# Patient Record
Sex: MALE | Race: WHITE | NOT HISPANIC OR LATINO | Employment: OTHER | URBAN - METROPOLITAN AREA
[De-identification: names, ages, dates, MRNs, and addresses within clinical notes are randomized per-mention and may not be internally consistent; named-entity substitution may affect disease eponyms.]

---

## 2017-01-12 ENCOUNTER — OFFICE VISIT (OUTPATIENT)
Dept: PODIATRY | Facility: CLINIC | Age: 62
End: 2017-01-12
Payer: COMMERCIAL

## 2017-01-12 ENCOUNTER — RADIANT APPOINTMENT (OUTPATIENT)
Dept: GENERAL RADIOLOGY | Facility: CLINIC | Age: 62
End: 2017-01-12
Attending: PODIATRIST
Payer: COMMERCIAL

## 2017-01-12 VITALS — TEMPERATURE: 96.2 F | HEIGHT: 71 IN | BODY MASS INDEX: 30.1 KG/M2 | WEIGHT: 215 LBS

## 2017-01-12 DIAGNOSIS — M72.2 PLANTAR FASCIAL FIBROMATOSIS: ICD-10-CM

## 2017-01-12 DIAGNOSIS — M79.672 FOOT PAIN, BILATERAL: ICD-10-CM

## 2017-01-12 DIAGNOSIS — M79.671 FOOT PAIN, BILATERAL: ICD-10-CM

## 2017-01-12 DIAGNOSIS — M20.5X1 HALLUX LIMITUS OF RIGHT FOOT: Primary | ICD-10-CM

## 2017-01-12 PROCEDURE — 73630 X-RAY EXAM OF FOOT: CPT | Mod: TC

## 2017-01-12 PROCEDURE — 20600 DRAIN/INJ JOINT/BURSA W/O US: CPT | Mod: RT | Performed by: PODIATRIST

## 2017-01-12 PROCEDURE — 99213 OFFICE O/P EST LOW 20 MIN: CPT | Mod: 25 | Performed by: PODIATRIST

## 2017-01-12 RX ORDER — DICLOFENAC SODIUM 75 MG/1
75 TABLET, DELAYED RELEASE ORAL 2 TIMES DAILY
Qty: 60 TABLET | Refills: 1 | Status: SHIPPED | OUTPATIENT
Start: 2017-01-12 | End: 2017-04-06

## 2017-01-12 ASSESSMENT — PAIN SCALES - GENERAL: PAINLEVEL: MODERATE PAIN (4)

## 2017-01-12 NOTE — NURSING NOTE
"Chief Complaint   Patient presents with     Consult     Right top and lateral foot pain 4; new issue     Consult     B/L great toe numbness, burning > Right early Dec 2016; new issue     RECHECK     improvement with NS, supportive shoes, orthotics, tendon feels better now tenderness of heel bone, pain 2Left plantar fasciitis; LOV 10/6/2016       Initial Temp(Src) 96.2  F (35.7  C) (Temporal)  Ht 5' 10.87\" (1.8 m)  Wt 215 lb (97.523 kg)  BMI 30.10 kg/m2 Estimated body mass index is 30.1 kg/(m^2) as calculated from the following:    Height as of this encounter: 5' 10.87\" (1.8 m).    Weight as of this encounter: 215 lb (97.523 kg).  BP completed using cuff size: NA (Not Taken)    Dede Portillo CMA, January 12, 2017    "

## 2017-01-12 NOTE — PROGRESS NOTES
"HPI:  Jose Angel Cha is a 61 year old male who is seen in consultation at the request of self.    Pt presents for eval of:   (Onset, Location, L/R, Character, Treatments, Injury if yes)     XR Left and Right foot today  Onset early January 2017, Right dorsal and lateral foot pain, dull ache, feels \"structural\" while walking  Onset early Dec 2016, Left and Right great toe numbness and burning > Right, feels like it is frostbitten  Wearing supportive shoes and orthotics    Chief Complaint   Patient presents with     Consult     Right top and lateral foot pain 4; new issue     Consult     B/L great toe numbness, burning > Right early Dec 2016; new issue     RECHECK     improvement with NS, supportive shoes, orthotics, tendon feels better now tenderness of heel bone, pain 2Left plantar fasciitis; LOV 10/6/2016       Weight management plan: Patient was referred to their PCP to discuss a diet and exercise plan.     HPI:  Jose Angel Cha is a 61 year old male who is seen in consultation at the request of Liz Gilliland MD.    Pt presents for evaluation of:     Left heel pain, starting 6/20/16 after jumping out of boot into sand beach barefoot.  Since then it has gotten better and worse and remain symptomatic. Terrible pain upon arising after rest. He also notes swelling off and on.    Now a new problem pain about right toe joint and limited motions, stiffness and edema.      Onset:  mid and June  Symptoms brought on by jumping out of boat.    Location:  Left foot    Laterality:  Plantar and Posterior    Character:  sharp, stabbing and swelling    Progression of symptoms:  same    Previous similar pain: no      Pain Level:  2/10    Previous treatments:  ice and boot x one week, elevation    Previous foot or ankle injuries throughout your life that took you out of work or play for more than a few days? no      Works as a .    Shoe gear: slip on shoes and dress shoes    Weight management plan: Patient was referred " "to their PCP to discuss a diet and exercise plan.    EXAM:Vitals: Temp(Src) 96.2  F (35.7  C) (Temporal)  Ht 5' 10.87\" (1.8 m)  Wt 215 lb (97.523 kg)  BMI 30.10 kg/m2  BMI= Body mass index is 30.1 kg/(m^2).    General appearance: Patient is alert and fully cooperative with history & exam.  No sign of distress is noted during the visit.     Psychiatric: Affect is pleasant & appropriate.  Patient appears motivated to improve health.     Respiratory: Breathing is regular & unlabored while sitting.     HEENT: Hearing is intact to spoken word.  Speech is clear.  No gross evidence of visual impairment that would impact ambulation.     Vascular: DP & PT 2/4 & regular bilaterally.  No significant edema, rubor or varicosities noted.  CFT and skin temperature is normal to both lower extremities.       Neurologic: Lower extremity sensation is intact to light touch.  No evidence of weakness in the lower extremities.  No evidence of neuropathy and negative tinel sign.     Dermatologic: Skin is intact to both lower extremities without significant lesions, rash or abrasion.  Normal texture turgor and tone. No paronychia or evidence of soft tissue infection is noted.    Musculoskeletal: Patient is ambulatory without assistive device or brace.   Much reduced discomfort with compression of the LEFT calcaneus medial to lateral over the any palpation of the proximal plantar fascia and calcaneus. Possible induration about the calcaneus. Most discomfort is noted throughout the entire arch and towards the medial band of the plantar fascia at the origination upon the calcaneus. I cannot be certain a stress fracture is not present at this time.    Now pain at left first MPJ with induration and pian with palpation and ROM.  40 total ROM both first MPJ.     Radiographs:  6/16 demonstrates A very large osteophyte is noted about the plantar calcaneus consistent with plantar fasciitis.     ASSESSMENT:       ICD-10-CM    1. Hallux limitus of " right foot M20.5X1 DRAIN/INJECT SMALL JOINT/BURSA     diclofenac (VOLTAREN) 75 MG EC tablet     Kenalog 10 MG         []     triamcinolone acetonide (KENALOG) 10 MG/ML injection   2. Plantar fascial fibromatosis M72.2 XR Foot Bilateral G/E 3 Views     ORTHOTICS REFERRAL     DRAIN/INJECT SMALL JOINT/BURSA     diclofenac (VOLTAREN) 75 MG EC tablet       PLAN:  Reviewed patient's chart in [facility] and discussed etiology and treatment options.      Treatments:  9/13/2016  This is most certainly plantar fasciitis however I cannot rule out stress fracture or fracture of the plantar calcaneal osteophyte. Therefore I have recommended MRI.  Follow up immediately after the MRI  He has mild anxiety with claustrophobia and therefore was given a preprocedure Valium 10 mg Rx. Instructed to have a  to take him to and from if he is using this    Also dispensed a night splint as this will not likely be detrimental in either case.  Follow-up in the next few days after the MRI    10/6/2016  Reviewed MRI findings demonstrating plantar fasciitis without calcaneal stress fracture  Order for orthotic  continue night splint all night every night    Use the OTC naprosyn 400 bid     1/12/2017  Evaluation of new problem hallux limitus right foot   I obtained informed consent, discussed risks and alternative treatments, prepped with ETOH, and injected 10 mg Kenalog and lidocaine about the right first MPJ.  Sterile dressing applied.   Continue orthotics and stiff shoes.  RTC prn    Also order for second pair of custom molded orthotics at his request to address and follow-up with previous established plantar fasciitis      Anshul Rocha DPM

## 2017-01-12 NOTE — PATIENT INSTRUCTIONS
Reliable shoe stores: To maximize your experience and provide the best possible fit.  Be sure to show them your foot concerns and tell them Dr. Rocha sent you.        Stores listed in bold have only athletic shoes, and stores that are not bold are mostly casual or variety of shoes    [employer]  [address] [place] [address]  [phone]    [employer]  [address] [place] [address]  [phone]     [employer] [place]  [address] [place] [address]  [phone]    [employer]  [address] [facility]  [facility] [address]  [phone]    [employer]  [address] [place], [place] [address] [phone]    [employer]  [address] [place] [address]  [phone]                         [employer] Locations:     [address] [place] [address]   [phone]     [address] [place] [address] [address] [place] [address]   [phone]     [address] [place] [address]   [phone]     [address] [facility] [place] [address]   [phone]     [address] [place] [address]   [phone]     [address] [place] [address]   [phone]     [address] [facility] [address]   [place] [address]   [phone]     [address] [facility]   [place] [address]   [phone]             How to find reasonable shoes          The correct width    Correct Fitting    Correct Length      Foot Distortion    Posture Distortion                          Torsional Rigidity      Grasp behind the heel and underneath the foot and twist      Bad    Excessive torsion/twist in midfoot     Less torsion/twist in midfoot is better                   Heel Counter Rigidity      Grasp just above   midsole and squeeze      Bad    Soft heel counter      Good    Rigid Heel Counter      Flexion Rigidity      Grasp shoe and bend from forefoot to rearfoot

## 2017-01-12 NOTE — MR AVS SNAPSHOT
After Visit Summary   1/12/2017    Jose Angel Cha    MRN: [id number]           Patient Information     Date Of Birth          1955        Visit Information        Provider Department      1/12/2017 11:00 AM Anshul Rocha, [facility]        Today's Diagnoses     Hallux limitus of right foot    -  1     Plantar fascial fibromatosis         Foot pain, bilateral           Care Instructions    Reliable shoe stores: To maximize your experience and provide the best possible fit.  Be sure to show them your foot concerns and tell them Dr. Rocha sent you.        Stores listed in bold have only athletic shoes, and stores that are not bold are mostly casual or variety of shoes    [employer]  [address] [place] [address]  [phone]    [employer] [place]  [address] [place] [address]  [phone]    [employer] [place]  [address] [place], [place] [address] [phone]    [employer]  [address] [facility]  [facility] [address]  [phone]    [employer]  [address] [place] [address]  [phone]    [employer]  [address] [place] [address]  [phone]                         [employer] Locations:     [address] [place] [address]   [phone]     [address] [facility] [address] [place] [address]   [phone]     [address] [place] [address]   [phone]     [address] [place] [address]   [phone]     [address] [place] [address] [place] [address]   [phone]     [address] [place] [address]   [phone]     [address]  [facility] [address]   [place] [address]   [phone]     [address]   [place], [place] [address] [phone]             How to find reasonable shoes          The correct width    Correct Fitting    Correct Length      Foot Distortion    Posture Distortion                          Torsional  "Rigidity      Grasp behind the heel and underneath the foot and twist      Bad    Excessive torsion/twist in midfoot     Less torsion/twist in midfoot is better                   Heel Counter Rigidity      Grasp just above   midsole and squeeze      Bad    Soft heel counter      Good    Rigid Heel Counter      Flexion Rigidity      Grasp shoe and bend from forefoot to rearfoot                      Follow-ups after your visit        Additional Services     ORTHOTICS REFERRAL       [facility] scheduling staff may contact patient to arrange appointments for casting of orthotics and often do not leave messages.  The patient may call this number for scheduling at their convenience. Scheduling Phone [phone].      One pair custom functional foot orthotics.   Flexible polypropylene shell with 1/8\" Spenco cushioned top cover, crepe rearfoot post, medial density arch fill, OZ bottom cover.  Aerobic model.  Subs permitted!!  May reduce volume if patient requests.                  Your next 10 appointments already scheduled     Jan 17, 2017  2:00 PM   HST  with SLEEP STUDY  7   [facility], [facility], Sleep Study ([facility])    [address] [place] [id number] [phone]            Jan 25, 2017  3:00 PM   Telephone Visit with Renu Mello MD   [facility], Sleep Study ([facility])    [address] [place] [id number] [phone]           Note: this is not an onsite visit; there is no need to come to the facility.              Who to contact     If you have questions or need follow up information about today's clinic visit or your schedule please contact [facility] directly at [phone].  Normal or non-critical lab and imaging results will be communicated to you by MyChart, letter or phone within 4 business days after the clinic has received the results. If you do not hear " "from us within 7 days, please contact the clinic through Expan or phone. If you have a critical or abnormal lab result, we will notify you by phone as soon as possible.  Submit refill requests through Expan or call your pharmacy and they will forward the refill request to us. Please allow 3 business days for your refill to be completed.          Additional Information About Your Visit        [employer] Search Information     Expan gives you secure access to your electronic health record. If you see a primary care provider, you can also send messages to your care team and make appointments. If you have questions, please call your primary care clinic.  If you do not have a primary care provider, please call [phone] and they will assist you.        Care EveryWhere ID     This is your Care EveryWhere ID. This could be used by other organizations to access your [facility] medical records  [id number]        Your Vitals Were     Temperature Height BMI (Body Mass Index)             96.2  F (35.7  C) (Temporal) 5' 10.87\" (1.8 m) 30.10 kg/m2          Blood Pressure from Last 3 Encounters:   12/07/16 150/94   10/26/16 146/96   09/13/16 115/64    Weight from Last 3 Encounters:   01/12/17 215 lb (97.523 kg)   12/07/16 215 lb (97.523 kg)   10/26/16 215 lb 8 oz (97.75 kg)              We Performed the Following     DRAIN/INJECT SMALL JOINT/BURSA     ORTHOTICS REFERRAL          Today's Medication Changes          These changes are accurate as of: 1/12/17 12:04 PM.  If you have any questions, ask your nurse or doctor.               Start taking these medicines.        Dose/Directions    diclofenac 75 MG EC tablet   Commonly known as:  VOLTAREN   Used for:  Hallux limitus of right foot, Plantar fascial fibromatosis   Started by:  Anshul Rocha DPM        Dose:  75 mg   Take 1 tablet (75 mg) by mouth 2 times daily   Quantity:  60 tablet   Refills:  1            Where to get your medicines      These medications were sent to " [facility] [place], [place] - [address]   [address] , [facility] [address]     Phone:  [phone]    - diclofenac 75 MG EC tablet             Primary Care Provider Office Phone # Fax #    Shari Paredes [phone]       [facility]  [address]  [facility] [address]        Thank you!     Thank you for choosing [facility]  for your care. Our goal is always to provide you with excellent care. Hearing back from our patients is one way we can continue to improve our services. Please take a few minutes to complete the written survey that you may receive in the mail after your visit with us. Thank you!             Your Updated Medication List - Protect others around you: Learn how to safely use, store and throw away your medicines at www.disposemymeds.org.          This list is accurate as of: 1/12/17 12:04 PM.  Always use your most recent med list.                   Brand Name Dispense Instructions for use    * ALLERGEN IMMUNOTHERAPY PRESCRIPTION     13 mL    Name of Mix: Mix #1  Mixed Vespid Mixed Vespid Venom 300 mcg/mL HS 13 ml Diluent: HSA qs to 13ml       * ALLERGEN IMMUNOTHERAPY PRESCRIPTION     13 mL    Name of Mix: Mix #2  Wasp Wasp Venom 100 mcg/mL HS 13 ml Diluent: HSA qs to 13ml       amLODIPine 2.5 MG tablet    NORVASC    30 tablet    Take 1 tablet (2.5 mg) by mouth daily       aspirin 81 MG tablet     0    ONE DAILY       diazepam 10 MG tablet    VALIUM    1 tablet    Take 1 tablet (10 mg) by mouth every 6 hours as needed for anxiety or sleep Take 30-60 minutes before procedure.  Do not operate a vehicle after taking this medication.       diclofenac 75 MG EC tablet    VOLTAREN    60 tablet    Take 1 tablet (75 mg) by mouth 2 times daily       EPINEPHrine 0.3 MG/0.3ML injection    EPIPEN 2-MIGUELINA    2 each    Inject 0.3 mLs (0.3 mg) into the muscle once as needed for anaphylaxis       MULTIVITAL PO      Take 1 tablet by mouth daily        NAPROXEN PO          omeprazole 20 MG CR capsule    priLOSEC    90 capsule    TAKE ONE CAPSULE BY MOUTH EVERY DAY (TAKE 30 TO 60 MINUTES BEFORE A MEAL)       STATIN NOT PRESCRIBED (INTENTIONAL)      by Other route continuous prn.       temazepam 15 MG capsule    RESTORIL    30 capsule    Take 1 capsule (15 mg) by mouth nightly as needed for sleep       * Notice:  This list has 2 medication(s) that are the same as other medications prescribed for you. Read the directions carefully, and ask your doctor or other care provider to review them with you.

## 2017-01-17 ENCOUNTER — OFFICE VISIT (OUTPATIENT)
Dept: SLEEP MEDICINE | Facility: CLINIC | Age: 62
End: 2017-01-17
Attending: INTERNAL MEDICINE
Payer: COMMERCIAL

## 2017-01-17 DIAGNOSIS — G47.33 OBSTRUCTIVE SLEEP APNEA SYNDROME: ICD-10-CM

## 2017-01-17 PROCEDURE — G0399 HOME SLEEP TEST/TYPE 3 PORTA: HCPCS | Mod: ZF

## 2017-01-17 NOTE — PATIENT INSTRUCTIONS
My home sleep study:    ______I will activate the device as shown on the video    __X____My device is programmed to start automatically at     ___11 P.M___________ Time   on _01/17/2017_____________  Day/Date    My contact number if I have problems is ____[phone]_____________________      I will watch the video before I hook it up at night: [email or web address]

## 2017-01-17 NOTE — MR AVS SNAPSHOT
After Visit Summary   1/17/2017    Jose Angel Cha    MRN: [id number]           Patient Information     Date Of Birth          1955        Visit Information        Provider Department      1/17/2017 2:00 PM [facility]        Today's Diagnoses     Obstructive sleep apnea syndrome           Care Instructions    My home sleep study:    ______I will activate the device as shown on the video    __X____My device is programmed to start automatically at     ___11 P.M___________ Time   on _01/17/2017_____________  Day/Date    My contact number if I have problems is ____[phone]_____________________      I will watch the video before I hook it up at night: [email or web address]                Follow-ups after your visit        Your next 10 appointments already scheduled     Jan 17, 2017  2:00 PM   HST  with SLEEP STUDY RM 7   [facility]    [address] [place] [id number] [phone]            Jan 25, 2017  3:00 PM   Telephone Visit with Renu Mello MD   [facility]    [address] [place] [id number] [phone]           Note: this is not an onsite visit; there is no need to come to the facility.              Who to contact     If you have questions or need follow up information about today's clinic visit or your schedule please contact [facility] directly at [phone].  Normal or non-critical lab and imaging results will be communicated to you by MyChart, letter or phone within 4 business days after the clinic has received the results. If you do not hear from us within 7 days, please contact the clinic through MyChart or phone. If you have a critical or abnormal lab result, we will notify you by phone as soon as possible.  Submit refill requests  through [employer]Meter or call your pharmacy and they will forward the refill request to us. Please allow 3 business days for your refill to be completed.          Additional Information About Your Visit        [employer] Information     [employer] gives you secure access to your electronic health record. If you see a primary care provider, you can also send messages to your care team and make appointments. If you have questions, please call your primary care clinic.  If you do not have a primary care provider, please call [phone] and they will assist you.        Care EveryWhere ID     This is your Care EveryWhere ID. This could be used by other organizations to access your [facility] medical records  [id number]         Blood Pressure from Last 3 Encounters:   12/07/16 150/94   10/26/16 146/96   09/13/16 115/64    Weight from Last 3 Encounters:   01/12/17 97.523 kg (215 lb)   12/07/16 97.523 kg (215 lb)   10/26/16 97.75 kg (215 lb 8 oz)              We Performed the Following     HST-HOME SLEEP TEST/TYPE 3 [facility]        Primary Care Provider Office Phone # Fax #    Shari Tonia Paredes [phone]       [facility]  [address] [facility] [address]        Thank you!     Thank you for choosing [facility], SLEEP STUDY  for your care. Our goal is always to provide you with excellent care. Hearing back from our patients is one way we can continue to improve our services. Please take a few minutes to complete the written survey that you may receive in the mail after your visit with us. Thank you!             Your Updated Medication List - Protect others around you: Learn how to safely use, store and throw away your medicines at www.disposemymeds.org.          This list is accurate as of: 1/17/17 11:15 AM.  Always use your most recent med list.                   Brand Name Dispense Instructions for use    * ALLERGEN IMMUNOTHERAPY PRESCRIPTION     13 mL    Name of Mix: Mix #1  Mixed Vespid Mixed  Vespid Venom 300 mcg/mL HS 13 ml Diluent: HSA qs to 13ml       * ALLERGEN IMMUNOTHERAPY PRESCRIPTION     13 mL    Name of Mix: Mix #2  Wasp Wasp Venom 100 mcg/mL HS 13 ml Diluent: HSA qs to 13ml       amLODIPine 2.5 MG tablet    NORVASC    30 tablet    Take 1 tablet (2.5 mg) by mouth daily       aspirin 81 MG tablet     0    ONE DAILY       diazepam 10 MG tablet    VALIUM    1 tablet    Take 1 tablet (10 mg) by mouth every 6 hours as needed for anxiety or sleep Take 30-60 minutes before procedure.  Do not operate a vehicle after taking this medication.       diclofenac 75 MG EC tablet    VOLTAREN    60 tablet    Take 1 tablet (75 mg) by mouth 2 times daily       EPINEPHrine 0.3 MG/0.3ML injection    EPIPEN 2-MIGUELINA    2 each    Inject 0.3 mLs (0.3 mg) into the muscle once as needed for anaphylaxis       MULTIVITAL PO      Take 1 tablet by mouth daily       NAPROXEN PO          omeprazole 20 MG CR capsule    priLOSEC    90 capsule    TAKE ONE CAPSULE BY MOUTH EVERY DAY (TAKE 30 TO 60 MINUTES BEFORE A MEAL)       STATIN NOT PRESCRIBED (INTENTIONAL)      by Other route continuous prn.       temazepam 15 MG capsule    RESTORIL    30 capsule    Take 1 capsule (15 mg) by mouth nightly as needed for sleep       * Notice:  This list has 2 medication(s) that are the same as other medications prescribed for you. Read the directions carefully, and ask your doctor or other care provider to review them with you.

## 2017-01-17 NOTE — PROGRESS NOTES
Patient presented to clinic for  and demonstration of the HST. Patient was set up and instructed use. Patient verbalized understanding and will be returning device after 8am tomorrow    Patient was given sleep logs and written instructions for use    BRANDO Crespo  Clinic Coordinator   Registered Medical Assistant   [facility]

## 2017-01-20 NOTE — PROGRESS NOTES
This HST performed using a Noxturnal T3 device which recorded snore, sound, movement activity, body position, nasal pressure, oronasal thermal airflow, pulse, oximetry and both chest and abdominal respiratory effort. HST data was confined to the time patients states they were in bed.   Patient was score using 1B rules. Patient respiratory events showed an AHI 2.5 with moderate snoring. Patient SP02 below 89% was 1.8 minutes. Overall signal quality was good.    Pt will follow up with sleep provider to determine appropriate therapy.

## 2017-01-20 NOTE — PROCEDURES
"HOME SLEEP STUDY INTERPRETATION    Patient: Jose Angel Cha  MRN: [id number]  YOB: 1955  Study Date: 1/17/2017  Referring Provider: Shari Paredes  Ordering Provider: Renu Mello MD     Indications for Home Study: Jose Angel Cha is a 61 year old male with a history of mild LISBET treated with a mandibular advancement device who presents for home sleep apnea testing for evaluation of efficacy of the mandibular advancement device.    Estimated body mass index is 30.10 kg/(m^2) as calculated from the following:    Height as of 1/12/17: 1.8 m (5' 10.87\").    Weight as of 1/12/17: 97.523 kg (215 lb).  Total score - [place]: 15 (12/7/2016 12:00 PM)    Data: A full night home sleep study was performed recording the standard physiologic parameters including body position, movement, sound, nasal pressure, thermal oral airflow, chest and abdominal movements with respiratory inductance plethysmography, and oxygen saturation by pulse oximetry. Pulse rate was estimated by oximetry recording. This study was considered adequate based on > 4 hours of quality oximetry and respiratory recording.     Analysis Time:  455 minutes    Respiration:   Sleep Associated Hypoxemia: sustained hypoxemia was not present. Baseline oxygen saturation was 93%.  Time with saturation less than or equal to 88% was 1.8 minutes. The lowest oxygen saturation was 70%.   Snoring: Snoring was absent.  Respiratory events: The home study revealed a presence of 2 obstructive apneas and 0 mixed and central apneas. There were 17 hypopneas resulting in a combined apnea/hypopnea index [AHI] of 2.5 events per hour.  AHI was 0 per hour supine, 0 per hour prone, 2.5 per hour on left side, and 2.6 per hour on right side.   Pattern: Excluding events noted above, respiratory rate and pattern was Normal.    Position: Percent of time spent: supine - 1.2%, prone - 0%, on left - 58%, on right - 40%.    Heart Rate: By pulse oximetry normal rate was noted. "     Assessment:   No remnant obstructive sleep apnea with the use of mandibular advancement device.  Sleep associated hypoxemia was not present.    Recommendations:  Continue  oral appliance therapy.  Suggest optimizing sleep hygiene and avoiding sleep deprivation.  Weight management.    Diagnosis Code(s): Obstructive Sleep Apnea G47.33    Haja Chou MD, January 20, 2017   Diplomate, American Board of Internal Medicine, Sleep Medicine

## 2017-03-06 ENCOUNTER — TELEPHONE (OUTPATIENT)
Dept: FAMILY MEDICINE | Facility: OTHER | Age: 62
End: 2017-03-06

## 2017-03-06 DIAGNOSIS — F41.9 ANXIETY: ICD-10-CM

## 2017-03-06 RX ORDER — TEMAZEPAM 15 MG/1
15 CAPSULE ORAL
Qty: 30 CAPSULE | Refills: 3 | Status: CANCELLED | OUTPATIENT
Start: 2017-03-06

## 2017-03-06 NOTE — TELEPHONE ENCOUNTER
Temazepam       Last Written Prescription Date:  3-17-16  Last Fill Quantity: 30,   # refills: 3  Last Office Visit with [facility] provider: 9-6--16  Future Office visit:    Next 5 appointments (look out 90 days)     Apr 06, 2017  9:15 AM CDT   Return Visit with Ynes Chirinos MD   [facility] ([facility])    [address] [place] [id number] [phone]                   Routing refill request to provider for review/approval because:  Drug not on the [facility] or [facility] refill protocol or controlled substance

## 2017-03-07 NOTE — TELEPHONE ENCOUNTER
Routing refill request to provider for review/approval because:  Drug not on the [facility] refill protocol     Yanet Acuña RN, BSN

## 2017-03-09 NOTE — TELEPHONE ENCOUNTER
Sent message to sleep team as use of temazepam was not addressed at that visit.  Shari Paredes MD

## 2017-03-15 ENCOUNTER — TELEPHONE (OUTPATIENT)
Dept: FAMILY MEDICINE | Facility: OTHER | Age: 62
End: 2017-03-15

## 2017-03-15 NOTE — TELEPHONE ENCOUNTER
Pt calling. He would like to start getting his allergy shots in [place] again. He has not vicki seen for this for some time. How does he get this set back up? Please call.   Thank you,  Annie Amaral- Pt Rep.

## 2017-03-15 NOTE — TELEPHONE ENCOUNTER
So, perhaps we should discuss the temazepam as per sleep provider.  OK for virtual visit.  Shari Paredes MD

## 2017-03-15 NOTE — TELEPHONE ENCOUNTER
Dr. Whitlock no longer works for [employer] and I saw that Dr. Mello works in the sleep study and seen this patient as well. They had me fax this message to her at [place] and will give to her to see if she can let us know our questions. I stated on cover sheet can either call us on our  Hot line number of to staff message you back.

## 2017-03-15 NOTE — TELEPHONE ENCOUNTER
Please back him up to 0.4ml. Build back up to top dose of 1.0ml. Once at top dose he can get venom allergy shot every 6 weeks. Thanks.

## 2017-03-15 NOTE — TELEPHONE ENCOUNTER
Per Dr Whitlock's last note pt was no longer using temazepam.     Home testing of sleep apnea with oral appliance in place showed adequate treatment-presumably off the temazepam.    Is pt having pain again? Why has he restarted the medication? Would he benefit from formal Cognitive Behavioral Therapy for Insomnia (via mental health referral to Dr Sherman Iqbal at  sleep [facility] or [facility].)    I recommend pt discuss resuming temazepam with his provider and consider alternative.    Renu Mello M.D.  Pulmonary/Critical Care/Sleep Medicine

## 2017-03-15 NOTE — TELEPHONE ENCOUNTER
Dr. Whitlock saw and did not address.  Sent staff message last week and no response.  Please call sleep clinic and see if they can respond to the message or ask Dr. Whitlock if he is concerned about use of temazepam in this patient due to new diagnosis of LISBET -- usually sedation can worsen LISBET.  Can transfer to me to talk to team if they are confused, but already sent message to Dr. Whitlock and no response.  Shari Paredes MD

## 2017-03-21 ENCOUNTER — ALLIED HEALTH/NURSE VISIT (OUTPATIENT)
Dept: ALLERGY | Facility: OTHER | Age: 62
End: 2017-03-21
Payer: COMMERCIAL

## 2017-03-21 DIAGNOSIS — Z51.6 ALLERGY DESENSITIZATION THERAPY: Primary | ICD-10-CM

## 2017-03-21 DIAGNOSIS — J30.9 ALLERGIC RHINITIS, UNSPECIFIED: Primary | ICD-10-CM

## 2017-03-21 PROCEDURE — 95117 IMMUNOTHERAPY INJECTIONS: CPT

## 2017-03-21 RX ORDER — EPINEPHRINE 0.3 MG/.3ML
0.3 INJECTION SUBCUTANEOUS PRN
Qty: 0.6 ML | Refills: 1 | Status: CANCELLED | OUTPATIENT
Start: 2017-03-21

## 2017-03-21 RX ORDER — EPINEPHRINE 0.3 MG/.3ML
0.3 INJECTION SUBCUTANEOUS PRN
Qty: 0.6 ML | Refills: 1 | Status: SHIPPED | OUTPATIENT
Start: 2017-03-21 | End: 2017-04-06

## 2017-03-21 NOTE — PROGRESS NOTES
"Jose Angel Cha is a 61 year old male who is being evaluated via a telephone visit.      The patient has been notified of following:     \"This telephone visit will be conducted via a call between you and your physician/provider. We have found that certain health care needs can be provided without the need for a physical exam.  This service lets us provide the care you need with a short phone conversation.  If a prescription is necessary we can send it directly to your pharmacy.  If lab work is needed we can place an order for that and you can then stop by our lab to have the test done at a later time.    We will bill your insurance company for this service.  Please check with your medical insurance if this type of visit is covered. You may be responsible for the cost of this type of visit if insurance coverage is denied.  The typical cost is $30 (10min), $59 (11-20min) and $85 (21-30min).  Most often these visits are shorter than 10 minutes.    If during the course of the call the physician/provider feels a telephone visit is not appropriate, you will not be charged for this service.\"       Consent has been obtained for this service by 2 care team members: yes. See the scanned image in the medical record.    Jose Angel Cha complains of  Recheck Medication (Temazepam)      I have reviewed and updated the patient's Past Medical History, Social History, Family History and Medication List.    ALLERGIES  Anesthetic ether; Bee venom; Demerol; and Statin [hmg-coa-r inhibitors]    Son Hope MA  March 22, 2017   (MA signature)    Additional provider notes: discussed temazepam use -- only uses if has sleep deprivation from pain over a few days.  Has much improved his sleep and pain control with his change in diet and activity.  Reduced alcohol use.  Really continues to feel better most of the time.  Has used temazepam for yaers and is actually reducing use of this, but has been more than a years since his prescription " and needs a refill to have it at all.  His wife is RN and will help monitor for any worsening of sleep apnea.    Has been having some soreness in his jaw -- instructed to call Dr. Mello and see if there is an easy adjustment.      Assessment/Plan:  1. LISBET (obstructive sleep apnea) AHI 13.8    2. Anxiety          I have reviewed the note as documented above.  This accurately captures the substance of my conversation with the patient,  Shari Paredes MD     Total time of call between patient and provider was 12 minutes

## 2017-03-21 NOTE — TELEPHONE ENCOUNTER
Pt needs a renewal of epi-pen. Pt would like Auvi-Q , let pt know that it is mail order and not a regular  at pharmacy.    Maddie Jay CMA ([facility])

## 2017-03-21 NOTE — MR AVS SNAPSHOT
After Visit Summary   3/21/2017    Jose Angel Cha    MRN: [id number]           Patient Information     Date Of Birth          1955        Visit Information        Provider Department      3/21/2017 9:45 AM ER ALLERGY SHOTS [facility]        Today's Diagnoses     Allergic rhinitis, unspecified    -  1       Follow-ups after your visit        Your next 10 appointments already scheduled     Mar 22, 2017  4:00 PM CDT   Telephone Visit with Shari [facility]es MD   [facility]    [address]  [facility] [address]   [phone]           Note: this is not an onsite visit; there is no need to come to the facility.            Mar 28, 2017  9:15 AM CDT   Nurse Only with ER ALLERGY SHOTS   [facility]    [address]  [facility] [address]   [phone]            Apr 06, 2017  9:15 AM CDT   Return Visit with Ynes Chirinos MD   [facility] ([facility])    [address] [place], [place] [id number]  [facility] [address]   [phone]            Apr 06, 2017  2:45 PM CDT   Nurse Only with ER ALLERGY SHOTS   [facility]    [address]  [facility] [address]   [phone]            Apr 11, 2017  8:30 AM CDT   Nurse Only with ER ALLERGY SHOTS   [facility]    [address]  [facility] [address]   [phone]            Apr 18, 2017  9:30 AM CDT   Nurse Only with ER ALLERGY SHOTS   [facility]    [address]  [facility] [address]   [phone]              Who to contact     If you have questions or need follow up information about today's clinic visit or your schedule please contact [facility] directly at [phone].  Normal or  non-critical lab and imaging results will be communicated to you by [employer], letter or phone within 4 business days after the clinic has received the results. If you do not hear from us within 7 days, please contact the clinic through kompany or phone. If you have a critical or abnormal lab result, we will notify you by phone as soon as possible.  Submit refill requests through kompany or call your pharmacy and they will forward the refill request to us. Please allow 3 business days for your refill to be completed.          Additional Information About Your Visit        kompany Information     kompany gives you secure access to your electronic health record. If you see a primary care provider, you can also send messages to your care team and make appointments. If you have questions, please call your primary care clinic.  If you do not have a primary care provider, please call [phone] and they will assist you.        Care EveryWhere ID     This is your Care EveryWhere ID. This could be used by other organizations to access your [place] medical records  [id number]         Blood Pressure from Last 3 Encounters:   12/07/16 (!) 150/94   10/26/16 (!) 146/96   09/13/16 115/64    Weight from Last 3 Encounters:   01/12/17 215 lb (97.5 kg)   12/07/16 215 lb (97.5 kg)   10/26/16 215 lb 8 oz (97.8 kg)              We Performed the Following     Allergy Shot: Two or more injections          Today's Medication Changes          These changes are accurate as of: 3/21/17 10:24 AM.  If you have any questions, ask your nurse or doctor.               These medicines have changed or have updated prescriptions.        Dose/Directions    * EPINEPHrine 0.3 MG/0.3ML injection   Commonly known as:  EPIPEN 2-MIGUELINA   This may have changed:  Another medication with the same name was added. Make sure you understand how and when to take each.   Used for:  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 sting   Changed by:  Shari Paredes MD        Dose:  0.3 mg   Inject  0.3 mLs (0.3 mg) into the muscle once as needed for anaphylaxis   Quantity:  2 each   Refills:  3       * EPINEPHrine 0.3 MG/0.3ML injection   Commonly known as:  AUVI-Q   This may have changed:  You were already taking a medication with the same name, and this prescription was added. Make sure you understand how and when to take each.   Used for:  Allergy desensitization therapy   Changed by:  Juan Antonio Cardozo,         Dose:  0.3 mg   Inject 0.3 mLs (0.3 mg) into the muscle as needed for anaphylaxis   Quantity:  0.6 mL   Refills:  1       * Notice:  This list has 2 medication(s) that are the same as other medications prescribed for you. Read the directions carefully, and ask your doctor or other care provider to review them with you.         Where to get your medicines      These medications were sent to [facility] [place] [id number]     Phone:  [phone]     EPINEPHrine 0.3 MG/0.3ML injection                Primary Care Provider Office Phone # Fax #    Shari Paredes [phone]       [facility]  [address] [facility] [address]        Thank you!     Thank you for choosing [facility]  for your care. Our goal is always to provide you with excellent care. Hearing back from our patients is one way we can continue to improve our services. Please take a few minutes to complete the written survey that you may receive in the mail after your visit with us. Thank you!             Your Updated Medication List - Protect others around you: Learn how to safely use, store and throw away your medicines at www.disposemymeds.org.          This list is accurate as of: 3/21/17 10:24 AM.  Always use your most recent med list.                   Brand Name Dispense Instructions for use    * ALLERGEN IMMUNOTHERAPY PRESCRIPTION     13 mL    Name of Mix: Mix #1  Mixed Vespid Mixed Vespid Venom 300 mcg/mL  HS 13 ml Diluent: HSA qs to 13ml       * ALLERGEN IMMUNOTHERAPY PRESCRIPTION     13 mL    Name of Mix: Mix #2  Wasp Wasp Venom 100 mcg/mL HS 13 ml Diluent: HSA qs to 13ml       amLODIPine 2.5 MG tablet    NORVASC    30 tablet    Take 1 tablet (2.5 mg) by mouth daily       aspirin 81 MG tablet     0    ONE DAILY       diazepam 10 MG tablet    VALIUM    1 tablet    Take 1 tablet (10 mg) by mouth every 6 hours as needed for anxiety or sleep Take 30-60 minutes before procedure.  Do not operate a vehicle after taking this medication.       diclofenac 75 MG EC tablet    VOLTAREN    60 tablet    Take 1 tablet (75 mg) by mouth 2 times daily       * EPINEPHrine 0.3 MG/0.3ML injection    EPIPEN 2-MIGUELINA    2 each    Inject 0.3 mLs (0.3 mg) into the muscle once as needed for anaphylaxis       * EPINEPHrine 0.3 MG/0.3ML injection    AUVI-Q    0.6 mL    Inject 0.3 mLs (0.3 mg) into the muscle as needed for anaphylaxis       MULTIVITAL PO      Take 1 tablet by mouth daily       NAPROXEN PO          omeprazole 20 MG CR capsule    priLOSEC    90 capsule    TAKE ONE CAPSULE BY MOUTH EVERY DAY (TAKE 30 TO 60 MINUTES BEFORE A MEAL)       STATIN NOT PRESCRIBED (INTENTIONAL)      by Other route continuous prn.       temazepam 15 MG capsule    RESTORIL    30 capsule    Take 1 capsule (15 mg) by mouth nightly as needed for sleep       * Notice:  This list has 4 medication(s) that are the same as other medications prescribed for you. Read the directions carefully, and ask your doctor or other care provider to review them with you.

## 2017-03-21 NOTE — PROGRESS NOTES
Ordered Auvi-Q today.   Patient presented after waiting 30 minutes with no reaction to allergy injections. Discharged from clinic.  Valarie Atkins MA

## 2017-03-22 ENCOUNTER — VIRTUAL VISIT (OUTPATIENT)
Dept: FAMILY MEDICINE | Facility: OTHER | Age: 62
End: 2017-03-22
Payer: COMMERCIAL

## 2017-03-22 DIAGNOSIS — F41.9 ANXIETY: ICD-10-CM

## 2017-03-22 DIAGNOSIS — G47.33 OSA (OBSTRUCTIVE SLEEP APNEA): Primary | ICD-10-CM

## 2017-03-22 PROCEDURE — 99442 ZZC PHYSICIAN TELEPHONE EVALUATION 11-20 MIN: CPT | Performed by: FAMILY MEDICINE

## 2017-03-22 RX ORDER — TEMAZEPAM 15 MG/1
15 CAPSULE ORAL
Qty: 30 CAPSULE | Refills: 3 | Status: SHIPPED | OUTPATIENT
Start: 2017-03-22 | End: 2017-09-23

## 2017-03-22 NOTE — MR AVS SNAPSHOT
After Visit Summary   3/22/2017    Jose Angel Cha    MRN: [id number]           Patient Information     Date Of Birth          1955        Visit Information        Provider Department      3/22/2017 4:00 PM Shari Paredes MD [facility]        Today's Diagnoses     LISBET (obstructive sleep apnea) AHI 13.8    -  1    Anxiety           Follow-ups after your visit        Your next 10 appointments already scheduled     Mar 22, 2017  4:00 PM CDT   Telephone Visit with Shari Paredes MD   [facility] ([facility])    [address]  [facility] [address]   [phone]           Note: this is not an onsite visit; there is no need to come to the facility.            Mar 28, 2017  9:15 AM CDT   Nurse Only with ER ALLERGY SHOTS   [facility] ([facility])    [address]  [facility] [address]   [phone]            Apr 06, 2017  9:15 AM CDT   Return Visit with Ynes Chirinos MD   [facility] [place] [facility] ([facility])    [address] [place], [place] [address]  [facility] [address]   [phone]            Apr 06, 2017  2:45 PM CDT   Nurse Only with ER ALLERGY SHOTS   [facility] ([facility])    [address]  [facility] [address]   [phone]            Apr 11, 2017  8:30 AM CDT   Nurse Only with ER ALLERGY SHOTS   [facility] ([facility])    [address] [facility] [address]  [facility] [address]   [phone]            Apr 18, 2017  9:30 AM CDT   Nurse Only with ER ALLERGY SHOTS   [facility] ([facility])    [address] [facility] [address]  [facility] [address]   [phone]              Who to contact     If you have questions or need follow up information about today's clinic visit or your schedule please contact [facility] directly at  [phone].  Normal or non-critical lab and imaging results will be communicated to you by MyChart, letter or phone within 4 business days after the clinic has received the results. If you do not hear from us within 7 days, please contact the clinic through CalciMedic[facility]art or phone. If you have a critical or abnormal lab result, we will notify you by phone as soon as possible.  Submit refill requests through [employer] or call your pharmacy and they will forward the refill request to us. Please allow 3 business days for your refill to be completed.          Additional Information About Your Visit        CalciMedicahart Information     [employer] gives you secure access to your electronic health record. If you see a primary care provider, you can also send messages to your care team and make appointments. If you have questions, please call your primary care clinic.  If you do not have a primary care provider, please call [phone] and they will assist you.        Care EveryWhere ID     This is your Care EveryWhere ID. This could be used by other organizations to access your [place] medical records  [id number]         Blood Pressure from Last 3 Encounters:   12/07/16 (!) 150/94   10/26/16 (!) 146/96   09/13/16 115/64    Weight from Last 3 Encounters:   01/12/17 215 lb (97.5 kg)   12/07/16 215 lb (97.5 kg)   10/26/16 215 lb 8 oz (97.8 kg)              Today, you had the following     No orders found for display         Where to get your medicines      Some of these will need a paper prescription and others can be bought over the counter.  Ask your nurse if you have questions.     Bring a paper prescription for each of these medications     temazepam 15 MG capsule          Primary Care Provider Office Phone # Fax #    Shari Paredes [phone]       [facility]  [address] [facility] [address]        Thank you!     Thank you for choosing [facility]  for your care. Our goal is  always to provide you with excellent care. Hearing back from our patients is one way we can continue to improve our services. Please take a few minutes to complete the written survey that you may receive in the mail after your visit with us. Thank you!             Your Updated Medication List - Protect others around you: Learn how to safely use, store and throw away your medicines at www.disposemymeds.org.          This list is accurate as of: 3/22/17  2:05 PM.  Always use your most recent med list.                   Brand Name Dispense Instructions for use    * ALLERGEN IMMUNOTHERAPY PRESCRIPTION     13 mL    Reported on 3/22/2017       * ALLERGEN IMMUNOTHERAPY PRESCRIPTION     13 mL    Reported on 3/22/2017       amLODIPine 2.5 MG tablet    NORVASC    30 tablet    Take 1 tablet (2.5 mg) by mouth daily       aspirin 81 MG tablet     0    ONE DAILY       diazepam 10 MG tablet    VALIUM    1 tablet    Take 1 tablet (10 mg) by mouth every 6 hours as needed for anxiety or sleep Take 30-60 minutes before procedure.  Do not operate a vehicle after taking this medication.       diclofenac 75 MG EC tablet    VOLTAREN    60 tablet    Take 1 tablet (75 mg) by mouth 2 times daily       * EPINEPHrine 0.3 MG/0.3ML injection    EPIPEN 2-MIGUELINA    2 each    Inject 0.3 mLs (0.3 mg) into the muscle once as needed for anaphylaxis       * EPINEPHrine 0.3 MG/0.3ML injection    AUVI-Q    0.6 mL    Inject 0.3 mLs (0.3 mg) into the muscle as needed for anaphylaxis       MULTIVITAL PO      Take 1 tablet by mouth daily Reported on 3/22/2017       NAPROXEN PO      Reported on 3/22/2017       omeprazole 20 MG CR capsule    priLOSEC    90 capsule    TAKE ONE CAPSULE BY MOUTH EVERY DAY (TAKE 30 TO 60 MINUTES BEFORE A MEAL)       STATIN NOT PRESCRIBED (INTENTIONAL)      by Other route continuous prn Reported on 3/22/2017       temazepam 15 MG capsule    RESTORIL    30 capsule    Take 1 capsule (15 mg) by mouth nightly as needed for sleep       *  Notice:  This list has 4 medication(s) that are the same as other medications prescribed for you. Read the directions carefully, and ask your doctor or other care provider to review them with you.

## 2017-03-27 PROBLEM — I10 BENIGN ESSENTIAL HYPERTENSION: Status: ACTIVE | Noted: 2017-03-27

## 2017-03-28 ENCOUNTER — ALLIED HEALTH/NURSE VISIT (OUTPATIENT)
Dept: ALLERGY | Facility: OTHER | Age: 62
End: 2017-03-28
Payer: COMMERCIAL

## 2017-03-28 DIAGNOSIS — J30.9 ALLERGIC RHINITIS, UNSPECIFIED: Primary | ICD-10-CM

## 2017-03-28 PROCEDURE — 95117 IMMUNOTHERAPY INJECTIONS: CPT

## 2017-03-28 NOTE — PROGRESS NOTES
Patient presented after waiting 30 minutes with no reaction to allergy injections. Discharged from clinic.  Roger Powell CMA

## 2017-03-28 NOTE — MR AVS SNAPSHOT
After Visit Summary   3/28/2017    Jose Angel Cha    MRN: [id number]           Patient Information     Date Of Birth          1955        Visit Information        Provider Department      3/28/2017 9:15 AM ER ALLERGY SHOTS [facility]        Today's Diagnoses     Allergic rhinitis, unspecified    -  1       Follow-ups after your visit        Your next 10 appointments already scheduled     Apr 06, 2017  9:15 AM CDT   Return Visit with Ynes Chirinos MD   [facility] [place] ([facility])    [address] [place], [place] [address]  [facility] [address]   [phone]            Apr 06, 2017  2:45 PM CDT   Nurse Only with ER ALLERGY SHOTS   [facility]    [address]  [facility] [address] [phone]            Apr 11, 2017  8:30 AM CDT   Nurse Only with ER ALLERGY SHOTS   [facility]    [address]  [facility] [address] [phone]            Apr 18, 2017  9:30 AM CDT   Nurse Only with ER ALLERGY SHOTS   [facility]    [address]  [facility] [address] [phone]              Who to contact     If you have questions or need follow up information about today's clinic visit or your schedule please contact [facility] directly at [phone].  Normal or non-critical lab and imaging results will be communicated to you by MyChart, letter or phone within 4 business days after the clinic has received the results. If you do not hear from us within 7 days, please contact the clinic through MyChart or phone. If you have a critical or abnormal lab result, we will notify you by phone as soon as possible.  Submit refill requests through [employer] or call your pharmacy and they will forward the refill request to us. Please allow 3 business days for your  refill to be completed.          Additional Information About Your Visit        [employer] Information     [employer] gives you secure access to your electronic health record. If you see a primary care provider, you can also send messages to your care team and make appointments. If you have questions, please call your primary care clinic.  If you do not have a primary care provider, please call [phone] and they will assist you.        Care EveryWhere ID     This is your Care EveryWhere ID. This could be used by other organizations to access your [place] medical records  [id number]         Blood Pressure from Last 3 Encounters:   12/07/16 (!) 150/94   10/26/16 (!) 146/96   09/13/16 115/64    Weight from Last 3 Encounters:   01/12/17 215 lb (97.5 kg)   12/07/16 215 lb (97.5 kg)   10/26/16 215 lb 8 oz (97.8 kg)              We Performed the Following     Allergy Shot: Two or more injections        Primary Care Provider Office Phone # Fax #    Shari Tonia Paredes [phone]       [facility]  [address] [facility] [address]        Thank you!     Thank you for choosing [facility]  for your care. Our goal is always to provide you with excellent care. Hearing back from our patients is one way we can continue to improve our services. Please take a few minutes to complete the written survey that you may receive in the mail after your visit with us. Thank you!             Your Updated Medication List - Protect others around you: Learn how to safely use, store and throw away your medicines at www.disposemymeds.org.          This list is accurate as of: 3/28/17 10:43 AM.  Always use your most recent med list.                   Brand Name Dispense Instructions for use    * ALLERGEN IMMUNOTHERAPY PRESCRIPTION     13 mL    Reported on 3/22/2017       * ALLERGEN IMMUNOTHERAPY PRESCRIPTION     13 mL    Reported on 3/22/2017       amLODIPine 2.5 MG tablet    NORVASC    30 tablet     Take 1 tablet (2.5 mg) by mouth daily       aspirin 81 MG tablet     0    ONE DAILY       diazepam 10 MG tablet    VALIUM    1 tablet    Take 1 tablet (10 mg) by mouth every 6 hours as needed for anxiety or sleep Take 30-60 minutes before procedure.  Do not operate a vehicle after taking this medication.       diclofenac 75 MG EC tablet    VOLTAREN    60 tablet    Take 1 tablet (75 mg) by mouth 2 times daily       * EPINEPHrine 0.3 MG/0.3ML injection    EPIPEN 2-MIGUELINA    2 each    Inject 0.3 mLs (0.3 mg) into the muscle once as needed for anaphylaxis       * EPINEPHrine 0.3 MG/0.3ML injection    AUVI-Q    0.6 mL    Inject 0.3 mLs (0.3 mg) into the muscle as needed for anaphylaxis       MULTIVITAL PO      Take 1 tablet by mouth daily Reported on 3/22/2017       NAPROXEN PO      Reported on 3/22/2017       omeprazole 20 MG CR capsule    priLOSEC    90 capsule    TAKE ONE CAPSULE BY MOUTH EVERY DAY (TAKE 30 TO 60 MINUTES BEFORE A MEAL)       STATIN NOT PRESCRIBED (INTENTIONAL)      by Other route continuous prn Reported on 3/22/2017       temazepam 15 MG capsule    RESTORIL    30 capsule    Take 1 capsule (15 mg) by mouth nightly as needed for sleep       * Notice:  This list has 4 medication(s) that are the same as other medications prescribed for you. Read the directions carefully, and ask your doctor or other care provider to review them with you.

## 2017-04-06 ENCOUNTER — TELEPHONE (OUTPATIENT)
Dept: ALLERGY | Facility: CLINIC | Age: 62
End: 2017-04-06

## 2017-04-06 ENCOUNTER — OFFICE VISIT (OUTPATIENT)
Dept: CARDIOLOGY | Facility: CLINIC | Age: 62
End: 2017-04-06
Attending: PHYSICIAN ASSISTANT
Payer: COMMERCIAL

## 2017-04-06 ENCOUNTER — ALLIED HEALTH/NURSE VISIT (OUTPATIENT)
Dept: ALLERGY | Facility: OTHER | Age: 62
End: 2017-04-06
Payer: COMMERCIAL

## 2017-04-06 VITALS
BODY MASS INDEX: 29.22 KG/M2 | DIASTOLIC BLOOD PRESSURE: 86 MMHG | HEART RATE: 76 BPM | HEIGHT: 71 IN | WEIGHT: 208.7 LBS | SYSTOLIC BLOOD PRESSURE: 124 MMHG

## 2017-04-06 DIAGNOSIS — J30.9 ALLERGIC RHINITIS, UNSPECIFIED: Primary | ICD-10-CM

## 2017-04-06 DIAGNOSIS — Z98.890 H/O MITRAL VALVE REPAIR: ICD-10-CM

## 2017-04-06 DIAGNOSIS — E78.2 MIXED HYPERLIPIDEMIA: Primary | ICD-10-CM

## 2017-04-06 PROCEDURE — 95117 IMMUNOTHERAPY INJECTIONS: CPT

## 2017-04-06 PROCEDURE — 99213 OFFICE O/P EST LOW 20 MIN: CPT | Performed by: INTERNAL MEDICINE

## 2017-04-06 RX ORDER — METOPROLOL SUCCINATE 50 MG/1
50 TABLET, EXTENDED RELEASE ORAL DAILY
COMMUNITY
End: 2017-05-10

## 2017-04-06 NOTE — TELEPHONE ENCOUNTER
Agreed - patient will need to bring an unexpired epinephrine auto-injector with him to all future injection appointments.

## 2017-04-06 NOTE — NURSING NOTE
"Patient here for allergy shot injection. Patient with epi-pen with expiration of 2017, desires to receive shot today. Pt states, \"I have a new one (Elmer-Q) I just received in the mail at home.\" Contacted Dr. Mishra. Per Dr. Mishra, patient okay to receive \"one time dose of injection that is due today. If patient returns next time with  pen or without one, he cannot receive an injection.\"  Pt updated and agreeable to plan of care. Reviewed with patient what steps he should take in event of medical emergency after his injections. Pt states understanding.  "

## 2017-04-06 NOTE — MR AVS SNAPSHOT
After Visit Summary   4/6/2017    Jose Angel Cha    MRN: [id number]           Patient Information     Date Of Birth          1955        Visit Information        Provider Department      4/6/2017 2:45 PM ER ALLERGY SHOTS [facility]        Today's Diagnoses     Allergic rhinitis, unspecified    -  1       Follow-ups after your visit        Your next 10 appointments already scheduled     Apr 11, 2017  8:00 AM CDT   LAB with [facility]   [facility]    [address] [facility] [address]   [phone]           Patient must bring picture ID.  Patient should be prepared to give a urine specimen  Please do not eat 10-12 hours before your appointment if you are coming in fasting for labs on lipids, cholesterol, or glucose (sugar).  Pregnant women should follow their Care Team instructions. Water with medications is okay. Do not drink coffee or other fluids.   If you have concerns about taking  your medications, please ask at office or if scheduling via [employer], send a message by clicking on Secure Messaging, Message Your Care Team.            Apr 11, 2017  8:30 AM CDT   Nurse Only with ER ALLERGY SHOTS   [facility]    [address]  [facility] [address]   [phone]            Apr 18, 2017 11:00 AM CDT   Nurse Only with ER ALLERGY SHOTS   [facility]    [address]  [facility] [address]   [phone]            Apr 25, 2017  8:15 AM CDT   Nurse Only with ER ALLERGY SHOTS   [facility]    [address]  [facility] [address]   [phone]            May 02, 2017  8:15 AM CDT   Nurse Only with ER ALLERGY SHOTS   [facility]    [address]  [facility] [address]   [phone]               Future tests that were ordered for you today     Open Future Orders        Priority Expected Expires Ordered    Follow-Up with Cardiologist Routine 4/6/2018 8/19/2018 4/6/2017    Lipid Profile Routine 4/13/2017 4/6/2018 4/6/2017    ALT Routine 4/13/2017 4/6/2018 4/6/2017            Who to contact     If you have questions or need follow up information about today's clinic visit or your schedule please contact [facility] directly at [phone].  Normal or non-critical lab and imaging results will be communicated to you by [employer], letter or phone within 4 business days after the clinic has received the results. If you do not hear from us within 7 days, please contact the clinic through [employer] or phone. If you have a critical or abnormal lab result, we will notify you by phone as soon as possible.  Submit refill requests through [employer] or call your pharmacy and they will forward the refill request to us. Please allow 3 business days for your refill to be completed.          Additional Information About Your Visit        [employer] Information     [employer] gives you secure access to your electronic health record. If you see a primary care provider, you can also send messages to your care team and make appointments. If you have questions, please call your primary care clinic.  If you do not have a primary care provider, please call [phone] and they will assist you.        Care EveryWhere ID     This is your Care EveryWhere ID. This could be used by other organizations to access your [place] medical records  [id number]         Blood Pressure from Last 3 Encounters:   04/06/17 124/86   12/07/16 (!) 150/94   10/26/16 (!) 146/96    Weight from Last 3 Encounters:   04/06/17 208 lb 11.2 oz (94.7 kg)   01/12/17 215 lb (97.5 kg)   12/07/16 215 lb (97.5 kg)              We Performed the Following     Allergy Shot: Two or more injections        Primary Care Provider Office Phone # Fax #    Shari Randall  MD Lena [phone]       [facility]  [address] [facility] [address]        Thank you!     Thank you for choosing [facility]  for your care. Our goal is always to provide you with excellent care. Hearing back from our patients is one way we can continue to improve our services. Please take a few minutes to complete the written survey that you may receive in the mail after your visit with us. Thank you!             Your Updated Medication List - Protect others around you: Learn how to safely use, store and throw away your medicines at www.disposemymeds.org.          This list is accurate as of: 4/6/17  4:18 PM.  Always use your most recent med list.                   Brand Name Dispense Instructions for use    * ALLERGEN IMMUNOTHERAPY PRESCRIPTION     13 mL    Reported on 3/22/2017       * ALLERGEN IMMUNOTHERAPY PRESCRIPTION     13 mL    Reported on 3/22/2017       aspirin 81 MG tablet     0    ONE DAILY       EPINEPHrine 0.3 MG/0.3ML injection    EPIPEN 2-MIGUELINA    2 each    Inject 0.3 mLs (0.3 mg) into the muscle once as needed for anaphylaxis       metoprolol 50 MG 24 hr tablet    TOPROL-XL     Take 50 mg by mouth daily       MULTIVITAL PO      Take 1 tablet by mouth daily Reported on 3/22/2017       omeprazole 20 MG CR capsule    priLOSEC    90 capsule    TAKE ONE CAPSULE BY MOUTH EVERY DAY (TAKE 30 TO 60 MINUTES BEFORE A MEAL)       STATIN NOT PRESCRIBED (INTENTIONAL)      by Other route continuous prn Reported on 4/6/2017       temazepam 15 MG capsule    RESTORIL    30 capsule    Take 1 capsule (15 mg) by mouth nightly as needed for sleep       * Notice:  This list has 2 medication(s) that are the same as other medications prescribed for you. Read the directions carefully, and ask your doctor or other care provider to review them with you.

## 2017-04-06 NOTE — LETTER
4/6/2017    Shari Paredes MD  [facility]    [address] [facility] [address]    RE: Jose Angel GARCIA Starla       Dear Colleague,    I had the pleasure of seeing Jose Angel Cha in the [facility].    Reverdeneen Cha returned for followup at [facility] in [place].  He is seen in followup of prior mitral valve repair.      He notes that he is overall feeling well.  He denies exertional dyspnea or exertional intolerance.  He and his wife just [place] the first [place]  He has undergone echocardiography which has demonstrated an intact repair and normal left ventricular systolic performance.      His postoperative course was complicated by atrial fibrillation which eventually resolved.  He has also had difficulty with cervical disk disease and prior herniation leading to chronic intermittent pain.  Fortunately, that has come under excellent control.  He developed bronchospasm in response to bee/wasp stings and was treated and the Emergency Department in [place] prior to my last visit.  It was recommended that he discontinue any ARB/ACE inhibitors and beta blockers.  He has remained on a low dose of beta blockade without difficulty.      He has a strong family history of coronary artery disease.  A little over a year ago, he performed a stress echocardiogram with good exertional tolerance and an absence of ischemia.  We reviewed his lipids which remain very elevated with an LDL fraction of 158 mg/dL and an HDL of 50 mg/dL.  He has been intolerant of multiple statin drugs in the past.      PHYSICAL EXAMINATION:   GENERAL:  This is a man in no apparent distress.  He was alert and oriented to person, place and time.   VITAL SIGNS:  Blood pressure was 124/86 mmHg, heart rate 76 beats per minute and regular and respiratory rate 14-18 per minute.   CHEST:  Clear to auscultation.   CARDIAC:  On cardiac auscultation, there was an S1 and  S2 without extra sounds or murmur.  The rhythm was regular.     Outpatient Encounter Prescriptions as of 4/6/2017   Medication Sig Dispense Refill     [DISCONTINUED] metoprolol (TOPROL-XL) 50 MG 24 hr tablet Take 50 mg by mouth daily       temazepam (RESTORIL) 15 MG capsule Take 1 capsule (15 mg) by mouth nightly as needed for sleep 30 capsule 3     omeprazole (PRILOSEC) 20 MG capsule TAKE ONE CAPSULE BY MOUTH EVERY DAY (TAKE 30 TO 60 MINUTES BEFORE A MEAL) 90 capsule 2     ORDER FOR ALLERGEN IMMUNOTHERAPY Reported on 3/22/2017 13 mL PRN     ORDER FOR ALLERGEN IMMUNOTHERAPY Reported on 3/22/2017 13 mL PRN     Multiple Vitamins-Minerals (MULTIVITAL PO) Take 1 tablet by mouth daily Reported on 3/22/2017       EPINEPHrine (EPIPEN 2-MIGUELINA) 0.3 MG/0.3ML injection Inject 0.3 mLs (0.3 mg) into the muscle once as needed for anaphylaxis 2 each 3     ASPIRIN 81 MG OR TABS ONE DAILY 0 0     [DISCONTINUED] EPINEPHrine (AUVI-Q) 0.3 MG/0.3ML injection Inject 0.3 mLs (0.3 mg) into the muscle as needed for anaphylaxis 0.6 mL 1     [DISCONTINUED] diclofenac (VOLTAREN) 75 MG EC tablet Take 1 tablet (75 mg) by mouth 2 times daily (Patient not taking: Reported on 3/22/2017) 60 tablet 1     [DISCONTINUED] NAPROXEN PO Reported on 3/22/2017       [DISCONTINUED] amLODIPine (NORVASC) 2.5 MG tablet Take 1 tablet (2.5 mg) by mouth daily 30 tablet 6     [DISCONTINUED] diazepam (VALIUM) 10 MG tablet Take 1 tablet (10 mg) by mouth every 6 hours as needed for anxiety or sleep Take 30-60 minutes before procedure.  Do not operate a vehicle after taking this medication. 1 tablet 0     STATIN NOT PRESCRIBED, INTENTIONAL, by Other route continuous prn Reported on 4/6/2017  0     No facility-administered encounter medications on file as of 4/6/2017.       IN ASSESSMENT:  Reverend Cha has an intact mitral repair.  He has normal left ventricular systolic performance post-repair.  He is doing well.      With regard to the perioperative atrial  fibrillation, it has resolved and has not returned.      With regard to his family history of coronary disease, I recommended consideration of an alternative to statin therapy.  We discussed the potential use of Repatha and he is very interested.  I have recommended a review of his covered medications and I will review his chart for a list of his prior statin intolerances.  We will contact him to set up a visit with regard to initiating therapy.  Otherwise, I will see him back at 1 year.     Sincerely,    Ynes Chirinos MD     [facility]

## 2017-04-06 NOTE — MR AVS SNAPSHOT
After Visit Summary   4/6/2017    Jose Angel Cha    MRN: [id number]           Patient Information     Date Of Birth          1955        Visit Information        Provider Department      4/6/2017 9:15 AM Ynes Chirinos MD [facility]        Today's Diagnoses     Mixed hyperlipidemia    -  1    H/O mitral valve repair           Follow-ups after your visit        Additional Services     Follow-Up with Cardiologist                 Your next 10 appointments already scheduled     Apr 06, 2017  2:45 PM CDT   Nurse Only with ER ALLERGY SHOTS   [facility]    [address]  [facility] [address]   [phone]            Apr 11, 2017  8:30 AM CDT   Nurse Only with ER ALLERGY SHOTS   [facility] ([facility])    [address]  [facility] [address]   [phone]            Apr 18, 2017  9:30 AM CDT   Nurse Only with ER ALLERGY SHOTS   [facility] ([facility])    [address]  [facility] [address]   [phone]              Future tests that were ordered for you today     Open Future Orders        Priority Expected Expires Ordered    Follow-Up with Cardiologist Routine 4/6/2018 8/19/2018 4/6/2017    Lipid Profile Routine 4/13/2017 4/6/2018 4/6/2017    ALT Routine 4/13/2017 4/6/2018 4/6/2017            Who to contact     If you have questions or need follow up information about today's clinic visit or your schedule please contact [facility] directly at [phone].  Normal or non-critical lab and imaging results will be communicated to you by MyChart, letter or phone within 4 business days after the clinic has received the results. If you do not hear from us within 7 days, please contact the clinic through MyChart or phone. If you have a critical or abnormal lab  "result, we will notify you by phone as soon as possible.  Submit refill requests through [employer] or call your pharmacy and they will forward the refill request to us. Please allow 3 business days for your refill to be completed.          Additional Information About Your Visit        milabenthart Information     [employer] gives you secure access to your electronic health record. If you see a primary care provider, you can also send messages to your care team and make appointments. If you have questions, please call your primary care clinic.  If you do not have a primary care provider, please call [phone] and they will assist you.        Care EveryWhere ID     This is your Care EveryWhere ID. This could be used by other organizations to access your [place] medical records  [id number]        Your Vitals Were     Pulse Height BMI (Body Mass Index)             76 1.791 m (5' 10.5\") 29.52 kg/m2          Blood Pressure from Last 3 Encounters:   04/06/17 124/86   12/07/16 (!) 150/94   10/26/16 (!) 146/96    Weight from Last 3 Encounters:   04/06/17 94.7 kg (208 lb 11.2 oz)   01/12/17 97.5 kg (215 lb)   12/07/16 97.5 kg (215 lb)              We Performed the Following     Follow-Up with Cardiologist        Primary Care Provider Office Phone # Fax #    Shari Paredes [phone]       [facility]  [address] [address]        Thank you!     Thank you for choosing [facility] [place]  for your care. Our goal is always to provide you with excellent care. Hearing back from our patients is one way we can continue to improve our services. Please take a few minutes to complete the written survey that you may receive in the mail after your visit with us. Thank you!             Your Updated Medication List - Protect others around you: Learn how to safely use, store and throw away your medicines at www.disposemymeds.org.          This list is accurate as " of: 4/6/17 10:04 AM.  Always use your most recent med list.                   Brand Name Dispense Instructions for use    * ALLERGEN IMMUNOTHERAPY PRESCRIPTION     13 mL    Reported on 3/22/2017       * ALLERGEN IMMUNOTHERAPY PRESCRIPTION     13 mL    Reported on 3/22/2017       aspirin 81 MG tablet     0    ONE DAILY       EPINEPHrine 0.3 MG/0.3ML injection    EPIPEN 2-MIGUELINA    2 each    Inject 0.3 mLs (0.3 mg) into the muscle once as needed for anaphylaxis       metoprolol 50 MG 24 hr tablet    TOPROL-XL     Take 50 mg by mouth daily       MULTIVITAL PO      Take 1 tablet by mouth daily Reported on 3/22/2017       omeprazole 20 MG CR capsule    priLOSEC    90 capsule    TAKE ONE CAPSULE BY MOUTH EVERY DAY (TAKE 30 TO 60 MINUTES BEFORE A MEAL)       STATIN NOT PRESCRIBED (INTENTIONAL)      by Other route continuous prn Reported on 4/6/2017       temazepam 15 MG capsule    RESTORIL    30 capsule    Take 1 capsule (15 mg) by mouth nightly as needed for sleep       * Notice:  This list has 2 medication(s) that are the same as other medications prescribed for you. Read the directions carefully, and ask your doctor or other care provider to review them with you.

## 2017-04-06 NOTE — PROGRESS NOTES
Patient presented after waiting 30 minutes with no reaction to allergy injections. Discharged from clinic.  Madina Hernandez CMA ....... 4/6/2017 4:18 PM

## 2017-04-10 NOTE — PROGRESS NOTES
HISTORY OF PRESENT ILLNESS:  Reverend Jose Angel Cha returned for followup at [facility] in [place].  He is seen in followup of prior mitral valve repair.      He notes that he is overall feeling well.  He denies exertional dyspnea or exertional intolerance.  He and his [place]  He has undergone echocardiography which has demonstrated an intact repair and normal left ventricular systolic performance.      His postoperative course was complicated by atrial fibrillation which eventually resolved.  He has also had difficulty with cervical disk disease and prior herniation leading to chronic intermittent pain.  Fortunately, that has come under excellent control.  He developed bronchospasm in response to bee/wasp stings and was treated and the Emergency Department in [place] prior to my last visit.  It was recommended that he discontinue any ARB/ACE inhibitors and beta blockers.  He has remained on a low dose of beta blockade without difficulty.      He has a strong family history of coronary artery disease.  A little over a year ago, he performed a stress echocardiogram with good exertional tolerance and an absence of ischemia.  We reviewed his lipids which remain very elevated with an LDL fraction of 158 mg/dL and an HDL of 50 mg/dL.  He has been intolerant of multiple statin drugs in the past.      PHYSICAL EXAMINATION:   GENERAL:  This is a man in no apparent distress.  He was alert and oriented to person, place and time.   VITAL SIGNS:  Blood pressure was 124/86 mmHg, heart rate 76 beats per minute and regular and respiratory rate 14-18 per minute.   CHEST:  Clear to auscultation.   CARDIAC:  On cardiac auscultation, there was an S1 and S2 without extra sounds or murmur.  The rhythm was regular.      IN ASSESSMENT:  Reverend Cha has an intact mitral repair.  He has normal left ventricular systolic performance post-repair.  He is doing  well.      With regard to the perioperative atrial fibrillation, it has resolved and has not returned.      With regard to his family history of coronary disease, I recommended consideration of an alternative to statin therapy.  We discussed the potential use of Repatha and he is very interested.  I have recommended a review of his covered medications and I will review his chart for a list of his prior statin intolerances.  We will contact him to set up a visit with regard to initiating therapy.  Otherwise, I will see him back at 1 year.         ERIKA PIPER MD, [facility]             D: 2017 22:06   T: 04/10/2017 08:32   MT: PIETER      Name:     KARTIK MILLS   MRN:      [id number]        Account:      [id number]   :      1955           Service Date: 2017      Document: [id number]

## 2017-04-11 ENCOUNTER — TELEPHONE (OUTPATIENT)
Dept: ALLERGY | Facility: OTHER | Age: 62
End: 2017-04-11

## 2017-04-11 ENCOUNTER — ALLIED HEALTH/NURSE VISIT (OUTPATIENT)
Dept: ALLERGY | Facility: OTHER | Age: 62
End: 2017-04-11
Payer: COMMERCIAL

## 2017-04-11 DIAGNOSIS — J30.9 ALLERGIC RHINITIS, UNSPECIFIED: Primary | ICD-10-CM

## 2017-04-11 DIAGNOSIS — E78.2 MIXED HYPERLIPIDEMIA: ICD-10-CM

## 2017-04-11 LAB
ALT SERPL W P-5'-P-CCNC: 30 U/L (ref 0–70)
CHOLEST SERPL-MCNC: 265 MG/DL
HDLC SERPL-MCNC: 60 MG/DL
LDLC SERPL CALC-MCNC: 174 MG/DL
NONHDLC SERPL-MCNC: 205 MG/DL
TRIGL SERPL-MCNC: 154 MG/DL

## 2017-04-11 PROCEDURE — 95117 IMMUNOTHERAPY INJECTIONS: CPT

## 2017-04-11 PROCEDURE — 80061 LIPID PANEL: CPT | Performed by: FAMILY MEDICINE

## 2017-04-11 PROCEDURE — 84460 ALANINE AMINO (ALT) (SGPT): CPT | Performed by: FAMILY MEDICINE

## 2017-04-11 PROCEDURE — 36415 COLL VENOUS BLD VENIPUNCTURE: CPT | Performed by: FAMILY MEDICINE

## 2017-04-11 NOTE — PROGRESS NOTES
Patient presented after waiting 30 minutes with no reaction to allergy injections. Discharged from clinic.  Madina Hernandez CMA ....... 4/11/2017 9:35 AM

## 2017-04-11 NOTE — MR AVS SNAPSHOT
After Visit Summary   4/11/2017    Jose Angel Cha    MRN: [id number]           Patient Information     Date Of Birth          1955        Visit Information        Provider Department      4/11/2017 8:30 AM ER ALLERGY SHOTS [facility]        Today's Diagnoses     Allergic rhinitis, unspecified    -  1       Follow-ups after your visit        Your next 10 appointments already scheduled     Apr 25, 2017  8:15 AM CDT   Nurse Only with ER ALLERGY SHOTS   [facility] ([facility]    [address]  [place] [id number] [phone]            May 02, 2017  8:15 AM CDT   Nurse Only with ER ALLERGY SHOTS   [facility] ([facility])    [address]  [place] [address] [phone]              Who to contact     If you have questions or need follow up information about today's clinic visit or your schedule please contact [facility] directly at [phone].  Normal or non-critical lab and imaging results will be communicated to you by Arista Powerhart, letter or phone within 4 business days after the clinic has received the results. If you do not hear from us within 7 days, please contact the clinic through [employer] or phone. If you have a critical or abnormal lab result, we will notify you by phone as soon as possible.  Submit refill requests through [employer] or call your pharmacy and they will forward the refill request to us. Please allow 3 business days for your refill to be completed.          Additional Information About Your Visit        Arista Powerhart Information     [employer] gives you secure access to your electronic health record. If you see a primary care provider, you can also send messages to your care team and make appointments. If you have questions, please call your primary care clinic.  If you do not have a primary care provider, please call [phone] and they will assist  you.        Care EveryWhere ID     This is your Care EveryWhere ID. This could be used by other organizations to access your [place] medical records  [id number]         Blood Pressure from Last 3 Encounters:   04/06/17 124/86   12/07/16 (!) 150/94   10/26/16 (!) 146/96    Weight from Last 3 Encounters:   04/06/17 208 lb 11.2 oz (94.7 kg)   01/12/17 215 lb (97.5 kg)   12/07/16 215 lb (97.5 kg)              We Performed the Following     Allergy Shot: Two or more injections        Primary Care Provider Office Phone # Fax #    Shari Paredes [phone]       [address] [address]        Thank you!     Thank you for choosing [facility]  for your care. Our goal is always to provide you with excellent care. Hearing back from our patients is one way we can continue to improve our services. Please take a few minutes to complete the written survey that you may receive in the mail after your visit with us. Thank you!             Your Updated Medication List - Protect others around you: Learn how to safely use, store and throw away your medicines at www.disposemymeds.org.          This list is accurate as of: 4/11/17  9:35 AM.  Always use your most recent med list.                   Brand Name Dispense Instructions for use    * ALLERGEN IMMUNOTHERAPY PRESCRIPTION     13 mL    Reported on 3/22/2017       * ALLERGEN IMMUNOTHERAPY PRESCRIPTION     13 mL    Reported on 3/22/2017       aspirin 81 MG tablet     0    ONE DAILY       EPINEPHrine 0.3 MG/0.3ML injection    EPIPEN 2-MIGUELINA    2 each    Inject 0.3 mLs (0.3 mg) into the muscle once as needed for anaphylaxis       metoprolol 50 MG 24 hr tablet    TOPROL-XL     Take 50 mg by mouth daily       MULTIVITAL PO      Take 1 tablet by mouth daily Reported on 3/22/2017       omeprazole 20 MG CR capsule    priLOSEC    90 capsule    TAKE ONE CAPSULE BY MOUTH EVERY DAY (TAKE 30 TO 60 MINUTES BEFORE A MEAL)        STATIN NOT PRESCRIBED (INTENTIONAL)      by Other route continuous prn Reported on 4/6/2017       temazepam 15 MG capsule    RESTORIL    30 capsule    Take 1 capsule (15 mg) by mouth nightly as needed for sleep       * Notice:  This list has 2 medication(s) that are the same as other medications prescribed for you. Read the directions carefully, and ask your doctor or other care provider to review them with you.

## 2017-04-11 NOTE — TELEPHONE ENCOUNTER
Pt canceled the April 18th, 2017 due to being out of town and would like to go on the April 20th but we are fully booked. Pt would like a call if we have a cancellation.     Maddie Jay CMA ([facility])

## 2017-04-12 ENCOUNTER — OFFICE VISIT (OUTPATIENT)
Dept: DENTISTRY | Facility: CLINIC | Age: 62
End: 2017-04-12

## 2017-04-12 DIAGNOSIS — G47.33 OSA (OBSTRUCTIVE SLEEP APNEA): Primary | ICD-10-CM

## 2017-04-12 NOTE — LETTER
4/12/2017       RE: Jose Angel Cha  [address] [facility] [address]     Dear Colleague,    Thank you for referring your patient, Jose Angel Cha, to the [facility] Please see a copy of my visit note below.    S:   - pt in no acute distress  - pt has mild sleep apnea  - still good treatment effect, reduced snoring, better sleep quality  - pt mentioned bilateral jaw pain occasionally, difficult to predict when problems occur  - M. masseter region bilateral, low intensity  - no functional problems (eating, chewing etc.)  - no bite problems  - no ear problems  - no dental issues  - no change in stress or job situation  - No family of arthritis or CA relevant for LISBET    O:  - Opening and protrusion: not limited, no pain  - Teeth ok, occlusion sound, checked with articulating paper  - No M. Mass. + Temp palpation pain or discomfort  - No TMJ palpation pain  - No neck palpation pain  - joint clicking  - V and VII are grossly intact  - lips ok, salivary glands ok, oral mucosa ok    A:  - Obstructive sleep apnea  - Disc displacement with reduction    P:  - Explained self-care options for pt to address TMD  - Use moist heat  - Avoid hard food when jaw problems are present  - if problems should occur pt should call   Total time 30 min, 25 min use for counseling and coordinating care    Again, thank you for allowing me to participate in the care of your patient.      Sincerely,    Berlin Vázquez DDS

## 2017-04-12 NOTE — PROGRESS NOTES
S:   - pt in no acute distress  - pt has mild sleep apnea  - still good treatment effect, reduced snoring, better sleep quality  - pt mentioned bilateral jaw pain occasionally, difficult to predict when problems occur  - M. masseter region bilateral, low intensity  - no functional problems (eating, chewing etc.)  - no bite problems  - no ear problems  - no dental issues  - no change in stress or job situation  - No family of arthritis or CA relevant for LISBET    O:  - Opening and protrusion: not limited, no pain  - Teeth ok, occlusion sound, checked with articulating paper  - No M. Mass. + Temp palpation pain or discomfort  - No TMJ palpation pain  - No neck palpation pain  - joint clicking  - V and VII are grossly intact  - lips ok, salivary glands ok, oral mucosa ok    A:  - Obstructive sleep apnea  - Disc displacement with reduction    P:  - Explained self-care options for pt to address TMD  - Use moist heat  - Avoid hard food when jaw problems are present  - if problems should occur pt should call   - Total time 30 min, 25 min use for counseling and coordinating care

## 2017-04-13 ENCOUNTER — DOCUMENTATION ONLY (OUTPATIENT)
Dept: CARDIOLOGY | Facility: CLINIC | Age: 62
End: 2017-04-13

## 2017-04-13 DIAGNOSIS — E78.2 MIXED HYPERLIPIDEMIA: Primary | ICD-10-CM

## 2017-04-13 NOTE — PROGRESS NOTES
"Lipid panel 4/13/17 from [place] lab noted, patient has been in discussion with Dr. Chirinos about starting repatha. Per Team 1 (repatha management): they need either the Rx sent to  Specialty Pharmacy or a request to their team to get the patient started on the medication and they can order the start-up.  Will message Dr. Chirinos to review    Per Dr. Chirinos's recommendation \"I request that they start Rev. Diane on Repatha. He was very much in favor. CD\"  Message sent to repatha team to inititate repatha therapy for patient  Called patient with update that repatha team will start working with him to arrange for the medication and insurance paperwork.  "

## 2017-04-25 ENCOUNTER — ALLIED HEALTH/NURSE VISIT (OUTPATIENT)
Dept: ALLERGY | Facility: OTHER | Age: 62
End: 2017-04-25
Payer: COMMERCIAL

## 2017-04-25 ENCOUNTER — TELEPHONE (OUTPATIENT)
Dept: FAMILY MEDICINE | Facility: OTHER | Age: 62
End: 2017-04-25

## 2017-04-25 DIAGNOSIS — Z12.11 SPECIAL SCREENING FOR MALIGNANT NEOPLASMS, COLON: Primary | ICD-10-CM

## 2017-04-25 DIAGNOSIS — E78.5 HYPERLIPIDEMIA LDL GOAL <100: ICD-10-CM

## 2017-04-25 DIAGNOSIS — J30.9 ALLERGIC RHINITIS, UNSPECIFIED: Primary | ICD-10-CM

## 2017-04-25 PROCEDURE — 95117 IMMUNOTHERAPY INJECTIONS: CPT

## 2017-04-25 NOTE — TELEPHONE ENCOUNTER
Patient returning call. Informed patient of the message below. He understand. He will come tomorrow to [place] to  the FIT test .

## 2017-04-25 NOTE — TELEPHONE ENCOUNTER
Summary:    Patient is due/failing the following:   COLONOSCOPY    Action needed:   Schedule a colonoscopy or complete a FIT test     Type of outreach:    Phone, left message for patient to call back.     Questions for provider review:    None                                                                                                                                    Honey Mckeon       Chart routed to Care Team .      Panel Management Review      Patient has the following on his problem list:     Hypertension   Last three blood pressure readings:  BP Readings from Last 3 Encounters:   04/06/17 124/86   12/07/16 (!) 150/94   10/26/16 (!) 146/96     Blood pressure: Passed    HTN Guidelines:  Age 18-59 BP range:  Less than 140/90  Age 60-85 with Diabetes:  Less than 140/90  Age 60-85 without Diabetes:  less than 150/90      Composite cancer screening  Chart review shows that this patient is due/due soon for the following Colonoscopy

## 2017-04-25 NOTE — PROGRESS NOTES
Patient presented after waiting 30 minutes with no reaction to allergy injections. Discharged from clinic.    Angela Bone RN ............   4/25/2017...9:16 AM

## 2017-04-25 NOTE — MR AVS SNAPSHOT
After Visit Summary   4/25/2017    Jose Angel Cha    MRN: [id number]           Patient Information     Date Of Birth          1955        Visit Information        Provider Department      4/25/2017 8:15 AM ER ALLERGY SHOTS [facility]        Today's Diagnoses     Allergic rhinitis, unspecified    -  1       Follow-ups after your visit        Your next 10 appointments already scheduled     May 02, 2017  8:15 AM CDT   Nurse Only with ER ALLERGY SHOTS   [facility]    [address]  [place] [id number] [phone]              Who to contact     If you have questions or need follow up information about today's clinic visit or your schedule please contact [facility] directly at [phone].  Normal or non-critical lab and imaging results will be communicated to you by MyChart, letter or phone within 4 business days after the clinic has received the results. If you do not hear from us within 7 days, please contact the clinic through MyChart or phone. If you have a critical or abnormal lab result, we will notify you by phone as soon as possible.  Submit refill requests through [employer] or call your pharmacy and they will forward the refill request to us. Please allow 3 business days for your refill to be completed.          Additional Information About Your Visit        MyChart Information     [employer] gives you secure access to your electronic health record. If you see a primary care provider, you can also send messages to your care team and make appointments. If you have questions, please call your primary care clinic.  If you do not have a primary care provider, please call [phone] and they will assist you.        Care EveryWhere ID     This is your Care EveryWhere ID. This could be used by other organizations to access your [place] medical records  [id number]         Blood Pressure from Last 3  Encounters:   04/06/17 124/86   12/07/16 (!) 150/94   10/26/16 (!) 146/96    Weight from Last 3 Encounters:   04/06/17 208 lb 11.2 oz (94.7 kg)   01/12/17 215 lb (97.5 kg)   12/07/16 215 lb (97.5 kg)              We Performed the Following     Allergy Shot: Two or more injections        Primary Care Provider Office Phone # Fax #    Shari Tonia Paredes [phone]       [facility]  [address] [facility] [address]        Thank you!     Thank you for choosing [facility]  for your care. Our goal is always to provide you with excellent care. Hearing back from our patients is one way we can continue to improve our services. Please take a few minutes to complete the written survey that you may receive in the mail after your visit with us. Thank you!             Your Updated Medication List - Protect others around you: Learn how to safely use, store and throw away your medicines at www.disposemymeds.org.          This list is accurate as of: 4/25/17  9:16 AM.  Always use your most recent med list.                   Brand Name Dispense Instructions for use    * ALLERGEN IMMUNOTHERAPY PRESCRIPTION     13 mL    Reported on 3/22/2017       * ALLERGEN IMMUNOTHERAPY PRESCRIPTION     13 mL    Reported on 3/22/2017       aspirin 81 MG tablet     0    ONE DAILY       EPINEPHrine 0.3 MG/0.3ML injection    EPIPEN 2-MIGUELINA    2 each    Inject 0.3 mLs (0.3 mg) into the muscle once as needed for anaphylaxis       metoprolol 50 MG 24 hr tablet    TOPROL-XL     Take 50 mg by mouth daily       MULTIVITAL PO      Take 1 tablet by mouth daily Reported on 3/22/2017       omeprazole 20 MG CR capsule    priLOSEC    90 capsule    TAKE ONE CAPSULE BY MOUTH EVERY DAY (TAKE 30 TO 60 MINUTES BEFORE A MEAL)       STATIN NOT PRESCRIBED (INTENTIONAL)      by Other route continuous prn Reported on 4/6/2017       temazepam 15 MG capsule    RESTORIL    30 capsule    Take 1 capsule (15 mg) by mouth nightly as  needed for sleep       * Notice:  This list has 2 medication(s) that are the same as other medications prescribed for you. Read the directions carefully, and ask your doctor or other care provider to review them with you.

## 2017-04-28 DIAGNOSIS — Z12.11 SPECIAL SCREENING FOR MALIGNANT NEOPLASMS, COLON: ICD-10-CM

## 2017-04-28 PROCEDURE — 82274 ASSAY TEST FOR BLOOD FECAL: CPT | Performed by: FAMILY MEDICINE

## 2017-04-29 LAB — HEMOCCULT STL QL IA: NEGATIVE

## 2017-05-01 NOTE — PROGRESS NOTES
Jose Angel, your results were all normal.    Please let me know if you have any questions.    Shari Paredes MD

## 2017-05-02 ENCOUNTER — ALLIED HEALTH/NURSE VISIT (OUTPATIENT)
Dept: ALLERGY | Facility: OTHER | Age: 62
End: 2017-05-02
Payer: COMMERCIAL

## 2017-05-02 DIAGNOSIS — J30.9 ALLERGIC RHINITIS, UNSPECIFIED: Primary | ICD-10-CM

## 2017-05-02 PROCEDURE — 95117 IMMUNOTHERAPY INJECTIONS: CPT

## 2017-05-02 NOTE — PROGRESS NOTES
Patient presented after waiting 30 minutes with no reaction to allergy injections. Discharged from clinic.  Angela Bone RN ............   5/2/2017...8:56 AM

## 2017-05-02 NOTE — MR AVS SNAPSHOT
After Visit Summary   5/2/2017    Jose Angel Cha    MRN: [id number]           Patient Information     Date Of Birth          1955        Visit Information        Provider Department      5/2/2017 8:15 AM ER ALLERGY SHOTS [facility]        Today's Diagnoses     Allergic rhinitis, unspecified    -  1       Follow-ups after your visit        Your next 10 appointments already scheduled     May 09, 2017  9:15 AM CDT   Nurse Only with ER ALLERGY SHOTS   [facility]    [address]  [place] [address]   [phone]            May 23, 2017  9:00 AM CDT   Nurse Only with ER ALLERGY SHOTS   [facility]    [address]  [place] [address]   [phone]            Jun 06, 2017  8:30 AM CDT   Nurse Only with ER ALLERGY SHOTS   [facility]    [address]  [place] [address]   [phone]            Jun 27, 2017  8:30 AM CDT   Nurse Only with ER ALLERGY SHOTS   [facility]    [address]  [place] [address]   [phone]              Who to contact     If you have questions or need follow up information about today's clinic visit or your schedule please contact [facility] directly at [phone].  Normal or non-critical lab and imaging results will be communicated to you by MyChart, letter or phone within 4 business days after the clinic has received the results. If you do not hear from us within 7 days, please contact the clinic through MyChart or phone. If you have a critical or abnormal lab result, we will notify you by phone as soon as possible.  Submit refill requests through [employer] or call your pharmacy and they will forward the refill request to us. Please allow 3 business days for your refill to be completed.           Additional Information About Your Visit        AthleteTraxhart Information     [employer] gives you secure access to your electronic health record. If you see a primary care provider, you can also send messages to your care team and make appointments. If you have questions, please call your primary care clinic.  If you do not have a primary care provider, please call [phone] and they will assist you.        Care EveryWhere ID     This is your Care EveryWhere ID. This could be used by other organizations to access your [place] medical records  [id number]         Blood Pressure from Last 3 Encounters:   04/06/17 124/86   12/07/16 (!) 150/94   10/26/16 (!) 146/96    Weight from Last 3 Encounters:   04/06/17 208 lb 11.2 oz (94.7 kg)   01/12/17 215 lb (97.5 kg)   12/07/16 215 lb (97.5 kg)              We Performed the Following     Allergy Shot: Two or more injections        Primary Care Provider Office Phone # Fax #    Shari Tonia Paredes [phone]       [facility]  [address] [facility] [address]        Thank you!     Thank you for choosing [facility]  for your care. Our goal is always to provide you with excellent care. Hearing back from our patients is one way we can continue to improve our services. Please take a few minutes to complete the written survey that you may receive in the mail after your visit with us. Thank you!             Your Updated Medication List - Protect others around you: Learn how to safely use, store and throw away your medicines at www.disposemymeds.org.          This list is accurate as of: 5/2/17  8:56 AM.  Always use your most recent med list.                   Brand Name Dispense Instructions for use    * ALLERGEN IMMUNOTHERAPY PRESCRIPTION     13 mL    Reported on 3/22/2017       * ALLERGEN IMMUNOTHERAPY PRESCRIPTION     13 mL    Reported on 3/22/2017       aspirin 81 MG tablet     0    ONE DAILY       EPINEPHrine 0.3 MG/0.3ML  injection    EPIPEN 2-MIGUELINA    2 each    Inject 0.3 mLs (0.3 mg) into the muscle once as needed for anaphylaxis       metoprolol 50 MG 24 hr tablet    TOPROL-XL     Take 50 mg by mouth daily       MULTIVITAL PO      Take 1 tablet by mouth daily Reported on 3/22/2017       omeprazole 20 MG CR capsule    priLOSEC    90 capsule    TAKE ONE CAPSULE BY MOUTH EVERY DAY (TAKE 30 TO 60 MINUTES BEFORE A MEAL)       STATIN NOT PRESCRIBED (INTENTIONAL)      by Other route continuous prn Reported on 4/6/2017       temazepam 15 MG capsule    RESTORIL    30 capsule    Take 1 capsule (15 mg) by mouth nightly as needed for sleep       * Notice:  This list has 2 medication(s) that are the same as other medications prescribed for you. Read the directions carefully, and ask your doctor or other care provider to review them with you.

## 2017-05-04 ENCOUNTER — TELEPHONE (OUTPATIENT)
Dept: CARDIOLOGY | Facility: CLINIC | Age: 62
End: 2017-05-04

## 2017-05-04 NOTE — TELEPHONE ENCOUNTER
Norvasc 2.5 mg      Last Written Prescription Date: 10-  Last Fill Quantity: 90,  # refills: 0   Last Office Visit with [facility], [facility] or [facility] prescribing provider: 04-  Radha Meadows, Pharmacy [facility] [phone]                                           Next 5 appointments (look out 90 days)     May 09, 2017  9:15 AM CDT   Nurse Only with ER ALLERGY SHOTS   [facility]    [address]  [place] [address]   [phone]            May 23, 2017  9:00 AM CDT   Nurse Only with ER ALLERGY SHOTS   [facility]    [address]  [place] [address]   [phone]            Jun 06, 2017  8:30 AM CDT   Nurse Only with ER ALLERGY SHOTS   [facility]    [address]  [place] [address]   [phone]            Jun 27, 2017  8:30 AM CDT   Nurse Only with ER ALLERGY SHOTS   [facility]    [address]  [place] [address]   [phone]                    Refused as pt not taking this--states it was discontinued prior to his last visit on 4/7/17. Dayna Landry RN Cardiology May 4, 2017, 11:25 AM

## 2017-05-09 ENCOUNTER — ALLIED HEALTH/NURSE VISIT (OUTPATIENT)
Dept: ALLERGY | Facility: OTHER | Age: 62
End: 2017-05-09
Payer: COMMERCIAL

## 2017-05-09 DIAGNOSIS — J30.9 ALLERGIC RHINITIS, UNSPECIFIED: Primary | ICD-10-CM

## 2017-05-09 PROCEDURE — 95117 IMMUNOTHERAPY INJECTIONS: CPT

## 2017-05-09 NOTE — MR AVS SNAPSHOT
After Visit Summary   5/9/2017    Jose Angel Cha    MRN: [id number]           Patient Information     Date Of Birth          1955        Visit Information        Provider Department      5/9/2017 9:15 AM ER ALLERGY SHOTS [facility]        Today's Diagnoses     Allergic rhinitis, unspecified    -  1       Follow-ups after your visit        Your next 10 appointments already scheduled     Jun 06, 2017  8:30 AM CDT   Nurse Only with ER ALLERGY SHOTS   [facility] ([facility]    [address]  [facility] [id number] [phone]            Jun 27, 2017  8:30 AM CDT   Nurse Only with ER ALLERGY SHOTS   [facility] ([facility])    [address]  [facility] [address] [phone]              Who to contact     If you have questions or need follow up information about today's clinic visit or your schedule please contact [facility] directly at [phone].  Normal or non-critical lab and imaging results will be communicated to you by Signal Data[employer], letter or phone within 4 business days after the clinic has received the results. If you do not hear from us within 7 days, please contact the clinic through [employer] or phone. If you have a critical or abnormal lab result, we will notify you by phone as soon as possible.  Submit refill requests through [employer] or call your pharmacy and they will forward the refill request to us. Please allow 3 business days for your refill to be completed.          Additional Information About Your Visit        Signal Data[employer] Information     [employer] gives you secure access to your electronic health record. If you see a primary care provider, you can also send messages to your care team and make appointments. If you have questions, please call your primary care clinic.  If you do not have a primary care provider, please call [phone] and they will assist  you.        Care EveryWhere ID     This is your Care EveryWhere ID. This could be used by other organizations to access your [place] medical records  [id number]         Blood Pressure from Last 3 Encounters:   04/06/17 124/86   12/07/16 (!) 150/94   10/26/16 (!) 146/96    Weight from Last 3 Encounters:   04/06/17 208 lb 11.2 oz (94.7 kg)   01/12/17 215 lb (97.5 kg)   12/07/16 215 lb (97.5 kg)              We Performed the Following     Allergy Shot: Two or more injections        Primary Care Provider Office Phone # Fax #    Shari Paredes [phone]       [address] [address]        Thank you!     Thank you for choosing [facility]  for your care. Our goal is always to provide you with excellent care. Hearing back from our patients is one way we can continue to improve our services. Please take a few minutes to complete the written survey that you may receive in the mail after your visit with us. Thank you!             Your Updated Medication List - Protect others around you: Learn how to safely use, store and throw away your medicines at www.disposemymeds.org.          This list is accurate as of: 5/9/17 10:16 AM.  Always use your most recent med list.                   Brand Name Dispense Instructions for use    * ALLERGEN IMMUNOTHERAPY PRESCRIPTION     13 mL    Reported on 3/22/2017       * ALLERGEN IMMUNOTHERAPY PRESCRIPTION     13 mL    Reported on 3/22/2017       aspirin 81 MG tablet     0    ONE DAILY       EPINEPHrine 0.3 MG/0.3ML injection    EPIPEN 2-MIGUELINA    2 each    Inject 0.3 mLs (0.3 mg) into the muscle once as needed for anaphylaxis       metoprolol 50 MG 24 hr tablet    TOPROL-XL     Take 50 mg by mouth daily       MULTIVITAL PO      Take 1 tablet by mouth daily Reported on 3/22/2017       omeprazole 20 MG CR capsule    priLOSEC    90 capsule    TAKE ONE CAPSULE BY MOUTH EVERY DAY (TAKE 30 TO 60 MINUTES BEFORE A MEAL)        STATIN NOT PRESCRIBED (INTENTIONAL)      by Other route continuous prn Reported on 4/6/2017       temazepam 15 MG capsule    RESTORIL    30 capsule    Take 1 capsule (15 mg) by mouth nightly as needed for sleep       * Notice:  This list has 2 medication(s) that are the same as other medications prescribed for you. Read the directions carefully, and ask your doctor or other care provider to review them with you.

## 2017-05-09 NOTE — PROGRESS NOTES
Patient presented after waiting 30 minutes with no reaction to allergy injections. Discharged from clinic.  Angela Bone RN ............   5/9/2017...10:16 AM

## 2017-05-10 ENCOUNTER — TELEPHONE (OUTPATIENT)
Dept: CARDIOLOGY | Facility: CLINIC | Age: 62
End: 2017-05-10

## 2017-05-10 DIAGNOSIS — I10 ESSENTIAL HYPERTENSION: Primary | ICD-10-CM

## 2017-05-10 RX ORDER — AMLODIPINE BESYLATE 2.5 MG/1
2.5 TABLET ORAL DAILY
Qty: 90 TABLET | Refills: 1 | Status: SHIPPED | OUTPATIENT
Start: 2017-05-10 | End: 2017-10-12

## 2017-05-10 NOTE — TELEPHONE ENCOUNTER
Patient called and is trying to clarify his refills at the [facility] pharmacy.  He states he has been taking Amlodipine for the last 9 months and a nurse denied it to be refilled.  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  He is requesting a refill of amlodipine 2.5mg 90 day supply.    Contacted  Pharmacy in [place] and clarified he has not had Toprol XL 50mg refilled recently.

## 2017-06-01 NOTE — PROGRESS NOTES
Script for Repatha sent to  Specialty pharmacy. PA also initiated. Will call pt once we have received response from  Specialty Pharmacy. Maikel CARREON

## 2017-06-02 ENCOUNTER — TELEPHONE (OUTPATIENT)
Dept: PHARMACY | Facility: OTHER | Age: 62
End: 2017-06-02

## 2017-06-02 NOTE — TELEPHONE ENCOUNTER
PA Initiation    Medication: repatha  Insurance Company: [employer] - Phone [phone] Fax [phone]  Pharm[facility]illing the Rx[facility] - [place], [place] - [address]  Filling Pharmacy Phone: [phone]  Filling Pharmacy Fax: [phone]  Start Date: 6/2/2017

## 2017-06-06 ENCOUNTER — ALLIED HEALTH/NURSE VISIT (OUTPATIENT)
Dept: ALLERGY | Facility: OTHER | Age: 62
End: 2017-06-06
Payer: COMMERCIAL

## 2017-06-06 DIAGNOSIS — J30.9 ALLERGIC RHINITIS, UNSPECIFIED: Primary | ICD-10-CM

## 2017-06-06 PROCEDURE — 95117 IMMUNOTHERAPY INJECTIONS: CPT

## 2017-06-06 NOTE — PROGRESS NOTES
Patient presented after waiting 30 minutes with flare/no wheal at injection sites. Patient reports itching, discussed that he should take an over the counter anti-histamine and he verbalized understanding. Discharged from clinic.  Angela Bone RN ............   6/6/2017...9:10 AM

## 2017-06-06 NOTE — MR AVS SNAPSHOT
After Visit Summary   6/6/2017    Jose Angel Cha    MRN: [id number]           Patient Information     Date Of Birth          1955        Visit Information        Provider Department      6/6/2017 8:30 AM ER ALLERGY SHOTS [facility]        Today's Diagnoses     Allergic rhinitis, unspecified    -  1       Follow-ups after your visit        Your next 10 appointments already scheduled     Jun 27, 2017  8:30 AM CDT   Nurse Only with ER [facility]ERGY SHOTS   [facility]    [address]  [facility] [id number] [phone]              Who to contact     If you have questions or need follow up information about today's clinic visit or your schedule please contact [facility] directly at [phone].  Normal or non-critical lab and imaging results will be communicated to you by MyChart, letter or phone within 4 business days after the clinic has received the results. If you do not hear from us within 7 days, please contact the clinic through MyChart or phone. If you have a critical or abnormal lab result, we will notify you by phone as soon as possible.  Submit refill requests through [employer] or call your pharmacy and they will forward the refill request to us. Please allow 3 business days for your refill to be completed.          Additional Information About Your Visit        MyChart Information     Horse[employer]ot gives you secure access to your electronic health record. If you see a primary care provider, you can also send messages to your care team and make appointments. If you have questions, please call your primary care clinic.  If you do not have a primary care provider, please call [phone] and they will assist you.        Care EveryWhere ID     This is your Care EveryWhere ID. This could be used by other organizations to access your [place] medical records  [id number]         Blood Pressure from Last 3  Encounters:   04/06/17 124/86   12/07/16 (!) 150/94   10/26/16 (!) 146/96    Weight from Last 3 Encounters:   04/06/17 208 lb 11.2 oz (94.7 kg)   01/12/17 215 lb (97.5 kg)   12/07/16 215 lb (97.5 kg)              We Performed the Following     Allergy Shot: Two or more injections        Primary Care Provider Office Phone # Fax #    Shari Tonia Paredes [phone]       [facility]  [address] [facility] [address]        Thank you!     Thank you for choosing [facility]  for your care. Our goal is always to provide you with excellent care. Hearing back from our patients is one way we can continue to improve our services. Please take a few minutes to complete the written survey that you may receive in the mail after your visit with us. Thank you!             Your Updated Medication List - Protect others around you: Learn how to safely use, store and throw away your medicines at www.disposemymeds.org.          This list is accurate as of: 6/6/17  9:12 AM.  Always use your most recent med list.                   Brand Name Dispense Instructions for use    * ALLERGEN IMMUNOTHERAPY PRESCRIPTION     13 mL    Reported on 3/22/2017       * ALLERGEN IMMUNOTHERAPY PRESCRIPTION     13 mL    Reported on 3/22/2017       amLODIPine 2.5 MG tablet    NORVASC    90 tablet    Take 1 tablet (2.5 mg) by mouth daily       aspirin 81 MG tablet     0    ONE DAILY       EPINEPHrine 0.3 MG/0.3ML injection    EPIPEN 2-MIGUELINA    2 each    Inject 0.3 mLs (0.3 mg) into the muscle once as needed for anaphylaxis       evolocumab 140 MG/ML prefilled autoinjector    REPATHA    2 mL    Inject 1 mL (140 mg) Subcutaneous every 14 days       MULTIVITAL PO      Take 1 tablet by mouth daily Reported on 3/22/2017       omeprazole 20 MG CR capsule    priLOSEC    90 capsule    TAKE ONE CAPSULE BY MOUTH EVERY DAY (TAKE 30 TO 60 MINUTES BEFORE A MEAL)       STATIN NOT PRESCRIBED (INTENTIONAL)      by Other route  continuous prn Reported on 4/6/2017       temazepam 15 MG capsule    RESTORIL    30 capsule    Take 1 capsule (15 mg) by mouth nightly as needed for sleep       * Notice:  This list has 2 medication(s) that are the same as other medications prescribed for you. Read the directions carefully, and ask your doctor or other care provider to review them with you.

## 2017-06-15 NOTE — PROGRESS NOTES
PA on repatha denied on basis that pt needs to fail praluent first. Script for Praluent sent to  Specialty Pharmacy. Maikel CARREON

## 2017-06-15 NOTE — TELEPHONE ENCOUNTER
PRIOR AUTHORIZATION DENIED    Medication: repatha    Denial Date: 6/13/2017    Denial Rational: Must try/fail Praluent    Appeal Information:     Mail to:   [facility] Clinical Review  [address]  [place] [address]    Or    Fax to: [phone]

## 2017-06-16 ENCOUNTER — TELEPHONE (OUTPATIENT)
Dept: PHARMACY | Facility: OTHER | Age: 62
End: 2017-06-16

## 2017-06-16 NOTE — TELEPHONE ENCOUNTER
PA Initiation    Medication: Praluent  Insurance Company: [employer] - Phone [phone] Fax [phone]  Pharmacy Filling the Rx:    Filling Pharmacy Phone:    Filling Pharmacy Fax:    Start Date: 6/16/2017

## 2017-06-22 DIAGNOSIS — K21.9 GASTROESOPHAGEAL REFLUX DISEASE WITHOUT ESOPHAGITIS: ICD-10-CM

## 2017-06-22 NOTE — TELEPHONE ENCOUNTER
omeprazole (PRILOSEC) 20 MG capsule      Last Written Prescription Date: 9/20/16  Last Fill Quantity: 90,  # refills: 2   Last Office Visit with [facility], [facility] or [facility] prescribing provider: 9/6/16                                         Next 5 appointments (look out 90 days)     Jun 27, 2017  8:30 AM CDT   Nurse Only with ER ALLERGY SHOTS   [facility] ([facility])    [address]  [facility] [address] [phone]

## 2017-06-22 NOTE — TELEPHONE ENCOUNTER
PRIOR AUTHORIZATION DENIED    Medication: Praluent    Denial Date: 6/20/2017    Denial Rational:     Appeal Information:

## 2017-06-26 NOTE — TELEPHONE ENCOUNTER
Prescription approved per [facility] Refill Protocol.  Ara Ivy, RN, BSN

## 2017-06-27 ENCOUNTER — ALLIED HEALTH/NURSE VISIT (OUTPATIENT)
Dept: ALLERGY | Facility: OTHER | Age: 62
End: 2017-06-27
Payer: COMMERCIAL

## 2017-06-27 DIAGNOSIS — J30.9 ALLERGIC RHINITIS, UNSPECIFIED: Primary | ICD-10-CM

## 2017-06-27 PROCEDURE — 95117 IMMUNOTHERAPY INJECTIONS: CPT

## 2017-06-27 NOTE — PROGRESS NOTES
Patient presented after waiting 30 minutes with no reaction to allergy injections. Discharged from clinic.  Angela Bone RN ............   6/27/2017...9:39 AM

## 2017-06-27 NOTE — MR AVS SNAPSHOT
After Visit Summary   6/27/2017    Jose Angel Cha    MRN: [id number]           Patient Information     Date Of Birth          1955        Visit Information        Provider Department      6/27/2017 8:30 AM ER ALLERGY SHOTS [facility]        Today's Diagnoses     Allergic rhinitis, unspecified    -  1       Follow-ups after your visit        Your next 10 appointments already scheduled     Aug 01, 2017  8:15 AM CDT   Nurse Only with ER ALLERGY SHOTS   [facility]    [address]  [facility] [id number] [phone]              Who to contact     If you have questions or need follow up information about today's clinic visit or your schedule please contact [facility] directly at [phone].  Normal or non-critical lab and imaging results will be communicated to you by MyChart, letter or phone within 4 business days after the clinic has received the results. If you do not hear from us within 7 days, please contact the clinic through MyChart or phone. If you have a critical or abnormal lab result, we will notify you by phone as soon as possible.  Submit refill requests through hipix or call your pharmacy and they will forward the refill request to us. Please allow 3 business days for your refill to be completed.          Additional Information About Your Visit        MyChart Information     hipix gives you secure access to your electronic health record. If you see a primary care provider, you can also send messages to your care team and make appointments. If you have questions, please call your primary care clinic.  If you do not have a primary care provider, please call [phone] and they will assist you.        Care EveryWhere ID     This is your Care EveryWhere ID. This could be used by other organizations to access your [place] medical records  [id number]         Blood Pressure from Last 3  Encounters:   04/06/17 124/86   12/07/16 (!) 150/94   10/26/16 (!) 146/96    Weight from Last 3 Encounters:   04/06/17 208 lb 11.2 oz (94.7 kg)   01/12/17 215 lb (97.5 kg)   12/07/16 215 lb (97.5 kg)              Today, you had the following     No orders found for display       Primary Care Provider Office Phone # Fax #    Shari Tonia Paredes [phone]       [facility]  [address] [facility] [address]        Equal Access to Services     [facility] : Hadii karina dowling hadasho Soreji, waaxda luqadaha, qaybta kaalmada adeegyada, nico guillen . So [facility] [phone].    ATENCIÓN: Si habla español, tiene a armas disposición servicios gratuitos de asistencia lingüística. [facility] [phone].    We comply with applicable federal civil rights laws and [place] laws. We do not discriminate on the basis of race, color, national origin, age, disability sex, sexual orientation or gender identity.            Thank you!     Thank you for choosing [facility]  for your care. Our goal is always to provide you with excellent care. Hearing back from our patients is one way we can continue to improve our services. Please take a few minutes to complete the written survey that you may receive in the mail after your visit with us. Thank you!             Your Updated Medication List - Protect others around you: Learn how to safely use, store and throw away your medicines at www.disposemymeds.org.          This list is accurate as of: 6/27/17  9:39 AM.  Always use your most recent med list.                   Brand Name Dispense Instructions for use Diagnosis    alirocumab 75 MG/ML injectable pen    PRALUENT    2 mL    Inject 1 mL (75 mg) Subcutaneous every 14 days    Mixed hyperlipidemia       * ALLERGEN IMMUNOTHERAPY PRESCRIPTION     13 mL    Reported on 3/22/2017        * ALLERGEN IMMUNOTHERAPY PRESCRIPTION     13 mL    Reported on 3/22/2017        amLODIPine 2.5  MG tablet    NORVASC    90 tablet    Take 1 tablet (2.5 mg) by mouth daily    Essential hypertension       aspirin 81 MG tablet     0    ONE DAILY    Other and unspecified hyperlipidemia, Family history of ischemic heart disease       EPINEPHrine 0.3 MG/0.3ML injection    EPIPEN 2-MIGUELINA    2 each    Inject 0.3 mLs (0.3 mg) into the muscle once as needed for anaphylaxis    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 sting       MULTIVITAL PO      Take 1 tablet by mouth daily Reported on 3/22/2017        omeprazole 20 MG CR capsule    priLOSEC    90 capsule    TAKE ONE CAPSULE BY MOUTH EVERY DAY (TAKE 30 TO 60 MINUTES BEFORE A MEAL)    Gastroesophageal reflux disease without esophagitis       STATIN NOT PRESCRIBED (INTENTIONAL)      by Other route continuous prn Reported on 4/6/2017    Mitral valve disorder, Hyperlipidemia LDL goal <100       temazepam 15 MG capsule    RESTORIL    30 capsule    Take 1 capsule (15 mg) by mouth nightly as needed for sleep    Anxiety       * Notice:  This list has 2 medication(s) that are the same as other medications prescribed for you. Read the directions carefully, and ask your doctor or other care provider to review them with you.

## 2017-08-01 ENCOUNTER — ALLIED HEALTH/NURSE VISIT (OUTPATIENT)
Dept: ALLERGY | Facility: OTHER | Age: 62
End: 2017-08-01
Payer: COMMERCIAL

## 2017-08-01 DIAGNOSIS — J30.9 ALLERGIC RHINITIS, UNSPECIFIED: Primary | ICD-10-CM

## 2017-08-01 PROCEDURE — 95117 IMMUNOTHERAPY INJECTIONS: CPT

## 2017-08-01 PROCEDURE — 99207 ZZC NO CHARGE LOS: CPT

## 2017-08-01 NOTE — MR AVS SNAPSHOT
After Visit Summary   8/1/2017    Jose Angel Cha    MRN: [id number]           Patient Information     Date Of Birth          1955        Visit Information        Provider Department      8/1/2017 8:15 AM ER ALLERGY SHOTS [facility]        Today's Diagnoses     Allergic rhinitis, unspecified    -  1       Follow-ups after your visit        Your next 10 appointments already scheduled     Aug 29, 2017  8:30 AM CDT   Nurse Only with ER ALLERGY SHOTS   [facility]    [address]  [facility] [address] [phone]              Who to contact     If you have questions or need follow up information about today's clinic visit or your schedule please contact [facility] directly at [phone].  Normal or non-critical lab and imaging results will be communicated to you by MyChart, letter or phone within 4 business days after the clinic has received the results. If you do not hear from us within 7 days, please contact the clinic through MyChart or phone. If you have a critical or abnormal lab result, we will notify you by phone as soon as possible.  Submit refill requests through [employer] or call your pharmacy and they will forward the refill request to us. Please allow 3 business days for your refill to be completed.          Additional Information About Your Visit        MyChart Information     [employer] gives you secure access to your electronic health record. If you see a primary care provider, you can also send messages to your care team and make appointments. If you have questions, please call your primary care clinic.  If you do not have a primary care provider, please call [phone] and they will assist you.        Care EveryWhere ID     This is your Care EveryWhere ID. This could be used by other organizations to access your [place] medical records  [id number]         Blood Pressure from Last 3  Encounters:   04/06/17 124/86   12/07/16 (!) 150/94   10/26/16 (!) 146/96    Weight from Last 3 Encounters:   04/06/17 208 lb 11.2 oz (94.7 kg)   01/12/17 215 lb (97.5 kg)   12/07/16 215 lb (97.5 kg)              We Performed the Following     Allergy Shot: Two or more injections        Primary Care Provider Office Phone # Fax #    Shari Tonia Paredes [phone]       [facility]  [address] [facility] [address]        Equal Access to Services     [facility] HospitalSOPHIA : Hadii aad ku hadasho Soreji, waaxda luqadaha, qaybta kaalmada adeegyada, nico guillen . So [facility] [phone].    ATENCIÓN: Si habla español, tiene a armas disposición servicios gratuitos de asistencia lingüística. [facility] [phone].    We comply with applicable federal civil rights laws and [place] laws. We do not discriminate on the basis of race, color, national origin, age, disability sex, sexual orientation or gender identity.            Thank you!     Thank you for choosing [facility]  for your care. Our goal is always to provide you with excellent care. Hearing back from our patients is one way we can continue to improve our services. Please take a few minutes to complete the written survey that you may receive in the mail after your visit with us. Thank you!             Your Updated Medication List - Protect others around you: Learn how to safely use, store and throw away your medicines at www.disposemymeds.org.          This list is accurate as of: 8/1/17  8:53 AM.  Always use your most recent med list.                   Brand Name Dispense Instructions for use Diagnosis    alirocumab 75 MG/ML injectable pen    PRALUENT    2 mL    Inject 1 mL (75 mg) Subcutaneous every 14 days    Mixed hyperlipidemia       * ALLERGEN IMMUNOTHERAPY PRESCRIPTION     13 mL    Reported on 3/22/2017        * ALLERGEN IMMUNOTHERAPY PRESCRIPTION     13 mL    Reported on 3/22/2017         amLODIPine 2.5 MG tablet    NORVASC    90 tablet    Take 1 tablet (2.5 mg) by mouth daily    Essential hypertension       aspirin 81 MG tablet     0    ONE DAILY    Other and unspecified hyperlipidemia, Family history of ischemic heart disease       EPINEPHrine 0.3 MG/0.3ML injection 2-pack    EPIPEN 2-MIGUELINA    2 each    Inject 0.3 mLs (0.3 mg) into the muscle once as needed for anaphylaxis    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 sting       MULTIVITAL PO      Take 1 tablet by mouth daily Reported on 3/22/2017        omeprazole 20 MG CR capsule    priLOSEC    90 capsule    TAKE ONE CAPSULE BY MOUTH EVERY DAY (TAKE 30 TO 60 MINUTES BEFORE A MEAL)    Gastroesophageal reflux disease without esophagitis       STATIN NOT PRESCRIBED (INTENTIONAL)      by Other route continuous prn Reported on 4/6/2017    Mitral valve disorder, Hyperlipidemia LDL goal <100       temazepam 15 MG capsule    RESTORIL    30 capsule    Take 1 capsule (15 mg) by mouth nightly as needed for sleep    Anxiety       * Notice:  This list has 2 medication(s) that are the same as other medications prescribed for you. Read the directions carefully, and ask your doctor or other care provider to review them with you.

## 2017-08-04 ENCOUNTER — TELEPHONE (OUTPATIENT)
Dept: SURGERY | Facility: CLINIC | Age: 62
End: 2017-08-04

## 2017-08-04 DIAGNOSIS — Z12.11 SCREENING FOR COLON CANCER: Primary | ICD-10-CM

## 2017-08-04 NOTE — TELEPHONE ENCOUNTER
Jose Angel called to schedule his Colonoscopy. Please place orders and call Jose Angel when it's ok to schedule.

## 2017-08-07 ENCOUNTER — TELEPHONE (OUTPATIENT)
Dept: FAMILY MEDICINE | Facility: OTHER | Age: 62
End: 2017-08-07

## 2017-08-07 NOTE — TELEPHONE ENCOUNTER
Left message for patient to return call to schedule EGD/colonoscopy. If Marixa or Ashia are not available, please transfer to same day surgery        Ok to schedule with either Toan or Brenda

## 2017-08-09 NOTE — TELEPHONE ENCOUNTER
Jose Angel called to schedule his Colonoscopy. He has the [facility] insurance. Offered Dr Gonzalez on any of his Monday schedules. Due to Jose Angel's occupation, he is unable to start prep on Sundays. I suggested [facility] [place]. Can you delete his [facility] order and place an order for [facility] [place]. Please call the patient when its ok to schedule.

## 2017-08-09 NOTE — TELEPHONE ENCOUNTER
No need for new order. Patient is not restricted to [place] so he can still have his colonoscopy here if he would like.

## 2017-08-09 NOTE — TELEPHONE ENCOUNTER
Order already in with no location, can use either site automatically.  Please help figure out what the issue is with this.  Shari Paredes MD

## 2017-08-11 NOTE — TELEPHONE ENCOUNTER
Left message for patient to return call to schedule EGD/colonoscopy. If Marixa or Ashia are not available, please transfer to same day surgery

## 2017-08-14 ENCOUNTER — TELEPHONE (OUTPATIENT)
Dept: FAMILY MEDICINE | Facility: OTHER | Age: 62
End: 2017-08-14

## 2017-08-29 ENCOUNTER — ALLIED HEALTH/NURSE VISIT (OUTPATIENT)
Dept: ALLERGY | Facility: OTHER | Age: 62
End: 2017-08-29
Payer: COMMERCIAL

## 2017-08-29 ENCOUNTER — TELEPHONE (OUTPATIENT)
Dept: ALLERGY | Facility: OTHER | Age: 62
End: 2017-08-29

## 2017-08-29 DIAGNOSIS — J30.9 ALLERGIC RHINITIS, UNSPECIFIED: Primary | ICD-10-CM

## 2017-08-29 DIAGNOSIS — T78.2XXD ANAPHYLAXIS DUE TO HYMENOPTERA VENOM, ACCIDENTAL OR UNINTENTIONAL, SUBSEQUENT ENCOUNTER: Primary | ICD-10-CM

## 2017-08-29 DIAGNOSIS — T63.481D ANAPHYLAXIS DUE TO HYMENOPTERA VENOM, ACCIDENTAL OR UNINTENTIONAL, SUBSEQUENT ENCOUNTER: Primary | ICD-10-CM

## 2017-08-29 PROCEDURE — 99207 ZZC NO CHARGE LOS: CPT

## 2017-08-29 NOTE — MR AVS SNAPSHOT
After Visit Summary   8/29/2017    Jose Angel Cha    MRN: [id number]           Patient Information     Date Of Birth          1955        Visit Information        Provider Department      8/29/2017 8:30 AM ER ALLERGY SHOTS [facility]        Today's Diagnoses     Allergic rhinitis, unspecified    -  1       Follow-ups after your visit        Your next 10 appointments already scheduled     Sep 08, 2017   Procedure with Marty Harmon MD   [facility] ([facility])    [address] [place] [address] [phone]              Who to contact     If you have questions or need follow up information about today's clinic visit or your schedule please contact [facility] directly at [phone].  Normal or non-critical lab and imaging results will be communicated to you by MyChart, letter or phone within 4 business days after the clinic has received the results. If you do not hear from us within 7 days, please contact the clinic through MyChart or phone. If you have a critical or abnormal lab result, we will notify you by phone as soon as possible.  Submit refill requests through [employer] or call your pharmacy and they will forward the refill request to us. Please allow 3 business days for your refill to be completed.          Additional Information About Your Visit        MyChart Information     [employer] gives you secure access to your electronic health record. If you see a primary care provider, you can also send messages to your care team and make appointments. If you have questions, please call your primary care clinic.  If you do not have a primary care provider, please call [phone] and they will assist you.        Care EveryWhere ID     This is your Care EveryWhere ID. This could be used by other organizations to access your [place] medical records  [id number]         Blood Pressure from Last 3 Encounters:    04/06/17 124/86   12/07/16 (!) 150/94   10/26/16 (!) 146/96    Weight from Last 3 Encounters:   04/06/17 208 lb 11.2 oz (94.7 kg)   01/12/17 215 lb (97.5 kg)   12/07/16 215 lb (97.5 kg)              Today, you had the following     No orders found for display       Primary Care Provider Office Phone # Fax #    Shari Paredes [phone]       [address] [facility] [address]        Equal Access to Services     [facility]: Hadii aad ku hadasho Soomaali, waaxda luqadaha, qaybta kaalmada adeegyada, nico guillen . So [facility] [phone].    ATENCIÓN: Si habla español, tiene a armas disposición servicios gratuitos de asistencia lingüística. [facility] [phone].    We comply with applicable federal civil rights laws and [place] laws. We do not discriminate on the basis of race, color, national origin, age, disability sex, sexual orientation or gender identity.            Thank you!     Thank you for choosing [facility]  for your care. Our goal is always to provide you with excellent care. Hearing back from our patients is one way we can continue to improve our services. Please take a few minutes to complete the written survey that you may receive in the mail after your visit with us. Thank you!             Your Updated Medication List - Protect others around you: Learn how to safely use, store and throw away your medicines at www.disposemymeds.org.          This list is accurate as of: 8/29/17  8:48 AM.  Always use your most recent med list.                   Brand Name Dispense Instructions for use Diagnosis    alirocumab 75 MG/ML injectable pen    PRALUENT    2 mL    Inject 1 mL (75 mg) Subcutaneous every 14 days    Mixed hyperlipidemia       * ALLERGEN IMMUNOTHERAPY PRESCRIPTION     13 mL    Reported on 3/22/2017        * ALLERGEN IMMUNOTHERAPY PRESCRIPTION     13 mL    Reported on 3/22/2017        amLODIPine 2.5 MG tablet    NORVASC    90 tablet     Take 1 tablet (2.5 mg) by mouth daily    Essential hypertension       aspirin 81 MG tablet     0    ONE DAILY    Other and unspecified hyperlipidemia, Family history of ischemic heart disease       EPINEPHrine 0.3 MG/0.3ML injection 2-pack    EPIPEN 2-MIGUELINA    2 each    Inject 0.3 mLs (0.3 mg) into the muscle once as needed for anaphylaxis    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 sting       MULTIVITAL PO      Take 1 tablet by mouth daily Reported on 3/22/2017        omeprazole 20 MG CR capsule    priLOSEC    90 capsule    TAKE ONE CAPSULE BY MOUTH EVERY DAY (TAKE 30 TO 60 MINUTES BEFORE A MEAL)    Gastroesophageal reflux disease without esophagitis       STATIN NOT PRESCRIBED (INTENTIONAL)      by Other route continuous prn Reported on 4/6/2017    Mitral valve disorder, Hyperlipidemia LDL goal <100       temazepam 15 MG capsule    RESTORIL    30 capsule    Take 1 capsule (15 mg) by mouth nightly as needed for sleep    Anxiety       * Notice:  This list has 2 medication(s) that are the same as other medications prescribed for you. Read the directions carefully, and ask your doctor or other care provider to review them with you.

## 2017-08-29 NOTE — TELEPHONE ENCOUNTER
Please call pt once allergy serums have been received at the [facility]. Per pt request.    Madina Hernandez CMA ....... 8/29/2017 8:50 AM

## 2017-08-29 NOTE — PROGRESS NOTES
Patient presents for allergy injections, serums . Refill placed. Discussed with patient that mixed vespid has been on backorder, and will find out from compounding when it can be filled and then call him for an appointment. Will also review with Dr. Cardozo if patient can proceed to every 6 week injections with venoms once patient has built back to top dose. Patient is aware that with new vials he will be cut back due to potency of new vials. Angela Bone RN ............   2017...8:48 AM

## 2017-08-29 NOTE — TELEPHONE ENCOUNTER
ALLERGY SOLUTION RE-ORDER REQUEST    Jose Angel Cha 1955 MRN: [id number]    DATE NEEDED:  9/4/2017  Vial Color Content   Top Dose       Last Dose     Vial Size  Red 1:1 Wasp   Red 1:1 1       Red 1:11     12 ml  Red 1:1 Mixed Vespid   Red 1:1 1       Red 1:11     12 ml        Shot Clinic Location:  [place]  Ship to Location: [place]  Special Instructions:        Updated Prescription Needed: No      Requester Signature  Madina Hernandez

## 2017-08-31 DIAGNOSIS — T63.441D TOXIC EFFECT OF VENOM OF BEES, ACCIDENTAL (UNINTENTIONAL), SUBSEQUENT ENCOUNTER: Primary | ICD-10-CM

## 2017-08-31 PROCEDURE — 95148 ANTIGEN THERAPY SERVICES: CPT | Mod: 59 | Performed by: ALLERGY & IMMUNOLOGY

## 2017-08-31 NOTE — PROGRESS NOTES
Allergy serums billed at [place].     Vials received below:    Vial Color Content                      Vial Size Expiration Date  Red 1:1 Wasp 12mL  8/30/2017  Red 1:1 Mixed Vespid 12mL  8/30/2017      Original Refill encounter date: 8/29/2017      Signature  Shawna Morgan RN

## 2017-08-31 NOTE — TELEPHONE ENCOUNTER
Allergy serums received at [place].     Vials received below:    Vial Color Content                      Vial Size Expiration Date  Red 1:1 Wasp 12mL  8/30/2018  Red 1:1 Mixed Vespid 12mL  8/30/2018        Signature  Shawna Morgan RN

## 2017-08-31 NOTE — TELEPHONE ENCOUNTER
Called and spoke with patient notifying him that allergy serums have been received in [place]. Patient states he will return call to set up appointment as he is currently busy.    Shawna Morgan RN ....... 8/31/2017 3:36 PM

## 2017-09-08 ENCOUNTER — HOSPITAL ENCOUNTER (OUTPATIENT)
Facility: CLINIC | Age: 62
Discharge: HOME | End: 2017-09-08
Attending: INTERNAL MEDICINE | Admitting: INTERNAL MEDICINE
Payer: COMMERCIAL

## 2017-09-08 ENCOUNTER — SURGERY (OUTPATIENT)
Age: 62
End: 2017-09-08

## 2017-09-08 VITALS
SYSTOLIC BLOOD PRESSURE: 138 MMHG | OXYGEN SATURATION: 98 % | HEIGHT: 70 IN | WEIGHT: 205 LBS | TEMPERATURE: 97.7 F | BODY MASS INDEX: 29.35 KG/M2 | DIASTOLIC BLOOD PRESSURE: 103 MMHG | RESPIRATION RATE: 16 BRPM

## 2017-09-08 LAB — COLONOSCOPY: NORMAL

## 2017-09-08 PROCEDURE — 45385 COLONOSCOPY W/LESION REMOVAL: CPT | Performed by: INTERNAL MEDICINE

## 2017-09-08 PROCEDURE — 88305 TISSUE EXAM BY PATHOLOGIST: CPT | Mod: 26 | Performed by: INTERNAL MEDICINE

## 2017-09-08 PROCEDURE — 25000125 ZZHC RX 250: Performed by: INTERNAL MEDICINE

## 2017-09-08 PROCEDURE — 25000128 H RX IP 250 OP 636: Performed by: INTERNAL MEDICINE

## 2017-09-08 PROCEDURE — 40000296 ZZH STATISTIC ENDO RECOVERY CLASS 1:2 FIRST HOUR: Performed by: INTERNAL MEDICINE

## 2017-09-08 PROCEDURE — 88305 TISSUE EXAM BY PATHOLOGIST: CPT | Performed by: INTERNAL MEDICINE

## 2017-09-08 PROCEDURE — 40000297 ZZH STATISTIC ENDO RECOVERY CLASS 1:2 EACH ADDL HOUR: Performed by: INTERNAL MEDICINE

## 2017-09-08 PROCEDURE — G0500 MOD SEDAT ENDO SERVICE >5YRS: HCPCS

## 2017-09-08 RX ORDER — ONDANSETRON 2 MG/ML
4 INJECTION INTRAMUSCULAR; INTRAVENOUS
Status: DISCONTINUED | OUTPATIENT
Start: 2017-09-08 | End: 2017-09-08 | Stop reason: HOSPADM

## 2017-09-08 RX ORDER — LIDOCAINE 40 MG/G
CREAM TOPICAL
Status: DISCONTINUED | OUTPATIENT
Start: 2017-09-08 | End: 2017-09-08 | Stop reason: HOSPADM

## 2017-09-08 RX ORDER — FENTANYL CITRATE 50 UG/ML
INJECTION, SOLUTION INTRAMUSCULAR; INTRAVENOUS PRN
Status: DISCONTINUED | OUTPATIENT
Start: 2017-09-08 | End: 2017-09-08 | Stop reason: HOSPADM

## 2017-09-08 RX ADMIN — MIDAZOLAM HYDROCHLORIDE 1 MG: 1 INJECTION, SOLUTION INTRAMUSCULAR; INTRAVENOUS at 10:48

## 2017-09-08 RX ADMIN — LIDOCAINE HYDROCHLORIDE 1 ML: 10 INJECTION, SOLUTION EPIDURAL; INFILTRATION; INTRACAUDAL; PERINEURAL at 09:58

## 2017-09-08 RX ADMIN — FENTANYL CITRATE 50 MCG: 50 INJECTION, SOLUTION INTRAMUSCULAR; INTRAVENOUS at 10:47

## 2017-09-08 RX ADMIN — MIDAZOLAM HYDROCHLORIDE 1 MG: 1 INJECTION, SOLUTION INTRAMUSCULAR; INTRAVENOUS at 10:49

## 2017-09-08 RX ADMIN — MIDAZOLAM HYDROCHLORIDE 2 MG: 1 INJECTION, SOLUTION INTRAMUSCULAR; INTRAVENOUS at 10:47

## 2017-09-08 RX ADMIN — MIDAZOLAM HYDROCHLORIDE 1 MG: 1 INJECTION, SOLUTION INTRAMUSCULAR; INTRAVENOUS at 10:51

## 2017-09-08 NOTE — CONSULTS
[facility] GI Pre-Procedure Physical Assessment    Jose Angel Cha MRN# [id number]   Age: 62 year old YOB: 1955      Date of Surgery: 9/8/2017  Location [facility]      Date of Exam 9/8/2017 Facility (Same day)       Primary care provider: Shari Paredes         Active problem list:   Patient Active Problem List   Diagnosis     Hearing loss     Lumbago     Family history of ischemic heart disease     Esophageal reflux     Unspecified hyperplasia of prostate without urinary obstruction and other lower urinary tract symptoms (LUTS)     Meniere disease     Pain in joint involving shoulder region     Health Care Home     Anxiety     Advanced directives, counseling/discussion     Mixed hyperlipidemia     Nerv/musculskel sym NEC     Dizziness and giddiness     Dupuytren's contracture     House dust mite allergy     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 sting     LISBET (obstructive sleep apnea) AHI 13.8     Venom-induced anaphylaxis     Coronary artery disease involving native coronary artery of native heart without angina pectoris     Nonrheumatic mitral valve insufficiency     Need for SBE (subacute bacterial endocarditis) prophylaxis     Benign essential hypertension            Medications (include herbals and vitamins):   Any Plavix use in the last 7 days?  No     Current Facility-Administered Medications   Medication     lidocaine 1 % 1 mL     lidocaine (LMX4) kit     sodium chloride (PF) 0.9% PF flush 3 mL     sodium chloride (PF) 0.9% PF flush 3 mL     sodium chloride (PF) 0.9% PF flush 3 mL     ondansetron (ZOFRAN) injection 4 mg             Allergies:      Allergies   Allergen Reactions     Anesthetic Ether      Bee Venom      Demerol Visual Disturbance     Statin [Hmg-Coa-R Inhibitors] Other (See Comments)     Muscle pain     Allergy to Latex?  No  Allergy to tape?    No          Social History:     Social History   Substance Use Topics     Smoking status: Light Tobacco Smoker     Types: Cigars  "    Smokeless tobacco: Never Used      Comment: Occas Cigar     Alcohol use Yes      Comment: 3-4 glasses wine/night            Physical Exam:   All vitals have been reviewed  Blood pressure (!) 152/101, temperature 97.7  F (36.5  C), temperature source Oral, resp. rate 16, height 5' 10\" (1.778 m), weight 205 lb (93 kg), SpO2 98 %.  Airway assessment:   Patient is able to stick out tongue      Lungs:   No increased work of breathing, good air exchange, clear to auscultation bilaterally, no crackles or wheezing      Cardiovascular:   Normal apical impulse, regular rate and rhythm, normal S1 and S2, no S3 or S4, and no murmur noted           Lab / Radiology Results:   All laboratory data reviewed          Assessment:   Appropriately NPO  Chief complaint or anatomic assessment of involved area: Screening         Plan:   Moderate (conscious) sedation     Risks, benefits, alternatives to sedation and blood explained and consent obtained  Risks, benefits, alternatives to procedure explained and consent obtained  Orders and progress notes are in the chart  Discharge from Phase 1 and / or Phase 2 recovery when patient meets criteria    I have reviewed the history and physical, lab finding(s), diagnostic data, medicaitons, and the plan for sedation.  I have determined this patient to be an appropriate candidate for the planned sedation / procedure and have reassessed the patient immediately prior to sedation / procedure.    I have personally and medically directed the administration of medications used.    Marty Harmon MD, MD     "

## 2017-09-11 LAB — COPATH REPORT: NORMAL

## 2017-09-13 ENCOUNTER — ALLIED HEALTH/NURSE VISIT (OUTPATIENT)
Dept: ALLERGY | Facility: OTHER | Age: 62
End: 2017-09-13
Payer: COMMERCIAL

## 2017-09-13 DIAGNOSIS — T63.441D TOXIC EFFECT OF VENOM OF BEES, ACCIDENTAL (UNINTENTIONAL), SUBSEQUENT ENCOUNTER: Primary | ICD-10-CM

## 2017-09-13 PROCEDURE — 95117 IMMUNOTHERAPY INJECTIONS: CPT

## 2017-09-13 NOTE — MR AVS SNAPSHOT
After Visit Summary   9/13/2017    Jose Angel Cha    MRN: [id number]           Patient Information     Date Of Birth          1955        Visit Information        Provider Department      9/13/2017 9:00 AM ER ALLERGY SHOTS [facility]        Today's Diagnoses     Toxic effect of venom of bees, accidental (unintentional), subsequent encounter    -  1       Follow-ups after your visit        Your next 10 appointments already scheduled     Sep 27, 2017  8:30 AM CDT   Nurse Only with ER ALLERGY SHOTS   [facility]    [address]  [facility] [address] [phone]            Oct 05, 2017  1:00 PM CDT   Nurse Only with ER ALLERGY SHOTS   [facility]    [address]  [facility] [address] [phone]              Who to contact     If you have questions or need follow up information about today's clinic visit or your schedule please contact [facility] directly at [phone].  Normal or non-critical lab and imaging results will be communicated to you by [employer], letter or phone within 4 business days after the clinic has received the results. If you do not hear from us within 7 days, please contact the clinic through [employer] or phone. If you have a critical or abnormal lab result, we will notify you by phone as soon as possible.  Submit refill requests through [employer] or call your pharmacy and they will forward the refill request to us. Please allow 3 business days for your refill to be completed.          Additional Information About Your Visit        [employer] Information     [employer] gives you secure access to your electronic health record. If you see a primary care provider, you can also send messages to your care team and make appointments. If you have questions, please call your primary care clinic.  If you do not have a primary care  provider, please call [phone] and they will assist you.        Care EveryWhere ID     This is your Care EveryWhere ID. This could be used by other organizations to access your [place] medical records  [id number]         Blood Pressure from Last 3 Encounters:   09/08/17 (!) 138/103   04/06/17 124/86   12/07/16 (!) 150/94    Weight from Last 3 Encounters:   09/08/17 205 lb (93 kg)   04/06/17 208 lb 11.2 oz (94.7 kg)   01/12/17 215 lb (97.5 kg)              We Performed the Following     Allergy Shot: Two or more injections        Primary Care Provider Office Phone # Fax #    Shari Paredes -[phone]       [address] [place] [address]        Equal Access to Services     [facility] : Hadii karina dowling hadasho Soomaali, waaxda luqadaha, qaybta kaalmada adeegyada, nico guillen . So [facility] [phone].    ATENCIÓN: Si habla español, tiene a armas disposición servicios gratuitos de asistencia lingüística. [facility] [phone].    We comply with applicable federal civil rights laws and [place] laws. We do not discriminate on the basis of race, color, national origin, age, disability sex, sexual orientation or gender identity.            Thank you!     Thank you for choosing [facility]  for your care. Our goal is always to provide you with excellent care. Hearing back from our patients is one way we can continue to improve our services. Please take a few minutes to complete the written survey that you may receive in the mail after your visit with us. Thank you!             Your Updated Medication List - Protect others around you: Learn how to safely use, store and throw away your medicines at www.disposemymeds.org.          This list is accurate as of: 9/13/17  9:55 AM.  Always use your most recent med list.                   Brand Name Dispense Instructions for use Diagnosis    alirocumab 75 MG/ML injectable pen    PRALUENT    2 mL    Inject 1 mL (75  mg) Subcutaneous every 14 days    Mixed hyperlipidemia       * ALLERGEN IMMUNOTHERAPY PRESCRIPTION     13 mL    Reported on 3/22/2017        * ALLERGEN IMMUNOTHERAPY PRESCRIPTION     13 mL    Reported on 3/22/2017        * ALLERGEN IMMUNOTHERAPY PRESCRIPTION     13 mL    Name of Mix: Mix #1  Mixed Vespid Mixed Vespid Venom 300 mcg/mL HS 13 ml Diluent: HSA qs to 13ml    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, accidental or unintentional, subsequent encounter       * ALLERGEN IMMUNOTHERAPY PRESCRIPTION     13 mL    Name of Mix: Mix #2  Wasp Wasp Venom 100 mcg/mL HS 13 ml Diluent: HSA qs to 13ml    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, accidental or unintentional, subsequent encounter       amLODIPine 2.5 MG tablet    NORVASC    90 tablet    Take 1 tablet (2.5 mg) by mouth daily    Essential hypertension       aspirin 81 MG tablet     0    ONE DAILY    Other and unspecified hyperlipidemia, Family history of ischemic heart disease       EPINEPHrine 0.3 MG/0.3ML injection 2-pack    EPIPEN 2-MIGUELINA    2 each    Inject 0.3 mLs (0.3 mg) into the muscle once as needed for anaphylaxis    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 sting       MULTIVITAL PO      Take 1 tablet by mouth daily Reported on 3/22/2017        omeprazole 20 MG CR capsule    priLOSEC    90 capsule    TAKE ONE CAPSULE BY MOUTH EVERY DAY (TAKE 30 TO 60 MINUTES BEFORE A MEAL)    Gastroesophageal reflux disease without esophagitis       STATIN NOT PRESCRIBED (INTENTIONAL)      by Other route continuous prn Reported on 4/6/2017    Mitral valve disorder, Hyperlipidemia LDL goal <100       temazepam 15 MG capsule    RESTORIL    30 capsule    Take 1 capsule (15 mg) by mouth nightly as needed for sleep    Anxiety       * Notice:  This list has 4 medication(s) that are the same as other medications prescribed for you. Read the directions carefully, and ask your doctor or other care provider to review them with you.

## 2017-09-13 NOTE — PROGRESS NOTES
Patient presented after waiting 30 minutes with no reaction to allergy injections. Discharged from clinic.    Steffanie Cope RN

## 2017-09-13 NOTE — PROGRESS NOTES
It has been 43 days since patient's last injection. He is able to go 6 weeks between injections. Dose will be cut back due to new vial. Per Dr. Cardozo, cut back to 0.5 ml for this injection. Then build by 0.25 ml until he reaches top dose (1.0 ml).     Patient was given 0.5 ml today.    Ara Mcbride RN

## 2017-09-19 ENCOUNTER — TELEPHONE (OUTPATIENT)
Dept: FAMILY MEDICINE | Facility: OTHER | Age: 62
End: 2017-09-19

## 2017-09-19 DIAGNOSIS — K21.9 GASTROESOPHAGEAL REFLUX DISEASE WITHOUT ESOPHAGITIS: ICD-10-CM

## 2017-09-19 NOTE — TELEPHONE ENCOUNTER
prilosec      Last Written Prescription Date: 06/26/17  Last Fill Quantity: 90,  # refills: 0   Last Office Visit with [facility], [facility] or [facility] prescribing provider: 03/22/17                                         Next 5 appointments (look out 90 days)     Sep 27, 2017  8:30 AM CDT   Nurse Only with ER ALLERGY SHOTS   [facility]    [address] [address]   [phone]            Oct 05, 2017  1:00 PM CDT   Nurse Only with ER ALLERGY SHOTS   [facility]    [address] [address]   [phone]

## 2017-09-19 NOTE — LETTER
[address]  [facility] [address]  Phone: [phone]  September 25, 2017      Jose Angel Cha  [address] [facility] [address]      Dear Jose Angel,    We have made multiple attempts to contact you and have been unsuccessful.    We received a request to refill the Prilosec. A one-time refill was sent to your pharmacy. However, you are due for an office visit prior to your next refill.    Please call us at [phone] to schedule your appointment.      Healthy Regards,    Your Health Care Team  [facility]

## 2017-09-21 NOTE — TELEPHONE ENCOUNTER
Medication is being filled for 1 time refill only due to:  Patient needs to be seen because it has been more than one year since last visit.     Ara Ivy, RN, BSN

## 2017-09-21 NOTE — PROGRESS NOTES
SUBJECTIVE:                                                    Jose Angel Cha is a 62 year old male who presents to clinic today for the following health issues:    HPI  Medication Followup of Prilosec    Taking Medication as prescribed: yes    Side Effects:  None    Medication Helping Symptoms:  yes     GERD   Patient claims that his omeprazole is helping with his GERD symptoms.     Patient says that he has been on the omeprazole for about 10 years (he was using OTC before 2012, when he was prescribed).     Mood  Patient says that his stress level has been down lately. Pt says that he rarely takes his Restoril but that it is very effective when taken.    HENT  Patient notices that he has a raspy throat and thinks it is a combination of his reflux and allergies. He states that he is getting this primarily for his bee allergy. He has been taking Flonase for his allergic rhinitis symptoms.     Sleep apnea  Patient doesn't use his oral appliance right now because he needs to get it fitted at the dentist. He says he will get this fitted ASAP.     Right foot  States big toe is tender all the time. When he puts his foot down in the morning, feels like there is a broken bone with pain on the lateral surface.     Lower Back  Consistent weakness and soreness is noted in the morning.     Hemorrhoids   Notes chronic hemorrhoid problems with bowel movements.     Energy  Patient explains that he is exhausted by 2 pm every day. He says that this is a new issue.     Hypertension  Patient inquires if his BP medications can effect his erections.     Problem list and histories reviewed & adjusted, as indicated.  Additional history: as documented    Patient Active Problem List   Diagnosis     Hearing loss     Lumbago     Family history of ischemic heart disease     Esophageal reflux     Unspecified hyperplasia of prostate without urinary obstruction and other lower urinary tract symptoms (LUTS)     Meniere disease     Pain in joint  involving shoulder region     Health Care Home     Anxiety     Advanced directives, counseling/discussion     Mixed hyperlipidemia     Nerv/musculskel sym NEC     Dizziness and giddiness     Dupuytren's contracture     House dust mite allergy     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 sting     LISBET (obstructive sleep apnea) AHI 13.8     Venom-induced anaphylaxis     Coronary artery disease involving native coronary artery of native heart without angina pectoris     Nonrheumatic mitral valve insufficiency     Need for SBE (subacute bacterial endocarditis) prophylaxis     Benign essential hypertension     Past Surgical History:   Procedure Laterality Date     BURSECTOMY ELBOW Right 2016    Procedure: BURSECTOMY ELBOW;  Surgeon: Cruzito Diaz DO;  Location: [facility]     COLONOSCOPY  2007     ESOPHAGOSCOPY, GASTROSCOPY, DUODENOSCOPY (EGD), COMBINED N/A 2015    Procedure: COMBINED ESOPHAGOSCOPY, GASTROSCOPY, DUODENOSCOPY (EGD);  Surgeon: Duane, William Charles, MD;  Location: [facility] OR     ESOPHAGOSCOPY, GASTROSCOPY, DUODENOSCOPY (EGD), COMBINED N/A 2015    Procedure: COMBINED ESOPHAGOSCOPY, GASTROSCOPY, DUODENOSCOPY (EGD), BIOPSY SINGLE OR MULTIPLE;  Surgeon: Duane, William Charles, MD;  Location: [facility] OR     HC CREATE EARDRUM OPENING,GEN ANESTH  2009    Right     HC MASTOIDECTOMY,COMPLETE  2009    Right     HEAD & NECK SURGERY       INJECT EPIDURAL CERVICAL  2014    [facility] [place]     ORTHOPEDIC SURGERY       REPAIR VALVE MITRAL  2010     THORACIC SURGERY       TONSILLECTOMY         Social History   Substance Use Topics     Smoking status: Light Tobacco Smoker     Types: Cigars     Smokeless tobacco: Never Used      Comment: Occas Cigar     Alcohol use Yes      Comment: 3-4 glasses wine/night     Family History   Problem Relation Age of Onset     C.A.D. Sister       from [place] at 49     C.A.D. Brother      [place] age 50s     Parkinsonism Brother      C.A.D. Mother      [place]     Neurologic  Disorder Brother      hearing loss     Neurologic Disorder Son      hearing loss age 20s     CANCER Father      liver  age 51     Cancer - colorectal No family hx of      Prostate Cancer No family hx of      DIABETES No family hx of      Hypertension No family hx of      CEREBROVASCULAR DISEASE No family hx of      Breast Cancer No family hx of      Colon Cancer No family hx of      Hyperlipidemia No family hx of      Coronary Artery Disease No family hx of      Other Cancer No family hx of      Depression No family hx of      Anxiety Disorder No family hx of      MENTAL ILLNESS No family hx of      Substance Abuse No family hx of      Anesthesia Reaction No family hx of      OSTEOPOROSIS No family hx of      Genetic Disorder No family hx of      Thyroid Disease No family hx of      Asthma No family hx of      Obesity No family hx of          ROS:  Constitutional, HEENT, cardiovascular, pulmonary, GI, , musculoskeletal, neuro, skin, endocrine and psych systems are negative, except as in HPI or otherwise noted.     This document serves as a record of the services and decisions personally performed and made by Shari Paredes MD. It was created on her behalf by Dequan Woods, a trained medical scribe. The creation of this document is based the provider's statements to the medical scribe.  Dequan Woods, 2017 4:18 PM     OBJECTIVE:                                                    /90  Pulse 80  Temp 97.5  F (36.4  C) (Oral)  Resp 16  Wt 95.7 kg (211 lb)  BMI 30.28 kg/m2  Body mass index is 30.28 kg/(m^2).     GENERAL: healthy, alert, well nourished, well hydrated, no distress, obese   SKIN: no suspicious lesions, no rashes to visible skin  PSYCH: Alert and oriented times 3; speech- coherent , normal rate and volume; able to articulate logical thoughts, able to abstract reason, no tangential thoughts, no hallucinations or delusions, affect- normal  RIGHT FOOT: swelling of first metatarsal  joint    No results found for this or any previous visit (from the past 24 hour(s)).     ASSESSMENT/PLAN:                                                        ICD-10-CM    1. Gastroesophageal reflux disease without esophagitis K21.9 omeprazole (PRILOSEC) 20 MG CR capsule     GASTROENTEROLOGY ADULT REF PROCEDURE ONLY   2. Tobacco use disorder F17.200 TOBACCO CESSATION ORDER FOR    3. Need for prophylactic vaccination and inoculation against influenza Z23    4. Anxiety F41.9 BASIC METABOLIC PANEL     temazepam (RESTORIL) 15 MG capsule   5. Chronic fatigue R53.82      GERD - Pt is informed with long term risks of using omeprazole use. He is advised to try to transition to making dietary and lifestyle adjustments to help with his GERD and possibly come off of the Prilosec (eventually). Pt is informed that gastroscopy is another option to examine etiology of GERD symptoms and examine his esophagus. Pt would like to get this ordered and scheduled. He is given a gastroenterology referral.     Right Foot Pain - Lateral pain is likely from overuse rather than degenerative toe. Pt will wear Crocs less and see if it clears up. He will follow up with pediatry if pain worsens.     Energy - Discussed limiting sleep interruptions and increasing rest time.     Hemorrhoids - Pt advised to use stool softener (such as Miralax) to reduce pressure of bowel movement.     Erection - Pt is told that his hypertension is not impacting his erection, hyperlipidemia, however, could be. He will follow up with his cardiologist on this topic if a source is not apparent on labs.     Smoking - Pt is advised to stop using cigars.     Meds - Order placed for meds. Medication direction, dosage, and side effects discussed with patient.    Labs - Order placed for labs that the patient will complete tomorrow morning (fasting).     There are no Patient Instructions on file for this visit.     The information in this document, created by the medical  scribe for me, accurately reflects the services I personally performed and the decisions made by me. I have reviewed and approved this document for accuracy.   MD Shari Davila MD, MD  [facility]

## 2017-09-25 ENCOUNTER — OFFICE VISIT (OUTPATIENT)
Dept: FAMILY MEDICINE | Facility: OTHER | Age: 62
End: 2017-09-25
Payer: COMMERCIAL

## 2017-09-25 VITALS
RESPIRATION RATE: 16 BRPM | WEIGHT: 211 LBS | TEMPERATURE: 97.5 F | DIASTOLIC BLOOD PRESSURE: 90 MMHG | SYSTOLIC BLOOD PRESSURE: 138 MMHG | HEART RATE: 80 BPM | BODY MASS INDEX: 30.28 KG/M2

## 2017-09-25 DIAGNOSIS — R53.82 CHRONIC FATIGUE: ICD-10-CM

## 2017-09-25 DIAGNOSIS — K64.4 EXTERNAL HEMORRHOIDS: ICD-10-CM

## 2017-09-25 DIAGNOSIS — F41.9 ANXIETY: ICD-10-CM

## 2017-09-25 DIAGNOSIS — M72.2 PLANTAR FASCIITIS: ICD-10-CM

## 2017-09-25 DIAGNOSIS — E03.8 SUBCLINICAL HYPOTHYROIDISM: ICD-10-CM

## 2017-09-25 DIAGNOSIS — Z23 NEED FOR PROPHYLACTIC VACCINATION AND INOCULATION AGAINST INFLUENZA: ICD-10-CM

## 2017-09-25 DIAGNOSIS — K21.9 GASTROESOPHAGEAL REFLUX DISEASE WITHOUT ESOPHAGITIS: Primary | ICD-10-CM

## 2017-09-25 DIAGNOSIS — K59.09 CHRONIC CONSTIPATION: ICD-10-CM

## 2017-09-25 DIAGNOSIS — F17.200 TOBACCO USE DISORDER: ICD-10-CM

## 2017-09-25 PROCEDURE — 99214 OFFICE O/P EST MOD 30 MIN: CPT | Performed by: FAMILY MEDICINE

## 2017-09-25 RX ORDER — TEMAZEPAM 15 MG/1
15 CAPSULE ORAL
Qty: 30 CAPSULE | Refills: 3 | Status: SHIPPED | OUTPATIENT
Start: 2017-09-25 | End: 2018-06-11

## 2017-09-25 RX ORDER — POLYETHYLENE GLYCOL 3350 17 G/17G
1 POWDER, FOR SOLUTION ORAL DAILY
Qty: 510 G | Refills: 1 | Status: SHIPPED | OUTPATIENT
Start: 2017-09-25 | End: 2022-02-24

## 2017-09-25 ASSESSMENT — PAIN SCALES - GENERAL: PAINLEVEL: MILD PAIN (3)

## 2017-09-25 NOTE — NURSING NOTE
"Chief Complaint   Patient presents with     Recheck Medication     Panel Management     flu, smoking cessation, cmp/bmp       Initial /90  Pulse 80  Temp 97.5  F (36.4  C) (Oral)  Resp 16  Wt 211 lb (95.7 kg)  BMI 30.28 kg/m2 Estimated body mass index is 30.28 kg/(m^2) as calculated from the following:    Height as of 9/8/17: 5' 10\" (1.778 m).    Weight as of this encounter: 211 lb (95.7 kg).  Medication Reconciliation: complete  Juan Pablo Welch MA    "

## 2017-09-25 NOTE — MR AVS SNAPSHOT
After Visit Summary   9/25/2017    Jose Angel Cha    MRN: [id number]           Patient Information     Date Of Birth          1955        Visit Information        Provider Department      9/25/2017 4:00 PM Shari Paredes MD [facility]        Today's Diagnoses     Gastroesophageal reflux disease without esophagitis    -  1    Tobacco use disorder        Need for prophylactic vaccination and inoculation against influenza        Anxiety        Chronic fatigue        Chronic constipation           Follow-ups after your visit        Additional Services     GASTROENTEROLOGY ADULT REF PROCEDURE ONLY       Last Lab Result: Creatinine (mg/dL)       Date                     Value                 02/05/2016               1.09             ----------  Body mass index is 30.28 kg/(m^2).     Needed:  No  Language:  English    Patient will be contacted to schedule procedure.     Please be aware that coverage of these services is subject to the terms and limitations of your health insurance plan.  Call member services at your health plan with any benefit or coverage questions.  Any procedures must be performed at a [facility] facility OR coordinated by your clinic's referral office.    Please bring the following with you to your appointment:    (1) Any X-Rays, CTs or MRIs which have been performed.  Contact the facility where they were done to arrange for  prior to your scheduled appointment.    (2) List of current medications   (3) This referral request   (4) Any documents/labs given to you for this referral                  Your next 10 appointments already scheduled     Sep 27, 2017  8:30 AM CDT   N[facility] with ER [facility]ERGY SHOTS   [facility]    [address]  [place] [address]   [phone]            Oct 05, 2017  1:00 PM CDT   Nurse Only with ER ALLERGY SHOTS   [facility]  [place]    [address]  [place] [id number] [phone]              Future tests that were ordered for you today     Open Future Orders        Priority Expected Expires Ordered    CBC with platelets Routine  10/25/2017 9/25/2017    TSH with free T4 reflex Routine  10/25/2017 9/25/2017    Hemoglobin A1c Routine  10/25/2017 9/25/2017    BASIC METABOLIC PANEL Routine  10/25/2017 9/25/2017            Who to contact     If you have questions or need follow up information about today's clinic visit or your schedule please contact [facility] directly at [phone].  Normal or non-critical lab and imaging results will be communicated to you by [email or web address], letter or phone within 4 business days after the clinic has received the results. If you do not hear from us within 7 days, please contact the clinic through [employer] or phone. If you have a critical or abnormal lab result, we will notify you by phone as soon as possible.  Submit refill requests through [employer] or call your pharmacy and they will forward the refill request to us. Please allow 3 business days for your refill to be completed.          Additional Information About Your Visit        [email or web address] Information     [employer] gives you secure access to your electronic health record. If you see a primary care provider, you can also send messages to your care team and make appointments. If you have questions, please call your primary care clinic.  If you do not have a primary care provider, please call [phone] and they will assist you.        Care EveryWhere ID     This is your Care EveryWhere ID. This could be used by other organizations to access your [place] medical records  [id number]        Your Vitals Were     Pulse Temperature Respirations BMI (Body Mass Index)          80 97.5  F (36.4  C) (Oral) 16 30.28 kg/m2         Blood Pressure from Last 3 Encounters:   09/25/17 138/90   09/08/17 (!) 138/103   04/06/17 124/86     Weight from Last 3 Encounters:   09/25/17 211 lb (95.7 kg)   09/08/17 205 lb (93 kg)   04/06/17 208 lb 11.2 oz (94.7 kg)              We Performed the Following     GASTROENTEROLOGY ADULT REF PROCEDURE ONLY     TOBACCO CESSATION ORDER FOR           Today's Medication Changes          These changes are accurate as of: 9/25/17  4:57 PM.  If you have any questions, ask your nurse or doctor.               Start taking these medicines.        Dose/Directions    polyethylene glycol powder   Commonly known as:  MIRALAX   Used for:  Chronic constipation   Started by:  Shari Paredes MD        Dose:  1 capful   Take 17 g (1 capful) by mouth daily   Quantity:  510 g   Refills:  1         These medicines have changed or have updated prescriptions.        Dose/Directions    omeprazole 20 MG CR capsule   Commonly known as:  priLOSEC   This may have changed:  See the new instructions.   Used for:  Gastroesophageal reflux disease without esophagitis   Changed by:  Shari Paredes MD        TAKE ONE CAPSULE BY MOUTH EVERY DAY (TAKE 30 TO 60 MINUTES BEFORE A MEAL)   Quantity:  90 capsule   Refills:  3            Where to get your medicines      These medications were sent to [facility] [address] [address]     Phone:  [phone]     omeprazole 20 MG CR capsule    polyethylene glycol powder         Some of these will need a paper prescription and others can be bought over the counter.  Ask your nurse if you have questions.     Bring a paper prescription for each of these medications     temazepam 15 MG capsule                Primary Care Provider Office Phone # Fax #    Shari Paredes [phone]       [address] [place] [address]        Equal Access to Services     [facility] : Ciaran Castañeda, kayley cotton, qanico gusman. So [facility] [phone].    ATENCIÓN: Si  gaston figueroa, tiene a armas disposición servicios gratuitos de asistencia lingüística. Pascual pulido [phone].    We comply with applicable federal civil rights laws and [place] laws. We do not discriminate on the basis of race, color, national origin, age, disability sex, sexual orientation or gender identity.            Thank you!     Thank you for choosing [facility]  for your care. Our goal is always to provide you with excellent care. Hearing back from our patients is one way we can continue to improve our services. Please take a few minutes to complete the written survey that you may receive in the mail after your visit with us. Thank you!             Your Updated Medication List - Protect others around you: Learn how to safely use, store and throw away your medicines at www.disposemymeds.org.          This list is accurate as of: 9/25/17  4:57 PM.  Always use your most recent med list.                   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, accidental or unintentional, subsequent encounter       * ALLERGEN IMMUNOTHERAPY PRESCRIPTION     13 mL    Name of Mix: Mix #2  Wasp Wasp Venom 100 mcg/mL HS 13 ml Diluent: HSA qs to 13ml    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, accidental or unintentional, subsequent encounter       amLODIPine 2.5 MG tablet    NORVASC    90 tablet    Take 1 tablet (2.5 mg) by mouth daily    Essential hypertension       aspirin 81 MG tablet     0    ONE DAILY    Other and unspecified  hyperlipidemia, Family history of ischemic heart disease       EPINEPHrine 0.3 MG/0.3ML injection 2-pack    EPIPEN 2-MIGUELINA    2 each    Inject 0.3 mLs (0.3 mg) into the muscle once as needed for anaphylaxis    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 sting       MULTIVITAL PO      Take 1 tablet by mouth daily Reported on 3/22/2017        omeprazole 20 MG CR capsule    priLOSEC    90 capsule    TAKE ONE CAPSULE BY MOUTH EVERY DAY (TAKE 30 TO 60 MINUTES BEFORE A MEAL)    Gastroesophageal reflux disease without esophagitis       polyethylene glycol powder    MIRALAX    510 g    Take 17 g (1 capful) by mouth daily    Chronic constipation       STATIN NOT PRESCRIBED (INTENTIONAL)      by Other route continuous prn Reported on 4/6/2017    Mitral valve disorder, Hyperlipidemia LDL goal <100       temazepam 15 MG capsule    RESTORIL    30 capsule    Take 1 capsule (15 mg) by mouth nightly as needed for sleep    Anxiety       * Notice:  This list has 4 medication(s) that are the same as other medications prescribed for you. Read the directions carefully, and ask your doctor or other care provider to review them with you.

## 2017-09-26 ENCOUNTER — TELEPHONE (OUTPATIENT)
Dept: FAMILY MEDICINE | Facility: OTHER | Age: 62
End: 2017-09-26

## 2017-09-26 DIAGNOSIS — R53.82 CHRONIC FATIGUE: ICD-10-CM

## 2017-09-26 DIAGNOSIS — F41.9 ANXIETY: ICD-10-CM

## 2017-09-26 LAB
ANION GAP SERPL CALCULATED.3IONS-SCNC: 10 MMOL/L (ref 3–14)
BUN SERPL-MCNC: 10 MG/DL (ref 7–30)
CALCIUM SERPL-MCNC: 8.6 MG/DL (ref 8.5–10.1)
CHLORIDE SERPL-SCNC: 105 MMOL/L (ref 94–109)
CO2 SERPL-SCNC: 26 MMOL/L (ref 20–32)
CREAT SERPL-MCNC: 1.14 MG/DL (ref 0.66–1.25)
ERYTHROCYTE [DISTWIDTH] IN BLOOD BY AUTOMATED COUNT: 11.7 % (ref 10–15)
GFR SERPL CREATININE-BSD FRML MDRD: NORMAL ML/MIN/1.7M2
GLUCOSE SERPL-MCNC: 97 MG/DL (ref 70–99)
HBA1C MFR BLD: 5.1 % (ref 4.3–6)
HCT VFR BLD AUTO: 43.6 % (ref 40–53)
HGB BLD-MCNC: 16 G/DL (ref 13.3–17.7)
MCH RBC QN AUTO: 37.3 PG (ref 26.5–33)
MCHC RBC AUTO-ENTMCNC: 36.7 G/DL (ref 31.5–36.5)
MCV RBC AUTO: 102 FL (ref 78–100)
PLATELET # BLD AUTO: 162 10E9/L (ref 150–450)
POTASSIUM SERPL-SCNC: 4.1 MMOL/L (ref 3.4–5.3)
RBC # BLD AUTO: 4.29 10E12/L (ref 4.4–5.9)
SODIUM SERPL-SCNC: 141 MMOL/L (ref 133–144)
T4 FREE SERPL-MCNC: 0.83 NG/DL (ref 0.76–1.46)
TSH SERPL DL<=0.005 MIU/L-ACNC: 7.43 MU/L (ref 0.4–4)
WBC # BLD AUTO: 4.5 10E9/L (ref 4–11)

## 2017-09-26 PROCEDURE — 84439 ASSAY OF FREE THYROXINE: CPT | Performed by: FAMILY MEDICINE

## 2017-09-26 PROCEDURE — 83036 HEMOGLOBIN GLYCOSYLATED A1C: CPT | Performed by: FAMILY MEDICINE

## 2017-09-26 PROCEDURE — 85027 COMPLETE CBC AUTOMATED: CPT | Performed by: FAMILY MEDICINE

## 2017-09-26 PROCEDURE — 80048 BASIC METABOLIC PNL TOTAL CA: CPT | Performed by: FAMILY MEDICINE

## 2017-09-26 PROCEDURE — 36415 COLL VENOUS BLD VENIPUNCTURE: CPT | Performed by: FAMILY MEDICINE

## 2017-09-26 PROCEDURE — 84443 ASSAY THYROID STIM HORMONE: CPT | Performed by: FAMILY MEDICINE

## 2017-09-26 NOTE — TELEPHONE ENCOUNTER
Left message for patient to return call to schedule colonoscopy or EGD. If Ashia or Marixa are unavailable, please transfer to the surgery center.     Schedule with JAUN

## 2017-09-27 ENCOUNTER — CARE COORDINATION (OUTPATIENT)
Dept: CARDIOLOGY | Facility: CLINIC | Age: 62
End: 2017-09-27

## 2017-09-27 ENCOUNTER — ALLIED HEALTH/NURSE VISIT (OUTPATIENT)
Dept: ALLERGY | Facility: OTHER | Age: 62
End: 2017-09-27
Payer: COMMERCIAL

## 2017-09-27 DIAGNOSIS — J30.9 ALLERGIC RHINITIS, UNSPECIFIED: Primary | ICD-10-CM

## 2017-09-27 PROBLEM — E03.8 SUBCLINICAL HYPOTHYROIDISM: Status: ACTIVE | Noted: 2017-09-27

## 2017-09-27 PROCEDURE — 95117 IMMUNOTHERAPY INJECTIONS: CPT

## 2017-09-27 NOTE — PROGRESS NOTES
RN called patient and left Voicemail regarding his questions about Praluent. Left contact information.     Patient called to discuss pre-authorization for Praluent. Patient was denied coverage. Assured patient the repeals will be submitted.     RN called [employer] to determine next steps. A new PA was submitted and [employer] will fax forms to be completed. Informed patient of this information and that it will take approximately 5 business days to process.

## 2017-09-27 NOTE — PROGRESS NOTES
Jose Angel, thyroid stimulating hormone is working a little harder to maintain the thyroid.  There are no symptoms associated with this change, but sleep, nutrition, and emotional stress can affect this.  As it can also be a trend of worsening from early thyroid disease, we need to recheck in 3-6 months to see if it has changed/worsened.  Please let me know if you have any questions.    Shari Paredes MD

## 2017-09-27 NOTE — TELEPHONE ENCOUNTER
Patient returned call and scheduled EGD on 10/6/17 arrive at 715 procedure at 815 with Dr. Membreno.  Please mail prep instructions

## 2017-09-27 NOTE — MR AVS SNAPSHOT
After Visit Summary   9/27/2017    Jose Angel Cha    MRN: [id number]           Patient Information     Date Of Birth          1955        Visit Information        Provider Department      9/27/2017 8:30 AM ER ALLERGY SHOTS [facility]        Today's Diagnoses     Allergic rhinitis, unspecified    -  1       Follow-ups after your visit        Your next 10 appointments already scheduled     Oct 05, 2017  1:00 PM CDT   Nurse Only with ER ALLERGY SHOTS   [facility] ([facility])    [address]  [facility] [facility] [address] [phone]            Oct 17, 2017  8:15 AM CDT   Nurse Only with ER ALLERGY SHOTS   [facility] ([facility])    [address]  [place] [facility] [place] [address] [phone]            Oct 31, 2017  8:15 AM CDT   Nurse Only with ER ALLERGY SHOTS   [facility] ([facility])    [address]  [place] [facility] [place] [address] [phone]              Who to contact     If you have questions or need follow up information about today's clinic visit or your schedule please contact [facility] directly at [phone].  Normal or non-critical lab and imaging results will be communicated to you by [employer], letter or phone within 4 business days after the clinic has received the results. If you do not hear from us within 7 days, please contact the clinic through [employer] or phone. If you have a critical or abnormal lab result, we will notify you by phone as soon as possible.  Submit refill requests through [employer] or call your pharmacy and they will forward the refill request to us. Please allow 3 business days for your refill to be completed.          Additional Information About Your Visit        [employer] Information     [employer] gives you secure access to your electronic health record. If you see a primary care provider, you can also send  messages to your care team and make appointments. If you have questions, please call your primary care clinic.  If you do not have a primary care provider, please call [phone] and they will assist you.        Care EveryWhere ID     This is your Care EveryWhere ID. This could be used by other organizations to access your [place] medical records  [id number]         Blood Pressure from Last 3 Encounters:   09/25/17 138/90   09/08/17 (!) 138/103   04/06/17 124/86    Weight from Last 3 Encounters:   09/25/17 211 lb (95.7 kg)   09/08/17 205 lb (93 kg)   04/06/17 208 lb 11.2 oz (94.7 kg)              We Performed the Following     Allergy Shot: Two or more injections        Primary Care Provider Office Phone # Fax #    Shari Paredes [phone]       [address] [facility] [address]        Equal Access to Services     [facility] Hospital[facility] : Hadii karina messero Soreji, waaxda luqadaha, qaybta kaalmada adeegyada, nico guillen . So [facility] [phone].    ATENCIÓN: Si habla español, tiene a armas disposición servicios gratuitos de asistencia lingüística. Llame al [phone].    We comply with applicable federal civil rights laws and [place] laws. We do not discriminate on the basis of race, color, national origin, age, disability sex, sexual orientation or gender identity.            Thank you!     Thank you for choosing [facility]  for your care. Our goal is always to provide you with excellent care. Hearing back from our patients is one way we can continue to improve our services. Please take a few minutes to complete the written survey that you may receive in the mail after your visit with us. Thank you!             Your Updated Medication List - Protect others around you: Learn how to safely use, store and throw away your medicines at www.disposemymeds.org.          This list is accurate as of: 9/27/17  9:13 AM.  Always use your most recent med list.                    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, accidental or unintentional, subsequent encounter       * ALLERGEN IMMUNOTHERAPY PRESCRIPTION     13 mL    Name of Mix: Mix #2  Wasp Wasp Venom 100 mcg/mL HS 13 ml Diluent: HSA qs to 13ml    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, accidental or unintentional, subsequent encounter       amLODIPine 2.5 MG tablet    NORVASC    90 tablet    Take 1 tablet (2.5 mg) by mouth daily    Essential hypertension       aspirin 81 MG tablet     0    ONE DAILY    Other and unspecified hyperlipidemia, Family history of ischemic heart disease       EPINEPHrine 0.3 MG/0.3ML injection 2-pack    EPIPEN 2-MIGUELINA    2 each    Inject 0.3 mLs (0.3 mg) into the muscle once as needed for anaphylaxis    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 sting       MULTIVITAL PO      Take 1 tablet by mouth daily Reported on 3/22/2017        omeprazole 20 MG CR capsule    priLOSEC    90 capsule    TAKE ONE CAPSULE BY MOUTH EVERY DAY (TAKE 30 TO 60 MINUTES BEFORE A MEAL)    Gastroesophageal reflux disease without esophagitis       polyethylene glycol powder    MIRALAX    510 g    Take 17 g (1 capful) by mouth daily    Chronic constipation       STATIN NOT PRESCRIBED (INTENTIONAL)      by Other route continuous prn Reported on 4/6/2017    Mitral valve disorder, Hyperlipidemia LDL goal <100       temazepam 15 MG capsule    RESTORIL    30 capsule    Take 1 capsule (15 mg) by mouth nightly as needed for sleep    Anxiety       * Notice:  This list  has 4 medication(s) that are the same as other medications prescribed for you. Read the directions carefully, and ask your doctor or other care provider to review them with you.

## 2017-10-05 ENCOUNTER — CARE COORDINATION (OUTPATIENT)
Dept: CARDIOLOGY | Facility: CLINIC | Age: 62
End: 2017-10-05

## 2017-10-05 ENCOUNTER — TELEPHONE (OUTPATIENT)
Dept: ALLERGY | Facility: OTHER | Age: 62
End: 2017-10-05

## 2017-10-05 ENCOUNTER — ALLIED HEALTH/NURSE VISIT (OUTPATIENT)
Dept: ALLERGY | Facility: OTHER | Age: 62
End: 2017-10-05
Payer: COMMERCIAL

## 2017-10-05 DIAGNOSIS — T63.441D TOXIC EFFECT OF VENOM OF BEES, UNINTENTIONAL, SUBSEQUENT ENCOUNTER: Primary | ICD-10-CM

## 2017-10-05 PROCEDURE — 95117 IMMUNOTHERAPY INJECTIONS: CPT

## 2017-10-05 PROCEDURE — 99207 ZZC NO CHARGE LOS: CPT

## 2017-10-05 NOTE — TELEPHONE ENCOUNTER
Routing to Dr. Mishra for orders as Dr. Cardozo is out of office today.    Shawna Morgan RN ....... 10/5/2017 9:50 AM

## 2017-10-05 NOTE — MR AVS SNAPSHOT
After Visit Summary   10/5/2017    Jose Angel Cha    MRN: [id number]           Patient Information     Date Of Birth          1955        Visit Information        Provider Department      10/5/2017 1:00 PM ER ALLERGY SHOTS [facility]        Today's Diagnoses     Toxic effect of venom of bees, unintentional, subsequent encounter    -  1       Follow-ups after your visit        Your next 10 appointments already scheduled     Oct 06, 2017   Procedure with Pablo Membreno MD   [facility] ([facility])    [address] [place] [address]   [phone]            Oct 17, 2017  8:15 AM CDT   Nurse Only with ER ALLERGY SHOTS   [facility] ([facility])    [address]  [facility] [id number] [phone]            Oct 31, 2017  8:15 AM CDT   Nurse Only with ER ALLERGY SHOTS   [facility] ([facility])    [address]  [facility] [id number] [phone]              Who to contact     If you have questions or need follow up information about today's clinic visit or your schedule please contact [facility] directly at [phone].  Normal or non-critical lab and imaging results will be communicated to you by [employer], letter or phone within 4 business days after the clinic has received the results. If you do not hear from us within 7 days, please contact the clinic through [employer] or phone. If you have a critical or abnormal lab result, we will notify you by phone as soon as possible.  Submit refill requests through [employer] or call your pharmacy and they will forward the refill request to us. Please allow 3 business days for your refill to be completed.          Additional Information About Your Visit        [id number] Information     [employer] gives you secure access to your electronic health record. If you see a primary care provider,  you can also send messages to your care team and make appointments. If you have questions, please call your primary care clinic.  If you do not have a primary care provider, please call [phone] and they will assist you.        Care EveryWhere ID     This is your Care EveryWhere ID. This could be used by other organizations to access your [place] medical records  [id number]         Blood Pressure from Last 3 Encounters:   09/25/17 138/90   09/08/17 (!) 138/103   04/06/17 124/86    Weight from Last 3 Encounters:   09/25/17 95.7 kg (211 lb)   09/08/17 93 kg (205 lb)   04/06/17 94.7 kg (208 lb 11.2 oz)              We Performed the Following     ANTIGEN RX SERV,INSECT,2 VENOMS        Primary Care Provider Office Phone # Fax #    Shari Paredes [phone]       [address] [facility] [address]        Equal Access to Services     ANJEL TAFOYA : Hadii karina ku hadasho Soomaali, waaxda luqadaha, qaybta kaalmada adeegyada, waxay lokiin karrie guillen . So [facility] [phone].    ATENCIÓN: Si gaston figueroa, tiene a armas disposición servicios gratuitos de asistencia lingüística. Llame al [phone].    We comply with applicable federal civil rights laws and [place] laws. We do not discriminate on the basis of race, color, national origin, age, disability, sex, sexual orientation, or gender identity.            Thank you!     Thank you for choosing [facility]  for your care. Our goal is always to provide you with excellent care. Hearing back from our patients is one way we can continue to improve our services. Please take a few minutes to complete the written survey that you may receive in the mail after your visit with us. Thank you!             Your Updated Medication List - Protect others around you: Learn how to safely use, store and throw away your medicines at www.disposemymeds.org.          This list is accurate as of: 10/5/17  3:26 PM.  Always use your most recent  med list.                   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, accidental or unintentional, subsequent encounter       * ALLERGEN IMMUNOTHERAPY PRESCRIPTION     13 mL    Name of Mix: Mix #2  Wasp Wasp Venom 100 mcg/mL HS 13 ml Diluent: HSA qs to 13ml    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, accidental or unintentional, subsequent encounter       amLODIPine 2.5 MG tablet    NORVASC    90 tablet    Take 1 tablet (2.5 mg) by mouth daily    Essential hypertension       aspirin 81 MG tablet     0    ONE DAILY    Other and unspecified hyperlipidemia, Family history of ischemic heart disease       EPINEPHrine 0.3 MG/0.3ML injection 2-pack    EPIPEN 2-MIGUELINA    2 each    Inject 0.3 mLs (0.3 mg) into the muscle once as needed for anaphylaxis    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 sting       MULTIVITAL PO      Take 1 tablet by mouth daily Reported on 3/22/2017        omeprazole 20 MG CR capsule    priLOSEC    90 capsule    TAKE ONE CAPSULE BY MOUTH EVERY DAY (TAKE 30 TO 60 MINUTES BEFORE A MEAL)    Gastroesophageal reflux disease without esophagitis       polyethylene glycol powder    MIRALAX    510 g    Take 17 g (1 capful) by mouth daily    Chronic constipation       STATIN NOT PRESCRIBED (INTENTIONAL)      by Other route continuous prn Reported on 4/6/2017    Mitral valve disorders(424.0), Hyperlipidemia LDL goal <100       temazepam 15 MG capsule    RESTORIL    30 capsule    Take 1 capsule (15 mg) by mouth nightly as needed for sleep    Anxiety       * Notice:  This list has 4 medication(s) that are the same as other medications prescribed for you. Read the directions carefully, and  ask your doctor or other care provider to review them with you.

## 2017-10-05 NOTE — TELEPHONE ENCOUNTER
Patient's top doses are Wasp Red 1.0 and Mixed Vespid Red 1.0.    On 9/13/17, patient started new vials and was cut back per protocol to Wasp Red 0.5 and Mixed Vespid Red 0.5.    At patient's next appointment (9/27) he was built by 0.1 to Wasp Red 0.6 and Mixed Vespid Red 0.6, instead of per protocol building by 0.25 ml.    Patient is scheduled for injection today, 10/5.    Routing to provider for dosing orders on how to build for today's injection appointment.    Shawna Morgan RN ....... 10/5/2017 8:19 AM

## 2017-10-05 NOTE — PROGRESS NOTES
Patient presented after waiting 30 minutes with no reaction to allergy injections. Discharged from clinic.    Shawna Morgan RN ....... 10/5/2017 1:46 PM

## 2017-10-06 ENCOUNTER — ANESTHESIA (OUTPATIENT)
Dept: GASTROENTEROLOGY | Facility: CLINIC | Age: 62
End: 2017-10-06
Payer: COMMERCIAL

## 2017-10-06 ENCOUNTER — SURGERY (OUTPATIENT)
Age: 62
End: 2017-10-06

## 2017-10-06 ENCOUNTER — ANESTHESIA EVENT (OUTPATIENT)
Dept: GASTROENTEROLOGY | Facility: CLINIC | Age: 62
End: 2017-10-06
Payer: COMMERCIAL

## 2017-10-06 ENCOUNTER — HOSPITAL ENCOUNTER (OUTPATIENT)
Facility: CLINIC | Age: 62
Discharge: HOME | End: 2017-10-06
Attending: SURGERY | Admitting: SURGERY
Payer: COMMERCIAL

## 2017-10-06 VITALS
SYSTOLIC BLOOD PRESSURE: 126 MMHG | DIASTOLIC BLOOD PRESSURE: 86 MMHG | BODY MASS INDEX: 30.28 KG/M2 | WEIGHT: 211 LBS | TEMPERATURE: 98 F | RESPIRATION RATE: 16 BRPM | OXYGEN SATURATION: 97 %

## 2017-10-06 DIAGNOSIS — K21.9 GASTROESOPHAGEAL REFLUX DISEASE WITHOUT ESOPHAGITIS: Primary | ICD-10-CM

## 2017-10-06 PROCEDURE — 25000128 H RX IP 250 OP 636: Performed by: NURSE ANESTHETIST, CERTIFIED REGISTERED

## 2017-10-06 PROCEDURE — 25000125 ZZHC RX 250: Performed by: NURSE ANESTHETIST, CERTIFIED REGISTERED

## 2017-10-06 PROCEDURE — 43235 EGD DIAGNOSTIC BRUSH WASH: CPT | Performed by: SURGERY

## 2017-10-06 PROCEDURE — 40000296 ZZH STATISTIC ENDO RECOVERY CLASS 1:2 FIRST HOUR: Performed by: SURGERY

## 2017-10-06 RX ORDER — PROPOFOL 10 MG/ML
INJECTION, EMULSION INTRAVENOUS PRN
Status: DISCONTINUED | OUTPATIENT
Start: 2017-10-06 | End: 2017-10-06

## 2017-10-06 RX ORDER — HYDROMORPHONE HYDROCHLORIDE 1 MG/ML
.3-.5 INJECTION, SOLUTION INTRAMUSCULAR; INTRAVENOUS; SUBCUTANEOUS EVERY 10 MIN PRN
Status: DISCONTINUED | OUTPATIENT
Start: 2017-10-06 | End: 2017-10-06 | Stop reason: HOSPADM

## 2017-10-06 RX ORDER — ONDANSETRON 4 MG/1
4 TABLET, ORALLY DISINTEGRATING ORAL EVERY 30 MIN PRN
Status: DISCONTINUED | OUTPATIENT
Start: 2017-10-06 | End: 2017-10-06 | Stop reason: HOSPADM

## 2017-10-06 RX ORDER — NALOXONE HYDROCHLORIDE 0.4 MG/ML
.1-.4 INJECTION, SOLUTION INTRAMUSCULAR; INTRAVENOUS; SUBCUTANEOUS
Status: DISCONTINUED | OUTPATIENT
Start: 2017-10-06 | End: 2017-10-06 | Stop reason: HOSPADM

## 2017-10-06 RX ORDER — PROPOFOL 10 MG/ML
INJECTION, EMULSION INTRAVENOUS CONTINUOUS PRN
Status: DISCONTINUED | OUTPATIENT
Start: 2017-10-06 | End: 2017-10-06

## 2017-10-06 RX ORDER — SODIUM CHLORIDE, SODIUM LACTATE, POTASSIUM CHLORIDE, CALCIUM CHLORIDE 600; 310; 30; 20 MG/100ML; MG/100ML; MG/100ML; MG/100ML
INJECTION, SOLUTION INTRAVENOUS CONTINUOUS
Status: DISCONTINUED | OUTPATIENT
Start: 2017-10-06 | End: 2017-10-06 | Stop reason: HOSPADM

## 2017-10-06 RX ORDER — ONDANSETRON 2 MG/ML
4 INJECTION INTRAMUSCULAR; INTRAVENOUS EVERY 30 MIN PRN
Status: DISCONTINUED | OUTPATIENT
Start: 2017-10-06 | End: 2017-10-06 | Stop reason: HOSPADM

## 2017-10-06 RX ORDER — DIMENHYDRINATE 50 MG/ML
25 INJECTION, SOLUTION INTRAMUSCULAR; INTRAVENOUS
Status: DISCONTINUED | OUTPATIENT
Start: 2017-10-06 | End: 2017-10-06 | Stop reason: HOSPADM

## 2017-10-06 RX ORDER — LIDOCAINE HYDROCHLORIDE 20 MG/ML
INJECTION, SOLUTION INFILTRATION; PERINEURAL PRN
Status: DISCONTINUED | OUTPATIENT
Start: 2017-10-06 | End: 2017-10-06

## 2017-10-06 RX ADMIN — LIDOCAINE HYDROCHLORIDE 0.1 ML: 10 INJECTION, SOLUTION EPIDURAL; INFILTRATION; INTRACAUDAL; PERINEURAL at 07:49

## 2017-10-06 RX ADMIN — LIDOCAINE HYDROCHLORIDE 40 MG: 20 INJECTION, SOLUTION INFILTRATION; PERINEURAL at 08:22

## 2017-10-06 RX ADMIN — SODIUM CHLORIDE, POTASSIUM CHLORIDE, SODIUM LACTATE AND CALCIUM CHLORIDE: 600; 310; 30; 20 INJECTION, SOLUTION INTRAVENOUS at 07:49

## 2017-10-06 RX ADMIN — PROPOFOL 50 MG: 10 INJECTION, EMULSION INTRAVENOUS at 08:27

## 2017-10-06 RX ADMIN — PROPOFOL 50 MG: 10 INJECTION, EMULSION INTRAVENOUS at 08:25

## 2017-10-06 RX ADMIN — PROPOFOL 150 MCG/KG/MIN: 10 INJECTION, EMULSION INTRAVENOUS at 08:22

## 2017-10-06 ASSESSMENT — LIFESTYLE VARIABLES: TOBACCO_USE: 1

## 2017-10-06 NOTE — DISCHARGE INSTRUCTIONS
[facility]    Home Care Following Endoscopy    Dr. Shah Membreno      Activity:    You have just undergone an endoscopic procedure usually performed with conscious sedation.  Do not work or operate machinery (including a car) for at least 12 hours.      I encourage you to walk and attempt to pass this air as soon as possible.    Diet:    Return to the diet you were on before your procedure but eat lightly for the first 12-24 hours.    Drink plenty of water.    Resume any regular medications unless otherwise advised by your physician.  Please begin any new medication prescribed as a result of your procedure as directed by your physician.     If you had any biopsy or polyp removed please refrain from aspirin or aspirin products for 2 days.  If on Coumadin please restart as instructed by your physician.   Pain:    You may take Tylenol as needed for pain.  Expected Recovery:    You can expect some mild abdominal fullness and/or discomfort due to the air used to inflate your intestinal tract. It is also normal to have a mild sore throat after upper endoscopy.    Call Your Physician if You Have:    After Upper Endoscopy:  o Shoulder, back or chest pain.  o Difficulty breathing or swallowing.  o Vomiting blood.    After Colonoscopy:  o Worsening persisting abdominal pain which is worse with activity.  o Fevers (>101 degrees F), chills or shakes.  o Passage of continued blood with bowel movements.   Any questions or concerns about your recovery, please call [phone] or after hours [phone] Nurse Advice Line.    Follow-up Care:    You should receive a call or letter with your results within 1 week. Please call if you have not received a notification of your results.    If asked to return to clinic please make an appointment 1 week after your procedure.  Call [phone].  [facility]   Adult Discharge Orders & Instructions     For 24 hours after surgery    1. Get plenty of rest.  A  responsible adult must stay with you for at least 24 hours after you leave the hospital.   2. Do not drive or use heavy equipment.  If you have weakness or tingling, don't drive or use heavy equipment until this feeling goes away.  3. Do not drink alcohol.  4. Avoid strenuous or risky activities.  Ask for help when climbing stairs.   5. You may feel lightheaded.  IF so, sit for a few minutes before standing.  Have someone help you get up.   6. If you have nausea (feel sick to your stomach): Drink only clear liquids such as apple juice, ginger ale, broth or 7-Up.  Rest may also help.  Be sure to drink enough fluids.  Move to a regular diet as you feel able.  7. You may have a slight fever. Call the doctor if your fever is over 100 F (37.7 C) (taken under the tongue) or lasts longer than 24 hours.  8. You may have a dry mouth, a sore throat, muscle aches or trouble sleeping.  These should go away after 24 hours.  9. Do not make important or legal decisions.   Call your doctor for any of the followin.  Signs of infection (fever, growing tenderness at the surgery site, a large amount of drainage or bleeding, severe pain, foul-smelling drainage, redness, swelling).    2. It has been over 8 to 10 hours since surgery and you are still not able to urinate (pass water).    3.  Headache for over 24 hours.

## 2017-10-06 NOTE — IP AVS SNAPSHOT
MRN:[id number]                      After Visit Summary   10/6/2017    Jose Angel Cha    MRN: [id number]           Thank you!     Thank you for choosing [facility] for your care. Our goal is always to provide you with excellent care. Hearing back from our patients is one way we can continue to improve our services. Please take a few minutes to complete the written survey that you may receive in the mail after you visit with us. Thank you!        Patient Information     Date Of Birth          1955        About your hospital stay     You were admitted on:  October 6, 2017 You last received care in the:  [facility]    You were discharged on:  October 6, 2017       Who to Call     For medical emergencies, please call 911.  For non-urgent questions about your medical care, please call your primary care provider or clinic, [phone]  For questions related to your surgery, please call your surgery clinic        Attending Provider     Provider Specialty    Membreno, MD [facility]       Primary Care Provider Office Phone # Fax #    Shari Tonia Paredes [phone]      Your next 10 appointments already scheduled     Oct 17, 2017  8:15 AM CDT   Nurse Only with ER ALLERGY SHOTS   [facility]    [address]  [facility] [id number] [phone]            Oct 31, 2017  8:15 AM CDT   Nurse Only with ER ALLERGY SHOTS   [facility]    [address]  [facility] [id number] [phone]              Further instructions from your care team         [facility]    Home Care Following Endoscopy    Dr. Shah Membreno      Activity:    You have just undergone an endoscopic procedure usually performed with conscious sedation.  Do not work or operate machinery (including a car) for at least 12 hours.      I encourage you to walk and attempt to pass  this air as soon as possible.    Diet:    Return to the diet you were on before your procedure but eat lightly for the first 12-24 hours.    Drink plenty of water.    Resume any regular medications unless otherwise advised by your physician.  Please begin any new medication prescribed as a result of your procedure as directed by your physician.     If you had any biopsy or polyp removed please refrain from aspirin or aspirin products for 2 days.  If on Coumadin please restart as instructed by your physician.   Pain:    You may take Tylenol as needed for pain.  Expected Recovery:    You can expect some mild abdominal fullness and/or discomfort due to the air used to inflate your intestinal tract. It is also normal to have a mild sore throat after upper endoscopy.    Call Your Physician if You Have:    After Upper Endoscopy:  o Shoulder, back or chest pain.  o Difficulty breathing or swallowing.  o Vomiting blood.    After Colonoscopy:  o Worsening persisting abdominal pain which is worse with activity.  o Fevers (>101 degrees F), chills or shakes.  o Passage of continued blood with bowel movements.   Any questions or concerns about your recovery, please call [phone] or after hours [phone] Nurse Advice Line.    Follow-up Care:    You should receive a call or letter with your results within 1 week. Please call if you have not received a notification of your results.    If asked to return to clinic please make an appointment 1 week after your procedure.  Call [phone].  [facility]   Adult Discharge Orders & Instructions     For 24 hours after surgery    1. Get plenty of rest.  A responsible adult must stay with you for at least 24 hours after you leave the hospital.   2. Do not drive or use heavy equipment.  If you have weakness or tingling, don't drive or use heavy equipment until this feeling goes away.  3. Do not drink alcohol.  4. Avoid strenuous or risky activities.  Ask for help when  climbing stairs.   5. You may feel lightheaded.  IF so, sit for a few minutes before standing.  Have someone help you get up.   6. If you have nausea (feel sick to your stomach): Drink only clear liquids such as apple juice, ginger ale, broth or 7-Up.  Rest may also help.  Be sure to drink enough fluids.  Move to a regular diet as you feel able.  7. You may have a slight fever. Call the doctor if your fever is over 100 F (37.7 C) (taken under the tongue) or lasts longer than 24 hours.  8. You may have a dry mouth, a sore throat, muscle aches or trouble sleeping.  These should go away after 24 hours.  9. Do not make important or legal decisions.   Call your doctor for any of the followin.  Signs of infection (fever, growing tenderness at the surgery site, a large amount of drainage or bleeding, severe pain, foul-smelling drainage, redness, swelling).    2. It has been over 8 to 10 hours since surgery and you are still not able to urinate (pass water).    3.  Headache for over 24 hours.      Pending Results     No orders found from 10/4/2017 to 10/7/2017.            Admission Information     Date & Time Provider Department Dept. Phone    10/6/2017 MembrenoPablo MD [facility] [phone]      Your Vitals Were     Blood Pressure Temperature Respirations Weight Pulse Oximetry BMI (Body Mass Index)    146/97 98  F (36.7  C) (Oral) 16 95.7 kg (211 lb) 94% 30.28 kg/m2      [facility] Information     [employer] gives you secure access to your electronic health record. If you see a primary care provider, you can also send messages to your care team and make appointments. If you have questions, please call your primary care clinic.  If you do not have a primary care provider, please call [phone] and they will assist you.        Care EveryWhere ID     This is your Care EveryWhere ID. This could be used by other organizations to access your [facility] medical records  [id number]        Equal Access to  Services     [facility]: Hadii karina san Soreji, waaxda luqadaha, qaybta kaalmada adeegkathleenrichard, nico guillen . So [facility] [phone].    ATENCIÓN: Si gaston figueroa, tiene a armas disposición servicios gratuitos de asistencia lingüística. Llame al [phone].    We comply with applicable federal civil rights laws and [place] laws. We do not discriminate on the basis of race, color, national origin, age, disability, sex, sexual orientation, or gender identity.               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, accidental or unintentional, subsequent encounter        Name of Mix: Mix #1  Mixed Vespid Mixed Vespid Venom 300 mcg/mL HS 13 ml Diluent: HSA qs to 13ml   Quantity:  13 mL   Refills:  PRN       * ALLERGEN IMMUNOTHERAPY PRESCRIPTION   Used for:  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, accidental or unintentional, subsequent encounter        Name of Mix: Mix #2  Wasp Wasp Venom 100 mcg/mL HS 13 ml Diluent: HSA qs to 13ml   Quantity:  13 mL   Refills:  PRN       amLODIPine 2.5 MG tablet   Commonly known as:  NORVASC   Used for:  Essential hypertension        Dose:  2.5 mg   Take 1 tablet (2.5 mg) by mouth daily   Quantity:  90 tablet   Refills:  1       aspirin 81 MG tablet   Used for:  Other and unspecified hyperlipidemia, Family history of ischemic heart disease        ONE DAILY   Quantity:  0   Refills:  0       EPINEPHrine 0.3 MG/0.3ML injection 2-pack   Commonly known as:  EPIPEN 2-MIGUELINA   Used for:  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 sting        Dose:  0.3 mg   Inject 0.3 mLs (0.3 mg) into the muscle once as needed for  anaphylaxis   Quantity:  2 each   Refills:  3       MULTIVITAL PO        Dose:  1 tablet   Take 1 tablet by mouth daily Reported on 3/22/2017   Refills:  0       omeprazole 20 MG CR capsule   Commonly known as:  priLOSEC   Used for:  Gastroesophageal reflux disease without esophagitis        TAKE ONE CAPSULE BY MOUTH EVERY DAY (TAKE 30 TO 60 MINUTES BEFORE A MEAL)   Quantity:  90 capsule   Refills:  3       polyethylene glycol powder   Commonly known as:  MIRALAX   Used for:  Chronic constipation        Dose:  1 capful   Take 17 g (1 capful) by mouth daily   Quantity:  510 g   Refills:  1       STATIN NOT PRESCRIBED (INTENTIONAL)   Used for:  Mitral valve disorders(424.0), Hyperlipidemia LDL goal <100        by Other route continuous prn Reported on 4/6/2017   Refills:  0       temazepam 15 MG capsule   Commonly known as:  RESTORIL   Used for:  Anxiety        Dose:  15 mg   Take 1 capsule (15 mg) by mouth nightly as needed for sleep   Quantity:  30 capsule   Refills:  3       * Notice:  This list has 4 medication(s) that are the same as other medications prescribed for you. Read the directions carefully, and ask your doctor or other care provider to review them with you.             Protect others around you: Learn how to safely use, store and throw away your medicines at www.disposemymeds.org.             Medication List: This is a list of all your medications and when to take them. Check marks below indicate your daily home schedule. Keep this list as a reference.      Medications           Morning Afternoon Evening Bedtime As Needed    * ALLERGEN IMMUNOTHERAPY PRESCRIPTION   Reported on 3/22/2017                                * ALLERGEN IMMUNOTHERAPY PRESCRIPTION   Reported on 3/22/2017                                * ALLERGEN IMMUNOTHERAPY PRESCRIPTION   Name of Mix: Mix #1  Mixed Vespid Mixed Vespid Venom 300 mcg/mL HS 13 ml Diluent: HSA qs to 13ml                                * ALLERGEN IMMUNOTHERAPY  PRESCRIPTION   Name of Mix: Mix #2  Wasp Wasp Venom 100 mcg/mL HS 13 ml Diluent: HSA qs to 13ml                                amLODIPine 2.5 MG tablet   Commonly known as:  NORVASC   Take 1 tablet (2.5 mg) by mouth daily                                aspirin 81 MG tablet   ONE DAILY                                EPINEPHrine 0.3 MG/0.3ML injection 2-pack   Commonly known as:  EPIPEN 2-MIGUELINA   Inject 0.3 mLs (0.3 mg) into the muscle once as needed for anaphylaxis                                MULTIVITAL PO   Take 1 tablet by mouth daily Reported on 3/22/2017                                omeprazole 20 MG CR capsule   Commonly known as:  priLOSEC   TAKE ONE CAPSULE BY MOUTH EVERY DAY (TAKE 30 TO 60 MINUTES BEFORE A MEAL)                                polyethylene glycol powder   Commonly known as:  MIRALAX   Take 17 g (1 capful) by mouth daily                                STATIN NOT PRESCRIBED (INTENTIONAL)   by Other route continuous prn Reported on 4/6/2017                                temazepam 15 MG capsule   Commonly known as:  RESTORIL   Take 1 capsule (15 mg) by mouth nightly as needed for sleep                                * Notice:  This list has 4 medication(s) that are the same as other medications prescribed for you. Read the directions carefully, and ask your doctor or other care provider to review them with you.

## 2017-10-06 NOTE — ANESTHESIA POSTPROCEDURE EVALUATION
Patient: Jose Angel Cha    Procedure(s):  ESOPHAGOSCOPY, GASTROSCOPY, DUODENOSCOPY (EGD) - Wound Class: II-Clean Contaminated    Diagnosis:GERD without esophagitis  Diagnosis Additional Information: No value filed.    Anesthesia Type:  MAC    Note:  Anesthesia Post Evaluation    Patient location during evaluation: Phase 2  Patient participation: Able to fully participate in evaluation  Level of consciousness: awake and alert  Pain management: adequate  Airway patency: patent  Cardiovascular status: acceptable  Respiratory status: acceptable  Hydration status: acceptable  PONV: none     Anesthetic complications: None          Last vitals:  Vitals:    10/06/17 0750 10/06/17 0845   BP: (!) 146/97 117/73   Resp: 16 16   Temp: 98  F (36.7  C)    SpO2: 94% 94%         Electronically Signed By: ETHEL Morales CRNA  October 6, 2017  9:01 AM

## 2017-10-06 NOTE — H&P (VIEW-ONLY)
SUBJECTIVE:                                                    Jose Angel Cha is a 62 year old male who presents to clinic today for the following health issues:    HPI  Medication Followup of Prilosec    Taking Medication as prescribed: yes    Side Effects:  None    Medication Helping Symptoms:  yes     GERD   Patient claims that his omeprazole is helping with his GERD symptoms.     Patient says that he has been on the omeprazole for about 10 years (he was using OTC before 2012, when he was prescribed).     Mood  Patient says that his stress level has been down lately. Pt says that he rarely takes his Restoril but that it is very effective when taken.    HENT  Patient notices that he has a raspy throat and thinks it is a combination of his reflux and allergies. He states that he is getting this primarily for his bee allergy. He has been taking Flonase for his allergic rhinitis symptoms.     Sleep apnea  Patient doesn't use his oral appliance right now because he needs to get it fitted at the dentist. He says he will get this fitted ASAP.     Right foot  States big toe is tender all the time. When he puts his foot down in the morning, feels like there is a broken bone with pain on the lateral surface.     Lower Back  Consistent weakness and soreness is noted in the morning.     Hemorrhoids   Notes chronic hemorrhoid problems with bowel movements.     Energy  Patient explains that he is exhausted by 2 pm every day. He says that this is a new issue.     Hypertension  Patient inquires if his BP medications can effect his erections.     Problem list and histories reviewed & adjusted, as indicated.  Additional history: as documented    Patient Active Problem List   Diagnosis     Hearing loss     Lumbago     Family history of ischemic heart disease     Esophageal reflux     Unspecified hyperplasia of prostate without urinary obstruction and other lower urinary tract symptoms (LUTS)     Meniere disease     Pain in joint  involving shoulder region     Health Care Home     Anxiety     Advanced directives, counseling/discussion     Mixed hyperlipidemia     Nerv/musculskel sym NEC     Dizziness and giddiness     Dupuytren's contracture     House dust mite allergy     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 sting     LISBET (obstructive sleep apnea) AHI 13.8     Venom-induced anaphylaxis     Coronary artery disease involving native coronary artery of native heart without angina pectoris     Nonrheumatic mitral valve insufficiency     Need for SBE (subacute bacterial endocarditis) prophylaxis     Benign essential hypertension     Past Surgical History:   Procedure Laterality Date     BURSECTOMY ELBOW Right 2016    Procedure: BURSECTOMY ELBOW;  Surgeon: Cruzito Diaz DO;  Location: [facility] OR     COLONOSCOPY  2007     ESOPHAGOSCOPY, GASTROSCOPY, DUODENOSCOPY (EGD), COMBINED N/A 2015    Procedure: COMBINED ESOPHAGOSCOPY, GASTROSCOPY, DUODENOSCOPY (EGD);  Surgeon: Duane, William Charles, MD;  Location: [facility] OR     ESOPHAGOSCOPY, GASTROSCOPY, DUODENOSCOPY (EGD), COMBINED N/A 2015    Procedure: COMBINED ESOPHAGOSCOPY, GASTROSCOPY, DUODENOSCOPY (EGD), BIOPSY SINGLE OR MULTIPLE;  Surgeon: Duane, William Charles, MD;  Location: [facility] OR     HC CREATE EARDRUM OPENING,GEN ANESTH  2009    Right     HC MASTOIDECTOMY,COMPLETE  2009    Right     HEAD & NECK SURGERY       INJECT EPIDURAL CERVICAL  2014    [facility] [place]     ORTHOPEDIC SURGERY       REPAIR VALVE MITRAL  2010     THORACIC SURGERY       TONSILLECTOMY         Social History   Substance Use Topics     Smoking status: Light Tobacco Smoker     Types: Cigars     Smokeless tobacco: Never Used      Comment: Occas Cigar     Alcohol use Yes      Comment: 3-4 glasses wine/night     Family History   Problem Relation Age of Onset     C.A.D. Sister       from [place] at 49     C.A.D. Brother      [place] age 50s     Parkinsonism Brother      C.A.D. Mother      [place]     Neurologic  Disorder Brother      hearing loss     Neurologic Disorder Son      hearing loss age 20s     CANCER Father      liver  age 51     Cancer - colorectal No family hx of      Prostate Cancer No family hx of      DIABETES No family hx of      Hypertension No family hx of      CEREBROVASCULAR DISEASE No family hx of      Breast Cancer No family hx of      Colon Cancer No family hx of      Hyperlipidemia No family hx of      Coronary Artery Disease No family hx of      Other Cancer No family hx of      Depression No family hx of      Anxiety Disorder No family hx of      MENTAL ILLNESS No family hx of      Substance Abuse No family hx of      Anesthesia Reaction No family hx of      OSTEOPOROSIS No family hx of      Genetic Disorder No family hx of      Thyroid Disease No family hx of      Asthma No family hx of      Obesity No family hx of          ROS:  Constitutional, HEENT, cardiovascular, pulmonary, GI, , musculoskeletal, neuro, skin, endocrine and psych systems are negative, except as in HPI or otherwise noted.     This document serves as a record of the services and decisions personally performed and made by Shari Paredes MD. It was created on her behalf by Dequan Woods, a trained medical scribe. The creation of this document is based the provider's statements to the medical scribe.  Dequan Woods, 2017 4:18 PM     OBJECTIVE:                                                    /90  Pulse 80  Temp 97.5  F (36.4  C) (Oral)  Resp 16  Wt 95.7 kg (211 lb)  BMI 30.28 kg/m2  Body mass index is 30.28 kg/(m^2).     GENERAL: healthy, alert, well nourished, well hydrated, no distress, obese   SKIN: no suspicious lesions, no rashes to visible skin  PSYCH: Alert and oriented times 3; speech- coherent , normal rate and volume; able to articulate logical thoughts, able to abstract reason, no tangential thoughts, no hallucinations or delusions, affect- normal  RIGHT FOOT: swelling of first metatarsal  joint    No results found for this or any previous visit (from the past 24 hour(s)).     ASSESSMENT/PLAN:                                                        ICD-10-CM    1. Gastroesophageal reflux disease without esophagitis K21.9 omeprazole (PRILOSEC) 20 MG CR capsule     GASTROENTEROLOGY ADULT REF PROCEDURE ONLY   2. Tobacco use disorder F17.200 TOBACCO CESSATION ORDER FOR    3. Need for prophylactic vaccination and inoculation against influenza Z23    4. Anxiety F41.9 BASIC METABOLIC PANEL     temazepam (RESTORIL) 15 MG capsule   5. Chronic fatigue R53.82      GERD - Pt is informed with long term risks of using omeprazole use. He is advised to try to transition to making dietary and lifestyle adjustments to help with his GERD and possibly come off of the Prilosec (eventually). Pt is informed that gastroscopy is another option to examine etiology of GERD symptoms and examine his esophagus. Pt would like to get this ordered and scheduled. He is given a gastroenterology referral.     Right Foot Pain - Lateral pain is likely from overuse rather than degenerative toe. Pt will wear Crocs less and see if it clears up. He will follow up with pediatry if pain worsens.     Energy - Discussed limiting sleep interruptions and increasing rest time.     Hemorrhoids - Pt advised to use stool softener (such as Miralax) to reduce pressure of bowel movement.     Erection - Pt is told that his hypertension is not impacting his erection, hyperlipidemia, however, could be. He will follow up with his cardiologist on this topic if a source is not apparent on labs.     Smoking - Pt is advised to stop using cigars.     Meds - Order placed for meds. Medication direction, dosage, and side effects discussed with patient.    Labs - Order placed for labs that the patient will complete tomorrow morning (fasting).     There are no Patient Instructions on file for this visit.     The information in this document, created by the medical  scribe for me, accurately reflects the services I personally performed and the decisions made by me. I have reviewed and approved this document for accuracy.   MD Shari Davila MD, MD  [facility]

## 2017-10-06 NOTE — ANESTHESIA CARE TRANSFER NOTE
Patient: Jose Angel Cha    Procedure(s):  ESOPHAGOSCOPY, GASTROSCOPY, DUODENOSCOPY (EGD) - Wound Class: II-Clean Contaminated    Diagnosis: GERD without esophagitis  Diagnosis Additional Information: No value filed.    Anesthesia Type:   MAC     Note:  Airway :Nasal Cannula  Patient transferred to:Phase II        Vitals: (Last set prior to Anesthesia Care Transfer)    CRNA VITALS  10/6/2017 0805 - 10/6/2017 0842      10/6/2017             Pulse: 67    SpO2: 97 %    Resp Rate (observed): 12                Electronically Signed By: ETHEL Morales CRNA  October 6, 2017  8:42 AM

## 2017-10-06 NOTE — ANESTHESIA PREPROCEDURE EVALUATION
Anesthesia Evaluation     . Pt has had prior anesthetic. Type: General           ROS/MED HX    ENT/Pulmonary:     (+)sleep apnea, LISBET risk factors snores loudly, hypertension, tobacco use, Current use , . .    Neurologic:  - neg neurologic ROS     Cardiovascular:     (+) Dyslipidemia, hypertension----. Taking blood thinners Pt has received instructions: . . . :. valvular problems/murmurs type: MR . Previous cardiac testing Echodate:results:Interpretation Summary  The baseline ECG displays minor diffuse abnormal ST segments. There was a  maximum 1.0mm ST segment depression in the inferior lateral lead(s). The  stress EKG is non-diagnostic due to pre-existing EKG abnomalities.  The patient exhibited no chest pain during exercise.  The visual ejection fraction is estimated at >70% with normal resting wall  motion and no stress-induced wall motion abnormality.  The stress EKG is non-diagnostic due to pre-existing EKG abnomalities.  THIS WAS A NORMAL STRESS ECHOCARDIOGRAM.  The results of the stress echocardiogram were conveyed to the patient today  The posterior mitral leaflet is thickened and fixed consistent with prior  mitral repair surgery with trace mitral regurgitation and no mitral stenosisdate: results:ECG reviewed date: results:SR incomplete rt BBB date: results:          METS/Exercise Tolerance:  1 - Eating, dressing   Hematologic:     (+) History of Transfusion no previous transfusion reaction -      Musculoskeletal:   (+) arthritis, , , -       GI/Hepatic: Comment: 3-4 glasses wine per night    (+) GERD Asymptomatic on medication,       Renal/Genitourinary:     (+) Pt has no history of transplant, BPH,       Endo:     (+) thyroid problem .      Psychiatric:     (+) psychiatric history depression      Infectious Disease:   (+) Recent Fever, Other Infectious Disease Mers      Malignancy:      - no malignancy   Other:    (+) No chance of pregnancy C-spine cleared: N/A, no H/O Chronic Pain,no other significant  disability                    Physical Exam  Normal systems: cardiovascular and pulmonary    Airway   Mallampati: II  TM distance: >3 FB  Neck ROM: full    Dental   (+) implants  Comment: Lower permanent bridge    Cardiovascular   Rhythm and rate: regular and normal      Pulmonary    breath sounds clear to auscultation                        Anesthesia Plan      History & Physical Review  History and physical reviewed and following examination; no interval change.    ASA Status:  3 .        Plan for MAC with Propofol and Intravenous induction. Maintenance will be Balanced.           Postoperative Care  Postoperative pain management:  IV analgesics.      Consents  Anesthetic plan, risks, benefits and alternatives discussed with:  Patient..                          .

## 2017-10-06 NOTE — INTERVAL H&P NOTE
This H&P has been reviewed and there are no clinically significant changes in the patient s condition.  The Patient is approved for surgery.      [facility], DO

## 2017-10-06 NOTE — IP AVS SNAPSHOT
[facility]    [address] [facility] [address] [facility] [address]    Phone:  [phone]                                       After Visit Summary   10/6/2017    Jose Angel Cha    MRN: [id number]           After Visit Summary Signature Page     I have received my discharge instructions, and my questions have been answered. I have discussed any challenges I see with this plan with the nurse or doctor.    ..........................................................................................................................................  Patient/Patient Representative Signature      ..........................................................................................................................................  Patient Representative Print Name and Relationship to Patient    ..................................................               ................................................  Date                                            Time    ..........................................................................................................................................  Reviewed by Signature/Title    ...................................................              ..............................................  Date                                                            Time

## 2017-10-09 ENCOUNTER — TELEPHONE (OUTPATIENT)
Dept: SURGERY | Facility: CLINIC | Age: 62
End: 2017-10-09

## 2017-10-09 NOTE — TELEPHONE ENCOUNTER
I attempted to reach the pt at his home number to tell him to schedule his next UGI endoscopy in one year.  If he has questions, please have him text my pager with his name and a number where I can reach him at.  Thanks

## 2017-10-10 LAB — UPPER GI ENDOSCOPY: NORMAL

## 2017-10-11 PROBLEM — K22.70 BARRETT'S ESOPHAGUS WITHOUT DYSPLASIA: Status: ACTIVE | Noted: 2017-10-11

## 2017-10-12 DIAGNOSIS — I10 ESSENTIAL HYPERTENSION: ICD-10-CM

## 2017-10-12 RX ORDER — AMLODIPINE BESYLATE 2.5 MG/1
2.5 TABLET ORAL DAILY
Qty: 90 TABLET | Refills: 1 | Status: SHIPPED | OUTPATIENT
Start: 2017-10-12 | End: 2017-11-22

## 2017-10-12 NOTE — TELEPHONE ENCOUNTER
Norvasc      Last Written Prescription Date: 05-  Last Fill Quantity: 90, # refills: 1  Last Office Visit with [facility], [facility] or [facility] prescribing provider: 04-  Radha Meadows, Pharmacy [facility] [phone]      Next 5 appointments (look out 90 days)     Oct 17, 2017  8:15 AM CDT   Nurse Only with ER ALLERGY SHOTS   [facility]    [address]  [facility] [id number] [phone]            Oct 31, 2017  8:15 AM CDT   Nurse Only with ER ALLERGY SHOTS   [facility]    [address]  [facility] [id number] [phone]                   Potassium   Date Value Ref Range Status   09/26/2017 4.1 3.4 - 5.3 mmol/L Final     Creatinine   Date Value Ref Range Status   09/26/2017 1.14 0.66 - 1.25 mg/dL Final     BP Readings from Last 3 Encounters:   10/06/17 126/86   09/25/17 138/90   09/08/17 (!) 138/103

## 2017-10-17 ENCOUNTER — ALLIED HEALTH/NURSE VISIT (OUTPATIENT)
Dept: ALLERGY | Facility: OTHER | Age: 62
End: 2017-10-17
Payer: COMMERCIAL

## 2017-10-17 DIAGNOSIS — T63.441D TOXIC EFFECT OF VENOM OF BEES, UNINTENTIONAL, SUBSEQUENT ENCOUNTER: Primary | ICD-10-CM

## 2017-10-17 PROCEDURE — 99207 ZZC NO CHARGE LOS: CPT

## 2017-10-17 PROCEDURE — 95117 IMMUNOTHERAPY INJECTIONS: CPT

## 2017-10-17 NOTE — PROGRESS NOTES
Patient presented after waiting 30 minutes with no reaction to allergy injections. Discharged from clinic.  Angela Bone RN ............   10/17/2017...9:00 AM

## 2017-10-18 ENCOUNTER — TELEPHONE (OUTPATIENT)
Dept: FAMILY MEDICINE | Facility: OTHER | Age: 62
End: 2017-10-18

## 2017-10-18 DIAGNOSIS — I10 BENIGN ESSENTIAL HYPERTENSION: ICD-10-CM

## 2017-10-18 DIAGNOSIS — E78.2 MIXED HYPERLIPIDEMIA: Primary | ICD-10-CM

## 2017-10-18 NOTE — TELEPHONE ENCOUNTER
Reason for Call:  Other appointment     Detailed comments: pt scheduled recheck labs for 12/19 and states also wants Lena to order a cholesterol so that can be checked at lab appt as well. Please advise    Phone Number Patient can be reached at: Cell number on file:    Telephone Information:   Mobile [phone]       Best Time: ANY    Can we leave a detailed message on this number? YES    Call taken on 10/18/2017 at 1:36 PM by Carmen Hines

## 2017-10-18 NOTE — LETTER
[address]  [facility] [address]  [phone]          October 23, 2017    Jose Angel Cha                                                                                                                     [address] [facility] [address]            Dear Jose Angel,    We tried to contact you by phone but were unable to connect with you. We wanted to let you know that Dr. Paredes was able to put in all of your lab orders for your December physical appointment including cholesterol.     Sincerely,     Your [facility] Care Team

## 2017-10-24 NOTE — TELEPHONE ENCOUNTER
I informed patient and he was wondering if we could remind him closer to the date to schedule? He did not have any further questions and said to have a great week!

## 2017-10-26 NOTE — TELEPHONE ENCOUNTER
LM for pt indicating I sent myself a reminder but for him to make sure he sets an appt as well  Shari Putnam RN  [facility]  10/26/2017 2:50 PM

## 2017-11-14 ENCOUNTER — ALLIED HEALTH/NURSE VISIT (OUTPATIENT)
Dept: ALLERGY | Facility: OTHER | Age: 62
End: 2017-11-14
Payer: COMMERCIAL

## 2017-11-14 DIAGNOSIS — J30.1 ALLERGIC RHINITIS DUE TO POLLEN: Primary | ICD-10-CM

## 2017-11-14 PROCEDURE — 95117 IMMUNOTHERAPY INJECTIONS: CPT

## 2017-11-14 NOTE — PROGRESS NOTES
Patient presented after waiting 30 minutes with no reaction to allergy injections. Discharged from clinic.  Angela Bone RN ............   11/14/2017...9:05 AM

## 2017-11-14 NOTE — MR AVS SNAPSHOT
After Visit Summary   11/14/2017    Jose Angel Cha    MRN: [id number]           Patient Information     Date Of Birth          1955        Visit Information        Provider Department      11/14/2017 8:15 AM ER ALLERGY SHOTS [facility]        Today's Diagnoses     Allergic rhinitis due to pollen    -  1       Follow-ups after your visit        Your next 10 appointments already scheduled     Nov 15, 2017  1:40 PM CST   Return Visit with Juan Antonio Cardozo,    [facility]    [address] [place] [address]   [phone]            Dec 12, 2017  8:15 AM CST   Nurse Only with ER ALLERGY SHOTS   [facility]    [address] [address] [phone]            Dec 19, 2017  8:30 AM CST   LAB with NL LAB [facility] ([facility])    [address] [place] [address] [phone]           Please do not eat 10-12 hours before your appointment if you are coming in fasting for labs on lipids, cholesterol, or glucose (sugar). This does not apply to pregnant women. Water, hot tea and black coffee (with nothing added) are okay. Do not drink other fluids, diet soda or chew gum.            Dec 28, 2017  1:30 PM CST   Nurse Only with ER ALLERGY SHOTS   [facility]    [address] [place] [address] [phone]              Who to contact     If you have questions or need follow up information about today's clinic visit or your schedule please contact [facility] directly at [phone].  Normal or non-critical lab and imaging results will be communicated to you by MyChart, letter or phone within 4 business days after the clinic has received the results. If you do not hear from us within 7 days, please contact the clinic through  [employer] or phone. If you have a critical or abnormal lab result, we will notify you by phone as soon as possible.  Submit refill requests through [employer] or call your pharmacy and they will forward the refill request to us. Please allow 3 business days for your refill to be completed.          Additional Information About Your Visit        [employer] Information     [employer] gives you secure access to your electronic health record. If you see a primary care provider, you can also send messages to your care team and make appointments. If you have questions, please call your primary care clinic.  If you do not have a primary care provider, please call [phone] and they will assist you.        Care EveryWhere ID     This is your Care EveryWhere ID. This could be used by other organizations to access your [facility] medical records  [id number]         Blood Pressure from Last 3 Encounters:   10/06/17 126/86   09/25/17 138/90   09/08/17 (!) 138/103    Weight from Last 3 Encounters:   10/06/17 95.7 kg (211 lb)   09/25/17 95.7 kg (211 lb)   09/08/17 93 kg (205 lb)              We Performed the Following     Allergy Shot: Two or more injections        Primary Care Provider Office Phone # Fax #    Shari Tonia Paredes [phone]       [address] [place] [address]        Equal Access to Services     [facility]: Hadii karina ku hadasho Soomaali, waaxda luqadaha, qaybta kaalmada adeegyada, nico guillen . So [facility] [phone].    ATENCIÓN: Si habla español, tiene a armas disposición servicios gratuitos de asistencia lingüística. Llame al [phone].    We comply with applicable federal civil rights laws and [place] laws. We do not discriminate on the basis of race, color, national origin, age, disability, sex, sexual orientation, or gender identity.            Thank you!     Thank you for choosing [facility]  for your care. Our goal is always to provide you with  excellent care. Hearing back from our patients is one way we can continue to improve our services. Please take a few minutes to complete the written survey that you may receive in the mail after your visit with us. Thank you!             Your Updated Medication List - Protect others around you: Learn how to safely use, store and throw away your medicines at www.disposemymeds.org.          This list is accurate as of: 11/14/17  9:06 AM.  Always use your most recent med list.                   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, accidental or unintentional, subsequent encounter       * ALLERGEN IMMUNOTHERAPY PRESCRIPTION     13 mL    Name of Mix: Mix #2  Wasp Wasp Venom 100 mcg/mL HS 13 ml Diluent: HSA qs to 13ml    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, accidental or unintentional, subsequent encounter       amLODIPine 2.5 MG tablet    NORVASC    90 tablet    Take 1 tablet (2.5 mg) by mouth daily    Essential hypertension       aspirin 81 MG tablet     0    ONE DAILY    Other and unspecified hyperlipidemia, Family history of ischemic heart disease       EPINEPHrine 0.3 MG/0.3ML injection 2-pack    EPIPEN 2-MIGUELINA    2 each    Inject 0.3 mLs (0.3 mg) into the muscle once as needed for anaphylaxis    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 sting       MULTIVITAL PO      Take 1 tablet by mouth daily Reported on 3/22/2017        omeprazole 20 MG CR capsule    priLOSEC    90 capsule    TAKE ONE CAPSULE BY MOUTH EVERY DAY (TAKE 30 TO 60 MINUTES BEFORE A MEAL)    Gastroesophageal reflux disease without esophagitis       polyethylene glycol powder    MIRALAX    510 g    Take 17 g (1 capful) by  mouth daily    Chronic constipation       STATIN NOT PRESCRIBED (INTENTIONAL)      by Other route continuous prn Reported on 4/6/2017    Mitral valve disorders(424.0), Hyperlipidemia LDL goal <100       temazepam 15 MG capsule    RESTORIL    30 capsule    Take 1 capsule (15 mg) by mouth nightly as needed for sleep    Anxiety       * Notice:  This list has 4 medication(s) that are the same as other medications prescribed for you. Read the directions carefully, and ask your doctor or other care provider to review them with you.

## 2017-11-15 ENCOUNTER — DOCUMENTATION ONLY (OUTPATIENT)
Dept: CARDIOLOGY | Facility: CLINIC | Age: 62
End: 2017-11-15

## 2017-11-15 ENCOUNTER — OFFICE VISIT (OUTPATIENT)
Dept: ALLERGY | Facility: OTHER | Age: 62
End: 2017-11-15
Payer: COMMERCIAL

## 2017-11-15 VITALS
DIASTOLIC BLOOD PRESSURE: 80 MMHG | OXYGEN SATURATION: 97 % | SYSTOLIC BLOOD PRESSURE: 140 MMHG | WEIGHT: 211.5 LBS | HEIGHT: 70 IN | BODY MASS INDEX: 30.28 KG/M2 | HEART RATE: 74 BPM | RESPIRATION RATE: 18 BRPM

## 2017-11-15 DIAGNOSIS — J30.81 ALLERGIC RHINITIS DUE TO ANIMAL DANDER: ICD-10-CM

## 2017-11-15 DIAGNOSIS — J30.89 ALLERGIC RHINITIS DUE TO DUST MITE: Primary | ICD-10-CM

## 2017-11-15 DIAGNOSIS — J30.1 CHRONIC SEASONAL ALLERGIC RHINITIS DUE TO POLLEN: ICD-10-CM

## 2017-11-15 DIAGNOSIS — Z91.09 HOUSE DUST MITE ALLERGY: ICD-10-CM

## 2017-11-15 DIAGNOSIS — J30.1 CHRONIC SEASONAL ALLERGIC RHINITIS DUE TO POLLEN: Primary | ICD-10-CM

## 2017-11-15 DIAGNOSIS — T63.91XD VENOM-INDUCED ANAPHYLAXIS, ACCIDENTAL OR UNINTENTIONAL, SUBSEQUENT ENCOUNTER: ICD-10-CM

## 2017-11-15 PROCEDURE — 99207 ZZC DROP WITH A PROCEDURE: CPT | Performed by: ALLERGY & IMMUNOLOGY

## 2017-11-15 PROCEDURE — 95004 PERQ TESTS W/ALRGNC XTRCS: CPT | Performed by: ALLERGY & IMMUNOLOGY

## 2017-11-15 PROCEDURE — 99214 OFFICE O/P EST MOD 30 MIN: CPT | Mod: 25 | Performed by: ALLERGY & IMMUNOLOGY

## 2017-11-15 RX ORDER — FLUTICASONE PROPIONATE 50 MCG
2 SPRAY, SUSPENSION (ML) NASAL DAILY
Qty: 1 BOTTLE | Refills: 11 | Status: SHIPPED | OUTPATIENT
Start: 2017-11-15 | End: 2019-05-22

## 2017-11-15 RX ORDER — OLOPATADINE HYDROCHLORIDE 2 MG/ML
1 SOLUTION/ DROPS OPHTHALMIC DAILY
Qty: 2.5 ML | Refills: 3 | Status: SHIPPED | OUTPATIENT
Start: 2017-11-15 | End: 2018-08-24
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
## 2017-11-15 NOTE — PROGRESS NOTES
Appeals submitted to insurance company. Patient was denied prior authorization for Praluent after appeals.

## 2017-11-15 NOTE — NURSING NOTE
RN educated on the symptoms and treatment of anaphylaxis. Went through the different ways that a reaction can present, and the body systems that it can affect. Instructed on when to use Epinephrine Auto Injector if she has an immunotherapy injection reaction. Patient verbalized understanding.     Writer demonstrated how to use an EpiPen auto-injector.  Patient instructed to form a fist around the auto-injector, remove blue safety release by pulling straight up, then firmly push orange tip against outer thigh so it clicks, holding for 5 seconds.  Patient advised that once used, needle will not be exposed, as orange tip extends.  Patient advised to call 911 or go to emergency department after epi-pen use for further monitoring.       Allergy immunotherapy consent was signed. Patient is aware that wait time of 30 minutes is required after injection. Understands that Epinephrine Autoinjector must be brought to each appointment. If He does not have his injector, the shot will not be given. Informed that patient is responsible for any remaining balance for allergy serum after it has been submitted to insurance.     Ara Mcbride RN

## 2017-11-15 NOTE — MR AVS SNAPSHOT
After Visit Summary   11/15/2017    Jose Angel Cha    MRN: [id number]           Patient Information     Date Of Birth          1955        Visit Information        Provider Department      11/15/2017 4:40 PM Juan Antonio Cardozo DO [facility]        Today's Diagnoses     Chronic seasonal allergic rhinitis due to pollen    -  1    Venom-induced anaphylaxis, accidental or unintentional, subsequent encounter        House dust mite allergy          Care Instructions    Allergy Staff Appt Hours Shot Hours Locations    Physician     Juan Antonio Cardozo DO       Support Staff     LAURI Felix MA  Monday:                      [place] 8-7     Tuesday:         [place] 8-5     Wednesday:        [place]: 7-5 Friday:        [place] 7-5 [place] Monday: 9-6 Friday: 7-2     [place]        Tuesday: 7-11 Thursday: 1:30-7     [place] Tuesday: 1-7 Wednesday: 11-6 Thursday: 7-12 [facility]  [address] [place] [address]  Appt Line: [phone]  Allergy RN (Monday):  [phone]    [facility]  [address] [place] [address]  Appt Line: [phone]  Allergy RN (Tues & Wed):  [phone]    [facility]  [address] [place] [address]  Appt Line: [phone]  Allergy RN (Friday):  [phone]       Important Scheduling Information  Aspirin Desensitization: Appt will last 2 clinic days. Please call the Allergy RN line for your clinic to schedule. Discontinue antihistamines 7 days prior to the appointment.     Food Challenges: Appt will last 3-4 hours. Please call the Allergy RN line for your clinic to schedule. Discontinue antihistamines 7 days prior to the appointment.     Penicillin Testing: Appt will last 2-3 hours. Please call the Allergy RN line for your clinic to schedule. Discontinue antihistamines 7 days prior to the appointment.     Skin Testing: Appt will about 40 minutes.  Call the appointment line for your clinic to schedule. Discontinue antihistamines 7 days prior to the appointment.     Venom Testing: Appt will last 2-3 hours. Please call the Allergy RN line for your clinic to schedule. Discontinue antihistamines 7 days prior to the appointment.     Thank you for trusting us with your Allergy, Asthma, and Immunology care. Please feel free to contact us with any questions or concerns you may have.      - Continue venom allergy shots. Increase to every 6 weeks.   - Flonase 2 sprays/nostril daily.   - Allegra or Xyzal daily.   - Pataday (olapatadine) 1 drop/eye daily as needed.   - Start allergy shots.     ACCELERATED IMMUNOTHERAPY PATIENT INFORMATION    Immunotherapy is a treatment that alters the patient s immune system so they have less allergy symptoms, use less medications to control symptoms, improved quality of life, and less health care utilization    Accelerated immunotherapy schedules are designed to allow patients to reach their maintenance imunotherapy dose in a shorter time frame than conventional immunotherapy.    Clinical benefit can be reached more rapidly using accelerated immunotherapy.     A lot of patients do not want to start allergen immunotherapy secondary to the upfront time commitment associated with conventional allergy shots. Rush immunotherapy would allow a patient to be on monthly allergy shots within 6-10 weeks. Cluster immunotherapy would allow the patient to be on monthly injections around 8-9 weeks.     Rush immunotherapy has historically been associated with an increased risk of reactions, however the risk is substantially lowered by only advancing on RUSH immunotherapy day to the mid-yellow vial, using a pre-medication regimen consisting of steroids and antihistamines, and making sure asthma is well controlled the RUSH immunotherapy day. This puts the risk of a systemic reaction similar to conventional immunotherapy. Cluster immunotherapy is similar  in the risk of systemic reaction to conventional immunotherapy. The patient would still need to take oral antihistamine on cluster immunotherapy days.    CLUSTER IMMUNOTHERAPY PATIENT INSTRUCTIONS    Asthma medications must be continued and asthma must be well controlled prior to receiving CLUSTER immunotherapy. Immunotherapy should not be given if you are feeling ill.    Other allergy medications may be continued    You should plan to spend approximately 2 hours at the clinic. Please feel free to bring things to occupy your time such as books, work, or computers. You may also wish to bring something to eat as you will not be able to leave the clinic once the procedure has begun.    You will be required to bring an epinephrine auto-injector with you to your CLUSTER immunotherapy appointments and all subsequent allergy shot appointments    You will be required to take the following pre-medication regimen prior  to your CLUSTER immunotherapy appointments  o Medications to be taken 1 day prior to CLUSTER:  - Zyrtec 10mg or Allegra 180mg twice daily  o Medications to be taken the morning of CLUSTER:  - Zyrtec 10mg or Allegra 180mg              Follow-ups after your visit        Your next 10 appointments already scheduled     Dec 12, 2017  8:15 AM CST   Nurse Only with ER ALLERGY SHOTS   [facility]    [address] [address]   [phone]            Dec 19, 2017  8:30 AM CST   LAB with NL LAB [facility] ([facility])    [address] [facility] [facility] [address]   [phone]           Please do not eat 10-12 hours before your appointment if you are coming in fasting for labs on lipids, cholesterol, or glucose (sugar). This does not apply to pregnant women. Water, hot tea and black coffee (with nothing added) are okay. Do not drink other fluids, diet soda or chew gum.            Dec 28, 2017  1:30 PM CST   Nurse  "Only with ER ALLERGY SHOTS   [facility]    [address] [address]  [facility] [address] [phone]              Who to contact     If you have questions or need follow up information about today's clinic visit or your schedule please contact [facility] directly at [phone].  Normal or non-critical lab and imaging results will be communicated to you by MyChart, letter or phone within 4 business days after the clinic has received the results. If you do not hear from us within 7 days, please contact the clinic through [employer] or phone. If you have a critical or abnormal lab result, we will notify you by phone as soon as possible.  Submit refill requests through [employer] or call your pharmacy and they will forward the refill request to us. Please allow 3 business days for your refill to be completed.          Additional Information About Your Visit        [employer] Information     [employer] gives you secure access to your electronic health record. If you see a primary care provider, you can also send messages to your care team and make appointments. If you have questions, please call your primary care clinic.  If you do not have a primary care provider, please call [phone] and they will assist you.        Care EveryWhere ID     This is your Care EveryWhere ID. This could be used by other organizations to access your [facility] medical records  [id number]        Your Vitals Were     Pulse Respirations Height Pulse Oximetry BMI (Body Mass Index)       74 18 1.79 m (5' 10.47\") 97% 29.94 kg/m2        Blood Pressure from Last 3 Encounters:   11/15/17 140/80   10/06/17 126/86   09/25/17 138/90    Weight from Last 3 Encounters:   11/15/17 95.9 kg (211 lb 8 oz)   10/06/17 95.7 kg (211 lb)   09/25/17 95.7 kg (211 lb)              We Performed the Following     ALLERGY SKIN TESTS,ALLERGENS          Today's Medication Changes          These changes are " accurate as of: 11/15/17  4:58 PM.  If you have any questions, ask your nurse or doctor.               Start taking these medicines.        Dose/Directions    olopatadine HCl 0.2 % Soln   Commonly known as:  PATADAY   Used for:  Chronic seasonal allergic rhinitis due to pollen, House dust mite allergy   Started by:  Juan Antonio Cardozo,         Dose:  1 drop   Place 1 drop into both eyes daily   Quantity:  2.5 mL   Refills:  3            Where to get your medicines      These medications were sent to [facility] [address] [address]     Phone:  [phone]     olopatadine HCl 0.2 % Soln                Primary Care Provider Office Phone # Fax #    Shari Paredes [phone]       [address] [place] [address]        Equal Access to Services     [facility]: Hadii karina dowling hadasho Soreji, waaxda luqadaha, qaybta kaalmada adeegyada, nico guillen . So [facility] [phone].    ATENCIÓN: Si habla español, tiene a armas disposición servicios gratuitos de asistencia lingüística. Llame al [phone].    We comply with applicable federal civil rights laws and [place] laws. We do not discriminate on the basis of race, color, national origin, age, disability, sex, sexual orientation, or gender identity.            Thank you!     Thank you for choosing [facility]  for your care. Our goal is always to provide you with excellent care. Hearing back from our patients is one way we can continue to improve our services. Please take a few minutes to complete the written survey that you may receive in the mail after your visit with us. Thank you!             Your Updated Medication List - Protect others around you: Learn how to safely use, store and throw away your medicines at www.disposemymeds.org.          This list is accurate as of: 11/15/17  4:58 PM.  Always use your most recent med list.                    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, accidental or unintentional, subsequent encounter       * ALLERGEN IMMUNOTHERAPY PRESCRIPTION     13 mL    Name of Mix: Mix #2  Wasp Wasp Venom 100 mcg/mL HS 13 ml Diluent: HSA qs to 13ml    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, accidental or unintentional, subsequent encounter       amLODIPine 2.5 MG tablet    NORVASC    90 tablet    Take 1 tablet (2.5 mg) by mouth daily    Essential hypertension       aspirin 81 MG tablet     0    ONE DAILY    Other and unspecified hyperlipidemia, Family history of ischemic heart disease       EPINEPHrine 0.3 MG/0.3ML injection 2-pack    EPIPEN 2-MIGUELINA    2 each    Inject 0.3 mLs (0.3 mg) into the muscle once as needed for anaphylaxis    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 sting       MULTIVITAL PO      Take 1 tablet by mouth daily Reported on 3/22/2017        olopatadine HCl 0.2 % Soln    PATADAY    2.5 mL    Place 1 drop into both eyes daily    Chronic seasonal allergic rhinitis due to pollen, House dust mite allergy       omeprazole 20 MG CR capsule    priLOSEC    90 capsule    TAKE ONE CAPSULE BY MOUTH EVERY DAY (TAKE 30 TO 60 MINUTES BEFORE A MEAL)    Gastroesophageal reflux disease without esophagitis       polyethylene glycol powder    MIRALAX    510 g    Take 17 g (1 capful) by mouth daily    Chronic constipation       STATIN NOT PRESCRIBED (INTENTIONAL)      by Other route continuous prn Reported on 4/6/2017    Mitral valve disorders(424.0), Hyperlipidemia LDL goal <100       temazepam 15 MG capsule    RESTORIL    30 capsule    Take 1 capsule (15 mg) by mouth nightly as needed for sleep     Anxiety       * Notice:  This list has 4 medication(s) that are the same as other medications prescribed for you. Read the directions carefully, and ask your doctor or other care provider to review them with you.
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
## 2017-11-15 NOTE — ASSESSMENT & PLAN NOTE
History of systemic symptoms on 3 separate stings. 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 Positive testing for white faced hornet, yellow faced hornet and wasp. Negative testing for honey bee. Tolerating venom immunotherapy. He was stung by what he thinks was a honey bee last year and had no symptoms. No interval stings. He has injectable epinephrine. No systemic reactions with allergy shots. Normal tryptase.     - Continue venom immunotherapy.  - Increase interval between dosing to 6 weeks.  - Likely will keep on lifelong venom immunotherapy given severe reaction.  - Return to clinic in 1 year.   - Continue to keep EpiPen near him at all times in case of accidental sting.

## 2017-11-15 NOTE — ASSESSMENT & PLAN NOTE
Nocturnal congestion, rhinorrhea, ocular watering, ocular itching, sneezing. Diphenhydramine has been helpful. Claritin was not helpful.Flonase 2 sprays/nostril daily has been helpful Testing 2 years ago positive for dust mites and birch. Symptoms are perennial.    Skin testing:  Skin testing  Positive for dust mites, cat, dog, grass mix, birch and nettle.     - Flonase 2 sprays per nostril daily.  - Cetirizine or fexofenadine daily as needed.  - Pataday (olapatadine) 1 drop/eye daily as needed.   - The patient has a history of allergic rhinoconjunctivitis and has exhausted all medical therapies without success in controlling symptoms. Immunotherapy was discussed as a treatment option with patient and family. Risks/benefits of immunotherapy were discussed and the patient/family wishes to proceed with allergen immunotherapy.   - Patient will proceed with cluster immunotherapy.   - He was positive on skin testing for only dust mite p, but positive on blood testing for both dust mites. I will include both dust mites in shots.   - The patient will be prescribed a injectable epinephrine device and will need to bring this with them to allergy shot appointments and carry with them for the rest of the day after they receive the allergy shot. Discussed signs and symptoms of a systemic reaction and when to use injectable epinephrine.   - Oral antihistamine daily prior to receiving allergy shot.

## 2017-11-15 NOTE — PATIENT INSTRUCTIONS
Allergy Staff Appt Hours Shot Hours Locations    Physician     Juan Antonio Cardozo DO       Support Staff     Ara STONE RN      Valarie ESPINOZA MA  Monday:                      [place] 8-7     Tuesday:         [place] 8-5 Wednesday:        [place]: 7-5     Friday:        [place] 7-5   [place]        Monday: 9-6        Friday: 7-2     [place]        Tuesday: 7-11 Thursday: 1:30-7     [place]        Tuesday: 1-7        Wednesday: 11-6 Thursday: 7-12 [facility]  [address] [place] [address]  Appt Line: [phone]  Allergy RN (Monday):  [phone]    [facility]  [address] [place] [address]  Appt Line: [phone]  Allergy RN (Tues & Wed):  [phone]    [facility]  [address] [place] [address]  Appt Line: [phone]  Allergy RN (Friday):  [phone]       Important Scheduling Information  Aspirin Desensitization: Appt will last 2 clinic days. Please call the Allergy RN line for your clinic to schedule. Discontinue antihistamines 7 days prior to the appointment.     Food Challenges: Appt will last 3-4 hours. Please call the Allergy RN line for your clinic to schedule. Discontinue antihistamines 7 days prior to the appointment.     Penicillin Testing: Appt will last 2-3 hours. Please call the Allergy RN line for your clinic to schedule. Discontinue antihistamines 7 days prior to the appointment.     Skin Testing: Appt will about 40 minutes. Call the appointment line for your clinic to schedule. Discontinue antihistamines 7 days prior to the appointment.     Venom Testing: Appt will last 2-3 hours. Please call the Allergy RN line for your clinic to schedule. Discontinue antihistamines 7 days prior to the appointment.     Thank you for trusting us with your Allergy, Asthma, and Immunology care. Please feel free to contact us with any questions or concerns you may have.      - Continue venom allergy shots. Increase to every 6 weeks.   -  Flonase 2 sprays/nostril daily.   - Allegra or Xyzal daily.   - Pataday (olapatadine) 1 drop/eye daily as needed.   - Start allergy shots.     ACCELERATED IMMUNOTHERAPY PATIENT INFORMATION    Immunotherapy is a treatment that alters the patient s immune system so they have less allergy symptoms, use less medications to control symptoms, improved quality of life, and less health care utilization    Accelerated immunotherapy schedules are designed to allow patients to reach their maintenance imunotherapy dose in a shorter time frame than conventional immunotherapy.    Clinical benefit can be reached more rapidly using accelerated immunotherapy.     A lot of patients do not want to start allergen immunotherapy secondary to the upfront time commitment associated with conventional allergy shots. Rush immunotherapy would allow a patient to be on monthly allergy shots within 6-10 weeks. Cluster immunotherapy would allow the patient to be on monthly injections around 8-9 weeks.     Rush immunotherapy has historically been associated with an increased risk of reactions, however the risk is substantially lowered by only advancing on RUSH immunotherapy day to the mid-yellow vial, using a pre-medication regimen consisting of steroids and antihistamines, and making sure asthma is well controlled the RUSH immunotherapy day. This puts the risk of a systemic reaction similar to conventional immunotherapy. Cluster immunotherapy is similar in the risk of systemic reaction to conventional immunotherapy. The patient would still need to take oral antihistamine on cluster immunotherapy days.    CLUSTER IMMUNOTHERAPY PATIENT INSTRUCTIONS    Asthma medications must be continued and asthma must be well controlled prior to receiving CLUSTER immunotherapy. Immunotherapy should not be given if you are feeling ill.    Other allergy medications may be continued    You should plan to spend approximately 2 hours at the clinic. Please feel free to  bring things to occupy your time such as books, work, or computers. You may also wish to bring something to eat as you will not be able to leave the clinic once the procedure has begun.    You will be required to bring an epinephrine auto-injector with you to your CLUSTER immunotherapy appointments and all subsequent allergy shot appointments    You will be required to take the following pre-medication regimen prior  to your CLUSTER immunotherapy appointments  o Medications to be taken 1 day prior to CLUSTER:  - Zyrtec 10mg or Allegra 180mg twice daily  o Medications to be taken the morning of CLUSTER:  - Zyrtec 10mg or Allegra 180mg

## 2017-11-15 NOTE — PROGRESS NOTES
ALLERGY SOLUTION NEW REQUEST    Jose Angel Cha 1955 MRN: [id number]    DATE NEEDED:  2 weeks  Vial Color Content   Top Dose         Vial Size  Green 1:1,000, Blue 1:100, Yellow 1:10 and Red 1:1 Cat, Dog, Grass, Dust Mite, Trees, Weeds  Red 1:1 0.5 5mL      Shot Clinic Location:  [place]  Ship to Location: [place]  Special Instructions:  He will be receiving cluster immunotherapy as opposed to traditional immunotherapy. No special instructions for pharmacy.         Requester Signature  Juan Antonio Cardozo

## 2017-11-22 ENCOUNTER — ALLIED HEALTH/NURSE VISIT (OUTPATIENT)
Dept: FAMILY MEDICINE | Facility: OTHER | Age: 62
End: 2017-11-22
Payer: COMMERCIAL

## 2017-11-22 ENCOUNTER — TELEPHONE (OUTPATIENT)
Dept: CARDIOLOGY | Facility: CLINIC | Age: 62
End: 2017-11-22

## 2017-11-22 VITALS — SYSTOLIC BLOOD PRESSURE: 128 MMHG | DIASTOLIC BLOOD PRESSURE: 88 MMHG | HEART RATE: 76 BPM

## 2017-11-22 DIAGNOSIS — I10 BENIGN ESSENTIAL HYPERTENSION: Primary | ICD-10-CM

## 2017-11-22 DIAGNOSIS — I10 ESSENTIAL HYPERTENSION: ICD-10-CM

## 2017-11-22 PROCEDURE — 99207 ZZC NO CHARGE NURSE ONLY: CPT | Performed by: FAMILY MEDICINE

## 2017-11-22 RX ORDER — AMLODIPINE BESYLATE 5 MG/1
5 TABLET ORAL DAILY
Qty: 90 TABLET | Refills: 1 | Status: SHIPPED | OUTPATIENT
Start: 2017-11-22 | End: 2017-12-29

## 2017-11-22 NOTE — PROGRESS NOTES
Jose Angel Cha is enrolled/participating in the retail pharmacy Blood Pressure Goals Achievement Program (BPGAP).  Jose Angel Cha was evaluated at [facility] on November 22, 2017 at which time his blood pressure was:    BP Readings from Last 3 Encounters:   11/22/17 128/88   11/15/17 140/80   10/06/17 126/86     Reviewed lifestyle modifications for blood pressure control and reduction: including making healthy food choices, managing weight, getting regular exercise, smoking cessation, reducing alcohol consumption, monitoring blood pressure regularly.     Jose Angel Cha is not experiencing symptoms.    Follow-Up: BP is at goal of < 150/90 mmHg (patient 60+ years of age without diabetes).  Recommended follow-up at pharmacy in 6 months.     Recommendation to Provider: none     Completed by:  Gerry Cotton, Pharm.D., [facility], [phone]

## 2017-11-22 NOTE — MR AVS SNAPSHOT
After Visit Summary   11/22/2017    Jose Angel Cha    MRN: [id number]           Patient Information     Date Of Birth          1955        Visit Information        Provider Department      11/22/2017 3:33 PM Shari Paredes MD [facility]        Today's Diagnoses     Benign essential hypertension    -  1       Follow-ups after your visit        Your next 10 appointments already scheduled     Nov 28, 2017  9:20 AM CST   Return Visit with Juan Antonio Cardozo DO   [facility]    [address]  [facility] [address]   [phone]            Dec 06, 2017  2:00 PM CST   Return Visit with Juan Antonio Cardozo DO   [facility]    [address]  [facility] [address]   [phone]            Dec 12, 2017  8:15 AM CST   Nurse Only with ER ALLERGY SHOTS   [facility]    [address]  [facility] [address]   [phone]            Dec 13, 2017  2:00 PM CST   Return Visit with Juan Antonio Cardozo DO   [facility]    [address]  [facility] [address]   [phone]            Dec 19, 2017  8:30 AM CST   LAB with NL LAB [facility]   [facility]    [address] [facility] [address]   [phone]           Please do not eat 10-12 hours before your appointment if you are coming in fasting for labs on lipids, cholesterol, or glucose (sugar). This does not apply to pregnant women. Water, hot tea and black coffee (with nothing added) are okay. Do not drink other fluids, diet soda or chew gum.            Dec 20, 2017  2:00 PM CST   Return Visit with Juan Antonio Cardozo DO   [facility]    [address]  [facility] [address]   [phone]            Dec  28, 2017  1:30 PM CST   Nurse Only with ER ALLERGY SHOTS   [facility]    [address]  [facility] [address]   [phone]            Jan 03, 2018  2:00 PM CST   Return Visit with Juan Antonio Osmar Cardozo DO   [facility]    [address]  [facility] [address]   [phone]            Rakesh 10, 2018  2:00 PM CST   [facility] Visit with [facility] DO   [facility]    [address]  [facility] [address]   [phone]            Jan 17, 2018  2:00 PM CST   [facility] Visit with Juan Antonio Cardozo DO   [facility]    [address]  [facility] [address]   [phone]              Who to contact     If you have questions or need follow up information about today's clinic visit or your schedule please contact [facility] directly at [phone].  Normal or non-critical lab and imaging results will be communicated to you by Base79hart, letter or phone within 4 business days after the clinic has received the results. If you do not hear from us within 7 days, please contact the clinic through Base79hart or phone. If you have a critical or abnormal lab result, we will notify you by phone as soon as possible.  Submit refill requests through hybris or call your pharmacy and they will forward the refill request to us. Please allow 3 business days for your refill to be completed.          Additional Information About Your Visit        [email or web address] Information     hybris gives you secure access to your electronic health record. If you see a primary care provider, you can also send messages to your care team and make appointments. If you have questions, please call your primary care clinic.  If you do not have a primary care provider, please call [phone] and they will assist  you.        Care EveryWhere ID     This is your Care EveryWhere ID. This could be used by other organizations to access your [place] medical records  [id number]        Your Vitals Were     Pulse                   76            Blood Pressure from Last 3 Encounters:   11/22/17 128/88   11/15/17 140/80   10/06/17 126/86    Weight from Last 3 Encounters:   11/15/17 211 lb 8 oz (95.9 kg)   10/06/17 211 lb (95.7 kg)   09/25/17 211 lb (95.7 kg)              Today, you had the following     No orders found for display       Primary Care Provider Office Phone # Fax #    Shari Paredes [phone]       [address] [facility] [address]        Equal Access to Services     RANDELL TAFOYA : Hadii aad ku hadasho Soomaali, waaxda luqadaha, qaybta kaalmada adeegyada, nico guillen . So [facility] [phone].    ATENCIÓN: Si habla español, tiene a armas disposición servicios gratuitos de asistencia lingüística. [facility] [phone].    We comply with applicable federal civil rights laws and [place] laws. We do not discriminate on the basis of race, color, national origin, age, disability, sex, sexual orientation, or gender identity.            Thank you!     Thank you for choosing [facility]  for your care. Our goal is always to provide you with excellent care. Hearing back from our patients is one way we can continue to improve our services. Please take a few minutes to complete the written survey that you may receive in the mail after your visit with us. Thank you!             Your Updated Medication List - Protect others around you: Learn how to safely use, store and throw away your medicines at www.disposemymeds.org.          This list is accurate as of: 11/22/17  3:36 PM.  Always use your most recent med list.                   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 P.  1:100 w/v, HS  1.0 ml Dust Mites DF 30,000AU/mL, HS  0.3 ml Dust Mites DP. 30,000 AU/mL, HS  0.3 ml  Birch Mix PRW 1:20 w/v, HS  0.5 ml Grass Mix #7 100,000 BAU/mL, HS 0.4 ml Nettle 1:20 w/v, HS 0.5 ml Diluent: HSA qs to 5ml    Allergic rhinitis due to dust mite, Chronic seasonal allergic rhinitis due to pollen, Allergic rhinitis due to animal dander       amLODIPine 2.5 MG tablet    NORVASC    90 tablet    Take 1 tablet (2.5 mg) by mouth daily    Essential hypertension       aspirin 81 MG tablet     0    ONE DAILY    Other and unspecified hyperlipidemia, Family history of ischemic heart disease       EPINEPHrine 0.3 MG/0.3ML injection 2-pack    EPIPEN 2-MIGUELINA    2 each    Inject 0.3 mLs (0.3 mg) into the muscle once as needed for anaphylaxis    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 sting       fluticasone 50 MCG/ACT spray    FLONASE    1 Bottle    Spray 2 sprays into both nostrils daily    Chronic seasonal allergic rhinitis due to pollen, House dust mite allergy, Allergic rhinitis due to animal dander       MULTIVITAL PO      Take 1 tablet by mouth daily Reported on 3/22/2017        olopatadine HCl 0.2 % Soln    PATADAY    2.5 mL    Place 1 drop into both eyes daily    Chronic seasonal allergic rhinitis due to pollen, House dust mite allergy       omeprazole 20 MG CR capsule     priLOSEC    90 capsule    TAKE ONE CAPSULE BY MOUTH EVERY DAY (TAKE 30 TO 60 MINUTES BEFORE A MEAL)    Gastroesophageal reflux disease without esophagitis       polyethylene glycol powder    MIRALAX    510 g    Take 17 g (1 capful) by mouth daily    Chronic constipation       STATIN NOT PRESCRIBED (INTENTIONAL)      by Other route continuous prn Reported on 4/6/2017    Mitral valve disorders(424.0), Hyperlipidemia LDL goal <100       temazepam 15 MG capsule    RESTORIL    30 capsule    Take 1 capsule (15 mg) by mouth nightly as needed for sleep    Anxiety       * Notice:  This list has 5 medication(s) that are the same as other medications prescribed for you. Read the directions carefully, and ask your doctor or other care provider to review them with you.

## 2017-11-22 NOTE — TELEPHONE ENCOUNTER
"Per patient was told to take 2 tablets of the Amlodipine 2.5mg tablets.  He isn't sure if it was the cardiologist or his primary that told him, but that is what he has been doing.  He only has 4 tablets left and will be out on Friday.  We took his BP in the pharmacy today and it was at goal.  He is unsure how long he has been taking 2 but thinks it has been a couple of months.    Refill is coming up \"refill too soon\" because we have that he is taking one tablet daily and it was last filled on 10/13/2017 for #90 for a 90 days supply.    Need new rx for Amlodipine 2.5mg - 2 tabs daily OR Amlodipine 5mg - 1 tablet daily, in order to get it to go thru insurance.    Please send new rx.    Thank you   -Zoraida Cotton, Pharm.D., [facility], [phone]  "

## 2017-11-28 ENCOUNTER — OFFICE VISIT (OUTPATIENT)
Dept: ALLERGY | Facility: OTHER | Age: 62
End: 2017-11-28
Payer: COMMERCIAL

## 2017-11-28 VITALS
SYSTOLIC BLOOD PRESSURE: 124 MMHG | DIASTOLIC BLOOD PRESSURE: 82 MMHG | HEART RATE: 77 BPM | WEIGHT: 213 LBS | BODY MASS INDEX: 30.15 KG/M2 | OXYGEN SATURATION: 96 %

## 2017-11-28 DIAGNOSIS — J30.2 SEASONAL ALLERGIC RHINITIS: Primary | ICD-10-CM

## 2017-11-28 DIAGNOSIS — J30.1 CHRONIC SEASONAL ALLERGIC RHINITIS DUE TO POLLEN: ICD-10-CM

## 2017-11-28 DIAGNOSIS — Z91.09 HOUSE DUST MITE ALLERGY: ICD-10-CM

## 2017-11-28 DIAGNOSIS — J30.81 ALLERGIC RHINITIS DUE TO ANIMAL DANDER: Primary | ICD-10-CM

## 2017-11-28 PROCEDURE — 99207 ZZC DROP WITH A PROCEDURE: CPT | Mod: 25 | Performed by: ALLERGY & IMMUNOLOGY

## 2017-11-28 PROCEDURE — 95180 RAPID DESENSITIZATION: CPT | Performed by: ALLERGY & IMMUNOLOGY

## 2017-11-28 PROCEDURE — 95165 ANTIGEN THERAPY SERVICES: CPT | Performed by: ALLERGY & IMMUNOLOGY

## 2017-11-28 NOTE — MR AVS SNAPSHOT
After Visit Summary   11/28/2017    Jose Angel Cha    MRN: [id number]           Patient Information     Date Of Birth          1955        Visit Information        Provider Department      11/28/2017 9:20 AM Juan Antonio Cardozo DO [facility] [place]        Today's Diagnoses     Allergic rhinitis due to animal dander    -  1    House dust mite allergy        Chronic seasonal allergic rhinitis due to pollen          Care Instructions    Allergy Staff Appt Hours Shot Hours Locations    Physician     Juan Antonio Cardozo DO       Support Staff     LAURI Felix MA  Monday:                      [place] 8-7     Tuesday:         [place] 8-5     Wednesday:        [place]: 7-5     Friday:        [place] 7-5   [place] Monday: 9-6        Friday: 7-2     [place]        Tuesday: 7-10:45        Thursday: 1:30-6:30     [place] Tuesday: 1-7        Wednesday: 11-6 Thursday: 7-12 [facility]  [address] [place] [address]  Appt Line: [phone]  Allergy RN (Monday):  [phone]    [facility]  [address] [place] [address]  Appt Line: [phone]  Allergy RN (Tues & Wed):  [phone]    [facility]  [address] [place] [address]  Appt Line: [phone]  Allergy RN (Friday):  [phone]       Important Scheduling Information  Aspirin Desensitization: Appt will last 2 clinic days. Please call the Allergy RN line for your clinic to schedule. Discontinue antihistamines 7 days prior to the appointment.     Food Challenges: Appt will last 3-4 hours. Please call the Allergy RN line for your clinic to schedule. Discontinue antihistamines 7 days prior to the appointment.     Penicillin Testing: Appt will last 2-3 hours. Please call the Allergy RN line for your clinic to schedule. Discontinue antihistamines 7 days prior to the appointment.     Skin Testing: Appt will about 40 minutes. Call the appointment line for your  clinic to schedule. Discontinue antihistamines 7 days prior to the appointment.     Venom Testing: Appt will last 2-3 hours. Please call the Allergy RN line for your clinic to schedule. Discontinue antihistamines 7 days prior to the appointment.     Thank you for trusting us with your Allergy, Asthma, and Immunology care. Please feel free to contact us with any questions or concerns you may have.                  Follow-ups after your visit        Follow-up notes from your care team     Return in about 1 week (around 12/5/2017).      Your next 10 appointments already scheduled     Dec 06, 2017  2:00 PM CST   [facility]   [facility]    [address] [place] [address]   [phone]            Dec 12, 2017  8:15 AM CST   Nurse Only with ER ALLERGY SHOTS   [facility]    [address] [place] [address]   [phone]            Dec 13, 2017  2:00 PM CST   Return Visit with Juan Antonio Cardozo DO   [facility]    [address] [address]   [phone]            Dec 19, 2017  8:30 AM CST   LAB with NL LAB [facility] ([facility]    [address] [place] [address]   [phone]           Please do not eat 10-12 hours before your appointment if you are coming in fasting for labs on lipids, cholesterol, or glucose (sugar). This does not apply to pregnant women. Water, hot tea and black coffee (with nothing added) are okay. Do not drink other fluids, diet soda or chew gum.            Dec 20, 2017  2:00 PM CST   Return Visit with Juan Antonio Cardozo DO   [facility]    [address] [place] [address]   [phone]            Dec 28, 2017  1:30 PM CST   Nurse Only with ER ALLERGY  SHOTS   [facility]    [address]  [facility] [address]   [phone]            Jan 03, 2018  2:00 PM CST   Return Visit with Juan Antonio Osmar Cardozo DO   [facility]    [address]  [facility] [address]   [phone]            Rakesh 10, 2018  2:00 PM CST   Return Visit with Juan Antonio Osmar Cardozo DO   [facility]    [address]  [facility] [address]   [phone]            Jan 17, 2018  2:00 PM CST   Return Visit with Juan Antonio Osmar Cardozo DO   [facility]    [address]  [facility] [address]   [phone]              Who to contact     If you have questions or need follow up information about today's clinic visit or your schedule please contact [facility] directly at [phone].  Normal or non-critical lab and imaging results will be communicated to you by ProviderTrusthart, letter or phone within 4 business days after the clinic has received the results. If you do not hear from us within 7 days, please contact the clinic through Coubict or phone. If you have a critical or abnormal lab result, we will notify you by phone as soon as possible.  Submit refill requests through [email or web address] or call your pharmacy and they will forward the refill request to us. Please allow 3 business days for your refill to be completed.          Additional Information About Your Visit        MyChart Information     [email or web address] gives you secure access to your electronic health record. If you see a primary care provider, you can also send messages to your care team and make appointments. If you have questions, please call your primary care clinic.  If you do not have a primary care provider, please call [phone] and they will assist you.        Care EveryWhere ID     This is your Care  EveryWhere ID. This could be used by other organizations to access your [place] medical records  [id number]        Your Vitals Were     Pulse Pulse Oximetry BMI (Body Mass Index)             77 96% 30.15 kg/m2          Blood Pressure from Last 3 Encounters:   11/28/17 124/82   11/22/17 128/88   11/15/17 140/80    Weight from Last 3 Encounters:   11/28/17 96.6 kg (213 lb)   11/15/17 95.9 kg (211 lb 8 oz)   10/06/17 95.7 kg (211 lb)              We Performed the Following     RAPID DESENSITIZATION        Primary Care Provider Office Phone # Fax #    Shari Tonia Paredes [phone]       [address] [facility] [address]        Equal Access to Services     ANJEL TAFOYA : Ciaran messero Soreji, waaxda luqadaha, qaybta kaalmada adeegyada, nico guillen . So [facility] [phone].    ATENCIÓN: Si habla español, tiene a armas disposición servicios gratuitos de asistencia lingüística. [facility] [phone].    We comply with applicable federal civil rights laws and [place] laws. We do not discriminate on the basis of race, color, national origin, age, disability, sex, sexual orientation, or gender identity.            Thank you!     Thank you for choosing [facility]  for your care. Our goal is always to provide you with excellent care. Hearing back from our patients is one way we can continue to improve our services. Please take a few minutes to complete the written survey that you may receive in the mail after your visit with us. Thank you!             Your Updated Medication List - Protect others around you: Learn how to safely use, store and throw away your medicines at www.disposemymeds.org.          This list is accurate as of: 11/28/17 12:25 PM.  Always use your most recent med list.                   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 P.  1:100 w/v, HS  1.0 ml Dust Mites DF 30,000AU/mL, HS  0.3 ml Dust Mites DP. 30,000 AU/mL, HS  0.3 ml  Birch Mix PRW 1:20 w/v, HS  0.5 ml Grass Mix #7 100,000 BAU/mL, HS 0.4 ml Nettle 1:20 w/v, HS 0.5 ml Diluent: HSA qs to 5ml    Allergic rhinitis due to dust mite, Chronic seasonal allergic rhinitis due to pollen, Allergic rhinitis due to animal dander       amLODIPine 5 MG tablet    NORVASC    90 tablet    Take 1 tablet (5 mg) by mouth daily    Essential hypertension       aspirin 81 MG tablet     0    ONE DAILY    Other and unspecified hyperlipidemia, Family history of ischemic heart disease       EPINEPHrine 0.3 MG/0.3ML injection 2-pack    EPIPEN 2-MIGUELINA    2 each    Inject 0.3 mLs (0.3 mg) into the muscle once as needed for anaphylaxis    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 sting       fluticasone 50 MCG/ACT spray    FLONASE    1 Bottle    Spray 2 sprays into both nostrils daily    Chronic seasonal allergic rhinitis due to pollen, House dust mite allergy, Allergic rhinitis due to animal dander       MULTIVITAL PO      Take 1 tablet by mouth daily Reported on 3/22/2017        olopatadine HCl 0.2 % Soln    PATADAY    2.5 mL    Place 1 drop into both eyes daily    Chronic seasonal allergic rhinitis due to pollen, House dust mite allergy       omeprazole 20 MG CR capsule    priLOSEC     90 capsule    TAKE ONE CAPSULE BY MOUTH EVERY DAY (TAKE 30 TO 60 MINUTES BEFORE A MEAL)    Gastroesophageal reflux disease without esophagitis       polyethylene glycol powder    MIRALAX    510 g    Take 17 g (1 capful) by mouth daily    Chronic constipation       STATIN NOT PRESCRIBED (INTENTIONAL)      by Other route continuous prn Reported on 4/6/2017    Mitral valve disorders(424.0), Hyperlipidemia LDL goal <100       temazepam 15 MG capsule    RESTORIL    30 capsule    Take 1 capsule (15 mg) by mouth nightly as needed for sleep    Anxiety       * Notice:  This list has 5 medication(s) that are the same as other medications prescribed for you. Read the directions carefully, and ask your doctor or other care provider to review them with you.

## 2017-11-28 NOTE — PROGRESS NOTES
Allergy serums received at [facility].     Vials received below:    Vial Color Content                      Vial Size Expiration Date  Green 1:1,000 Cat, Dog, Grass, Dust Mite, Trees, Weeds 5mL  5/21/2018  Blue 1:100 Cat, Dog, Grass, Dust Mite, Trees, Weeds 5mL  11/21/2018  Yellow 1:10 Cat, Dog, Grass, Dust Mite, Trees, Weeds 5mL  11/21/2018  Red 1:1 Cat, Dog, Grass, Dust Mite, Trees, Weeds 5mL  11/21/2018      Adelina Hernandez

## 2017-11-28 NOTE — PATIENT INSTRUCTIONS
Allergy Staff Appt Hours Shot Hours Locations    Physician     Juan Antonio Cardozo DO       Support Staff     Ara STONE RN      Valarie ESPINOZA MA  Monday:                      [place] 8-7 Tuesday:         [place] 8-5 Wednesday:        [place]: 7-5     Friday:        [place] 7-5   [place] Monday: 9-6        Friday: 7-2     [place]        Tuesday: 7-10:45        Thursday: 1:30-6:30     [place] Tuesday: 1-7        Wednesday: 11-6 Thursday: 7-12 [facility]  [address] [place] [address]  Appt Line: [phone]  Allergy RN (Monday):  [phone]    [facility]  [address] [place] [address]  Appt Line: [phone]  Allergy RN (Tues & Wed):  [phone]    [facility]  [address] [place] [address]  Appt Line: [phone]  Allergy RN (Friday):  [phone]       Important Scheduling Information  Aspirin Desensitization: Appt will last 2 clinic days. Please call the Allergy RN line for your clinic to schedule. Discontinue antihistamines 7 days prior to the appointment.     Food Challenges: Appt will last 3-4 hours. Please call the Allergy RN line for your clinic to schedule. Discontinue antihistamines 7 days prior to the appointment.     Penicillin Testing: Appt will last 2-3 hours. Please call the Allergy RN line for your clinic to schedule. Discontinue antihistamines 7 days prior to the appointment.     Skin Testing: Appt will about 40 minutes. Call the appointment line for your clinic to schedule. Discontinue antihistamines 7 days prior to the appointment.     Venom Testing: Appt will last 2-3 hours. Please call the Allergy RN line for your clinic to schedule. Discontinue antihistamines 7 days prior to the appointment.     Thank you for trusting us with your Allergy, Asthma, and Immunology care. Please feel free to contact us with any questions or concerns you may have.

## 2017-11-28 NOTE — PROGRESS NOTES
Allergy serums billed at [facility].     Vials received below:    Vial Color Content                      Vial Size Expiration Date  Green 1:1,000 Cat, Dog, Grass, Dust Mite, Trees, Weeds 5mL  5/21/2018  Blue 1:100 Cat, Dog, Grass, Dust Mite, Trees, Weeds 5mL  11/21/2018  Yellow 1:10 Cat, Dog, Grass, Dust Mite, Trees, Weeds 5mL  11/21/2018  Red 1:1 Cat, Dog, Grass, Dust Mite, Trees, Weeds 5mL  11/21/2018    Original Refill encounter date: 11/15/2017      Signature  Madina Hernandez

## 2017-11-28 NOTE — ASSESSMENT & PLAN NOTE
Nocturnal congestion, rhinorrhea, ocular watering, ocular itching, sneezing. Diphenhydramine has been helpful. Claritin was not helpful.Flonase 2 sprays/nostril daily has been helpful Testing 2 years ago positive for dust mites and birch. Symptoms are perennial.     Skin testing  Positive for dust mites, cat, dog, grass mix, birch and nettle.    Today was first cluster immunotherapy appointment. The patient received green 0.1, green 0.2, green 0.4 and blue 0.1ml today. He tolerated. Full report will be scanned in to EMR.      - Flonase 2 sprays per nostril daily.  - Cetirizine or fexofenadine daily as needed.  - Pataday (olapatadine) 1 drop/eye daily as needed.   - Return next week for cluster office visit number 2.
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
## 2017-12-06 ENCOUNTER — OFFICE VISIT (OUTPATIENT)
Dept: ALLERGY | Facility: OTHER | Age: 62
End: 2017-12-06
Payer: COMMERCIAL

## 2017-12-06 VITALS
RESPIRATION RATE: 18 BRPM | SYSTOLIC BLOOD PRESSURE: 124 MMHG | BODY MASS INDEX: 30.58 KG/M2 | DIASTOLIC BLOOD PRESSURE: 84 MMHG | OXYGEN SATURATION: 97 % | WEIGHT: 216 LBS | HEART RATE: 77 BPM

## 2017-12-06 DIAGNOSIS — J30.81 ALLERGIC RHINITIS DUE TO ANIMAL DANDER: ICD-10-CM

## 2017-12-06 DIAGNOSIS — Z91.09 HOUSE DUST MITE ALLERGY: ICD-10-CM

## 2017-12-06 DIAGNOSIS — J30.1 CHRONIC SEASONAL ALLERGIC RHINITIS DUE TO POLLEN: Primary | ICD-10-CM

## 2017-12-06 PROCEDURE — 99207 ZZC DROP WITH A PROCEDURE: CPT | Mod: 25 | Performed by: ALLERGY & IMMUNOLOGY

## 2017-12-06 PROCEDURE — 95180 RAPID DESENSITIZATION: CPT | Performed by: ALLERGY & IMMUNOLOGY

## 2017-12-06 NOTE — MR AVS SNAPSHOT
After Visit Summary   12/6/2017    Jose Angel Cha    MRN: [id number]           Patient Information     Date Of Birth          1955        Visit Information        Provider Department      12/6/2017 2:00 PM Juan Antonio Cardozo DO [facility]        Today's Diagnoses     Chronic seasonal allergic rhinitis due to pollen    -  1    Allergic rhinitis due to animal dander        House dust mite allergy          Care Instructions    Allergy Staff Appt Hours Shot Hours Locations    Physician     Juan Antonio Cardozo DO       Support Staff     LAURI Felix MA  Monday:                      [place] 8-7     Tuesday:         [place] 8-5     Wednesday:        [place]: 7-5     Friday:        [place] 7-5   [place] Monday: 9-6        Friday: 7-2     [place]        Tuesday: 7-10:45        Thursday: 1:30-6:30     [place] Tuesday: 1-7        Wednesday: 11-6 Thursday: 7-12 [facility]  [address] [place] [address]  Appt Line: [phone]  Allergy RN (Monday):  [phone]    [facility] ([facility]  [address] [place] [address]  Appt Line: [phone]  Allergy RN (Tues & Wed):  [phone]    [facility]  [address] [place] [address]  Appt Line: [phone]  Allergy RN (Friday):  [phone]       Important Scheduling Information  Aspirin Desensitization: Appt will last 2 clinic days. Please call the Allergy RN line for your clinic to schedule. Discontinue antihistamines 7 days prior to the appointment.     Food Challenges: Appt will last 3-4 hours. Please call the Allergy RN line for your clinic to schedule. Discontinue antihistamines 7 days prior to the appointment.     Penicillin Testing: Appt will last 2-3 hours. Please call the Allergy RN line for your clinic to schedule. Discontinue antihistamines 7 days prior to the appointment.     Skin Testing: Appt will about 40 minutes. Call the appointment line for your  clinic to schedule. Discontinue antihistamines 7 days prior to the appointment.     Venom Testing: Appt will last 2-3 hours. Please call the Allergy RN line for your clinic to schedule. Discontinue antihistamines 7 days prior to the appointment.     Thank you for trusting us with your Allergy, Asthma, and Immunology care. Please feel free to contact us with any questions or concerns you may have.                Follow-ups after your visit        Your next 10 appointments already scheduled     Dec 12, 2017  8:15 AM CST   Nurse [facility] with ER [facility]   [facility]    [address] [place] [address]   [phone]            Dec 13, 2017  2:00 PM CST   [facility] Visit with [facility]ozo DO   [facility]    [address] [place] [address]   [phone]            Dec 19, 2017  8:30 AM CST   LAB with NL LAB [facility]   [facility]    [address] [place] [address]   [phone]           Please do not eat 10-12 hours before your appointment if you are coming in fasting for labs on lipids, cholesterol, or glucose (sugar). This does not apply to pregnant women. Water, hot tea and black coffee (with nothing added) are okay. Do not drink other fluids, diet soda or chew gum.            Dec 20, 2017  2:00 PM CST   [facility]   [facility]    [address] [place] [address]   [phone]            Dec 28, 2017  1:30 PM CST   Nurse Only with ER ALLERGY SHOTS   [facility]    [address] [place] [address]   [phone]            Jan 03, 2018  2:00 PM CST   [facility]    [address] [place] [address]  [address]  [facility] [address]   [phone]            Rakesh 10, 2018  2:00 PM CST   [facility]    [address] [facility] [address]  [place] [address]   [phone]            Jan 17, 2018  2:00 PM CST   [facility] Visit with Juan Antonio Cardozo    [facility]    [address] [facility] [address]  [place] [address]   [phone]              Who to contact     If you have questions or need follow up information about today's clinic visit or your schedule please contact [facility] directly at [phone].  Normal or non-critical lab and imaging results will be communicated to you by 3D Hubshart, letter or phone within 4 business days after the clinic has received the results. If you do not hear from us within 7 days, please contact the clinic through 3D Hubshart or phone. If you have a critical or abnormal lab result, we will notify you by phone as soon as possible.  Submit refill requests through Bfly or call your pharmacy and they will forward the refill request to us. Please allow 3 business days for your refill to be completed.          Additional Information About Your Visit        3D Hubshart Information     Bfly gives you secure access to your electronic health record. If you see a primary care provider, you can also send messages to your care team and make appointments. If you have questions, please call your primary care clinic.  If you do not have a primary care provider, please call [phone] and they will assist you.        Care EveryWhere ID     This is your Care EveryWhere ID. This could be used by other organizations to access your [place] medical records  [id number]        Your Vitals Were     Pulse Respirations Pulse Oximetry BMI (Body Mass Index)          77 18 97% 30.58 kg/m2         Blood Pressure from Last 3 Encounters:   12/06/17 124/84   11/28/17  124/82   11/22/17 128/88    Weight from Last 3 Encounters:   12/06/17 98 kg (216 lb)   11/28/17 96.6 kg (213 lb)   11/15/17 95.9 kg (211 lb 8 oz)              We Performed the Following     RAPID DESENSITIZATION        Primary Care Provider Office Phone # Fax #    Shari Tonia Paredes [phone]       [address] [facility] [address]        Equal Access to Services     [facility] : Hadii aad ku hadasho Soomaali, waaxda luqadaha, qaybta kaalmada adeegyada, waxay idiin hayaan adeeg calvinarazully la'everetten . So [facility] [phone].    ATENCIÓN: Si habla español, tiene a armas disposición servicios gratuitos de asistencia lingüística. [facility] [phone].    We comply with applicable federal civil rights laws and [place] laws. We do not discriminate on the basis of race, color, national origin, age, disability, sex, sexual orientation, or gender identity.            Thank you!     Thank you for choosing [facility]  for your care. Our goal is always to provide you with excellent care. Hearing back from our patients is one way we can continue to improve our services. Please take a few minutes to complete the written survey that you may receive in the mail after your visit with us. Thank you!             Your Updated Medication List - Protect others around you: Learn how to safely use, store and throw away your medicines at www.disposemymeds.org.          This list is accurate as of: 12/6/17  3:52 PM.  Always use your most recent med list.                   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, accidental or unintentional, subsequent  encounter       * ALLERGEN IMMUNOTHERAPY PRESCRIPTION     13 mL    Name of Mix: Mix #2  Wasp Wasp Venom 100 mcg/mL HS 13 ml Diluent: HSA qs to 13ml    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 P.  1:100 w/v, HS  1.0 ml Dust Mites DF 30,000AU/mL, HS  0.3 ml Dust Mites DP. 30,000 AU/mL, HS  0.3 ml  Birch Mix PRW 1:20 w/v, HS  0.5 ml Grass Mix #7 100,000 BAU/mL, HS 0.4 ml Nettle 1:20 w/v, HS 0.5 ml Diluent: HSA qs to 5ml    Allergic rhinitis due to dust mite, Chronic seasonal allergic rhinitis due to pollen, Allergic rhinitis due to animal dander       amLODIPine 5 MG tablet    NORVASC    90 tablet    Take 1 tablet (5 mg) by mouth daily    Essential hypertension       aspirin 81 MG tablet     0    ONE DAILY    Other and unspecified hyperlipidemia, Family history of ischemic heart disease       EPINEPHrine 0.3 MG/0.3ML injection 2-pack    EPIPEN 2-MIGUELINA    2 each    Inject 0.3 mLs (0.3 mg) into the muscle once as needed for anaphylaxis    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 sting       fluticasone 50 MCG/ACT spray    FLONASE    1 Bottle    Spray 2 sprays into both nostrils daily    Chronic seasonal allergic rhinitis due to pollen, House dust mite allergy, Allergic rhinitis due to animal dander       MULTIVITAL PO      Take 1 tablet by mouth daily Reported on 3/22/2017        olopatadine HCl 0.2 % Soln    PATADAY    2.5 mL    Place 1 drop into both eyes daily    Chronic seasonal allergic rhinitis due to pollen, House dust mite allergy       omeprazole 20 MG CR capsule    priLOSEC    90 capsule    TAKE ONE CAPSULE BY MOUTH EVERY DAY (TAKE 30 TO 60 MINUTES BEFORE A MEAL)    Gastroesophageal reflux disease without esophagitis       polyethylene glycol powder    MIRALAX    510 g    Take 17 g (1 capful) by mouth daily    Chronic constipation       STATIN NOT PRESCRIBED  (INTENTIONAL)      by Other route continuous prn Reported on 4/6/2017    Mitral valve disorders(424.0), Hyperlipidemia LDL goal <100       temazepam 15 MG capsule    RESTORIL    30 capsule    Take 1 capsule (15 mg) by mouth nightly as needed for sleep    Anxiety       * Notice:  This list has 5 medication(s) that are the same as other medications prescribed for you. Read the directions carefully, and ask your doctor or other care provider to review them with you.
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
## 2017-12-06 NOTE — PATIENT INSTRUCTIONS
Allergy Staff Appt Hours Shot Hours Locations    Physician     Juan Antonio Cardozo DO       Support Staff     Ara STONE RN      Valarie ESPINOZA MA  Monday:                      [place] 8-7 Tuesday:         [place] 8-5 Wednesday:        [place]: 7-5     Friday:        [place] 7-5   [place] Monday: 9-6        Friday: 7-2     [place]        Tuesday: 7-10:45        Thursday: 1:30-6:30     [place] Tuesday: 1-7        Wednesday: 11-6 Thursday: 7-12 [facility]  [address] [place] [address]  Appt Line: [phone]  Allergy RN (Monday):  [phone]    [facility]  [address] [place] [address]  Appt Line: [phone]  Allergy RN (Tues & Wed):  [phone]    [facility]  [address] [place] [address]  Appt Line: [phone]  Allergy RN (Friday):  [phone]       Important Scheduling Information  Aspirin Desensitization: Appt will last 2 clinic days. Please call the Allergy RN line for your clinic to schedule. Discontinue antihistamines 7 days prior to the appointment.     Food Challenges: Appt will last 3-4 hours. Please call the Allergy RN line for your clinic to schedule. Discontinue antihistamines 7 days prior to the appointment.     Penicillin Testing: Appt will last 2-3 hours. Please call the Allergy RN line for your clinic to schedule. Discontinue antihistamines 7 days prior to the appointment.     Skin Testing: Appt will about 40 minutes. Call the appointment line for your clinic to schedule. Discontinue antihistamines 7 days prior to the appointment.     Venom Testing: Appt will last 2-3 hours. Please call the Allergy RN line for your clinic to schedule. Discontinue antihistamines 7 days prior to the appointment.     Thank you for trusting us with your Allergy, Asthma, and Immunology care. Please feel free to contact us with any questions or concerns you may have.
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
## 2017-12-06 NOTE — ASSESSMENT & PLAN NOTE
Nocturnal congestion, rhinorrhea, ocular watering, ocular itching, sneezing. Diphenhydramine has been helpful. Claritin was not helpful.Flonase 2 sprays/nostril daily has been helpful Testing 2 years ago positive for dust mites and birch. Symptoms are perennial.      Skin testing recently  Positive for dust mites, cat, dog, grass mix, birch and nettle.     Today was second cluster immunotherapy appointment. The patient received blue 0.2, blue 0.4, and yellow 0.05. Each dose was  by 30 minutes. Full report will be scanned into electronic medical record. The patient was in office for over 1.5 hours.      - Flonase 2 sprays per nostril daily.  - Cetirizine or fexofenadine daily as needed.  - Pataday (olapatadine) 1 drop/eye daily as needed.   - Return next week for cluster office visit number 3.

## 2017-12-11 ENCOUNTER — TELEPHONE (OUTPATIENT)
Dept: ALLERGY | Facility: OTHER | Age: 62
End: 2017-12-11

## 2017-12-11 NOTE — TELEPHONE ENCOUNTER
Spoke with patient.  He had some itching and swelling of his arm at site of injection - no hive, no systemic symptoms.  Let him know that his symptoms are normal reaction to allergy injections, and gave reminder to premedicate with Zyrtec.      Patient verbalized understanding. No further questions or concerns. Closing encounter.      Sofi Fong RN

## 2017-12-11 NOTE — TELEPHONE ENCOUNTER
Patient had reaction to the dust/pollen injection he received last Wednesday and wanted to inform someone. He said it wasn't urgent but he wanted someone to be aware of this.

## 2017-12-12 ENCOUNTER — ALLIED HEALTH/NURSE VISIT (OUTPATIENT)
Dept: ALLERGY | Facility: OTHER | Age: 62
End: 2017-12-12
Payer: COMMERCIAL

## 2017-12-12 DIAGNOSIS — T63.441D TOXIC EFFECT OF VENOM OF BEES, UNINTENTIONAL, SUBSEQUENT ENCOUNTER: Primary | ICD-10-CM

## 2017-12-12 PROCEDURE — 95117 IMMUNOTHERAPY INJECTIONS: CPT

## 2017-12-12 PROCEDURE — 99207 ZZC NO CHARGE LOS: CPT

## 2017-12-12 NOTE — PROGRESS NOTES
Patient presented after waiting 30 minutes with no reaction to allergy injections. Discharged from clinic.  Angela Bone RN ............   12/12/2017...9:17 AM

## 2017-12-12 NOTE — MR AVS SNAPSHOT
After Visit Summary   12/12/2017    Jose Angel Cha    MRN: [id number]           Patient Information     Date Of Birth          1955        Visit Information        Provider Department      12/12/2017 8:15 AM ER ALLERGY SHOTS [facility]        Today's Diagnoses     Toxic effect of venom of bees, unintentional, subsequent encounter    -  1       Follow-ups after your visit        Your next 10 appointments already scheduled     Dec 13, 2017  2:00 PM CST   [facility] Visit with Juan Antonio Cardozo DO   [facility]    [address] [address]   [phone]            Dec 19, 2017  8:30 AM CST   LAB with NL LAB [facility] ([facility])    [address] [facility] [address]   [phone]           Please do not eat 10-12 hours before your appointment if you are coming in fasting for labs on lipids, cholesterol, or glucose (sugar). This does not apply to pregnant women. Water, hot tea and black coffee (with nothing added) are okay. Do not drink other fluids, diet soda or chew gum.            Dec 20, 2017  2:00 PM CST   [facility] Visit with Juan Antonio Cardozo DO   [facility]    [address] [address]   [phone]            Jan 03, 2018  2:00 PM CST   Return Visit with Juan Antonio Cardozo DO   [facility]    [address] [address]   [phone]            Rakesh 10, 2018  2:00 PM CST   Return Visit with Juan Antonio Cardozo DO   [facility]    [address] [address]   [phone]            Jan 17, 2018  2:00 PM CST   Return Visit with Juan Antonio Cardozo DO   [facility]    [address] [place]  [address]   [phone]            Jan 23, 2018  8:10 AM CST   Nurse Only with ER ALLERGY SHOTS   [facility]    [address]  [facility] [address] [phone]            Mar 06, 2018  8:10 AM CST   Nurse Only with ER ALLERGY SHOTS   [facility] ([facility])    [address]  [facility] [address]   [phone]              Who to contact     If you have questions or need follow up information about today's clinic visit or your schedule please contact [facility] directly at [phone].  Normal or non-critical lab and imaging results will be communicated to you by Hurray!hart, letter or phone within 4 business days after the clinic has received the results. If you do not hear from us within 7 days, please contact the clinic through Next Safetyt or phone. If you have a critical or abnormal lab result, we will notify you by phone as soon as possible.  Submit refill requests through HRsoft or call your pharmacy and they will forward the refill request to us. Please allow 3 business days for your refill to be completed.          Additional Information About Your Visit        HRsoft Information     HRsoft gives you secure access to your electronic health record. If you see a primary care provider, you can also send messages to your care team and make appointments. If you have questions, please call your primary care clinic.  If you do not have a primary care provider, please call [phone] and they will assist you.        Care EveryWhere ID     This is your Care EveryWhere ID. This could be used by other organizations to access your [facility] medical records  [id number]         Blood Pressure from Last 3 Encounters:   12/06/17 124/84   11/28/17 124/82   11/22/17 128/88    Weight from Last 3 Encounters:   12/06/17 98 kg (216 lb)   11/28/17 96.6 kg (213 lb)   11/15/17 95.9 kg (211 lb 8 oz)               We Performed the Following     Allergy Shot: Two or more injections        Primary Care Provider Office Phone # Fax #    Shari Paredes [phone]       [address] [place] [address]        Equal Access to Services     ANJEL TAFOYA : Hadii karina dowling guio Somackali, waaxda luqadaha, qaybta kaalmada adeegyada, nico lokiin hayaan cuongjefry verdugo wilmer dennison. So [facility] [phone].    ATENCIÓN: Si habla español, tiene a armas disposición servicios gratuitos de asistencia lingüística. Llame al [phone].    We comply with applicable federal civil rights laws and [place] laws. We do not discriminate on the basis of race, color, national origin, age, disability, sex, sexual orientation, or gender identity.            Thank you!     Thank you for choosing [facility]  for your care. Our goal is always to provide you with excellent care. Hearing back from our patients is one way we can continue to improve our services. Please take a few minutes to complete the written survey that you may receive in the mail after your visit with us. Thank you!             Your Updated Medication List - Protect others around you: Learn how to safely use, store and throw away your medicines at www.disposemymeds.org.          This list is accurate as of: 12/12/17  9:17 AM.  Always use your most recent med list.                   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, accidental or unintentional, subsequent encounter       * ALLERGEN IMMUNOTHERAPY PRESCRIPTION     13 mL    Name of Mix: Mix #2  Wasp Wasp Venom 100 mcg/mL HS 13 ml Diluent: HSA qs  to 13ml    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 P.  1:100 w/v, HS  1.0 ml Dust Mites DF 30,000AU/mL, HS  0.3 ml Dust Mites DP. 30,000 AU/mL, HS  0.3 ml  Birch Mix PRW 1:20 w/v, HS  0.5 ml Grass Mix #7 100,000 BAU/mL, HS 0.4 ml Nettle 1:20 w/v, HS 0.5 ml Diluent: HSA qs to 5ml    Allergic rhinitis due to dust mite, Chronic seasonal allergic rhinitis due to pollen, Allergic rhinitis due to animal dander       amLODIPine 5 MG tablet    NORVASC    90 tablet    Take 1 tablet (5 mg) by mouth daily    Essential hypertension       aspirin 81 MG tablet     0    ONE DAILY    Other and unspecified hyperlipidemia, Family history of ischemic heart disease       EPINEPHrine 0.3 MG/0.3ML injection 2-pack    EPIPEN 2-MIGUELINA    2 each    Inject 0.3 mLs (0.3 mg) into the muscle once as needed for anaphylaxis    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 sting       fluticasone 50 MCG/ACT spray    FLONASE    1 Bottle    Spray 2 sprays into both nostrils daily    Chronic seasonal allergic rhinitis due to pollen, House dust mite allergy, Allergic rhinitis due to animal dander       MULTIVITAL PO      Take 1 tablet by mouth daily Reported on 3/22/2017        olopatadine HCl 0.2 % Soln    PATADAY    2.5 mL    Place 1 drop into both eyes daily    Chronic seasonal allergic rhinitis due to pollen, House dust mite allergy       omeprazole 20 MG CR capsule    priLOSEC    90 capsule    TAKE ONE CAPSULE BY MOUTH EVERY DAY (TAKE 30 TO 60 MINUTES BEFORE A MEAL)    Gastroesophageal reflux disease without esophagitis       polyethylene glycol powder    MIRALAX    510 g    Take 17 g (1 capful) by mouth daily    Chronic constipation       STATIN NOT PRESCRIBED (INTENTIONAL)      by Other route continuous prn Reported on 4/6/2017    Mitral valve disorders(424.0), Hyperlipidemia LDL goal <100       temazepam  15 MG capsule    RESTORIL    30 capsule    Take 1 capsule (15 mg) by mouth nightly as needed for sleep    Anxiety       * Notice:  This list has 5 medication(s) that are the same as other medications prescribed for you. Read the directions carefully, and ask your doctor or other care provider to review them with you.

## 2017-12-13 ENCOUNTER — OFFICE VISIT (OUTPATIENT)
Dept: ALLERGY | Facility: OTHER | Age: 62
End: 2017-12-13
Payer: COMMERCIAL

## 2017-12-13 VITALS
SYSTOLIC BLOOD PRESSURE: 130 MMHG | DIASTOLIC BLOOD PRESSURE: 80 MMHG | RESPIRATION RATE: 18 BRPM | BODY MASS INDEX: 30.72 KG/M2 | WEIGHT: 217 LBS | OXYGEN SATURATION: 97 % | HEART RATE: 83 BPM

## 2017-12-13 DIAGNOSIS — J30.81 ALLERGIC RHINITIS DUE TO ANIMAL DANDER: ICD-10-CM

## 2017-12-13 DIAGNOSIS — J30.1 CHRONIC SEASONAL ALLERGIC RHINITIS DUE TO POLLEN: Primary | ICD-10-CM

## 2017-12-13 DIAGNOSIS — Z91.09 HOUSE DUST MITE ALLERGY: ICD-10-CM

## 2017-12-13 PROCEDURE — 99207 ZZC DROP WITH A PROCEDURE: CPT | Mod: 25 | Performed by: ALLERGY & IMMUNOLOGY

## 2017-12-13 PROCEDURE — 95180 RAPID DESENSITIZATION: CPT | Performed by: ALLERGY & IMMUNOLOGY

## 2017-12-13 NOTE — ASSESSMENT & PLAN NOTE
Nocturnal congestion, rhinorrhea, ocular watering, ocular itching, sneezing. Persistent post nasal drainage. Diphenhydramine has been helpful. Claritin was not helpful.Flonase 2 sprays/nostril daily has been helpful Testing 2 years ago positive for dust mites and birch. Symptoms are perennial.      Skin testing recently  Positive for dust mites, cat, dog, grass mix, birch and nettle.      Cluster immunotherapy   The patient received yellow 0.1, yellow 0.15, and yellow 0.25. Each dose was  by 30 minutes. Full report will be scanned into electronic medical record. The patient was in office for over 1.5 hours.      - Flonase 2 sprays per nostril daily.  - Cetirizine or fexofenadine daily as needed.  - Pataday (olapatadine) 1 drop/eye daily as needed.   - Return next week for cluster office visit number 4.
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
## 2017-12-13 NOTE — PATIENT INSTRUCTIONS
Allergy Staff Appt Hours Shot Hours Locations    Physician     Juan Antonio Cardozo DO       Support Staff     Sofi GILL RN      Valarie ESPINOZA MA  Monday:                      [place] 8-7 Tuesday:         [place] 8-5 Wednesday:        [place]: 7-5     Friday:        [place] 7-5   [place] Monday: 9-6        Friday: 7-2     [place]        Tuesday: 7-10:45        Thursday: 1:30-6:30     [place] Tuesday: 1-7        Wednesday: 11-6 Thursday: 7-12 [facility]  [address] [place] [address]  Appt Line: [phone]  Allergy RN (Monday):  [phone]    [facility]  [address] [place] [address]  Appt Line: [phone]  Allergy RN (Tues & Wed):  [phone]    [facility]  [address] [place] [address]  Appt Line: [phone]  Allergy RN (Friday):  [phone]       Important Scheduling Information  Aspirin Desensitization: Appt will last 2 clinic days. Please call the Allergy RN line for your clinic to schedule. Discontinue antihistamines 7 days prior to the appointment.     Food Challenges: Appt will last 3-4 hours. Please call the Allergy RN line for your clinic to schedule. Discontinue antihistamines 7 days prior to the appointment.     Penicillin Testing: Appt will last 2-3 hours. Please call the Allergy RN line for your clinic to schedule. Discontinue antihistamines 7 days prior to the appointment.     Skin Testing: Appt will about 40 minutes. Call the appointment line for your clinic to schedule. Discontinue antihistamines 7 days prior to the appointment.     Venom Testing: Appt will last 2-3 hours. Please call the Allergy RN line for your clinic to schedule. Discontinue antihistamines 7 days prior to the appointment.     Thank you for trusting us with your Allergy, Asthma, and Immunology care. Please feel free to contact us with any questions or concerns you may have.
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
## 2017-12-13 NOTE — MR AVS SNAPSHOT
After Visit Summary   12/13/2017    Jose Angel Cha    MRN: [id number]           Patient Information     Date Of Birth          1955        Visit Information        Provider Department      12/13/2017 2:00 PM Juan Antonio Cardozo DO [facility]        Today's Diagnoses     Chronic seasonal allergic rhinitis due to pollen    -  1    House dust mite allergy        Allergic rhinitis due to animal dander          Care Instructions    Allergy Staff Appt Hours Shot Hours Locations    Physician     Juan Antonio Cardozo DO       Support Staff     LAURI Downs MA  Monday:                      [place] 8-7     Tuesday:         [place] 8-5     Wednesday:        [place]: 7-5     Friday:        [place] 7-5   [place] Monday: 9-6        Friday: 7-2     [place]        Tuesday: 7-10:45        Thursday: 1:30-6:30     [place] Tuesday: 1-7        Wednesday: 11-6 Thursday: 7-12 [facility]  [address] [place] [address]  Appt Line: [phone]  Allergy RN (Monday):  [phone]    [facility]  [address] [place] [address]  Appt Line: [phone]  Allergy RN (Tues & Wed):  [phone]    [facility]  [address] [place] [address]  Appt Line: [phone]  Allergy RN (Friday):  [phone]       Important Scheduling Information  Aspirin Desensitization: Appt will last 2 clinic days. Please call the Allergy RN line for your clinic to schedule. Discontinue antihistamines 7 days prior to the appointment.     Food Challenges: Appt will last 3-4 hours. Please call the Allergy RN line for your clinic to schedule. Discontinue antihistamines 7 days prior to the appointment.     Penicillin Testing: Appt will last 2-3 hours. Please call the Allergy RN line for your clinic to schedule. Discontinue antihistamines 7 days prior to the appointment.     Skin Testing: Appt will about 40 minutes. Call the appointment line for your  clinic to schedule. Discontinue antihistamines 7 days prior to the appointment.     Venom Testing: Appt will last 2-3 hours. Please call the Allergy RN line for your clinic to schedule. Discontinue antihistamines 7 days prior to the appointment.     Thank you for trusting us with your Allergy, Asthma, and Immunology care. Please feel free to contact us with any questions or concerns you may have.                Follow-ups after your visit        Your next 10 appointments already scheduled     Dec 19, 2017  8:30 AM CST   LAB with [facility]   [facility]    [address] [place] [id number]   [phone]           Please do not eat 10-12 hours before your appointment if you are coming in fasting for labs on lipids, cholesterol, or glucose (sugar). This does not apply to pregnant women. Water, hot tea and black coffee (with nothing added) are okay. Do not drink other fluids, diet soda or chew gum.            Dec 20, 2017  2:00 PM CST   Return Visit with Juan Antonio Cardozo DO   [facility]    [address] [place] [address]   [phone]            Jan 03, 2018  2:00 PM CST   Return Visit with Juan Antonio Cardozo DO   [facility]    [address] [place] [id number]   [phone]            Rakesh 10, 2018  2:00 PM CST   Return Visit with Juan Antonio Cardozo DO   [facility]    [address] [place] [address]   [phone]            Jan 17, 2018  2:00 PM CST   Return Visit with Juan Antonio Cardozo DO   [facility]    [address] [place] [address]   [phone]            Jan 23, 2018  8:10 AM CST   Nurse Only with ER ALLERGY SHOTS   [facility]    [address] [place] [address]  [address]  [facility] [address]   [phone]            Mar 06, 2018  8:10 AM CST   [facility]urse Only with ER [facility]GY SHOTS   [facility]    [address]  [facility] [address]   [phone]              Who to contact     If you have questions or need follow up information about today's clinic visit or your schedule please contact [facility] directly at [phone].  Normal or non-critical lab and imaging results will be communicated to you by [employer], letter or phone within 4 business days after the clinic has received the results. If you do not hear from us within 7 days, please contact the clinic through [employer] or phone. If you have a critical or abnormal lab result, we will notify you by phone as soon as possible.  Submit refill requests through [employer] or call your pharmacy and they will forward the refill request to us. Please allow 3 business days for your refill to be completed.          Additional Information About Your Visit        [employer] Information     [employer] gives you secure access to your electronic health record. If you see a primary care provider, you can also send messages to your care team and make appointments. If you have questions, please call your primary care clinic.  If you do not have a primary care provider, please call [phone] and they will assist you.        Care EveryWhere ID     This is your Care EveryWhere ID. This could be used by other organizations to access your [place] medical records  [id number]        Your Vitals Were     Pulse Respirations Pulse Oximetry BMI (Body Mass Index)          83 18 97% 30.72 kg/m2         Blood Pressure from Last 3 Encounters:   12/13/17 130/80   12/06/17 124/84   11/28/17 124/82    Weight from Last 3 Encounters:   12/13/17 98.4 kg (217 lb)   12/06/17 98 kg (216 lb)   11/28/17 96.6 kg (213 lb)              We Performed the Following     RAPID DESENSITIZATION         Primary Care Provider Office Phone # Fax #    Shari Paredes [phone]       [address] [place] [address]        Equal Access to Services     ANJEL TAFOYA : Hadii aad ku hadkailao Somackali, waaxda luqadaha, qaybta kaalmada corinda, nico lokiin hayaakathleen hutchinsjefry verdugo laMartinezchaz dennison. So [facility] [phone].    ATENCIÓN: Si habla español, tiene a armas disposición servicios gratuitos de asistencia lingüística. Llame al [phone].    We comply with applicable federal civil rights laws and [place] laws. We do not discriminate on the basis of race, color, national origin, age, disability, sex, sexual orientation, or gender identity.            Thank you!     Thank you for choosing [facility]  for your care. Our goal is always to provide you with excellent care. Hearing back from our patients is one way we can continue to improve our services. Please take a few minutes to complete the written survey that you may receive in the mail after your visit with us. Thank you!             Your Updated Medication List - Protect others around you: Learn how to safely use, store and throw away your medicines at www.disposemymeds.org.          This list is accurate as of: 12/13/17  3:37 PM.  Always use your most recent med list.                   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 P.  1:100 w/v, HS  1.0 ml Dust Mites DF 30,000AU/mL, HS  0.3 ml Dust Mites DP. 30,000 AU/mL, HS  0.3 ml  Birch Mix PRW 1:20 w/v, HS  0.5 ml Grass Mix #7 100,000 BAU/mL, HS 0.4 ml Nettle 1:20 w/v, HS 0.5 ml Diluent: HSA qs to 5ml    Allergic rhinitis due to dust mite, Chronic seasonal allergic rhinitis due to pollen, Allergic rhinitis due to animal dander       amLODIPine 5 MG tablet    NORVASC    90 tablet    Take 1 tablet (5 mg) by mouth daily    Essential hypertension       aspirin 81 MG tablet     0    ONE DAILY    Other and unspecified hyperlipidemia, Family history of ischemic heart disease       EPINEPHrine 0.3 MG/0.3ML injection 2-pack    EPIPEN 2-MIGUELINA    2 each    Inject 0.3 mLs (0.3 mg) into the muscle once as needed for anaphylaxis    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 sting       fluticasone 50 MCG/ACT spray    FLONASE    1 Bottle    Spray 2 sprays into both nostrils daily    Chronic seasonal allergic rhinitis due to pollen, House dust mite allergy, Allergic rhinitis due to animal dander       MULTIVITAL PO      Take 1 tablet by mouth daily Reported on 3/22/2017        olopatadine HCl 0.2 % Soln    PATADAY    2.5 mL    Place 1 drop into both eyes daily    Chronic seasonal allergic rhinitis due to pollen, House dust mite allergy       omeprazole 20 MG CR capsule    priLOSEC    90 capsule    TAKE ONE CAPSULE BY MOUTH EVERY DAY (TAKE 30 TO 60 MINUTES BEFORE A MEAL)    Gastroesophageal reflux disease without esophagitis       polyethylene glycol powder    MIRALAX    510 g    Take 17 g (1 capful) by mouth daily    Chronic constipation       STATIN NOT PRESCRIBED (INTENTIONAL)      by Other route continuous prn Reported on 4/6/2017    Mitral valve disorders(424.0), Hyperlipidemia LDL goal <100       temazepam 15 MG capsule    RESTORIL    30 capsule    Take 1 capsule (15 mg) by mouth  nightly as needed for sleep    Anxiety       * Notice:  This list has 5 medication(s) that are the same as other medications prescribed for you. Read the directions carefully, and ask your doctor or other care provider to review them with you.

## 2017-12-18 ENCOUNTER — TELEPHONE (OUTPATIENT)
Dept: CARDIOLOGY | Facility: CLINIC | Age: 62
End: 2017-12-18

## 2017-12-18 ENCOUNTER — TELEPHONE (OUTPATIENT)
Dept: FAMILY MEDICINE | Facility: OTHER | Age: 62
End: 2017-12-18

## 2017-12-18 DIAGNOSIS — I10 BENIGN ESSENTIAL HYPERTENSION: ICD-10-CM

## 2017-12-18 DIAGNOSIS — I87.2 EDEMA OF BOTH LOWER LEGS DUE TO PERIPHERAL VENOUS INSUFFICIENCY: ICD-10-CM

## 2017-12-18 DIAGNOSIS — E78.2 MIXED HYPERLIPIDEMIA: ICD-10-CM

## 2017-12-18 DIAGNOSIS — I05.9 MITRAL VALVE DISEASE: Primary | ICD-10-CM

## 2017-12-18 DIAGNOSIS — R60.0 EDEMA OF BOTH LOWER LEGS DUE TO PERIPHERAL VENOUS INSUFFICIENCY: ICD-10-CM

## 2017-12-18 LAB
ALBUMIN SERPL-MCNC: 4 G/DL (ref 3.4–5)
ALP SERPL-CCNC: 87 U/L (ref 40–150)
ALT SERPL W P-5'-P-CCNC: 38 U/L (ref 0–70)
ANION GAP SERPL CALCULATED.3IONS-SCNC: 7 MMOL/L (ref 3–14)
AST SERPL W P-5'-P-CCNC: 32 U/L (ref 0–45)
BILIRUB SERPL-MCNC: 0.8 MG/DL (ref 0.2–1.3)
BUN SERPL-MCNC: 13 MG/DL (ref 7–30)
CALCIUM SERPL-MCNC: 9.1 MG/DL (ref 8.5–10.1)
CHLORIDE SERPL-SCNC: 104 MMOL/L (ref 94–109)
CHOLEST SERPL-MCNC: 250 MG/DL
CO2 SERPL-SCNC: 29 MMOL/L (ref 20–32)
CREAT SERPL-MCNC: 1.14 MG/DL (ref 0.66–1.25)
GFR SERPL CREATININE-BSD FRML MDRD: 65 ML/MIN/1.7M2
GLUCOSE SERPL-MCNC: 102 MG/DL (ref 70–99)
HDLC SERPL-MCNC: 56 MG/DL
LDLC SERPL CALC-MCNC: 168 MG/DL
NONHDLC SERPL-MCNC: 194 MG/DL
POTASSIUM SERPL-SCNC: 4.4 MMOL/L (ref 3.4–5.3)
PROT SERPL-MCNC: 7.3 G/DL (ref 6.8–8.8)
SODIUM SERPL-SCNC: 140 MMOL/L (ref 133–144)
TRIGL SERPL-MCNC: 131 MG/DL

## 2017-12-18 PROCEDURE — 80053 COMPREHEN METABOLIC PANEL: CPT | Performed by: FAMILY MEDICINE

## 2017-12-18 PROCEDURE — 80061 LIPID PANEL: CPT | Performed by: FAMILY MEDICINE

## 2017-12-18 PROCEDURE — 36415 COLL VENOUS BLD VENIPUNCTURE: CPT | Performed by: FAMILY MEDICINE

## 2017-12-18 NOTE — TELEPHONE ENCOUNTER
Patient called and left a voicemail regarding denial of the PCSK9 and to report having new peripheral edema. RN called patient back to discuss the denial of the PCSK9. The insurance company denied approval twice with appeal. Will forward to Dr. Chirinos and Team regarding next steps.   Regarding his edema;  -Fluid retention started about 2 weeks ago encompassing his arms, thighs, and ankles and seems to be increasing.  -Patient believes he has gained about 5 lbs.  -Denies shortness of breath or new chest discomfort associated with the edema.   -Patient states there is no change to his urination but that it is slightly concentrated.  -Patient is taking allergy shots and wondered if this could be a side effect-RN suggested calling allergist too.   -Last BP at the Allergist's office with the fluid retention was 128/88.  RN advised patient if he experiences an acute event of SOB to go to the emergency department. RN will forward this information to Dr. Chirinos and team for response.     RN called patient back to determine if he has increased his salt intake over the holidays. He stated that he has not changed his dietary intake of salt.

## 2017-12-18 NOTE — TELEPHONE ENCOUNTER
RN left message for patient to return call to RN's direct line @ [phone].    Ara Mcbride RN

## 2017-12-18 NOTE — TELEPHONE ENCOUNTER
Spoke with patient.  He has had lower extremity swelling that he noticed got worse in the same time frame that he started his cluster injections.  He has an upcoming appointment with his cardiologist, and would like to speak to provider more regarding his symptoms and medications.  He can keep his appointment scheduled for Wednesday, or if the provider wishes, could just talk over the phone.    Weekly appointments scheduled in shot clinic starting this week.  Routing to provider.    Sofi Fong RN

## 2017-12-18 NOTE — TELEPHONE ENCOUNTER
Called and spoke with patient.  He has had some recent lower extremity edema.  I do not think this is related to his allergen immunotherapy.  He is going to follow-up with his cardiologist.  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

## 2017-12-18 NOTE — TELEPHONE ENCOUNTER
Reason for Call:  Other appointment    Detailed comments: pt states been doing accelerated Allergy shots and states wants to stop doing that and just come in weakly. Please contact pt in regards    Phone Number Patient can be reached at: Cell number on file:    Telephone Information:   Mobile [phone]       Best Time: ANY    Can we leave a detailed message on this number? YES    Call taken on 12/18/2017 at 8:54 AM by Carmen Hines

## 2017-12-19 NOTE — PROGRESS NOTES
Jose Angel, your cholesterol results are stable at a little high.  Your blood sugar is a little borderline again.  Be careful with diet and exercise.     Please let me know if you have any questions.    Shari Paredes MD

## 2017-12-19 NOTE — TELEPHONE ENCOUNTER
Call from patient expressing concern about his edema throughout his lower extremities and arms. See Mariel's note from yesterday, as patient relayed the same information to me. Patient states that he has noticed that keeping his feet elevated has helped with the swelling. Patient denies any shortness of breath. Patient also states that he has not had any changes in his salt intake. Patient did say that he made an appointment to see an LOLIS on 1/2/18 this morning, but would like some advice from Dr. Chirinos in the mean time, as his swelling is quite uncomfortable. Will message Dr. Chirinos for review.

## 2017-12-21 ENCOUNTER — ALLIED HEALTH/NURSE VISIT (OUTPATIENT)
Dept: ALLERGY | Facility: OTHER | Age: 62
End: 2017-12-21
Payer: COMMERCIAL

## 2017-12-21 DIAGNOSIS — J30.1 ALLERGIC RHINITIS DUE TO POLLEN: Primary | ICD-10-CM

## 2017-12-21 PROCEDURE — 95115 IMMUNOTHERAPY ONE INJECTION: CPT

## 2017-12-21 PROCEDURE — 99207 ZZC NO CHARGE LOS: CPT

## 2017-12-21 NOTE — MR AVS SNAPSHOT
After Visit Summary   12/21/2017    Jose Angel Cha    MRN: [id number]           Patient Information     Date Of Birth          1955        Visit Information        Provider Department      12/21/2017 2:15 PM ER ALLERGY SHOTS [facility]        Today's Diagnoses     Allergic rhinitis due to pollen    -  1       Follow-ups after your visit        Your next 10 appointments already scheduled     Dec 26, 2017  9:15 AM CST   Nurse Only with ER ALLERGY SHOTS   [facility]    [address]  [facility] [address] [phone]            Dec 27, 2017  2:00 PM CST   Ech Complete with [id number]   [facility] CV Echocardiography (Cardiovascular [facility] at [facility])    [address]  [facility] [id number] [phone]           1. Please bring or wear a comfortable two-piece outfit. 2. You may eat, drink and take your normal medicines. 3. For any questions that cannot be answered, please contact the ordering physician            Dec 27, 2017  3:00 PM CST   US LOWER EXTREMITY VENOUS DUPLEX BILATERAL with SUVUS1   [facility] PHYSICIANS HEART AT [place] ([facility])    [address]  [facility] [id number] [phone]           Please bring a list of your medicines (including vitamins, minerals and over-the-counter drugs). Also, tell your doctor about any allergies you may have. Wear comfortable clothes and leave your valuables at home.  You do not need to do anything special to prepare for your exam.  Please call the Imaging Department at your exam site with any questions.            Dec 29, 2017  7:30 AM CST   Return Visit with ETHEL Zhou [facility] ([facility])    [address]  [facility] [id number] [phone]            Jan 02, 2018  8:30 AM CST   Nurse  Only with ER ALLERGY SHOTS   [facility]    [address]  [facility] [address]   [phone]            Jan 11, 2018  4:20 PM CST   Nurse Only with ER ALLERGY SHOTS   [facility]    [address]  [facility] [address]   [phone]            Jan 23, 2018  8:10 AM CST   Nurse Only with ER ALLERGY SHOTS   [facility]    [address]  [facility] [address]   [phone]            Mar 06, 2018  8:10 AM CST   Nurse Only with ER ALLERGY SHOTS   [facility])    [address]  [facility] [address]   [phone]              Who to contact     If you have questions or need follow up information about today's clinic visit or your schedule please contact [facility] directly at [phone].  Normal or non-critical lab and imaging results will be communicated to you by [employer], letter or phone within 4 business days after the clinic has received the results. If you do not hear from us within 7 days, please contact the clinic through [employer] or phone. If you have a critical or abnormal lab result, we will notify you by phone as soon as possible.  Submit refill requests through [employer] or call your pharmacy and they will forward the refill request to us. Please allow 3 business days for your refill to be completed.          Additional Information About Your Visit        [employer] Information     [employer] gives you secure access to your electronic health record. If you see a primary care provider, you can also send messages to your care team and make appointments. If you have questions, please call your primary care clinic.  If you do not have a primary care provider, please call [phone] and they will assist you.        Care EveryWhere ID     This is your Care  EveryWhere ID. This could be used by other organizations to access your [place] medical records  [id number]         Blood Pressure from Last 3 Encounters:   12/13/17 130/80   12/06/17 124/84   11/28/17 124/82    Weight from Last 3 Encounters:   12/13/17 98.4 kg (217 lb)   12/06/17 98 kg (216 lb)   11/28/17 96.6 kg (213 lb)              We Performed the Following     Allergy Shot: One injection        Primary Care Provider Office Phone # Fax #    Shari Tonia Paredes [phone]       [address] [facility] [address]        Equal Access to Services     [facility] MICHELET : Hadii karina dowling hadkailao Soreji, waaxda luqadaha, qaybta kaalmada adejefryyada, nico guillen . So [facility] [phone].    ATENCIÓN: Si habla español, tiene a armas disposición servicios gratuitos de asistencia lingüística. [facility] [phone].    We comply with applicable federal civil rights laws and [place] laws. We do not discriminate on the basis of race, color, national origin, age, disability, sex, sexual orientation, or gender identity.            Thank you!     Thank you for choosing [facility]  for your care. Our goal is always to provide you with excellent care. Hearing back from our patients is one way we can continue to improve our services. Please take a few minutes to complete the written survey that you may receive in the mail after your visit with us. Thank you!             Your Updated Medication List - Protect others around you: Learn how to safely use, store and throw away your medicines at www.disposemymeds.org.          This list is accurate as of: 12/21/17  3:52 PM.  Always use your most recent med list.                   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 P.  1:100 w/v, HS  1.0 ml Dust Mites DF 30,000AU/mL, HS  0.3 ml Dust Mites DP. 30,000 AU/mL, HS  0.3 ml  Birch Mix PRW 1:20 w/v, HS  0.5 ml Grass Mix #7 100,000 BAU/mL, HS 0.4 ml Nettle 1:20 w/v, HS 0.5 ml Diluent: HSA qs to 5ml    Allergic rhinitis due to dust mite, Chronic seasonal allergic rhinitis due to pollen, Allergic rhinitis due to animal dander       amLODIPine 5 MG tablet    NORVASC    90 tablet    Take 1 tablet (5 mg) by mouth daily    Essential hypertension       aspirin 81 MG tablet     0    ONE DAILY    Other and unspecified hyperlipidemia, Family history of ischemic heart disease       EPINEPHrine 0.3 MG/0.3ML injection 2-pack    EPIPEN 2-MIGUELINA    2 each    Inject 0.3 mLs (0.3 mg) into the muscle once as needed for anaphylaxis    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 sting       fluticasone 50 MCG/ACT spray    FLONASE    1 Bottle    Spray 2 sprays into both nostrils daily    Chronic seasonal allergic rhinitis due to pollen, House dust mite allergy, Allergic rhinitis due to animal dander       MULTIVITAL PO      Take 1 tablet by mouth daily Reported on 3/22/2017        olopatadine HCl 0.2 % Soln    PATADAY    2.5 mL    Place 1 drop into both eyes daily    Chronic seasonal allergic rhinitis due to pollen, House dust mite allergy       omeprazole 20 MG CR capsule    priLOSEC    90 capsule    TAKE ONE CAPSULE BY MOUTH EVERY DAY (TAKE 30 TO 60 MINUTES BEFORE A MEAL)    Gastroesophageal  reflux disease without esophagitis       polyethylene glycol powder    MIRALAX    510 g    Take 17 g (1 capful) by mouth daily    Chronic constipation       STATIN NOT PRESCRIBED (INTENTIONAL)      by Other route continuous prn Reported on 4/6/2017    Mitral valve disorders(424.0), Hyperlipidemia LDL goal <100       temazepam 15 MG capsule    RESTORIL    30 capsule    Take 1 capsule (15 mg) by mouth nightly as needed for sleep    Anxiety       * Notice:  This list has 5 medication(s) that are the same as other medications prescribed for you. Read the directions carefully, and ask your doctor or other care provider to review them with you.

## 2017-12-21 NOTE — TELEPHONE ENCOUNTER
Also recommend hold amlodipine.  Should have an echo, BMP and appt with Ginette in our clinic. Need to be certain there is no cardiac cause; most likely this is lymphedema.

## 2017-12-21 NOTE — PROGRESS NOTES
Patient presented after waiting 30 minutes with no reaction to allergy injections. Discharged from clinic.    Ara Mcbride RN

## 2017-12-27 ENCOUNTER — RADIANT APPOINTMENT (OUTPATIENT)
Dept: VASCULAR ULTRASOUND | Facility: CLINIC | Age: 62
End: 2017-12-27
Attending: INTERNAL MEDICINE
Payer: COMMERCIAL

## 2017-12-27 ENCOUNTER — HOSPITAL ENCOUNTER (OUTPATIENT)
Dept: CARDIOLOGY | Facility: CLINIC | Age: 62
Discharge: HOME | End: 2017-12-27
Attending: INTERNAL MEDICINE | Admitting: INTERNAL MEDICINE
Payer: COMMERCIAL

## 2017-12-27 DIAGNOSIS — I87.2 EDEMA OF BOTH LOWER LEGS DUE TO PERIPHERAL VENOUS INSUFFICIENCY: ICD-10-CM

## 2017-12-27 DIAGNOSIS — I05.9 MITRAL VALVE DISEASE: ICD-10-CM

## 2017-12-27 DIAGNOSIS — R60.0 EDEMA OF BOTH LOWER LEGS DUE TO PERIPHERAL VENOUS INSUFFICIENCY: ICD-10-CM

## 2017-12-27 PROCEDURE — 93306 TTE W/DOPPLER COMPLETE: CPT

## 2017-12-27 PROCEDURE — 93970 EXTREMITY STUDY: CPT | Performed by: INTERNAL MEDICINE

## 2017-12-27 PROCEDURE — 93306 TTE W/DOPPLER COMPLETE: CPT | Mod: 26 | Performed by: INTERNAL MEDICINE

## 2017-12-28 ENCOUNTER — ALLIED HEALTH/NURSE VISIT (OUTPATIENT)
Dept: ALLERGY | Facility: OTHER | Age: 62
End: 2017-12-28
Payer: COMMERCIAL

## 2017-12-28 DIAGNOSIS — J30.1 ALLERGIC RHINITIS DUE TO POLLEN: Primary | ICD-10-CM

## 2017-12-28 PROCEDURE — 95115 IMMUNOTHERAPY ONE INJECTION: CPT

## 2017-12-28 PROCEDURE — 99207 ZZC NO CHARGE LOS: CPT

## 2017-12-28 NOTE — MR AVS SNAPSHOT
After Visit Summary   12/28/2017    Jose Angel Cha    MRN: [id number]           Patient Information     Date Of Birth          1955        Visit Information        Provider Department      12/28/2017 2:15 PM ER ALLERGY SHOTS [facility]        Today's Diagnoses     Allergic rhinitis due to pollen    -  1       Follow-ups after your visit        Your next 10 appointments already scheduled     Dec 29, 2017  7:30 AM CST   Return Visit with ETHEL Zhou CNP   [facility] ([facility])    [address] [place] [address] [address]  [facility] [address]   [phone]            Jan 02, 2018  8:30 AM CST   Nurse Only with ER ALLERGY SHOTS   [facility] ([facility])    [address]  [facility] [address]   [phone]            Jan 11, 2018  4:20 PM CST   Nurse Only with ER ALLERGY SHOTS   [facility] ([facility])    [address]  [facility] [address]   [phone]            Jan 23, 2018  8:10 AM CST   Nurse Only with ER ALLERGY SHOTS   [facility] ([facility])    [address]  [facility] [address]   [phone]            Mar 06, 2018  8:10 AM CST   Nurse Only with ER ALLERGY SHOTS   [facility] ([facility])    [address]  [facility] [address]   [phone]              Who to contact     If you have questions or need follow up information about today's clinic visit or your schedule please contact [facility] directly at [phone].  Normal or non-critical lab and imaging results will be communicated to you by MyChart, letter or phone within 4 business days after the clinic has received the results. If you do not hear from us within 7 days, please contact the clinic through MyChart or phone. If you have a critical  or abnormal lab result, we will notify you by phone as soon as possible.  Submit refill requests through [employer] or call your pharmacy and they will forward the refill request to us. Please allow 3 business days for your refill to be completed.          Additional Information About Your Visit        [employer] Information     [employer] gives you secure access to your electronic health record. If you see a primary care provider, you can also send messages to your care team and make appointments. If you have questions, please call your primary care clinic.  If you do not have a primary care provider, please call [phone] and they will assist you.        Care EveryWhere ID     This is your Care EveryWhere ID. This could be used by other organizations to access your [place] medical records  [id number]         Blood Pressure from Last 3 Encounters:   12/13/17 130/80   12/06/17 124/84   11/28/17 124/82    Weight from Last 3 Encounters:   12/13/17 98.4 kg (217 lb)   12/06/17 98 kg (216 lb)   11/28/17 96.6 kg (213 lb)              We Performed the Following     Allergy Shot: One injection        Primary Care Provider Office Phone # Fax #    Shari Tonia Paredes [phone]       [address] [place] [address]        Equal Access to Services     ANJEL TAFOYA : Hadii karina ku hadasho Soomaali, waaxda luqadaha, qaybta kaalmada adeegyada, waxay alyce dennison. So [facility] [phone].    ATENCIÓN: Si habla español, tiene a armas disposición servicios gratuitos de asistencia lingüística. Llame al [phone].    We comply with applicable federal civil rights laws and [place] laws. We do not discriminate on the basis of race, color, national origin, age, disability, sex, sexual orientation, or gender identity.            Thank you!     Thank you for choosing [facility]  for your care. Our goal is always to provide you with excellent care. Hearing back from our patients is one way  we can continue to improve our services. Please take a few minutes to complete the written survey that you may receive in the mail after your visit with us. Thank you!             Your Updated Medication List - Protect others around you: Learn how to safely use, store and throw away your medicines at www.disposemymeds.org.          This list is accurate as of: 12/28/17  2:21 PM.  Always use your most recent med list.                   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 P.  1:100 w/v, HS  1.0 ml Dust Mites DF 30,000AU/mL, HS  0.3 ml Dust Mites DP. 30,000 AU/mL, HS  0.3 ml  Birch Mix PRW 1:20 w/v, HS  0.5 ml Grass Mix #7 100,000 BAU/mL, HS 0.4 ml Nettle 1:20 w/v, HS 0.5 ml Diluent: HSA qs to 5ml    Allergic rhinitis due to dust mite, Chronic seasonal allergic rhinitis due to pollen, Allergic rhinitis due to animal dander       amLODIPine 5 MG tablet    NORVASC    90 tablet    Take 1 tablet (5 mg) by mouth daily    Essential hypertension       aspirin 81 MG tablet     0    ONE DAILY    Other and unspecified hyperlipidemia, Family history of ischemic heart disease       EPINEPHrine 0.3 MG/0.3ML injection 2-pack    EPIPEN 2-MIGUELINA    2 each    Inject 0.3 mLs (0.3  mg) into the muscle once as needed for anaphylaxis    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 sting       fluticasone 50 MCG/ACT spray    FLONASE    1 Bottle    Spray 2 sprays into both nostrils daily    Chronic seasonal allergic rhinitis due to pollen, House dust mite allergy, Allergic rhinitis due to animal dander       MULTIVITAL PO      Take 1 tablet by mouth daily Reported on 3/22/2017        olopatadine HCl 0.2 % Soln    PATADAY    2.5 mL    Place 1 drop into both eyes daily    Chronic seasonal allergic rhinitis due to pollen, House dust mite allergy       omeprazole 20 MG CR capsule    priLOSEC    90 capsule    TAKE ONE CAPSULE BY MOUTH EVERY DAY (TAKE 30 TO 60 MINUTES BEFORE A MEAL)    Gastroesophageal reflux disease without esophagitis       polyethylene glycol powder    MIRALAX    510 g    Take 17 g (1 capful) by mouth daily    Chronic constipation       STATIN NOT PRESCRIBED (INTENTIONAL)      by Other route continuous prn Reported on 4/6/2017    Mitral valve disorders(424.0), Hyperlipidemia LDL goal <100       temazepam 15 MG capsule    RESTORIL    30 capsule    Take 1 capsule (15 mg) by mouth nightly as needed for sleep    Anxiety       * Notice:  This list has 5 medication(s) that are the same as other medications prescribed for you. Read the directions carefully, and ask your doctor or other care provider to review them with you.

## 2017-12-28 NOTE — PROGRESS NOTES
Patient presented after waiting 30 minutes with no reaction to allergy injections. Discharged from clinic.      Sofi Fong RN

## 2017-12-29 ENCOUNTER — OFFICE VISIT (OUTPATIENT)
Dept: CARDIOLOGY | Facility: CLINIC | Age: 62
End: 2017-12-29
Attending: INTERNAL MEDICINE
Payer: COMMERCIAL

## 2017-12-29 ENCOUNTER — OFFICE VISIT (OUTPATIENT)
Dept: FAMILY MEDICINE | Facility: OTHER | Age: 62
End: 2017-12-29
Payer: COMMERCIAL

## 2017-12-29 ENCOUNTER — RADIANT APPOINTMENT (OUTPATIENT)
Dept: GENERAL RADIOLOGY | Facility: OTHER | Age: 62
End: 2017-12-29
Attending: NURSE PRACTITIONER
Payer: COMMERCIAL

## 2017-12-29 ENCOUNTER — TELEPHONE (OUTPATIENT)
Dept: CARDIOLOGY | Facility: CLINIC | Age: 62
End: 2017-12-29

## 2017-12-29 VITALS
WEIGHT: 215.4 LBS | BODY MASS INDEX: 30.91 KG/M2 | TEMPERATURE: 97.5 F | HEART RATE: 74 BPM | OXYGEN SATURATION: 98 % | SYSTOLIC BLOOD PRESSURE: 134 MMHG | RESPIRATION RATE: 14 BRPM | DIASTOLIC BLOOD PRESSURE: 74 MMHG

## 2017-12-29 VITALS
SYSTOLIC BLOOD PRESSURE: 147 MMHG | HEIGHT: 70 IN | HEART RATE: 78 BPM | BODY MASS INDEX: 31.07 KG/M2 | WEIGHT: 217 LBS | DIASTOLIC BLOOD PRESSURE: 88 MMHG

## 2017-12-29 DIAGNOSIS — M25.552 HIP PAIN, LEFT: ICD-10-CM

## 2017-12-29 DIAGNOSIS — I25.10 CORONARY ARTERY DISEASE INVOLVING NATIVE CORONARY ARTERY OF NATIVE HEART WITHOUT ANGINA PECTORIS: Primary | ICD-10-CM

## 2017-12-29 DIAGNOSIS — I10 BENIGN ESSENTIAL HYPERTENSION: ICD-10-CM

## 2017-12-29 DIAGNOSIS — E78.2 MIXED HYPERLIPIDEMIA: ICD-10-CM

## 2017-12-29 DIAGNOSIS — I05.9 MITRAL VALVE DISEASE: ICD-10-CM
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
## 2017-12-29 DIAGNOSIS — R06.02 SOB (SHORTNESS OF BREATH): ICD-10-CM

## 2017-12-29 PROCEDURE — 72100 X-RAY EXAM L-S SPINE 2/3 VWS: CPT

## 2017-12-29 PROCEDURE — 99215 OFFICE O/P EST HI 40 MIN: CPT | Performed by: NURSE PRACTITIONER

## 2017-12-29 PROCEDURE — 99213 OFFICE O/P EST LOW 20 MIN: CPT | Performed by: NURSE PRACTITIONER

## 2017-12-29 PROCEDURE — 73502 X-RAY EXAM HIP UNI 2-3 VIEWS: CPT

## 2017-12-29 RX ORDER — LORATADINE 10 MG/1
10 TABLET ORAL DAILY
COMMUNITY
End: 2023-01-02

## 2017-12-29 RX ORDER — HYDROCHLOROTHIAZIDE 25 MG/1
25 TABLET ORAL DAILY
Qty: 30 TABLET | Refills: 1 | Status: SHIPPED | OUTPATIENT
Start: 2017-12-29 | End: 2018-02-27

## 2017-12-29 ASSESSMENT — PAIN SCALES - GENERAL: PAINLEVEL: MODERATE PAIN (4)
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
## 2017-12-29 NOTE — MR AVS SNAPSHOT
After Visit Summary   12/29/2017    Jose Angel Cha    MRN: [id number]           Patient Information     Date Of Birth          1955        Visit Information        Provider Department      12/29/2017 7:30 AM Skyla Garcia APRN [facility]        Today's Diagnoses     Coronary artery disease involving native coronary artery of native heart without angina pectoris    -  1    Mitral valve disease        Benign essential hypertension        Mixed hyperlipidemia        SOB (shortness of breath)          Care Instructions    For you blood pressure, start hydrochlorithiazide 25 mg daily.    In a week or two, get your labs (BMP) drawn at any [facility] clinic.    We will do a nuclear stress test since I'm not sure, with your pelvic pain if you can walk on the treadmill.    See me in a month for your blood pressure.    Ginette  [phone]          Follow-ups after your visit        Your next 10 appointments already scheduled     Jan 02, 2018  8:30 AM CST   Nurse Only with ER ALLERGY SHOTS   [facility] ([facility])    [address]  [place] [address]   [phone]            Jan 11, 2018  4:20 PM CST   Nurse Only with ER ALLERGY SHOTS   [facility]    [address]  [place] [address]   [phone]            Jan 23, 2018  8:10 AM CST   Nurse Only with ER ALLERGY SHOTS   [facility] ([facility]    [address]  [place] [address]   [phone]            Mar 06, 2018  8:10 AM CST   Nurse Only with ER ALLERGY SHOTS   [facility] ([facility])    [address]  [place] [address]   [phone]              Future tests that were ordered for you today     Open Future Orders        Priority Expected Expires Ordered    NM Exercise stress test  "(nuc card) Routine 1/5/2018 12/29/2018 12/29/2017    Basic metabolic panel Routine 1/5/2018 12/29/2018 12/29/2017            Who to contact     If you have questions or need follow up information about today's clinic visit or your schedule please contact [facility] directly at [phone].  Normal or non-critical lab and imaging results will be communicated to you by KnowledgeTreehart, letter or phone within 4 business days after the clinic has received the results. If you do not hear from us within 7 days, please contact the clinic through KnowledgeTreehart or phone. If you have a critical or abnormal lab result, we will notify you by phone as soon as possible.  Submit refill requests through [employer]Data or call your pharmacy and they will forward the refill request to us. Please allow 3 business days for your refill to be completed.          Additional Information About Your Visit        KnowledgeTreehart Information     Gran[employer] gives you secure access to your electronic health record. If you see a primary care provider, you can also send messages to your care team and make appointments. If you have questions, please call your primary care clinic.  If you do not have a primary care provider, please call [phone] and they will assist you.        Care EveryWhere ID     This is your Care EveryWhere ID. This could be used by other organizations to access your [place] medical records  [id number]        Your Vitals Were     Pulse Height BMI (Body Mass Index)             78 1.778 m (5' 10\") 31.14 kg/m2          Blood Pressure from Last 3 Encounters:   12/29/17 147/88   12/13/17 130/80   12/06/17 124/84    Weight from Last 3 Encounters:   12/29/17 98.4 kg (217 lb)   12/13/17 98.4 kg (217 lb)   12/06/17 98 kg (216 lb)              We Performed the Following     Follow-Up with Cardiac Advanced Practice Provider          Today's Medication Changes          These changes are accurate as of: 12/29/17  8:14 AM.  If " you have any questions, ask your nurse or doctor.               Start taking these medicines.        Dose/Directions    hydrochlorothiazide 25 MG tablet   Commonly known as:  HYDRODIURIL   Used for:  Benign essential hypertension   Started by:  Sykla Garcia APRN CNP        Dose:  25 mg   Take 1 tablet (25 mg) by mouth daily   Quantity:  30 tablet   Refills:  1         Stop taking these medicines if you haven't already. Please contact your care team if you have questions.     amLODIPine 5 MG tablet   Commonly known as:  NORVASC   Stopped by:  Skyla Garcia APRN CNP                Where to get your medicines      These medications were sent to [facility] - [address]  [address] [facility] [address]     Phone:  [phone]     hydrochlorothiazide 25 MG tablet                Primary Care Provider Office Phone # Fax #    Shari Tonia Paredes [phone]       [address] [facility] [address]        Equal Access to Services     [facility] : Hadii karina ku hadasho Soomaali, waaxda luqadaha, qaybta kaalmada adeegyada, waxay lokiin haychaz guillen . So [facility] [phone].    ATENCIÓN: Si habla español, tiene a armas disposición servicios gratuitos de asistencia lingüística. [facility] [phone].    We comply with applicable federal civil rights laws and [place] laws. We do not discriminate on the basis of race, color, national origin, age, disability, sex, sexual orientation, or gender identity.            Thank you!     Thank you for choosing [facility]  for your care. Our goal is always to provide you with excellent care. Hearing back from our patients is one way we can continue to improve our services. Please take a few minutes to complete the written survey that you may receive in the mail after your visit with us. Thank you!             Your Updated Medication List - Protect others  around you: Learn how to safely use, store and throw away your medicines at www.disposemymeds.org.          This list is accurate as of: 12/29/17  8:14 AM.  Always use your most recent med list.                   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 P.  1:100 w/v, HS  1.0 ml Dust Mites DF 30,000AU/mL, HS  0.3 ml Dust Mites DP. 30,000 AU/mL, HS  0.3 ml  Birch Mix PRW 1:20 w/v, HS  0.5 ml Grass Mix #7 100,000 BAU/mL, HS 0.4 ml Nettle 1:20 w/v, HS 0.5 ml Diluent: HSA qs to 5ml    Allergic rhinitis due to dust mite, Chronic seasonal allergic rhinitis due to pollen, Allergic rhinitis due to animal dander       aspirin 81 MG tablet     0    ONE DAILY    Other and unspecified hyperlipidemia, Family history of ischemic heart disease       CLEAR-ATADINE 10 MG tablet   Generic drug:  loratadine      Take 10 mg by mouth daily        EPINEPHrine 0.3 MG/0.3ML injection 2-pack    EPIPEN 2-MIGUELINA    2 each    Inject 0.3 mLs (0.3 mg) into the muscle once as needed for anaphylaxis    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 sting       fluticasone 50 MCG/ACT spray    FLONASE    1 Bottle    Spray 2 sprays into both nostrils daily    Chronic seasonal allergic rhinitis due to pollen, House dust mite allergy,  Allergic rhinitis due to animal dander       hydrochlorothiazide 25 MG tablet    HYDRODIURIL    30 tablet    Take 1 tablet (25 mg) by mouth daily    Benign essential hypertension       MULTIVITAL PO      Take 1 tablet by mouth daily Reported on 3/22/2017        olopatadine HCl 0.2 % Soln    PATADAY    2.5 mL    Place 1 drop into both eyes daily    Chronic seasonal allergic rhinitis due to pollen, House dust mite allergy       omeprazole 20 MG CR capsule    priLOSEC    90 capsule    TAKE ONE CAPSULE BY MOUTH EVERY DAY (TAKE 30 TO 60 MINUTES BEFORE A MEAL)    Gastroesophageal reflux disease without esophagitis       polyethylene glycol powder    MIRALAX    510 g    Take 17 g (1 capful) by mouth daily    Chronic constipation       STATIN NOT PRESCRIBED (INTENTIONAL)      by Other route continuous prn Reported on 4/6/2017    Mitral valve disorders(424.0), Hyperlipidemia LDL goal <100       temazepam 15 MG capsule    RESTORIL    30 capsule    Take 1 capsule (15 mg) by mouth nightly as needed for sleep    Anxiety       * Notice:  This list has 5 medication(s) that are the same as other medications prescribed for you. Read the directions carefully, and ask your doctor or other care provider to review them with you.

## 2017-12-29 NOTE — PATIENT INSTRUCTIONS
For you blood pressure, start hydrochlorithiazide 25 mg daily.    In a week or two, get your labs (BMP) drawn at any  clinic.    We will do a nuclear stress test since I'm not sure, with your pelvic pain if you can walk on the treadmill.    See me in a month for your blood pressure.    [place] [phone]
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
## 2017-12-29 NOTE — MR AVS SNAPSHOT
After Visit Summary   12/29/2017    Jose Angel Cha    MRN: [id number]           Patient Information     Date Of Birth          1955        Visit Information        Provider Department      12/29/2017 11:00 AM Ashia Sommer APRN CNP [facility]        Today's Diagnoses     Fall, initial encounter    -  1    Hip pain, left          Care Instructions    - Ice 3-4 times daily or heat whichever is most comfortable.   - TYLENOL 1000 mg every 6 hours; maximum daily dose: 3000 mg daily alternating with ibuprofen 400mg every 6 hours with a maximum of 3200mg daily.   - Aspercreme over the counter or icy hot along joint area.   - Start Physical therapy  - Return to clinic in 4 weeks if no improvement. Return to clinic if any worsening symptoms.     ETHEL Paul CNP            Follow-ups after your visit        Additional Services     PHYSICAL THERAPY REFERRAL       *This therapy referral will be filtered to a centralized scheduling office at [facility] and the patient will receive a call to schedule an appointment at a [place] location most convenient for them. *     [facility] provides Physical Therapy evaluation and treatment and many specialty services across the [place] system.  If requesting a specialty program, please choose from the list below.    If you have not heard from the scheduling office within 2 business days, please call [phone] for all locations, with the exception of [place], please call [phone].  Treatment: Evaluation & Treatment  Special Instructions/Modalities: Evaluate and Treat   Special Programs: None     Please be aware that coverage of these services is subject to the terms and limitations of your health insurance plan.  Call member services at your health plan with any benefit or coverage questions.      **Note to Provider:  If you are referring outside of [place] for the therapy appointment,  "please list the name of the location in the \"special instructions\" above, print the referral and give to the patient to schedule the appointment.                  Follow-up notes from your care team     Return in about 4 weeks (around 1/26/2018), or if symptoms worsen or fail to improve.      Your next 10 appointments already scheduled     Jan 02, 2018  8:30 AM CST   Nurse Only with ER ALLERGY SHOTS   [facility]    [address]  [facility] [address]   [phone]            Jan 11, 2018  4:20 PM CST   Nurse Only with ER ALLERGY SHOTS   [facility]    [address]  [facility] [address]   [phone]            Jan 12, 2018  9:30 AM CST   NM SH CV MPI MULT RST ST 1 DAY with SCINM1   [facility] CV Nuclear Medicine (Cardiovascular Imaging at [facility])    [address] [place] [address]  [address]  [facility] [address] [phone]           For a ONE day exam: Allow 3-4 hours for test. For a TWO day exam: Allow 2 hours PER day for test.  You may need to stop some medicines before the test. Follow your doctor s orders. - If you take a beta blocker: Follow your doctor s specific instructions on taking it prior to and on the day of your exam. - If you take Aggrenox or dipyridamole (Persantine, Permole), stop taking it 48 hours before your test. - If you take Viagra, Cialis or Levitra, stop taking it 48 hours before your test. - If you take theophylline or aminophylline, stop taking it 12 hours before your test.  For patients with diabetes: - If you take insulin, call your diabetes care team. Ask if you should take a 1/2 dose the morning of your test. - If you take diabetes medicine by mouth, don t take it on the morning of your test. Bring it with you to take after the test. (If you have questions, call your diabetes care team.)  Do not take nitrates on the day of your test. Do " not wear your Nitro-Patch.  Stop all caffeine 12 hours before the test. This includes coffee, tea, soda pop, chocolate and certain medicines (such as Anacin, Excedrin and NoDoz). Also avoid decaf coffee and tea, as these contain small amounts of caffeine.  No alcohol, smoking or other tobacco for 12 hours before the test.  Stop eating 3 hours before the test. You may drink water.  Please wear a loose two-piece outfit. If you will have an exercise test, bring rubber-soled walking shoes.  When you arrive, please tell us if you: - Have diabetes - Are breastfeeding - May be pregnant - Have a pacemaker of ICD (implantable defibrillator).  Please call your Imaging Department at your exam site with any questions.            Jan 23, 2018  8:10 AM CST   Nurse Only with ER ALLERGY SHOTS   [facility]    [address] [address] [phone]            Jan 24, 2018  3:00 PM CST   Return Visit with ETHEL Zhou [facility] ([facility])    [address] [place] [address] [address]  [facility] [address]   [phone]            Mar 06, 2018  8:10 AM CST   Nurse Only with ER ALLERGY SHOTS   [facility] ([facility])    [address]  [facility] [address] [phone]              Future tests that were ordered for you today     Open Future Orders        Priority Expected Expires Ordered    NM Exercise stress test (nuc card) Routine 1/5/2018 12/29/2018 12/29/2017    Basic metabolic panel Routine 1/5/2018 12/29/2018 12/29/2017            Who to contact     If you have questions or need follow up information about today's clinic visit or your schedule please contact [facility] directly at [phone].  Normal or non-critical lab and imaging results will be communicated to you by MyChart, letter or phone within 4 business days after  the clinic has received the results. If you do not hear from us within 7 days, please contact the clinic through Invodo or phone. If you have a critical or abnormal lab result, we will notify you by phone as soon as possible.  Submit refill requests through Invodo or call your pharmacy and they will forward the refill request to us. Please allow 3 business days for your refill to be completed.          Additional Information About Your Visit        Invodo Information     Invodo gives you secure access to your electronic health record. If you see a primary care provider, you can also send messages to your care team and make appointments. If you have questions, please call your primary care clinic.  If you do not have a primary care provider, please call [phone] and they will assist you.        Care EveryWhere ID     This is your Care EveryWhere ID. This could be used by other organizations to access your [place] medical records  [id number]        Your Vitals Were     Pulse Temperature Respirations Pulse Oximetry BMI (Body Mass Index)       74 97.5  F (36.4  C) (Temporal) 14 98% 30.91 kg/m2        Blood Pressure from Last 3 Encounters:   12/29/17 134/74   12/29/17 147/88   12/13/17 130/80    Weight from Last 3 Encounters:   12/29/17 215 lb 6.4 oz (97.7 kg)   12/29/17 217 lb (98.4 kg)   12/13/17 217 lb (98.4 kg)              We Performed the Following     PHYSICAL THERAPY REFERRAL          Today's Medication Changes          These changes are accurate as of: 12/29/17 11:23 AM.  If you have any questions, ask your nurse or doctor.               Start taking these medicines.        Dose/Directions    hydrochlorothiazide 25 MG tablet   Commonly known as:  HYDRODIURIL   Used for:  Benign essential hypertension   Started by:  Skyla Garcia APRN CNP        Dose:  25 mg   Take 1 tablet (25 mg) by mouth daily   Quantity:  30 tablet   Refills:  1         Stop taking these medicines if you haven't  already. Please contact your care team if you have questions.     amLODIPine 5 MG tablet   Commonly known as:  NORVASC   Stopped by:  Skyla Garcia APRN CNP                Where to get your medicines      These [place] [facility] [place] [address] [facility]  [address] [facility] [address]     Phone:  [phone]     hydrochlorothiazide 25 MG tablet                Primary Care Provider Office Phone # Fax #    Shari Tonia Paredes [phone]       [address] [facility] [address]        Equal Access to Services     [facility]: Hadii aad ku hadasho Soomaali, waaxda luqadaha, qaybta kaalmada adeegyada, nico mead haychaz guillen . So [facility] [phone].    ATENCIÓN: Si habla español, tiene a armas disposición servicios gratuitos de asistencia lingüística. [facility] [phone].    We comply with applicable federal civil rights laws and [place] laws. We do not discriminate on the basis of race, color, national origin, age, disability, sex, sexual orientation, or gender identity.            Thank you!     Thank you for choosing [facility] [place] [facility]  for your care. Our goal is always to provide you with excellent care. Hearing back from our patients is one way we can continue to improve our services. Please take a few minutes to complete the written survey that you may receive in the mail after your visit with us. Thank you!             Your Updated Medication List - Protect others around you: Learn how to safely use, store and throw away your medicines at www.disposemymeds.org.          This list is accurate as of: 12/29/17 11:23 AM.  Always use your most recent med list.                   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 P.  1:100 w/v, HS  1.0 ml Dust Mites DF 30,000AU/mL, HS  0.3 ml Dust Mites DP. 30,000 AU/mL, HS  0.3 ml  Birch Mix PRW 1:20 w/v, HS  0.5 ml Grass Mix #7 100,000 BAU/mL, HS 0.4 ml Nettle 1:20 w/v, HS 0.5 ml Diluent: HSA qs to 5ml    Allergic rhinitis due to dust mite, Chronic seasonal allergic rhinitis due to pollen, Allergic rhinitis due to animal dander       aspirin 81 MG tablet     0    ONE DAILY    Other and unspecified hyperlipidemia, Family history of ischemic heart disease       CLEAR-ATADINE 10 MG tablet   Generic drug:  loratadine      Take 10 mg by mouth daily        EPINEPHrine 0.3 MG/0.3ML injection 2-pack    EPIPEN 2-MIGUELINA    2 each    Inject 0.3 mLs (0.3 mg) into the muscle once as needed for anaphylaxis    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 sting       fluticasone 50 MCG/ACT spray    FLONASE    1 Bottle    Spray 2 sprays into both nostrils daily    Chronic seasonal allergic rhinitis due to pollen, House dust mite allergy, Allergic rhinitis due to animal dander       hydrochlorothiazide 25 MG tablet    HYDRODIURIL    30 tablet    Take 1 tablet (25 mg) by mouth daily    Benign essential hypertension       MULTIVITAL PO      Take 1 tablet by mouth daily Reported on 3/22/2017        olopatadine HCl 0.2 % Soln    PATADAY    2.5 mL    Place 1 drop into both eyes daily    Chronic seasonal allergic rhinitis due to pollen, House dust mite allergy       omeprazole 20 MG CR  capsule    priLOSEC    90 capsule    TAKE ONE CAPSULE BY MOUTH EVERY DAY (TAKE 30 TO 60 MINUTES BEFORE A MEAL)    Gastroesophageal reflux disease without esophagitis       polyethylene glycol powder    MIRALAX    510 g    Take 17 g (1 capful) by mouth daily    Chronic constipation       STATIN NOT PRESCRIBED (INTENTIONAL)      by Other route continuous prn Reported on 4/6/2017    Mitral valve disorders(424.0), Hyperlipidemia LDL goal <100       temazepam 15 MG capsule    RESTORIL    30 capsule    Take 1 capsule (15 mg) by mouth nightly as needed for sleep    Anxiety       * Notice:  This list has 5 medication(s) that are the same as other medications prescribed for you. Read the directions carefully, and ask your doctor or other care provider to review them with you.

## 2017-12-29 NOTE — NURSING NOTE
"No chief complaint on file.      Initial /74  Pulse 74  Temp 97.5  F (36.4  C) (Temporal)  Resp 14  Wt 215 lb 6.4 oz (97.7 kg)  SpO2 98%  BMI 30.91 kg/m2 Estimated body mass index is 30.91 kg/(m^2) as calculated from the following:    Height as of an earlier encounter on 12/29/17: 5' 10\" (1.778 m).    Weight as of this encounter: 215 lb 6.4 oz (97.7 kg).  Medication Reconciliation: complete  "

## 2018-01-02 ENCOUNTER — ALLIED HEALTH/NURSE VISIT (OUTPATIENT)
Dept: ALLERGY | Facility: OTHER | Age: 63
End: 2018-01-02
Payer: COMMERCIAL

## 2018-01-02 DIAGNOSIS — J30.1 ALLERGIC RHINITIS DUE TO POLLEN: Primary | ICD-10-CM

## 2018-01-02 PROCEDURE — 99207 ZZC NO CHARGE LOS: CPT

## 2018-01-02 PROCEDURE — 95115 IMMUNOTHERAPY ONE INJECTION: CPT

## 2018-01-02 NOTE — MR AVS SNAPSHOT
After Visit Summary   1/2/2018    Jose Angel Cha    MRN: [id number]           Patient Information     Date Of Birth          1955        Visit Information        Provider Department      1/2/2018 8:30 AM ER ALLERGY SHOTS [facility]        Today's Diagnoses     Allergic rhinitis due to pollen    -  1       Follow-ups after your visit        Your next 10 appointments already scheduled     Rakesh 10, 2018  8:00 AM CST   LAB with NL LAB [facility]   [facility]    [address] [facility] [address]   [phone]           Please do not eat 10-12 hours before your appointment if you are coming in fasting for labs on lipids, cholesterol, or glucose (sugar). This does not apply to pregnant women. Water, hot tea and black coffee (with nothing added) are okay. Do not drink other fluids, diet soda or chew gum.            Jan 11, 2018  4:20 PM CST   Nurse Only with ER ALLERGY SHOTS   [facility]    [address]  [facility] [address]   [phone]            Jan 12, 2018  9:30 AM CST   NM SH CV MPI MULT RST ST 1 DAY with SCINM1   [facility] CV Nuclear Medicine (Cardiovascular Imaging at [facility])    [address]  [address]  [facility] [address] [phone]           For a ONE day exam: Allow 3-4 hours for test. For a TWO day exam: Allow 2 hours PER day for test.  You may need to stop some medicines before the test. Follow your doctor s orders. - If you take a beta blocker: Follow your doctor s specific instructions on taking it prior to and on the day of your exam. - If you take Aggrenox or dipyridamole (Persantine, Permole), stop taking it 48 hours before your test. - If you take Viagra, Cialis or Levitra, stop taking it 48 hours before your test. - If you take theophylline or aminophylline, stop taking it 12 hours before your test.  For patients with  diabetes: - If you take insulin, call your diabetes care team. Ask if you should take a 1/2 dose the morning of your test. - If you take diabetes medicine by mouth, don t take it on the morning of your test. Bring it with you to take after the test. (If you have questions, call your diabetes care team.)  Do not take nitrates on the day of your test. Do not wear your Nitro-Patch.  Stop all caffeine 12 hours before the test. This includes coffee, tea, soda pop, chocolate and certain medicines (such as Anacin, Excedrin and NoDoz). Also avoid decaf coffee and tea, as these contain small amounts of caffeine.  No alcohol, smoking or other tobacco for 12 hours before the test.  Stop eating 3 hours before the test. You may drink water.  Please wear a loose two-piece outfit. If you will have an exercise test, bring rubber-soled walking shoes.  When you arrive, please tell us if you: - Have diabetes - Are breastfeeding - May be pregnant - Have a pacemaker of ICD (implantable defibrillator).  Please call your Imaging Department at your exam site with any questions.            Jan 18, 2018  3:50 PM CST   [facility]urse Only with ER [facility]GY SHOTS   [facility]    [address]  [facility] [address]   [phone]            Jan 23, 2018  8:10 AM CST   Nurse Only with ER ALLERGY SHOTS   [facility]    [address]  [facility] [address]   [phone]            Jan 24, 2018  3:00 PM CST   Return Visit with ETHEL Zhou [facility]   [place] [facility])    [address] [place] [address] [address]  [place] [address] [phone]            Jan 25, 2018  3:20 PM CST   (Arrive by 3:15 PM)   Nurse Only with ER ALLERGY SHOTS   [facility]    [address]  [facility] [address]   [phone]             Feb 01, 2018  3:10 PM CST   Nurse Only with ER ALLERGY SHOTS   [facility]    [address]  [facility] [address] [phone]            Feb 15, 2018  4:50 PM CST   Nurse Only with ER ALLERGY SHOTS   [facility]    [address]  [facility] [address] [phone]            Feb 22, 2018  4:50 PM CST   Nurse Only with ER ALLERGY SHOTS   [facility]    [address]  [facility] [address] [phone]              Who to contact     If you have questions or need follow up information about today's clinic visit or your schedule please contact [facility] directly at [phone].  Normal or non-critical lab and imaging results will be communicated to you by [employer], letter or phone within 4 business days after the clinic has received the results. If you do not hear from us within 7 days, please contact the clinic through [employer] or phone. If you have a critical or abnormal lab result, we will notify you by phone as soon as possible.  Submit refill requests through Mapori or call your pharmacy and they will forward the refill request to us. Please allow 3 business days for your refill to be completed.          Additional Information About Your Visit        [employer] Information     Mapori gives you secure access to your electronic health record. If you see a primary care provider, you can also send messages to your care team and make appointments. If you have questions, please call your primary care clinic.  If you do not have a primary care provider, please call [phone] and they will assist you.        Care EveryWhere ID     This is your Care EveryWhere ID. This could be used by other organizations to access your [place] medical records  [id number]         Blood Pressure from Last 3 Encounters:   12/29/17  134/74   12/29/17 147/88   12/13/17 130/80    Weight from Last 3 Encounters:   12/29/17 97.7 kg (215 lb 6.4 oz)   12/29/17 98.4 kg (217 lb)   12/13/17 98.4 kg (217 lb)              We Performed the Following     Allergy Shot: One injection        Primary Care Provider Office Phone # Fax #    Shari Tonia Paredes [phone]       [address] [place] [address]        Equal Access to Services     RANDELL TAFOYA : Hadii aad ku hadasho Soomaali, waaxda luqadaha, qaybta kaalmada adeegyada, waxay idiin hayaan adeeg kartik guillen . So [facility] [phone].    ATENCIÓN: Si habla español, tiene a armas disposición servicios gratuitos de asistencia lingüística. [facility] [phone].    We comply with applicable federal civil rights laws and [place] laws. We do not discriminate on the basis of race, color, national origin, age, disability, sex, sexual orientation, or gender identity.            Thank you!     Thank you for choosing [facility]  for your care. Our goal is always to provide you with excellent care. Hearing back from our patients is one way we can continue to improve our services. Please take a few minutes to complete the written survey that you may receive in the mail after your visit with us. Thank you!             Your Updated Medication List - Protect others around you: Learn how to safely use, store and throw away your medicines at www.disposemymeds.org.          This list is accurate as of: 1/2/18  9:29 AM.  Always use your most recent med list.                   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, accidental or  unintentional, subsequent encounter       * ALLERGEN IMMUNOTHERAPY PRESCRIPTION     13 mL    Name of Mix: Mix #2  Wasp Wasp Venom 100 mcg/mL HS 13 ml Diluent: HSA qs to 13ml    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 P.  1:100 w/v, HS  1.0 ml Dust Mites DF 30,000AU/mL, HS  0.3 ml Dust Mites DP. 30,000 AU/mL, HS  0.3 ml  Birch Mix PRW 1:20 w/v, HS  0.5 ml Grass Mix #7 100,000 BAU/mL, HS 0.4 ml Nettle 1:20 w/v, HS 0.5 ml Diluent: HSA qs to 5ml    Allergic rhinitis due to dust mite, Chronic seasonal allergic rhinitis due to pollen, Allergic rhinitis due to animal dander       aspirin 81 MG tablet     0    ONE DAILY    Other and unspecified hyperlipidemia, Family history of ischemic heart disease       CLEAR-ATADINE 10 MG tablet   Generic drug:  loratadine      Take 10 mg by mouth daily        EPINEPHrine 0.3 MG/0.3ML injection 2-pack    EPIPEN 2-MIGUELINA    2 each    Inject 0.3 mLs (0.3 mg) into the muscle once as needed for anaphylaxis    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 sting       fluticasone 50 MCG/ACT spray    FLONASE    1 Bottle    Spray 2 sprays into both nostrils daily    Chronic seasonal allergic rhinitis due to pollen, House dust mite allergy, Allergic rhinitis due to animal dander       hydrochlorothiazide 25 MG tablet    HYDRODIURIL    30 tablet    Take 1 tablet (25 mg) by mouth daily    Benign essential hypertension       MULTIVITAL PO      Take 1 tablet by mouth daily Reported on 3/22/2017        olopatadine HCl 0.2 % Soln    PATADAY    2.5 mL    Place 1 drop into both eyes daily    Chronic seasonal allergic rhinitis due to pollen, House dust mite allergy       omeprazole 20 MG CR capsule    priLOSEC    90 capsule    TAKE ONE CAPSULE BY MOUTH EVERY DAY (TAKE 30 TO 60 MINUTES BEFORE A MEAL)    Gastroesophageal reflux disease without esophagitis        polyethylene glycol powder    MIRALAX    510 g    Take 17 g (1 capful) by mouth daily    Chronic constipation       STATIN NOT PRESCRIBED (INTENTIONAL)      by Other route continuous prn Reported on 4/6/2017    Mitral valve disorders(424.0), Hyperlipidemia LDL goal <100       temazepam 15 MG capsule    RESTORIL    30 capsule    Take 1 capsule (15 mg) by mouth nightly as needed for sleep    Anxiety       * Notice:  This list has 5 medication(s) that are the same as other medications prescribed for you. Read the directions carefully, and ask your doctor or other care provider to review them with you.

## 2018-01-02 NOTE — PROGRESS NOTES
Patient presented after waiting 30 minutes with no reaction to allergy injections. Discharged from clinic.  Angela Bone RN ............   1/2/2018...9:29 AM

## 2018-01-05 ENCOUNTER — TELEPHONE (OUTPATIENT)
Dept: CARDIOLOGY | Facility: CLINIC | Age: 63
End: 2018-01-05

## 2018-01-05 DIAGNOSIS — E78.2 MIXED HYPERLIPIDEMIA: Primary | ICD-10-CM

## 2018-01-05 RX ORDER — EZETIMIBE 10 MG/1
10 TABLET ORAL DAILY
Qty: 30 TABLET | Refills: 6 | Status: SHIPPED | OUTPATIENT
Start: 2018-01-05 | End: 2018-07-25

## 2018-01-05 NOTE — TELEPHONE ENCOUNTER
RN spoke with Dr. Chirinos regarding denial of insurance coverage for Praluent. Patient to start on Zetia 10 mg every day take at night. Order placed. RN called patient to inform him about Dr. Chirinos's order. Patient to see Dr. Chirinos in March.Ordered lipid panel to be done prior to OV.

## 2018-01-10 ENCOUNTER — THERAPY VISIT (OUTPATIENT)
Dept: PHYSICAL THERAPY | Facility: CLINIC | Age: 63
End: 2018-01-10
Payer: COMMERCIAL

## 2018-01-10 DIAGNOSIS — I10 BENIGN ESSENTIAL HYPERTENSION: ICD-10-CM

## 2018-01-10 DIAGNOSIS — M25.552 HIP PAIN, LEFT: Primary | ICD-10-CM

## 2018-01-10 LAB
ANION GAP SERPL CALCULATED.3IONS-SCNC: 6 MMOL/L (ref 3–14)
BUN SERPL-MCNC: 15 MG/DL (ref 7–30)
CALCIUM SERPL-MCNC: 8.9 MG/DL (ref 8.5–10.1)
CHLORIDE SERPL-SCNC: 102 MMOL/L (ref 94–109)
CO2 SERPL-SCNC: 30 MMOL/L (ref 20–32)
CREAT SERPL-MCNC: 1.33 MG/DL (ref 0.66–1.25)
GFR SERPL CREATININE-BSD FRML MDRD: 54 ML/MIN/1.7M2
GLUCOSE SERPL-MCNC: 104 MG/DL (ref 70–99)
POTASSIUM SERPL-SCNC: 3.9 MMOL/L (ref 3.4–5.3)
SODIUM SERPL-SCNC: 138 MMOL/L (ref 133–144)

## 2018-01-10 PROCEDURE — 97161 PT EVAL LOW COMPLEX 20 MIN: CPT | Mod: GP | Performed by: PHYSICAL THERAPIST

## 2018-01-10 PROCEDURE — 36415 COLL VENOUS BLD VENIPUNCTURE: CPT | Performed by: NURSE PRACTITIONER

## 2018-01-10 PROCEDURE — 97140 MANUAL THERAPY 1/> REGIONS: CPT | Mod: GP | Performed by: PHYSICAL THERAPIST

## 2018-01-10 PROCEDURE — 80048 BASIC METABOLIC PNL TOTAL CA: CPT | Performed by: NURSE PRACTITIONER

## 2018-01-10 PROCEDURE — 97110 THERAPEUTIC EXERCISES: CPT | Mod: GP | Performed by: PHYSICAL THERAPIST

## 2018-01-10 ASSESSMENT — ACTIVITIES OF DAILY LIVING (ADL)
HOS_ADL_HIGHEST_POTENTIAL_SCORE: 64
HOS_ADL_COUNT: 16
WALKING_INITIALLY: MODERATE DIFFICULTY
SITTING_FOR_15_MINUTES: MODERATE DIFFICULTY
GOING_UP_1_FLIGHT_OF_STAIRS: EXTREME DIFFICULTY
DEEP_SQUATTING: EXTREME DIFFICULTY
STANDING_FOR_15_MINUTES: MODERATE DIFFICULTY
LIGHT_TO_MODERATE_WORK: MODERATE DIFFICULTY
RECREATIONAL_ACTIVITIES: MODERATE DIFFICULTY
HEAVY_WORK: EXTREME DIFFICULTY
WALKING_15_MINUTES_OR_GREATER: EXTREME DIFFICULTY
GETTING_INTO_AND_OUT_OF_AN_AVERAGE_CAR: EXTREME DIFFICULTY
WALKING_UP_STEEP_HILLS: MODERATE DIFFICULTY
HOS_ADL_SCORE(%): 37.5
HOW_WOULD_YOU_RATE_YOUR_CURRENT_LEVEL_OF_FUNCTION_DURING_YOUR_USUAL_ACTIVITIES_OF_DAILY_LIVING_FROM_0_TO_100_WITH_100_BEING_YOUR_LEVEL_OF_FUNCTION_PRIOR_TO_YOUR_HIP_PROBLEM_AND_0_BEING_THE_INABILITY_TO_PERFORM_ANY_OF_YOUR_USUAL_DAILY_ACTIVITIES?: 65
WALKING_APPROXIMATELY_10_MINUTES: MODERATE DIFFICULTY
STEPPING_UP_AND_DOWN_CURBS: EXTREME DIFFICULTY
HOS_ADL_ITEM_SCORE_TOTAL: 24
TWISTING/PIVOTING_ON_INVOLVED_LEG: EXTREME DIFFICULTY
PUTTING_ON_SOCKS_AND_SHOES: SLIGHT DIFFICULTY
GOING_DOWN_1_FLIGHT_OF_STAIRS: EXTREME DIFFICULTY
ROLLING_OVER_IN_BED: MODERATE DIFFICULTY
WALKING_DOWN_STEEP_HILLS: MODERATE DIFFICULTY

## 2018-01-10 NOTE — PROGRESS NOTES
"[facility] Initial Evaluation  Subjective:  Patient is a 62 year old male presenting with rehab left hip hpi. The history is provided by the patient. No  was used.   Jose Angel Cah is a 62 year old male with a left hip condition.  Condition occurred with:  A fall/slip.  Condition occurred: in the community.  This is a new condition  Had slip on ice 12/5/17 when picking up car from car dealership. Was stepping out of vehicle w/ L LE and leg hit slipper icy and he fell into the car door which broke his fall (impact into door). Notes he \"wrenched\" the L hip, but did not fall to the ground..    Patient reports pain:  Lateral and posterior.  Radiates to:  Thigh and knee.  Pain is described as aching (primarily aching and tender w/ occasional sharp pain (previously sharp and shooting more frequently)) and is constant and reported as 4/10 and 6/10 (4/10 current; up to 6/10 w/ perla pains lately; previously 10/10).  Associated symptoms:  Loss of strength, loss of motion/stiffness and tingling. Pain is the same all the time (more stiffness first thing in AM; increased fatigue by end of day).  Symptoms are exacerbated by weight bearing, descending stairs, ascending stairs, walking, standing, sitting, lying on extremity, transfers and other (avoiding bending/squatting) and relieved by rest.  Since onset symptoms are gradually improving.  Special tests:  X-ray.      General health as reported by patient is good.  Pertinent medical history includes:  Overweight, high blood pressure, depression, fibromyalgia, sleep disorder/apnea and other.  Medical allergies: yes.  Other surgeries include:  Heart surgery.  Current medications:  Sleep medication, pain medication and high blood pressure medication.  Current occupation is [employer]; Lives at home w/ wife; hobbies include golf.  Patient is working in normal job without restrictions.  Primary job tasks include:  Prolonged standing and prolonged " sitting.    Barriers include:  Stairs and transportation (getting into vehicle is difficult).    Red flags:  None as reported by the patient.                        Objective:    Gait:    Gait Type:  Antalgic           Primarily uses R LE during sit<>stand transfer         Lumbar/SI Evaluation  ROM:    AROM Lumbar:   Flexion:            To ankle ++pain on return  Ext:                    80% limtied (compensates throguh knee flex)    Side Bend:        Left:  Mid thigh    Right:  Lower thigh  Rotation:           Left:  Min limitation +pain mid back    Right:  Min limitation  Side Glide:        Left:     Right:                     Lumbar Palpation:    Tenderness present at Left:    Greater Trochanter  Tenderness present at Right: Greater Trochanter                                          Hip Evaluation  HIP AROM:    Flexion: Left: 90 ++pain    Right:  110          Internal Rotation: Left: 20    Right: 30  External Rotation: Left: 35    Right: 43      Hip PROM:    Flexion: Left: 105 +pain   Right: 115                        Hip Strength:  : Hip extension not yet tested.    Flexion:   Left:  3+/5   ++  Pain: weak/painful  Right: 4+/5   Pain:                      Abduction:  Left: 3+/5    ++   Pain:Right: 4+/5    Pain:                  Hip Special Testing:    Left hip positive for the following special tests:  Fadir/Labrum  Left hip negative for the following special tests:  Jesus (ROM limitation compared to R and Pull in anterior hip at end range)  Right hip negative for the following special tests:  Jesus or Fadir/Labrum    Hip Palpation:    Left hip tenderness present at:   Piriformis           (+) Torsion L Hip  After L torsion correction: L hip flex increased to 4+/5 +pain, L hip Abd 4+/5 +pain, Less pain w/ FADIR, Improved ROM w/ L YUNIOR  Less pain w/ sitting and easier to transfer to standing after MT    General     ROS    Assessment/Plan:    Patient is a 62 year old male with left side hip complaints.    Patient  has the following significant findings with corresponding treatment plan.                Diagnosis 1:  L Hip pain  Pain -  hot/cold therapy, US, electric stimulation, manual therapy, splint/taping/bracing/orthotics, self management, education and home program  Decreased ROM/flexibility - manual therapy, therapeutic exercise, therapeutic activity and home program  Decreased joint mobility - manual therapy, therapeutic exercise, therapeutic activity and home program  Decreased strength - therapeutic exercise, therapeutic activities and home program  Inflammation - cold therapy, US and self management/home program  Impaired gait - gait training and home program  Impaired muscle performance - neuro re-education and home program  Decreased function - therapeutic activities and home program    Therapy Evaluation Codes:   1) History comprised of:   Personal factors that impact the plan of care:      Age and Past/current experiences.    Comorbidity factors that impact the plan of care are:      Depression, Fibromyalgia, Heart problems, High blood pressure, Migraines/headaches, Overweight and Sleep disorder/apnea.     Medications impacting care: High blood pressure, Pain and Sleep.  2) Examination of Body Systems comprised of:   Body structures and functions that impact the plan of care:      Hip, Lumbar spine, Pelvis and Sacral illiac joint.   Activity limitations that impact the plan of care are:      Bending, Cooking, Driving, Dressing, Jumping, Lifting, Running, Sitting, Squatting/kneeling, Stairs, Standing, Walking, Working, Sleeping and Laying down.  3) Clinical presentation characteristics are:   Stable/Uncomplicated.  4) Decision-Making    Low complexity using standardized patient assessment instrument and/or measureable assessment of functional outcome.  Cumulative Therapy Evaluation is: Low complexity.    Previous and current functional limitations:  (See Goal Flow Sheet for this information)    Short term and Long  term goals: (See Goal Flow Sheet for this information)     Communication ability:  Patient appears to be able to clearly communicate and understand verbal and written communication and follow directions correctly.  Treatment Explanation - The following has been discussed with the patient:   RX ordered/plan of care  Anticipated outcomes  Possible risks and side effects  This patient would benefit from PT intervention to resume normal activities.   Rehab potential is good.    Frequency:  1 X week, once daily  Duration:  for 6 weeks  Discharge Plan:  Achieve all LTG.  Independent in home treatment program.  Reach maximal therapeutic benefit.    Please refer to the daily flowsheet for treatment today, total treatment time and time spent performing 1:1 timed codes.

## 2018-01-10 NOTE — LETTER
"[facility] PHYSICAL THERAPY  [address]  [facility] [id number] [phone]    January 10, 2018    Re: Jose Angel Cha   :   1955  MRN:  [id number]   REFERRING PHYSICIAN:   Ashia Sommer    [facility]    Date of Initial Evaluation:  ***  Visits:  Rxs Used: 1  Reason for Referral:  Hip pain, left    EVALUATION SUMMARY    [facility] Initial Evaluation  Subjective:  Patient is a 62 year old male presenting with rehab left hip hpi. The history is provided by the patient. No  was used.   Jose Angel Cha is a 62 year old male with a left hip condition.  Condition occurred with:  A fall/slip.  Condition occurred: in the community.  This is a new condition  Had slip on ice 17 when picking up car from car dealership. Was stepping out of vehicle w/ L LE and leg hit slipper icy and he fell into the car door which broke his fall (impact into door). Notes he \"wrenched\" the L hip, but did not fall to the ground..    Patient reports pain:  Lateral and posterior.  Radiates to:  Thigh and knee.  Pain is described as aching (primarily aching and tender w/ occasional sharp pain (previously sharp and shooting more frequently)) and is constant and reported as 4/10 and 6/10 (4/10 current; up to 6/10 w/ perla pains lately; previously 10/10).  Associated symptoms:  Loss of strength, loss of motion/stiffness and tingling. Pain is the same all the time (more stiffness first thing in AM; increased fatigue by end of day).  Symptoms are exacerbated by weight bearing, descending stairs, ascending stairs, walking, standing, sitting, lying on extremity, transfers and other (avoiding bending/squatting) and relieved by rest.  Since onset symptoms are gradually improving.  Special tests:  X-ray.      General health as reported by patient is good.  Pertinent medical history includes:  Overweight, " high blood pressure, depression, fibromyalgia, sleep disorder/apnea and other.  Medical allergies: yes.  Other surgeries include:  Heart surgery.  Current medications:  Sleep medication, pain medication and high blood pressure medication.  Current occupation is [employer]; Lives at home w/ wife; hobbies include golf.  Patient is working in normal job without restrictions.  Primary job tasks include:  Prolonged standing and prolonged sitting.    Barriers include:  Stairs and transportation (getting into vehicle is difficult).    Red flags:  None as reported by the patient.                        Objective:    Gait:    Gait Type:  Antalgic           Primarily uses R LE during sit<>stand transfer         Lumbar/SI Evaluation  ROM:    AROM Lumbar:   Flexion:            To ankle ++pain on return  Ext:                    80% limtied (compensates throguh knee flex)    Side Bend:        Left:  Mid thigh    Right:  Lower thigh  Rotation:           Left:  Min limitation +pain mid back    Right:  Min limitation  Side Glide:        Left:     Right:                     Lumbar Palpation:    Tenderness present at Left:    Greater Trochanter  Tenderness present at Right: Greater Trochanter                                          Hip Evaluation  HIP AROM:    Flexion: Left: 90 ++pain    Right:  110          Internal Rotation: Left: 20    Right: 30  External Rotation: Left: 35    Right: 43      Hip PROM:    Flexion: Left: 105 +pain   Right: 115                        Hip Strength:  : Hip extension not yet tested.    Flexion:   Left:  3+/5   ++  Pain: weak/painful  Right: 4+/5   Pain:                      Abduction:  Right: 4+/5    Pain:                  Hip Special Testing:    Left hip positive for the following special tests:  Fadir/Labrum  Left hip negative for the following special tests:  Jesus (ROM limitation compared to R and Pull in anterior hip at end range)  Right hip negative for the following special tests:  Jesus or  Fadir/Labrum    Hip Palpation:    Left hip tenderness present at:   Piriformis           (+) Torsion L Hip  After L torsion correction: L hip flex increased to 4+/5 +pain, L hip Abd 4+/5 +pain, Less pain w/ FADIR, Improved ROM w/ L YUNIOR  Less pain w/ sitting and easier to transfer to standing after MT    General     ROS    Assessment/Plan:    Patient is a 62 year old male with left side hip complaints.    Patient has the following significant findings with corresponding treatment plan.                Diagnosis 1:  L Hip pain  Pain -  hot/cold therapy, US, electric stimulation, manual therapy, splint/taping/bracing/orthotics, self management, education and home program  Decreased ROM/flexibility - manual therapy, therapeutic exercise, therapeutic activity and home program  Decreased joint mobility - manual therapy, therapeutic exercise, therapeutic activity and home program  Decreased strength - therapeutic exercise, therapeutic activities and home program  Inflammation - cold therapy, US and self management/home program  Impaired gait - gait training and home program  Impaired muscle performance - neuro re-education and home program  Decreased function - therapeutic activities and home program    Therapy Evaluation Codes:   1) History comprised of:   Personal factors that impact the plan of care:      Age and Past/current experiences.    Comorbidity factors that impact the plan of care are:      Depression, Fibromyalgia, Heart problems, High blood pressure, Migraines/headaches, Overweight and Sleep disorder/apnea.     Medications impacting care: High blood pressure, Pain and Sleep.  2) Examination of Body Systems comprised of:   Body structures and functions that impact the plan of care:      Hip, Lumbar spine, Pelvis and Sacral illiac joint.   Activity limitations that impact the plan of care are:      Bending, Cooking, Driving, Dressing, Jumping, Lifting, Running, Sitting, Squatting/kneeling, Stairs, Standing,  Walking, Working, Sleeping and Laying down.  3) Clinical presentation characteristics are:   Stable/Uncomplicated.  4) Decision-Making    Low complexity using standardized patient assessment instrument and/or measureable assessment of functional outcome.  Cumulative Therapy Evaluation is: Low complexity.    Previous and current functional limitations:  (See Goal Flow Sheet for this information)    Short term and Long term goals: (See Goal Flow Sheet for this information)     Communication ability:  Patient appears to be able to clearly communicate and understand verbal and written communication and follow directions correctly.  Treatment Explanation - The following has been discussed with the patient:   RX ordered/plan of care  Anticipated outcomes  Possible risks and side effects  This patient would benefit from PT intervention to resume normal activities.   Rehab potential is good.    Frequency:  1 X week, once daily  Duration:  for 6 weeks  Discharge Plan:  Achieve all LTG.  Independent in home treatment program.  Reach maximal therapeutic benefit.    Please refer to the daily flowsheet for treatment today, total treatment time and time spent performing 1:1 timed codes.         Thank you for your referral.    INQUIRIES  Therapist:    [facility]  [address]  [facility] [address]  Phone: [phone]  Fax: [phone]

## 2018-01-10 NOTE — MR AVS SNAPSHOT
After Visit Summary   1/10/2018    Jose Angel Cha    MRN: [id number]           Patient Information     Date Of Birth          1955        Visit Information        Provider Department      1/10/2018 9:00 AM Hilligoss, Amanda K, PT [place] [facility]        Today's Diagnoses     Hip pain, left    -  1       Follow-ups after your visit        Your next 10 appointments already scheduled     Jan 11, 2018  4:20 PM CST   Nurse Only with ER ALLERGY SHOTS   [facility]    [address]  [facility] [address]   [phone]            Jan 12, 2018  9:30 AM CST   NM SH CV MPI MULT RST ST 1 DAY with SCINM1   [facility] CV Nuclear Medicine (Cardiovascular Imaging at [facility])    [address]  [address]  [facility] [address] [phone]           For a ONE day exam: Allow 3-4 hours for test. For a TWO day exam: Allow 2 hours PER day for test.  You may need to stop some medicines before the test. Follow your doctor s orders. - If you take a beta blocker: Follow your doctor s specific instructions on taking it prior to and on the day of your exam. - If you take Aggrenox or dipyridamole (Persantine, Permole), stop taking it 48 hours before your test. - If you take Viagra, Cialis or Levitra, stop taking it 48 hours before your test. - If you take theophylline or aminophylline, stop taking it 12 hours before your test.  For patients with diabetes: - If you take insulin, call your diabetes care team. Ask if you should take a 1/2 dose the morning of your test. - If you take diabetes medicine by mouth, don t take it on the morning of your test. Bring it with you to take after the test. (If you have questions, call your diabetes care team.)  Do not take nitrates on the day of your test. Do not wear your Nitro-Patch.  Stop all caffeine 12 hours before the test. This includes  coffee, tea, soda pop, chocolate and certain medicines (such as Anacin, Excedrin and NoDoz). Also avoid decaf coffee and tea, as these contain small amounts of caffeine.  No alcohol, smoking or other tobacco for 12 hours before the test.  Stop eating 3 hours before the test. You may drink water.  Please wear a loose two-piece outfit. If you will have an exercise test, bring rubber-soled walking shoes.  When you arrive, please tell us if you: - Have diabetes - Are breastfeeding - May be pregnant - Have a pacemaker of ICD (implantable defibrillator).  Please call your Imaging Department at your exam site with any questions.            Jan 17, 2018  3:30 PM CST   LUIS F Extremity with Amanda K Hilligoss, PT   [place] [facility] ([facility]  )    [address]  [facility] [address]   [phone]            Jan 18, 2018  3:50 PM CST   Nurse Only with ER ALLERGY SHOTS   [facility]    [address] [place] [address] [address]  [facility] [address]   [phone]            Jan 23, 2018  8:10 AM CST   Nurse Only with ER ALLERGY SHOTS   [facility]    [address] [address]   [phone]            Jan 24, 2018 11:20 AM CST   LUIS F Extremity with Amanda K Hilligoss, PT   [place] [facility] ([facility]  )    [address]  [facility] [address]   [phone]            Jan 24, 2018  3:00 PM CST   Return Visit with ETHEL Zhou [facility] ([facility])    [address] [place] [address] [address]  [facility] [address] [phone]            Jan 25, 2018  3:20 PM CST   (Arrive by 3:15 PM)   Nurse Only with ER ALLERGY SHOTS   [facility] ([facility]    [address] [place] [address] [address]  [facility] [address]    [phone]            Feb 01, 2018  3:10 PM CST   Nurse Only with ER ALLERGY SHOTS   [facility]    [address]  [facility] [address] [phone]            Feb 15, 2018  4:50 PM CST   Nurse Only with ER ALLERGY SHOTS   [facility]    [address]  [facility] [address] [phone]              Who to contact     If you have questions or need follow up information about today's clinic visit or your schedule please contact [facility] directly at [phone].  Normal or non-critical lab and imaging results will be communicated to you by Lumicityhart, letter or phone within 4 business days after the clinic has received the results. If you do not hear from us within 7 days, please contact the clinic through Lumicityhart or phone. If you have a critical or abnormal lab result, we will notify you by phone as soon as possible.  Submit refill requests through ApeSoft or call your pharmacy and they will forward the refill request to us. Please allow 3 business days for your refill to be completed.          Additional Information About Your Visit        ApeSoft Information     ApeSoft gives you secure access to your electronic health record. If you see a primary care provider, you can also send messages to your care team and make appointments. If you have questions, please call your primary care clinic.  If you do not have a primary care provider, please call [phone] and they will assist you.        Care EveryWhere ID     This is your Care EveryWhere ID. This could be used by other organizations to access your [place] medical records  [id number]         Blood Pressure from Last 3 Encounters:   12/29/17 134/74   12/29/17 147/88   12/13/17 130/80    Weight from Last 3 Encounters:   12/29/17 97.7 kg (215 lb 6.4 oz)   12/29/17 98.4 kg (217 lb)    12/13/17 98.4 kg (217 lb)              We Performed the Following     HC PT EVAL, LOW COMPLEXITY     LUIS F INITIAL EVAL REPORT     MANUAL THER TECH,1+REGIONS,EA 15 MIN     THERAPEUTIC EXERCISES        Primary Care Provider Office Phone # Fax #    Shari Paredes [phone]       [address] [place] [address]        Equal Access to Services     [facility]: Hadii aad ku hadasho Soomaali, waaxda luqadaha, qaybta kaalmada adeegyada, waxay idiin hayaan adeeg kharash la'aan ah. So [facility] [phone].    ATENCIÓN: Si habla español, tiene a armas disposición servicios gratuitos de asistencia lingüística. Nirmalame al [phone].    We comply with applicable federal civil rights laws and [place] laws. We do not discriminate on the basis of race, color, national origin, age, disability, sex, sexual orientation, or gender identity.            Thank you!     Thank you for choosing [place] [facility]  for your care. Our goal is always to provide you with excellent care. Hearing back from our patients is one way we can continue to improve our services. Please take a few minutes to complete the written survey that you may receive in the mail after your visit with us. Thank you!             Your Updated Medication List - Protect others around you: Learn how to safely use, store and throw away your medicines at www.disposemymeds.org.          This list is accurate as of: 1/10/18 10:34 AM.  Always use your most recent med list.                   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 P.  1:100 w/v, HS  1.0 ml Dust Mites DF 30,000AU/mL, HS  0.3 ml Dust Mites DP. 30,000 AU/mL, HS  0.3 ml  Birch Mix PRW 1:20 w/v, HS  0.5 ml Grass Mix #7 100,000 BAU/mL, HS 0.4 ml Nettle 1:20 w/v, HS 0.5 ml Diluent: HSA qs to 5ml    Allergic rhinitis due to dust mite, Chronic seasonal allergic rhinitis due to pollen, Allergic rhinitis due to animal dander       aspirin 81 MG tablet     0    ONE DAILY    Other and unspecified hyperlipidemia, Family history of ischemic heart disease       CLEAR-ATADINE 10 MG tablet   Generic drug:  loratadine      Take 10 mg by mouth daily        EPINEPHrine 0.3 MG/0.3ML injection 2-pack    EPIPEN 2-MIGUELINA    2 each    Inject 0.3 mLs (0.3 mg) into the muscle once as needed for anaphylaxis    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 sting       ezetimibe 10 MG tablet    ZETIA    30 tablet    Take 1 tablet (10 mg) by mouth daily At night    Mixed hyperlipidemia       fluticasone 50 MCG/ACT spray    FLONASE    1 Bottle    Spray 2 sprays into both nostrils daily    Chronic seasonal allergic rhinitis due to pollen, House dust mite allergy, Allergic rhinitis due to animal dander       hydrochlorothiazide 25 MG tablet    HYDRODIURIL    30 tablet    Take 1 tablet (25 mg) by mouth daily    Benign essential hypertension       MULTIVITAL PO      Take 1 tablet by mouth daily Reported on 3/22/2017        olopatadine HCl 0.2 % Soln    PATADAY    2.5 mL    Place 1 drop into both eyes daily    Chronic seasonal allergic rhinitis due to pollen, House dust mite allergy       omeprazole 20 MG CR capsule    priLOSEC    90 capsule    TAKE ONE CAPSULE BY MOUTH EVERY  DAY (TAKE 30 TO 60 MINUTES BEFORE A MEAL)    Gastroesophageal reflux disease without esophagitis       polyethylene glycol powder    MIRALAX    510 g    Take 17 g (1 capful) by mouth daily    Chronic constipation       STATIN NOT PRESCRIBED (INTENTIONAL)      by Other route continuous prn Reported on 4/6/2017    Mitral valve disorders(424.0), Hyperlipidemia LDL goal <100       temazepam 15 MG capsule    RESTORIL    30 capsule    Take 1 capsule (15 mg) by mouth nightly as needed for sleep    Anxiety       * Notice:  This list has 5 medication(s) that are the same as other medications prescribed for you. Read the directions carefully, and ask your doctor or other care provider to review them with you.

## 2018-01-11 ENCOUNTER — ALLIED HEALTH/NURSE VISIT (OUTPATIENT)
Dept: ALLERGY | Facility: OTHER | Age: 63
End: 2018-01-11
Payer: COMMERCIAL

## 2018-01-11 DIAGNOSIS — J30.1 ALLERGIC RHINITIS DUE TO POLLEN: Primary | ICD-10-CM

## 2018-01-11 PROCEDURE — 95115 IMMUNOTHERAPY ONE INJECTION: CPT

## 2018-01-11 PROCEDURE — 99207 ZZC NO CHARGE LOS: CPT

## 2018-01-11 NOTE — MR AVS SNAPSHOT
After Visit Summary   1/11/2018    Jose Angel Cha    MRN: [id number]           Patient Information     Date Of Birth          1955        Visit Information        Provider Department      1/11/2018 2:50 PM ER ALLERGY SHOTS [facility]        Today's Diagnoses     Allergic rhinitis due to pollen    -  1       Follow-ups after your visit        Your next 10 appointments already scheduled     Jan 12, 2018  9:30 AM CST   NM SH CV MPI MULT RST ST 1 DAY with SCINM1   [facility] CV Nuclear Medicine (Cardiovascular Imaging at [facility])    [address]  [address]  [facility] [id number] [phone]           For a ONE day exam: Allow 3-4 hours for test. For a TWO day exam: Allow 2 hours PER day for test.  You may need to stop some medicines before the test. Follow your doctor s orders. - If you take a beta blocker: Follow your doctor s specific instructions on taking it prior to and on the day of your exam. - If you take Aggrenox or dipyridamole (Persantine, Permole), stop taking it 48 hours before your test. - If you take Viagra, Cialis or Levitra, stop taking it 48 hours before your test. - If you take theophylline or aminophylline, stop taking it 12 hours before your test.  For patients with diabetes: - If you take insulin, call your diabetes care team. Ask if you should take a 1/2 dose the morning of your test. - If you take diabetes medicine by mouth, don t take it on the morning of your test. Bring it with you to take after the test. (If you have questions, call your diabetes care team.)  Do not take nitrates on the day of your test. Do not wear your Nitro-Patch.  Stop all caffeine 12 hours before the test. This includes coffee, tea, soda pop, chocolate and certain medicines (such as Anacin, Excedrin and NoDoz). Also avoid decaf coffee and tea, as these contain small amounts of caffeine.  No alcohol, smoking or other tobacco for 12 hours before the  test.  Stop eating 3 hours before the test. You may drink water.  Please wear a loose two-piece outfit. If you will have an exercise test, bring rubber-soled walking shoes.  When you arrive, please tell us if you: - Have diabetes - Are breastfeeding - May be pregnant - Have a pacemaker of ICD (implantable defibrillator).  Please call your Imaging Department at your exam site with any questions.            Jan 17, 2018  3:30 PM CST   LUIS F Extremity with Amanda K Hilligoss, PT   [place] [facility]  )    [address]  [facility] [address]   [phone]            Jan 18, 2018  3:50 PM CST   Nurse Only with ER ALLERGY SHOTS   [facility]    [address]  [facility] [address]   [phone]            Jan 23, 2018  8:10 AM CST   Nurse Only with ER ALLERGY SHOTS   [facility]    [address]  [facility] [address]   [phone]            Jan 24, 2018 11:20 AM CST   LUIS F Extremity with Amanda K Hilligoss, PT   [place] [facility] ([facility]  )    [address]  [facility] [address]   [phone]            Jan 24, 2018  3:00 PM CST   Return Visit with ETHEL Zhou [facility] ([facility])    [address] [place] [address] [address]  [facility] [address]   [phone]            Jan 25, 2018  3:20 PM CST   (Arrive by 3:15 PM)   Nurse Only with ER ALLERGY SHOTS   [facility]    [address]  [facility] [address]   [phone]            Feb 01, 2018  3:10 PM CST   Nurse Only with ER ALLERGY SHOTS   [facility]    [address]  [facility] [address]   [phone]            Feb 15,  2018  4:50 PM CST   Nurse Only with ER ALLERGY SHOTS   [facility] ([facility])    [address]  [facility] [place] [address] [phone]            Feb 22, 2018  4:50 PM CST   Nurse Only with ER ALLERGY SHOTS   [facility] ([facility])    [address]  [facility] [place] [address] [phone]              Who to contact     If you have questions or need follow up information about today's clinic visit or your schedule please contact [facility] directly at [phone].  Normal or non-critical lab and imaging results will be communicated to you by [employer] Corphart, letter or phone within 4 business days after the clinic has received the results. If you do not hear from us within 7 days, please contact the clinic through ReachLocalt or phone. If you have a critical or abnormal lab result, we will notify you by phone as soon as possible.  Submit refill requests through Medlanes or call your pharmacy and they will forward the refill request to us. Please allow 3 business days for your refill to be completed.          Additional Information About Your Visit        [employer] Corphart Information     Medlanes gives you secure access to your electronic health record. If you see a primary care provider, you can also send messages to your care team and make appointments. If you have questions, please call your primary care clinic.  If you do not have a primary care provider, please call [phone] and they will assist you.        Care EveryWhere ID     This is your Care EveryWhere ID. This could be used by other organizations to access your [place] medical records  [id number]         Blood Pressure from Last 3 Encounters:   12/29/17 134/74   12/29/17 147/88   12/13/17 130/80    Weight from Last 3 Encounters:   12/29/17 97.7 kg (215 lb 6.4 oz)   12/29/17 98.4 kg (217 lb)   12/13/17 98.4 kg (217 lb)              We Performed the Following      Allergy Shot: One injection        Primary Care Provider Office Phone # Fax #    Shari Paredes [phone]       [address] [place] [address]        Equal Access to Services     ANJEL TAFOYA : Hadii karina ku hadkailao Somackali, waaxda luqadaha, qaybta kaalmada adeegyada, nico singletarykathleen hutchinsjefry verdugo laMartinezchaz dennison. So [facility] [phone].    ATENCIÓN: Si habla español, tiene a armas disposición servicios gratuitos de asistencia lingüística. Llame al [phone].    We comply with applicable federal civil rights laws and [place] laws. We do not discriminate on the basis of race, color, national origin, age, disability, sex, sexual orientation, or gender identity.            Thank you!     Thank you for choosing [facility]  for your care. Our goal is always to provide you with excellent care. Hearing back from our patients is one way we can continue to improve our services. Please take a few minutes to complete the written survey that you may receive in the mail after your visit with us. Thank you!             Your Updated Medication List - Protect others around you: Learn how to safely use, store and throw away your medicines at www.disposemymeds.org.          This list is accurate as of: 1/11/18  3:34 PM.  Always use your most recent med list.                   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 P.  1:100 w/v, HS  1.0 ml Dust Mites DF 30,000AU/mL, HS  0.3 ml Dust Mites DP. 30,000 AU/mL, HS  0.3 ml  Birch Mix PRW 1:20 w/v, HS  0.5 ml Grass Mix #7 100,000 BAU/mL, HS 0.4 ml Nettle 1:20 w/v, HS 0.5 ml Diluent: HSA qs to 5ml    Allergic rhinitis due to dust mite, Chronic seasonal allergic rhinitis due to pollen, Allergic rhinitis due to animal dander       aspirin 81 MG tablet     0    ONE DAILY    Other and unspecified hyperlipidemia, Family history of ischemic heart disease       CLEAR-ATADINE 10 MG tablet   Generic drug:  loratadine      Take 10 mg by mouth daily        EPINEPHrine 0.3 MG/0.3ML injection 2-pack    EPIPEN 2-MIGUELINA    2 each    Inject 0.3 mLs (0.3 mg) into the muscle once as needed for anaphylaxis    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 sting       ezetimibe 10 MG tablet    ZETIA    30 tablet    Take 1 tablet (10 mg) by mouth daily At night    Mixed hyperlipidemia       fluticasone 50 MCG/ACT spray    FLONASE    1 Bottle    Spray 2 sprays into both nostrils daily    Chronic seasonal allergic rhinitis due to pollen, House dust mite allergy, Allergic rhinitis due to animal dander       hydrochlorothiazide 25 MG tablet    HYDRODIURIL    30 tablet    Take 1 tablet (25 mg) by mouth daily    Benign essential hypertension       MULTIVITAL PO      Take 1 tablet by mouth daily Reported on 3/22/2017        olopatadine HCl 0.2 % Soln    PATADAY    2.5 mL    Place 1 drop into both eyes daily    Chronic seasonal allergic rhinitis due to pollen, House dust mite allergy       omeprazole 20 MG CR capsule    priLOSEC    90 capsule    TAKE ONE CAPSULE BY MOUTH EVERY DAY (TAKE 30 TO 60 MINUTES BEFORE A MEAL)    Gastroesophageal reflux disease without esophagitis       polyethylene glycol powder    MIRALAX    510 g    Take 17 g (1 capful) by mouth daily    Chronic  constipation       STATIN NOT PRESCRIBED (INTENTIONAL)      by Other route continuous prn Reported on 4/6/2017    Mitral valve disorders(424.0), Hyperlipidemia LDL goal <100       temazepam 15 MG capsule    RESTORIL    30 capsule    Take 1 capsule (15 mg) by mouth nightly as needed for sleep    Anxiety       * Notice:  This list has 5 medication(s) that are the same as other medications prescribed for you. Read the directions carefully, and ask your doctor or other care provider to review them with you.

## 2018-01-12 ENCOUNTER — HOSPITAL ENCOUNTER (OUTPATIENT)
Dept: CARDIOLOGY | Facility: CLINIC | Age: 63
Discharge: HOME | End: 2018-01-12
Attending: NURSE PRACTITIONER | Admitting: NURSE PRACTITIONER
Payer: COMMERCIAL

## 2018-01-12 DIAGNOSIS — I25.10 CORONARY ARTERY DISEASE INVOLVING NATIVE CORONARY ARTERY OF NATIVE HEART WITHOUT ANGINA PECTORIS: ICD-10-CM

## 2018-01-12 PROCEDURE — 78452 HT MUSCLE IMAGE SPECT MULT: CPT | Mod: 26 | Performed by: INTERNAL MEDICINE

## 2018-01-12 PROCEDURE — A9502 TC99M TETROFOSMIN: HCPCS | Performed by: INTERNAL MEDICINE

## 2018-01-12 PROCEDURE — 93016 CV STRESS TEST SUPVJ ONLY: CPT | Performed by: INTERNAL MEDICINE

## 2018-01-12 PROCEDURE — 93018 CV STRESS TEST I&R ONLY: CPT | Performed by: INTERNAL MEDICINE

## 2018-01-12 PROCEDURE — 78452 HT MUSCLE IMAGE SPECT MULT: CPT

## 2018-01-12 PROCEDURE — 34300033 ZZH RX 343: Performed by: INTERNAL MEDICINE

## 2018-01-12 RX ADMIN — TETROFOSMIN 12.48 MCI.: 1.38 INJECTION, POWDER, LYOPHILIZED, FOR SOLUTION INTRAVENOUS at 11:08

## 2018-01-12 RX ADMIN — TETROFOSMIN 4.34 MCI.: 1.38 INJECTION, POWDER, LYOPHILIZED, FOR SOLUTION INTRAVENOUS at 09:50

## 2018-01-15 ENCOUNTER — TELEPHONE (OUTPATIENT)
Dept: CARDIOLOGY | Facility: CLINIC | Age: 63
End: 2018-01-15

## 2018-01-15 NOTE — TELEPHONE ENCOUNTER
Notes Recorded by Skyla Garcia, APRN CNP on 1/12/2018 at 2:52 PM  Please let him know his stress test looks good, no significant change from 2016. He should keep f/u with me, I just dont want him to worry about results. Thanks, Ginette    Spoke to patient regarding results above. Pt verbalized understanding. Pt confirmed OV 1/24/18 with Ginette. No further questions or concerns.

## 2018-01-17 ENCOUNTER — THERAPY VISIT (OUTPATIENT)
Dept: PHYSICAL THERAPY | Facility: CLINIC | Age: 63
End: 2018-01-17
Payer: COMMERCIAL

## 2018-01-17 DIAGNOSIS — M25.552 HIP PAIN, LEFT: ICD-10-CM

## 2018-01-17 PROCEDURE — 97140 MANUAL THERAPY 1/> REGIONS: CPT | Mod: GP | Performed by: PHYSICAL THERAPIST

## 2018-01-17 PROCEDURE — 97110 THERAPEUTIC EXERCISES: CPT | Mod: GP | Performed by: PHYSICAL THERAPIST

## 2018-01-17 PROCEDURE — 97112 NEUROMUSCULAR REEDUCATION: CPT | Mod: GP | Performed by: PHYSICAL THERAPIST

## 2018-01-18 ENCOUNTER — ALLIED HEALTH/NURSE VISIT (OUTPATIENT)
Dept: ALLERGY | Facility: OTHER | Age: 63
End: 2018-01-18
Payer: COMMERCIAL

## 2018-01-18 DIAGNOSIS — J30.1 ALLERGIC RHINITIS DUE TO POLLEN: Primary | ICD-10-CM

## 2018-01-18 PROCEDURE — 95115 IMMUNOTHERAPY ONE INJECTION: CPT

## 2018-01-18 PROCEDURE — 99207 ZZC DROP WITH A PROCEDURE: CPT

## 2018-01-18 NOTE — MR AVS SNAPSHOT
After Visit Summary   1/18/2018    Jose Angel Cha    MRN: [id number]           Patient Information     Date Of Birth          1955        Visit Information        Provider Department      1/18/2018 2:40 PM ER ALLERGY SHOTS [facility]        Today's Diagnoses     Allergic rhinitis due to pollen    -  1       Follow-ups after your visit        Your next 10 appointments already scheduled     Jan 23, 2018  8:10 AM CST   Nurse Only with ER ALLERGY SHOTS   [facility] ([facility])    [address]  [facility] [address]   [phone]            Jan 24, 2018 11:20 AM CST   LUIS F Extremity with Amanda K Hilligoss, PT   [facility] ([facility]  )    [address]  [facility] [address]   [phone]            Jan 24, 2018  3:00 PM CST   Return Visit with ETHEL Zhou [facility] ([facility])    [address] [place] [address] [address]  [facility] [address]   [phone]            Jan 25, 2018  3:20 PM CST   (Arrive by 3:15 PM)   Nurse Only with ER ALLERGY SHOTS   [facility] ([facility])    [address]  [facility] [address]   [phone]            Jan 31, 2018  1:50 PM CST   LUIS F Extremity with Amanda K Hilligoss, PT   [facility] ([facility]  )    [address]  [facility] [address]   [phone]            Feb 01, 2018  3:10 PM CST   Nurse Only with ER ALLERGY SHOTS   [facility] ([facility])    [address]  [facility] [address]   [phone]            Feb 15, 2018  4:50 PM CST   Nurse Only with ER ALLERGY SHOTS   [facility] ([facility])    [address]  [facility] [address]   [phone]             Feb 22, 2018  4:50 PM CST   Nurse Only with ER ALLERGY SHOTS   [facility] ([facility])    [address]  [facility] [address]   [phone]            Mar 06, 2018  9:00 AM CST   Nurse Only with ER ALLERGY SHOTS   [facility] ([facility])    [address]  [facility] [address]   [phone]            Mar 26, 2018  8:45 AM CDT   Return Visit with Ynes Chirinos MD   [facility] ([facility])    [address]  [facility] [id number] [phone]              Who to contact     If you have questions or need follow up information about today's clinic visit or your schedule please contact [facility] directly at [phone].  Normal or non-critical lab and imaging results will be communicated to you by [employer], letter or phone within 4 business days after the clinic has received the results. If you do not hear from us within 7 days, please contact the clinic through "[employer]t or phone. If you have a critical or abnormal lab result, we will notify you by phone as soon as possible.  Submit refill requests through [employer] or call your pharmacy and they will forward the refill request to us. Please allow 3 business days for your refill to be completed.          Additional Information About Your Visit        [employer] Information     [employer] gives you secure access to your electronic health record. If you see a primary care provider, you can also send messages to your care team and make appointments. If you have questions, please call your primary care clinic.  If you do not have a primary care provider, please call [phone] and they will assist you.        Care EveryWhere ID     This is your Care EveryWhere ID. This could be used by other organizations to access your [place] medical records  [id number]         Blood Pressure from  Last 3 Encounters:   12/29/17 134/74   12/29/17 147/88   12/13/17 130/80    Weight from Last 3 Encounters:   12/29/17 97.7 kg (215 lb 6.4 oz)   12/29/17 98.4 kg (217 lb)   12/13/17 98.4 kg (217 lb)              We Performed the Following     Allergy Shot: One injection        Primary Care Provider Office Phone # Fax #    Shari Tonia Paredes [phone]       [address] [place] [address]        Equal Access to Services     [facility]: Hadii aad ku hadasho Soomaali, waaxda luqadaha, qaybta kaalmada adeegyada, waxay lokiin hayaan sandra guillen . So [facility] [phone].    ATENCIÓN: Si habla español, tiene a armas disposición servicios gratuitos de asistencia lingüística. [facility] [phone].    We comply with applicable federal civil rights laws and [place] laws. We do not discriminate on the basis of race, color, national origin, age, disability, sex, sexual orientation, or gender identity.            Thank you!     Thank you for choosing [facility]  for your care. Our goal is always to provide you with excellent care. Hearing back from our patients is one way we can continue to improve our services. Please take a few minutes to complete the written survey that you may receive in the mail after your visit with us. Thank you!             Your Updated Medication List - Protect others around you: Learn how to safely use, store and throw away your medicines at www.disposemymeds.org.          This list is accurate as of: 1/18/18  3:53 PM.  Always use your most recent med list.                   Brand Name Dispense Instructions for use Diagnosis    * ALLERGEN IMMUNOTHERAPY PRESCRIPTION     13 mL    Reported on 3/22/2017        * ALLERGEN IMMUNOTHERAPY PRESCRIPTION     13 mL    Reported on 3/22/2017        * ALLERGEN IMMUNOTHERAPY PRESCRIPTION     13 mL    Name of Mix: Mix #1  Mixed Vespid Mixed Vespid Venom 300 mcg/mL HS 13 ml Diluent: HSA qs to 13ml    Anaphylaxis due to  hymenoptera venom, accidental or unintentional, subsequent encounter       * ALLERGEN IMMUNOTHERAPY PRESCRIPTION     13 mL    Name of Mix: Mix #2  Wasp Wasp Venom 100 mcg/mL HS 13 ml Diluent: HSA qs to 13ml    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 P.  1:100 w/v, HS  1.0 ml Dust Mites DF 30,000AU/mL, HS  0.3 ml Dust Mites DP. 30,000 AU/mL, HS  0.3 ml  Birch Mix PRW 1:20 w/v, HS  0.5 ml Grass Mix #7 100,000 BAU/mL, HS 0.4 ml Nettle 1:20 w/v, HS 0.5 ml Diluent: HSA qs to 5ml    Allergic rhinitis due to dust mite, Chronic seasonal allergic rhinitis due to pollen, Allergic rhinitis due to animal dander       aspirin 81 MG tablet     0    ONE DAILY    Other and unspecified hyperlipidemia, Family history of ischemic heart disease       CLEAR-ATADINE 10 MG tablet   Generic drug:  loratadine      Take 10 mg by mouth daily        EPINEPHrine 0.3 MG/0.3ML injection 2-pack    EPIPEN 2-MIGUELINA    2 each    Inject 0.3 mLs (0.3 mg) into the muscle once as needed for anaphylaxis    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 sting       ezetimibe 10 MG tablet    ZETIA    30 tablet    Take 1 tablet (10 mg) by mouth daily At night    Mixed hyperlipidemia       fluticasone 50 MCG/ACT spray    FLONASE    1 Bottle    Spray 2 sprays into both nostrils daily    Chronic seasonal allergic rhinitis due to pollen, House dust mite allergy, Allergic rhinitis due to animal dander       hydrochlorothiazide 25 MG tablet    HYDRODIURIL    30 tablet    Take 1 tablet (25 mg) by mouth daily    Benign essential hypertension       MULTIVITAL PO      Take 1 tablet by mouth daily Reported on 3/22/2017        olopatadine HCl 0.2 % Soln    PATADAY    2.5 mL    Place 1 drop into both eyes daily    Chronic seasonal allergic rhinitis due to pollen, House dust mite allergy       omeprazole 20 MG CR capsule    priLOSEC     90 capsule    TAKE ONE CAPSULE BY MOUTH EVERY DAY (TAKE 30 TO 60 MINUTES BEFORE A MEAL)    Gastroesophageal reflux disease without esophagitis       polyethylene glycol powder    MIRALAX    510 g    Take 17 g (1 capful) by mouth daily    Chronic constipation       STATIN NOT PRESCRIBED (INTENTIONAL)      by Other route continuous prn Reported on 4/6/2017    Mitral valve disorders(424.0), Hyperlipidemia LDL goal <100       temazepam 15 MG capsule    RESTORIL    30 capsule    Take 1 capsule (15 mg) by mouth nightly as needed for sleep    Anxiety       * Notice:  This list has 5 medication(s) that are the same as other medications prescribed for you. Read the directions carefully, and ask your doctor or other care provider to review them with you.

## 2018-01-22 ENCOUNTER — TELEPHONE (OUTPATIENT)
Dept: ALLERGY | Facility: OTHER | Age: 63
End: 2018-01-22

## 2018-01-22 NOTE — TELEPHONE ENCOUNTER
Patient will be 6 weeks at tomorrows appointment. This is the right amount of time to receive his venom injection per protocol. RN informed patient. Patient verbalized understanding. No further questions or concerns. Closing encounter.      Ara Mcbride RN

## 2018-01-22 NOTE — TELEPHONE ENCOUNTER
Please call pt back to let him know if it is to early for him to have his bee venom inj tomorrow the 23rd.

## 2018-01-23 ENCOUNTER — ALLIED HEALTH/NURSE VISIT (OUTPATIENT)
Dept: ALLERGY | Facility: OTHER | Age: 63
End: 2018-01-23
Payer: COMMERCIAL

## 2018-01-23 DIAGNOSIS — T63.441D TOXIC EFFECT OF VENOM OF BEES, UNINTENTIONAL, SUBSEQUENT ENCOUNTER: Primary | ICD-10-CM

## 2018-01-23 PROCEDURE — 95117 IMMUNOTHERAPY INJECTIONS: CPT

## 2018-01-23 PROCEDURE — 99207 ZZC DROP WITH A PROCEDURE: CPT

## 2018-01-23 NOTE — MR AVS SNAPSHOT
After Visit Summary   1/23/2018    Jose Angel Cha    MRN: [id number]           Patient Information     Date Of Birth          1955        Visit Information        Provider Department      1/23/2018 8:10 AM ER ALLERGY SHOTS [facility]        Today's Diagnoses     Toxic effect of venom of bees, unintentional, subsequent encounter    -  1       Follow-ups after your visit        Your next 10 appointments already scheduled     Jan 24, 2018 11:20 AM CST   LUIS F Extremity with Amanda K Hilligoss, PT   [facility] ([facility]  )    [address]  [facility] [address]   [phone]            Jan 24, 2018  3:00 PM CST   Return Visit with ETHEL Zhou [facility] ([facility])    [address] [place] [address]   [phone]            Jan 25, 2018  3:20 PM CST   (Arrive by 3:15 PM)   Nurse Only with ER ALLERGY SHOTS   [facility]    [address]  [facility] [address]   [phone]            Jan 31, 2018  1:50 PM CST   LUIS F Extremity with Amanda K Hilligoss, PT   [facility] ([facility]  )    [address]  [facility] [address]   [phone]            Feb 01, 2018  3:10 PM CST   Nurse Only with ER ALLERGY SHOTS   [facility]    [address]  [facility] [address]   [phone]            Feb 15, 2018  4:50 PM CST   Nurse Only with ER ALLERGY SHOTS   [facility]    [address]  [facility] [address]   [phone]            Feb 22, 2018  4:50 PM CST   Nurse Only with ER ALLERGY SHOTS   [facility]    [address] [place] [address]  [facility] [address]   [phone]            Mar 06, 2018  9:00 AM CST   Nurse Only with ER ALLERGY SHOTS   [facility]    [address]  [facility] [address] [phone]            Mar 26, 2018  8:45 AM CDT   Return Visit with Ynes Chirinos MD   [facility] ([facility])    [address]  [facility] [id number] [phone]              Who to contact     If you have questions or need follow up information about today's clinic visit or your schedule please contact [facility] directly at [phone].  Normal or non-critical lab and imaging results will be communicated to you by [employer], letter or phone within 4 business days after the clinic has received the results. If you do not hear from us within 7 days, please contact the clinic through [employer] or phone. If you have a critical or abnormal lab result, we will notify you by phone as soon as possible.  Submit refill requests through [employer] or call your pharmacy and they will forward the refill request to us. Please allow 3 business days for your refill to be completed.          Additional Information About Your Visit        [employer] Information     [employer] gives you secure access to your electronic health record. If you see a primary care provider, you can also send messages to your care team and make appointments. If you have questions, please call your primary care clinic.  If you do not have a primary care provider, please call [phone] and they will assist you.        Care EveryWhere ID     This is your Care EveryWhere ID. This could be used by other organizations to access your [place] medical records  [id number]         Blood Pressure from Last 3 Encounters:   12/29/17 134/74   12/29/17 147/88   12/13/17 130/80    Weight from Last 3 Encounters:   12/29/17 97.7 kg (215 lb 6.4 oz)   12/29/17 98.4 kg (217 lb)    12/13/17 98.4 kg (217 lb)              We Performed the Following     Allergy Shot: Two or more injections        Primary Care Provider Office Phone # Fax #    Shari Paredes [phone]       [address] [place] [address]        Equal Access to Services     [facility] : Hadii aad ku hadasho Soomaali, waaxda luqadaha, qaybta kaalmada adeegyada, waxay idiin hayaan adejefry curtiszully labelemn ah. So [facility] [phone].    ATENCIÓN: Si habla español, tiene a armas disposición servicios gratuitos de asistencia lingüística. [facility] al [phone].    We comply with applicable federal civil rights laws and [place] laws. We do not discriminate on the basis of race, color, national origin, age, disability, sex, sexual orientation, or gender identity.            Thank you!     Thank you for choosing [facility]  for your care. Our goal is always to provide you with excellent care. Hearing back from our patients is one way we can continue to improve our services. Please take a few minutes to complete the written survey that you may receive in the mail after your visit with us. Thank you!             Your Updated Medication List - Protect others around you: Learn how to safely use, store and throw away your medicines at www.disposemymeds.org.          This list is accurate as of: 1/23/18  8:54 AM.  Always use your most recent med list.                   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, accidental or unintentional, subsequent encounter       * ALLERGEN IMMUNOTHERAPY PRESCRIPTION     13 mL    Name of Mix: Mix #2  Wasp Wasp Venom 100  mcg/mL HS 13 ml Diluent: HSA qs to 13ml    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 P.  1:100 w/v, HS  1.0 ml Dust Mites DF 30,000AU/mL, HS  0.3 ml Dust Mites DP. 30,000 AU/mL, HS  0.3 ml  Birch Mix PRW 1:20 w/v, HS  0.5 ml Grass Mix #7 100,000 BAU/mL, HS 0.4 ml Nettle 1:20 w/v, HS 0.5 ml Diluent: HSA qs to 5ml    Allergic rhinitis due to dust mite, Chronic seasonal allergic rhinitis due to pollen, Allergic rhinitis due to animal dander       aspirin 81 MG tablet     0    ONE DAILY    Other and unspecified hyperlipidemia, Family history of ischemic heart disease       CLEAR-ATADINE 10 MG tablet   Generic drug:  loratadine      Take 10 mg by mouth daily        EPINEPHrine 0.3 MG/0.3ML injection 2-pack    EPIPEN 2-MIGUELINA    2 each    Inject 0.3 mLs (0.3 mg) into the muscle once as needed for anaphylaxis    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 sting       ezetimibe 10 MG tablet    ZETIA    30 tablet    Take 1 tablet (10 mg) by mouth daily At night    Mixed hyperlipidemia       fluticasone 50 MCG/ACT spray    FLONASE    1 Bottle    Spray 2 sprays into both nostrils daily    Chronic seasonal allergic rhinitis due to pollen, House dust mite allergy, Allergic rhinitis due to animal dander       hydrochlorothiazide 25 MG tablet    HYDRODIURIL    30 tablet    Take 1 tablet (25 mg) by mouth daily    Benign essential hypertension       MULTIVITAL PO      Take 1 tablet by mouth daily Reported on 3/22/2017        olopatadine HCl 0.2 % Soln    PATADAY    2.5 mL    Place 1 drop into both eyes daily    Chronic seasonal allergic rhinitis due to pollen, House dust mite allergy       omeprazole 20 MG CR capsule    priLOSEC    90 capsule    TAKE ONE CAPSULE BY MOUTH EVERY DAY (TAKE 30 TO 60 MINUTES BEFORE A MEAL)    Gastroesophageal reflux disease without esophagitis       polyethylene  glycol powder    MIRALAX    510 g    Take 17 g (1 capful) by mouth daily    Chronic constipation       STATIN NOT PRESCRIBED (INTENTIONAL)      by Other route continuous prn Reported on 4/6/2017    Mitral valve disorders(424.0), Hyperlipidemia LDL goal <100       temazepam 15 MG capsule    RESTORIL    30 capsule    Take 1 capsule (15 mg) by mouth nightly as needed for sleep    Anxiety       * Notice:  This list has 5 medication(s) that are the same as other medications prescribed for you. Read the directions carefully, and ask your doctor or other care provider to review them with you.

## 2018-01-23 NOTE — PROGRESS NOTES
Patient presented after waiting 30 minutes with no reaction to allergy injections. Discharged from clinic.  Angela Bone RN ............   1/23/2018...8:54 AM

## 2018-01-24 ENCOUNTER — TELEPHONE (OUTPATIENT)
Dept: FAMILY MEDICINE | Facility: OTHER | Age: 63
End: 2018-01-24

## 2018-01-24 ENCOUNTER — THERAPY VISIT (OUTPATIENT)
Dept: PHYSICAL THERAPY | Facility: CLINIC | Age: 63
End: 2018-01-24
Payer: COMMERCIAL

## 2018-01-24 ENCOUNTER — OFFICE VISIT (OUTPATIENT)
Dept: CARDIOLOGY | Facility: CLINIC | Age: 63
End: 2018-01-24
Payer: COMMERCIAL

## 2018-01-24 VITALS
SYSTOLIC BLOOD PRESSURE: 141 MMHG | DIASTOLIC BLOOD PRESSURE: 93 MMHG | HEIGHT: 70 IN | WEIGHT: 219.9 LBS | HEART RATE: 74 BPM | BODY MASS INDEX: 31.48 KG/M2

## 2018-01-24 DIAGNOSIS — I25.10 CORONARY ARTERY DISEASE INVOLVING NATIVE CORONARY ARTERY OF NATIVE HEART WITHOUT ANGINA PECTORIS: Primary | ICD-10-CM

## 2018-01-24 DIAGNOSIS — M25.552 HIP PAIN, LEFT: ICD-10-CM

## 2018-01-24 DIAGNOSIS — M25.552 HIP PAIN, LEFT: Primary | ICD-10-CM

## 2018-01-24 DIAGNOSIS — I10 BENIGN ESSENTIAL HYPERTENSION: ICD-10-CM

## 2018-01-24 DIAGNOSIS — E78.2 MIXED HYPERLIPIDEMIA: ICD-10-CM

## 2018-01-24 PROCEDURE — 97110 THERAPEUTIC EXERCISES: CPT | Mod: GP | Performed by: PHYSICAL THERAPIST

## 2018-01-24 PROCEDURE — 97140 MANUAL THERAPY 1/> REGIONS: CPT | Mod: GP | Performed by: PHYSICAL THERAPIST

## 2018-01-24 PROCEDURE — 97112 NEUROMUSCULAR REEDUCATION: CPT | Mod: GP | Performed by: PHYSICAL THERAPIST

## 2018-01-24 PROCEDURE — 99214 OFFICE O/P EST MOD 30 MIN: CPT | Performed by: NURSE PRACTITIONER

## 2018-01-24 RX ORDER — AMLODIPINE BESYLATE 2.5 MG/1
2.5 TABLET ORAL DAILY
Qty: 30 TABLET | Refills: 1 | Status: SHIPPED | OUTPATIENT
Start: 2018-01-24 | End: 2018-03-16

## 2018-01-24 NOTE — TELEPHONE ENCOUNTER
Please call patient and see how he is doing with his hip pain. I know he is seeing PT. I did talk to his physical therapist and it sounds like he is progressing, but is worried about his hip locking and having some continued issues. Would he like me to proceed with an MRI?    ETHEL Paul CNP
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
## 2018-01-24 NOTE — PATIENT INSTRUCTIONS
Please restart amlodipine 2.5 mg daily.  See your primary in a month or so to follow up on your blood pressure.    Keep follow up with Dr. Chirinos in March.    [place] [phone]
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
## 2018-01-25 ENCOUNTER — ALLIED HEALTH/NURSE VISIT (OUTPATIENT)
Dept: ALLERGY | Facility: OTHER | Age: 63
End: 2018-01-25
Payer: COMMERCIAL

## 2018-01-25 ENCOUNTER — TELEPHONE (OUTPATIENT)
Dept: FAMILY MEDICINE | Facility: OTHER | Age: 63
End: 2018-01-25

## 2018-01-25 DIAGNOSIS — J30.1 ALLERGIC RHINITIS DUE TO POLLEN: Primary | ICD-10-CM

## 2018-01-25 PROCEDURE — 99207 ZZC DROP WITH A PROCEDURE: CPT

## 2018-01-25 PROCEDURE — 95115 IMMUNOTHERAPY ONE INJECTION: CPT

## 2018-01-25 NOTE — TELEPHONE ENCOUNTER
Patient would prefer to talk to Ashia Sommer, can you please call him back?  He is coming in today at 3:20 for a allergy shot if anyone needs to come out and speak to him at that time.

## 2018-01-25 NOTE — MR AVS SNAPSHOT
After Visit Summary   1/25/2018    Jose Angel Cha    MRN: [id number]           Patient Information     Date Of Birth          1955        Visit Information        Provider Department      1/25/2018 3:20 PM ER ALLERGY SHOTS [facility]        Today's Diagnoses     Allergic rhinitis due to pollen    -  1       Follow-ups after your visit        Your next 10 appointments already scheduled     Jan 31, 2018  1:50 PM CST   LUIS F Extremity with Amanda K Hilligoss, PT   [facility]  )    [address]  [facility] [address]   [phone]            Feb 01, 2018  3:10 PM CST   Nurse Only with ER ALLERGY SHOTS   [facility]    [address]  [facility] [address]   [phone]            Feb 15, 2018  4:50 PM CST   Nurse Only with ER ALLERGY SHOTS   [facility] ([facility])    [address]  [facility] [address]   [phone]            Feb 22, 2018  4:50 PM CST   Nurse Only with ER ALLERGY SHOTS   [facility] ([facility])    [address]  [facility] [address]   [phone]            Mar 06, 2018  9:00 AM CST   Nurse Only with ER ALLERGY SHOTS   [facility] ([facility])    [address]  [facility] [address]   [phone]            Mar 26, 2018  8:45 AM CDT   Return Visit with Ynes Chirinos MD   [facility] ([facility])    [address] [place] [address] [address]  [facility] [id number] [phone]              Who to contact     If you have questions or need follow up information about today's clinic visit or your schedule please contact [facility] directly at [phone].  Normal or non-critical lab and imaging results will be communicated to you  by Mokuhart, letter or phone within 4 business days after the clinic has received the results. If you do not hear from us within 7 days, please contact the clinic through TeachTownt or phone. If you have a critical or abnormal lab result, we will notify you by phone as soon as possible.  Submit refill requests through Kiddies Smilz or call your pharmacy and they will forward the refill request to us. Please allow 3 business days for your refill to be completed.          Additional Information About Your Visit        MokuharAquaMobile Information     Kiddies Smilz gives you secure access to your electronic health record. If you see a primary care provider, you can also send messages to your care team and make appointments. If you have questions, please call your primary care clinic.  If you do not have a primary care provider, please call [phone] and they will assist you.        Care EveryWhere ID     This is your Care EveryWhere ID. This could be used by other organizations to access your [facility] medical records  [id number]         Blood Pressure from Last 3 Encounters:   01/24/18 (!) 141/93   12/29/17 134/74   12/29/17 147/88    Weight from Last 3 Encounters:   01/24/18 99.7 kg (219 lb 14.4 oz)   12/29/17 97.7 kg (215 lb 6.4 oz)   12/29/17 98.4 kg (217 lb)              We Performed the Following     Allergy Shot: One injection        Primary Care Provider Office Phone # Fax #    Shari Tonia Paredes [phone]       [address] [place] [id number]        Equal Access to Services     [facility] : Hadii aad ku hadasho Soomaali, waaxda luqadaha, qaybta kaalmada adeegyada, nico guillen . So [facility] [phone].    ATENCIÓN: Si habla español, tiene a armas disposición servicios gratuitos de asistencia lingüística. Llame al [phone].    We comply with applicable federal civil rights laws and [place] laws. We do not discriminate on the basis of race, color, national origin, age, disability,  sex, sexual orientation, or gender identity.            Thank you!     Thank you for choosing [facility]  for your care. Our goal is always to provide you with excellent care. Hearing back from our patients is one way we can continue to improve our services. Please take a few minutes to complete the written survey that you may receive in the mail after your visit with us. Thank you!             Your Updated Medication List - Protect others around you: Learn how to safely use, store and throw away your medicines at www.disposemymeds.org.          This list is accurate as of 1/25/18  4:16 PM.  Always use your most recent med list.                   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 P.  1:100 w/v, HS  1.0 ml Dust Mites DF 30,000AU/mL, HS  0.3 ml Dust Mites DP. 30,000 AU/mL, HS  0.3 ml  Birch Mix PRW 1:20 w/v, HS  0.5 ml Grass Mix #7 100,000 BAU/mL, HS 0.4 ml Nettle 1:20 w/v, HS 0.5 ml Diluent: HSA qs to 5ml    Allergic rhinitis due to dust mite, Chronic seasonal allergic rhinitis due to pollen, Allergic rhinitis due to animal dander       amLODIPine 2.5 MG tablet    NORVASC    30 tablet    Take 1 tablet (2.5 mg) by mouth daily    Benign  essential hypertension       aspirin 81 MG tablet     0    ONE DAILY    Other and unspecified hyperlipidemia, Family history of ischemic heart disease       CLEAR-ATADINE 10 MG tablet   Generic drug:  loratadine      Take 10 mg by mouth daily        EPINEPHrine 0.3 MG/0.3ML injection 2-pack    EPIPEN 2-MIGUELINA    2 each    Inject 0.3 mLs (0.3 mg) into the muscle once as needed for anaphylaxis    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 sting       ezetimibe 10 MG tablet    ZETIA    30 tablet    Take 1 tablet (10 mg) by mouth daily At night    Mixed hyperlipidemia       fluticasone 50 MCG/ACT spray    FLONASE    1 Bottle    Spray 2 sprays into both nostrils daily    Chronic seasonal allergic rhinitis due to pollen, House dust mite allergy, Allergic rhinitis due to animal dander       hydrochlorothiazide 25 MG tablet    HYDRODIURIL    30 tablet    Take 1 tablet (25 mg) by mouth daily    Benign essential hypertension       MULTIVITAL PO      Take 1 tablet by mouth daily Reported on 3/22/2017        olopatadine HCl 0.2 % Soln    PATADAY    2.5 mL    Place 1 drop into both eyes daily    Chronic seasonal allergic rhinitis due to pollen, House dust mite allergy       omeprazole 20 MG CR capsule    priLOSEC    90 capsule    TAKE ONE CAPSULE BY MOUTH EVERY DAY (TAKE 30 TO 60 MINUTES BEFORE A MEAL)    Gastroesophageal reflux disease without esophagitis       polyethylene glycol powder    MIRALAX    510 g    Take 17 g (1 capful) by mouth daily    Chronic constipation       STATIN NOT PRESCRIBED (INTENTIONAL)      by Other route continuous prn Reported on 4/6/2017    Mitral valve disorders(424.0), Hyperlipidemia LDL goal <100       temazepam 15 MG capsule    RESTORIL    30 capsule    Take 1 capsule (15 mg) by mouth nightly as needed for sleep    Anxiety       * Notice:  This list has 5 medication(s) that are the same as other medications prescribed for you. Read the directions carefully, and ask your doctor or other care provider to review them with you.

## 2018-01-26 NOTE — TELEPHONE ENCOUNTER
Patient is in need of a hip MRI. Do we have open MRI options or sedated MRI options as he has failed oral valium in the past?    ETHEL Paul CNP

## 2018-01-26 NOTE — TELEPHONE ENCOUNTER
Perfect lets do that, I will put in order for MRI. Please fax referral and have patient call them to schedule. Please route back to me once scheduled so I can order an oral sedative for him.     ETHEL Paul CNP

## 2018-01-26 NOTE — TELEPHONE ENCOUNTER
Spoke to patient and he is scheduled with [facility] on Feb. 2nd at noon. This is open-sided MRI and they will give him sedation and is bringing a .

## 2018-01-31 ENCOUNTER — THERAPY VISIT (OUTPATIENT)
Dept: PHYSICAL THERAPY | Facility: CLINIC | Age: 63
End: 2018-01-31
Payer: COMMERCIAL

## 2018-01-31 DIAGNOSIS — M25.552 HIP PAIN, LEFT: ICD-10-CM

## 2018-01-31 PROCEDURE — 97140 MANUAL THERAPY 1/> REGIONS: CPT | Mod: GP | Performed by: PHYSICAL THERAPIST

## 2018-01-31 PROCEDURE — 97110 THERAPEUTIC EXERCISES: CPT | Mod: GP | Performed by: PHYSICAL THERAPIST

## 2018-02-01 ENCOUNTER — ALLIED HEALTH/NURSE VISIT (OUTPATIENT)
Dept: ALLERGY | Facility: OTHER | Age: 63
End: 2018-02-01
Payer: COMMERCIAL

## 2018-02-01 DIAGNOSIS — J30.1 ALLERGIC RHINITIS DUE TO POLLEN: Primary | ICD-10-CM

## 2018-02-01 PROCEDURE — 95115 IMMUNOTHERAPY ONE INJECTION: CPT

## 2018-02-01 PROCEDURE — 99207 ZZC DROP WITH A PROCEDURE: CPT

## 2018-02-01 NOTE — TELEPHONE ENCOUNTER
Pt returning call about MRI of left hip. Pt wants to make sure just doing the left hip is sufficient. Pt states thinking the whole pelvis and lower back should be imaged as well during MRI. Please advise and contact pt at [phone] pt is scheduled at noon tomorrow.

## 2018-02-01 NOTE — MR AVS SNAPSHOT
After Visit Summary   2/1/2018    Jose Angel Cha    MRN: [id number]           Patient Information     Date Of Birth          1955        Visit Information        Provider Department      2/1/2018 3:10 PM ER ALLERGY SHOTS [facility]        Today's Diagnoses     Allergic rhinitis due to pollen    -  1       Follow-ups after your visit        Your next 10 appointments already scheduled     Feb 15, 2018  3:30 PM CST   LUIS F Extremity with Amanda K Hilligoss, PT   [facility] ([facility]  )    [address]  [facility] [address]   [phone]            Feb 15, 2018  4:50 PM CST   Nurse Only with ER ALLERGY SHOTS   [facility]    [address]  [facility] [address]   [phone]            Feb 22, 2018  3:30 PM CST   LUIS F Extremity with Amanda K Hilligoss, PT   [facility] ([facility]  )    [address]  [facility] [address]   [phone]            Feb 22, 2018  4:50 PM CST   Nurse Only with ER ALLERGY SHOTS   [facility]    [address]  [facility] [address]   [phone]            Mar 06, 2018  9:00 AM CST   Nurse Only with ER ALLERGY SHOTS   [facility]    [address]  [facility] [address]   [phone]            Mar 26, 2018  8:45 AM CDT   Return Visit with Ynes Chirinos MD   [facility] ([facility])    [address] [place] [address] [address]  [facility] [id number] [phone]              Who to contact     If you have questions or need follow up information about today's clinic visit or your schedule please contact [facility] directly at [phone].  Normal or non-critical lab and imaging results  will be communicated to you by MyChart, letter or phone within 4 business days after the clinic has received the results. If you do not hear from us within 7 days, please contact the clinic through [employer] or phone. If you have a critical or abnormal lab result, we will notify you by phone as soon as possible.  Submit refill requests through Visual[employer] or call your pharmacy and they will forward the refill request to us. Please allow 3 business days for your refill to be completed.          Additional Information About Your Visit        Visual[employer] Information     [employer] gives you secure access to your electronic health record. If you see a primary care provider, you can also send messages to your care team and make appointments. If you have questions, please call your primary care clinic.  If you do not have a primary care provider, please call [phone] and they will assist you.        Care EveryWhere ID     This is your Care EveryWhere ID. This could be used by other organizations to access your [place] medical records  [id number]         Blood Pressure from Last 3 Encounters:   01/24/18 (!) 141/93   12/29/17 134/74   12/29/17 147/88    Weight from Last 3 Encounters:   01/24/18 99.7 kg (219 lb 14.4 oz)   12/29/17 97.7 kg (215 lb 6.4 oz)   12/29/17 98.4 kg (217 lb)              We Performed the Following     Allergy Shot: One injection        Primary Care Provider Office Phone # Fax #    Shari Tonia Paredes [phone]       [address] [place] [address]        Equal Access to Services     [facility]: Hadii aad ku hadasho Soomaali, waaxda luqadaha, qaybta kaalmada adeegyada, nico guillen . So [facility] [phone].    ATENCIÓN: Si habla español, tiene a armas disposición servicios gratuitos de asistencia lingüística. Llame al [phone].    We comply with applicable federal civil rights laws and [place] laws. We do not discriminate on the basis of race, color,  national origin, age, disability, sex, sexual orientation, or gender identity.            Thank you!     Thank you for choosing [facility]  for your care. Our goal is always to provide you with excellent care. Hearing back from our patients is one way we can continue to improve our services. Please take a few minutes to complete the written survey that you may receive in the mail after your visit with us. Thank you!             Your Updated Medication List - Protect others around you: Learn how to safely use, store and throw away your medicines at www.disposemymeds.org.          This list is accurate as of 2/1/18  4:07 PM.  Always use your most recent med list.                   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 P.  1:100 w/v, HS  1.0 ml Dust Mites DF 30,000AU/mL, HS  0.3 ml Dust Mites DP. 30,000 AU/mL, HS  0.3 ml  Birch Mix PRW 1:20 w/v, HS  0.5 ml Grass Mix #7 100,000 BAU/mL, HS 0.4 ml Nettle 1:20 w/v, HS 0.5 ml Diluent: HSA qs to 5ml    Allergic rhinitis due to dust mite, Chronic seasonal allergic rhinitis due to pollen, Allergic rhinitis due to animal dander       amLODIPine 2.5 MG tablet    NORVASC    30 tablet    Take 1 tablet (2.5 mg)  by mouth daily    Benign essential hypertension       aspirin 81 MG tablet     0    ONE DAILY    Other and unspecified hyperlipidemia, Family history of ischemic heart disease       CLEAR-ATADINE 10 MG tablet   Generic drug:  loratadine      Take 10 mg by mouth daily        EPINEPHrine 0.3 MG/0.3ML injection 2-pack    EPIPEN 2-MIGUELINA    2 each    Inject 0.3 mLs (0.3 mg) into the muscle once as needed for anaphylaxis    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 sting       ezetimibe 10 MG tablet    ZETIA    30 tablet    Take 1 tablet (10 mg) by mouth daily At night    Mixed hyperlipidemia       fluticasone 50 MCG/ACT spray    FLONASE    1 Bottle    Spray 2 sprays into both nostrils daily    Chronic seasonal allergic rhinitis due to pollen, House dust mite allergy, Allergic rhinitis due to animal dander       hydrochlorothiazide 25 MG tablet    HYDRODIURIL    30 tablet    Take 1 tablet (25 mg) by mouth daily    Benign essential hypertension       MULTIVITAL PO      Take 1 tablet by mouth daily Reported on 3/22/2017        olopatadine HCl 0.2 % Soln    PATADAY    2.5 mL    Place 1 drop into both eyes daily    Chronic seasonal allergic rhinitis due to pollen, House dust mite allergy       omeprazole 20 MG CR capsule    priLOSEC    90 capsule    TAKE ONE CAPSULE BY MOUTH EVERY DAY (TAKE 30 TO 60 MINUTES BEFORE A MEAL)    Gastroesophageal reflux disease without esophagitis       polyethylene glycol powder    MIRALAX    510 g    Take 17 g (1 capful) by mouth daily    Chronic constipation       STATIN NOT PRESCRIBED (INTENTIONAL)      by Other route continuous prn Reported on 4/6/2017    Mitral valve disorders(424.0), Hyperlipidemia LDL goal <100       temazepam 15 MG capsule    RESTORIL    30 capsule    Take 1 capsule (15 mg) by mouth nightly as needed for sleep    Anxiety       * Notice:  This list has 5 medication(s) that are the same as other medications prescribed for you. Read the directions carefully, and ask your doctor or other care provider to  review them with you.

## 2018-02-02 NOTE — TELEPHONE ENCOUNTER
Spoke to patient and he wanted to cancel his appt today at noon. He would like all his MRI's done at once., I spoke to [facility] and their office will call him to schedule the bilateral Hip and lumbar spine. Informed patient of this as well.   Orders Faxed.

## 2018-02-02 NOTE — TELEPHONE ENCOUNTER
New MRI orders placed for bilateral hip area and lumbar spine please fax new orders and let patient know.     ETHEL Paul CNP

## 2018-02-13 ENCOUNTER — TRANSFERRED RECORDS (OUTPATIENT)
Dept: HEALTH INFORMATION MANAGEMENT | Facility: CLINIC | Age: 63
End: 2018-02-13

## 2018-02-15 ENCOUNTER — THERAPY VISIT (OUTPATIENT)
Dept: PHYSICAL THERAPY | Facility: CLINIC | Age: 63
End: 2018-02-15
Payer: COMMERCIAL

## 2018-02-15 ENCOUNTER — ALLIED HEALTH/NURSE VISIT (OUTPATIENT)
Dept: ALLERGY | Facility: OTHER | Age: 63
End: 2018-02-15
Payer: COMMERCIAL

## 2018-02-15 DIAGNOSIS — M25.552 HIP PAIN, LEFT: ICD-10-CM

## 2018-02-15 DIAGNOSIS — J30.9 ALLERGIC RHINITIS: Primary | ICD-10-CM

## 2018-02-15 PROCEDURE — 99207 ZZC DROP WITH A PROCEDURE: CPT

## 2018-02-15 PROCEDURE — 97140 MANUAL THERAPY 1/> REGIONS: CPT | Mod: GP | Performed by: PHYSICAL THERAPIST

## 2018-02-15 PROCEDURE — 97110 THERAPEUTIC EXERCISES: CPT | Mod: GP | Performed by: PHYSICAL THERAPIST

## 2018-02-15 PROCEDURE — 97112 NEUROMUSCULAR REEDUCATION: CPT | Mod: GP | Performed by: PHYSICAL THERAPIST

## 2018-02-15 PROCEDURE — 95115 IMMUNOTHERAPY ONE INJECTION: CPT

## 2018-02-15 NOTE — MR AVS SNAPSHOT
After Visit Summary   2/15/2018    Jose Angel Cha    MRN: [id number]           Patient Information     Date Of Birth          1955        Visit Information        Provider Department      2/15/2018 4:50 PM ER ALLERGY SHOTS [facility]        Today's Diagnoses     Allergic rhinitis    -  1       Follow-ups after your visit        Your next 10 appointments already scheduled     Feb 22, 2018  3:30 PM CST   LUIS F Extremity with Amanda K Hilligoss, PT   [facility]    [address]  [facility] [address]   [phone]            Feb 22, 2018  4:50 PM CST   Nurse Only with ER ALLERGY SHOTS   [facility] ([facility])    [address]  [facility] [address] [phone]            Mar 06, 2018  9:00 AM CST   Nurse Only with ER ALLERGY SHOTS   [facility] ([facility])    [address]  [facility] [address] [phone]            Mar 26, 2018  8:45 AM CDT   Return Visit with Ynes Chirinos MD   [facility] ([facility])    [address]  [facility] [id number] [phone]              Who to contact     If you have questions or need follow up information about today's clinic visit or your schedule please contact [facility] directly at [phone].  Normal or non-critical lab and imaging results will be communicated to you by MyChart, letter or phone within 4 business days after the clinic has received the results. If you do not hear from us within 7 days, please contact the clinic through MyChart or phone. If you have a critical or abnormal lab result, we will notify you by phone as soon as possible.  Submit refill requests through liveBooks or call your pharmacy and they will forward the refill request to us. Please allow 3 business  days for your refill to be completed.          Additional Information About Your Visit        MyChart Information     M/A-COM[employer] gives you secure access to your electronic health record. If you see a primary care provider, you can also send messages to your care team and make appointments. If you have questions, please call your primary care clinic.  If you do not have a primary care provider, please call [phone] and they will assist you.        Care EveryWhere ID     This is your Care EveryWhere ID. This could be used by other organizations to access your [place] medical records  [id number]         Blood Pressure from Last 3 Encounters:   01/24/18 (!) 141/93   12/29/17 134/74   12/29/17 147/88    Weight from Last 3 Encounters:   01/24/18 99.7 kg (219 lb 14.4 oz)   12/29/17 97.7 kg (215 lb 6.4 oz)   12/29/17 98.4 kg (217 lb)              We Performed the Following     Allergy Shot: One injection        Primary Care Provider Office Phone # Fax #    Shari Tonia Paredes [phone]       [address] [place] [address]        Equal Access to Services     [facility]: Hadii aad ku hadasho Soomaali, waaxda luqadaha, qaybta kaalmada adejefryyada, nico guillen . So [facility] [phone].    ATENCIÓN: Si habla español, tiene a armas disposición servicios gratuitos de asistencia lingüística. Ll[facility] [phone].    We comply with applicable federal civil rights laws and [place] laws. We do not discriminate on the basis of race, color, national origin, age, disability, sex, sexual orientation, or gender identity.            Thank you!     Thank you for choosing [facility]  for your care. Our goal is always to provide you with excellent care. Hearing back from our patients is one way we can continue to improve our services. Please take a few minutes to complete the written survey that you may receive in the mail after your visit with us. Thank you!             Your  Updated Medication List - Protect others around you: Learn how to safely use, store and throw away your medicines at www.disposemymeds.org.          This list is accurate as of 2/15/18  5:50 PM.  Always use your most recent med list.                   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 P.  1:100 w/v, HS  1.0 ml Dust Mites DF 30,000AU/mL, HS  0.3 ml Dust Mites DP. 30,000 AU/mL, HS  0.3 ml  Birch Mix PRW 1:20 w/v, HS  0.5 ml Grass Mix #7 100,000 BAU/mL, HS 0.4 ml Nettle 1:20 w/v, HS 0.5 ml Diluent: HSA qs to 5ml    Allergic rhinitis due to dust mite, Chronic seasonal allergic rhinitis due to pollen, Allergic rhinitis due to animal dander       amLODIPine 2.5 MG tablet    NORVASC    30 tablet    Take 1 tablet (2.5 mg) by mouth daily    Benign essential hypertension       aspirin 81 MG tablet     0    ONE DAILY    Other and unspecified hyperlipidemia, Family history of ischemic heart disease       CLEAR-ATADINE 10 MG tablet   Generic drug:  loratadine      Take 10 mg by mouth daily        EPINEPHrine 0.3 MG/0.3ML injection 2-pack    EPIPEN 2-MIGUELINA    2 each    Inject 0.3 mLs (0.3 mg) into the muscle once as needed for anaphylaxis    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 sting        ezetimibe 10 MG tablet    ZETIA    30 tablet    Take 1 tablet (10 mg) by mouth daily At night    Mixed hyperlipidemia       fluticasone 50 MCG/ACT spray    FLONASE    1 Bottle    Spray 2 sprays into both nostrils daily    Chronic seasonal allergic rhinitis due to pollen, House dust mite allergy, Allergic rhinitis due to animal dander       hydrochlorothiazide 25 MG tablet    HYDRODIURIL    30 tablet    Take 1 tablet (25 mg) by mouth daily    Benign essential hypertension       MULTIVITAL PO      Take 1 tablet by mouth daily Reported on 3/22/2017        olopatadine HCl 0.2 % Soln    PATADAY    2.5 mL    Place 1 drop into both eyes daily    Chronic seasonal allergic rhinitis due to pollen, House dust mite allergy       omeprazole 20 MG CR capsule    priLOSEC    90 capsule    TAKE ONE CAPSULE BY MOUTH EVERY DAY (TAKE 30 TO 60 MINUTES BEFORE A MEAL)    Gastroesophageal reflux disease without esophagitis       polyethylene glycol powder    MIRALAX    510 g    Take 17 g (1 capful) by mouth daily    Chronic constipation       STATIN NOT PRESCRIBED (INTENTIONAL)      by Other route continuous prn Reported on 4/6/2017    Mitral valve disorders(424.0), Hyperlipidemia LDL goal <100       temazepam 15 MG capsule    RESTORIL    30 capsule    Take 1 capsule (15 mg) by mouth nightly as needed for sleep    Anxiety       * Notice:  This list has 5 medication(s) that are the same as other medications prescribed for you. Read the directions carefully, and ask your doctor or other care provider to review them with you.

## 2018-02-21 ENCOUNTER — TELEPHONE (OUTPATIENT)
Dept: FAMILY MEDICINE | Facility: OTHER | Age: 63
End: 2018-02-21

## 2018-02-21 DIAGNOSIS — S73.192A TEAR OF LEFT ACETABULAR LABRUM, INITIAL ENCOUNTER: Primary | ICD-10-CM

## 2018-02-21 DIAGNOSIS — S73.191A TEAR OF RIGHT ACETABULAR LABRUM, INITIAL ENCOUNTER: ICD-10-CM

## 2018-02-21 NOTE — TELEPHONE ENCOUNTER
[facility] us the results for his MRI- will place on [facility] desk to review.

## 2018-02-21 NOTE — TELEPHONE ENCOUNTER
Spoke to patient and informed him of results- He is scheduled tomorrow with Dr. Diaz at 2:40 tomorrow pm.

## 2018-02-21 NOTE — TELEPHONE ENCOUNTER
Reason for Call:  Request for results:    Name of test or procedure: MRI results    Date of test of procedure: 02/13/18    [facility] of the test [facility]: [facility]      OK to leave the result message on voice mail or with a family member? YES    Phone number Patient can be reached at:  Cell number on file:    Telephone Information:   Mobile [phone]       Additional comments: patient is calling requesting results of his MRI results that he had done last week, requested this be sent to [facility]. Please advise.    Call taken on 2/21/2018 at 10:23 AM by Rolanda Huertas

## 2018-02-21 NOTE — TELEPHONE ENCOUNTER
Please let patient know that his Lumbar spine imaging was normal. Bilatearl hip MRI. Shows bilateral acetabular labral tear (which is the ring of cartilage that surrounds the acetabulum of the hip.), recommend follow up with orthopedics to discuss treatment plan for  Acetabular labral tears. I know there are some openings on Friday here in ER, would recommend this.     ETHEL Paul CNP

## 2018-02-22 ENCOUNTER — ALLIED HEALTH/NURSE VISIT (OUTPATIENT)
Dept: ALLERGY | Facility: OTHER | Age: 63
End: 2018-02-22
Payer: COMMERCIAL

## 2018-02-22 ENCOUNTER — THERAPY VISIT (OUTPATIENT)
Dept: PHYSICAL THERAPY | Facility: CLINIC | Age: 63
End: 2018-02-22
Payer: COMMERCIAL

## 2018-02-22 ENCOUNTER — RADIANT APPOINTMENT (OUTPATIENT)
Dept: GENERAL RADIOLOGY | Facility: OTHER | Age: 63
End: 2018-02-22
Attending: ORTHOPAEDIC SURGERY
Payer: COMMERCIAL

## 2018-02-22 ENCOUNTER — OFFICE VISIT (OUTPATIENT)
Dept: ORTHOPEDICS | Facility: OTHER | Age: 63
End: 2018-02-22
Payer: COMMERCIAL

## 2018-02-22 VITALS — BODY MASS INDEX: 30.06 KG/M2 | TEMPERATURE: 97.4 F | WEIGHT: 210 LBS | HEIGHT: 70 IN

## 2018-02-22 DIAGNOSIS — M25.552 HIP PAIN, LEFT: ICD-10-CM

## 2018-02-22 DIAGNOSIS — M25.552 HIP PAIN, LEFT: Primary | ICD-10-CM

## 2018-02-22 DIAGNOSIS — J30.9 ALLERGIC RHINITIS: Primary | ICD-10-CM

## 2018-02-22 PROCEDURE — 97110 THERAPEUTIC EXERCISES: CPT | Mod: GP | Performed by: PHYSICAL THERAPIST

## 2018-02-22 PROCEDURE — 97140 MANUAL THERAPY 1/> REGIONS: CPT | Mod: GP | Performed by: PHYSICAL THERAPIST

## 2018-02-22 PROCEDURE — 95115 IMMUNOTHERAPY ONE INJECTION: CPT

## 2018-02-22 PROCEDURE — 99207 ZZC DROP WITH A PROCEDURE: CPT

## 2018-02-22 PROCEDURE — 99213 OFFICE O/P EST LOW 20 MIN: CPT | Performed by: ORTHOPAEDIC SURGERY

## 2018-02-22 PROCEDURE — 73502 X-RAY EXAM HIP UNI 2-3 VIEWS: CPT

## 2018-02-22 PROCEDURE — 97112 NEUROMUSCULAR REEDUCATION: CPT | Mod: GP | Performed by: PHYSICAL THERAPIST

## 2018-02-22 ASSESSMENT — PAIN SCALES - GENERAL: PAINLEVEL: MODERATE PAIN (4)

## 2018-02-22 NOTE — MR AVS SNAPSHOT
After Visit Summary   2/22/2018    Jose Angel Cha    MRN: [id number]           Patient Information     Date Of Birth          1955        Visit Information        Provider Department      2/22/2018 3:30 PM Hilligoss, Amanda K, PT [place] [facility] Therapy        Today's Diagnoses     Hip pain, left           Follow-ups after your visit        Your next 10 appointments already scheduled     Mar 01, 2018  9:30 AM CST   XR INJECTION with PHCARM2, PH RAD   [facility])    [address] [place] [id number] [phone]           Stop drinking 1 hour before the exam.  You may take your medicines as usual, except for blood thinners (Coumadin, Plavix, Ticlid, Persantine, Aggrenox, Pletal, Effient, Brilliant). Talk to your doctor if you take these.  Tell your doctor if:   You have ever had an allergic reaction to X-ray dye (contrast fluid).   There is a chance you may be pregnant.  Please bring a list of your current medicines to your exam. Include vitamins, minerals and over-the-counter medicines.  Please call the Imaging Department at your exam site with any questions.            Mar 01, 2018  2:00 PM CST   Nurse Only with ER ALLERGY SHOTS   [facility]    [address]  [facility] [address]   [phone]            Mar 01, 2018  2:50 PM CST   LUIS F Extremity with Amanda K Hilligoss, WOLFGANG   [place] [facility] ([facility]  )    [address]  [facility] [address]   [phone]            Mar 06, 2018 10:40 AM CST   Nurse Only with ER ALLERGY SHOTS   [facility] ([facility])    [address]  [facility] [address]   [phone]            Mar 07, 2018 11:20 AM CST   LUIS F Extremity with Amanda K Hilligoss, PT   [place] [facility] ([facility]  )    [address]  [facility] [address]   [phone]            Mar 08, 2018  2:30 PM CST   Nurse Only with ER ALLERGY SHOTS   [facility]    [address]  [facility] [address]   [phone]            Mar 14, 2018 11:20 AM CDT   LUIS F Extremity with Amanda K Hilligoss, PT   [facility] ([facility]  )    [address]  [facility] [address]   [phone]            Mar 15, 2018  3:20 PM CDT   Nurse Only with ER ALLERGY SHOTS   [facility] ([facility])    [address]  [facility] [address]   [phone]            Mar 22, 2018  3:10 PM CDT   Nurse Only with ER ALLERGY SHOTS   [facility] ([facility])    [address]  [facility] [address]   [phone]              Future tests that were ordered for you today     Open Future Orders        Priority Expected Expires Ordered    XR Joint Injection Major Left Routine 2/22/2018 2/22/2019 2/22/2018            Who to contact     If you have questions or need follow up information about today's clinic visit or your schedule please contact [facility] directly at [phone].  Normal or non-critical lab and imaging results will be communicated to you by MyChart, letter or phone within 4 business days after the clinic has received the results. If you do not hear from us within 7 days, please contact the clinic through JustBookhart or phone. If you have a critical or abnormal lab result, we will notify you by phone as soon as possible.  Submit refill requests through [employer] or call your pharmacy and they will forward the refill request to us. Please allow 3 business days for your refill to be completed.          Additional Information About Your  Visit        [employer] Information     [employer] gives you secure access to your electronic health record. If you see a primary care provider, you can also send messages to your care team and make appointments. If you have questions, please call your primary care clinic.  If you do not have a primary care provider, please call [phone] and they will assist you.        Care EveryWhere ID     This is your Care EveryWhere ID. This could be used by other organizations to access your [place] medical records  [id number]         Blood Pressure from Last 3 Encounters:   01/24/18 (!) 141/93   12/29/17 134/74   12/29/17 147/88    Weight from Last 3 Encounters:   02/22/18 95.3 kg (210 lb)   01/24/18 99.7 kg (219 lb 14.4 oz)   12/29/17 97.7 kg (215 lb 6.4 oz)              We Performed the Following     LUIS F PROGRESS NOTES REPORT     MANUAL THER TECH,1+REGIONS,EA 15 MIN     NEUROMUSCULAR RE-EDUCATION     THERAPEUTIC EXERCISES        Primary Care Provider Office Phone # Fax #    Shari Tonia Paredes [phone]       [address] [place] [id number]        Equal Access to Services     [facility]: Hadii aad ku hadasho Somackali, waaxda luqadaha, qaybta kaalmada adejefryyada, nico singletaryn sandra guillen . So [facility] [phone].    ATENCIÓN: Si habla español, tiene a armas disposición servicios gratuitos de asistencia lingüística. [facility] [phone].    We comply with applicable federal civil rights laws and [place] laws. We do not discriminate on the basis of race, color, national origin, age, disability, sex, sexual orientation, or gender identity.            Thank you!     Thank you for choosing [facility]  for your care. Our goal is always to provide you with excellent care. Hearing back from our patients is one way we can continue to improve our services. Please take a few minutes to complete the written survey that you may receive in the mail after  your visit with us. Thank you!             Your Updated Medication List - Protect others around you: Learn how to safely use, store and throw away your medicines at www.disposemymeds.org.          This list is accurate as of 2/22/18  6:46 PM.  Always use your most recent med list.                   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 P.  1:100 w/v, HS  1.0 ml Dust Mites DF 30,000AU/mL, HS  0.3 ml Dust Mites DP. 30,000 AU/mL, HS  0.3 ml  Birch Mix PRW 1:20 w/v, HS  0.5 ml Grass Mix #7 100,000 BAU/mL, HS 0.4 ml Nettle 1:20 w/v, HS 0.5 ml Diluent: HSA qs to 5ml    Allergic rhinitis due to dust mite, Chronic seasonal allergic rhinitis due to pollen, Allergic rhinitis due to animal dander       amLODIPine 2.5 MG tablet    NORVASC    30 tablet    Take 1 tablet (2.5 mg) by mouth daily    Benign essential hypertension       aspirin 81 MG tablet     0    ONE DAILY    Other and unspecified hyperlipidemia, Family history of ischemic heart disease       CLEAR-ATADINE 10 MG tablet   Generic drug:  loratadine      Take 10 mg by mouth daily        EPINEPHrine 0.3 MG/0.3ML injection 2-pack    EPIPEN 2-MIGUELINA    2 each    Inject 0.3 mLs (0.3 mg) into the muscle once as needed  for anaphylaxis    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 sting       ezetimibe 10 MG tablet    ZETIA    30 tablet    Take 1 tablet (10 mg) by mouth daily At night    Mixed hyperlipidemia       fluticasone 50 MCG/ACT spray    FLONASE    1 Bottle    Spray 2 sprays into both nostrils daily    Chronic seasonal allergic rhinitis due to pollen, House dust mite allergy, Allergic rhinitis due to animal dander       hydrochlorothiazide 25 MG tablet    HYDRODIURIL    30 tablet    Take 1 tablet (25 mg) by mouth daily    Benign essential hypertension       MULTIVITAL PO      Take 1 tablet by mouth daily Reported on 3/22/2017        olopatadine HCl 0.2 % Soln    PATADAY    2.5 mL    Place 1 drop into both eyes daily    Chronic seasonal allergic rhinitis due to pollen, House dust mite allergy       omeprazole 20 MG CR capsule    priLOSEC    90 capsule    TAKE ONE CAPSULE BY MOUTH EVERY DAY (TAKE 30 TO 60 MINUTES BEFORE A MEAL)    Gastroesophageal reflux disease without esophagitis       polyethylene glycol powder    MIRALAX    510 g    Take 17 g (1 capful) by mouth daily    Chronic constipation       STATIN NOT PRESCRIBED (INTENTIONAL)      by Other route continuous prn Reported on 4/6/2017    Mitral valve disorders(424.0), Hyperlipidemia LDL goal <100       temazepam 15 MG capsule    RESTORIL    30 capsule    Take 1 capsule (15 mg) by mouth nightly as needed for sleep    Anxiety       * Notice:  This list has 5 medication(s) that are the same as other medications prescribed for you. Read the directions carefully, and ask your doctor or other care provider to review them with you.

## 2018-02-22 NOTE — NURSING NOTE
"Chief Complaint   Patient presents with     Musculoskeletal Problem     left hip pain- fell on ice DOI: 12/5/2017     Consult     ref: Ashia Sommer NP       Initial Temp 97.4  F (36.3  C) (Temporal)  Ht 1.778 m (5' 10\")  Wt 95.3 kg (210 lb)  BMI 30.13 kg/m2 Estimated body mass index is 30.13 kg/(m^2) as calculated from the following:    Height as of this encounter: 1.778 m (5' 10\").    Weight as of this encounter: 95.3 kg (210 lb).  Medication Reconciliation: complete   Deloris/BRANDO     "
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
## 2018-02-22 NOTE — LETTER
"[facility] PHYSICAL THERAPY  [address]  [facility] [address] [phone]    2018    Re: Jose Angel Cha   :   1955  MRN:  [id number]   REFERRING PHYSICIAN:   Ashia Sommer    [facility]    Date of Initial Evaluation:  ***  Visits:  Rxs Used: 6  Reason for Referral:  Hip pain, left    EVALUATION SUMMARY    Subjective:  HPI                    Objective:  System    Physical Exam    General     ROS    Assessment/Plan:    PROGRESS  REPORT    Progress reporting period is from 1/10/18 to 18.       SUBJECTIVE  Pt states he had MRI which showed labral tears. Per DO notes \"patient start with intra-articular injection on left, continue with therapy, and follow-up in two weeks\". Patient feels that pain is less than initially and he has few sharp pains, but does still have pain that is worst in groin.    Current Pain level: 4/10.     Initial Pain level: 10/10 (at worst; 4-6/10 at start of PT).   Changes in function:  Yes (See Goal flowsheet attached for changes in current functional level)  Adverse reaction to treatment or activity: None    OBJECTIVE  Lumbar AROM: Flex to mid lower leg +pain low back, ++pain on return, Ext 50% limited +pain low back, SB L to knee, R to knee; (-) Gillet test B (+stiffnes when lifting L); (-) YUNIOR (Stiffness in joint L>R); Tender/hypertonic L ITB; hip flexion,adduction/abduction 4+/5 on left 5/5 on right; No difficulty standing feet together eyes closed; tandem stance eyes closed <3 sec; Semi tandem eyes closed 45sec-1 min B    ASSESSMENT/PLAN  Updated problem list and treatment plan: Diagnosis 1:  L Hip pain  Pain -  manual therapy, self management, education and home program  Decreased ROM/flexibility - manual therapy, therapeutic exercise, therapeutic activity and home program  Decreased joint mobility - manual therapy, therapeutic exercise, therapeutic activity " and home program  Decreased strength - therapeutic exercise, therapeutic activities and home program  Impaired balance - neuro re-education, therapeutic activities and home program  Impaired muscle performance - neuro re-education and home program  Decreased function - therapeutic activities and home program  STG/LTGs have been met or progress has been made towards goals:  Yes (See Goal flow sheet completed today.)  Assessment of Progress: The patient's condition is improving.  The patient's progress has slowed.  Self Management Plans:  Patient has been instructed in a home treatment program.  Patient  has been instructed in self management of symptoms.  I have re-evaluated this patient and find that the nature, scope, duration and intensity of the therapy is appropriate for the medical condition of the patient.  Jose Angel continues to require the following intervention to meet STG and LTG's:  PT    Recommendations:  This patient would benefit from continued therapy.     Frequency:  1 X week, once daily  Duration:  for 4 weeks            Thank you for your referral.    INQUIRIES  Therapist:   [facility]  [address]  [facility] [address]  Phone: [phone]  Fax: [phone]

## 2018-02-22 NOTE — MR AVS SNAPSHOT
After Visit Summary   2/22/2018    Jose Angel Cha    MRN: [id number]           Patient Information     Date Of Birth          1955        Visit Information        Provider Department      2/22/2018 2:40 PM Cruzito Diaz,  [facility]        Today's Diagnoses     Hip pain, left    -  1       Follow-ups after your visit        Your next 10 appointments already scheduled     Feb 22, 2018  4:50 PM CST   Nurse Only with ER ALLERGY SHOTS   [facility] ([facility]    [address]  [facility] [address]   [phone]            Mar 06, 2018  9:00 AM CST   Nurse Only with ER ALLERGY SHOTS   [facility] ([facility])    [address]  [facility] [address]   [phone]            Mar 26, 2018  8:45 AM CDT   Return Visit with Ynes Chirinos MD   [facility] ([facility])    [address] [place] [address] [address]  [facility] [address] [phone]              Future tests that were ordered for you today     Open Future Orders        Priority Expected Expires Ordered    XR Joint Injection Major Left Routine 2/22/2018 2/22/2019 2/22/2018            Who to contact     If you have questions or need follow up information about today's clinic visit or your schedule please contact [facility] directly at [phone].  Normal or non-critical lab and imaging results will be communicated to you by MyChart, letter or phone within 4 business days after the clinic has received the results. If you do not hear from us within 7 days, please contact the clinic through MyChart or phone. If you have a critical or abnormal lab result, we will notify you by phone as soon as possible.  Submit refill requests through [employer] or call your pharmacy and they will forward the refill request to us. Please allow 3 business days for your refill to be  "completed.          Additional Information About Your Visit        [employer] Information     [employer] gives you secure access to your electronic health record. If you see a primary care provider, you can also send messages to your care team and make appointments. If you have questions, please call your primary care clinic.  If you do not have a primary care provider, please call [phone] and they will assist you.        Care EveryWhere ID     This is your Care EveryWhere ID. This could be used by other organizations to access your [facility] medical records  [id number]        Your Vitals Were     Temperature Height BMI (Body Mass Index)             97.4  F (36.3  C) (Temporal) 5' 10\" (1.778 m) 30.13 kg/m2          Blood Pressure from Last 3 Encounters:   01/24/18 (!) 141/93   12/29/17 134/74   12/29/17 147/88    Weight from Last 3 Encounters:   02/22/18 210 lb (95.3 kg)   01/24/18 219 lb 14.4 oz (99.7 kg)   12/29/17 215 lb 6.4 oz (97.7 kg)               Primary Care Provider Office Phone # Fax #    Shari Paredes [phone]       [address] [place] [address]        Equal Access to Services     ANJEL TAFOYA AH: Hadii aad ku hadasho Soomaali, waaxda luqadaha, qaybta kaalmada adeegyada, waxay alyce hayeveretten sandra verdugo labelemn ah. So [facility] [phone].    ATENCIÓN: Si habla español, tiene a armas disposición servicios gratuitos de asistencia lingüística. ajay al [phone].    We comply with applicable federal civil rights laws and [place] laws. We do not discriminate on the basis of race, color, national origin, age, disability, sex, sexual orientation, or gender identity.            Thank you!     Thank you for choosing [facility]  for your care. Our goal is always to provide you with excellent care. Hearing back from our patients is one way we can continue to improve our services. Please take a few minutes to complete the written survey that you may receive in the mail after " your visit with us. Thank you!             Your Updated Medication List - Protect others around you: Learn how to safely use, store and throw away your medicines at www.disposemymeds.org.          This list is accurate as of 2/22/18  3:58 PM.  Always use your most recent med list.                   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 P.  1:100 w/v, HS  1.0 ml Dust Mites DF 30,000AU/mL, HS  0.3 ml Dust Mites DP. 30,000 AU/mL, HS  0.3 ml  Birch Mix PRW 1:20 w/v, HS  0.5 ml Grass Mix #7 100,000 BAU/mL, HS 0.4 ml Nettle 1:20 w/v, HS 0.5 ml Diluent: HSA qs to 5ml    Allergic rhinitis due to dust mite, Chronic seasonal allergic rhinitis due to pollen, Allergic rhinitis due to animal dander       amLODIPine 2.5 MG tablet    NORVASC    30 tablet    Take 1 tablet (2.5 mg) by mouth daily    Benign essential hypertension       aspirin 81 MG tablet     0    ONE DAILY    Other and unspecified hyperlipidemia, Family history of ischemic heart disease       CLEAR-ATADINE 10 MG tablet   Generic drug:  loratadine      Take 10 mg by mouth daily        EPINEPHrine 0.3 MG/0.3ML injection 2-pack    EPIPEN 2-MIGUELINA    2 each    Inject 0.3 mLs (0.3 mg) into the muscle once as needed  for anaphylaxis    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 sting       ezetimibe 10 MG tablet    ZETIA    30 tablet    Take 1 tablet (10 mg) by mouth daily At night    Mixed hyperlipidemia       fluticasone 50 MCG/ACT spray    FLONASE    1 Bottle    Spray 2 sprays into both nostrils daily    Chronic seasonal allergic rhinitis due to pollen, House dust mite allergy, Allergic rhinitis due to animal dander       hydrochlorothiazide 25 MG tablet    HYDRODIURIL    30 tablet    Take 1 tablet (25 mg) by mouth daily    Benign essential hypertension       MULTIVITAL PO      Take 1 tablet by mouth daily Reported on 3/22/2017        olopatadine HCl 0.2 % Soln    PATADAY    2.5 mL    Place 1 drop into both eyes daily    Chronic seasonal allergic rhinitis due to pollen, House dust mite allergy       omeprazole 20 MG CR capsule    priLOSEC    90 capsule    TAKE ONE CAPSULE BY MOUTH EVERY DAY (TAKE 30 TO 60 MINUTES BEFORE A MEAL)    Gastroesophageal reflux disease without esophagitis       polyethylene glycol powder    MIRALAX    510 g    Take 17 g (1 capful) by mouth daily    Chronic constipation       STATIN NOT PRESCRIBED (INTENTIONAL)      by Other route continuous prn Reported on 4/6/2017    Mitral valve disorders(424.0), Hyperlipidemia LDL goal <100       temazepam 15 MG capsule    RESTORIL    30 capsule    Take 1 capsule (15 mg) by mouth nightly as needed for sleep    Anxiety       * Notice:  This list has 5 medication(s) that are the same as other medications prescribed for you. Read the directions carefully, and ask your doctor or other care provider to review them with you.

## 2018-02-22 NOTE — MR AVS SNAPSHOT
After Visit Summary   2/22/2018    Jose Angel Cha    MRN: [id number]           Patient Information     Date Of Birth          1955        Visit Information        Provider Department      2/22/2018 4:50 PM ER ALLERGY SHOTS [facility]        Today's Diagnoses     Allergic rhinitis    -  1       Follow-ups after your visit        Your next 10 appointments already scheduled     Mar 01, 2018  9:30 AM CST   XR INJECTION with PHCARM2, PH RAD   [facility] ([facility])    [address] [place] [id number] [phone]           Stop drinking 1 hour before the exam.  You may take your medicines as usual, except for blood thinners (Coumadin, Plavix, Ticlid, Persantine, Aggrenox, Pletal, Effient, Brilliant). Talk to your doctor if you take these.  Tell your doctor if:   You have ever had an allergic reaction to X-ray dye (contrast fluid).   There is a chance you may be pregnant.  Please bring a list of your current medicines to your exam. Include vitamins, minerals and over-the-counter medicines.  Please call the Imaging Department at your exam site with any questions.            Mar 01, 2018  2:50 PM CST   LUIS F Extremity with Amanda K Hilligoss, PT   [facility] ([facility]  )    [address]  [facility] [address]   [phone]            Mar 06, 2018  9:00 AM CST   Nurse Only with ER ALLERGY SHOTS   [facility]    [address]  [facility] [address]   [phone]            Mar 07, 2018 11:20 AM CST   LUIS F Extremity with Amanda K Hilligoss, PT   [facility] ([facility]  )    [address]  [facility] [address]   [phone]            Mar 14, 2018 11:20 AM CDT   LUIS F Extremity with Amanda K Hilligoss, PT   [facility]  Physical Therapy ([facility]  )    [address]  [place] [id number] [phone]            Mar 26, 2018  8:45 AM CDT   Return Visit with Ynes Chirinos MD   [facility] ([facility])    [address]  [place] [id number] [phone]              Future tests that were ordered for you today     Open Future Orders        Priority Expected Expires Ordered    XR Joint Injection Major Left Routine 2/22/2018 2/22/2019 2/22/2018            Who to contact     If you have questions or need follow up information about today's clinic visit or your schedule please contact [facility] directly at [phone].  Normal or non-critical lab and imaging results will be communicated to you by MyChart, letter or phone within 4 business days after the clinic has received the results. If you do not hear from us within 7 days, please contact the clinic through [employer] or phone. If you have a critical or abnormal lab result, we will notify you by phone as soon as possible.  Submit refill requests through [employer] or call your pharmacy and they will forward the refill request to us. Please allow 3 business days for your refill to be completed.          Additional Information About Your Visit        [employer] Information     [employer] gives you secure access to your electronic health record. If you see a primary care provider, you can also send messages to your care team and make appointments. If you have questions, please call your primary care clinic.  If you do not have a primary care provider, please call [phone] and they will assist you.        Care EveryWhere ID     This is your Care EveryWhere ID. This could be used by other organizations to access your [facility] medical records  [id number]         Blood Pressure from Last 3 Encounters:   01/24/18 (!) 141/93   12/29/17 134/74   12/29/17 147/88    Weight from Last 3 Encounters:    02/22/18 95.3 kg (210 lb)   01/24/18 99.7 kg (219 lb 14.4 oz)   12/29/17 97.7 kg (215 lb 6.4 oz)              We Performed the Following     Allergy Shot: One injection        Primary Care Provider Office Phone # Fax #    Shari Tonia Paredes [phone]       [address] [address]        Equal Access to Services     ANJEL TAFOYA : Hadii aad ku hadasho Soomaali, waaxda luqadaha, qaybta kaalmada adeegyada, waxay idiin hayaan adeeg kharash la'chaz . So [facility] [phone].    ATENCIÓN: Si josela henry, tiene a armas disposición servicios gratuitos de asistencia lingüística. Llame al [phone].    We comply with applicable federal civil rights laws and [place] laws. We do not discriminate on the basis of race, color, national origin, age, disability, sex, sexual orientation, or gender identity.            Thank you!     Thank you for choosing [facility]  for your care. Our goal is always to provide you with excellent care. Hearing back from our patients is one way we can continue to improve our services. Please take a few minutes to complete the written survey that you may receive in the mail after your visit with us. Thank you!             Your Updated Medication List - Protect others around you: Learn how to safely use, store and throw away your medicines at www.disposemymeds.org.          This list is accurate as of 2/22/18  5:23 PM.  Always use your most recent med list.                   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 P.  1:100 w/v, HS  1.0 ml Dust Mites DF 30,000AU/mL, HS  0.3 ml Dust Mites DP. 30,000 AU/mL, HS  0.3 ml  Birch Mix PRW 1:20 w/v, HS  0.5 ml Grass Mix #7 100,000 BAU/mL, HS 0.4 ml Nettle 1:20 w/v, HS 0.5 ml Diluent: HSA qs to 5ml    Allergic rhinitis due to dust mite, Chronic seasonal allergic rhinitis due to pollen, Allergic rhinitis due to animal dander       amLODIPine 2.5 MG tablet    NORVASC    30 tablet    Take 1 tablet (2.5 mg) by mouth daily    Benign essential hypertension       aspirin 81 MG tablet     0    ONE DAILY    Other and unspecified hyperlipidemia, Family history of ischemic heart disease       CLEAR-ATADINE 10 MG tablet   Generic drug:  loratadine      Take 10 mg by mouth daily        EPINEPHrine 0.3 MG/0.3ML injection 2-pack    EPIPEN 2-MIGUELINA    2 each    Inject 0.3 mLs (0.3 mg) into the muscle once as needed for anaphylaxis    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 sting       ezetimibe 10 MG tablet    ZETIA    30 tablet    Take 1 tablet (10 mg) by mouth daily At night    Mixed hyperlipidemia       fluticasone 50 MCG/ACT spray    FLONASE    1 Bottle    Spray 2 sprays into both nostrils daily    Chronic seasonal allergic rhinitis due to pollen, House dust mite allergy, Allergic rhinitis due to animal dander       hydrochlorothiazide 25 MG tablet    HYDRODIURIL    30 tablet    Take 1 tablet (25 mg) by mouth daily    Benign essential hypertension       MULTIVITAL PO      Take 1 tablet by mouth daily Reported on 3/22/2017        olopatadine HCl 0.2 % Soln    PATADAY    2.5 mL    Place 1 drop into both eyes daily    Chronic seasonal allergic rhinitis due to pollen, House dust mite allergy       omeprazole 20  MG CR capsule    priLOSEC    90 capsule    TAKE ONE CAPSULE BY MOUTH EVERY DAY (TAKE 30 TO 60 MINUTES BEFORE A MEAL)    Gastroesophageal reflux disease without esophagitis       polyethylene glycol powder    MIRALAX    510 g    Take 17 g (1 capful) by mouth daily    Chronic constipation       STATIN NOT PRESCRIBED (INTENTIONAL)      by Other route continuous prn Reported on 4/6/2017    Mitral valve disorders(424.0), Hyperlipidemia LDL goal <100       temazepam 15 MG capsule    RESTORIL    30 capsule    Take 1 capsule (15 mg) by mouth nightly as needed for sleep    Anxiety       * Notice:  This list has 5 medication(s) that are the same as other medications prescribed for you. Read the directions carefully, and ask your doctor or other care provider to review them with you.
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
## 2018-02-23 NOTE — PROGRESS NOTES
"Subjective:  HPI                    Objective:  System    Physical Exam    General     ROS    Assessment/Plan:    PROGRESS  REPORT    Progress reporting period is from 1/10/18 to 2/22/18.       SUBJECTIVE  Pt states he had MRI which showed labral tears. Per DO notes \"patient start with intra-articular injection on left, continue with therapy, and follow-up in two weeks\". Patient feels that pain is less than initially and he has few sharp pains, but does still have pain that is worst in groin.    Current Pain level: 4/10.     Initial Pain level: 10/10 (at worst; 4-6/10 at start of PT).   Changes in function:  Yes (See Goal flowsheet attached for changes in current functional level)  Adverse reaction to treatment or activity: None    OBJECTIVE  Lumbar AROM: Flex to mid lower leg +pain low back, ++pain on return, Ext 50% limited +pain low back, SB L to knee, R to knee; (-) Gillet test B (+stiffnes when lifting L); (-) YUNIOR (Stiffness in joint L>R); Tender/hypertonic L ITB; hip flexion,adduction/abduction 4+/5 on left 5/5 on right; No difficulty standing feet together eyes closed; tandem stance eyes closed <3 sec; Semi tandem eyes closed 45sec-1 min B    ASSESSMENT/PLAN  Updated problem list and treatment plan: Diagnosis 1:  L Hip pain  Pain -  manual therapy, self management, education and home program  Decreased ROM/flexibility - manual therapy, therapeutic exercise, therapeutic activity and home program  Decreased joint mobility - manual therapy, therapeutic exercise, therapeutic activity and home program  Decreased strength - therapeutic exercise, therapeutic activities and home program  Impaired balance - neuro re-education, therapeutic activities and home program  Impaired muscle performance - neuro re-education and home program  Decreased function - therapeutic activities and home program  STG/LTGs have been met or progress has been made towards goals:  Yes (See Goal flow sheet completed today.)  Assessment of " Progress: The patient's condition is improving.  The patient's progress has slowed.  Self Management Plans:  Patient has been instructed in a home treatment program.  Patient  has been instructed in self management of symptoms.  I have re-evaluated this patient and find that the nature, scope, duration and intensity of the therapy is appropriate for the medical condition of the patient.  Jose Angel continues to require the following intervention to meet STG and LTG's:  PT    Recommendations:  This patient would benefit from continued therapy.     Frequency:  1 X week, once daily  Duration:  for 4 weeks        Please refer to the daily flowsheet for treatment today, total treatment time and time spent performing 1:1 timed codes.

## 2018-02-27 DIAGNOSIS — I10 BENIGN ESSENTIAL HYPERTENSION: ICD-10-CM

## 2018-02-27 RX ORDER — HYDROCHLOROTHIAZIDE 25 MG/1
25 TABLET ORAL DAILY
Qty: 90 TABLET | Refills: 2 | Status: SHIPPED | OUTPATIENT
Start: 2018-02-27 | End: 2018-11-19

## 2018-03-01 ENCOUNTER — HOSPITAL ENCOUNTER (OUTPATIENT)
Dept: GENERAL RADIOLOGY | Facility: CLINIC | Age: 63
Discharge: HOME | End: 2018-03-01
Attending: ORTHOPAEDIC SURGERY | Admitting: ORTHOPAEDIC SURGERY
Payer: COMMERCIAL

## 2018-03-01 ENCOUNTER — ALLIED HEALTH/NURSE VISIT (OUTPATIENT)
Dept: ALLERGY | Facility: OTHER | Age: 63
End: 2018-03-01
Payer: COMMERCIAL

## 2018-03-01 DIAGNOSIS — M25.552 HIP PAIN, LEFT: ICD-10-CM

## 2018-03-01 DIAGNOSIS — J30.9 ALLERGIC RHINITIS: Primary | ICD-10-CM

## 2018-03-01 PROCEDURE — 99207 ZZC DROP WITH A PROCEDURE: CPT

## 2018-03-01 PROCEDURE — 95115 IMMUNOTHERAPY ONE INJECTION: CPT

## 2018-03-01 PROCEDURE — 25500064 ZZH RX 255 OP 636: Performed by: RADIOLOGY

## 2018-03-01 PROCEDURE — 25000128 H RX IP 250 OP 636: Performed by: RADIOLOGY

## 2018-03-01 PROCEDURE — 25000125 ZZHC RX 250: Performed by: RADIOLOGY

## 2018-03-01 PROCEDURE — 20610 DRAIN/INJ JOINT/BURSA W/O US: CPT | Mod: LT

## 2018-03-01 RX ORDER — TRIAMCINOLONE ACETONIDE 40 MG/ML
40 INJECTION, SUSPENSION INTRA-ARTICULAR; INTRAMUSCULAR ONCE
Status: COMPLETED | OUTPATIENT
Start: 2018-03-01 | End: 2018-03-01

## 2018-03-01 RX ORDER — BUPIVACAINE HYDROCHLORIDE 2.5 MG/ML
10 INJECTION, SOLUTION INFILTRATION; PERINEURAL ONCE
Status: COMPLETED | OUTPATIENT
Start: 2018-03-01 | End: 2018-03-01

## 2018-03-01 RX ORDER — IOPAMIDOL 408 MG/ML
50 INJECTION, SOLUTION INTRAVASCULAR ONCE
Status: COMPLETED | OUTPATIENT
Start: 2018-03-01 | End: 2018-03-01

## 2018-03-01 RX ORDER — LIDOCAINE HYDROCHLORIDE 10 MG/ML
10 INJECTION, SOLUTION INFILTRATION; PERINEURAL ONCE
Status: COMPLETED | OUTPATIENT
Start: 2018-03-01 | End: 2018-03-01

## 2018-03-01 RX ADMIN — BUPIVACAINE HYDROCHLORIDE 4 ML: 2.5 INJECTION, SOLUTION INFILTRATION; PERINEURAL at 10:02

## 2018-03-01 RX ADMIN — TRIAMCINOLONE ACETONIDE 1 ML: 40 INJECTION, SUSPENSION INTRA-ARTICULAR; INTRAMUSCULAR at 10:02

## 2018-03-01 RX ADMIN — LIDOCAINE HYDROCHLORIDE 0.5 ML: 10 INJECTION, SOLUTION INFILTRATION; PERINEURAL at 10:01

## 2018-03-01 RX ADMIN — IOPAMIDOL 1 ML: 408 INJECTION, SOLUTION INTRAVASCULAR at 10:02

## 2018-03-01 NOTE — PROGRESS NOTES
Patient presented after waiting 30 minutes with no reaction to allergy injections. Discharged from clinic.    Cornel Tejeda RN....3/1/2018 3:00 PM

## 2018-03-01 NOTE — MR AVS SNAPSHOT
After Visit Summary   3/1/2018    Jose Angel Cha    MRN: [id number]           Patient Information     Date Of Birth          1955        Visit Information        Provider Department      3/1/2018 2:00 PM ER ALLERGY SHOTS [facility]        Today's Diagnoses     Allergic rhinitis    -  1       Follow-ups after your visit        Your next 10 appointments already scheduled     Mar 06, 2018 10:40 AM CST   Nurse Only with ER ALLERGY SHOTS   [facility]    [address]  [facility] [address]   [phone]            Mar 07, 2018 11:20 AM CST   LUIS F Extremity with Amanda K Hilligoss, PT   [facility] ([facility]  )    [address]  [facility] [address]   [phone]            Mar 08, 2018  2:30 PM CST   Nurse Only with ER ALLERGY SHOTS   [facility]    [address]  [facility] [address]   [phone]            Mar 14, 2018 11:20 AM CDT   LUIS F Extremity with Amanda K Hilligoss, PT   [facility] ([facility] [place]  )    [address]  [facility] [address]   [phone]            Mar 15, 2018  3:20 PM CDT   Nurse Only with ER ALLERGY SHOTS   [facility]    [address]  [facility] [address]   [phone]            Mar 22, 2018  3:10 PM CDT   Nurse Only with ER ALLERGY SHOTS   [facility]    [address]  [facility] [address]   [phone]            Mar 26, 2018  7:50 AM CDT   LAB with PERALTA LAB   [facility]    [address] [place] [address] [address]  [place]  [place] [address] [phone]           Please do not eat 10-12 hours before your appointment if you are  coming in fasting for labs on lipids, cholesterol, or glucose (sugar). This does not apply to pregnant women. Water, hot tea and black coffee (with nothing added) are okay. Do not drink other fluids, diet soda or chew gum.            Mar 26, 2018  8:45 AM CDT   Return Visit with Ynes Chirinos MD   [facility] ([facility])    [address] [place] [address] [address]  [facility] [address]   [phone]            Apr 05, 2018  4:30 PM CDT   Nurse Only with ER ALLERGY SHOTS   [facility]    [address]  [facility] [id number] [phone]            Apr 12, 2018  4:20 PM CDT   Nurse Only with ER ALLERGY SHOTS   [facility] ([facility])    [address]  [facility] [address] [phone]              Who to contact     If you have questions or need follow up information about today's clinic visit or your schedule please contact [facility] directly at [phone].  Normal or non-critical lab and imaging results will be communicated to you by HashTiphart, letter or phone within 4 business days after the clinic has received the results. If you do not hear from us within 7 days, please contact the clinic through Cogenicst or phone. If you have a critical or abnormal lab result, we will notify you by phone as soon as possible.  Submit refill requests through Brandtology or call your pharmacy and they will forward the refill request to us. Please allow 3 business days for your refill to be completed.          Additional Information About Your Visit        HashTipharLive On The Go Information     Brandtology gives you secure access to your electronic health record. If you see a primary care provider, you can also send messages to your care team and make appointments. If you have questions, please call your primary care clinic.  If you do not have a primary care provider, please call  [phone] and they will assist you.        Care EveryWhere ID     This is your Care EveryWhere ID. This could be used by other organizations to access your [facility] medical records  [id number]         Blood Pressure from Last 3 Encounters:   01/24/18 (!) 141/93   12/29/17 134/74   12/29/17 147/88    Weight from Last 3 Encounters:   02/22/18 95.3 kg (210 lb)   01/24/18 99.7 kg (219 lb 14.4 oz)   12/29/17 97.7 kg (215 lb 6.4 oz)              We Performed the Following     Allergy Shot: One injection        Primary Care Provider Office Phone # Fax #    Shari Tonia Paredes [phone]       [address] [place] [id number]        Equal Access to Services     [facility] CenterSOPHIA : Hadii karina dowling hadasho Soreji, waaxda luqadaha, qaybta kaalmada adeegyada, nico guillen . So [facility] [phone].    ATENCIÓN: Si habla español, tiene a armas disposición servicios gratuitos de asistencia lingüística. Pascual al [phone].    We comply with applicable federal civil rights laws and [place] laws. We do not discriminate on the basis of race, color, national origin, age, disability, sex, sexual orientation, or gender identity.            Thank you!     Thank you for choosing [facility]  for your care. Our goal is always to provide you with excellent care. Hearing back from our patients is one way we can continue to improve our services. Please take a few minutes to complete the written survey that you may receive in the mail after your visit with us. Thank you!             Your Updated Medication List - Protect others around you: Learn how to safely use, store and throw away your medicines at www.disposemymeds.org.          This list is accurate as of 3/1/18  3:01 PM.  Always use your most recent med list.                   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 P.  1:100 w/v, HS  1.0 ml Dust Mites DF 30,000AU/mL, HS  0.3 ml Dust Mites DP. 30,000 AU/mL, HS  0.3 ml  Birch Mix PRW 1:20 w/v, HS  0.5 ml Grass Mix #7 100,000 BAU/mL, HS 0.4 ml Nettle 1:20 w/v, HS 0.5 ml Diluent: HSA qs to 5ml    Allergic rhinitis due to dust mite, Chronic seasonal allergic rhinitis due to pollen, Allergic rhinitis due to animal dander       amLODIPine 2.5 MG tablet    NORVASC    30 tablet    Take 1 tablet (2.5 mg) by mouth daily    Benign essential hypertension       aspirin 81 MG tablet     0    ONE DAILY    Other and unspecified hyperlipidemia, Family history of ischemic heart disease       CLEAR-ATADINE 10 MG tablet   Generic drug:  loratadine      Take 10 mg by mouth daily        EPINEPHrine 0.3 MG/0.3ML injection 2-pack    EPIPEN 2-MIGUELINA    2 each    Inject 0.3 mLs (0.3 mg) into the muscle once as needed for anaphylaxis    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 sting       ezetimibe 10 MG tablet    ZETIA    30 tablet    Take 1 tablet (10 mg) by mouth daily At night    Mixed hyperlipidemia       fluticasone 50 MCG/ACT spray    FLONASE    1 Bottle    Spray 2 sprays into both nostrils daily    Chronic seasonal allergic rhinitis due to pollen, House dust mite allergy, Allergic rhinitis due to animal dander       hydrochlorothiazide 25 MG tablet    HYDRODIURIL    90  tablet    Take 1 tablet (25 mg) by mouth daily    Benign essential hypertension       MULTIVITAL PO      Take 1 tablet by mouth daily Reported on 3/22/2017        olopatadine HCl 0.2 % Soln    PATADAY    2.5 mL    Place 1 drop into both eyes daily    Chronic seasonal allergic rhinitis due to pollen, House dust mite allergy       omeprazole 20 MG CR capsule    priLOSEC    90 capsule    TAKE ONE CAPSULE BY MOUTH EVERY DAY (TAKE 30 TO 60 MINUTES BEFORE A MEAL)    Gastroesophageal reflux disease without esophagitis       polyethylene glycol powder    MIRALAX    510 g    Take 17 g (1 capful) by mouth daily    Chronic constipation       STATIN NOT PRESCRIBED (INTENTIONAL)      by Other route continuous prn Reported on 4/6/2017    Mitral valve disorders(424.0), Hyperlipidemia LDL goal <100       temazepam 15 MG capsule    RESTORIL    30 capsule    Take 1 capsule (15 mg) by mouth nightly as needed for sleep    Anxiety       * Notice:  This list has 5 medication(s) that are the same as other medications prescribed for you. Read the directions carefully, and ask your doctor or other care provider to review them with you.

## 2018-03-06 ENCOUNTER — ALLIED HEALTH/NURSE VISIT (OUTPATIENT)
Dept: ALLERGY | Facility: OTHER | Age: 63
End: 2018-03-06
Payer: COMMERCIAL

## 2018-03-06 DIAGNOSIS — T63.441D TOXIC EFFECT OF VENOM OF BEES, UNINTENTIONAL, SUBSEQUENT ENCOUNTER: Primary | ICD-10-CM

## 2018-03-06 PROCEDURE — 99207 ZZC DROP WITH A PROCEDURE: CPT

## 2018-03-06 PROCEDURE — 95117 IMMUNOTHERAPY INJECTIONS: CPT

## 2018-03-06 NOTE — PROGRESS NOTES
Patient presented after waiting 30 minutes with no reaction to allergy injections. Discharged from clinic.  Angela Bone RN on 3/6/2018 at 11:20 AM

## 2018-03-06 NOTE — MR AVS SNAPSHOT
After Visit Summary   3/6/2018    Jose Angel Cha    MRN: [id number]           Patient Information     Date Of Birth          1955        Visit Information        Provider Department      3/6/2018 10:40 AM ER ALLERGY SHOTS [facility]        Today's Diagnoses     Toxic effect of venom of bees, unintentional, subsequent encounter    -  1       Follow-ups after your visit        Your next 10 appointments already scheduled     Mar 07, 2018 11:20 AM CST   LUIS F Extremity with Amanda K Hilligoss, PT   [facility] ([facility]  )    [address]  [facility] [address]   [phone]            Mar 08, 2018  2:30 PM CST   Nurse Only with ER ALLERGY SHOTS   [facility] ([facility]    [address]  [facility] [address]   [phone]            Mar 14, 2018 11:20 AM CDT   LUIS F Extremity with Amanda K Hilligoss, PT   [facility] ([facility]  )    [address]  [facility] [address]   [phone]            Mar 15, 2018  3:20 PM CDT   Nurse Only with ER ALLERGY SHOTS   [facility]    [address]  [facility] [address]   [phone]            Mar 22, 2018  3:10 PM CDT   Nurse Only with ER ALLERGY SHOTS   [facility] ([facility]    [address]  [facility] [address]   [phone]            Mar 26, 2018  7:50 AM CDT   LAB with PERALTA LAB   [facility] [place] ([facility])    [address] [place] [address] [address]  [place]  [place] [address] [phone]           Please do not eat 10-12 hours before your appointment if you are coming in fasting for labs on lipids, cholesterol, or glucose (sugar). This does not apply to pregnant women. Water, hot tea and black coffee (with  nothing added) are okay. Do not drink other fluids, diet soda or chew gum.            Mar 26, 2018  8:45 AM CDT   Return Visit with Ynes Chirinos MD   [facility] ([facility])    [address]  [place] [address]   [phone]            Apr 05, 2018  4:30 PM CDT   Nurse Only with ER ALLERGY SHOTS   [facility]    [address]  [facility] [address] [phone]            Apr 12, 2018  4:20 PM CDT   Nurse Only with ER ALLERGY SHOTS   [facility] ([facility])    [address]  [facility] [address] [phone]            May 08, 2018 10:50 AM CDT   Nurse Only with ER ALLERGY SHOTS   [facility] ([facility])    [address]  [facility] [address] [phone]              Who to contact     If you have questions or need follow up information about today's clinic visit or your schedule please contact [facility] directly at [phone].  Normal or non-critical lab and imaging results will be communicated to you by [email or web address], letter or phone within 4 business days after the clinic has received the results. If you do not hear from us within 7 days, please contact the clinic through [email or web address] or phone. If you have a critical or abnormal lab result, we will notify you by phone as soon as possible.  Submit refill requests through Nanotecture or call your pharmacy and they will forward the refill request to us. Please allow 3 business days for your refill to be completed.          Additional Information About Your Visit        Nanotecture Information     Nanotecture gives you secure access to your electronic health record. If you see a primary care provider, you can also send messages to your care team and make appointments. If you have questions, please call your primary care clinic.  If you  do not have a primary care provider, please call [phone] and they will assist you.        Care EveryWhere ID     This is your Care EveryWhere ID. This could be used by other organizations to access your [place] medical records  [id number]         Blood Pressure from Last 3 Encounters:   01/24/18 (!) 141/93   12/29/17 134/74   12/29/17 147/88    Weight from Last 3 Encounters:   02/22/18 95.3 kg (210 lb)   01/24/18 99.7 kg (219 lb 14.4 oz)   12/29/17 97.7 kg (215 lb 6.4 oz)              We Performed the Following     Allergy Shot: Two or more injections        Primary Care Provider Office Phone # Fax #    Shari Paredes [phone]       [address] [place] [address]        Equal Access to Services     [facility]: Hadii karina dowling hadasho Soomaali, waaxda luqadaha, qaybta kaalmada adejefryyada, nico mead haychaz guillen . So [facility] [phone].    ATENCIÓN: Si habla español, tiene a armas disposición servicios gratuitos de asistencia lingüística. Llame al [phone].    We comply with applicable federal civil rights laws and [place] laws. We do not discriminate on the basis of race, color, national origin, age, disability, sex, sexual orientation, or gender identity.            Thank you!     Thank you for choosing [facility]  for your care. Our goal is always to provide you with excellent care. Hearing back from our patients is one way we can continue to improve our services. Please take a few minutes to complete the written survey that you may receive in the mail after your visit with us. Thank you!             Your Updated Medication List - Protect others around you: Learn how to safely use, store and throw away your medicines at www.disposemymeds.org.          This list is accurate as of 3/6/18 11:20 AM.  Always use your most recent med list.                   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 P.  1:100 w/v, HS  1.0 ml Dust Mites DF 30,000AU/mL, HS  0.3 ml Dust Mites DP. 30,000 AU/mL, HS  0.3 ml  Birch Mix PRW 1:20 w/v, HS  0.5 ml Grass Mix #7 100,000 BAU/mL, HS 0.4 ml Nettle 1:20 w/v, HS 0.5 ml Diluent: HSA qs to 5ml    Allergic rhinitis due to dust mite, Chronic seasonal allergic rhinitis due to pollen, Allergic rhinitis due to animal dander       amLODIPine 2.5 MG tablet    NORVASC    30 tablet    Take 1 tablet (2.5 mg) by mouth daily    Benign essential hypertension       aspirin 81 MG tablet     0    ONE DAILY    Other and unspecified hyperlipidemia, Family history of ischemic heart disease       CLEAR-ATADINE 10 MG tablet   Generic drug:  loratadine      Take 10 mg by mouth daily        EPINEPHrine 0.3 MG/0.3ML injection 2-pack    EPIPEN 2-MIGUELINA    2 each    Inject 0.3 mLs (0.3 mg) into the muscle once as needed for anaphylaxis    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 sting       ezetimibe 10 MG tablet    ZETIA    30 tablet    Take 1 tablet (10 mg) by mouth daily At night    Mixed hyperlipidemia       fluticasone 50 MCG/ACT spray    FLONASE    1 Bottle    Spray 2 sprays into both nostrils daily    Chronic seasonal allergic rhinitis due to pollen, House dust mite allergy, Allergic rhinitis due to animal dander        hydrochlorothiazide 25 MG tablet    HYDRODIURIL    90 tablet    Take 1 tablet (25 mg) by mouth daily    Benign essential hypertension       MULTIVITAL PO      Take 1 tablet by mouth daily Reported on 3/22/2017        olopatadine HCl 0.2 % Soln    PATADAY    2.5 mL    Place 1 drop into both eyes daily    Chronic seasonal allergic rhinitis due to pollen, House dust mite allergy       omeprazole 20 MG CR capsule    priLOSEC    90 capsule    TAKE ONE CAPSULE BY MOUTH EVERY DAY (TAKE 30 TO 60 MINUTES BEFORE A MEAL)    Gastroesophageal reflux disease without esophagitis       polyethylene glycol powder    MIRALAX    510 g    Take 17 g (1 capful) by mouth daily    Chronic constipation       STATIN NOT PRESCRIBED (INTENTIONAL)      by Other route continuous prn Reported on 4/6/2017    Mitral valve disorders(424.0), Hyperlipidemia LDL goal <100       temazepam 15 MG capsule    RESTORIL    30 capsule    Take 1 capsule (15 mg) by mouth nightly as needed for sleep    Anxiety       * Notice:  This list has 5 medication(s) that are the same as other medications prescribed for you. Read the directions carefully, and ask your doctor or other care provider to review them with you.

## 2018-03-07 ENCOUNTER — THERAPY VISIT (OUTPATIENT)
Dept: PHYSICAL THERAPY | Facility: CLINIC | Age: 63
End: 2018-03-07
Payer: COMMERCIAL

## 2018-03-07 DIAGNOSIS — M25.552 HIP PAIN, LEFT: ICD-10-CM

## 2018-03-07 PROCEDURE — 97112 NEUROMUSCULAR REEDUCATION: CPT | Mod: GP | Performed by: PHYSICAL THERAPIST

## 2018-03-07 PROCEDURE — 97110 THERAPEUTIC EXERCISES: CPT | Mod: GP | Performed by: PHYSICAL THERAPIST

## 2018-03-08 ENCOUNTER — ALLIED HEALTH/NURSE VISIT (OUTPATIENT)
Dept: ALLERGY | Facility: OTHER | Age: 63
End: 2018-03-08
Payer: COMMERCIAL

## 2018-03-08 DIAGNOSIS — E78.2 MIXED HYPERLIPIDEMIA: Primary | ICD-10-CM

## 2018-03-08 DIAGNOSIS — J30.9 ALLERGIC RHINITIS: Primary | ICD-10-CM

## 2018-03-08 PROCEDURE — 95115 IMMUNOTHERAPY ONE INJECTION: CPT

## 2018-03-08 PROCEDURE — 99207 ZZC DROP WITH A PROCEDURE: CPT

## 2018-03-08 NOTE — MR AVS SNAPSHOT
After Visit Summary   3/8/2018    Jose Angel Cha    MRN: [id number]           Patient Information     Date Of Birth          1955        Visit Information        Provider Department      3/8/2018 2:30 PM ER ALLERGY SHOTS [facility]        Today's Diagnoses     Allergic rhinitis    -  1       Follow-ups after your visit        Your next 10 appointments already scheduled     Mar 14, 2018 11:20 AM CDT   LUIS F Extremity with Amanda K Hilligoss, PT   [place] [facility] ([facility]  )    [address]  [facility] [address]   [phone]            Mar 15, 2018  3:20 PM CDT   Nurse Only with ER ALLERGY SHOTS   [facility] ([facility])    [address]  [facility] [address]   [phone]            Mar 21, 2018 11:20 AM CDT   LUIS F Extremity with Amanda K Hilligoss, PT   [facility] ([facility]  )    [address]  [facility] [address]   [phone]            Mar 22, 2018  3:10 PM CDT   Nurse Only with ER ALLERGY SHOTS   [facility] ([facility])    [address]  [facility] [address]   [phone]            Mar 26, 2018  7:50 AM CDT   LAB with PERALTA LAB   [facility] [place] ([facility])    [address] [place] [address] [address]  [facility] [address] [phone]           Please do not eat 10-12 hours before your appointment if you are coming in fasting for labs on lipids, cholesterol, or glucose (sugar). This does not apply to pregnant women. Water, hot tea and black coffee (with nothing added) are okay. Do not drink other fluids, diet soda or chew gum.            Mar 26, 2018  8:45 AM CDT   Return Visit with Ynes Chirinos MD   [facility]   [place] ([facility])    [address] [place] [address]  [address]  [place] [address]   [phone]            Mar 29, 2018  2:10 PM CDT   Nurse Only with ER ALLERGY SHOTS   [facility]    [address]  [facility] [address]   [phone]            Apr 05, 2018  4:30 PM CDT   Nurse Only with ER ALLERGY SHOTS   [facility]    [address]  [facility] [address]   [phone]            Apr 12, 2018  4:20 PM CDT   Nurse Only with ER ALLERGY SHOTS   [facility]    [address]  [facility] [address]   [phone]            Apr 19, 2018  2:10 PM CDT   Nurse Only with ER ALLERGY SHOTS   [facility]    [address]  [facility] [address]   [phone]              Future tests that were ordered for you today     Open Future Orders        Priority Expected Expires Ordered    Lipid Profile Routine 3/26/2018 3/8/2019 3/8/2018            Who to contact     If you have questions or need follow up information about today's clinic visit or your schedule please contact [facility] directly at [phone].  Normal or non-critical lab and imaging results will be communicated to you by Bee Therehart, letter or phone within 4 business days after the clinic has received the results. If you do not hear from us within 7 days, please contact the clinic through [email or web address] or phone. If you have a critical or abnormal lab result, we will notify you by phone as soon as possible.  Submit refill requests through Wellcoin or call your pharmacy and they will forward the refill request to us. Please allow 3 business days for your refill to be completed.          Additional Information About Your Visit        Wellcoin Information     Wellcoin gives you secure access to your electronic health record. If you see a primary care provider, you can also send  messages to your care team and make appointments. If you have questions, please call your primary care clinic.  If you do not have a primary care provider, please call [phone] and they will assist you.        Care EveryWhere ID     This is your Care EveryWhere ID. This could be used by other organizations to access your [place] medical records  [id number]         Blood Pressure from Last 3 Encounters:   01/24/18 (!) 141/93   12/29/17 134/74   12/29/17 147/88    Weight from Last 3 Encounters:   02/22/18 95.3 kg (210 lb)   01/24/18 99.7 kg (219 lb 14.4 oz)   12/29/17 97.7 kg (215 lb 6.4 oz)              We Performed the Following     Allergy Shot: One injection        Primary Care Provider Office Phone # Fax #    Shari Paredes [phone]       [address] [facility] [address]        Equal Access to Services     ANJEL TAFOYA : Hadii aad ku hadasho Soomaali, waaxda luqadaha, qaybta kaalmada adeegyada, waxay lokiin hayeveretten sandra guillen . So [facility] [phone].    ATENCIÓN: Si gaston figueroa, tiene a armas disposición servicios gratuitos de asistencia lingüística. Llame al [phone].    We comply with applicable federal civil rights laws and [place] laws. We do not discriminate on the basis of race, color, national origin, age, disability, sex, sexual orientation, or gender identity.            Thank you!     Thank you for choosing [facility]  for your care. Our goal is always to provide you with excellent care. Hearing back from our patients is one way we can continue to improve our services. Please take a few minutes to complete the written survey that you may receive in the mail after your visit with us. Thank you!             Your Updated Medication List - Protect others around you: Learn how to safely use, store and throw away your medicines at www.disposemymeds.org.          This list is accurate as of 3/8/18  3:03 PM.  Always use your most recent med list.                    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 P.  1:100 w/v, HS  1.0 ml Dust Mites DF 30,000AU/mL, HS  0.3 ml Dust Mites DP. 30,000 AU/mL, HS  0.3 ml  Birch Mix PRW 1:20 w/v, HS  0.5 ml Grass Mix #7 100,000 BAU/mL, HS 0.4 ml Nettle 1:20 w/v, HS 0.5 ml Diluent: HSA qs to 5ml    Allergic rhinitis due to dust mite, Chronic seasonal allergic rhinitis due to pollen, Allergic rhinitis due to animal dander       amLODIPine 2.5 MG tablet    NORVASC    30 tablet    Take 1 tablet (2.5 mg) by mouth daily    Benign essential hypertension       aspirin 81 MG tablet     0    ONE DAILY    Other and unspecified hyperlipidemia, Family history of ischemic heart disease       CLEAR-ATADINE 10 MG tablet   Generic drug:  loratadine      Take 10 mg by mouth daily        EPINEPHrine 0.3 MG/0.3ML injection 2-pack    EPIPEN 2-MIGUELINA    2 each    Inject 0.3 mLs (0.3 mg) into the muscle once as needed for anaphylaxis    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 sting       ezetimibe 10 MG tablet    ZETIA    30 tablet    Take 1 tablet (10 mg) by mouth daily At night    Mixed hyperlipidemia       fluticasone 50 MCG/ACT spray    FLONASE    1 Bottle    Spray 2 sprays into both nostrils daily    Chronic seasonal  allergic rhinitis due to pollen, House dust mite allergy, Allergic rhinitis due to animal dander       hydrochlorothiazide 25 MG tablet    HYDRODIURIL    90 tablet    Take 1 tablet (25 mg) by mouth daily    Benign essential hypertension       MULTIVITAL PO      Take 1 tablet by mouth daily Reported on 3/22/2017        olopatadine HCl 0.2 % Soln    PATADAY    2.5 mL    Place 1 drop into both eyes daily    Chronic seasonal allergic rhinitis due to pollen, House dust mite allergy       omeprazole 20 MG CR capsule    priLOSEC    90 capsule    TAKE ONE CAPSULE BY MOUTH EVERY DAY (TAKE 30 TO 60 MINUTES BEFORE A MEAL)    Gastroesophageal reflux disease without esophagitis       polyethylene glycol powder    MIRALAX    510 g    Take 17 g (1 capful) by mouth daily    Chronic constipation       STATIN NOT PRESCRIBED (INTENTIONAL)      by Other route continuous prn Reported on 4/6/2017    Mitral valve disorders(424.0), Hyperlipidemia LDL goal <100       temazepam 15 MG capsule    RESTORIL    30 capsule    Take 1 capsule (15 mg) by mouth nightly as needed for sleep    Anxiety       * Notice:  This list has 5 medication(s) that are the same as other medications prescribed for you. Read the directions carefully, and ask your doctor or other care provider to review them with you.

## 2018-03-13 DIAGNOSIS — E78.2 MIXED HYPERLIPIDEMIA: ICD-10-CM

## 2018-03-13 LAB
CHOLEST SERPL-MCNC: 182 MG/DL
HDLC SERPL-MCNC: 47 MG/DL
LDLC SERPL CALC-MCNC: 106 MG/DL
NONHDLC SERPL-MCNC: 135 MG/DL
TRIGL SERPL-MCNC: 145 MG/DL

## 2018-03-13 PROCEDURE — 80061 LIPID PANEL: CPT | Performed by: INTERNAL MEDICINE

## 2018-03-13 PROCEDURE — 36415 COLL VENOUS BLD VENIPUNCTURE: CPT | Performed by: INTERNAL MEDICINE

## 2018-03-14 ENCOUNTER — THERAPY VISIT (OUTPATIENT)
Dept: PHYSICAL THERAPY | Facility: CLINIC | Age: 63
End: 2018-03-14
Payer: COMMERCIAL

## 2018-03-14 DIAGNOSIS — M25.552 HIP PAIN, LEFT: ICD-10-CM

## 2018-03-14 PROCEDURE — 97110 THERAPEUTIC EXERCISES: CPT | Mod: GP | Performed by: PHYSICAL THERAPIST

## 2018-03-14 PROCEDURE — 97140 MANUAL THERAPY 1/> REGIONS: CPT | Mod: GP | Performed by: PHYSICAL THERAPIST

## 2018-03-14 PROCEDURE — 97112 NEUROMUSCULAR REEDUCATION: CPT | Mod: GP | Performed by: PHYSICAL THERAPIST

## 2018-03-14 ASSESSMENT — ACTIVITIES OF DAILY LIVING (ADL)
GOING_UP_1_FLIGHT_OF_STAIRS: NO DIFFICULTY AT ALL
HOS_ADL_COUNT: 17
HEAVY_WORK: MODERATE DIFFICULTY
HOS_ADL_SCORE(%): 77.94
GOING_DOWN_1_FLIGHT_OF_STAIRS: NO DIFFICULTY AT ALL
RECREATIONAL_ACTIVITIES: SLIGHT DIFFICULTY
WALKING_UP_STEEP_HILLS: SLIGHT DIFFICULTY
STEPPING_UP_AND_DOWN_CURBS: NO DIFFICULTY AT ALL
GETTING_INTO_AND_OUT_OF_AN_AVERAGE_CAR: NO DIFFICULTY AT ALL
HOS_ADL_ITEM_SCORE_TOTAL: 53
SITTING_FOR_15_MINUTES: MODERATE DIFFICULTY
WALKING_APPROXIMATELY_10_MINUTES: SLIGHT DIFFICULTY
HOS_ADL_HIGHEST_POTENTIAL_SCORE: 68
HOW_WOULD_YOU_RATE_YOUR_CURRENT_LEVEL_OF_FUNCTION_DURING_YOUR_USUAL_ACTIVITIES_OF_DAILY_LIVING_FROM_0_TO_100_WITH_100_BEING_YOUR_LEVEL_OF_FUNCTION_PRIOR_TO_YOUR_HIP_PROBLEM_AND_0_BEING_THE_INABILITY_TO_PERFORM_ANY_OF_YOUR_USUAL_DAILY_ACTIVITIES?: 90
ROLLING_OVER_IN_BED: SLIGHT DIFFICULTY
WALKING_INITIALLY: SLIGHT DIFFICULTY
WALKING_DOWN_STEEP_HILLS: SLIGHT DIFFICULTY
PUTTING_ON_SOCKS_AND_SHOES: NO DIFFICULTY AT ALL
STANDING_FOR_15_MINUTES: SLIGHT DIFFICULTY
LIGHT_TO_MODERATE_WORK: SLIGHT DIFFICULTY
DEEP_SQUATTING: MODERATE DIFFICULTY
WALKING_15_MINUTES_OR_GREATER: SLIGHT DIFFICULTY
GETTING_INTO_AND_OUT_OF_A_BATHTUB: SLIGHT DIFFICULTY
TWISTING/PIVOTING_ON_INVOLVED_LEG: SLIGHT DIFFICULTY

## 2018-03-14 NOTE — PROGRESS NOTES
Subjective:  HPI                    Objective:  System    Physical Exam    General     ROS    Assessment/Plan:    DISCHARGE REPORT    Progress reporting period is from 2/22/18 to 3/14/18.       SUBJECTIVE  Has been havign much les pain over past 2 weeks since getting injection 3/1/18. Feels deep pain in groin is no longer present. Does still have lateral pain in hip intermittently, primarily w/ prolonged positions such as sleeping or sitting. Does feel stretching and icing are helping. Feels he is ready to continue progressing independently w/ HEP for now.     Current Pain level: 0/10.     Initial Pain level: 10/10 (at worst; 4-6/10 at start of PT).   Changes in function:  Yes (See Goal flowsheet attached for changes in current functional level)  Adverse reaction to treatment or activity: None    OBJECTIVE  Lumbar AROM: Flex to ankles w/o pain at end range or pain on return, Ext 25% limited, SB L to knee, R to knee; Hip strength: Flex, Ext, Abd WNL/EQ B; (-) Gillet test B (+stiffnes when lifting L); (-) YUNIOR B; Tender/hypertonic L ITB, TFL, Glute med, Piriformis; (+) Bursitis L hip     ASSESSMENT/PLAN  Updated problem list and treatment plan: Diagnosis 1:  L Hip pain  Pain -  self management and home program  Decreased ROM/flexibility - home program  Impaired muscle performance - home program  Decreased function - home program  STG/LTGs have been met or progress has been made towards goals:  Yes (See Goal flow sheet completed today.)  Assessment of Progress: Patient is meeting short term goals and is progressing towards long term goals.  Self Management Plans:  Patient is independent in a home treatment program.  Patient  has been instructed in self management of symptoms.  I have re-evaluated this patient and find that PT intervention is no longer required to meet STG/LTG.    Recommendations:  This patient is ready to be discharged from therapy and continue their home treatment program.    Please refer to the  daily flowsheet for treatment today, total treatment time and time spent performing 1:1 timed codes.

## 2018-03-14 NOTE — MR AVS SNAPSHOT
After Visit Summary   3/14/2018    Jose Angel Cha    MRN: [id number]           Patient Information     Date Of Birth          1955        Visit Information        Provider Department      3/14/2018 11:20 AM Hilligoss, Christine FREGOSO, PT [facility] [place] [facility]        Today's Diagnoses     Hip pain, left           Follow-ups after your visit        Your next 10 appointments already scheduled     Mar 15, 2018  3:20 PM CDT   Nurse Only with ER ALLERGY SHOTS   [facility] ([facility])    [address]  [facility] [address]   [phone]            Mar 21, 2018 11:20 AM CDT   LUIS F Extremity with Amanda K Hilligoss, PT   [place] [facility] ([facility]  )    [address]  [facility] [address]   [phone]            Mar 22, 2018  3:10 PM CDT   Nurse Only with ER ALLERGY SHOTS   [facility] ([facility])    [address]  [facility] [address]   [phone]            Mar 26, 2018  8:45 AM CDT   Return Visit with Ynes Chirinos MD   [facility] ([facility])    [address] [place] [address] [address]  [facility] [address]   [phone]            Mar 29, 2018  2:10 PM CDT   Nurse Only with ER ALLERGY SHOTS   [facility] ([facility])    [address]  [facility] [address]   [phone]            Apr 05, 2018  4:30 PM CDT   Nurse Only with ER ALLERGY SHOTS   [facility] ([facility])    [address]  [facility] [address]   [phone]            Apr 12, 2018  4:20 PM CDT   Nurse Only with ER ALLERGY SHOTS   [facility] ([facility])    [address]  [facility] [address]   [phone]            Apr 19, 2018  2:10 PM CDT   Nurse Only  with ER ALLERGY SHOTS   [facility]    [address]  [facility] [address] [phone]            May 08, 2018 10:50 AM CDT   Nurse Only with ER ALLERGY SHOTS   [facility]    [address]  [facility] [address]   [phone]              Who to contact     If you have questions or need follow up information about today's clinic visit or your schedule please contact [facility] directly at [phone].  Normal or non-critical lab and imaging results will be communicated to you by [employer], letter or phone within 4 business days after the clinic has received the results. If you do not hear from us within 7 days, please contact the clinic through [email or web address] or phone. If you have a critical or abnormal lab result, we will notify you by phone as soon as possible.  Submit refill requests through [employer] or call your pharmacy and they will forward the refill request to us. Please allow 3 business days for your refill to be completed.          Additional Information About Your Visit        [employer] Information     Your[employer] gives you secure access to your electronic health record. If you see a primary care provider, you can also send messages to your care team and make appointments. If you have questions, please call your primary care clinic.  If you do not have a primary care provider, please call [phone] and they will assist you.        Care EveryWhere ID     This is your Care EveryWhere ID. This could be used by other organizations to access your [place] medical records  [id number]         Blood Pressure from Last 3 Encounters:   01/24/18 (!) 141/93   12/29/17 134/74   12/29/17 147/88    Weight from Last 3 Encounters:   02/22/18 95.3 kg (210 lb)   01/24/18 99.7 kg (219 lb 14.4 oz)   12/29/17 97.7 kg (215 lb 6.4 oz)              We Performed the  Following     LUIS F PROGRESS NOTES REPORT     MANUAL THER TECH,1+REGIONS,EA 15 MIN     NEUROMUSCULAR RE-EDUCATION     THERAPEUTIC EXERCISES        Primary Care Provider Office Phone # Fax #    Shari Paredes [phone]       [address] [place] [address]        Equal Access to Services     [facility] MICHELET : Hadii aad ku hadasho Soomaali, waaxda luqadaha, qaybta kaalmada adeegyada, waxay idiin hayaan adejefry curtiszully lamarlee . So [facility] [phone].    ATENCIÓN: Si habla español, tiene a armas disposición servicios gratuitos de asistencia lingüística. Nirmalame al [phone].    We comply with applicable federal civil rights laws and [place] laws. We do not discriminate on the basis of race, color, national origin, age, disability, sex, sexual orientation, or gender identity.            Thank you!     Thank you for choosing [place] [facility]  for your care. Our goal is always to provide you with excellent care. Hearing back from our patients is one way we can continue to improve our services. Please take a few minutes to complete the written survey that you may receive in the mail after your visit with us. Thank you!             Your Updated Medication List - Protect others around you: Learn how to safely use, store and throw away your medicines at www.disposemymeds.org.          This list is accurate as of 3/14/18 12:23 PM.  Always use your most recent med list.                   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 P.  1:100 w/v, HS  1.0 ml Dust Mites DF 30,000AU/mL, HS  0.3 ml Dust Mites DP. 30,000 AU/mL, HS  0.3 ml  Birch Mix PRW 1:20 w/v, HS  0.5 ml Grass Mix #7 100,000 BAU/mL, HS 0.4 ml Nettle 1:20 w/v, HS 0.5 ml Diluent: HSA qs to 5ml    Allergic rhinitis due to dust mite, Chronic seasonal allergic rhinitis due to pollen, Allergic rhinitis due to animal dander       amLODIPine 2.5 MG tablet    NORVASC    30 tablet    Take 1 tablet (2.5 mg) by mouth daily    Benign essential hypertension       aspirin 81 MG tablet     0    ONE DAILY    Other and unspecified hyperlipidemia, Family history of ischemic heart disease       CLEAR-ATADINE 10 MG tablet   Generic drug:  loratadine      Take 10 mg by mouth daily        EPINEPHrine 0.3 MG/0.3ML injection 2-pack    EPIPEN 2-MIGUELINA    2 each    Inject 0.3 mLs (0.3 mg) into the muscle once as needed for anaphylaxis    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 sting       ezetimibe 10 MG tablet    ZETIA    30 tablet    Take 1 tablet (10 mg) by mouth daily At night    Mixed hyperlipidemia       fluticasone 50 MCG/ACT spray    FLONASE    1 Bottle    Spray 2 sprays into both nostrils daily    Chronic seasonal allergic rhinitis due to pollen, House dust mite allergy, Allergic rhinitis due to animal dander       hydrochlorothiazide 25 MG tablet    HYDRODIURIL    90 tablet    Take 1 tablet (25 mg) by mouth daily    Benign essential hypertension       MULTIVITAL PO      Take 1 tablet by mouth daily Reported on 3/22/2017        olopatadine HCl 0.2 % Soln    PATADAY    2.5 mL    Place 1 drop into both eyes daily    Chronic seasonal allergic rhinitis due to pollen, House dust mite allergy       omeprazole 20  MG CR capsule    priLOSEC    90 capsule    TAKE ONE CAPSULE BY MOUTH EVERY DAY (TAKE 30 TO 60 MINUTES BEFORE A MEAL)    Gastroesophageal reflux disease without esophagitis       polyethylene glycol powder    MIRALAX    510 g    Take 17 g (1 capful) by mouth daily    Chronic constipation       STATIN NOT PRESCRIBED (INTENTIONAL)      by Other route continuous prn Reported on 4/6/2017    Mitral valve disorders(424.0), Hyperlipidemia LDL goal <100       temazepam 15 MG capsule    RESTORIL    30 capsule    Take 1 capsule (15 mg) by mouth nightly as needed for sleep    Anxiety       * Notice:  This list has 5 medication(s) that are the same as other medications prescribed for you. Read the directions carefully, and ask your doctor or other care provider to review them with you.

## 2018-03-15 ENCOUNTER — ALLIED HEALTH/NURSE VISIT (OUTPATIENT)
Dept: ALLERGY | Facility: OTHER | Age: 63
End: 2018-03-15
Payer: COMMERCIAL

## 2018-03-15 DIAGNOSIS — J30.9 ALLERGIC RHINITIS: Primary | ICD-10-CM

## 2018-03-15 PROCEDURE — 95115 IMMUNOTHERAPY ONE INJECTION: CPT

## 2018-03-15 PROCEDURE — 99207 ZZC DROP WITH A PROCEDURE: CPT

## 2018-03-15 NOTE — MR AVS SNAPSHOT
After Visit Summary   3/15/2018    Jose Angel Cha    MRN: [id number]           Patient Information     Date Of Birth          1955        Visit Information        Provider Department      3/15/2018 2:20 PM ER ALLERGY SHOTS [facility]        Today's Diagnoses     Allergic rhinitis    -  1       Follow-ups after your visit        Your next 10 appointments already scheduled     Mar 21, 2018 11:20 AM CDT   LUIS F Extremity with Amanda K Hilligoss, PT   [place] [facility] ([facility]    [address]  [facility] [address]   [phone]            Mar 22, 2018  3:10 PM CDT   Nurse Only with ER ALLERGY SHOTS   [facility] ([facility])    [address]  [facility] [address]   [phone]            Mar 26, 2018  8:45 AM CDT   Return Visit with Ynes Chirinos MD   [facility] ([facility])    [address] [place] [address] [address]  [facility] [address]   [phone]            Mar 29, 2018  2:10 PM CDT   Nurse Only with ER ALLERGY SHOTS   [facility] ([facility])    [address]  [facility] [address]   [phone]            Apr 05, 2018  4:30 PM CDT   Nurse Only with ER ALLERGY SHOTS   [facility] ([facility])    [address]  [facility] [address]   [phone]            Apr 12, 2018  4:20 PM CDT   Nurse Only with ER ALLERGY SHOTS   [facility] ([facility])    [address]  [facility] [address]   [phone]            Apr 19, 2018  2:10 PM CDT   Nurse Only with ER ALLERGY SHOTS   [facility]    [address]  [facility] [address]   [phone]            May 08, 2018 10:50 AM CDT   Nurse Only with ER ALLERGY SHOTS   [facility] ([facility])    [address]  [facility] [address] [phone]              Who to contact     If you have questions or need follow up information about today's clinic visit or your schedule please contact [facility] directly at [phone].  Normal or non-critical lab and imaging results will be communicated to you by MyChart, letter or phone within 4 business days after the clinic has received the results. If you do not hear from us within 7 days, please contact the clinic through MyChart or phone. If you have a critical or abnormal lab result, we will notify you by phone as soon as possible.  Submit refill requests through [employer] or call your pharmacy and they will forward the refill request to us. Please allow 3 business days for your refill to be completed.          Additional Information About Your Visit        [employer] Information     [employer] gives you secure access to your electronic health record. If you see a primary care provider, you can also send messages to your care team and make appointments. If you have questions, please call your primary care clinic.  If you do not have a primary care provider, please call [phone] and they will assist you.        Care EveryWhere ID     This is your Care EveryWhere ID. This could be used by other organizations to access your [place] medical records  [id number]         Blood Pressure from Last 3 Encounters:   01/24/18 (!) 141/93   12/29/17 134/74   12/29/17 147/88    Weight from Last 3 Encounters:   02/22/18 95.3 kg (210 lb)   01/24/18 99.7 kg (219 lb 14.4 oz)   12/29/17 97.7 kg (215 lb 6.4 oz)              We Performed the Following     Allergy Shot: One injection        Primary Care Provider Office Phone # Fax #    Shari Paredes [phone]       [address] [facility] [address]        Equal Access to Services     ANJEL TAFOYA : Ciaran Castañeda  waaxda luqadaha, qaybta kaalmada neeru, nico garciaaan ah. So [facility] [phone].    ATENCIÓN: Si gaston figueroa, tiene a armas disposición servicios gratuitos de asistencia lingüística. Pascual al [phone].    We comply with applicable federal civil rights laws and [place] laws. We do not discriminate on the basis of race, color, national origin, age, disability, sex, sexual orientation, or gender identity.            Thank you!     Thank you for choosing [facility]  for your care. Our goal is always to provide you with excellent care. Hearing back from our patients is one way we can continue to improve our services. Please take a few minutes to complete the written survey that you may receive in the mail after your visit with us. Thank you!             Your Updated Medication List - Protect others around you: Learn how to safely use, store and throw away your medicines at www.disposemymeds.org.          This list is accurate as of 3/15/18  3:38 PM.  Always use your most recent med list.                   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 P.  1:100 w/v, HS  1.0 ml Dust Mites DF 30,000AU/mL, HS  0.3 ml Dust Mites  DP. 30,000 AU/mL, HS  0.3 ml  Birch Mix PRW 1:20 w/v, HS  0.5 ml Grass Mix #7 100,000 BAU/mL, HS 0.4 ml Nettle 1:20 w/v, HS 0.5 ml Diluent: HSA qs to 5ml    Allergic rhinitis due to dust mite, Chronic seasonal allergic rhinitis due to pollen, Allergic rhinitis due to animal dander       amLODIPine 2.5 MG tablet    NORVASC    30 tablet    Take 1 tablet (2.5 mg) by mouth daily    Benign essential hypertension       aspirin 81 MG tablet     0    ONE DAILY    Other and unspecified hyperlipidemia, Family history of ischemic heart disease       CLEAR-ATADINE 10 MG tablet   Generic drug:  loratadine      Take 10 mg by mouth daily        EPINEPHrine 0.3 MG/0.3ML injection 2-pack    EPIPEN 2-MIGUELINA    2 each    Inject 0.3 mLs (0.3 mg) into the muscle once as needed for anaphylaxis    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 sting       ezetimibe 10 MG tablet    ZETIA    30 tablet    Take 1 tablet (10 mg) by mouth daily At night    Mixed hyperlipidemia       fluticasone 50 MCG/ACT spray    FLONASE    1 Bottle    Spray 2 sprays into both nostrils daily    Chronic seasonal allergic rhinitis due to pollen, House dust mite allergy, Allergic rhinitis due to animal dander       hydrochlorothiazide 25 MG tablet    HYDRODIURIL    90 tablet    Take 1 tablet (25 mg) by mouth daily    Benign essential hypertension       MULTIVITAL PO      Take 1 tablet by mouth daily Reported on 3/22/2017        olopatadine HCl 0.2 % Soln    PATADAY    2.5 mL    Place 1 drop into both eyes daily    Chronic seasonal allergic rhinitis due to pollen, House dust mite allergy       omeprazole 20 MG CR capsule    priLOSEC    90 capsule    TAKE ONE CAPSULE BY MOUTH EVERY DAY (TAKE 30 TO 60 MINUTES BEFORE A MEAL)    Gastroesophageal reflux disease without esophagitis       polyethylene glycol powder    MIRALAX    510 g    Take 17 g (1 capful) by mouth daily    Chronic constipation       STATIN NOT PRESCRIBED (INTENTIONAL)      by Other route continuous prn Reported on 4/6/2017    Mitral  valve disorders(424.0), Hyperlipidemia LDL goal <100       temazepam 15 MG capsule    RESTORIL    30 capsule    Take 1 capsule (15 mg) by mouth nightly as needed for sleep    Anxiety       * Notice:  This list has 5 medication(s) that are the same as other medications prescribed for you. Read the directions carefully, and ask your doctor or other care provider to review them with you.

## 2018-03-16 DIAGNOSIS — I10 BENIGN ESSENTIAL HYPERTENSION: ICD-10-CM

## 2018-03-16 RX ORDER — AMLODIPINE BESYLATE 2.5 MG/1
2.5 TABLET ORAL DAILY
Qty: 90 TABLET | Refills: 2 | Status: SHIPPED | OUTPATIENT
Start: 2018-03-16 | End: 2018-12-17

## 2018-03-22 ENCOUNTER — ALLIED HEALTH/NURSE VISIT (OUTPATIENT)
Dept: ALLERGY | Facility: OTHER | Age: 63
End: 2018-03-22
Payer: COMMERCIAL

## 2018-03-22 DIAGNOSIS — J30.9 ALLERGIC RHINITIS: Primary | ICD-10-CM

## 2018-03-22 PROCEDURE — 95115 IMMUNOTHERAPY ONE INJECTION: CPT

## 2018-03-22 PROCEDURE — 99207 ZZC DROP WITH A PROCEDURE: CPT

## 2018-03-22 NOTE — MR AVS SNAPSHOT
After Visit Summary   3/22/2018    Jose Angel Cha    MRN: [id number]           Patient Information     Date Of Birth          1955        Visit Information        Provider Department      3/22/2018 3:10 PM ER ALLERGY SHOTS [facility]        Today's Diagnoses     Allergic rhinitis    -  1       Follow-ups after your visit        Your next 10 appointments already scheduled     Mar 26, 2018  8:45 AM CDT   Return Visit with Ynes Chirinos MD   [facility] ([facility])    [address] [place] [address] [address]  [facility] [address]   [phone] OPT 2            Mar 29, 2018  2:10 PM CDT   Nurse Only with ER ALLERGY SHOTS   [facility]    [address]  [facility] [address]   [phone]            Apr 05, 2018  4:30 PM CDT   Nurse Only with ER ALLERGY SHOTS   [facility]    [address]  [facility] [address]   [phone]            Apr 12, 2018  4:20 PM CDT   Nurse Only with ER ALLERGY SHOTS   [facility] ([facility])    [address]  [facility] [address]   [phone]            Apr 19, 2018  2:10 PM CDT   Nurse Only with ER ALLERGY SHOTS   [facility] ([facility])    [address]  [facility] [address]   [phone]            May 08, 2018 10:50 AM CDT   Nurse Only with ER ALLERGY SHOTS   [facility]    [address]  [facility] [address]   [phone]              Who to contact     If you have questions or need follow up information about today's clinic visit or your schedule please contact [facility] directly at [phone].  Normal or non-critical lab and imaging results will be communicated to you by MyChart, letter or phone within 4  business days after the clinic has received the results. If you do not hear from us within 7 days, please contact the clinic through [employer] or phone. If you have a critical or abnormal lab result, we will notify you by phone as soon as possible.  Submit refill requests through [employer] or call your pharmacy and they will forward the refill request to us. Please allow 3 business days for your refill to be completed.          Additional Information About Your Visit        [employer] Information     [employer] gives you secure access to your electronic health record. If you see a primary care provider, you can also send messages to your care team and make appointments. If you have questions, please call your primary care clinic.  If you do not have a primary care provider, please call [phone] and they will assist you.        Care EveryWhere ID     This is your Care EveryWhere ID. This could be used by other organizations to access your [place] medical records  [id number]         Blood Pressure from Last 3 Encounters:   01/24/18 (!) 141/93   12/29/17 134/74   12/29/17 147/88    Weight from Last 3 Encounters:   02/22/18 95.3 kg (210 lb)   01/24/18 99.7 kg (219 lb 14.4 oz)   12/29/17 97.7 kg (215 lb 6.4 oz)              We Performed the Following     Allergy Shot: One injection        Primary Care Provider Office Phone # Fax #    Shari Tonia Paredes [phone]       [address] [facility] [address]        Equal Access to Services     RANDELL TAFOYA AH: Hadii aad ku hadasho Somackali, waaxda luqadaha, qaybta kaalmada sandrayarichard, nico guillen . So [facility] [phone].    ATENCIÓN: Si habla español, tiene a armas disposición servicios gratuitos de asistencia lingüística. Pascual al [phone].    We comply with applicable federal civil rights laws and [place] laws. We do not discriminate on the basis of race, color, national origin, age, disability, sex, sexual orientation, or gender  identity.            Thank you!     Thank you for choosing [facility]  for your care. Our goal is always to provide you with excellent care. Hearing back from our patients is one way we can continue to improve our services. Please take a few minutes to complete the written survey that you may receive in the mail after your visit with us. Thank you!             Your Updated Medication List - Protect others around you: Learn how to safely use, store and throw away your medicines at www.disposemymeds.org.          This list is accurate as of 3/22/18  4:07 PM.  Always use your most recent med list.                   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 P.  1:100 w/v, HS  1.0 ml Dust Mites DF 30,000AU/mL, HS  0.3 ml Dust Mites DP. 30,000 AU/mL, HS  0.3 ml  Birch Mix PRW 1:20 w/v, HS  0.5 ml Grass Mix #7 100,000 BAU/mL, HS 0.4 ml Nettle 1:20 w/v, HS 0.5 ml Diluent: HSA qs to 5ml    Allergic rhinitis due to dust mite, Chronic seasonal allergic rhinitis due to pollen, Allergic rhinitis due to animal dander       amLODIPine 2.5 MG tablet    NORVASC    90 tablet    Take 1 tablet (2.5 mg) by mouth daily    Benign essential hypertension       aspirin 81 MG  tablet     0    ONE DAILY    Other and unspecified hyperlipidemia, Family history of ischemic heart disease       CLEAR-ATADINE 10 MG tablet   Generic drug:  loratadine      Take 10 mg by mouth daily        EPINEPHrine 0.3 MG/0.3ML injection 2-pack    EPIPEN 2-MIGUELINA    2 each    Inject 0.3 mLs (0.3 mg) into the muscle once as needed for anaphylaxis    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 sting       ezetimibe 10 MG tablet    ZETIA    30 tablet    Take 1 tablet (10 mg) by mouth daily At night    Mixed hyperlipidemia       fluticasone 50 MCG/ACT spray    FLONASE    1 Bottle    Spray 2 sprays into both nostrils daily    Chronic seasonal allergic rhinitis due to pollen, House dust mite allergy, Allergic rhinitis due to animal dander       hydrochlorothiazide 25 MG tablet    HYDRODIURIL    90 tablet    Take 1 tablet (25 mg) by mouth daily    Benign essential hypertension       MULTIVITAL PO      Take 1 tablet by mouth daily Reported on 3/22/2017        olopatadine HCl 0.2 % Soln    PATADAY    2.5 mL    Place 1 drop into both eyes daily    Chronic seasonal allergic rhinitis due to pollen, House dust mite allergy       omeprazole 20 MG CR capsule    priLOSEC    90 capsule    TAKE ONE CAPSULE BY MOUTH EVERY DAY (TAKE 30 TO 60 MINUTES BEFORE A MEAL)    Gastroesophageal reflux disease without esophagitis       polyethylene glycol powder    MIRALAX    510 g    Take 17 g (1 capful) by mouth daily    Chronic constipation       STATIN NOT PRESCRIBED (INTENTIONAL)      by Other route continuous prn Reported on 4/6/2017    Mitral valve disorders(424.0), Hyperlipidemia LDL goal <100       temazepam 15 MG capsule    RESTORIL    30 capsule    Take 1 capsule (15 mg) by mouth nightly as needed for sleep    Anxiety       * Notice:  This list has 5 medication(s) that are the same as other medications prescribed for you. Read the directions carefully, and ask your doctor or other care provider to review them with you.

## 2018-03-26 ENCOUNTER — OFFICE VISIT (OUTPATIENT)
Dept: CARDIOLOGY | Facility: CLINIC | Age: 63
End: 2018-03-26
Attending: INTERNAL MEDICINE
Payer: COMMERCIAL

## 2018-03-26 VITALS
HEIGHT: 70 IN | SYSTOLIC BLOOD PRESSURE: 123 MMHG | WEIGHT: 205 LBS | DIASTOLIC BLOOD PRESSURE: 86 MMHG | HEART RATE: 80 BPM | BODY MASS INDEX: 29.35 KG/M2

## 2018-03-26 DIAGNOSIS — E78.2 MIXED HYPERLIPIDEMIA: ICD-10-CM

## 2018-03-26 DIAGNOSIS — Z98.890 H/O MITRAL VALVE REPAIR: Primary | ICD-10-CM

## 2018-03-26 PROCEDURE — 99214 OFFICE O/P EST MOD 30 MIN: CPT | Performed by: INTERNAL MEDICINE
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
## 2018-03-26 NOTE — MR AVS SNAPSHOT
After Visit Summary   3/26/2018    Jose Angel Cha    MRN: [id number]           Patient Information     Date Of Birth          1955        Visit Information        Provider Department      3/26/2018 8:45 AM Ynes Chirinos MD [facility]        Today's Diagnoses     Mixed hyperlipidemia           Follow-ups after your visit        Your next 10 appointments already scheduled     Mar 29, 2018  2:50 PM CDT   Nurse Only with ER ALLERGY SHOTS   [facility] ([facility])    [address]  [facility] [address]   [phone]            Apr 05, 2018  4:30 PM CDT   Nurse Only with ER ALLERGY SHOTS   [facility] ([facility])    [address]  [facility] [address]   [phone]            Apr 12, 2018  4:20 PM CDT   Nurse Only with ER ALLERGY SHOTS   [facility] ([facility])    [address]  [facility] [address]   [phone]            Apr 19, 2018  2:10 PM CDT   Nurse Only with ER ALLERGY SHOTS   [facility] ([facility])    [address]  [facility] [address]   [phone]            May 08, 2018 10:50 AM CDT   Nurse Only with ER ALLERGY SHOTS   [facility] ([facility])    [address]  [facility] [address]   [phone]              Who to contact     If you have questions or need follow up information about today's clinic visit or your schedule please contact [facility] directly at [phone].  Normal or non-critical lab and imaging results will be communicated to you by MyChart, letter or phone within 4 business days after the clinic has received the results. If you do not hear from us within 7 days, please contact the clinic through MyChart or phone. If you have a critical or  "abnormal lab result, we will notify you by phone as soon as possible.  Submit refill requests through [employer] or call your pharmacy and they will forward the refill request to us. Please allow 3 business days for your refill to be completed.          Additional Information About Your Visit        MyChart Information     [employer] gives you secure access to your electronic health record. If you see a primary care provider, you can also send messages to your care team and make appointments. If you have questions, please call your primary care clinic.  If you do not have a primary care provider, please call [phone] and they will assist you.        Care EveryWhere ID     This is your Care EveryWhere ID. This could be used by other organizations to access your [facility] medical records  [id number]        Your Vitals Were     Pulse Height BMI (Body Mass Index)             80 1.778 m (5' 10\") 29.41 kg/m2          Blood Pressure from Last 3 Encounters:   03/26/18 123/86   01/24/18 (!) 141/93   12/29/17 134/74    Weight from Last 3 Encounters:   03/26/18 93 kg (205 lb)   02/22/18 95.3 kg (210 lb)   01/24/18 99.7 kg (219 lb 14.4 oz)              We Performed the Following     Follow-Up with Cardiologist        Primary Care Provider Office Phone # Fax #    Shari Paredes [phone]       [address] [place] [address]        Equal Access to Services     [facility]: Hadii aad ku hadasho Soomaali, waaxda luqadaha, qaybta kaalmada adeegyada, nico mead haychaz guillen . So [facility] [phone].    ATENCIÓN: Si habla español, tiene a armas disposición servicios gratuitos de asistencia lingüística. Pascual al [phone].    We comply with applicable federal civil rights laws and [place] laws. We do not discriminate on the basis of race, color, national origin, age, disability, sex, sexual orientation, or gender identity.            Thank you!     Thank you for choosing [facility]  for your care. Our goal is always to provide you with excellent care. Hearing back from our patients is one way we can continue to improve our services. Please take a few minutes to complete the written survey that you may receive in the mail after your visit with us. Thank you!             Your Updated Medication List - Protect others around you: Learn how to safely use, store and throw away your medicines at www.disposemymeds.org.          This list is accurate as of 3/26/18  9:22 AM.  Always use your most recent med list.                   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 P.  1:100 w/v, HS  1.0 ml Dust Mites DF 30,000AU/mL, HS  0.3 ml Dust Mites DP. 30,000 AU/mL, HS  0.3 ml  Birch Mix PRW 1:20 w/v, HS  0.5 ml Grass Mix #7 100,000 BAU/mL, HS 0.4 ml Nettle 1:20 w/v, HS 0.5 ml Diluent: HSA qs to 5ml    Allergic rhinitis due to dust mite, Chronic seasonal allergic rhinitis due to pollen, Allergic rhinitis due to animal dander       amLODIPine 2.5 MG tablet    NORVASC    90 tablet    Take 1 tablet (2.5 mg) by mouth daily    Benign essential hypertension       aspirin 81 MG tablet     0    ONE DAILY    Other and  unspecified hyperlipidemia, Family history of ischemic heart disease       CLEAR-ATADINE 10 MG tablet   Generic drug:  loratadine      Take 10 mg by mouth daily        EPINEPHrine 0.3 MG/0.3ML injection 2-pack    EPIPEN 2-MIGUELINA    2 each    Inject 0.3 mLs (0.3 mg) into the muscle once as needed for anaphylaxis    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 sting       ezetimibe 10 MG tablet    ZETIA    30 tablet    Take 1 tablet (10 mg) by mouth daily At night    Mixed hyperlipidemia       fluticasone 50 MCG/ACT spray    FLONASE    1 Bottle    Spray 2 sprays into both nostrils daily    Chronic seasonal allergic rhinitis due to pollen, House dust mite allergy, Allergic rhinitis due to animal dander       hydrochlorothiazide 25 MG tablet    HYDRODIURIL    90 tablet    Take 1 tablet (25 mg) by mouth daily    Benign essential hypertension       MULTIVITAL PO      Take 1 tablet by mouth daily Reported on 3/22/2017        olopatadine HCl 0.2 % Soln    PATADAY    2.5 mL    Place 1 drop into both eyes daily    Chronic seasonal allergic rhinitis due to pollen, House dust mite allergy       omeprazole 20 MG CR capsule    priLOSEC    90 capsule    TAKE ONE CAPSULE BY MOUTH EVERY DAY (TAKE 30 TO 60 MINUTES BEFORE A MEAL)    Gastroesophageal reflux disease without esophagitis       polyethylene glycol powder    MIRALAX    510 g    Take 17 g (1 capful) by mouth daily    Chronic constipation       STATIN NOT PRESCRIBED (INTENTIONAL)      by Other route continuous prn Reported on 4/6/2017    Mitral valve disorders(424.0), Hyperlipidemia LDL goal <100       temazepam 15 MG capsule    RESTORIL    30 capsule    Take 1 capsule (15 mg) by mouth nightly as needed for sleep    Anxiety       * Notice:  This list has 5 medication(s) that are the same as other medications prescribed for you. Read the directions carefully, and ask your doctor or other care provider to review them with you.

## 2018-03-29 ENCOUNTER — ALLIED HEALTH/NURSE VISIT (OUTPATIENT)
Dept: ALLERGY | Facility: OTHER | Age: 63
End: 2018-03-29
Payer: COMMERCIAL

## 2018-03-29 DIAGNOSIS — J30.9 ALLERGIC RHINITIS: Primary | ICD-10-CM

## 2018-03-29 PROCEDURE — 99207 ZZC DROP WITH A PROCEDURE: CPT

## 2018-03-29 PROCEDURE — 95115 IMMUNOTHERAPY ONE INJECTION: CPT

## 2018-03-29 NOTE — PROGRESS NOTES
Patient presented after waiting 30 minutes with no reaction to allergy injections. Discharged from clinic.    Cornel Tejeda RN....3/29/2018 3:10 PM

## 2018-03-29 NOTE — MR AVS SNAPSHOT
After Visit Summary   3/29/2018    Jose Angel Cha    MRN: [id number]           Patient Information     Date Of Birth          1955        Visit Information        Provider Department      3/29/2018 2:50 PM ER ALLERGY SHOTS [facility]        Today's Diagnoses     Allergic rhinitis    -  1       Follow-ups after your visit        Your next 10 appointments already scheduled     Apr 10, 2018  9:10 AM CDT   Nurse Only with ER ALLERGY SHOTS   [facility] ([facility])    [address]  [facility] [place] [address]   [phone]            Apr 12, 2018  4:20 PM CDT   Nurse Only with ER ALLERGY SHOTS   [facility] ([facility])    [address]  [facility] [place] [address]   [phone]            Apr 24, 2018 10:50 AM CDT   Nurse Only with ER ALLERGY SHOTS   [facility] ([facility])    [address]  [facility] [place] [address]   [phone]            May 08, 2018 10:50 AM CDT   Nurse Only with ER ALLERGY SHOTS   [facility] ([facility])    [address]  [facility] [place] [address]   [phone]              Who to contact     If you have questions or need follow up information about today's clinic visit or your schedule please contact [facility] directly at [phone].  Normal or non-critical lab and imaging results will be communicated to you by MyChart, letter or phone within 4 business days after the clinic has received the results. If you do not hear from us within 7 days, please contact the clinic through MyChart or phone. If you have a critical or abnormal lab result, we will notify you by phone as soon as possible.  Submit refill requests through [employer] or call your pharmacy and they will forward the refill request to us. Please allow 3 business days for your refill to be completed.           Additional Information About Your Visit        [employer] Information     [employer] gives you secure access to your electronic health record. If you see a primary care provider, you can also send messages to your care team and make appointments. If you have questions, please call your primary care clinic.  If you do not have a primary care provider, please call [phone] and they will assist you.        Care EveryWhere ID     This is your Care EveryWhere ID. This could be used by other organizations to access your [facility] medical records  [id number]         Blood Pressure from Last 3 Encounters:   03/26/18 123/86   01/24/18 (!) 141/93   12/29/17 134/74    Weight from Last 3 Encounters:   03/26/18 93 kg (205 lb)   02/22/18 95.3 kg (210 lb)   01/24/18 99.7 kg (219 lb 14.4 oz)              We Performed the Following     Allergy Shot: One injection        Primary Care Provider Office Phone # Fax #    Shari Tonia Paredes [phone]       [address] [place] [address]        Equal Access to Services     [facility]: Hadii aad ku hadasho Soomaali, waaxda luqadaha, qaybta kaalmada adeegyada, waxmallory guillen . So [facility] [phone].    ATENCIÓN: Si habla español, tiene a armas disposición servicios gratuitos de asistencia lingüística. [facility] [phone].    We comply with applicable federal civil rights laws and [place] laws. We do not discriminate on the basis of race, color, national origin, age, disability, sex, sexual orientation, or gender identity.            Thank you!     Thank you for choosing [facility]  for your care. Our goal is always to provide you with excellent care. Hearing back from our patients is one way we can continue to improve our services. Please take a few minutes to complete the written survey that you may receive in the mail after your visit with us. Thank you!             Your Updated Medication List - Protect others around you:  Learn how to safely use, store and throw away your medicines at www.disposemymeds.org.          This list is accurate as of 3/29/18  3:11 PM.  Always use your most recent med list.                   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 P.  1:100 w/v, HS  1.0 ml Dust Mites DF 30,000AU/mL, HS  0.3 ml Dust Mites DP. 30,000 AU/mL, HS  0.3 ml  Birch Mix PRW 1:20 w/v, HS  0.5 ml Grass Mix #7 100,000 BAU/mL, HS 0.4 ml Nettle 1:20 w/v, HS 0.5 ml Diluent: HSA qs to 5ml    Allergic rhinitis due to dust mite, Chronic seasonal allergic rhinitis due to pollen, Allergic rhinitis due to animal dander       amLODIPine 2.5 MG tablet    NORVASC    90 tablet    Take 1 tablet (2.5 mg) by mouth daily    Benign essential hypertension       aspirin 81 MG tablet     0    ONE DAILY    Other and unspecified hyperlipidemia, Family history of ischemic heart disease       CLEAR-ATADINE 10 MG tablet   Generic drug:  loratadine      Take 10 mg by mouth daily        EPINEPHrine 0.3 MG/0.3ML injection 2-pack    EPIPEN 2-MIGUELINA    2 each    Inject 0.3 mLs (0.3 mg) into the muscle once as needed for anaphylaxis    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 sting       ezetimibe 10 MG tablet    ZETIA    30 tablet    Take 1  tablet (10 mg) by mouth daily At night    Mixed hyperlipidemia       fluticasone 50 MCG/ACT spray    FLONASE    1 Bottle    Spray 2 sprays into both nostrils daily    Chronic seasonal allergic rhinitis due to pollen, House dust mite allergy, Allergic rhinitis due to animal dander       hydrochlorothiazide 25 MG tablet    HYDRODIURIL    90 tablet    Take 1 tablet (25 mg) by mouth daily    Benign essential hypertension       MULTIVITAL PO      Take 1 tablet by mouth daily Reported on 3/22/2017        olopatadine HCl 0.2 % Soln    PATADAY    2.5 mL    Place 1 drop into both eyes daily    Chronic seasonal allergic rhinitis due to pollen, House dust mite allergy       omeprazole 20 MG CR capsule    priLOSEC    90 capsule    TAKE ONE CAPSULE BY MOUTH EVERY DAY (TAKE 30 TO 60 MINUTES BEFORE A MEAL)    Gastroesophageal reflux disease without esophagitis       polyethylene glycol powder    MIRALAX    510 g    Take 17 g (1 capful) by mouth daily    Chronic constipation       STATIN NOT PRESCRIBED (INTENTIONAL)      by Other route continuous prn Reported on 4/6/2017    Mitral valve disorders(424.0), Hyperlipidemia LDL goal <100       temazepam 15 MG capsule    RESTORIL    30 capsule    Take 1 capsule (15 mg) by mouth nightly as needed for sleep    Anxiety       * Notice:  This list has 5 medication(s) that are the same as other medications prescribed for you. Read the directions carefully, and ask your doctor or other care provider to review them with you.

## 2018-04-10 ENCOUNTER — ALLIED HEALTH/NURSE VISIT (OUTPATIENT)
Dept: ALLERGY | Facility: OTHER | Age: 63
End: 2018-04-10
Payer: COMMERCIAL

## 2018-04-10 DIAGNOSIS — J30.9 ALLERGIC RHINITIS: Primary | ICD-10-CM

## 2018-04-10 PROCEDURE — 95115 IMMUNOTHERAPY ONE INJECTION: CPT

## 2018-04-10 PROCEDURE — 99207 ZZC DROP WITH A PROCEDURE: CPT

## 2018-04-10 NOTE — MR AVS SNAPSHOT
After Visit Summary   4/10/2018    Jose Angel Cha    MRN: [id number]           Patient Information     Date Of Birth          1955        Visit Information        Provider Department      4/10/2018 9:10 AM ER ALLERGY SHOTS [facility]        Today's Diagnoses     Allergic rhinitis    -  1       Follow-ups after your visit        Your next 10 appointments already scheduled     Apr 12, 2018  4:20 PM CDT   Nurse Only with ER ALLERGY SHOTS   [facility] ([facility])    [address]  [facility] [address]   [phone]            Apr 26, 2018  4:00 PM CDT   Nurse Only with ER ALLERGY SHOTS   [facility] ([facility])    [address]  [facility] [address]   [phone]            May 08, 2018 10:50 AM CDT   Nurse Only with ER ALLERGY SHOTS   [facility] ([facility])    [address]  [facility] [address]   [phone]            May 10, 2018  4:00 PM CDT   Nurse Only with ER ALLERGY SHOTS   [facility] ([facility])    [address]  [facility] [address]   [phone]            May 29, 2018  9:30 AM CDT   Nurse Only with ER ALLERGY SHOTS   [facility] ([facility])    [address]  [facility] [address]   [phone]              Who to contact     If you have questions or need follow up information about today's clinic visit or your schedule please contact [facility] directly at [phone].  Normal or non-critical lab and imaging results will be communicated to you by MyChart, letter or phone within 4 business days after the clinic has received the results. If you do not hear from us within 7 days, please contact the clinic through MyChart or phone. If you have a critical or abnormal lab result, we will notify you by phone  as soon as possible.  Submit refill requests through [employer] or call your pharmacy and they will forward the refill request to us. Please allow 3 business days for your refill to be completed.          Additional Information About Your Visit        [email or web address] Information     [employer] gives you secure access to your electronic health record. If you see a primary care provider, you can also send messages to your care team and make appointments. If you have questions, please call your primary care clinic.  If you do not have a primary care provider, please call [phone] and they will assist you.        Care EveryWhere ID     This is your Care EveryWhere ID. This could be used by other organizations to access your [facility] medical records  [id number]         Blood Pressure from Last 3 Encounters:   03/26/18 123/86   01/24/18 (!) 141/93   12/29/17 134/74    Weight from Last 3 Encounters:   03/26/18 93 kg (205 lb)   02/22/18 95.3 kg (210 lb)   01/24/18 99.7 kg (219 lb 14.4 oz)              We Performed the Following     Allergy Shot: One injection        Primary Care Provider Office Phone # Fax #    Shari Tonia Paredes [phone]       [address] [address]        Equal Access to Services     ANJEL TAFOYA : Hadii karina ku hadasho Soomaali, waaxda luqadaha, qaybta kaalmada adeegyada, nico dennison. So [facility] [phone].    ATENCIÓN: Si habla español, tiene a armas disposición servicios gratuitos de asistencia lingüística. Llame al [phone].    We comply with applicable federal civil rights laws and [place] laws. We do not discriminate on the basis of race, color, national origin, age, disability, sex, sexual orientation, or gender identity.            Thank you!     Thank you for choosing [facility]  for your care. Our goal is always to provide you with excellent care. Hearing back from our patients is one way we can continue to improve our  services. Please take a few minutes to complete the written survey that you may receive in the mail after your visit with us. Thank you!             Your Updated Medication List - Protect others around you: Learn how to safely use, store and throw away your medicines at www.disposemymeds.org.          This list is accurate as of 4/10/18  9:46 AM.  Always use your most recent med list.                   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 P.  1:100 w/v, HS  1.0 ml Dust Mites DF 30,000AU/mL, HS  0.3 ml Dust Mites DP. 30,000 AU/mL, HS  0.3 ml  Birch Mix PRW 1:20 w/v, HS  0.5 ml Grass Mix #7 100,000 BAU/mL, HS 0.4 ml Nettle 1:20 w/v, HS 0.5 ml Diluent: HSA qs to 5ml    Allergic rhinitis due to dust mite, Chronic seasonal allergic rhinitis due to pollen, Allergic rhinitis due to animal dander       amLODIPine 2.5 MG tablet    NORVASC    90 tablet    Take 1 tablet (2.5 mg) by mouth daily    Benign essential hypertension       aspirin 81 MG tablet     0    ONE DAILY    Other and unspecified hyperlipidemia, Family history of ischemic heart disease       CLEAR-ATADINE 10 MG tablet   Generic drug:  loratadine      Take 10 mg by mouth daily        EPINEPHrine 0.3  MG/0.3ML injection 2-pack    EPIPEN 2-MIGUELINA    2 each    Inject 0.3 mLs (0.3 mg) into the muscle once as needed for anaphylaxis    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 sting       ezetimibe 10 MG tablet    ZETIA    30 tablet    Take 1 tablet (10 mg) by mouth daily At night    Mixed hyperlipidemia       fluticasone 50 MCG/ACT spray    FLONASE    1 Bottle    Spray 2 sprays into both nostrils daily    Chronic seasonal allergic rhinitis due to pollen, House dust mite allergy, Allergic rhinitis due to animal dander       hydrochlorothiazide 25 MG tablet    HYDRODIURIL    90 tablet    Take 1 tablet (25 mg) by mouth daily    Benign essential hypertension       MULTIVITAL PO      Take 1 tablet by mouth daily Reported on 3/22/2017        olopatadine HCl 0.2 % Soln    PATADAY    2.5 mL    Place 1 drop into both eyes daily    Chronic seasonal allergic rhinitis due to pollen, House dust mite allergy       omeprazole 20 MG CR capsule    priLOSEC    90 capsule    TAKE ONE CAPSULE BY MOUTH EVERY DAY (TAKE 30 TO 60 MINUTES BEFORE A MEAL)    Gastroesophageal reflux disease without esophagitis       polyethylene glycol powder    MIRALAX    510 g    Take 17 g (1 capful) by mouth daily    Chronic constipation       STATIN NOT PRESCRIBED (INTENTIONAL)      by Other route continuous prn Reported on 4/6/2017    Mitral valve disorders(424.0), Hyperlipidemia LDL goal <100       temazepam 15 MG capsule    RESTORIL    30 capsule    Take 1 capsule (15 mg) by mouth nightly as needed for sleep    Anxiety       * Notice:  This list has 5 medication(s) that are the same as other medications prescribed for you. Read the directions carefully, and ask your doctor or other care provider to review them with you.

## 2018-04-10 NOTE — PROGRESS NOTES
Patient presented after waiting 30 minutes with no reaction to allergy injections. Discharged from clinic.  Angela Bone RN on 4/10/2018 at 9:46 AM

## 2018-04-12 ENCOUNTER — ALLIED HEALTH/NURSE VISIT (OUTPATIENT)
Dept: ALLERGY | Facility: OTHER | Age: 63
End: 2018-04-12
Payer: COMMERCIAL

## 2018-04-12 DIAGNOSIS — Z51.6 NEED FOR DESENSITIZATION TO ALLERGENS: ICD-10-CM

## 2018-04-12 DIAGNOSIS — J30.9 ALLERGIC RHINITIS: Primary | ICD-10-CM

## 2018-04-12 DIAGNOSIS — Z91.030 ALLERGY TO BEE STING: ICD-10-CM

## 2018-04-12 PROCEDURE — 99207 ZZC DROP WITH A PROCEDURE: CPT | Performed by: ALLERGY & IMMUNOLOGY

## 2018-04-12 PROCEDURE — 95146 ANTIGEN THERAPY SERVICES: CPT | Performed by: ALLERGY & IMMUNOLOGY

## 2018-04-12 RX ORDER — EPINEPHRINE 0.3 MG/.3ML
0.3 INJECTION SUBCUTANEOUS PRN
Qty: 2 ML | Refills: 1 | Status: SHIPPED | OUTPATIENT
Start: 2018-04-12 | End: 2019-04-03

## 2018-04-12 NOTE — MR AVS SNAPSHOT
After Visit Summary   4/12/2018    Jose Angel Cha    MRN: [id number]           Patient Information     Date Of Birth          1955        Visit Information        Provider Department      4/12/2018 4:20 PM ER ALLERGY SHOTS [facility]        Today's Diagnoses     Allergic rhinitis    -  1    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 sting        Need for desensitization to allergens           Follow-ups after your visit        Your next 10 appointments already scheduled     Apr 26, 2018  4:00 PM CDT   Nurse Only with ER ALLERGY SHOTS   [facility]    [address]  [place] [address]   [phone]            May 08, 2018 10:50 AM CDT   Nurse Only with ER ALLERGY SHOTS   [facility]    [address]  [place] [address]   [phone]            May 10, 2018  4:00 PM CDT   Nurse Only with ER ALLERGY SHOTS   [facility]    [address]  [place] [address]   [phone]            May 29, 2018  9:30 AM CDT   Nurse Only with ER ALLERGY SHOTS   [facility]    [address]  [place] [address]   [phone]              Who to contact     If you have questions or need follow up information about today's clinic visit or your schedule please contact [facility] directly at [phone].  Normal or non-critical lab and imaging results will be communicated to you by MyChart, letter or phone within 4 business days after the clinic has received the results. If you do not hear from us within 7 days, please contact the clinic through [employer] or phone. If you have a critical or abnormal lab result, we will notify you by phone as soon as possible.  Submit refill requests through [employer] or call your pharmacy and they will forward the refill request to us.  Please allow 3 business days for your refill to be completed.          Additional Information About Your Visit        TellWisehart Information     [employer] gives you secure access to your electronic health record. If you see a primary care provider, you can also send messages to your care team and make appointments. If you have questions, please call your primary care clinic.  If you do not have a primary care provider, please call [phone] and they will assist you.        Care EveryWhere ID     This is your Care EveryWhere ID. This could be used by other organizations to access your [place] medical records  [id number]         Blood Pressure from Last 3 Encounters:   03/26/18 123/86   01/24/18 (!) 141/93   12/29/17 134/74    Weight from Last 3 Encounters:   03/26/18 93 kg (205 lb)   02/22/18 95.3 kg (210 lb)   01/24/18 99.7 kg (219 lb 14.4 oz)              We Performed the Following     ANTIGEN RX SERV,INSECT,2 VENOMS          Today's Medication Changes          These changes are accurate as of 4/12/18  5:10 PM.  If you have any questions, ask your nurse or doctor.               These medicines have changed or have updated prescriptions.        Dose/Directions    * EPINEPHrine 0.3 MG/0.3ML injection 2-pack   Commonly known as:  EPIPEN 2-MIGUELINA   This may have changed:  Another medication with the same name was added. Make sure you understand how and when to take each.   Used for:  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 sting        Dose:  0.3 mg   Inject 0.3 mLs (0.3 mg) into the muscle once as needed for anaphylaxis   Quantity:  2 each   Refills:  3       * EPINEPHrine 0.3 MG/0.3ML injection 2-pack   Commonly known as:  AUVI-Q   This may have changed:  You were already taking a medication with the same name, and this prescription was added. Make sure you understand how and when to take each.   Used for:  Need for desensitization to allergens        Dose:  0.3 mg   Inject 0.3 mLs (0.3 mg) into the muscle as needed for anaphylaxis   Quantity:   2 mL   Refills:  1       * Notice:  This list has 2 medication(s) that are the same as other medications prescribed for you. Read the directions carefully, and ask your doctor or other care provider to review them with you.         Where to get your medicines      These medications were sent to [facility] - [address]  [address], [place] [facility] [address]     Phone:  [phone]     EPINEPHrine 0.3 MG/0.3ML injection 2-pack                Primary Care Provider Office Phone # Fax #    Shari Tonia Paredes [phone]       [address] [place] [id number]        Equal Access to Services     [facility] : Hadii karina messero Garry, waaxda luqadaha, qaybta kaalmada neeru, nico guillen . So [facility] [phone].    ATENCIÓN: Si habla español, tiene a armas disposición servicios gratuitos de asistencia lingüística. [facility] [phone].    We comply with applicable federal civil rights laws and [place] laws. We do not discriminate on the basis of race, color, national origin, age, disability, sex, sexual orientation, or gender identity.            Thank you!     Thank you for choosing [facility]  for your care. Our goal is always to provide you with excellent care. Hearing back from our patients is one way we can continue to improve our services. Please take a few minutes to complete the written survey that you may receive in the mail after your visit with us. Thank you!             Your Updated Medication List - Protect others around you: Learn how to safely use, store and throw away your medicines at www.disposemymeds.org.          This list is accurate as of 4/12/18  5:10 PM.  Always use your most recent med list.                   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 P.  1:100 w/v, HS  1.0 ml Dust Mites DF 30,000AU/mL, HS  0.3 ml Dust Mites DP. 30,000 AU/mL, HS  0.3 ml  Birch Mix PRW 1:20 w/v, HS  0.5 ml Grass Mix #7 100,000 BAU/mL, HS 0.4 ml Nettle 1:20 w/v, HS 0.5 ml Diluent: HSA qs to 5ml    Allergic rhinitis due to dust mite, Chronic seasonal allergic rhinitis due to pollen, Allergic rhinitis due to animal dander       amLODIPine 2.5 MG tablet    NORVASC    90 tablet    Take 1 tablet (2.5 mg) by mouth daily    Benign essential hypertension       aspirin 81 MG tablet     0    ONE DAILY    Other and unspecified hyperlipidemia, Family history of ischemic heart disease       CLEAR-ATADINE 10 MG tablet   Generic drug:  loratadine      Take 10 mg by mouth daily        * EPINEPHrine 0.3 MG/0.3ML injection 2-pack    EPIPEN 2-MIGUELINA    2 each    Inject 0.3 mLs (0.3 mg) into the muscle once as needed for anaphylaxis    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 sting       * EPINEPHrine 0.3 MG/0.3ML injection 2-pack    AUVI-Q    2 mL    Inject 0.3 mLs (0.3 mg) into the muscle as needed for anaphylaxis    Need for desensitization to allergens       ezetimibe 10 MG tablet    ZETIA    30 tablet    Take 1 tablet (10 mg) by mouth daily At night    Mixed hyperlipidemia       fluticasone 50 MCG/ACT spray    FLONASE    1 Bottle    Spray 2 sprays into both nostrils daily    Chronic seasonal allergic  rhinitis due to pollen, House dust mite allergy, Allergic rhinitis due to animal dander       hydrochlorothiazide 25 MG tablet    HYDRODIURIL    90 tablet    Take 1 tablet (25 mg) by mouth daily    Benign essential hypertension       MULTIVITAL PO      Take 1 tablet by mouth daily Reported on 3/22/2017        olopatadine HCl 0.2 % Soln    PATADAY    2.5 mL    Place 1 drop into both eyes daily    Chronic seasonal allergic rhinitis due to pollen, House dust mite allergy       omeprazole 20 MG CR capsule    priLOSEC    90 capsule    TAKE ONE CAPSULE BY MOUTH EVERY DAY (TAKE 30 TO 60 MINUTES BEFORE A MEAL)    Gastroesophageal reflux disease without esophagitis       polyethylene glycol powder    MIRALAX    510 g    Take 17 g (1 capful) by mouth daily    Chronic constipation       STATIN NOT PRESCRIBED (INTENTIONAL)      by Other route continuous prn Reported on 4/6/2017    Mitral valve disorders(424.0), Hyperlipidemia LDL goal <100       temazepam 15 MG capsule    RESTORIL    30 capsule    Take 1 capsule (15 mg) by mouth nightly as needed for sleep    Anxiety       * Notice:  This list has 7 medication(s) that are the same as other medications prescribed for you. Read the directions carefully, and ask your doctor or other care provider to review them with you.

## 2018-04-26 ENCOUNTER — ALLIED HEALTH/NURSE VISIT (OUTPATIENT)
Dept: ALLERGY | Facility: OTHER | Age: 63
End: 2018-04-26
Payer: COMMERCIAL

## 2018-04-26 DIAGNOSIS — J30.9 ALLERGIC RHINITIS: Primary | ICD-10-CM

## 2018-04-26 PROCEDURE — 95115 IMMUNOTHERAPY ONE INJECTION: CPT

## 2018-04-26 PROCEDURE — 99207 ZZC DROP WITH A PROCEDURE: CPT

## 2018-04-26 NOTE — MR AVS SNAPSHOT
After Visit Summary   4/26/2018    Jose Angel Cha    MRN: [id number]           Patient Information     Date Of Birth          1955        Visit Information        Provider Department      4/26/2018 4:00 PM ER ALLERGY SHOTS [facility]        Today's Diagnoses     Allergic rhinitis    -  1       Follow-ups after your visit        Your next 10 appointments already scheduled     May 08, 2018 10:50 AM CDT   Nurse Only with ER ALLERGY SHOTS   [facility]    [address]  [place] [address] [phone]            May 10, 2018  4:00 PM CDT   Nurse Only with ER ALLERGY SHOTS   [facility]    [address]  [place] [address] [phone]            May 29, 2018  9:30 AM CDT   Nurse Only with ER ALLERGY SHOTS   [facility]    [address]  [place] [address] [phone]              Who to contact     If you have questions or need follow up information about today's clinic visit or your schedule please contact [facility] directly at [phone].  Normal or non-critical lab and imaging results will be communicated to you by Ch[employer]im[employer]art, letter or phone within 4 business days after the clinic has received the results. If you do not hear from us within 7 days, please contact the clinic through [email or web address] or phone. If you have a critical or abnormal lab result, we will notify you by phone as soon as possible.  Submit refill requests through [employer] or call your pharmacy and they will forward the refill request to us. Please allow 3 business days for your refill to be completed.          Additional Information About Your Visit        [employer]im[employer]ar[employer] Information     [employer] gives you secure access to your electronic health record. If you see a primary care provider, you can also send messages to  your care team and make appointments. If you have questions, please call your primary care clinic.  If you do not have a primary care provider, please call [phone] and they will assist you.        Care EveryWhere ID     This is your Care EveryWhere ID. This could be used by other organizations to access your [place] medical records  [id number]         Blood Pressure from Last 3 Encounters:   03/26/18 123/86   01/24/18 (!) 141/93   12/29/17 134/74    Weight from Last 3 Encounters:   03/26/18 93 kg (205 lb)   02/22/18 95.3 kg (210 lb)   01/24/18 99.7 kg (219 lb 14.4 oz)              We Performed the Following     Allergy Shot: One injection        Primary Care Provider Office Phone # Fax #    Shari Paredes [phone]       [address] [address]        Equal Access to Services     [facility] Center[facility]OPHIA : Hadii aad ku hadasho Soomaali, waaxda luqadaha, qaybta kaalmada adeegyada, waxay lokiin hayeveretten sandra guillen . So [facility] [phone].    ATENCIÓN: Si habla español, tiene a armas disposición servicios gratuitos de asistencia lingüística. [facility] [phone].    We comply with applicable federal civil rights laws and [place] laws. We do not discriminate on the basis of race, color, national origin, age, disability, sex, sexual orientation, or gender identity.            Thank you!     Thank you for choosing [facility]  for your care. Our goal is always to provide you with excellent care. Hearing back from our patients is one way we can continue to improve our services. Please take a few minutes to complete the written survey that you may receive in the mail after your visit with us. Thank you!             Your Updated Medication List - Protect others around you: Learn how to safely use, store and throw away your medicines at www.disposemymeds.org.          This list is accurate as of 4/26/18  4:38 PM.  Always use your most recent med list.                   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 P.  1:100 w/v, HS  1.0 ml Dust Mites DF 30,000AU/mL, HS  0.3 ml Dust Mites DP. 30,000 AU/mL, HS  0.3 ml  Birch Mix PRW 1:20 w/v, HS  0.5 ml Grass Mix #7 100,000 BAU/mL, HS 0.4 ml Nettle 1:20 w/v, HS 0.5 ml Diluent: HSA qs to 5ml    Allergic rhinitis due to dust mite, Chronic seasonal allergic rhinitis due to pollen, Allergic rhinitis due to animal dander       amLODIPine 2.5 MG tablet    NORVASC    90 tablet    Take 1 tablet (2.5 mg) by mouth daily    Benign essential hypertension       aspirin 81 MG tablet     0    ONE DAILY    Other and unspecified hyperlipidemia, Family history of ischemic heart disease       CLEAR-ATADINE 10 MG tablet   Generic drug:  loratadine      Take 10 mg by mouth daily        * EPINEPHrine 0.3 MG/0.3ML injection 2-pack    EPIPEN 2-MIGUELINA    2 each    Inject 0.3 mLs (0.3 mg) into the muscle once as needed for anaphylaxis    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 sting       * EPINEPHrine 0.3 MG/0.3ML injection 2-pack    AUVI-Q    2 mL    Inject 0.3 mLs (0.3 mg) into the muscle as needed for anaphylaxis    Need for desensitization to allergens       ezetimibe 10 MG tablet    ZETIA    30 tablet    Take 1 tablet (10 mg) by mouth  daily At night    Mixed hyperlipidemia       fluticasone 50 MCG/ACT spray    FLONASE    1 Bottle    Spray 2 sprays into both nostrils daily    Chronic seasonal allergic rhinitis due to pollen, House dust mite allergy, Allergic rhinitis due to animal dander       hydrochlorothiazide 25 MG tablet    HYDRODIURIL    90 tablet    Take 1 tablet (25 mg) by mouth daily    Benign essential hypertension       MULTIVITAL PO      Take 1 tablet by mouth daily Reported on 3/22/2017        olopatadine HCl 0.2 % Soln    PATADAY    2.5 mL    Place 1 drop into both eyes daily    Chronic seasonal allergic rhinitis due to pollen, House dust mite allergy       omeprazole 20 MG CR capsule    priLOSEC    90 capsule    TAKE ONE CAPSULE BY MOUTH EVERY DAY (TAKE 30 TO 60 MINUTES BEFORE A MEAL)    Gastroesophageal reflux disease without esophagitis       polyethylene glycol powder    MIRALAX    510 g    Take 17 g (1 capful) by mouth daily    Chronic constipation       STATIN NOT PRESCRIBED (INTENTIONAL)      by Other route continuous prn Reported on 4/6/2017    Mitral valve disorders(424.0), Hyperlipidemia LDL goal <100       temazepam 15 MG capsule    RESTORIL    30 capsule    Take 1 capsule (15 mg) by mouth nightly as needed for sleep    Anxiety       * Notice:  This list has 7 medication(s) that are the same as other medications prescribed for you. Read the directions carefully, and ask your doctor or other care provider to review them with you.

## 2018-04-26 NOTE — PROGRESS NOTES
Patient presented after waiting 30 minutes with no reaction to allergy injections. Discharged from clinic.  Angela Bone RN on 4/26/2018 at 4:38 PM

## 2018-05-08 ENCOUNTER — ALLIED HEALTH/NURSE VISIT (OUTPATIENT)
Dept: ALLERGY | Facility: OTHER | Age: 63
End: 2018-05-08
Payer: COMMERCIAL

## 2018-05-08 DIAGNOSIS — J30.89 ALLERGIC RHINITIS DUE TO DUST MITE: ICD-10-CM

## 2018-05-08 DIAGNOSIS — J30.81 ALLERGIC RHINITIS DUE TO ANIMAL DANDER: ICD-10-CM

## 2018-05-08 DIAGNOSIS — J30.1 CHRONIC SEASONAL ALLERGIC RHINITIS DUE TO POLLEN: ICD-10-CM

## 2018-05-08 DIAGNOSIS — J30.9 ALLERGIC RHINITIS: Primary | ICD-10-CM

## 2018-05-08 PROCEDURE — 99207 ZZC DROP WITH A PROCEDURE: CPT

## 2018-05-08 PROCEDURE — 95115 IMMUNOTHERAPY ONE INJECTION: CPT

## 2018-05-08 NOTE — TELEPHONE ENCOUNTER
ALLERGY SOLUTION RE-ORDER REQUEST    Jose Angel Cha 1955 MRN: [id number]    DATE NEEDED:  5/15/2018  Vial Color Content   Top Dose       Last Dose     Vial Size  Red 1:1             Cat, Dog, Grass, Dust Mite, Trees, Weeds   Red 1:1 0.5      Red 1:10.5     5ml        Serum reorder consent signed and patient/parent was advised that new serums would be ordered through the pharmacy and billed to their insurance company when they arrive in clinic. Yes    [facility] Clinic Location:  [facility]  Ship to Location: [facility]  Special Instructions:        Updated Prescription Needed: No      Requester Signature  Madina Hernandez

## 2018-05-08 NOTE — MR AVS SNAPSHOT
After Visit Summary   5/8/2018    Jose Angel Cha    MRN: [id number]           Patient Information     Date Of Birth          1955        Visit Information        Provider Department      5/8/2018 10:50 AM ER ALLERGY SHOTS [facility]        Today's Diagnoses     Allergic rhinitis    -  1       Follow-ups after your visit        Your next 10 appointments already scheduled     May 11, 2018  9:00 AM CDT   [facility]    [address]  [place] [facility] [id number] [phone]            May 25, 2018  9:00 AM CDT   Nurse Only with ER [facility] SHOT[facility]   [facility]    [address]  [place] [id number] [phone]              Who to contact     If you have questions or need follow up information about today's clinic visit or your schedule please contact [facility] directly at [phone].  Normal or non-critical lab and imaging results will be communicated to you by [employer], letter or phone within 4 business days after the clinic has received the results. If you do not hear from us within 7 days, please contact the clinic through [employer] or phone. If you have a critical or abnormal lab result, we will notify you by phone as soon as possible.  Submit refill requests through [employer] or call your pharmacy and they will forward the refill request to us. Please allow 3 business days for your refill to be completed.          Additional Information About Your Visit        Immun[employer] Projecthart Information     [employer] gives you secure access to your electronic health record. If you see a primary care provider, you can also send messages to your care team and make appointments. If you have questions, please call your primary care clinic.  If you do not have a primary care provider, please call [phone] and they will assist you.         Care EveryWhere ID     This is your Care EveryWhere ID. This could be used by other organizations to access your [place] medical records  [id number]         Blood Pressure from Last 3 Encounters:   03/26/18 123/86   01/24/18 (!) 141/93   12/29/17 134/74    Weight from Last 3 Encounters:   03/26/18 93 kg (205 lb)   02/22/18 95.3 kg (210 lb)   01/24/18 99.7 kg (219 lb 14.4 oz)              We Performed the Following     Allergy Shot: One injection        Primary Care Provider Office Phone # Fax #    Shari Tonia Paredes [phone]       [address] [address]        Equal Access to Services     ANJEL TAFOYA : Hadii karina messero Soreji, waaxda luqadaha, qaybta kaalmada adeegyada, nico guillen . So [facility] [phone].    ATENCIÓN: Si habla español, tiene a armas disposición servicios gratuitos de asistencia lingüística. LlPaulding County Hospital [phone].    We comply with applicable federal civil rights laws and [place] laws. We do not discriminate on the basis of race, color, national origin, age, disability, sex, sexual orientation, or gender identity.            Thank you!     Thank you for choosing [facility]  for your care. Our goal is always to provide you with excellent care. Hearing back from our patients is one way we can continue to improve our services. Please take a few minutes to complete the written survey that you may receive in the mail after your visit with us. Thank you!             Your Updated Medication List - Protect others around you: Learn how to safely use, store and throw away your medicines at www.disposemymeds.org.          This list is accurate as of 5/8/18 11:31 AM.  Always use your most recent med list.                   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 P.  1:100 w/v, HS  1.0 ml Dust Mites DF 30,000AU/mL, HS  0.3 ml Dust Mites DP. 30,000 AU/mL, HS  0.3 ml  Birch Mix PRW 1:20 w/v, HS  0.5 ml Grass Mix #7 100,000 BAU/mL, HS 0.4 ml Nettle 1:20 w/v, HS 0.5 ml Diluent: HSA qs to 5ml    Allergic rhinitis due to dust mite, Chronic seasonal allergic rhinitis due to pollen, Allergic rhinitis due to animal dander       amLODIPine 2.5 MG tablet    NORVASC    90 tablet    Take 1 tablet (2.5 mg) by mouth daily    Benign essential hypertension       aspirin 81 MG tablet     0    ONE DAILY    Other and unspecified hyperlipidemia, Family history of ischemic heart disease       CLEAR-ATADINE 10 MG tablet   Generic drug:  loratadine      Take 10 mg by mouth daily        * EPINEPHrine 0.3 MG/0.3ML injection 2-pack    EPIPEN 2-MIGUELINA    2 each    Inject 0.3 mLs (0.3 mg) into the muscle once as needed for anaphylaxis    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 sting       * EPINEPHrine 0.3 MG/0.3ML injection 2-pack    AUVI-Q    2 mL    Inject 0.3 mLs (0.3 mg) into the muscle as needed for anaphylaxis    Need for desensitization to allergens       ezetimibe 10 MG tablet    ZETIA    30 tablet    Take 1 tablet (10 mg) by mouth daily At night    Mixed hyperlipidemia       fluticasone 50 MCG/ACT spray    FLONASE    1 Bottle    Spray 2 sprays into both nostrils daily    Chronic seasonal allergic rhinitis due to pollen, House dust  mite allergy, Allergic rhinitis due to animal dander       hydrochlorothiazide 25 MG tablet    HYDRODIURIL    90 tablet    Take 1 tablet (25 mg) by mouth daily    Benign essential hypertension       MULTIVITAL PO      Take 1 tablet by mouth daily Reported on 3/22/2017        olopatadine HCl 0.2 % Soln    PATADAY    2.5 mL    Place 1 drop into both eyes daily    Chronic seasonal allergic rhinitis due to pollen, House dust mite allergy       omeprazole 20 MG CR capsule    priLOSEC    90 capsule    TAKE ONE CAPSULE BY MOUTH EVERY DAY (TAKE 30 TO 60 MINUTES BEFORE A MEAL)    Gastroesophageal reflux disease without esophagitis       polyethylene glycol powder    MIRALAX    510 g    Take 17 g (1 capful) by mouth daily    Chronic constipation       STATIN NOT PRESCRIBED (INTENTIONAL)      by Other route continuous prn Reported on 4/6/2017    Mitral valve disorders(424.0), Hyperlipidemia LDL goal <100       temazepam 15 MG capsule    RESTORIL    30 capsule    Take 1 capsule (15 mg) by mouth nightly as needed for sleep    Anxiety       * Notice:  This list has 7 medication(s) that are the same as other medications prescribed for you. Read the directions carefully, and ask your doctor or other care provider to review them with you.

## 2018-05-08 NOTE — PROGRESS NOTES
Patient presented after waiting 30 minutes with normal reaction to allergy injections. Discharged from clinic.    Angela Bone RN ............   5/8/2018...11:31 AM

## 2018-05-10 DIAGNOSIS — J30.2 SEASONAL ALLERGIC RHINITIS: Primary | ICD-10-CM

## 2018-05-10 PROCEDURE — 95165 ANTIGEN THERAPY SERVICES: CPT | Performed by: ALLERGY & IMMUNOLOGY

## 2018-05-10 NOTE — PROGRESS NOTES
Allergy serums billed at [place].     Vials received below:    Vial Color Content                      Vial Size Expiration Date  Red 1:1 Cat, Dog, Grass, Dust Mite, Trees, Weeds 5mL  5/9/2019      Original Refill encounter date: 5/8/2018      Signature  Madina Hernandez

## 2018-05-10 NOTE — TELEPHONE ENCOUNTER
Allergy serums received at [place].     Vials received below:    Vial Color Content                      Vial Size Expiration Date  Red 1:1 Cat, Dog, Grass, Dust Mite, Trees, Weeds 5mL  5/9/2019        Signature  Madina Hernandez

## 2018-05-11 ENCOUNTER — ALLIED HEALTH/NURSE VISIT (OUTPATIENT)
Dept: ALLERGY | Facility: OTHER | Age: 63
End: 2018-05-11
Payer: COMMERCIAL

## 2018-05-11 DIAGNOSIS — J30.9 ALLERGIC RHINITIS: Primary | ICD-10-CM

## 2018-05-11 PROCEDURE — 99207 ZZC DROP WITH A PROCEDURE: CPT

## 2018-05-11 PROCEDURE — 95115 IMMUNOTHERAPY ONE INJECTION: CPT

## 2018-05-11 NOTE — MR AVS SNAPSHOT
After Visit Summary   5/11/2018    Jose Angel Cha    MRN: [id number]           Patient Information     Date Of Birth          1955        Visit Information        Provider Department      5/11/2018 9:00 AM ER ALLERGY SHOTS [facility]        Today's Diagnoses     Allergic rhinitis    -  1       Follow-ups after your visit        Your next 10 appointments already scheduled     May 25, 2018  9:00 AM CDT   [facility] with [facility]   [facility]    [address]  [facility] [id number] [phone]            Jun 07, 2018  3:50 PM CDT   Nurse Only with ER ALLERGY SHOTS   [facility]    [address]  [place] [facility] [place] [facility] [id number] [phone]              Who to contact     If you have questions or need follow up information about today's clinic visit or your schedule please contact [facility] directly at [phone].  Normal or non-critical lab and imaging results will be communicated to you by [employer], letter or phone within 4 business days after the clinic has received the results. If you do not hear from us within 7 days, please contact the clinic through [employer] or phone. If you have a critical or abnormal lab result, we will notify you by phone as soon as possible.  Submit refill requests through [employer] or call your pharmacy and they will forward the refill request to us. Please allow 3 business days for your refill to be completed.          Additional Information About Your Visit        [employer] Information     [employer] gives you secure access to your electronic health record. If you see a primary care provider, you can also send messages to your care team and make appointments. If you have questions, please call your primary care clinic.  If you do not have a primary care provider, please call [phone] and they will assist you.         Care EveryWhere ID     This is your Care EveryWhere ID. This could be used by other organizations to access your [place] medical records  [id number]         Blood Pressure from Last 3 Encounters:   03/26/18 123/86   01/24/18 (!) 141/93   12/29/17 134/74    Weight from Last 3 Encounters:   03/26/18 93 kg (205 lb)   02/22/18 95.3 kg (210 lb)   01/24/18 99.7 kg (219 lb 14.4 oz)              We Performed the Following     Allergy Shot: One injection        Primary Care Provider Office Phone # Fax #    Shari Tonia Paredes [phone]       [address] [address]        Equal Access to Services     ANJEL TAFOYA : Hadii karina messero Soreji, waaxda luqadaha, qaybta kaalmada adeegyada, nico guillen . So [facility] [phone].    ATENCIÓN: Si habla español, tiene a armas disposición servicios gratuitos de asistencia lingüística. LlUniversity [facility] Medical [facility] [phone].    We comply with applicable federal civil rights laws and [place] laws. We do not discriminate on the basis of race, color, national origin, age, disability, sex, sexual orientation, or gender identity.            Thank you!     Thank you for choosing [facility]  for your care. Our goal is always to provide you with excellent care. Hearing back from our patients is one way we can continue to improve our services. Please take a few minutes to complete the written survey that you may receive in the mail after your visit with us. Thank you!             Your Updated Medication List - Protect others around you: Learn how to safely use, store and throw away your medicines at www.disposemymeds.org.          This list is accurate as of 5/11/18  9:41 AM.  Always use your most recent med list.                   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 P.  1:100 w/v, HS  1.0 ml Dust Mites DF 30,000AU/mL, HS  0.3 ml Dust Mites DP. 30,000 AU/mL, HS  0.3 ml  Birch Mix PRW 1:20 w/v, HS  0.5 ml Grass Mix #7 100,000 BAU/mL, HS 0.4 ml Nettle 1:20 w/v, HS 0.5 ml Diluent: HSA qs to 5ml    Allergic rhinitis due to dust mite, Chronic seasonal allergic rhinitis due to pollen, Allergic rhinitis due to animal dander       amLODIPine 2.5 MG tablet    NORVASC    90 tablet    Take 1 tablet (2.5 mg) by mouth daily    Benign essential hypertension       aspirin 81 MG tablet     0    ONE DAILY    Other and unspecified hyperlipidemia, Family history of ischemic heart disease       CLEAR-ATADINE 10 MG tablet   Generic drug:  loratadine      Take 10 mg by mouth daily        * EPINEPHrine 0.3 MG/0.3ML injection 2-pack    EPIPEN 2-MIGUELINA    2 each    Inject 0.3 mLs (0.3 mg) into the muscle once as needed for anaphylaxis    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 sting       * EPINEPHrine 0.3 MG/0.3ML injection 2-pack    AUVI-Q    2 mL    Inject 0.3 mLs (0.3 mg) into the muscle as needed for anaphylaxis    Need for desensitization to allergens       ezetimibe 10 MG tablet    ZETIA    30 tablet    Take 1 tablet (10 mg) by mouth daily At night    Mixed hyperlipidemia       fluticasone 50 MCG/ACT spray    FLONASE    1 Bottle    Spray 2 sprays into both nostrils daily    Chronic seasonal allergic rhinitis due to pollen, House dust  mite allergy, Allergic rhinitis due to animal dander       hydrochlorothiazide 25 MG tablet    HYDRODIURIL    90 tablet    Take 1 tablet (25 mg) by mouth daily    Benign essential hypertension       MULTIVITAL PO      Take 1 tablet by mouth daily Reported on 3/22/2017        olopatadine HCl 0.2 % Soln    PATADAY    2.5 mL    Place 1 drop into both eyes daily    Chronic seasonal allergic rhinitis due to pollen, House dust mite allergy       omeprazole 20 MG CR capsule    priLOSEC    90 capsule    TAKE ONE CAPSULE BY MOUTH EVERY DAY (TAKE 30 TO 60 MINUTES BEFORE A MEAL)    Gastroesophageal reflux disease without esophagitis       polyethylene glycol powder    MIRALAX    510 g    Take 17 g (1 capful) by mouth daily    Chronic constipation       STATIN NOT PRESCRIBED (INTENTIONAL)      by Other route continuous prn Reported on 4/6/2017    Mitral valve disorders(424.0), Hyperlipidemia LDL goal <100       temazepam 15 MG capsule    RESTORIL    30 capsule    Take 1 capsule (15 mg) by mouth nightly as needed for sleep    Anxiety       * Notice:  This list has 6 medication(s) that are the same as other medications prescribed for you. Read the directions carefully, and ask your doctor or other care provider to review them with you.

## 2018-05-11 NOTE — PROGRESS NOTES
Patient presented after waiting 30 minutes with no reaction to allergy injections. Discharged from clinic.    Cornel Tejeda RN....5/11/2018 9:41 AM

## 2018-05-24 ENCOUNTER — ALLIED HEALTH/NURSE VISIT (OUTPATIENT)
Dept: ALLERGY | Facility: OTHER | Age: 63
End: 2018-05-24
Payer: COMMERCIAL

## 2018-05-24 DIAGNOSIS — T63.441D TOXIC EFFECT OF VENOM OF BEES, UNINTENTIONAL, SUBSEQUENT ENCOUNTER: Primary | ICD-10-CM

## 2018-05-24 PROCEDURE — 99207 ZZC DROP WITH A PROCEDURE: CPT

## 2018-05-24 PROCEDURE — 95117 IMMUNOTHERAPY INJECTIONS: CPT

## 2018-05-24 NOTE — MR AVS SNAPSHOT
After Visit Summary   5/24/2018    Jose Angel Cha    MRN: [id number]           Patient Information     Date Of Birth          1955        Visit Information        Provider Department      5/24/2018 1:50 PM ER ALLERGY SHOTS [facility]        Today's Diagnoses     Toxic effect of venom of bees, unintentional, subsequent encounter    -  1       Follow-ups after your visit        Your next 10 appointments already scheduled     Jun 07, 2018  3:50 PM CDT   Nurse Only with ER [facility]ERGY SHOTS   [facility]    [address]  [place] [address] [phone]            Jul 05, 2018  3:30 PM CDT   Nurse Only with ER ALLERGY SHOTS   [facility]    [address]  [place] [address] [phone]              Who to contact     If you have questions or need follow up information about today's clinic visit or your schedule please contact [facility] directly at [phone].  Normal or non-critical lab and imaging results will be communicated to you by Virtual[employer] Softwarehart, letter or phone within 4 business days after the clinic has received the results. If you do not hear from us within 7 days, please contact the clinic through [employer] or phone. If you have a critical or abnormal lab result, we will notify you by phone as soon as possible.  Submit refill requests through [employer] or call your pharmacy and they will forward the refill request to us. Please allow 3 business days for your refill to be completed.          Additional Information About Your Visit        [employer] Software[employer] Information     [employer] gives you secure access to your electronic health record. If you see a primary care provider, you can also send messages to your care team and make appointments. If you have questions, please call your primary care clinic.  If you do not have a primary care provider, please  call [phone] and they will assist you.        Care EveryWhere ID     This is your Care EveryWhere ID. This could be used by other organizations to access your [place] medical records  [id number]         Blood Pressure from Last 3 Encounters:   03/26/18 123/86   01/24/18 (!) 141/93   12/29/17 134/74    Weight from Last 3 Encounters:   03/26/18 93 kg (205 lb)   02/22/18 95.3 kg (210 lb)   01/24/18 99.7 kg (219 lb 14.4 oz)              We Performed the Following     Allergy Shot: Two or more injections        Primary Care Provider Office Phone # Fax #    Shari Tonia Paredes -[phone]       [address] [place] [address]        Equal Access to Services     ANJEL TAFOYA : Hadii aad ku hadasho Soreji, waaxda luqadaha, qaybta kaalmada adeegyada, nico guillen . So [facility] [phone].    ATENCIÓN: Si habla español, tiene a armas disposición servicios gratuitos de asistencia lingüística. Llame al [phone].    We comply with applicable federal civil rights laws and [place] laws. We do not discriminate on the basis of race, color, national origin, age, disability, sex, sexual orientation, or gender identity.            Thank you!     Thank you for choosing [facility]  for your care. Our goal is always to provide you with excellent care. Hearing back from our patients is one way we can continue to improve our services. Please take a few minutes to complete the written survey that you may receive in the mail after your visit with us. Thank you!             Your Updated Medication List - Protect others around you: Learn how to safely use, store and throw away your medicines at www.disposemymeds.org.          This list is accurate as of 5/24/18  2:51 PM.  Always use your most recent med list.                   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 P.  1:100 w/v, HS  1.0 ml Dust Mites DF 30,000AU/mL, HS  0.3 ml Dust Mites DP. 30,000 AU/mL, HS  0.3 ml  Birch Mix PRW 1:20 w/v, HS  0.5 ml Grass Mix #7 100,000 BAU/mL, HS 0.4 ml Nettle 1:20 w/v, HS 0.5 ml Diluent: HSA qs to 5ml    Allergic rhinitis due to dust mite, Chronic seasonal allergic rhinitis due to pollen, Allergic rhinitis due to animal dander       amLODIPine 2.5 MG tablet    NORVASC    90 tablet    Take 1 tablet (2.5 mg) by mouth daily    Benign essential hypertension       aspirin 81 MG tablet     0    ONE DAILY    Other and unspecified hyperlipidemia, Family history of ischemic heart disease       CLEAR-ATADINE 10 MG tablet   Generic drug:  loratadine      Take 10 mg by mouth daily        * EPINEPHrine 0.3 MG/0.3ML injection 2-pack    EPIPEN 2-MIGUELINA    2 each    Inject 0.3 mLs (0.3 mg) into the muscle once as needed for anaphylaxis    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 sting       * EPINEPHrine 0.3 MG/0.3ML injection 2-pack    AUVI-Q    2 mL    Inject 0.3 mLs (0.3 mg) into the muscle as needed for anaphylaxis    Need for desensitization to allergens       ezetimibe 10 MG tablet    ZETIA    30 tablet    Take 1 tablet (10 mg) by mouth daily At night    Mixed hyperlipidemia       fluticasone 50 MCG/ACT spray    FLONASE    1 Bottle    Spray 2 sprays into both nostrils daily     Chronic seasonal allergic rhinitis due to pollen, House dust mite allergy, Allergic rhinitis due to animal dander       hydrochlorothiazide 25 MG tablet    HYDRODIURIL    90 tablet    Take 1 tablet (25 mg) by mouth daily    Benign essential hypertension       MULTIVITAL PO      Take 1 tablet by mouth daily Reported on 3/22/2017        olopatadine HCl 0.2 % Soln    PATADAY    2.5 mL    Place 1 drop into both eyes daily    Chronic seasonal allergic rhinitis due to pollen, House dust mite allergy       omeprazole 20 MG CR capsule    priLOSEC    90 capsule    TAKE ONE CAPSULE BY MOUTH EVERY DAY (TAKE 30 TO 60 MINUTES BEFORE A MEAL)    Gastroesophageal reflux disease without esophagitis       polyethylene glycol powder    MIRALAX    510 g    Take 17 g (1 capful) by mouth daily    Chronic constipation       STATIN NOT PRESCRIBED (INTENTIONAL)      by Other route continuous prn Reported on 4/6/2017    Mitral valve disorders(424.0), Hyperlipidemia LDL goal <100       temazepam 15 MG capsule    RESTORIL    30 capsule    Take 1 capsule (15 mg) by mouth nightly as needed for sleep    Anxiety       * Notice:  This list has 6 medication(s) that are the same as other medications prescribed for you. Read the directions carefully, and ask your doctor or other care provider to review them with you.

## 2018-06-07 ENCOUNTER — ALLIED HEALTH/NURSE VISIT (OUTPATIENT)
Dept: ALLERGY | Facility: OTHER | Age: 63
End: 2018-06-07
Payer: COMMERCIAL

## 2018-06-07 DIAGNOSIS — J30.9 ALLERGIC RHINITIS: Primary | ICD-10-CM

## 2018-06-07 PROCEDURE — 99207 ZZC DROP WITH A PROCEDURE: CPT

## 2018-06-07 PROCEDURE — 95115 IMMUNOTHERAPY ONE INJECTION: CPT

## 2018-06-07 NOTE — MR AVS SNAPSHOT
After Visit Summary   6/7/2018    Jose Angel Cha    MRN: [id number]           Patient Information     Date Of Birth          1955        Visit Information        Provider Department      6/7/2018 4:50 PM ER ALLERGY SHOTS [facility]        Today's Diagnoses     Allergic rhinitis    -  1       Follow-ups after your visit        Your next 10 appointments already scheduled     Jun 14, 2018  2:20 PM CDT   Nurse Only with ER ALLERGY SHOTS   [facility]    [address]  [facility] [address] [phone]            Jun 28, 2018  2:30 PM CDT   Nurse Only with ER ALLERGY SHOTS   [facility]    [address]  [facility] [address] [phone]            Jul 05, 2018  3:30 PM CDT   Nurse Only with ER ALLERGY SHOTS   [facility]    [address]  [facility] [address] [phone]              Who to contact     If you have questions or need follow up information about today's clinic visit or your schedule please contact [facility] directly at [phone].  Normal or non-critical lab and imaging results will be communicated to you by Etaphasehart, letter or phone within 4 business days after the clinic has received the results. If you do not hear from us within 7 days, please contact the clinic through CM Sistemit or phone. If you have a critical or abnormal lab result, we will notify you by phone as soon as possible.  Submit refill requests through [employer] or call your pharmacy and they will forward the refill request to us. Please allow 3 business days for your refill to be completed.          Additional Information About Your Visit        EtaphaseharPhoneplus Information     [employer] gives you secure access to your electronic health record. If you see a primary care provider, you can also send messages to  your care team and make appointments. If you have questions, please call your primary care clinic.  If you do not have a primary care provider, please call [phone] and they will assist you.        Care EveryWhere ID     This is your Care EveryWhere ID. This could be used by other organizations to access your [place] medical records  [id number]         Blood Pressure from Last 3 Encounters:   03/26/18 123/86   01/24/18 (!) 141/93   12/29/17 134/74    Weight from Last 3 Encounters:   03/26/18 93 kg (205 lb)   02/22/18 95.3 kg (210 lb)   01/24/18 99.7 kg (219 lb 14.4 oz)              We Performed the Following     Allergy Shot: One injection        Primary Care Provider Office Phone # Fax #    Shari Paredes [phone]       [address] [address]        Equal Access to Services     [facility]AN[facility]LL [facility] HospitalSOPHIA : Hadii aad ku hadasho Soomaali, waaxda luqadaha, qaybta kaalmada adeegyada, waxay lokiin hayeveretten sandra guillen . So [facility] [phone].    ATENCIÓN: Si habla español, tiene a armas disposición servicios gratuitos de asistencia lingüística. Pascual al [phone].    We comply with applicable federal civil rights laws and [place] laws. We do not discriminate on the basis of race, color, national origin, age, disability, sex, sexual orientation, or gender identity.            Thank you!     Thank you for choosing [facility]  for your care. Our goal is always to provide you with excellent care. Hearing back from our patients is one way we can continue to improve our services. Please take a few minutes to complete the written survey that you may receive in the mail after your visit with us. Thank you!             Your Updated Medication List - Protect others around you: Learn how to safely use, store and throw away your medicines at www.disposemymeds.org.          This list is accurate as of 6/7/18  5:24 PM.  Always use your most recent med list.                   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 P.  1:100 w/v, HS  1.0 ml Dust Mites DF 30,000AU/mL, HS  0.3 ml Dust Mites DP. 30,000 AU/mL, HS  0.3 ml  Birch Mix PRW 1:20 w/v, HS  0.5 ml Grass Mix #7 100,000 BAU/mL, HS 0.4 ml Nettle 1:20 w/v, HS 0.5 ml Diluent: HSA qs to 5ml    Allergic rhinitis due to dust mite, Chronic seasonal allergic rhinitis due to pollen, Allergic rhinitis due to animal dander       amLODIPine 2.5 MG tablet    NORVASC    90 tablet    Take 1 tablet (2.5 mg) by mouth daily    Benign essential hypertension       aspirin 81 MG tablet     0    ONE DAILY    Other and unspecified hyperlipidemia, Family history of ischemic heart disease       CLEAR-ATADINE 10 MG tablet   Generic drug:  loratadine      Take 10 mg by mouth daily        * EPINEPHrine 0.3 MG/0.3ML injection 2-pack    EPIPEN 2-MIGUELINA    2 each    Inject 0.3 mLs (0.3 mg) into the muscle once as needed for anaphylaxis    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 sting       * EPINEPHrine 0.3 MG/0.3ML injection 2-pack    AUVI-Q    2 mL    Inject 0.3 mLs (0.3 mg) into the muscle as needed for anaphylaxis    Need for desensitization to allergens       ezetimibe 10 MG tablet    ZETIA    30 tablet    Take 1 tablet (10 mg) by mouth daily  At night    Mixed hyperlipidemia       fluticasone 50 MCG/ACT spray    FLONASE    1 Bottle    Spray 2 sprays into both nostrils daily    Chronic seasonal allergic rhinitis due to pollen, House dust mite allergy, Allergic rhinitis due to animal dander       hydrochlorothiazide 25 MG tablet    HYDRODIURIL    90 tablet    Take 1 tablet (25 mg) by mouth daily    Benign essential hypertension       MULTIVITAL PO      Take 1 tablet by mouth daily Reported on 3/22/2017        olopatadine HCl 0.2 % Soln    PATADAY    2.5 mL    Place 1 drop into both eyes daily    Chronic seasonal allergic rhinitis due to pollen, House dust mite allergy       omeprazole 20 MG CR capsule    priLOSEC    90 capsule    TAKE ONE CAPSULE BY MOUTH EVERY DAY (TAKE 30 TO 60 MINUTES BEFORE A MEAL)    Gastroesophageal reflux disease without esophagitis       polyethylene glycol powder    MIRALAX    510 g    Take 17 g (1 capful) by mouth daily    Chronic constipation       STATIN NOT PRESCRIBED (INTENTIONAL)      by Other route continuous prn Reported on 4/6/2017    Mitral valve disorders(424.0), Hyperlipidemia LDL goal <100       temazepam 15 MG capsule    RESTORIL    30 capsule    Take 1 capsule (15 mg) by mouth nightly as needed for sleep    Anxiety       * Notice:  This list has 6 medication(s) that are the same as other medications prescribed for you. Read the directions carefully, and ask your doctor or other care provider to review them with you.

## 2018-06-11 ENCOUNTER — TELEPHONE (OUTPATIENT)
Dept: FAMILY MEDICINE | Facility: OTHER | Age: 63
End: 2018-06-11

## 2018-06-11 DIAGNOSIS — F41.9 ANXIETY: ICD-10-CM

## 2018-06-12 NOTE — TELEPHONE ENCOUNTER
temazepam (RESTORIL) 15 MG capsule      Last Written Prescription Date:  9/25/17  Last Fill Quantity: 30,   # refills: 3  Last Office Visit: 12/29/17  Future Office visit:    Next 5 appointments (look out 90 days)     Jun 14, 2018  2:20 PM CDT   [facility] with [facility]    [address]  [place] [facility] [address]   [phone]            Jun 28, 2018  2:30 PM CDT   Nurse Only with ER ALLERGY SHOTS   [facility]    [address]  [facility] [address]   [phone]            Jul 05, 2018  3:30 PM CDT   Nurse Only with ER ALLERGY SHOTS   [facility]    [address] [place] [address] [address]  [facility] [address]   [phone]                   Routing refill request to provider for review/approval because:  Drug not on the [facility], [facility] or [facility] refill protocol or controlled substance    Elsie Cordoba RN

## 2018-06-14 ENCOUNTER — ALLIED HEALTH/NURSE VISIT (OUTPATIENT)
Dept: ALLERGY | Facility: OTHER | Age: 63
End: 2018-06-14
Payer: COMMERCIAL

## 2018-06-14 DIAGNOSIS — J30.9 ALLERGIC RHINITIS: Primary | ICD-10-CM

## 2018-06-14 PROCEDURE — 95115 IMMUNOTHERAPY ONE INJECTION: CPT

## 2018-06-14 PROCEDURE — 99207 ZZC DROP WITH A PROCEDURE: CPT

## 2018-06-14 RX ORDER — TEMAZEPAM 15 MG/1
15 CAPSULE ORAL
Qty: 30 CAPSULE | Refills: 0 | Status: SHIPPED | OUTPATIENT
Start: 2018-06-14 | End: 2018-09-12

## 2018-06-14 NOTE — TELEPHONE ENCOUNTER
Spoke with patient, informed that requested RX was hand delivered to [facility]. Patient voiced understanding.  Elina REGALADO CMA ([facility])

## 2018-06-14 NOTE — MR AVS SNAPSHOT
After Visit Summary   6/14/2018    Jose Angel Cha    MRN: [id number]           Patient Information     Date Of Birth          1955        Visit Information        Provider Department      6/14/2018 2:20 PM ER ALLERGY SHOTS [facility]        Today's Diagnoses     Allergic rhinitis    -  1       Follow-ups after your visit        Your next 10 appointments already scheduled     Jun 28, 2018  2:30 PM CDT   Nurse Only with ER ALLERGY SHOTS   [facility] ([facility]    [address]  [facility] [place] [facility] [address] [phone]            Jul 05, 2018  3:30 PM CDT   Nurse Only with ER ALLERGY SHOTS   [facility] ([facility])    [address]  [facility] [address] [phone]              Who to contact     If you have questions or need follow up information about today's clinic visit or your schedule please contact [facility] directly at [phone].  Normal or non-critical lab and imaging results will be communicated to you by Vurv Technologyhart, letter or phone within 4 business days after the clinic has received the results. If you do not hear from us within 7 days, please contact the clinic through Toptalt or phone. If you have a critical or abnormal lab result, we will notify you by phone as soon as possible.  Submit refill requests through MeeWee or call your pharmacy and they will forward the refill request to us. Please allow 3 business days for your refill to be completed.          Additional Information About Your Visit        Vurv Technologyhart Information     MeeWee gives you secure access to your electronic health record. If you see a primary care provider, you can also send messages to your care team and make appointments. If you have questions, please call your primary care clinic.  If you do not have a primary care provider, please call [phone] and they will assist you.         Care EveryWhere ID     This is your Care EveryWhere ID. This could be used by other organizations to access your [place] medical records  [id number]         Blood Pressure from Last 3 Encounters:   03/26/18 123/86   01/24/18 (!) 141/93   12/29/17 134/74    Weight from Last 3 Encounters:   03/26/18 93 kg (205 lb)   02/22/18 95.3 kg (210 lb)   01/24/18 99.7 kg (219 lb 14.4 oz)              We Performed the Following     Allergy Shot: One injection        Primary Care Provider Office Phone # Fax #    Shari Tonia Paredes -[phone]       [address] [facility] [address]        Equal Access to Services     ANJEL TAFOYA : Hadii karina messero Soreji, waaxda luqadaha, qaybta kaalmada adeegyada, nico guillen . So [facility] [phone].    ATENCIÓN: Si habla español, tiene a armas disposición servicios gratuitos de asistencia lingüística. Ll[facility] [phone].    We comply with applicable federal civil rights laws and [place] laws. We do not discriminate on the basis of race, color, national origin, age, disability, sex, sexual orientation, or gender identity.            Thank you!     Thank you for choosing [facility]  for your care. Our goal is always to provide you with excellent care. Hearing back from our patients is one way we can continue to improve our services. Please take a few minutes to complete the written survey that you may receive in the mail after your visit with us. Thank you!             Your Updated Medication List - Protect others around you: Learn how to safely use, store and throw away your medicines at www.disposemymeds.org.          This list is accurate as of 6/14/18  3:45 PM.  Always use your most recent med list.                   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 P.  1:100 w/v, HS  1.0 ml Dust Mites DF 30,000AU/mL, HS  0.3 ml Dust Mites DP. 30,000 AU/mL, HS  0.3 ml  Birch Mix PRW 1:20 w/v, HS  0.5 ml Grass Mix #7 100,000 BAU/mL, HS 0.4 ml Nettle 1:20 w/v, HS 0.5 ml Diluent: HSA qs to 5ml    Allergic rhinitis due to dust mite, Chronic seasonal allergic rhinitis due to pollen, Allergic rhinitis due to animal dander       amLODIPine 2.5 MG tablet    NORVASC    90 tablet    Take 1 tablet (2.5 mg) by mouth daily    Benign essential hypertension       aspirin 81 MG tablet     0    ONE DAILY    Other and unspecified hyperlipidemia, Family history of ischemic heart disease       CLEAR-ATADINE 10 MG tablet   Generic drug:  loratadine      Take 10 mg by mouth daily        * EPINEPHrine 0.3 MG/0.3ML injection 2-pack    EPIPEN 2-MIGUELINA    2 each    Inject 0.3 mLs (0.3 mg) into the muscle once as needed for anaphylaxis    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 sting       * EPINEPHrine 0.3 MG/0.3ML injection 2-pack    AUVI-Q    2 mL    Inject 0.3 mLs (0.3 mg) into the muscle as needed for anaphylaxis    Need for desensitization to allergens       ezetimibe 10 MG tablet    ZETIA    30 tablet    Take 1 tablet (10 mg) by mouth daily At night    Mixed hyperlipidemia       fluticasone 50 MCG/ACT spray    FLONASE    1 Bottle    Spray 2 sprays into both nostrils daily    Chronic seasonal allergic rhinitis due to pollen, House dust  mite allergy, Allergic rhinitis due to animal dander       hydrochlorothiazide 25 MG tablet    HYDRODIURIL    90 tablet    Take 1 tablet (25 mg) by mouth daily    Benign essential hypertension       MULTIVITAL PO      Take 1 tablet by mouth daily Reported on 3/22/2017        olopatadine HCl 0.2 % Soln    PATADAY    2.5 mL    Place 1 drop into both eyes daily    Chronic seasonal allergic rhinitis due to pollen, House dust mite allergy       omeprazole 20 MG CR capsule    priLOSEC    90 capsule    TAKE ONE CAPSULE BY MOUTH EVERY DAY (TAKE 30 TO 60 MINUTES BEFORE A MEAL)    Gastroesophageal reflux disease without esophagitis       polyethylene glycol powder    MIRALAX    510 g    Take 17 g (1 capful) by mouth daily    Chronic constipation       STATIN NOT PRESCRIBED (INTENTIONAL)      by Other route continuous prn Reported on 4/6/2017    Mitral valve disorders(424.0), Hyperlipidemia LDL goal <100       temazepam 15 MG capsule    RESTORIL    30 capsule    Take 1 capsule (15 mg) by mouth nightly as needed for sleep    Anxiety       * Notice:  This list has 6 medication(s) that are the same as other medications prescribed for you. Read the directions carefully, and ask your doctor or other care provider to review them with you.

## 2018-06-28 ENCOUNTER — ALLIED HEALTH/NURSE VISIT (OUTPATIENT)
Dept: ALLERGY | Facility: OTHER | Age: 63
End: 2018-06-28
Payer: COMMERCIAL

## 2018-06-28 DIAGNOSIS — J30.9 ALLERGIC RHINITIS: Primary | ICD-10-CM

## 2018-06-28 PROCEDURE — 95115 IMMUNOTHERAPY ONE INJECTION: CPT

## 2018-06-28 PROCEDURE — 99207 ZZC DROP WITH A PROCEDURE: CPT

## 2018-06-28 NOTE — MR AVS SNAPSHOT
After Visit Summary   6/28/2018    Jose Angel Cha    MRN: [id number]           Patient Information     Date Of Birth          1955        Visit Information        Provider Department      6/28/2018 2:30 PM ER ALLERGY SHOTS [facility]        Today's Diagnoses     Allergic rhinitis    -  1       Follow-ups after your visit        Your next 10 appointments already scheduled     Jul 05, 2018  3:30 PM CDT   Nurse Only with ER ALLERGY SHOTS   [facility]    [address]  [place] [address] [phone]            Jul 26, 2018  3:00 PM CDT   Nurse Only with ER ALLERGY SHOTS   [facility]    [address]  [place] [place] [address] [phone]              Who to contact     If you have questions or need follow up information about today's clinic visit or your schedule please contact [facility] directly at [phone].  Normal or non-critical lab and imaging results will be communicated to you by Sprout Socialhart, letter or phone within 4 business days after the clinic has received the results. If you do not hear from us within 7 days, please contact the clinic through [employer] or phone. If you have a critical or abnormal lab result, we will notify you by phone as soon as possible.  Submit refill requests through [employer] or call your pharmacy and they will forward the refill request to us. Please allow 3 business days for your refill to be completed.          Additional Information About Your Visit        Sprout Socialhart Information     [employer] gives you secure access to your electronic health record. If you see a primary care provider, you can also send messages to your care team and make appointments. If you have questions, please call your primary care clinic.  If you do not have a primary care provider, please call [phone] and they will assist you.         Care EveryWhere ID     This is your Care EveryWhere ID. This could be used by other organizations to access your [place] medical records  [id number]         Blood Pressure from Last 3 Encounters:   03/26/18 123/86   01/24/18 (!) 141/93   12/29/17 134/74    Weight from Last 3 Encounters:   03/26/18 93 kg (205 lb)   02/22/18 95.3 kg (210 lb)   01/24/18 99.7 kg (219 lb 14.4 oz)              We Performed the Following     Allergy Shot: One injection        Primary Care Provider Office Phone # Fax #    Shari Tonia Paredes -[phone]       [address] [address]        Equal Access to Services     ANJEL TAFOYA : Hadii karina messero Soreji, waaxda luqadaha, qaybta kaalmada adeegyada, nico guillen . So [facility] [phone].    ATENCIÓN: Si habla español, tiene a armas disposición servicios gratuitos de asistencia lingüística. LlOhioHealth Doctors Hospital [phone].    We comply with applicable federal civil rights laws and [place] laws. We do not discriminate on the basis of race, color, national origin, age, disability, sex, sexual orientation, or gender identity.            Thank you!     Thank you for choosing [facility]  for your care. Our goal is always to provide you with excellent care. Hearing back from our patients is one way we can continue to improve our services. Please take a few minutes to complete the written survey that you may receive in the mail after your visit with us. Thank you!             Your Updated Medication List - Protect others around you: Learn how to safely use, store and throw away your medicines at www.disposemymeds.org.          This list is accurate as of 6/28/18 11:59 PM.  Always use your most recent med list.                   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 P.  1:100 w/v, HS  1.0 ml Dust Mites DF 30,000AU/mL, HS  0.3 ml Dust Mites DP. 30,000 AU/mL, HS  0.3 ml  Birch Mix PRW 1:20 w/v, HS  0.5 ml Grass Mix #7 100,000 BAU/mL, HS 0.4 ml Nettle 1:20 w/v, HS 0.5 ml Diluent: HSA qs to 5ml    Allergic rhinitis due to dust mite, Chronic seasonal allergic rhinitis due to pollen, Allergic rhinitis due to animal dander       amLODIPine 2.5 MG tablet    NORVASC    90 tablet    Take 1 tablet (2.5 mg) by mouth daily    Benign essential hypertension       aspirin 81 MG tablet     0    ONE DAILY    Other and unspecified hyperlipidemia, Family history of ischemic heart disease       CLEAR-ATADINE 10 MG tablet   Generic drug:  loratadine      Take 10 mg by mouth daily        * EPINEPHrine 0.3 MG/0.3ML injection 2-pack    EPIPEN 2-MIGUELINA    2 each    Inject 0.3 mLs (0.3 mg) into the muscle once as needed for anaphylaxis    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 sting       * EPINEPHrine 0.3 MG/0.3ML injection 2-pack    AUVI-Q    2 mL    Inject 0.3 mLs (0.3 mg) into the muscle as needed for anaphylaxis    Need for desensitization to allergens       ezetimibe 10 MG tablet    ZETIA    30 tablet    Take 1 tablet (10 mg) by mouth daily At night    Mixed hyperlipidemia       fluticasone 50 MCG/ACT spray    FLONASE    1 Bottle    Spray 2 sprays into both nostrils daily    Chronic seasonal allergic rhinitis due to pollen, House dust  mite allergy, Allergic rhinitis due to animal dander       hydrochlorothiazide 25 MG tablet    HYDRODIURIL    90 tablet    Take 1 tablet (25 mg) by mouth daily    Benign essential hypertension       MULTIVITAL PO      Take 1 tablet by mouth daily Reported on 3/22/2017        olopatadine HCl 0.2 % Soln    PATADAY    2.5 mL    Place 1 drop into both eyes daily    Chronic seasonal allergic rhinitis due to pollen, House dust mite allergy       omeprazole 20 MG CR capsule    priLOSEC    90 capsule    TAKE ONE CAPSULE BY MOUTH EVERY DAY (TAKE 30 TO 60 MINUTES BEFORE A MEAL)    Gastroesophageal reflux disease without esophagitis       polyethylene glycol powder    MIRALAX    510 g    Take 17 g (1 capful) by mouth daily    Chronic constipation       STATIN NOT PRESCRIBED (INTENTIONAL)      by Other route continuous prn Reported on 4/6/2017    Mitral valve disorders(424.0), Hyperlipidemia LDL goal <100       temazepam 15 MG capsule    RESTORIL    30 capsule    Take 1 capsule (15 mg) by mouth nightly as needed for sleep    Anxiety       * Notice:  This list has 6 medication(s) that are the same as other medications prescribed for you. Read the directions carefully, and ask your doctor or other care provider to review them with you.

## 2018-07-05 ENCOUNTER — ALLIED HEALTH/NURSE VISIT (OUTPATIENT)
Dept: ALLERGY | Facility: OTHER | Age: 63
End: 2018-07-05
Payer: COMMERCIAL

## 2018-07-05 DIAGNOSIS — T63.441D TOXIC EFFECT OF VENOM OF BEES, UNINTENTIONAL, SUBSEQUENT ENCOUNTER: Primary | ICD-10-CM

## 2018-07-05 PROCEDURE — 99207 ZZC DROP WITH A PROCEDURE: CPT

## 2018-07-05 PROCEDURE — 95117 IMMUNOTHERAPY INJECTIONS: CPT

## 2018-07-05 NOTE — MR AVS SNAPSHOT
After Visit Summary   7/5/2018    Jose Angel Cha    MRN: [id number]           Patient Information     Date Of Birth          1955        Visit Information        Provider Department      7/5/2018 3:30 PM ER ALLERGY SHOTS [facility]        Today's Diagnoses     Toxic effect of venom of bees, unintentional, subsequent encounter    -  1       Follow-ups after your visit        Your next 10 appointments already scheduled     Jul 26, 2018  3:00 PM CDT   Nurse Only with ER ALLERGY SHOTS   [facility] ([facility])    [address]  [place] [address] [phone]            Aug 16, 2018  3:40 PM CDT   Nurse Only with ER ALLERGY SHOTS   [facility] ([facility])    [address]  [facility] [address] [phone]              Who to contact     If you have questions or need follow up information about today's clinic visit or your schedule please contact [facility] directly at [phone].  Normal or non-critical lab and imaging results will be communicated to you by Veducahart, letter or phone within 4 business days after the clinic has received the results. If you do not hear from us within 7 days, please contact the clinic through CityHourt or phone. If you have a critical or abnormal lab result, we will notify you by phone as soon as possible.  Submit refill requests through IdleAir or call your pharmacy and they will forward the refill request to us. Please allow 3 business days for your refill to be completed.          Additional Information About Your Visit        Veducahart Information     IdleAir gives you secure access to your electronic health record. If you see a primary care provider, you can also send messages to your care team and make appointments. If you have questions, please call your primary care clinic.  If you do not have a primary care provider, please call  [phone] and they will assist you.        Care EveryWhere ID     This is your Care EveryWhere ID. This could be used by other organizations to access your [place] medical records  [id number]         Blood Pressure from Last 3 Encounters:   03/26/18 123/86   01/24/18 (!) 141/93   12/29/17 134/74    Weight from Last 3 Encounters:   03/26/18 93 kg (205 lb)   02/22/18 95.3 kg (210 lb)   01/24/18 99.7 kg (219 lb 14.4 oz)              We Performed the Following     Allergy Shot: Two or more injections        Primary Care Provider Office Phone # Fax #    Shari Tonia Paredes [phone]       [address] [place] [address]        Equal Access to Services     ANJEL TAFOYA : Hadii aad ku hadasho Soreji, waaxda luqadaha, qaybta kaalmada adeegyada, nico guillen . So [facility] [phone].    ATENCIÓN: Si habla español, tiene a armas disposición servicios gratuitos de asistencia lingüística. Llame al [phone].    We comply with applicable federal civil rights laws and [place] laws. We do not discriminate on the basis of race, color, national origin, age, disability, sex, sexual orientation, or gender identity.            Thank you!     Thank you for choosing [facility]  for your care. Our goal is always to provide you with excellent care. Hearing back from our patients is one way we can continue to improve our services. Please take a few minutes to complete the written survey that you may receive in the mail after your visit with us. Thank you!             Your Updated Medication List - Protect others around you: Learn how to safely use, store and throw away your medicines at www.disposemymeds.org.          This list is accurate as of 7/5/18  4:12 PM.  Always use your most recent med list.                   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 P.  1:100 w/v, HS  1.0 ml Dust Mites DF 30,000AU/mL, HS  0.3 ml Dust Mites DP. 30,000 AU/mL, HS  0.3 ml  Birch Mix PRW 1:20 w/v, HS  0.5 ml Grass Mix #7 100,000 BAU/mL, HS 0.4 ml Nettle 1:20 w/v, HS 0.5 ml Diluent: HSA qs to 5ml    Allergic rhinitis due to dust mite, Chronic seasonal allergic rhinitis due to pollen, Allergic rhinitis due to animal dander       amLODIPine 2.5 MG tablet    NORVASC    90 tablet    Take 1 tablet (2.5 mg) by mouth daily    Benign essential hypertension       aspirin 81 MG tablet     0    ONE DAILY    Other and unspecified hyperlipidemia, Family history of ischemic heart disease       CLEAR-ATADINE 10 MG tablet   Generic drug:  loratadine      Take 10 mg by mouth daily        * EPINEPHrine 0.3 MG/0.3ML injection 2-pack    EPIPEN 2-MIGUELINA    2 each    Inject 0.3 mLs (0.3 mg) into the muscle once as needed for anaphylaxis    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 sting       * EPINEPHrine 0.3 MG/0.3ML injection 2-pack    AUVI-Q    2 mL    Inject 0.3 mLs (0.3 mg) into the muscle as needed for anaphylaxis    Need for desensitization to allergens       ezetimibe 10 MG tablet    ZETIA    30 tablet    Take 1 tablet (10 mg) by mouth daily At night    Mixed hyperlipidemia       fluticasone 50 MCG/ACT spray    FLONASE    1 Bottle    Spray 2 sprays into both nostrils daily     Chronic seasonal allergic rhinitis due to pollen, House dust mite allergy, Allergic rhinitis due to animal dander       hydrochlorothiazide 25 MG tablet    HYDRODIURIL    90 tablet    Take 1 tablet (25 mg) by mouth daily    Benign essential hypertension       MULTIVITAL PO      Take 1 tablet by mouth daily Reported on 3/22/2017        olopatadine HCl 0.2 % Soln    PATADAY    2.5 mL    Place 1 drop into both eyes daily    Chronic seasonal allergic rhinitis due to pollen, House dust mite allergy       omeprazole 20 MG CR capsule    priLOSEC    90 capsule    TAKE ONE CAPSULE BY MOUTH EVERY DAY (TAKE 30 TO 60 MINUTES BEFORE A MEAL)    Gastroesophageal reflux disease without esophagitis       polyethylene glycol powder    MIRALAX    510 g    Take 17 g (1 capful) by mouth daily    Chronic constipation       STATIN NOT PRESCRIBED (INTENTIONAL)      by Other route continuous prn Reported on 4/6/2017    Mitral valve disorders(424.0), Hyperlipidemia LDL goal <100       temazepam 15 MG capsule    RESTORIL    30 capsule    Take 1 capsule (15 mg) by mouth nightly as needed for sleep    Anxiety       * Notice:  This list has 6 medication(s) that are the same as other medications prescribed for you. Read the directions carefully, and ask your doctor or other care provider to review them with you.

## 2018-07-25 DIAGNOSIS — E78.2 MIXED HYPERLIPIDEMIA: ICD-10-CM

## 2018-07-25 RX ORDER — EZETIMIBE 10 MG/1
10 TABLET ORAL DAILY
Qty: 90 TABLET | Refills: 3 | Status: SHIPPED | OUTPATIENT
Start: 2018-07-25 | End: 2019-07-26

## 2018-07-26 ENCOUNTER — ALLIED HEALTH/NURSE VISIT (OUTPATIENT)
Dept: ALLERGY | Facility: OTHER | Age: 63
End: 2018-07-26
Payer: COMMERCIAL

## 2018-07-26 DIAGNOSIS — J30.9 ALLERGIC RHINITIS: Primary | ICD-10-CM

## 2018-07-26 PROCEDURE — 99207 ZZC DROP WITH A PROCEDURE: CPT

## 2018-07-26 PROCEDURE — 95115 IMMUNOTHERAPY ONE INJECTION: CPT

## 2018-07-26 NOTE — MR AVS SNAPSHOT
After Visit Summary   7/26/2018    Jose Angel Cha    MRN: [id number]           Patient Information     Date Of Birth          1955        Visit Information        Provider Department      7/26/2018 3:00 PM ER ALLERGY SHOTS [facility]        Today's Diagnoses     Allergic rhinitis    -  1       Follow-ups after your visit        Your next 10 appointments already scheduled     Aug 16, 2018  3:40 PM CDT   Nurse Only with ER ALLERGY SHOTS   [facility] ([facility])    [address]  [facility] [id number] [phone]            Aug 21, 2018  8:50 AM CDT   Nurse Only with ER ALLERGY SHOTS   [facility] ([facility])    [address]  [facility] [place] [facility] [id number] [phone]              Who to contact     If you have questions or need follow up information about today's clinic visit or your schedule please contact [facility] directly at [phone].  Normal or non-critical lab and imaging results will be communicated to you by Better World Bookshart, letter or phone within 4 business days after the clinic has received the results. If you do not hear from us within 7 days, please contact the clinic through Warplyt or phone. If you have a critical or abnormal lab result, we will notify you by phone as soon as possible.  Submit refill requests through Promptu Systems or call your pharmacy and they will forward the refill request to us. Please allow 3 business days for your refill to be completed.          Additional Information About Your Visit        Better World Bookshart Information     Promptu Systems gives you secure access to your electronic health record. If you see a primary care provider, you can also send messages to your care team and make appointments. If you have questions, please call your primary care clinic.  If you do not have a primary care provider, please call [phone] and they will assist you.         Care EveryWhere ID     This is your Care EveryWhere ID. This could be used by other organizations to access your [place] medical records  [id number]         Blood Pressure from Last 3 Encounters:   03/26/18 123/86   01/24/18 (!) 141/93   12/29/17 134/74    Weight from Last 3 Encounters:   03/26/18 93 kg (205 lb)   02/22/18 95.3 kg (210 lb)   01/24/18 99.7 kg (219 lb 14.4 oz)              We Performed the Following     Allergy Shot: One injection        Primary Care Provider Office Phone # Fax #    Shari Tonia Paredes [phone]       [address] [address]        Equal Access to Services     ANJEL TAFOYA : Hadii karina messero Soreji, waaxda luqadaha, qaybta kaalmada adeegyada, nico guillen . So [facility] [phone].    ATENCIÓN: Si habla español, tiene a armas disposición servicios gratuitos de asistencia lingüística. [facility] [phone].    We comply with applicable federal civil rights laws and [place] laws. We do not discriminate on the basis of race, color, national origin, age, disability, sex, sexual orientation, or gender identity.            Thank you!     Thank you for choosing [facility]  for your care. Our goal is always to provide you with excellent care. Hearing back from our patients is one way we can continue to improve our services. Please take a few minutes to complete the written survey that you may receive in the mail after your visit with us. Thank you!             Your Updated Medication List - Protect others around you: Learn how to safely use, store and throw away your medicines at www.disposemymeds.org.          This list is accurate as of 7/26/18  3:34 PM.  Always use your most recent med list.                   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 P.  1:100 w/v, HS  1.0 ml Dust Mites DF 30,000AU/mL, HS  0.3 ml Dust Mites DP. 30,000 AU/mL, HS  0.3 ml  Birch Mix PRW 1:20 w/v, HS  0.5 ml Grass Mix #7 100,000 BAU/mL, HS 0.4 ml Nettle 1:20 w/v, HS 0.5 ml Diluent: HSA qs to 5ml    Allergic rhinitis due to dust mite, Chronic seasonal allergic rhinitis due to pollen, Allergic rhinitis due to animal dander       amLODIPine 2.5 MG tablet    NORVASC    90 tablet    Take 1 tablet (2.5 mg) by mouth daily    Benign essential hypertension       aspirin 81 MG tablet     0    ONE DAILY    Other and unspecified hyperlipidemia, Family history of ischemic heart disease       CLEAR-ATADINE 10 MG tablet   Generic drug:  loratadine      Take 10 mg by mouth daily        * EPINEPHrine 0.3 MG/0.3ML injection 2-pack    EPIPEN 2-MIGUELINA    2 each    Inject 0.3 mLs (0.3 mg) into the muscle once as needed for anaphylaxis    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 sting       * EPINEPHrine 0.3 MG/0.3ML injection 2-pack    AUVI-Q    2 mL    Inject 0.3 mLs (0.3 mg) into the muscle as needed for anaphylaxis    Need for desensitization to allergens       ezetimibe 10 MG tablet    ZETIA    90 tablet    Take 1 tablet (10 mg) by mouth daily At night    Mixed hyperlipidemia       fluticasone 50 MCG/ACT spray    FLONASE    1 Bottle    Spray 2 sprays into both nostrils daily    Chronic seasonal allergic rhinitis due to pollen, House dust  mite allergy, Allergic rhinitis due to animal dander       hydrochlorothiazide 25 MG tablet    HYDRODIURIL    90 tablet    Take 1 tablet (25 mg) by mouth daily    Benign essential hypertension       MULTIVITAL PO      Take 1 tablet by mouth daily Reported on 3/22/2017        olopatadine HCl 0.2 % Soln    PATADAY    2.5 mL    Place 1 drop into both eyes daily    Chronic seasonal allergic rhinitis due to pollen, House dust mite allergy       omeprazole 20 MG CR capsule    priLOSEC    90 capsule    TAKE ONE CAPSULE BY MOUTH EVERY DAY (TAKE 30 TO 60 MINUTES BEFORE A MEAL)    Gastroesophageal reflux disease without esophagitis       polyethylene glycol powder    MIRALAX    510 g    Take 17 g (1 capful) by mouth daily    Chronic constipation       STATIN NOT PRESCRIBED (INTENTIONAL)      by Other route continuous prn Reported on 4/6/2017    Mitral valve disorders(424.0), Hyperlipidemia LDL goal <100       temazepam 15 MG capsule    RESTORIL    30 capsule    Take 1 capsule (15 mg) by mouth nightly as needed for sleep    Anxiety       * Notice:  This list has 6 medication(s) that are the same as other medications prescribed for you. Read the directions carefully, and ask your doctor or other care provider to review them with you.

## 2018-08-16 ENCOUNTER — ALLIED HEALTH/NURSE VISIT (OUTPATIENT)
Dept: ALLERGY | Facility: OTHER | Age: 63
End: 2018-08-16
Payer: COMMERCIAL

## 2018-08-16 DIAGNOSIS — J30.9 ALLERGIC RHINITIS: Primary | ICD-10-CM

## 2018-08-16 DIAGNOSIS — T63.481D ANAPHYLAXIS DUE TO HYMENOPTERA VENOM, ACCIDENTAL OR UNINTENTIONAL, SUBSEQUENT ENCOUNTER: ICD-10-CM

## 2018-08-16 DIAGNOSIS — T78.2XXD ANAPHYLAXIS DUE TO HYMENOPTERA VENOM, ACCIDENTAL OR UNINTENTIONAL, SUBSEQUENT ENCOUNTER: ICD-10-CM

## 2018-08-16 PROCEDURE — 99207 ZZC NO CHARGE NURSE ONLY: CPT

## 2018-08-16 NOTE — TELEPHONE ENCOUNTER
ALLERGY SOLUTION RE-ORDER REQUEST    Jose Angel Cha 1955 MRN: [id number]    DATE NEEDED:  8/23/2018  Vial Color Content   Top Dose       Last Dose     Vial Size  Red 1:1 Mixed Vespid   Red 1:1 1       Red 1:11     12ml  Red 1:1 Wasp   Red 1:1 1       Red 1:11     12ml        Serum reorder consent signed and patient/parent was advised that new serums would be ordered through the pharmacy and billed to their insurance company when they arrive in clinic. Yes    Shot Clinic Location:  [facility]  Ship to Location: [facility]  Serum billed to:  [facility]    Special Instructions:        Updated Prescription Needed: No      Requester Signature  Madina Hernandez

## 2018-08-16 NOTE — MR AVS SNAPSHOT
After Visit Summary   8/16/2018    Jose Angel Cha    MRN: [id number]           Patient Information     Date Of Birth          1955        Visit Information        Provider Department      8/16/2018 3:40 PM ER ALLERGY SHOTS [facility]        Today's Diagnoses     Allergic rhinitis    -  1       Follow-ups after your visit        Your next 10 appointments already scheduled     Aug 21, 2018  8:50 AM CDT   [facility]    [address]  [place] [address] [phone]            Aug 23, 2018  2:20 PM CDT   Nurse Only with ER ALLERGY SHOTS   [facility]    [address]  [facility] [address] [phone]              Who to contact     If you have questions or need follow up information about today's clinic visit or your schedule please contact [facility] directly at [phone].  Normal or non-critical lab and imaging results will be communicated to you by SonoPlothart, letter or phone within 4 business days after the clinic has received the results. If you do not hear from us within 7 days, please contact the clinic through Mobilitust or phone. If you have a critical or abnormal lab result, we will notify you by phone as soon as possible.  Submit refill requests through Bering Media or call your pharmacy and they will forward the refill request to us. Please allow 3 business days for your refill to be completed.          Additional Information About Your Visit        SonoPlothart Information     B[employer] Media gives you secure access to your electronic health record. If you see a primary care provider, you can also send messages to your care team and make appointments. If you have questions, please call your primary care clinic.  If you do not have a primary care provider, please call [phone] and they will assist you.         Care EveryWhere ID     This is your Care EveryWhere ID. This could be used by other organizations to access your [place] medical records  [id number]         Blood Pressure from Last 3 Encounters:   03/26/18 123/86   01/24/18 (!) 141/93   12/29/17 134/74    Weight from Last 3 Encounters:   03/26/18 93 kg (205 lb)   02/22/18 95.3 kg (210 lb)   01/24/18 99.7 kg (219 lb 14.4 oz)              Today, you had the following     No orders found for display       Primary Care Provider Office Phone # Fax #    Shari Tonia Paredes [phone]       [address] [address]        Equal Access to Services     ANJEL TAFOYA : Hadii karina messero Soreji, waaxda luqadaha, qaybta kaalmada adeegyada, nico guillen . So [facility] [phone].    ATENCIÓN: Si habla español, tiene a armas disposición servicios gratuitos de asistencia lingüística. [facility] [phone].    We comply with applicable federal civil rights laws and [place] laws. We do not discriminate on the basis of race, color, national origin, age, disability, sex, sexual orientation, or gender identity.            Thank you!     Thank you for choosing [facility]  for your care. Our goal is always to provide you with excellent care. Hearing back from our patients is one way we can continue to improve our services. Please take a few minutes to complete the written survey that you may receive in the mail after your visit with us. Thank you!             Your Updated Medication List - Protect others around you: Learn how to safely use, store and throw away your medicines at www.disposemymeds.org.          This list is accurate as of 8/16/18  3:45 PM.  Always use your most recent med list.                   Brand Name Dispense Instructions for use Diagnosis    amLODIPine 2.5 MG tablet    NORVASC    90 tablet    Take 1 tablet (2.5 mg) by mouth daily    Benign essential hypertension       aspirin 81 MG tablet      0    ONE DAILY    Other and unspecified hyperlipidemia, Family history of ischemic heart disease       CLEAR-ATADINE 10 MG tablet   Generic drug:  loratadine      Take 10 mg by mouth daily        * EPINEPHrine 0.3 MG/0.3ML injection 2-pack    EPIPEN 2-MIGUELINA    2 each    Inject 0.3 mLs (0.3 mg) into the muscle once as needed for anaphylaxis    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 sting       * EPINEPHrine 0.3 MG/0.3ML injection 2-pack    AUVI-Q    2 mL    Inject 0.3 mLs (0.3 mg) into the muscle as needed for anaphylaxis    Need for desensitization to allergens       ezetimibe 10 MG tablet    ZETIA    90 tablet    Take 1 tablet (10 mg) by mouth daily At night    Mixed hyperlipidemia       fluticasone 50 MCG/ACT spray    FLONASE    1 Bottle    Spray 2 sprays into both nostrils daily    Chronic seasonal allergic rhinitis due to pollen, House dust mite allergy, Allergic rhinitis due to animal dander       hydrochlorothiazide 25 MG tablet    HYDRODIURIL    90 tablet    Take 1 tablet (25 mg) by mouth daily    Benign essential hypertension       MULTIVITAL PO      Take 1 tablet by mouth daily Reported on 3/22/2017        olopatadine HCl 0.2 % Soln    PATADAY    2.5 mL    Place 1 drop into both eyes daily    Chronic seasonal allergic rhinitis due to pollen, House dust mite allergy       omeprazole 20 MG CR capsule    priLOSEC    90 capsule    TAKE ONE CAPSULE BY MOUTH EVERY DAY (TAKE 30 TO 60 MINUTES BEFORE A MEAL)    Gastroesophageal reflux disease without esophagitis       * ORDER FOR ALLERGEN IMMUNOTHERAPY 5 mL vial     13 mL    Reported on 3/22/2017        * ORDER FOR ALLERGEN IMMUNOTHERAPY 5 mL vial     13 mL    Reported on 3/22/2017        * ORDER FOR ALLERGEN IMMUNOTHERAPY 5 mL vial     13 mL    Name of Mix: Mix #1  Mixed Vespid Mixed Vespid Venom 300 mcg/mL HS 13 ml Diluent: HSA qs to 13ml    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 P.  1:100 w/v, HS  1.0 ml Dust Mites DF 30,000AU/mL, HS  0.3 ml Dust Mites DP. 30,000 AU/mL, HS  0.3 ml  Birch Mix PRW 1:20 w/v, HS  0.5 ml Grass Mix #7 100,000 BAU/mL, HS 0.4 ml Nettle 1:20 w/v, HS 0.5 ml Diluent: HSA qs to 5ml    Allergic rhinitis due to dust mite, Chronic seasonal allergic rhinitis due to pollen, Allergic rhinitis due to animal dander       polyethylene glycol powder    MIRALAX    510 g    Take 17 g (1 capful) by mouth daily    Chronic constipation       STATIN NOT PRESCRIBED (INTENTIONAL)      by Other route continuous prn Reported on 4/6/2017    Mitral valve disorders(424.0), Hyperlipidemia LDL goal <100       temazepam 15 MG capsule    RESTORIL    30 capsule    Take 1 capsule (15 mg) by mouth nightly as needed for sleep    Anxiety       * Notice:  This list has 6 medication(s) that are the same as other medications prescribed for you. Read the directions carefully, and ask your doctor or other care provider to review them with you.

## 2018-08-21 ENCOUNTER — ALLIED HEALTH/NURSE VISIT (OUTPATIENT)
Dept: ALLERGY | Facility: OTHER | Age: 63
End: 2018-08-21
Payer: COMMERCIAL

## 2018-08-21 DIAGNOSIS — J30.9 ALLERGIC RHINITIS: Primary | ICD-10-CM

## 2018-08-21 PROCEDURE — 95115 IMMUNOTHERAPY ONE INJECTION: CPT

## 2018-08-21 PROCEDURE — 99207 ZZC DROP WITH A PROCEDURE: CPT

## 2018-08-21 NOTE — MR AVS SNAPSHOT
After Visit Summary   8/21/2018    Jose Angel Cha    MRN: [id number]           Patient Information     Date Of Birth          1955        Visit Information        Provider Department      8/21/2018 8:50 AM ER ALLERGY SHOTS [facility]        Today's Diagnoses     Allergic rhinitis    -  1       Follow-ups after your visit        Your next 10 appointments already scheduled     Aug 23, 2018  2:20 PM CDT   Nurse Only with ER ALLERGY SHOTS   [facility] ([facility])    [address]  [facility] [id number] [phone]              Who to contact     If you have questions or need follow up information about today's clinic visit or your schedule please contact [facility] directly at [phone].  Normal or non-critical lab and imaging results will be communicated to you by MyChart, letter or phone within 4 business days after the clinic has received the results. If you do not hear from us within 7 days, please contact the clinic through MyChart or phone. If you have a critical or abnormal lab result, we will notify you by phone as soon as possible.  Submit refill requests through [employer] or call your pharmacy and they will forward the refill request to us. Please allow 3 business days for your refill to be completed.          Additional Information About Your Visit        MyChart Information     [employer] gives you secure access to your electronic health record. If you see a primary care provider, you can also send messages to your care team and make appointments. If you have questions, please call your primary care clinic.  If you do not have a primary care provider, please call [phone] and they will assist you.        Care EveryWhere ID     This is your Care EveryWhere ID. This could be used by other organizations to access your [place] medical records  [id number]         Blood Pressure from Last 3 Encounters:    03/26/18 123/86   01/24/18 (!) 141/93   12/29/17 134/74    Weight from Last 3 Encounters:   03/26/18 93 kg (205 lb)   02/22/18 95.3 kg (210 lb)   01/24/18 99.7 kg (219 lb 14.4 oz)              We Performed the Following     Allergy Shot: One injection        Primary Care Provider Office Phone # Fax #    Shari Tonia Paredes [phone]       [address] [place] [address]        Equal Access to Services     [facility]: Hadii aad nitesh hadasho Soomaali, waaxda luqadaha, qaybta kaalmada adeegyarichard, nico guillen . So [facility] [phone].    ATENCIÓN: Si habla español, tiene a armas disposición servicios gratuitos de asistencia lingüística. [facility] [phone].    We comply with applicable federal civil rights laws and [place] laws. We do not discriminate on the basis of race, color, national origin, age, disability, sex, sexual orientation, or gender identity.            Thank you!     Thank you for choosing [facility]  for your care. Our goal is always to provide you with excellent care. Hearing back from our patients is one way we can continue to improve our services. Please take a few minutes to complete the written survey that you may receive in the mail after your visit with us. Thank you!             Your Updated Medication List - Protect others around you: Learn how to safely use, store and throw away your medicines at www.disposemymeds.org.          This list is accurate as of 8/21/18  9:27 AM.  Always use your most recent med list.                   Brand Name Dispense Instructions for use Diagnosis    amLODIPine 2.5 MG tablet    NORVASC    90 tablet    Take 1 tablet (2.5 mg) by mouth daily    Benign essential hypertension       aspirin 81 MG tablet     0    ONE DAILY    Other and unspecified hyperlipidemia, Family history of ischemic heart disease       CLEAR-ATADINE 10 MG tablet   Generic drug:  loratadine      Take 10 mg by mouth daily        *  EPINEPHrine 0.3 MG/0.3ML injection 2-pack    EPIPEN 2-MIGUELINA    2 each    Inject 0.3 mLs (0.3 mg) into the muscle once as needed for anaphylaxis    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 sting       * EPINEPHrine 0.3 MG/0.3ML injection 2-pack    AUVI-Q    2 mL    Inject 0.3 mLs (0.3 mg) into the muscle as needed for anaphylaxis    Need for desensitization to allergens       ezetimibe 10 MG tablet    ZETIA    90 tablet    Take 1 tablet (10 mg) by mouth daily At night    Mixed hyperlipidemia       fluticasone 50 MCG/ACT spray    FLONASE    1 Bottle    Spray 2 sprays into both nostrils daily    Chronic seasonal allergic rhinitis due to pollen, House dust mite allergy, Allergic rhinitis due to animal dander       hydrochlorothiazide 25 MG tablet    HYDRODIURIL    90 tablet    Take 1 tablet (25 mg) by mouth daily    Benign essential hypertension       MULTIVITAL PO      Take 1 tablet by mouth daily Reported on 3/22/2017        olopatadine HCl 0.2 % Soln    PATADAY    2.5 mL    Place 1 drop into both eyes daily    Chronic seasonal allergic rhinitis due to pollen, House dust mite allergy       omeprazole 20 MG CR capsule    priLOSEC    90 capsule    TAKE ONE CAPSULE BY MOUTH EVERY DAY (TAKE 30 TO 60 MINUTES BEFORE A MEAL)    Gastroesophageal reflux disease without esophagitis       * ORDER FOR ALLERGEN IMMUNOTHERAPY 5 mL vial     13 mL    Reported on 3/22/2017        * ORDER FOR ALLERGEN IMMUNOTHERAPY 5 mL vial     13 mL    Reported on 3/22/2017        * ORDER FOR ALLERGEN IMMUNOTHERAPY 5 mL vial     13 mL    Name of Mix: Mix #1  Mixed Vespid Mixed Vespid Venom 300 mcg/mL HS 13 ml Diluent: HSA qs to 13ml    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 P.  1:100 w/v, HS  1.0 ml Dust Mites DF 30,000AU/mL, HS  0.3 ml Dust Mites DP. 30,000 AU/mL, HS  0.3 ml  Birch Mix PRW 1:20 w/v, HS  0.5 ml Grass Mix #7 100,000 BAU/mL, HS 0.4 ml Nettle 1:20 w/v, HS 0.5 ml Diluent: HSA qs to 5ml    Allergic rhinitis due to dust mite, Chronic seasonal allergic rhinitis due to pollen, Allergic rhinitis due to animal dander       polyethylene glycol powder    MIRALAX    510 g    Take 17 g (1 capful) by mouth daily    Chronic constipation       STATIN NOT PRESCRIBED (INTENTIONAL)      by Other route continuous prn Reported on 4/6/2017    Mitral valve disorders(424.0), Hyperlipidemia LDL goal <100       temazepam 15 MG capsule    RESTORIL    30 capsule    Take 1 capsule (15 mg) by mouth nightly as needed for sleep    Anxiety       * Notice:  This list has 6 medication(s) that are the same as other medications prescribed for you. Read the directions carefully, and ask your doctor or other care provider to review them with you.

## 2018-08-23 ENCOUNTER — ALLIED HEALTH/NURSE VISIT (OUTPATIENT)
Dept: ALLERGY | Facility: OTHER | Age: 63
End: 2018-08-23
Payer: COMMERCIAL

## 2018-08-23 DIAGNOSIS — T63.441D TOXIC EFFECT OF VENOM OF BEES, ACCIDENTAL OR UNINTENTIONAL, SUBSEQUENT ENCOUNTER: Primary | ICD-10-CM

## 2018-08-23 DIAGNOSIS — J30.9 ALLERGIC RHINITIS: Primary | ICD-10-CM

## 2018-08-23 PROCEDURE — 99207 ZZC DROP WITH A PROCEDURE: CPT

## 2018-08-23 PROCEDURE — 95148 ANTIGEN THERAPY SERVICES: CPT | Performed by: ALLERGY & IMMUNOLOGY

## 2018-08-23 PROCEDURE — 95117 IMMUNOTHERAPY INJECTIONS: CPT

## 2018-08-23 NOTE — MR AVS SNAPSHOT
After Visit Summary   8/23/2018    Jose Angel Cha    MRN: [id number]           Patient Information     Date Of Birth          1955        Visit Information        Provider Department      8/23/2018 2:20 PM ER ALLERGY SHOTS [facility]        Today's Diagnoses     Allergic rhinitis    -  1       Follow-ups after your visit        Your next 10 appointments already scheduled     Sep 11, 2018  8:45 AM CDT   LAB with [facility]   [facility] ([facility])    [address] [address]   [phone]           Please do not eat 10-12 hours before your appointment if you are coming in fasting for labs on lipids, cholesterol, or glucose (sugar). This does not apply to pregnant women. Water, hot tea and black coffee (with nothing added) are okay. Do not drink other fluids, diet soda or chew gum.            Sep 11, 2018  9:20 AM CDT   Nurse Only with ER ALLERGY SHOTS   [facility] ([facility])    [address] [address]   [phone]            Sep 12, 2018  1:30 PM CDT   PHYSICAL with Shari Paredes MD   [facility] ([facility])    [address] [address]   [phone]            Sep 27, 2018  3:10 PM CDT   Nurse Only with ER ALLERGY SHOTS   [facility] ([facility])    [address] [address]   [phone]            Oct 02, 2018  8:00 AM CDT   Nurse Only with ER ALLERGY SHOTS   [facility] ([facility])    [address] [address]   [phone]            Oct 04, 2018  1:30 PM CDT   Nurse Only with ER ALLERGY SHOTS   [facility] ([facility])    [address] [address]   [phone]            Oct 11, 2018  4:40 PM CDT   Nurse Only with ER ALLERGY  SHOTS   [facility]    [address]  [facility] [place] [id number] [phone]            Oct 30, 2018 10:50 AM CDT   Nurse Only with ER ALLERGY SHOTS   [facility] ([facility])    [address]  [employer] [id number] [phone]              Who to contact     If you have questions or need follow up information about today's clinic visit or your schedule please contact [facility] directly at [phone].  Normal or non-critical lab and imaging results will be communicated to you by [employer], letter or phone within 4 business days after the clinic has received the results. If you do not hear from us within 7 days, please contact the clinic through [employer] or phone. If you have a critical or abnormal lab result, we will notify you by phone as soon as possible.  Submit refill requests through MeetMe or call your pharmacy and they will forward the refill request to us. Please allow 3 business days for your refill to be completed.          Additional Information About Your Visit        [employer] Information     MeetMe gives you secure access to your electronic health record. If you see a primary care provider, you can also send messages to your care team and make appointments. If you have questions, please call your primary care clinic.  If you do not have a primary care provider, please call [phone] and they will assist you.        Care EveryWhere ID     This is your Care EveryWhere ID. This could be used by other organizations to access your [place] medical records  [id number]         Blood Pressure from Last 3 Encounters:   03/26/18 123/86   01/24/18 (!) 141/93   12/29/17 134/74    Weight from Last 3 Encounters:   03/26/18 93 kg (205 lb)   02/22/18 95.3 kg (210 lb)   01/24/18 99.7 kg (219 lb 14.4 oz)              We Performed the Following     Allergy Shot: Two or more injections         Primary Care Provider Office Phone # Fax #    Shari Paredes [phone]       [address] [place] [address]        Equal Access to Services     ANJEL TAFOYA : Hadii aad ku hadkailamary jane Garry, wanelsyda luqadaha, qaybta kaalmada neeru, nico yoon cuongjefry verdugo wilmre dennison. So [facility] [phone].    ATENCIÓN: Si habla español, tiene a armas disposición servicios gratuitos de asistencia lingüística. Llame al [phone].    We comply with applicable federal civil rights laws and [place] laws. We do not discriminate on the basis of race, color, national origin, age, disability, sex, sexual orientation, or gender identity.            Thank you!     Thank you for choosing [facility]  for your care. Our goal is always to provide you with excellent care. Hearing back from our patients is one way we can continue to improve our services. Please take a few minutes to complete the written survey that you may receive in the mail after your visit with us. Thank you!             Your Updated Medication List - Protect others around you: Learn how to safely use, store and throw away your medicines at www.disposemymeds.org.          This list is accurate as of 8/23/18  3:09 PM.  Always use your most recent med list.                   Brand Name Dispense Instructions for use Diagnosis    amLODIPine 2.5 MG tablet    NORVASC    90 tablet    Take 1 tablet (2.5 mg) by mouth daily    Benign essential hypertension       aspirin 81 MG tablet     0    ONE DAILY    Other and unspecified hyperlipidemia, Family history of ischemic heart disease       CLEAR-ATADINE 10 MG tablet   Generic drug:  loratadine      Take 10 mg by mouth daily        * EPINEPHrine 0.3 MG/0.3ML injection 2-pack    EPIPEN 2-MIGUELINA    2 each    Inject 0.3 mLs (0.3 mg) into the muscle once as needed for anaphylaxis    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 sting       * EPINEPHrine 0.3 MG/0.3ML injection 2-pack    AUVI-Q    2 mL    Inject 0.3 mLs (0.3 mg)  into the muscle as needed for anaphylaxis    Need for desensitization to allergens       ezetimibe 10 MG tablet    ZETIA    90 tablet    Take 1 tablet (10 mg) by mouth daily At night    Mixed hyperlipidemia       fluticasone 50 MCG/ACT spray    FLONASE    1 Bottle    Spray 2 sprays into both nostrils daily    Chronic seasonal allergic rhinitis due to pollen, House dust mite allergy, Allergic rhinitis due to animal dander       hydrochlorothiazide 25 MG tablet    HYDRODIURIL    90 tablet    Take 1 tablet (25 mg) by mouth daily    Benign essential hypertension       MULTIVITAL PO      Take 1 tablet by mouth daily Reported on 3/22/2017        olopatadine HCl 0.2 % Soln    PATADAY    2.5 mL    Place 1 drop into both eyes daily    Chronic seasonal allergic rhinitis due to pollen, House dust mite allergy       omeprazole 20 MG CR capsule    priLOSEC    90 capsule    TAKE ONE CAPSULE BY MOUTH EVERY DAY (TAKE 30 TO 60 MINUTES BEFORE A MEAL)    Gastroesophageal reflux disease without esophagitis       * ORDER FOR ALLERGEN IMMUNOTHERAPY 5 mL vial     13 mL    Reported on 3/22/2017        * ORDER FOR ALLERGEN IMMUNOTHERAPY 5 mL vial     13 mL    Reported on 3/22/2017        ORDER FOR ALLERGEN IMMUNOTHERAPY 5 mL vial     5 mL    Cat Hair, Standardized 10,000 BAU/mL, ALK  2.0 ml Dog Hair-Dander, A. P.  1:100 w/v, HS  1.0 ml Dust Mites DF 30,000AU/mL, HS  0.3 ml Dust Mites DP. 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, accidental or unintentional, subsequent encounter       ORDER FOR ALLERGEN  IMMUNOTHERAPY 5 mL vial     13 mL    Name of Mix: Mix #2  Wasp Wasp Venom 100 mcg/mL HS 13 ml Diluent: HSA qs to 13ml    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 Read the directions carefully, and ask your doctor or other care provider to review them with you.

## 2018-08-23 NOTE — TELEPHONE ENCOUNTER
Signed orders as fulfillment so that encounter could be closed. Serums have already been received.     Ara Mcbride RN

## 2018-08-23 NOTE — TELEPHONE ENCOUNTER
Allergy serums received at [place].     Vials received below:    Vial Color Content                      Vial Size Expiration Date  Red 1:1 Mixed Vespid 12mL  8/20/2019  Red 1:1 Wasp 12mL  8/20/2019        Signature  Madina Hernandez

## 2018-08-23 NOTE — PROGRESS NOTES
Allergy serums billed at [place].     Vials received below:    Vial Color Content                      Vial Size Expiration Date  Red 1:1 Mixed Vespid 12mL  8/20/2019  Red 1:1 Wasp 12mL  8/20/2019    Original Refill encounter date: 8/16/2018      Signature  Madina Hernandez

## 2018-08-24 DIAGNOSIS — Z91.09 HOUSE DUST MITE ALLERGY: ICD-10-CM

## 2018-08-24 DIAGNOSIS — J30.1 CHRONIC SEASONAL ALLERGIC RHINITIS DUE TO POLLEN: ICD-10-CM

## 2018-08-24 RX ORDER — OLOPATADINE HYDROCHLORIDE 2 MG/ML
1 SOLUTION/ DROPS OPHTHALMIC DAILY
Qty: 2.5 ML | Refills: 3 | Status: SHIPPED | OUTPATIENT
Start: 2018-08-24 | End: 2019-04-03

## 2018-08-24 NOTE — TELEPHONE ENCOUNTER
Pending Prescriptions:                       Disp   Refills    olopatadine HCl (PATADAY) 0.2 % SOLN      2.5 mL 3            Sig: Place 1 drop into both eyes daily    Ashley Turcios CMA on 8/24/2018 at 10:28 AM

## 2018-08-24 NOTE — TELEPHONE ENCOUNTER
Signed Prescriptions:                        Disp   Refills    olopatadine HCl (PATADAY) 0.2 % SOLN       2.5 mL 3        Sig: Place 1 drop into both eyes daily  Authorizing Provider: TAMERA LEYVA  Ordering User: JO FONG RN refilled medication per [facility] Refill Protocol.     Jo Fong RN

## 2018-08-29 ENCOUNTER — TRANSFERRED RECORDS (OUTPATIENT)
Dept: HEALTH INFORMATION MANAGEMENT | Facility: CLINIC | Age: 63
End: 2018-08-29
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
## 2018-09-10 ENCOUNTER — TELEPHONE (OUTPATIENT)
Dept: FAMILY MEDICINE | Facility: OTHER | Age: 63
End: 2018-09-10

## 2018-09-10 DIAGNOSIS — K22.70 BARRETT'S ESOPHAGUS WITHOUT DYSPLASIA: Primary | ICD-10-CM

## 2018-09-10 DIAGNOSIS — Z12.11 SCREENING FOR COLON CANCER: ICD-10-CM

## 2018-09-10 NOTE — TELEPHONE ENCOUNTER
Can someone  call pt back to schedule his procedures please? This morning is a better time to reach PT

## 2018-09-10 NOTE — TELEPHONE ENCOUNTER
Membreno-patient states that you want him to have an EGD and colonoscopy. Can you please place orders?

## 2018-09-11 ENCOUNTER — ALLIED HEALTH/NURSE VISIT (OUTPATIENT)
Dept: ALLERGY | Facility: OTHER | Age: 63
End: 2018-09-11
Payer: COMMERCIAL

## 2018-09-11 DIAGNOSIS — J30.9 ALLERGIC RHINITIS: Primary | ICD-10-CM

## 2018-09-11 DIAGNOSIS — E78.2 MIXED HYPERLIPIDEMIA: ICD-10-CM

## 2018-09-11 LAB
ALT SERPL W P-5'-P-CCNC: 44 U/L (ref 0–70)
CHOLEST SERPL-MCNC: 223 MG/DL
HDLC SERPL-MCNC: 43 MG/DL
LDLC SERPL CALC-MCNC: 123 MG/DL
NONHDLC SERPL-MCNC: 180 MG/DL
TRIGL SERPL-MCNC: 283 MG/DL

## 2018-09-11 PROCEDURE — 99207 ZZC DROP WITH A PROCEDURE: CPT

## 2018-09-11 PROCEDURE — 84460 ALANINE AMINO (ALT) (SGPT): CPT | Performed by: INTERNAL MEDICINE

## 2018-09-11 PROCEDURE — 80061 LIPID PANEL: CPT | Performed by: INTERNAL MEDICINE

## 2018-09-11 PROCEDURE — 36415 COLL VENOUS BLD VENIPUNCTURE: CPT | Performed by: INTERNAL MEDICINE

## 2018-09-11 PROCEDURE — 95115 IMMUNOTHERAPY ONE INJECTION: CPT

## 2018-09-11 NOTE — MR AVS SNAPSHOT
After Visit Summary   9/11/2018    Jose Angel Cha    MRN: [id number]           Patient Information     Date Of Birth          1955        Visit Information        Provider Department      9/11/2018 9:20 AM ER ALLERGY SHOTS [facility]        Today's Diagnoses     Allergic rhinitis    -  1       Follow-ups after your visit        Your next 10 appointments already scheduled     Sep 12, 2018  1:30 PM CDT   PHYSICAL with Shari Paredes MD   [facility]    [address]  [facility] [address]   [phone]            Sep 27, 2018  3:10 PM CDT   Nurse Only with ER ALLERGY SHOTS   [facility]    [address]  [facility] [address]   [phone]            Oct 04, 2018  1:30 PM CDT   Nurse Only with ER ALLERGY SHOTS   [facility]    [address]  [facility] [address]   [phone]            Oct 09, 2018  8:30 AM CDT   Nurse Only with ER ALLERGY SHOTS   [facility]    [address]  [facility] [address]   [phone]            Oct 11, 2018  4:40 PM CDT   Nurse Only with ER ALLERGY SHOTS   [facility]    [address]  [facility] [address]   [phone]            Nov 06, 2018 10:40 AM CST   Nurse Only with ER ALLERGY SHOTS   [facility]    [address]  [facility] [address]   [phone]              Who to contact     If you have questions or need follow up information about today's clinic visit or your schedule please contact [facility] directly at [phone].  Normal or non-critical lab and imaging results will be communicated to you by MyChart, letter or phone within 4 business days after the clinic  has received the results. If you do not hear from us within 7 days, please contact the clinic through [employer] or phone. If you have a critical or abnormal lab result, we will notify you by phone as soon as possible.  Submit refill requests through [employer] or call your pharmacy and they will forward the refill request to us. Please allow 3 business days for your refill to be completed.          Additional Information About Your Visit        [employer] Information     [employer] gives you secure access to your electronic health record. If you see a primary care provider, you can also send messages to your care team and make appointments. If you have questions, please call your primary care clinic.  If you do not have a primary care provider, please call [phone] and they will assist you.        Care EveryWhere ID     This is your Care EveryWhere ID. This could be used by other organizations to access your [place] medical records  [id number]         Blood Pressure from Last 3 Encounters:   03/26/18 123/86   01/24/18 (!) 141/93   12/29/17 134/74    Weight from Last 3 Encounters:   03/26/18 93 kg (205 lb)   02/22/18 95.3 kg (210 lb)   01/24/18 99.7 kg (219 lb 14.4 oz)              We Performed the Following     Allergy Shot: One injection        Primary Care Provider Office Phone # Fax #    Shari Paredes [phone]       [address] [place] [address]        Equal Access to Services     [facility] CenterSOPHIA AH: Hadii aad ku hadasho Soomaali, waaxda luqadaha, qaybta kaalmada adeegyada, nico guillen . So [facility] [phone].    ATENCIÓN: Si habla español, tiene a armas disposición servicios gratuitos de asistencia lingüística. Llame al [phone].    We comply with applicable federal civil rights laws and [place] laws. We do not discriminate on the basis of race, color, national origin, age, disability, sex, sexual orientation, or gender identity.            Thank you!     Thank  you for choosing [facility]  for your care. Our goal is always to provide you with excellent care. Hearing back from our patients is one way we can continue to improve our services. Please take a few minutes to complete the written survey that you may receive in the mail after your visit with us. Thank you!             Your Updated Medication List - Protect others around you: Learn how to safely use, store and throw away your medicines at www.disposemymeds.org.          This list is accurate as of 9/11/18 10:03 AM.  Always use your most recent med list.                   Brand Name Dispense Instructions for use Diagnosis    amLODIPine 2.5 MG tablet    NORVASC    90 tablet    Take 1 tablet (2.5 mg) by mouth daily    Benign essential hypertension       aspirin 81 MG tablet     0    ONE DAILY    Other and unspecified hyperlipidemia, Family history of ischemic heart disease       CLEAR-ATADINE 10 MG tablet   Generic drug:  loratadine      Take 10 mg by mouth daily        * EPINEPHrine 0.3 MG/0.3ML injection 2-pack    EPIPEN 2-MIGUELINA    2 each    Inject 0.3 mLs (0.3 mg) into the muscle once as needed for anaphylaxis    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 sting       * EPINEPHrine 0.3 MG/0.3ML injection 2-pack    AUVI-Q    2 mL    Inject 0.3 mLs (0.3 mg) into the muscle as needed for anaphylaxis    Need for desensitization to allergens       ezetimibe 10 MG tablet    ZETIA    90 tablet    Take 1 tablet (10 mg) by mouth daily At night    Mixed hyperlipidemia       fluticasone 50 MCG/ACT spray    FLONASE    1 Bottle    Spray 2 sprays into both nostrils daily    Chronic seasonal allergic rhinitis due to pollen, House dust mite allergy, Allergic rhinitis due to animal dander       hydrochlorothiazide 25 MG tablet    HYDRODIURIL    90 tablet    Take 1 tablet (25 mg) by mouth daily    Benign essential hypertension       MULTIVITAL PO      Take 1 tablet by mouth daily Reported on 3/22/2017        olopatadine HCl 0.2 % Soln    PATADAY     2.5 mL    Place 1 drop into both eyes daily    Chronic seasonal allergic rhinitis due to pollen, House dust mite allergy       omeprazole 20 MG CR capsule    priLOSEC    90 capsule    TAKE ONE CAPSULE BY MOUTH EVERY DAY (TAKE 30 TO 60 MINUTES BEFORE A MEAL)    Gastroesophageal reflux disease without esophagitis       * ORDER FOR ALLERGEN IMMUNOTHERAPY 5 mL vial     13 mL    Reported on 3/22/2017        * ORDER FOR ALLERGEN IMMUNOTHERAPY 5 mL vial     13 mL    Reported on 3/22/2017        ORDER FOR ALLERGEN IMMUNOTHERAPY 5 mL vial     5 mL    Cat Hair, Standardized 10,000 BAU/mL, ALK  2.0 ml Dog Hair-Dander, A. P.  1:100 w/v, HS  1.0 ml Dust Mites DF 30,000AU/mL, HS  0.3 ml Dust Mites DP. 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, accidental or unintentional, subsequent encounter       ORDER FOR ALLERGEN IMMUNOTHERAPY 5 mL vial     13 mL    Name of Mix: Mix #2  Wasp Wasp Venom 100 mcg/mL HS 13 ml Diluent: HSA qs to 13ml    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 Read the directions carefully, and ask your doctor or other care provider to review them with you.

## 2018-09-12 ENCOUNTER — OFFICE VISIT (OUTPATIENT)
Dept: FAMILY MEDICINE | Facility: OTHER | Age: 63
End: 2018-09-12
Payer: COMMERCIAL

## 2018-09-12 VITALS
OXYGEN SATURATION: 96 % | DIASTOLIC BLOOD PRESSURE: 80 MMHG | TEMPERATURE: 97.2 F | HEIGHT: 70 IN | WEIGHT: 215 LBS | BODY MASS INDEX: 30.78 KG/M2 | HEART RATE: 77 BPM | SYSTOLIC BLOOD PRESSURE: 132 MMHG

## 2018-09-12 DIAGNOSIS — M72.2 PLANTAR FASCIITIS: ICD-10-CM

## 2018-09-12 DIAGNOSIS — Z00.00 ENCOUNTER FOR ROUTINE ADULT HEALTH EXAMINATION WITHOUT ABNORMAL FINDINGS: ICD-10-CM

## 2018-09-12 DIAGNOSIS — K64.4 EXTERNAL HEMORRHOIDS: ICD-10-CM

## 2018-09-12 DIAGNOSIS — F41.9 ANXIETY: ICD-10-CM

## 2018-09-12 DIAGNOSIS — E66.01 MORBID OBESITY (H): ICD-10-CM

## 2018-09-12 DIAGNOSIS — Z11.4 SCREENING FOR HIV (HUMAN IMMUNODEFICIENCY VIRUS): ICD-10-CM

## 2018-09-12 DIAGNOSIS — F17.200 TOBACCO USE DISORDER: ICD-10-CM

## 2018-09-12 DIAGNOSIS — F10.10 ALCOHOL ABUSE: ICD-10-CM

## 2018-09-12 DIAGNOSIS — E78.2 MIXED HYPERLIPIDEMIA: Primary | ICD-10-CM

## 2018-09-12 DIAGNOSIS — K21.9 GASTROESOPHAGEAL REFLUX DISEASE WITHOUT ESOPHAGITIS: Primary | ICD-10-CM

## 2018-09-12 PROCEDURE — 99396 PREV VISIT EST AGE 40-64: CPT | Performed by: FAMILY MEDICINE

## 2018-09-12 RX ORDER — TEMAZEPAM 15 MG/1
15 CAPSULE ORAL
Qty: 30 CAPSULE | Refills: 0 | Status: SHIPPED | OUTPATIENT
Start: 2018-09-12 | End: 2019-01-21

## 2018-09-12 ASSESSMENT — ENCOUNTER SYMPTOMS
CHILLS: 0
HEMATOCHEZIA: 0
FEVER: 0
HEMATURIA: 0
FREQUENCY: 0
ABDOMINAL PAIN: 1
CONSTIPATION: 1
NERVOUS/ANXIOUS: 1
DIARRHEA: 0
EYE PAIN: 0
COUGH: 1
DIZZINESS: 1

## 2018-09-12 ASSESSMENT — ANXIETY QUESTIONNAIRES
1. FEELING NERVOUS, ANXIOUS, OR ON EDGE: SEVERAL DAYS
GAD7 TOTAL SCORE: 3
4. TROUBLE RELAXING: SEVERAL DAYS
3. WORRYING TOO MUCH ABOUT DIFFERENT THINGS: NOT AT ALL
5. BEING SO RESTLESS THAT IT IS HARD TO SIT STILL: NOT AT ALL
GAD7 TOTAL SCORE: 3
7. FEELING AFRAID AS IF SOMETHING AWFUL MIGHT HAPPEN: NOT AT ALL
6. BECOMING EASILY ANNOYED OR IRRITABLE: SEVERAL DAYS
7. FEELING AFRAID AS IF SOMETHING AWFUL MIGHT HAPPEN: NOT AT ALL
2. NOT BEING ABLE TO STOP OR CONTROL WORRYING: NOT AT ALL
GAD7 TOTAL SCORE: 3

## 2018-09-12 ASSESSMENT — PATIENT HEALTH QUESTIONNAIRE - PHQ9
SUM OF ALL RESPONSES TO PHQ QUESTIONS 1-9: 5
10. IF YOU CHECKED OFF ANY PROBLEMS, HOW DIFFICULT HAVE THESE PROBLEMS MADE IT FOR YOU TO DO YOUR WORK, TAKE CARE OF THINGS AT HOME, OR GET ALONG WITH OTHER PEOPLE: NOT DIFFICULT AT ALL
SUM OF ALL RESPONSES TO PHQ QUESTIONS 1-9: 5

## 2018-09-12 NOTE — PROGRESS NOTES
Lipids/ALT drawn early at patient's PCP. Labs intended to be drawn in March 2019. Order replaced.

## 2018-09-12 NOTE — MR AVS SNAPSHOT
After Visit Summary   9/12/2018    Jose Angel Cha    MRN: [id number]           Patient Information     Date Of Birth          1955        Visit Information        Provider Department      9/12/2018 1:30 PM Shari Paredes MD [facility]        Today's Diagnoses     Gastroesophageal reflux disease without esophagitis    -  1    Screening for HIV (human immunodeficiency virus)        Tobacco use disorder        Anxiety        Encounter for routine adult health examination without abnormal findings        External hemorrhoids        Alcohol abuse        Morbid obesity (H)        Plantar fasciitis          Care Instructions      Preventive Health Recommendations  Male Ages 50 - 64    Yearly exam:             See your health care provider every year in order to  o   Review health changes.   o   Discuss preventive care.    o   Review your medicines if your doctor has prescribed any.     Have a cholesterol test every 5 years, or more frequently if you are at risk for high cholesterol/heart disease.     Have a diabetes test (fasting glucose) every three years. If you are at risk for diabetes, you should have this test more often.     Have a colonoscopy at age 50, or have a yearly FIT test (stool test). These exams will check for colon cancer.      Talk with your health care provider about whether or not a prostate cancer screening test (PSA) is right for you.    You should be tested each year for STDs (sexually transmitted diseases), if you re at risk.     Shots: Get a flu shot each year. Get a tetanus shot every 10 years.     Nutrition:    Eat at least 5 servings of fruits and vegetables daily.     Eat whole-grain bread, whole-wheat pasta and brown rice instead of white grains and rice.     Get adequate Calcium and Vitamin D.     Lifestyle    Exercise for at least 150 minutes a week (30 minutes a day, 5 days a week). This will help you control your weight and prevent disease.     Limit  alcohol to one drink per day.     No smoking.     Wear sunscreen to prevent skin cancer.     See your dentist every six months for an exam and cleaning.     See your eye doctor every 1 to 2 years.            Follow-ups after your visit        Additional Services     GENERAL SURG ADULT REFERRAL       Your provider has referred you to: [place]: [facility] [phone]   [email or web address]    Please be aware that coverage of these services is subject to the terms and limitations of your health insurance plan.  Call member services at your health plan with any benefit or coverage questions.      Please bring the following with you to your appointment:    (1) Any X-Rays, CTs or MRIs which have been performed.  Contact the facility where they were done to arrange for  prior to your scheduled appointment.   (2) List of current medications   (3) This referral request   (4) Any documents/labs given to you for this referral                  Your next 10 appointments already scheduled     Sep 27, 2018  3:10 PM CDT   Nurse Only with ER ALLERGY SHOTS   [facility] ([facility]    [address] [address]   [phone]            Oct 04, 2018  1:30 PM CDT   Nurse Only with ER ALLERGY SHOTS   [facility] ([facility])    [address] [address]   [phone]            Oct 09, 2018  8:30 AM CDT   Nurse Only with ER ALLERGY SHOTS   [facility] ([facility])    [address] [address]   [phone]            Oct 11, 2018  4:40 PM CDT   Nurse Only with ER ALLERGY SHOTS   [facility] ([facility])    [address] [address]   [phone]            Nov 06, 2018 10:40 AM CST   Nurse Only with ER ALLERGY SHOTS   [facility]  "([facility])    [address] [facility] [address]  [facility] [id number] [phone]              Future tests that were ordered for you today     Open Future Orders        Priority Expected Expires Ordered    Lipid Profile Routine 3/11/2019 9/12/2019 9/12/2018    ALT Routine 3/11/2019 9/12/2019 9/12/2018            Who to contact     If you have questions or need follow up information about today's clinic visit or your schedule please contact [facility] directly at [phone].  Normal or non-critical lab and imaging results will be communicated to you by Zeerhart, letter or phone within 4 business days after the clinic has received the results. If you do not hear from us within 7 days, please contact the clinic through [employer] or phone. If you have a critical or abnormal lab result, we will notify you by phone as soon as possible.  Submit refill requests through M[employer] or call your pharmacy and they will forward the refill request to us. Please allow 3 business days for your refill to be completed.          Additional Information About Your Visit        Zeerhart Information     M[employer] gives you secure access to your electronic health record. If you see a primary care provider, you can also send messages to your care team and make appointments. If you have questions, please call your primary care clinic.  If you do not have a primary care provider, please call [phone] and they will assist you.        Care EveryWhere ID     This is your Care EveryWhere ID. This could be used by other organizations to access your [place] medical records  [id number]        Your Vitals Were     Pulse Temperature Height Pulse Oximetry BMI (Body Mass Index)       77 97.2  F (36.2  C) (Temporal) 5' 10\" (1.778 m) 96% 30.85 kg/m2        Blood Pressure from Last 3 Encounters:   09/12/18 132/80   03/26/18 123/86   01/24/18 (!) 141/93    Weight from Last 3 Encounters:   09/12/18 215 lb (97.5 kg) "   03/26/18 205 lb (93 kg)   02/22/18 210 lb (95.3 kg)              We Performed the Following     GENERAL SURG ADULT REFERRAL          Where to get your medicines      These [place] [facility] [place] [address]  [address] [facility] [address]     Phone:  [phone]     omeprazole 20 MG CR capsule         Some of these will need a paper prescription and others can be bought over the counter.  Ask your nurse if you have questions.     Bring a paper prescription for each of these medications     temazepam 15 MG capsule          Primary Care Provider Office Phone # Fax #    Shari Paredes [phone]       [address] [facility] [address]        Equal Access to Services     ANJEL TAFOYA : Ciaran Castañeda, waaxda luanyaadaha, qaybta kaalmada neeru, nico guillen . So [facility] [phone].    ATENCIÓN: Si habla español, tiene a armas disposición servicios gratuitos de asistencia lingüística. [facility] [phone].    We comply with applicable federal civil rights laws and [place] laws. We do not discriminate on the basis of race, color, national origin, age, disability, sex, sexual orientation, or gender identity.            Thank you!     Thank you for choosing [facility]  for your care. Our goal is always to provide you with excellent care. Hearing back from our patients is one way we can continue to improve our services. Please take a few minutes to complete the written survey that you may receive in the mail after your visit with us. Thank you!             Your Updated Medication List - Protect others around you: Learn how to safely use, store and throw away your medicines at www.disposemymeds.org.          This list is accurate as of 9/12/18  2:53 PM.  Always use your most recent med list.                   Brand Name Dispense Instructions for use Diagnosis    amLODIPine 2.5 MG tablet     NORVASC    90 tablet    Take 1 tablet (2.5 mg) by mouth daily    Benign essential hypertension       aspirin 81 MG tablet     0    ONE DAILY    Other and unspecified hyperlipidemia, Family history of ischemic heart disease       CLEAR-ATADINE 10 MG tablet   Generic drug:  loratadine      Take 10 mg by mouth daily        * EPINEPHrine 0.3 MG/0.3ML injection 2-pack    EPIPEN 2-MIGUELINA    2 each    Inject 0.3 mLs (0.3 mg) into the muscle once as needed for anaphylaxis    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 sting       * EPINEPHrine 0.3 MG/0.3ML injection 2-pack    AUVI-Q    2 mL    Inject 0.3 mLs (0.3 mg) into the muscle as needed for anaphylaxis    Need for desensitization to allergens       ezetimibe 10 MG tablet    ZETIA    90 tablet    Take 1 tablet (10 mg) by mouth daily At night    Mixed hyperlipidemia       fluticasone 50 MCG/ACT spray    FLONASE    1 Bottle    Spray 2 sprays into both nostrils daily    Chronic seasonal allergic rhinitis due to pollen, House dust mite allergy, Allergic rhinitis due to animal dander       hydrochlorothiazide 25 MG tablet    HYDRODIURIL    90 tablet    Take 1 tablet (25 mg) by mouth daily    Benign essential hypertension       MULTIVITAL PO      Take 1 tablet by mouth daily Reported on 3/22/2017        olopatadine HCl 0.2 % Soln    PATADAY    2.5 mL    Place 1 drop into both eyes daily    Chronic seasonal allergic rhinitis due to pollen, House dust mite allergy       omeprazole 20 MG CR capsule    priLOSEC    90 capsule    TAKE ONE CAPSULE BY MOUTH EVERY DAY (TAKE 30 TO 60 MINUTES BEFORE A MEAL)    Gastroesophageal reflux disease without esophagitis       * ORDER FOR ALLERGEN IMMUNOTHERAPY 5 mL vial     13 mL    Reported on 3/22/2017        * ORDER FOR ALLERGEN IMMUNOTHERAPY 5 mL vial     13 mL    Reported on 3/22/2017        ORDER FOR ALLERGEN IMMUNOTHERAPY 5 mL vial     5 mL    Cat Hair, Standardized 10,000 BAU/mL, ALK  2.0 ml Dog Hair-Dander, A. P.  1:100 w/v, HS  1.0 ml Dust Mites DF 30,000AU/mL, HS   0.3 ml Dust Mites DP. 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, accidental or unintentional, subsequent encounter       ORDER FOR ALLERGEN IMMUNOTHERAPY 5 mL vial     13 mL    Name of Mix: Mix #2  Wasp Wasp Venom 100 mcg/mL HS 13 ml Diluent: HSA qs to 13ml    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 Read the directions carefully, and ask your doctor or other care provider to review them with you.

## 2018-09-13 ASSESSMENT — ANXIETY QUESTIONNAIRES: GAD7 TOTAL SCORE: 3

## 2018-09-13 ASSESSMENT — PATIENT HEALTH QUESTIONNAIRE - PHQ9: SUM OF ALL RESPONSES TO PHQ QUESTIONS 1-9: 5

## 2018-09-24 NOTE — TELEPHONE ENCOUNTER
----- Message from Ashia Munoz sent at 9/24/2018  3:00 PM CDT -----  Can you please place orders?  See below  ----- Message -----     From: Pablo Membreno MD     Sent: 9/24/2018  11:26 AM       To: Ashia Munoz    Please let his PCP knows about the orders.      ----- Message -----     From: Ashia Munoz     Sent: 9/24/2018  11:20 AM       To: [facility]    I havent seen any orders come through.   ----- Message -----     From: Pablo Membreno MD     Sent: 9/20/2018  11:11 AM       To: Ashia Munoz    This pt does need both and EGD and colon.  Book him for [facility] since he does do biopsies for peeps with [facility].  The pt's PCP should be putting in these orders.     Thanks  [facility]

## 2018-09-26 ENCOUNTER — TELEPHONE (OUTPATIENT)
Dept: FAMILY MEDICINE | Facility: OTHER | Age: 63
End: 2018-09-26

## 2018-09-27 ENCOUNTER — ALLIED HEALTH/NURSE VISIT (OUTPATIENT)
Dept: ALLERGY | Facility: OTHER | Age: 63
End: 2018-09-27
Payer: COMMERCIAL

## 2018-09-27 DIAGNOSIS — J30.9 ALLERGIC RHINITIS: Primary | ICD-10-CM

## 2018-09-27 PROCEDURE — 99207 ZZC NO CHARGE NURSE ONLY: CPT

## 2018-09-27 NOTE — TELEPHONE ENCOUNTER
Patient returned call, would prefer to have Ashia or Marixa call instead of being transferred to Same Day.

## 2018-09-27 NOTE — TELEPHONE ENCOUNTER
Patient also has hemorrhoids and would like to have them removed if possible. Scope should be with Dr. Win due to Barretts. Patient has been scheduled for a consult with Dr. Win and we will schedule all procedures at once if possible.

## 2018-09-27 NOTE — MR AVS SNAPSHOT
After Visit Summary   9/27/2018    Jose Angel Cha    MRN: [id number]           Patient Information     Date Of Birth          1955        Visit Information        Provider Department      9/27/2018 1:30 PM ER ALLERGY SHOTS [facility]        Today's Diagnoses     Allergic rhinitis    -  1       Follow-ups after your visit        Your next 10 appointments already scheduled     Oct 04, 2018  1:30 PM CDT   Nurse Only with ER ALLERGY SHOTS   [facility] ([facility]    [address]  [facility] [address]   [phone]            Oct 09, 2018  8:30 AM CDT   Nurse Only with ER ALLERGY SHOTS   [facility] ([facility])    [address]  [facility] [address]   [phone]            Oct 11, 2018  4:40 PM CDT   Nurse Only with ER ALLERGY SHOTS   [facility] ([facility])    [address]  [facility] [address]   [phone]            Oct 16, 2018  8:00 AM CDT   Nurse Only with ER ALLERGY SHOTS   [facility] ([facility])    [address]  [facility] [address]   [phone]            Nov 06, 2018 10:40 AM CST   Nurse Only with ER ALLERGY SHOTS   [facility] ([facility])    [address]  [facility] [address]   [phone]              Who to contact     If you have questions or need follow up information about today's clinic visit or your schedule please contact [facility] directly at [phone].  Normal or non-critical lab and imaging results will be communicated to you by MyChart, letter or phone within 4 business days after the clinic has received the results. If you do not hear from us within 7 days, please contact the clinic through MyChart or phone. If you have a critical or abnormal lab result, we will notify you by phone  as soon as possible.  Submit refill requests through B[employer] or call your pharmacy and they will forward the refill request to us. Please allow 3 business days for your refill to be completed.          Additional Information About Your Visit        Neemahart Information     Bevvy gives you secure access to your electronic health record. If you see a primary care provider, you can also send messages to your care team and make appointments. If you have questions, please call your primary care clinic.  If you do not have a primary care provider, please call [phone] and they will assist you.        Care EveryWhere ID     This is your Care EveryWhere ID. This could be used by other organizations to access your [place] medical records  [id number]         Blood Pressure from Last 3 Encounters:   09/12/18 132/80   03/26/18 123/86   01/24/18 (!) 141/93    Weight from Last 3 Encounters:   09/12/18 97.5 kg (215 lb)   03/26/18 93 kg (205 lb)   02/22/18 95.3 kg (210 lb)              Today, you had the following     No orders found for display       Primary Care Provider Office Phone # Fax #    Shari Tonia Paredes [phone]       [address] [place] [address]        Equal Access to Services     ANJEL TAFOYA : Hadii karina dowling hadasho Soomaali, waaxda luqadaha, qaybta kaalmada adeegyada, nico dennison. So [facility] [phone].    ATENCIÓN: Si habla español, tiene a armas disposición servicios gratuitos de asistencia lingüística. Llame al [phone].    We comply with applicable federal civil rights laws and [place] laws. We do not discriminate on the basis of race, color, national origin, age, disability, sex, sexual orientation, or gender identity.            Thank you!     Thank you for choosing [facility]  for your care. Our goal is always to provide you with excellent care. Hearing back from our patients is one way we can continue to improve our services.  Please take a few minutes to complete the written survey that you may receive in the mail after your visit with us. Thank you!             Your Updated Medication List - Protect others around you: Learn how to safely use, store and throw away your medicines at www.disposemymeds.org.          This list is accurate as of 9/27/18  1:39 PM.  Always use your most recent med list.                   Brand Name Dispense Instructions for use Diagnosis    amLODIPine 2.5 MG tablet    NORVASC    90 tablet    Take 1 tablet (2.5 mg) by mouth daily    Benign essential hypertension       aspirin 81 MG tablet     0    ONE DAILY    Other and unspecified hyperlipidemia, Family history of ischemic heart disease       CLEAR-ATADINE 10 MG tablet   Generic drug:  loratadine      Take 10 mg by mouth daily        * EPINEPHrine 0.3 MG/0.3ML injection 2-pack    EPIPEN 2-MIGUELINA    2 each    Inject 0.3 mLs (0.3 mg) into the muscle once as needed for anaphylaxis    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 sting       * EPINEPHrine 0.3 MG/0.3ML injection 2-pack    AUVI-Q    2 mL    Inject 0.3 mLs (0.3 mg) into the muscle as needed for anaphylaxis    Need for desensitization to allergens       ezetimibe 10 MG tablet    ZETIA    90 tablet    Take 1 tablet (10 mg) by mouth daily At night    Mixed hyperlipidemia       fluticasone 50 MCG/ACT spray    FLONASE    1 Bottle    Spray 2 sprays into both nostrils daily    Chronic seasonal allergic rhinitis due to pollen, House dust mite allergy, Allergic rhinitis due to animal dander       hydrochlorothiazide 25 MG tablet    HYDRODIURIL    90 tablet    Take 1 tablet (25 mg) by mouth daily    Benign essential hypertension       MULTIVITAL PO      Take 1 tablet by mouth daily Reported on 3/22/2017        olopatadine HCl 0.2 % Soln    PATADAY    2.5 mL    Place 1 drop into both eyes daily    Chronic seasonal allergic rhinitis due to pollen, House dust mite allergy       omeprazole 20 MG CR capsule    priLOSEC    90 capsule    TAKE ONE  CAPSULE BY MOUTH EVERY DAY (TAKE 30 TO 60 MINUTES BEFORE A MEAL)    Gastroesophageal reflux disease without esophagitis       * ORDER FOR ALLERGEN IMMUNOTHERAPY 5 mL vial     13 mL    Reported on 3/22/2017        * ORDER FOR ALLERGEN IMMUNOTHERAPY 5 mL vial     13 mL    Reported on 3/22/2017        ORDER FOR ALLERGEN IMMUNOTHERAPY 5 mL vial     5 mL    Cat Hair, Standardized 10,000 BAU/mL, ALK  2.0 ml Dog Hair-Dander, A. P.  1:100 w/v, HS  1.0 ml Dust Mites DF 30,000AU/mL, HS  0.3 ml Dust Mites DP. 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, accidental or unintentional, subsequent encounter       ORDER FOR ALLERGEN IMMUNOTHERAPY 5 mL vial     13 mL    Name of Mix: Mix #2  Wasp Wasp Venom 100 mcg/mL HS 13 ml Diluent: HSA qs to 13ml    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 Read the directions carefully, and ask your doctor or other care provider to review them with you.

## 2018-09-28 NOTE — PROGRESS NOTES
SUBJECTIVE:   Jose Angel Cha is a 63 year old male who presents to clinic today for the following health issues:    HPI     Skin Lesion- Ear  Patient is here to have a scab checked on his right ear that is not healing. It has been there for 2 months. No pain or itching. He denies that he has been picking at it, but it won't go away. When he dries his hair with a towel the clot sometimes breaks off and starts bleeding again. If he couldn't feel the spot with his fingers he wouldn't even know that it is there.       Problem list and histories reviewed & adjusted, as indicated.  Additional history: as documented    Patient Active Problem List   Diagnosis     Hearing loss     Lumbago     Family history of ischemic heart disease     Esophageal reflux     Unspecified hyperplasia of prostate without urinary obstruction and other lower urinary tract symptoms (LUTS)     Meniere disease     Pain in joint involving shoulder region     Health Care Home     Anxiety     Advanced directives, counseling/discussion     Mixed hyperlipidemia     Other symptoms involving nervous and musculoskeletal systems(781.99)     Dizziness and giddiness     Dupuytren's contracture     House dust mite allergy     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 sting     LISBET (obstructive sleep apnea) AHI 13.8     Venom-induced anaphylaxis     Coronary artery disease involving native coronary artery of native heart without angina pectoris     Nonrheumatic mitral valve insufficiency     Need for SBE (subacute bacterial endocarditis) prophylaxis     Benign essential hypertension     Subclinical hypothyroidism     Cardenas's esophagus without dysplasia     Allergic rhinitis due to animal dander     Chronic seasonal allergic rhinitis due to pollen     Past Surgical History:   Procedure Laterality Date     BURSECTOMY ELBOW Right 4/26/2016    Procedure: BURSECTOMY ELBOW;  Surgeon: Cruzito Diaz DO;  Location: [facility]     COLONOSCOPY  03/28/2007     ESOPHAGOSCOPY, GASTROSCOPY,  DUODENOSCOPY (EGD), COMBINED N/A 2015    Procedure: COMBINED ESOPHAGOSCOPY, GASTROSCOPY, DUODENOSCOPY (EGD);  Surgeon: Duane, William Charles, MD;  Location: [facility]     ESOPHAGOSCOPY, GASTROSCOPY, DUODENOSCOPY (EGD), COMBINED N/A 2015    Procedure: COMBINED ESOPHAGOSCOPY, GASTROSCOPY, DUODENOSCOPY (EGD), BIOPSY SINGLE OR MULTIPLE;  Surgeon: Duane, William Charles, MD;  Location: [facility]     ESOPHAGOSCOPY, GASTROSCOPY, DUODENOSCOPY (EGD), COMBINED N/A 10/6/2017    Procedure: COMBINED ESOPHAGOSCOPY, GASTROSCOPY, DUODENOSCOPY (EGD);  ESOPHAGOSCOPY, GASTROSCOPY, DUODENOSCOPY (EGD);  Surgeon: Pablo Membreno MD;  Location: [facility]     HC CREATE EARDRUM OPENING,GEN ANESTH  2009    Right     HC MASTOIDECTOMY,COMPLETE  2009    Right     HEAD & NECK SURGERY       INJECT EPIDURAL CERVICAL  2014    [facility]     ORTHOPEDIC SURGERY       REPAIR VALVE MITRAL  2010     THORACIC SURGERY       TONSILLECTOMY         Social History   Substance Use Topics     Smoking status: Former Smoker     Types: Cigars     Quit date: 11/10/2017     Smokeless tobacco: Never Used      Comment: Occas Cigar     Alcohol use Yes      Comment: 3-4 glasses wine/night     Family History   Problem Relation Age of Onset     C.A.D. Sister       from [place] at 49     C.A.D. Brother      [place] age 50s     Parkinsonism Brother      C.A.D. Mother      [place]     Neurologic Disorder Brother      hearing loss     Neurologic Disorder Son      hearing loss age 20s     Cancer Father      liver  age 51     Cancer - colorectal No family hx of      Prostate Cancer No family hx of      Diabetes No family hx of      Hypertension No family hx of      Cerebrovascular Disease No family hx of      Breast Cancer No family hx of      Colon Cancer No family hx of      Hyperlipidemia No family hx of      Coronary Artery Disease No family hx of      Other Cancer No family hx of      Depression No family hx of      Anxiety Disorder No family  hx of      Mental Illness No family hx of      Substance Abuse No family hx of      Anesthesia Reaction No family hx of      Osteoporosis No family hx of      Genetic Disorder No family hx of      Thyroid Disease No family hx of      Asthma No family hx of      Obesity No family hx of          Current Outpatient Prescriptions   Medication Sig Dispense Refill     amLODIPine (NORVASC) 2.5 MG tablet Take 1 tablet (2.5 mg) by mouth daily 90 tablet 2     ASPIRIN 81 MG OR TABS ONE DAILY 0 0     EPINEPHrine (AUVI-Q) 0.3 MG/0.3ML injection 2-pack Inject 0.3 mLs (0.3 mg) into the muscle as needed for anaphylaxis 2 mL 1     EPINEPHrine (EPIPEN 2-MIGUELINA) 0.3 MG/0.3ML injection Inject 0.3 mLs (0.3 mg) into the muscle once as needed for anaphylaxis 2 each 3     ezetimibe (ZETIA) 10 MG tablet Take 1 tablet (10 mg) by mouth daily At night 90 tablet 3     fluticasone (FLONASE) 50 MCG/ACT spray Spray 2 sprays into both nostrils daily 1 Bottle 11     hydrochlorothiazide (HYDRODIURIL) 25 MG tablet Take 1 tablet (25 mg) by mouth daily 90 tablet 2     loratadine (CLEAR-ATADINE) 10 MG tablet Take 10 mg by mouth daily       Multiple Vitamins-Minerals (MULTIVITAL PO) Take 1 tablet by mouth daily Reported on 3/22/2017       olopatadine HCl (PATADAY) 0.2 % SOLN Place 1 drop into both eyes daily 2.5 mL 3     omeprazole (PRILOSEC) 20 MG CR capsule TAKE ONE CAPSULE BY MOUTH EVERY DAY (TAKE 30 TO 60 MINUTES BEFORE A MEAL) 90 capsule 3     ORDER FOR ALLERGEN IMMUNOTHERAPY Name of Mix: Mix #1  Mixed Vespid  Mixed Vespid Venom 300 mcg/mL HS 13 ml  Diluent: HSA qs to 13ml 13 mL PRN     ORDER FOR ALLERGEN IMMUNOTHERAPY Name of Mix: Mix #2  Wasp  Wasp Venom 100 mcg/mL HS 13 ml  Diluent: HSA qs to 13ml 13 mL PRN     ORDER FOR ALLERGEN IMMUNOTHERAPY Cat Hair, Standardized 10,000 BAU/mL, ALK  2.0 ml  Dog Hair-Dander, A. P.  1:100 w/v, HS  1.0 ml  Dust Mites DF 30,000AU/mL, HS  0.3 ml  Dust Mites DP. 30,000 AU/mL, HS  0.3 ml   Birch Mix PRW 1:20 w/v, HS  0.5  ml  Grass Mix #7 100,000 BAU/mL, HS 0.4 ml  Nettle 1:20 w/v, HS 0.5 ml  Diluent: HSA qs to 5ml 5 mL prn     ORDER FOR ALLERGEN IMMUNOTHERAPY Reported on 3/22/2017 13 mL PRN     ORDER FOR ALLERGEN IMMUNOTHERAPY Reported on 3/22/2017 13 mL PRN     polyethylene glycol (MIRALAX) powder Take 17 g (1 capful) by mouth daily 510 g 1     STATIN NOT PRESCRIBED, INTENTIONAL, by Other route continuous prn Reported on 4/6/2017  0     temazepam (RESTORIL) 15 MG capsule Take 1 capsule (15 mg) by mouth nightly as needed for sleep 30 capsule 0     Labs reviewed in EPIC    ROS:  Constitutional, HEENT, cardiovascular, pulmonary, GI, , musculoskeletal, neuro, skin, endocrine and psych systems are negative, except as in HPI or otherwise noted.     This document serves as a record of the services and decisions personally performed and made by Shari Paredes MD. It was created on her behalf by Karen Arreola, a trained medical scribe. The creation of this document is based the provider's statements to the medical scribe.  Karen Arreola, October 3, 2018 11:59 AM     OBJECTIVE:                                                    /70  Pulse 74  Temp 97.3  F (36.3  C) (Temporal)  Wt 98.4 kg (217 lb)  SpO2 96%  BMI 31.14 kg/m2  Body mass index is 31.14 kg/(m^2).   GENERAL: healthy, alert, well nourished, well hydrated, no distress  SKIN: Right ear has a ulcerative skin lesion   PSYCH: Alert and oriented times 3; speech- coherent , normal rate and volume; able to articulate logical thoughts, able to abstract reason, no tangential thoughts, no hallucinations or delusions, affect- normal    Diagnostic test results:  No results found for this or any previous visit (from the past 24 hour(s)).     ASSESSMENT/PLAN:                                                        ICD-10-CM    1. Screening for HIV (human immunodeficiency virus) Z11.4    2. Tobacco use disorder F17.200 TOBACCO CESSATION ORDER FOR [facility]     Ear lesion: Biopsy obtained today. Advised  that this could be a lesion of skin cancer, or it could be a normal cut that is not healing due to a circulation problem.     PROCEDURE: Biopsy done. Area was cleansed with betadine and numbed with ethyl chloride spray and Lidocaine 1%. Under sterile conditions, the lesion was biopsied with a 2 mm punch, and bleeding was stopped using silver nitrate. Wound dresses and lesion sent to pathology. Appropriate wound care dressing applied. Patient tolerated the procedure well.  Wound care instructions given.  Will call with pathology results.      There are no Patient Instructions on file for this visit.    The information in this document, created by the medical scribe for me, accurately reflects the services I personally performed and the decisions made by me. I have reviewed and approved this document for accuracy.   MD Shari Davila MD, MD  [facility]

## 2018-10-03 ENCOUNTER — OFFICE VISIT (OUTPATIENT)
Dept: SURGERY | Facility: CLINIC | Age: 63
End: 2018-10-03
Payer: COMMERCIAL

## 2018-10-03 ENCOUNTER — OFFICE VISIT (OUTPATIENT)
Dept: FAMILY MEDICINE | Facility: OTHER | Age: 63
End: 2018-10-03
Payer: COMMERCIAL

## 2018-10-03 VITALS
WEIGHT: 217 LBS | BODY MASS INDEX: 31.07 KG/M2 | TEMPERATURE: 96.6 F | HEIGHT: 70 IN | DIASTOLIC BLOOD PRESSURE: 70 MMHG | SYSTOLIC BLOOD PRESSURE: 122 MMHG

## 2018-10-03 VITALS
OXYGEN SATURATION: 96 % | HEART RATE: 74 BPM | WEIGHT: 217 LBS | DIASTOLIC BLOOD PRESSURE: 70 MMHG | SYSTOLIC BLOOD PRESSURE: 122 MMHG | TEMPERATURE: 97.3 F | BODY MASS INDEX: 31.14 KG/M2

## 2018-10-03 DIAGNOSIS — F17.200 TOBACCO USE DISORDER: ICD-10-CM

## 2018-10-03 DIAGNOSIS — K21.00 REFLUX ESOPHAGITIS: ICD-10-CM

## 2018-10-03 DIAGNOSIS — Z11.4 SCREENING FOR HIV (HUMAN IMMUNODEFICIENCY VIRUS): ICD-10-CM

## 2018-10-03 DIAGNOSIS — K64.1 GRADE II INTERNAL HEMORRHOIDS: ICD-10-CM

## 2018-10-03 DIAGNOSIS — H93.90 EAR LESION: Primary | ICD-10-CM

## 2018-10-03 DIAGNOSIS — Z87.19 HISTORY OF BARRETT'S ESOPHAGUS: Primary | ICD-10-CM

## 2018-10-03 PROCEDURE — 46221 LIGATION OF HEMORRHOID(S): CPT | Performed by: SURGERY

## 2018-10-03 PROCEDURE — 99213 OFFICE O/P EST LOW 20 MIN: CPT | Mod: 25 | Performed by: FAMILY MEDICINE

## 2018-10-03 PROCEDURE — 11100 HC BIOPSY SKIN/SUBQ/MUC MEM, SINGLE LESION: CPT | Performed by: FAMILY MEDICINE

## 2018-10-03 PROCEDURE — 99213 OFFICE O/P EST LOW 20 MIN: CPT | Mod: 25 | Performed by: SURGERY

## 2018-10-03 PROCEDURE — 88305 TISSUE EXAM BY PATHOLOGIST: CPT | Performed by: FAMILY MEDICINE

## 2018-10-03 NOTE — MR AVS SNAPSHOT
After Visit Summary   10/3/2018    Jose Angel Cha    MRN: [id number]           Patient Information     Date Of Birth          1955        Visit Information        Provider Department      10/3/2018 11:45 AM Shari Paredes MD [facility]        Today's Diagnoses     Ear lesion    -  1    Screening for HIV (human immunodeficiency virus)        Tobacco use disorder           Follow-ups after your visit        Your next 10 appointments already scheduled     Oct 03, 2018  1:30 PM CDT   New Visit with Gurpreet Thrasher DO   [facility]    [address] [place] [address]   [phone]            Oct 04, 2018  1:30 PM CDT   Nurse Only with ER ALLERGY SHOTS   [facility]    [address]  [facility] [address]   [phone]            Oct 09, 2018  8:30 AM CDT   Nurse Only with ER ALLERGY SHOTS   [facility]    [address]  [facility] [address]   [phone]            Oct 11, 2018  4:40 PM CDT   Nurse Only with ER ALLERGY SHOTS   [facility]    [address]  [facility] [address]   [phone]            Oct 16, 2018  8:00 AM CDT   Nurse Only with ER ALLERGY SHOTS   [facility]    [address]  [facility] [address]   [phone]            Nov 06, 2018 10:40 AM CST   Nurse Only with ER ALLERGY SHOTS   [facility]    [address]  [facility] [address]   [phone]              Who to contact     If you have questions or need follow up information about today's clinic visit or your schedule please contact [facility] directly at [phone].  Normal or non-critical lab and imaging results  will be communicated to you by [employer], letter or phone within 4 business days after the clinic has received the results. If you do not hear from us within 7 days, please contact the clinic through [employer] or phone. If you have a critical or abnormal lab result, we will notify you by phone as soon as possible.  Submit refill requests through [employer] or call your pharmacy and they will forward the refill request to us. Please allow 3 business days for your refill to be completed.          Additional Information About Your Visit        [employer] Information     [employer] gives you secure access to your electronic health record. If you see a primary care provider, you can also send messages to your care team and make appointments. If you have questions, please call your primary care clinic.  If you do not have a primary care provider, please call [phone] and they will assist you.        Care EveryWhere ID     This is your Care EveryWhere ID. This could be used by other organizations to access your [facility] medical records  [id number]        Your Vitals Were     Pulse Temperature Pulse Oximetry BMI (Body Mass Index)          74 97.3  F (36.3  C) (Temporal) 96% 31.14 kg/m2         Blood Pressure from Last 3 Encounters:   10/03/18 122/70   09/12/18 132/80   03/26/18 123/86    Weight from Last 3 Encounters:   10/03/18 217 lb (98.4 kg)   09/12/18 215 lb (97.5 kg)   03/26/18 205 lb (93 kg)              We Performed the Following     Single Lesion     Surgical pathology exam        Primary Care Provider Office Phone # Fax #    Shari Paredes [phone]       [address] [place] [address]        Equal Access to Services     [facility]: Hadii karina dowling hadrema Soreji, waaxda luqadaha, qaybta kaalnico amos. So [facility] [phone].    ATENCIÓN: Si habla español, tiene a armas disposición servicios gratuitos de asistencia lingüística. Llame al  [phone].    We comply with applicable federal civil rights laws and [place] laws. We do not discriminate on the basis of race, color, national origin, age, disability, sex, sexual orientation, or gender identity.            Thank you!     Thank you for choosing [facility]  for your care. Our goal is always to provide you with excellent care. Hearing back from our patients is one way we can continue to improve our services. Please take a few minutes to complete the written survey that you may receive in the mail after your visit with us. Thank you!             Your Updated Medication List - Protect others around you: Learn how to safely use, store and throw away your medicines at www.disposemymeds.org.          This list is accurate as of 10/3/18  1:29 PM.  Always use your most recent med list.                   Brand Name Dispense Instructions for use Diagnosis    amLODIPine 2.5 MG tablet    NORVASC    90 tablet    Take 1 tablet (2.5 mg) by mouth daily    Benign essential hypertension       aspirin 81 MG tablet     0    ONE DAILY    Other and unspecified hyperlipidemia, Family history of ischemic heart disease       CLEAR-ATADINE 10 MG tablet   Generic drug:  loratadine      Take 10 mg by mouth daily        * EPINEPHrine 0.3 MG/0.3ML injection 2-pack    EPIPEN 2-MIGUELINA    2 each    Inject 0.3 mLs (0.3 mg) into the muscle once as needed for anaphylaxis    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 sting       * EPINEPHrine 0.3 MG/0.3ML injection 2-pack    AUVI-Q    2 mL    Inject 0.3 mLs (0.3 mg) into the muscle as needed for anaphylaxis    Need for desensitization to allergens       ezetimibe 10 MG tablet    ZETIA    90 tablet    Take 1 tablet (10 mg) by mouth daily At night    Mixed hyperlipidemia       fluticasone 50 MCG/ACT spray    FLONASE    1 Bottle    Spray 2 sprays into both nostrils daily    Chronic seasonal allergic rhinitis due to pollen, House dust mite allergy, Allergic rhinitis due to animal dander        hydrochlorothiazide 25 MG tablet    HYDRODIURIL    90 tablet    Take 1 tablet (25 mg) by mouth daily    Benign essential hypertension       MULTIVITAL PO      Take 1 tablet by mouth daily Reported on 3/22/2017        olopatadine HCl 0.2 % Soln    PATADAY    2.5 mL    Place 1 drop into both eyes daily    Chronic seasonal allergic rhinitis due to pollen, House dust mite allergy       omeprazole 20 MG CR capsule    priLOSEC    90 capsule    TAKE ONE CAPSULE BY MOUTH EVERY DAY (TAKE 30 TO 60 MINUTES BEFORE A MEAL)    Gastroesophageal reflux disease without esophagitis       * ORDER FOR ALLERGEN IMMUNOTHERAPY 5 mL vial     13 mL    Reported on 3/22/2017        * ORDER FOR ALLERGEN IMMUNOTHERAPY 5 mL vial     13 mL    Reported on 3/22/2017        ORDER FOR ALLERGEN IMMUNOTHERAPY 5 mL vial     5 mL    Cat Hair, Standardized 10,000 BAU/mL, ALK  2.0 ml Dog Hair-Dander, A. P.  1:100 w/v, HS  1.0 ml Dust Mites DF 30,000AU/mL, HS  0.3 ml Dust Mites DP. 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, accidental or unintentional, subsequent encounter       ORDER FOR ALLERGEN IMMUNOTHERAPY 5 mL vial     13 mL    Name of Mix: Mix #2  Wasp Wasp Venom 100 mcg/mL HS 13 ml Diluent: HSA qs to 13ml    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, accidental or unintentional, subsequent encounter       polyethylene glycol powder    MIRALAX    510 g    Take 17 g (1 capful) by mouth daily    Chronic constipation       STATIN NOT PRESCRIBED (INTENTIONAL)      by Other route continuous prn Reported on 4/6/2017    Mitral valve  disorders(424.0), Hyperlipidemia LDL goal <100       temazepam 15 MG capsule    RESTORIL    30 capsule    Take 1 capsule (15 mg) by mouth nightly as needed for sleep    Anxiety       * Notice:  This list has 4 medication(s) that are the same as other medications prescribed for you. Read the directions carefully, and ask your doctor or other care provider to review them with you.
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
## 2018-10-03 NOTE — MR AVS SNAPSHOT
After Visit Summary   10/3/2018    Jose Angel Cha    MRN: [id number]           Patient Information     Date Of Birth          1955        Visit Information        Provider Department      10/3/2018 1:30 PM Gurpreet Thrasher,  [facility]        Today's Diagnoses     History of Cardenas's esophagus    -  1    Reflux esophagitis           Follow-ups after your visit        Additional Services     GASTROENTEROLOGY ADULT REF PROCEDURE ONLY [facility] [phone]; (Price)       Last Lab Result: Creatinine (mg/dL)       Date                     Value                 01/10/2018               1.33 (H)         ----------  Body mass index is 31.14 kg/(m^2).     Needed:  No  Language:  English    Patient will be contacted to schedule procedure.     Please be aware that coverage of these services is subject to the terms and limitations of your health insurance plan.  Call member services at your health plan with any benefit or coverage questions.  Any procedures must be performed at a [facility] facility OR coordinated by your clinic's referral office.    Please bring the following with you to your appointment:    (1) Any X-Rays, CTs or MRIs which have been performed.  Contact the facility where they were done to arrange for  prior to your scheduled appointment.    (2) List of current medications   (3) This referral request   (4) Any documents/labs given to you for this referral                  Your next 10 appointments already scheduled     Oct 04, 2018  1:30 PM CDT   Nurse Only with ER ALLERGY SHOTS   [facility] ([facility])    [address]  [facility] [facility] [address]   [phone]            Oct 09, 2018  8:30 AM CDT   Nurse Only with ER ALLERGY SHOTS   [facility] ([facility])    [address]  [place] [facility] [address]   [phone]            Oct  11, 2018  4:40 PM CDT   [facility]urse Only with ER [facility]ERGY SHOTS   [facility]    [address]  [place] [facility] [address]   [phone]            Oct 16, 2018  8:00 AM CDT   Nurse Only with ER ALLERGY SHOTS   [facility]    [address]  [place] [facility] [place] [address]   [phone]            Nov 06, 2018 10:40 AM CST   Nurse Only with ER ALLERGY SHOTS   [facility]    [address]  [place] [facility] [address]   [phone]              Future tests that were ordered for you today     Open Future Orders        Priority Expected Expires Ordered    XR Esophagram w Upper GI Routine 10/3/2018 10/3/2019 10/3/2018            Who to contact     If you have questions or need follow up information about today's clinic visit or your schedule please contact [facility] directly at [phone].  Normal or non-critical lab and imaging results will be communicated to you by DataMarkethart, letter or phone within 4 business days after the clinic has received the results. If you do not hear from us within 7 days, please contact the clinic through TraitWare or phone. If you have a critical or abnormal lab result, we will notify you by phone as soon as possible.  Submit refill requests through [employer]Ware or call your pharmacy and they will forward the refill request to us. Please allow 3 business days for your refill to be completed.          Additional Information About Your Visit        DataMarkethart Information     TraitWare gives you secure access to your electronic health record. If you see a primary care provider, you can also send messages to your care team and make appointments. If you have questions, please call your primary care clinic.  If you do not have a primary care provider, please call [phone] and they will assist you.        Care EveryWhere ID     This is  "your Care EveryWhere ID. This could be used by other organizations to access your [facility] medical records  [id number]        Your Vitals Were     Temperature Height BMI (Body Mass Index)             96.6  F (35.9  C) (Temporal) 1.778 m (5' 10\") 31.14 kg/m2          Blood Pressure from Last 3 Encounters:   10/03/18 122/70   10/03/18 122/70   09/12/18 132/80    Weight from Last 3 Encounters:   10/03/18 98.4 kg (217 lb)   10/03/18 98.4 kg (217 lb)   09/12/18 97.5 kg (215 lb)              We Performed the Following     GASTROENTEROLOGY ADULT REF PROCEDURE ONLY [facility] [phone]; (Price)        Primary Care Provider Office Phone # Fax #    Shari Paredes [phone]       [address] [place] [address]        Equal Access to Services     ANJEL TAFOYA : Hadii karina ku hadasho Soomaali, waaxda luqadaha, qaybta kaalmada adeegyada, nico guillen . So [facility] [phone].    ATENCIÓN: Si habla español, tiene a armas disposición servicios gratuitos de asistencia lingüística. Llame al [phone].    We comply with applicable federal civil rights laws and [place] laws. We do not discriminate on the basis of race, color, national origin, age, disability, sex, sexual orientation, or gender identity.            Thank you!     Thank you for choosing [facility]  for your care. Our goal is always to provide you with excellent care. Hearing back from our patients is one way we can continue to improve our services. Please take a few minutes to complete the written survey that you may receive in the mail after your visit with us. Thank you!             Your Updated Medication List - Protect others around you: Learn how to safely use, store and throw away your medicines at www.disposemymeds.org.          This list is accurate as of 10/3/18  5:01 PM.  Always use your most recent med list.                   Brand Name Dispense Instructions for use " Diagnosis    amLODIPine 2.5 MG tablet    NORVASC    90 tablet    Take 1 tablet (2.5 mg) by mouth daily    Benign essential hypertension       aspirin 81 MG tablet     0    ONE DAILY    Other and unspecified hyperlipidemia, Family history of ischemic heart disease       CLEAR-ATADINE 10 MG tablet   Generic drug:  loratadine      Take 10 mg by mouth daily        * EPINEPHrine 0.3 MG/0.3ML injection 2-pack    EPIPEN 2-MIGUELINA    2 each    Inject 0.3 mLs (0.3 mg) into the muscle once as needed for anaphylaxis    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 sting       * EPINEPHrine 0.3 MG/0.3ML injection 2-pack    AUVI-Q    2 mL    Inject 0.3 mLs (0.3 mg) into the muscle as needed for anaphylaxis    Need for desensitization to allergens       ezetimibe 10 MG tablet    ZETIA    90 tablet    Take 1 tablet (10 mg) by mouth daily At night    Mixed hyperlipidemia       fluticasone 50 MCG/ACT spray    FLONASE    1 Bottle    Spray 2 sprays into both nostrils daily    Chronic seasonal allergic rhinitis due to pollen, House dust mite allergy, Allergic rhinitis due to animal dander       hydrochlorothiazide 25 MG tablet    HYDRODIURIL    90 tablet    Take 1 tablet (25 mg) by mouth daily    Benign essential hypertension       MULTIVITAL PO      Take 1 tablet by mouth daily Reported on 3/22/2017        olopatadine HCl 0.2 % Soln    PATADAY    2.5 mL    Place 1 drop into both eyes daily    Chronic seasonal allergic rhinitis due to pollen, House dust mite allergy       omeprazole 20 MG CR capsule    priLOSEC    90 capsule    TAKE ONE CAPSULE BY MOUTH EVERY DAY (TAKE 30 TO 60 MINUTES BEFORE A MEAL)    Gastroesophageal reflux disease without esophagitis       * ORDER FOR ALLERGEN IMMUNOTHERAPY 5 mL vial     13 mL    Reported on 3/22/2017        * ORDER FOR ALLERGEN IMMUNOTHERAPY 5 mL vial     13 mL    Reported on 3/22/2017        ORDER FOR ALLERGEN IMMUNOTHERAPY 5 mL vial     5 mL    Cat Hair, Standardized 10,000 BAU/mL, ALK  2.0 ml Dog Hair-Dander, A. P.  1:100 w/v,  HS  1.0 ml Dust Mites DF 30,000AU/mL, HS  0.3 ml Dust Mites DP. 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, accidental or unintentional, subsequent encounter       ORDER FOR ALLERGEN IMMUNOTHERAPY 5 mL vial     13 mL    Name of Mix: Mix #2  Wasp Wasp Venom 100 mcg/mL HS 13 ml Diluent: HSA qs to 13ml    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 Read the directions carefully, and ask your doctor or other care provider to review them with you.
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
## 2018-10-04 ENCOUNTER — TELEPHONE (OUTPATIENT)
Dept: SURGERY | Facility: CLINIC | Age: 63
End: 2018-10-04

## 2018-10-04 ENCOUNTER — ALLIED HEALTH/NURSE VISIT (OUTPATIENT)
Dept: ALLERGY | Facility: OTHER | Age: 63
End: 2018-10-04
Payer: COMMERCIAL

## 2018-10-04 DIAGNOSIS — T63.441D TOXIC EFFECT OF VENOM OF BEES, UNINTENTIONAL, SUBSEQUENT ENCOUNTER: Primary | ICD-10-CM

## 2018-10-04 PROBLEM — K64.1 GRADE II INTERNAL HEMORRHOIDS: Status: ACTIVE | Noted: 2018-10-04

## 2018-10-04 PROCEDURE — 99207 ZZC DROP WITH A PROCEDURE: CPT

## 2018-10-04 PROCEDURE — 95117 IMMUNOTHERAPY INJECTIONS: CPT

## 2018-10-04 NOTE — LETTER
[address] [address]  [phone]        October 4, 2018    Jose Angel Cha  [address] [facility] [address]

## 2018-10-04 NOTE — TELEPHONE ENCOUNTER
Date of colonoscopy/EGD: 11/12/18  Surgeon: Dr. Win  Prep:EGD  Packet:Colonoscopy/EGD instructions mailed to patient's home address.   Date: 10/4/2018      Surgery Scheduler

## 2018-10-04 NOTE — ASSESSMENT & PLAN NOTE
Anoscopy performed showing medium sized; non-bleeding; grade 2 internal hemorrhoids at the left lateral and right posterior positions.    Banding performed today in office    Instructed patient on need for improved toilet hygiene, increased water intake, and high fiber diet.  If problems persist in 3 months, would recommend hemorrhoidectomy at that point.

## 2018-10-04 NOTE — MR AVS SNAPSHOT
After Visit Summary   10/4/2018    Jose Angel Cha    MRN: [id number]           Patient Information     Date Of Birth          1955        Visit Information        Provider Department      10/4/2018 1:30 PM ER ALLERGY SHOTS [facility]        Today's Diagnoses     Toxic effect of venom of bees, unintentional, subsequent encounter    -  1       Follow-ups after your visit        Your next 10 appointments already scheduled     Oct 09, 2018  8:30 AM CDT   Nurse Only with ER ALLERGY SHOTS   [facility]    [address]  [facility] [address]   [phone]            Oct 11, 2018  4:40 PM CDT   Nurse Only with ER ALLERGY SHOTS   [facility]    [address]  [facility] [facility] [address]   [phone]            Oct 16, 2018  8:00 AM CDT   Nurse Only with ER ALLERGY SHOTS   [facility]    [address]  [place] [address]   [phone]            Nov 06, 2018 10:40 AM CST   Nurse Only with ER ALLERGY SHOTS   [facility]    [address]  [place] [address]   [phone]              Future tests that were ordered for you today     Open Future Orders        Priority Expected Expires Ordered    XR Esophagram w Upper GI Routine 10/3/2018 10/3/2019 10/3/2018            Who to contact     If you have questions or need follow up information about today's clinic visit or your schedule please contact [facility] directly at [phone].  Normal or non-critical lab and imaging results will be communicated to you by MyChart, letter or phone within 4 business days after the clinic has received the results. If you do not hear from us within 7 days, please contact the clinic through MyChart or phone. If you have a critical or abnormal lab  result, we will notify you by phone as soon as possible.  Submit refill requests through [employer] or call your pharmacy and they will forward the refill request to us. Please allow 3 business days for your refill to be completed.          Additional Information About Your Visit        [employer] Information     [employer] gives you secure access to your electronic health record. If you see a primary care provider, you can also send messages to your care team and make appointments. If you have questions, please call your primary care clinic.  If you do not have a primary care provider, please call [phone] and they will assist you.        Care EveryWhere ID     This is your Care EveryWhere ID. This could be used by other organizations to access your [facility] medical records  [id number]         Blood Pressure from Last 3 Encounters:   10/03/18 122/70   10/03/18 122/70   09/12/18 132/80    Weight from Last 3 Encounters:   10/03/18 98.4 kg (217 lb)   10/03/18 98.4 kg (217 lb)   09/12/18 97.5 kg (215 lb)              We Performed the Following     Allergy Shot: Two or more injections        Primary Care Provider Office Phone # Fax #    Shari Paredes [phone]       [address] [place] [id number]        Equal Access to Services     ANJEL TAFOYA : Hadii karina ku hadasho Soomaali, waaxda luqadaha, qaybta kaalmada adeegyada, waxay alyce dennison. So [facility] [phone].    ATENCIÓN: Si habla español, tiene a armas disposición servicios gratuitos de asistencia lingüística. Llame al [phone].    We comply with applicable federal civil rights laws and [place] laws. We do not discriminate on the basis of race, color, national origin, age, disability, sex, sexual orientation, or gender identity.            Thank you!     Thank you for choosing [facility]  for your care. Our goal is always to provide you with excellent care. Hearing back from our patients is one way we  can continue to improve our services. Please take a few minutes to complete the written survey that you may receive in the mail after your visit with us. Thank you!             Your Updated Medication List - Protect others around you: Learn how to safely use, store and throw away your medicines at www.disposemymeds.org.          This list is accurate as of 10/4/18  2:10 PM.  Always use your most recent med list.                   Brand Name Dispense Instructions for use Diagnosis    amLODIPine 2.5 MG tablet    NORVASC    90 tablet    Take 1 tablet (2.5 mg) by mouth daily    Benign essential hypertension       aspirin 81 MG tablet     0    ONE DAILY    Other and unspecified hyperlipidemia, Family history of ischemic heart disease       CLEAR-ATADINE 10 MG tablet   Generic drug:  loratadine      Take 10 mg by mouth daily        * EPINEPHrine 0.3 MG/0.3ML injection 2-pack    EPIPEN 2-MIGUELINA    2 each    Inject 0.3 mLs (0.3 mg) into the muscle once as needed for anaphylaxis    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 sting       * EPINEPHrine 0.3 MG/0.3ML injection 2-pack    AUVI-Q    2 mL    Inject 0.3 mLs (0.3 mg) into the muscle as needed for anaphylaxis    Need for desensitization to allergens       ezetimibe 10 MG tablet    ZETIA    90 tablet    Take 1 tablet (10 mg) by mouth daily At night    Mixed hyperlipidemia       fluticasone 50 MCG/ACT spray    FLONASE    1 Bottle    Spray 2 sprays into both nostrils daily    Chronic seasonal allergic rhinitis due to pollen, House dust mite allergy, Allergic rhinitis due to animal dander       hydrochlorothiazide 25 MG tablet    HYDRODIURIL    90 tablet    Take 1 tablet (25 mg) by mouth daily    Benign essential hypertension       MULTIVITAL PO      Take 1 tablet by mouth daily Reported on 3/22/2017        olopatadine HCl 0.2 % Soln    PATADAY    2.5 mL    Place 1 drop into both eyes daily    Chronic seasonal allergic rhinitis due to pollen, House dust mite allergy       omeprazole 20 MG CR capsule     priLOSEC    90 capsule    TAKE ONE CAPSULE BY MOUTH EVERY DAY (TAKE 30 TO 60 MINUTES BEFORE A MEAL)    Gastroesophageal reflux disease without esophagitis       * ORDER FOR ALLERGEN IMMUNOTHERAPY 5 mL vial     13 mL    Reported on 3/22/2017        * ORDER FOR ALLERGEN IMMUNOTHERAPY 5 mL vial     13 mL    Reported on 3/22/2017        ORDER FOR ALLERGEN IMMUNOTHERAPY 5 mL vial     5 mL    Cat Hair, Standardized 10,000 BAU/mL, ALK  2.0 ml Dog Hair-Dander, A. P.  1:100 w/v, HS  1.0 ml Dust Mites DF 30,000AU/mL, HS  0.3 ml Dust Mites DP. 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, accidental or unintentional, subsequent encounter       ORDER FOR ALLERGEN IMMUNOTHERAPY 5 mL vial     13 mL    Name of Mix: Mix #2  Wasp Wasp Venom 100 mcg/mL HS 13 ml Diluent: HSA qs to 13ml    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 Read the directions carefully, and ask your doctor or other care provider to review them with you.

## 2018-10-04 NOTE — ASSESSMENT & PLAN NOTE
Known history of hiatal hernia and cardona's esophagus    Discuss possible surgical treatment for reflux disease  Will f/u with repeat EGD and order esophagram  Possible Manometry of patient is a candidate for surgical repair.

## 2018-10-04 NOTE — LETTER
Upper Endoscopy    Date of procedure:__11/12/18_________      Location:__[place]______________  Please arrive at:_3:30pm____________    Procedure time:__4:30pm______________    Review the preparation schedule below for the five days preceding your procedure.     If you have any questions, please call [phone].          5 Days Prior  *CHECK WITH YOUR INSURANCE REGARDING COVERAGE PRIOR TO PROCEDURE.  If you take Coumadin (Warfarin), Plavix, Ticlid, Aggrenox:, insulin, diabetic pills and/or iron products contact your doctor for specific instructions.  Have INR checked the day before the procedure.  Please remember to arrange a responsible adult to accompany you home.   must be     present upon discharge.    1 Days Prior  Eat normally up until midnight.  You may drink small amounts of clear liquids up until 4 hours prior to your arrival.  **Please see Clear Liquid Diet Sheet for suggested liquids.    Procedure Day    From midnight until 3 hours prior to your procedure, you may drink small amounts of clear liquids.  Do not take your morning medications until after you have completed your procedure.  Bring a list of your medications with you on the day of your procedure.     *Reminder:  Have transportation arranged to take you home.   must accompany you to the procedure.  Do not plan to drive or operate vehicles or machinery the day of your exam.      Clear Liquid Diet  Beverages  Coffee, Decaffeinated coffee, Tea (without milk or non-dairy creamer)  Carbonated beverages (pop)  Water  Sports Drinks    Desserts  Jello (except red or purple)  Fruit ice  Popsicle    Fruit and Fruit Juices  Citrus juices, strained  Fruit nectars, strained  Apple juice  Fruit drinks    Soups  Broth or Bouillon  Consomme  Meat stock, strained  Vegetable broth, strained    Sweets  Hard candy, plain  Sugar    Miscellaneous  Flavorings  Salt    No red or purple colored juices or Jello  No dairy products  No foods containing  seeds 2 days prior to procedure  No alcoholic beverages               Directions to [facility]    From the North:   Take the [address] exit off of [address] (on the south side of [place]).  Turn left on [address]  Turn left at the first stoplight (at [facility]).  The hospital and clinic is the third building on the left.     From the South:  Follow [address] north to the first [place] exit.  Exit on [address] following it to the right.  Turn left at the first stoplight.  The hospital and clinic is the third building on the left.    From the East:     Following [address] west, turn left at the stoplight onto [address] Follow [address] through [place].  Take a right at the light on [address] (at [facility]).  The hospital and clinic is the third building on the left.    From the West:    Following [address] going east, turn south on [address].  Take the [address] exit off of [address] (on the south side of [place]).  Follow [address] through [place] to the stoplight on [address] (at [facility]). Take a left.  The hospital and clinic is the third building on the left.        UPPER ENDOSCOPY INFORMATION  Upper endoscopy (also known as an upper GI endoscopy, esophagogastro-duodenoscopy [EGD], or panendoscopy) is a procedure that enables your physician to examine the lining of the upper part of your gastrointestinal tract, ie. The esophagus (swallowing tube), stomach, and duodenum (first portion of the small intestine) using a thin flexible tube with its own lens and light source.    Upper endoscopy is usually performed to evaluate symptoms of persistent upper abdominal pain, nausea, vomiting or difficulty swallowing.  It is also the best test for finding the cause of bleeding from the upper gastrointestinal tract.    Upper endoscopy is more accurate than x-ray films for detecting inflammation, ulcers or tumors of the esophagus, stomach and duodenum.   Upper endoscopy can detect early cancer and can distinguish between benign (non-cancerous) and malignant (cancerous) conditions when biopsies (small tissue samples) of suspicious areas are obtained.  Biopsies are taken for many reasons and do not necessarily mean that cancer is suspected.  A cytology test (introduction of a small brush to collect cells) may also be performed.    Upper endoscopy is also used to treat conditions present in the upper gastrointestinal tract.  A variety of instruments can be passed through the endoscope that allow many abnormalities to be treated directly with little or no discomfort.  This may include stretching narrowed areas, removing polyps (usually benign growths) or swallowed objects, or treating upper gastrointestinal bleeding.  Safe and effective endoscopic control of bleeding has reduced the need for transfusions and surgery in many patients.    For the best and safest examination, the stomach must be completely empty.  You should have nothing to eat or drink, including water, for approximately 4 hours prior to your examination.    WHAT CAN BE EXPECTED DURING THE UPPER ENDOSCOPY?  Your doctor will review with you why upper endoscopy is being performed, whether any alternative tests are available, and possible complications from the procedure.  Your throat will be sprayed with a local anesthetic before the procedure begins and you may be given medication through a vein to help you relax during the procedure.  While you are in a comfortable position on your side, the endoscope is passed through the mouth and then is passed in turn through the esophagus, stomach, and duodenum.  The endoscope does not interfere with your breathing during the test.  Most patients consider the procedure to be only slightly uncomfortable and many patients fall asleep during the procedure.    WHAT HAPPENS AFTER THE UPPER ENDOSCOPY?  After the procedure, you will be monitored in the endoscopy area until  most of the effects of the medication have work off.  Your throat may be a little sore for a while, and you may feel bloated right after the procedure because of the air introduced into your stomach during the test.  You will be able to resume your diet after you leave unless you are instructed otherwise.    If you receive sedation, you will need to arrange to have a responsible adult drive and accompany you home from the examination because the sedative may affect your judgment and reflexes for the rest of the day.  You will not be allowed to take a taxi or bus as transportation home.  If you receive sedation, you will not be allowed to drive after the procedure even though you may not feel tired.    WHAT ARE THE POSSIBLE COMPLICATIONS OF UPPER ENDOSCOPY?  Endoscopy is generally safe.  Complications can occur but are rare when the test is performed by physicians with specialized training and experience in this procedure.  Bleeding may occur from a biopsy site or where a polyp was removed.  It is usually minimal and rarely requires blood transfusions or surgery.  Localized irritation of the vein where the medication was injected may rarely cause a tender lump lasting for several weeks, but this will go away.  Applying heat packs or hot moist towels may help relieve discomfort.  Other potential risks include a reaction to the sedatives used and complications from underlying medical conditions.  Major complication, eg, perforation (a tear that might require surgery for repair) are very uncommon.      It is important for you to recognize early signs of any possible complication.  If you begin to run a fever after the test, begin to have trouble swallowing, or have increasing throat, chest or abdominal pain, let your doctor know about it promptly.

## 2018-10-04 NOTE — PROCEDURES
Preoperative diagnosis grade 2 internal hemorrhoids    Postoperative diagnosis same    Procedure banding of internal hemorrhoids    Surgeon Dr. Gurpreet Win    Anesthetic lidocaine jelly    Specimens none    EBL 1 cc    Complications none    Indications for procedure: 63-year-old  male with history of internal hemorrhoids who was shown increased incidence of intermittent bleeds and itching.  During anoscopy grade 2 medium sized nonbleeding internal hemorrhoids were noted in the left lateral and right posterior positions.  Banding was recommended.  Risk and benefits of the procedure were discussed in detail with the patient to include risk of bleeding risk of infection urinary retention and discomfort.  Consent was obtained.    Procedure patient was brought to the the procedure room and placed in the left lateral decubitus position.  Anoscopy was performed with lidocaine jelly.  Grade 2, non-bleeding, internal hemorrhoids in the left lateral and right posterior positions were identified and individually grasped and banded with rubber band.  Small amount of EBL was noted there was no discomfort reported by the patient.  Patient tolerated the procedure well without incident or complication.

## 2018-10-09 ENCOUNTER — ALLIED HEALTH/NURSE VISIT (OUTPATIENT)
Dept: ALLERGY | Facility: OTHER | Age: 63
End: 2018-10-09
Payer: COMMERCIAL

## 2018-10-09 DIAGNOSIS — J30.9 ALLERGIC RHINITIS: Primary | ICD-10-CM

## 2018-10-09 LAB — COPATH REPORT: NORMAL

## 2018-10-09 PROCEDURE — 95115 IMMUNOTHERAPY ONE INJECTION: CPT

## 2018-10-09 PROCEDURE — 99207 ZZC DROP WITH A PROCEDURE: CPT

## 2018-10-09 NOTE — MR AVS SNAPSHOT
After Visit Summary   10/9/2018    Jose Angel Cha    MRN: [id number]           Patient Information     Date Of Birth          1955        Visit Information        Provider Department      10/9/2018 8:30 AM ER ALLERGY SHOTS [facility]        Today's Diagnoses     Allergic rhinitis    -  1       Follow-ups after your visit        Your next 10 appointments already scheduled     Oct 11, 2018  4:40 PM CDT   [facility] with ER [facility]ERGY SHOTS   [facility]    [address]  [facility] [address]   [phone]            Oct 16, 2018  8:50 AM CDT   Nurse Only with ER ALLERGY SHOTS   [facility]    [address]  [facility] [address]   [phone]            Nov 06, 2018 10:40 AM CST   Nurse Only with ER ALLERGY SHOTS   [facility]    [address]  [facility] [address]   [phone]            Nov 12, 2018   Procedure with Gurpreet Thrasher DO   [facility] ([facility])    [address] [place] [id number] [phone]              Who to contact     If you have questions or need follow up information about today's clinic visit or your schedule please contact [facility] directly at [phone].  Normal or non-critical lab and imaging results will be communicated to you by MyChart, letter or phone within 4 business days after the clinic has received the results. If you do not hear from us within 7 days, please contact the clinic through MyChart or phone. If you have a critical or abnormal lab result, we will notify you by phone as soon as possible.  Submit refill requests through [employer] or call your pharmacy and they will forward the refill request to us. Please allow 3 business days for your refill to be completed.           Additional Information About Your Visit        [employer] Information     [employer] gives you secure access to your electronic health record. If you see a primary care provider, you can also send messages to your care team and make appointments. If you have questions, please call your primary care clinic.  If you do not have a primary care provider, please call [phone] and they will assist you.        Care EveryWhere ID     This is your Care EveryWhere ID. This could be used by other organizations to access your [facility] medical records  [id number]         Blood Pressure from Last 3 Encounters:   10/03/18 122/70   10/03/18 122/70   09/12/18 132/80    Weight from Last 3 Encounters:   10/03/18 98.4 kg (217 lb)   10/03/18 98.4 kg (217 lb)   09/12/18 97.5 kg (215 lb)              We Performed the Following     Allergy Shot: One injection        Primary Care Provider Office Phone # Fax #    Shari Tonia Paredes [phone]       [address] [place] [id number]        Equal Access to Services     [facility]: Hadii aad ku hadasho Soomaali, waaxda luqadaha, qaybta kaalmada adeegyada, waxay lokiin haychaz guillen . So [facility] [phone].    ATENCIÓN: Si habla español, tiene a armas disposición servicios gratuitos de asistencia lingüística. Llame al [phone].    We comply with applicable federal civil rights laws and [place] laws. We do not discriminate on the basis of race, color, national origin, age, disability, sex, sexual orientation, or gender identity.            Thank you!     Thank you for choosing [facility]  for your care. Our goal is always to provide you with excellent care. Hearing back from our patients is one way we can continue to improve our services. Please take a few minutes to complete the written survey that you may receive in the mail after your visit with us. Thank you!             Your Updated Medication List - Protect others around you: Learn how to  safely use, store and throw away your medicines at www.disposemymeds.org.          This list is accurate as of 10/9/18  9:16 AM.  Always use your most recent med list.                   Brand Name Dispense Instructions for use Diagnosis    amLODIPine 2.5 MG tablet    NORVASC    90 tablet    Take 1 tablet (2.5 mg) by mouth daily    Benign essential hypertension       aspirin 81 MG tablet     0    ONE DAILY    Other and unspecified hyperlipidemia, Family history of ischemic heart disease       CLEAR-ATADINE 10 MG tablet   Generic drug:  loratadine      Take 10 mg by mouth daily        * EPINEPHrine 0.3 MG/0.3ML injection 2-pack    EPIPEN 2-MIGUELINA    2 each    Inject 0.3 mLs (0.3 mg) into the muscle once as needed for anaphylaxis    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 sting       * EPINEPHrine 0.3 MG/0.3ML injection 2-pack    AUVI-Q    2 mL    Inject 0.3 mLs (0.3 mg) into the muscle as needed for anaphylaxis    Need for desensitization to allergens       ezetimibe 10 MG tablet    ZETIA    90 tablet    Take 1 tablet (10 mg) by mouth daily At night    Mixed hyperlipidemia       fluticasone 50 MCG/ACT spray    FLONASE    1 Bottle    Spray 2 sprays into both nostrils daily    Chronic seasonal allergic rhinitis due to pollen, House dust mite allergy, Allergic rhinitis due to animal dander       hydrochlorothiazide 25 MG tablet    HYDRODIURIL    90 tablet    Take 1 tablet (25 mg) by mouth daily    Benign essential hypertension       MULTIVITAL PO      Take 1 tablet by mouth daily Reported on 3/22/2017        olopatadine HCl 0.2 % Soln    PATADAY    2.5 mL    Place 1 drop into both eyes daily    Chronic seasonal allergic rhinitis due to pollen, House dust mite allergy       omeprazole 20 MG CR capsule    priLOSEC    90 capsule    TAKE ONE CAPSULE BY MOUTH EVERY DAY (TAKE 30 TO 60 MINUTES BEFORE A MEAL)    Gastroesophageal reflux disease without esophagitis       * ORDER FOR ALLERGEN IMMUNOTHERAPY 5 mL vial     13 mL    Reported on 3/22/2017        *  ORDER FOR ALLERGEN IMMUNOTHERAPY 5 mL vial     13 mL    Reported on 3/22/2017        ORDER FOR ALLERGEN IMMUNOTHERAPY 5 mL vial     5 mL    Cat Hair, Standardized 10,000 BAU/mL, ALK  2.0 ml Dog Hair-Dander, A. P.  1:100 w/v, HS  1.0 ml Dust Mites DF 30,000AU/mL, HS  0.3 ml Dust Mites DP. 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, accidental or unintentional, subsequent encounter       ORDER FOR ALLERGEN IMMUNOTHERAPY 5 mL vial     13 mL    Name of Mix: Mix #2  Wasp Wasp Venom 100 mcg/mL HS 13 ml Diluent: HSA qs to 13ml    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 Read the directions carefully, and ask your doctor or other care provider to review them with you.

## 2018-10-10 NOTE — PROGRESS NOTES
Jose Angel, your results were all normal. Which is good news, but you will need to do wound care to manage with clean vaseline to help it heal. Though if it is still having trouble healing, we can consider a consult for another biopsy as the sample size was small. Though given the size of the ulcer and that there really is thin skin there, it wasn't a surprise to have a small biopsy.   Please let me know if you have any questions.    Shari Paredes MD

## 2018-10-11 ENCOUNTER — ALLIED HEALTH/NURSE VISIT (OUTPATIENT)
Dept: ALLERGY | Facility: OTHER | Age: 63
End: 2018-10-11
Payer: COMMERCIAL

## 2018-10-11 DIAGNOSIS — T63.441D TOXIC EFFECT OF VENOM OF BEES, UNINTENTIONAL, SUBSEQUENT ENCOUNTER: Primary | ICD-10-CM

## 2018-10-11 PROCEDURE — 99207 ZZC DROP WITH A PROCEDURE: CPT

## 2018-10-11 PROCEDURE — 95117 IMMUNOTHERAPY INJECTIONS: CPT

## 2018-10-11 NOTE — MR AVS SNAPSHOT
After Visit Summary   10/11/2018    Jose Angel Cha    MRN: [id number]           Patient Information     Date Of Birth          1955        Visit Information        Provider Department      10/11/2018 4:40 PM ER ALLERGY SHOTS [facility]        Today's Diagnoses     Toxic effect of venom of bees, unintentional, subsequent encounter    -  1       Follow-ups after your visit        Your next 10 appointments already scheduled     Oct 16, 2018  8:50 AM CDT   Nurse Only with ER ALLERGY SHOTS   [facility]    [address]  [facility] [address]   [phone]            Nov 06, 2018 10:40 AM CST   Nurse Only with ER ALLERGY SHOTS   [facility] ([facility])    [address]  [facility] [address] [phone]            Nov 12, 2018   Procedure with Gurpreet Thrasher DO   [facility] ([facility])    [address] [id number] [phone]              Who to contact     If you have questions or need follow up information about today's clinic visit or your schedule please contact [facility] directly at [phone].  Normal or non-critical lab and imaging results will be communicated to you by Greysoxhart, letter or phone within 4 business days after the clinic has received the results. If you do not hear from us within 7 days, please contact the clinic through Greysoxhart or phone. If you have a critical or abnormal lab result, we will notify you by phone as soon as possible.  Submit refill requests through Utrip or call your pharmacy and they will forward the refill request to us. Please allow 3 business days for your refill to be completed.          Additional Information About Your Visit        [employer] Information     Utrip gives you secure access to your electronic health record. If you see a  primary care provider, you can also send messages to your care team and make appointments. If you have questions, please call your primary care clinic.  If you do not have a primary care provider, please call [phone] and they will assist you.        Care EveryWhere ID     This is your Care EveryWhere ID. This could be used by other organizations to access your [facility] medical records  [id number]         Blood Pressure from Last 3 Encounters:   10/03/18 122/70   10/03/18 122/70   09/12/18 132/80    Weight from Last 3 Encounters:   10/03/18 98.4 kg (217 lb)   10/03/18 98.4 kg (217 lb)   09/12/18 97.5 kg (215 lb)              We Performed the Following     Allergy Shot: Two or more injections        Primary Care Provider Office Phone # Fax #    Shari Paredes [phone]       [address] [address]        Equal Access to Services     ANJEL TAFOYA : Hadii karina ku hadasho Soomaali, waaxda luqadaha, qaybta kaalmada adeegyada, waxay lokiin haychaz guillen . So [facility] [phone].    ATENCIÓN: Si josela espchun, tiene a armas disposición servicios gratuitos de asistencia lingüística. Llame al [phone].    We comply with applicable federal civil rights laws and [place] laws. We do not discriminate on the basis of race, color, national origin, age, disability, sex, sexual orientation, or gender identity.            Thank you!     Thank you for choosing [facility]  for your care. Our goal is always to provide you with excellent care. Hearing back from our patients is one way we can continue to improve our services. Please take a few minutes to complete the written survey that you may receive in the mail after your visit with us. Thank you!             Your Updated Medication List - Protect others around you: Learn how to safely use, store and throw away your medicines at www.disposemymeds.org.          This list is accurate as of 10/11/18  5:21 PM.  Always use  your most recent med list.                   Brand Name Dispense Instructions for use Diagnosis    amLODIPine 2.5 MG tablet    NORVASC    90 tablet    Take 1 tablet (2.5 mg) by mouth daily    Benign essential hypertension       aspirin 81 MG tablet     0    ONE DAILY    Other and unspecified hyperlipidemia, Family history of ischemic heart disease       CLEAR-ATADINE 10 MG tablet   Generic drug:  loratadine      Take 10 mg by mouth daily        * EPINEPHrine 0.3 MG/0.3ML injection 2-pack    EPIPEN 2-MIGUELINA    2 each    Inject 0.3 mLs (0.3 mg) into the muscle once as needed for anaphylaxis    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 sting       * EPINEPHrine 0.3 MG/0.3ML injection 2-pack    AUVI-Q    2 mL    Inject 0.3 mLs (0.3 mg) into the muscle as needed for anaphylaxis    Need for desensitization to allergens       ezetimibe 10 MG tablet    ZETIA    90 tablet    Take 1 tablet (10 mg) by mouth daily At night    Mixed hyperlipidemia       fluticasone 50 MCG/ACT spray    FLONASE    1 Bottle    Spray 2 sprays into both nostrils daily    Chronic seasonal allergic rhinitis due to pollen, House dust mite allergy, Allergic rhinitis due to animal dander       hydrochlorothiazide 25 MG tablet    HYDRODIURIL    90 tablet    Take 1 tablet (25 mg) by mouth daily    Benign essential hypertension       MULTIVITAL PO      Take 1 tablet by mouth daily Reported on 3/22/2017        olopatadine HCl 0.2 % Soln    PATADAY    2.5 mL    Place 1 drop into both eyes daily    Chronic seasonal allergic rhinitis due to pollen, House dust mite allergy       omeprazole 20 MG CR capsule    priLOSEC    90 capsule    TAKE ONE CAPSULE BY MOUTH EVERY DAY (TAKE 30 TO 60 MINUTES BEFORE A MEAL)    Gastroesophageal reflux disease without esophagitis       * ORDER FOR ALLERGEN IMMUNOTHERAPY 5 mL vial     13 mL    Reported on 3/22/2017        * ORDER FOR ALLERGEN IMMUNOTHERAPY 5 mL vial     13 mL    Reported on 3/22/2017        ORDER FOR ALLERGEN IMMUNOTHERAPY 5 mL vial     5 mL     Cat Hair, Standardized 10,000 BAU/mL, ALK  2.0 ml Dog Hair-Dander, A. P.  1:100 w/v, HS  1.0 ml Dust Mites DF 30,000AU/mL, HS  0.3 ml Dust Mites DP. 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, accidental or unintentional, subsequent encounter       ORDER FOR ALLERGEN IMMUNOTHERAPY 5 mL vial     13 mL    Name of Mix: Mix #2  Wasp Wasp Venom 100 mcg/mL HS 13 ml Diluent: HSA qs to 13ml    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 Read the directions carefully, and ask your doctor or other care provider to review them with you.

## 2018-10-15 ENCOUNTER — TELEPHONE (OUTPATIENT)
Dept: FAMILY MEDICINE | Facility: OTHER | Age: 63
End: 2018-10-15

## 2018-10-15 NOTE — TELEPHONE ENCOUNTER
Reason for Call:  Request for results:    Name of test or procedure: biopsy results    Date of test of procedure: last week    Location of the test or procedure: [place]    OK to leave the result message on voice mail or with a family member? NO    Phone number Patient can be reached at:  Cell number on file:    Telephone Information:   Mobile [phone]       Additional comments:     Call taken on 10/15/2018 at 11:59 AM by Elsie Kelsey

## 2018-10-15 NOTE — TELEPHONE ENCOUNTER
"Patient informed - no further questions.     Result note from 10/10: \"Notes Recorded by Shari Paredes MD on 10/10/2018 at 8:17 AM  Jose Angel, your results were all normal. Which is good news, but you will need to do wound care to manage with clean vaseline to help it heal. Though if it is still having trouble healing, we can consider a consult for another biopsy as the sample size was small. Though given the size of the ulcer and that there really is thin skin there, it wasn't a surprise to have a small biopsy.   Please let me know if you have any questions.    Shari Paredes MD\"  "

## 2018-10-16 ENCOUNTER — ALLIED HEALTH/NURSE VISIT (OUTPATIENT)
Dept: ALLERGY | Facility: OTHER | Age: 63
End: 2018-10-16
Payer: COMMERCIAL

## 2018-10-16 DIAGNOSIS — T63.441D TOXIC EFFECT OF VENOM OF BEES, UNINTENTIONAL, SUBSEQUENT ENCOUNTER: Primary | ICD-10-CM

## 2018-10-16 PROCEDURE — 99207 ZZC DROP WITH A PROCEDURE: CPT

## 2018-10-16 PROCEDURE — 95117 IMMUNOTHERAPY INJECTIONS: CPT

## 2018-10-16 NOTE — MR AVS SNAPSHOT
After Visit Summary   10/16/2018    Jose Angel Cha    MRN: [id number]           Patient Information     Date Of Birth          1955        Visit Information        Provider Department      10/16/2018 8:50 AM ER ALLERGY SHOTS [facility]        Today's Diagnoses     Toxic effect of venom of bees, unintentional, subsequent encounter    -  1       Follow-ups after your visit        Your next 10 appointments already scheduled     Nov 06, 2018 10:40 AM CST   Nurse Only with ER ALLERGY SHOTS   [facility]    [address]  [place] [address] [phone]            Nov 12, 2018   Procedure with Gurpreet Thrasher DO   [facility] ([facility])    [address] [id number] [phone]            Nov 27, 2018  8:50 AM CST   Nurse Only with ER ALLERGY SHOTS   [facility]    [address]  [place] [address] [phone]              Who to contact     If you have questions or need follow up information about today's clinic visit or your schedule please contact [facility] directly at [phone].  Normal or non-critical lab and imaging results will be communicated to you by Teliportmehart, letter or phone within 4 business days after the clinic has received the results. If you do not hear from us within 7 days, please contact the clinic through Teliportmehart or phone. If you have a critical or abnormal lab result, we will notify you by phone as soon as possible.  Submit refill requests through [employer] or call your pharmacy and they will forward the refill request to us. Please allow 3 business days for your refill to be completed.          Additional Information About Your Visit        [employer] Information     [employer] gives you secure access to your electronic health record. If you see a  primary care provider, you can also send messages to your care team and make appointments. If you have questions, please call your primary care clinic.  If you do not have a primary care provider, please call [phone] and they will assist you.        Care EveryWhere ID     This is your Care EveryWhere ID. This could be used by other organizations to access your [facility] medical records  [id number]         Blood Pressure from Last 3 Encounters:   10/03/18 122/70   10/03/18 122/70   09/12/18 132/80    Weight from Last 3 Encounters:   10/03/18 98.4 kg (217 lb)   10/03/18 98.4 kg (217 lb)   09/12/18 97.5 kg (215 lb)              We Performed the Following     Allergy Shot: Two or more injections        Primary Care Provider Office Phone # Fax #    Shari Paredes [phone]       [address] [address]        Equal Access to Services     ANJEL TAFOYA : Hadii karina ku hadasho Soomaali, waaxda luqadaha, qaybta kaalmada adeegyada, waxay lokiin haychaz guillen . So [facility] [phone].    ATENCIÓN: Si josela espchun, tiene a armas disposición servicios gratuitos de asistencia lingüística. Llame al [phone].    We comply with applicable federal civil rights laws and [place] laws. We do not discriminate on the basis of race, color, national origin, age, disability, sex, sexual orientation, or gender identity.            Thank you!     Thank you for choosing [facility]  for your care. Our goal is always to provide you with excellent care. Hearing back from our patients is one way we can continue to improve our services. Please take a few minutes to complete the written survey that you may receive in the mail after your visit with us. Thank you!             Your Updated Medication List - Protect others around you: Learn how to safely use, store and throw away your medicines at www.disposemymeds.org.          This list is accurate as of 10/16/18  9:33 AM.  Always use  your most recent med list.                   Brand Name Dispense Instructions for use Diagnosis    amLODIPine 2.5 MG tablet    NORVASC    90 tablet    Take 1 tablet (2.5 mg) by mouth daily    Benign essential hypertension       aspirin 81 MG tablet     0    ONE DAILY    Other and unspecified hyperlipidemia, Family history of ischemic heart disease       CLEAR-ATADINE 10 MG tablet   Generic drug:  loratadine      Take 10 mg by mouth daily        * EPINEPHrine 0.3 MG/0.3ML injection 2-pack    EPIPEN 2-MIGUELINA    2 each    Inject 0.3 mLs (0.3 mg) into the muscle once as needed for anaphylaxis    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 sting       * EPINEPHrine 0.3 MG/0.3ML injection 2-pack    AUVI-Q    2 mL    Inject 0.3 mLs (0.3 mg) into the muscle as needed for anaphylaxis    Need for desensitization to allergens       ezetimibe 10 MG tablet    ZETIA    90 tablet    Take 1 tablet (10 mg) by mouth daily At night    Mixed hyperlipidemia       fluticasone 50 MCG/ACT spray    FLONASE    1 Bottle    Spray 2 sprays into both nostrils daily    Chronic seasonal allergic rhinitis due to pollen, House dust mite allergy, Allergic rhinitis due to animal dander       hydrochlorothiazide 25 MG tablet    HYDRODIURIL    90 tablet    Take 1 tablet (25 mg) by mouth daily    Benign essential hypertension       MULTIVITAL PO      Take 1 tablet by mouth daily Reported on 3/22/2017        olopatadine HCl 0.2 % Soln    PATADAY    2.5 mL    Place 1 drop into both eyes daily    Chronic seasonal allergic rhinitis due to pollen, House dust mite allergy       omeprazole 20 MG CR capsule    priLOSEC    90 capsule    TAKE ONE CAPSULE BY MOUTH EVERY DAY (TAKE 30 TO 60 MINUTES BEFORE A MEAL)    Gastroesophageal reflux disease without esophagitis       * ORDER FOR ALLERGEN IMMUNOTHERAPY 5 mL vial     13 mL    Reported on 3/22/2017        * ORDER FOR ALLERGEN IMMUNOTHERAPY 5 mL vial     13 mL    Reported on 3/22/2017        ORDER FOR ALLERGEN IMMUNOTHERAPY 5 mL vial     5 mL     Cat Hair, Standardized 10,000 BAU/mL, ALK  2.0 ml Dog Hair-Dander, A. P.  1:100 w/v, HS  1.0 ml Dust Mites DF 30,000AU/mL, HS  0.3 ml Dust Mites DP. 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, accidental or unintentional, subsequent encounter       ORDER FOR ALLERGEN IMMUNOTHERAPY 5 mL vial     13 mL    Name of Mix: Mix #2  Wasp Wasp Venom 100 mcg/mL HS 13 ml Diluent: HSA qs to 13ml    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 Read the directions carefully, and ask your doctor or other care provider to review them with you.

## 2018-11-06 ENCOUNTER — ALLIED HEALTH/NURSE VISIT (OUTPATIENT)
Dept: ALLERGY | Facility: OTHER | Age: 63
End: 2018-11-06
Payer: COMMERCIAL

## 2018-11-06 DIAGNOSIS — J30.9 ALLERGIC RHINITIS: Primary | ICD-10-CM

## 2018-11-06 PROCEDURE — 95115 IMMUNOTHERAPY ONE INJECTION: CPT

## 2018-11-06 PROCEDURE — 99207 ZZC DROP WITH A PROCEDURE: CPT

## 2018-11-06 NOTE — MR AVS SNAPSHOT
After Visit Summary   11/6/2018    Jose Angel Cha    MRN: [id number]           Patient Information     Date Of Birth          1955        Visit Information        Provider Department      11/6/2018 8:10 AM ER ALLERGY SHOTS [facility]        Today's Diagnoses     Allergic rhinitis    -  1       Follow-ups after your visit        Your next 10 appointments already scheduled     Nov 12, 2018   Procedure with Gurpreet Thrasher DO   [facility] ([facility])    [address] [id number] [phone]            Nov 27, 2018  8:50 AM CST   Nurse Only with ER ALLERGY SHOTS   [facility]    [address]  [facility] [address] [phone]              Who to contact     If you have questions or need follow up information about today's clinic visit or your schedule please contact [facility] directly at [phone].  Normal or non-critical lab and imaging results will be communicated to you by [email or web address], letter or phone within 4 business days after the clinic has received the results. If you do not hear from us within 7 days, please contact the clinic through [employer] or phone. If you have a critical or abnormal lab result, we will notify you by phone as soon as possible.  Submit refill requests through [employer] or call your pharmacy and they will forward the refill request to us. Please allow 3 business days for your refill to be completed.          Additional Information About Your Visit        [email or web address] Information     [employer] gives you secure access to your electronic health record. If you see a primary care provider, you can also send messages to your care team and make appointments. If you have questions, please call your primary care clinic.  If you do not have a primary care provider, please call [phone] and they will assist you.        Care  EveryWhere ID     This is your Care EveryWhere ID. This could be used by other organizations to access your [facility] medical records  [id number]         Blood Pressure from Last 3 Encounters:   10/03/18 122/70   10/03/18 122/70   09/12/18 132/80    Weight from Last 3 Encounters:   10/03/18 98.4 kg (217 lb)   10/03/18 98.4 kg (217 lb)   09/12/18 97.5 kg (215 lb)              We Performed the Following     Allergy Shot: One injection        Primary Care Provider Office Phone # Fax #    Shari Tonia Paredes [phone]       [address] [facility] [address]        Equal Access to Services     ANJEL TAFOYA : Hadii karina messero Soreji, waaxda luqadaha, qaybta kaalmada adejefryyada, nico guillen . So [facility] [phone].    ATENCIÓN: Si habla español, tiene a armas disposición servicios gratuitos de asistencia lingüística. Llame al [phone].    We comply with applicable federal civil rights laws and [place] laws. We do not discriminate on the basis of race, color, national origin, age, disability, sex, sexual orientation, or gender identity.            Thank you!     Thank you for choosing [facility]  for your care. Our goal is always to provide you with excellent care. Hearing back from our patients is one way we can continue to improve our services. Please take a few minutes to complete the written survey that you may receive in the mail after your visit with us. Thank you!             Your Updated Medication List - Protect others around you: Learn how to safely use, store and throw away your medicines at www.disposemymeds.org.          This list is accurate as of 11/6/18  8:49 AM.  Always use your most recent med list.                   Brand Name Dispense Instructions for use Diagnosis    amLODIPine 2.5 MG tablet    NORVASC    90 tablet    Take 1 tablet (2.5 mg) by mouth daily    Benign essential hypertension       aspirin 81 MG tablet     0    ONE DAILY     Other and unspecified hyperlipidemia, Family history of ischemic heart disease       CLEAR-ATADINE 10 MG tablet   Generic drug:  loratadine      Take 10 mg by mouth daily        * EPINEPHrine 0.3 MG/0.3ML injection 2-pack    EPIPEN 2-MIGUELINA    2 each    Inject 0.3 mLs (0.3 mg) into the muscle once as needed for anaphylaxis    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 sting       * EPINEPHrine 0.3 MG/0.3ML injection 2-pack    AUVI-Q    2 mL    Inject 0.3 mLs (0.3 mg) into the muscle as needed for anaphylaxis    Need for desensitization to allergens       ezetimibe 10 MG tablet    ZETIA    90 tablet    Take 1 tablet (10 mg) by mouth daily At night    Mixed hyperlipidemia       fluticasone 50 MCG/ACT spray    FLONASE    1 Bottle    Spray 2 sprays into both nostrils daily    Chronic seasonal allergic rhinitis due to pollen, House dust mite allergy, Allergic rhinitis due to animal dander       hydrochlorothiazide 25 MG tablet    HYDRODIURIL    90 tablet    Take 1 tablet (25 mg) by mouth daily    Benign essential hypertension       MULTIVITAL PO      Take 1 tablet by mouth daily Reported on 3/22/2017        olopatadine HCl 0.2 % Soln    PATADAY    2.5 mL    Place 1 drop into both eyes daily    Chronic seasonal allergic rhinitis due to pollen, House dust mite allergy       omeprazole 20 MG CR capsule    priLOSEC    90 capsule    TAKE ONE CAPSULE BY MOUTH EVERY DAY (TAKE 30 TO 60 MINUTES BEFORE A MEAL)    Gastroesophageal reflux disease without esophagitis       * ORDER FOR ALLERGEN IMMUNOTHERAPY 5 mL vial     13 mL    Reported on 3/22/2017        * ORDER FOR ALLERGEN IMMUNOTHERAPY 5 mL vial     13 mL    Reported on 3/22/2017        ORDER FOR ALLERGEN IMMUNOTHERAPY 5 mL vial     5 mL    Cat Hair, Standardized 10,000 BAU/mL, ALK  2.0 ml Dog Hair-Dander, A. P.  1:100 w/v, HS  1.0 ml Dust Mites DF 30,000AU/mL, HS  0.3 ml Dust Mites DP. 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, accidental or unintentional, subsequent encounter       ORDER FOR ALLERGEN IMMUNOTHERAPY 5 mL vial     13 mL    Name of Mix: Mix #2  Wasp Wasp Venom 100 mcg/mL HS 13 ml Diluent: HSA qs to 13ml    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 Read the directions carefully, and ask your doctor or other care provider to review them with you.

## 2018-11-06 NOTE — PROGRESS NOTES
Patient presented after waiting 30 minutes with normal reaction to allergy injections. Discharged from clinic.    Angela Bone RN ............   11/6/2018...8:48 AM

## 2018-11-07 NOTE — TELEPHONE ENCOUNTER
Called patient to see if he could move his scope time up due to there being a gap in the provider schedule. Patient agreed to move up to 2:30pm. Polina in the OR notified.

## 2018-11-12 ENCOUNTER — HOSPITAL ENCOUNTER (OUTPATIENT)
Facility: CLINIC | Age: 63
Discharge: HOME | End: 2018-11-12
Attending: SURGERY | Admitting: SURGERY
Payer: COMMERCIAL

## 2018-11-12 ENCOUNTER — ANESTHESIA EVENT (OUTPATIENT)
Dept: GASTROENTEROLOGY | Facility: CLINIC | Age: 63
End: 2018-11-12
Payer: COMMERCIAL

## 2018-11-12 ENCOUNTER — ANESTHESIA (OUTPATIENT)
Dept: GASTROENTEROLOGY | Facility: CLINIC | Age: 63
End: 2018-11-12
Payer: COMMERCIAL

## 2018-11-12 ENCOUNTER — SURGERY (OUTPATIENT)
Age: 63
End: 2018-11-12

## 2018-11-12 VITALS
OXYGEN SATURATION: 95 % | RESPIRATION RATE: 16 BRPM | TEMPERATURE: 97.7 F | SYSTOLIC BLOOD PRESSURE: 151 MMHG | DIASTOLIC BLOOD PRESSURE: 103 MMHG

## 2018-11-12 LAB — UPPER GI ENDOSCOPY: NORMAL

## 2018-11-12 PROCEDURE — 88305 TISSUE EXAM BY PATHOLOGIST: CPT | Mod: 26,59 | Performed by: SURGERY

## 2018-11-12 PROCEDURE — 99152 MOD SED SAME PHYS/QHP 5/>YRS: CPT | Mod: 59 | Performed by: SURGERY

## 2018-11-12 PROCEDURE — 43239 EGD BIOPSY SINGLE/MULTIPLE: CPT | Performed by: SURGERY

## 2018-11-12 PROCEDURE — 25000128 H RX IP 250 OP 636: Performed by: NURSE ANESTHETIST, CERTIFIED REGISTERED

## 2018-11-12 PROCEDURE — 88305 TISSUE EXAM BY PATHOLOGIST: CPT | Performed by: SURGERY

## 2018-11-12 PROCEDURE — 40000299 ZZH STATISTIC ENDO RECOVERY CLASS 1:3 EA ADD HOUR: Performed by: SURGERY

## 2018-11-12 PROCEDURE — 40000296 ZZH STATISTIC ENDO RECOVERY CLASS 1:2 FIRST HOUR: Performed by: SURGERY

## 2018-11-12 PROCEDURE — G0500 MOD SEDAT ENDO SERVICE >5YRS: HCPCS | Performed by: SURGERY

## 2018-11-12 PROCEDURE — 25000128 H RX IP 250 OP 636: Performed by: SURGERY

## 2018-11-12 PROCEDURE — 25000125 ZZHC RX 250: Performed by: SURGERY

## 2018-11-12 RX ORDER — LIDOCAINE 40 MG/G
CREAM TOPICAL
Status: DISCONTINUED | OUTPATIENT
Start: 2018-11-12 | End: 2018-11-12 | Stop reason: HOSPADM

## 2018-11-12 RX ORDER — FENTANYL CITRATE 50 UG/ML
INJECTION, SOLUTION INTRAMUSCULAR; INTRAVENOUS PRN
Status: DISCONTINUED | OUTPATIENT
Start: 2018-11-12 | End: 2018-11-12 | Stop reason: HOSPADM

## 2018-11-12 RX ORDER — ONDANSETRON 2 MG/ML
4 INJECTION INTRAMUSCULAR; INTRAVENOUS
Status: DISCONTINUED | OUTPATIENT
Start: 2018-11-12 | End: 2018-11-12 | Stop reason: HOSPADM

## 2018-11-12 RX ORDER — SODIUM CHLORIDE, SODIUM LACTATE, POTASSIUM CHLORIDE, CALCIUM CHLORIDE 600; 310; 30; 20 MG/100ML; MG/100ML; MG/100ML; MG/100ML
INJECTION, SOLUTION INTRAVENOUS CONTINUOUS
Status: DISCONTINUED | OUTPATIENT
Start: 2018-11-12 | End: 2018-11-12 | Stop reason: HOSPADM

## 2018-11-12 RX ADMIN — FENTANYL CITRATE 25 MCG: 50 INJECTION, SOLUTION INTRAMUSCULAR; INTRAVENOUS at 14:57

## 2018-11-12 RX ADMIN — FENTANYL CITRATE 25 MCG: 50 INJECTION, SOLUTION INTRAMUSCULAR; INTRAVENOUS at 14:47

## 2018-11-12 RX ADMIN — SODIUM CHLORIDE, POTASSIUM CHLORIDE, SODIUM LACTATE AND CALCIUM CHLORIDE: 600; 310; 30; 20 INJECTION, SOLUTION INTRAVENOUS at 14:06

## 2018-11-12 RX ADMIN — FENTANYL CITRATE 100 MCG: 50 INJECTION, SOLUTION INTRAMUSCULAR; INTRAVENOUS at 14:42

## 2018-11-12 RX ADMIN — MIDAZOLAM 2 MG: 1 INJECTION INTRAMUSCULAR; INTRAVENOUS at 14:42

## 2018-11-12 RX ADMIN — MIDAZOLAM 1 MG: 1 INJECTION INTRAMUSCULAR; INTRAVENOUS at 14:47

## 2018-11-12 RX ADMIN — MIDAZOLAM 1 MG: 1 INJECTION INTRAMUSCULAR; INTRAVENOUS at 14:57

## 2018-11-12 RX ADMIN — BENZOCAINE 2 SPRAY: 200 SPRAY DENTAL; ORAL; PERIODONTAL at 14:42

## 2018-11-12 RX ADMIN — LIDOCAINE HYDROCHLORIDE 1 ML: 10 INJECTION, SOLUTION EPIDURAL; INFILTRATION; INTRACAUDAL; PERINEURAL at 14:05

## 2018-11-12 ASSESSMENT — LIFESTYLE VARIABLES: TOBACCO_USE: 1

## 2018-11-12 NOTE — IP AVS SNAPSHOT
MRN:[id number]                      After Visit Summary   11/12/2018    Jose Angel Cha    MRN: [id number]           Thank you!     Thank you for choosing [facility] for your care. Our goal is always to provide you with excellent care. Hearing back from our patients is one way we can continue to improve our services. Please take a few minutes to complete the written survey that you may receive in the mail after you visit with us. Thank you!        Patient Information     Date Of Birth          1955        About your hospital stay     You were admitted on:  November 12, 2018 You last received care in the:  [facility]    You were discharged on:  November 12, 2018       Who to Call     For medical emergencies, please call 911.  For non-urgent questions about your medical care, please call your primary care provider or clinic, [phone]  For questions related to your surgery, please call your surgery clinic        Attending Provider     Provider Specialty    Gurpreet Thrasher, DO Surgery       Primary Care Provider Office Phone # Fax #    hSari Paredes [phone]      Your next 10 appointments already scheduled     Nov 27, 2018  8:50 AM CST   Nurse Only with ER ALLERGY SHOTS   [facility]    [address]  [place] [address] [phone]            Dec 06, 2018  1:30 PM CST   Nurse Only with ER ALLERGY SHOTS   [facility]    [address]  [place] [address] [phone]              Further instructions from your care team         [facility]    Home Care Following Endoscopy          Activity:    You have just undergone an endoscopic procedure usually performed with conscious sedation.  Do not work or operate machinery (including a car) for at least 12 hours.        Diet:    Return to the diet you were  on before your procedure but eat lightly for the first 12-24 hours.    Drink plenty of water.    Resume any regular medications unless otherwise advised by your physician.  Please begin any new medication prescribed as a result of your procedure as directed by your physician.     If you had any biopsy or polyp removed please refrain from aspirin or aspirin products for 2 days.  If on Coumadin please restart as instructed by your physician.   Pain:    You may take Tylenol as needed for pain.  Expected Recovery:    You can expect some mild abdominal fullness and/or discomfort due to the air used to inflate your intestinal tract. It is also normal to have a mild sore throat after upper endoscopy.    Call Your Physician if You Have:    After Upper Endoscopy:  o Shoulder, back or chest pain.  o Difficulty breathing or swallowing.  o Vomiting blood.    Any questions or concerns about your recovery, please call [phone] or after hours [phone] Nurse Advice Line.    Follow-up Care:    You should receive a call or letter with your results within 1 week. Please call if you have not received a notification of your results.  Please make appointment to see Dr. Win in 1 week.  [phone]      Pending Results     No orders found from 11/10/2018 to 11/13/2018.            Admission Information     Date & Time Provider Department Dept. Phone    11/12/2018 Gurpreet Thrasher,  [facility] [phone]      Your Vitals Were     Blood Pressure Temperature Respirations Pulse Oximetry          131/92 97.7  F (36.5  C) (Oral) 16 93%        MyChart Information     [facility] gives you secure access to your electronic health record. If you see a primary care provider, you can also send messages to your care team and make appointments. If you have questions, please call your primary care clinic.  If you do not have a primary care provider, please call [phone] and they will assist you.        Care  EveryWhere ID     This is your Care EveryWhere ID. This could be used by other organizations to access your [facility] medical records  [id number]        Equal Access to Services     ANJEL TAFOYA : Ciaran Castañeda, kayley cotton, beanmelani pierreluciana cuongbridgett, nico lokiin hayaakathleen hutchinsjefry verdugo wilmer dennison. So [facility] [phone].    ATENCIÓN: Si habla español, tiene a armas disposición servicios gratuitos de asistencia lingüística. Llame al [phone].    We comply with applicable federal civil rights laws and [place] laws. We do not discriminate on the basis of race, color, national origin, age, disability, sex, sexual orientation, or gender identity.               Review of your medicines      UNREVIEWED medicines. Ask your doctor about these medicines        Dose / Directions    amLODIPine 2.5 MG tablet   Commonly known as:  NORVASC   Used for:  Benign essential hypertension        Dose:  2.5 mg   Take 1 tablet (2.5 mg) by mouth daily   Quantity:  90 tablet   Refills:  2       aspirin 81 MG tablet   Used for:  Other and unspecified hyperlipidemia, Family history of ischemic heart disease        ONE DAILY   Quantity:  0   Refills:  0       CLEAR-ATADINE 10 MG tablet   Generic drug:  loratadine        Dose:  10 mg   Take 10 mg by mouth daily   Refills:  0       * EPINEPHrine 0.3 MG/0.3ML injection 2-pack   Commonly known as:  EPIPEN 2-MIGUELINA   Used for:  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 sting        Dose:  0.3 mg   Inject 0.3 mLs (0.3 mg) into the muscle once as needed for anaphylaxis   Quantity:  2 each   Refills:  3       * EPINEPHrine 0.3 MG/0.3ML injection 2-pack   Commonly known as:  AUVI-Q   Used for:  Need for desensitization to allergens        Dose:  0.3 mg   Inject 0.3 mLs (0.3 mg) into the muscle as needed for anaphylaxis   Quantity:  2 mL   Refills:  1       ezetimibe 10 MG tablet   Commonly known as:  ZETIA   Used for:  Mixed hyperlipidemia        Dose:  10 mg   Take 1 tablet (10 mg) by mouth daily At night    Quantity:  90 tablet   Refills:  3       fluticasone 50 MCG/ACT spray   Commonly known as:  FLONASE   Used for:  Chronic seasonal allergic rhinitis due to pollen, House dust mite allergy, Allergic rhinitis due to animal dander        Dose:  2 spray   Spray 2 sprays into both nostrils daily   Quantity:  1 Bottle   Refills:  11       hydrochlorothiazide 25 MG tablet   Commonly known as:  HYDRODIURIL   Used for:  Benign essential hypertension        Dose:  25 mg   Take 1 tablet (25 mg) by mouth daily   Quantity:  90 tablet   Refills:  2       MULTIVITAL PO        Dose:  1 tablet   Take 1 tablet by mouth daily Reported on 3/22/2017   Refills:  0       olopatadine HCl 0.2 % Soln   Commonly known as:  PATADAY   Used for:  Chronic seasonal allergic rhinitis due to pollen, House dust mite allergy        Dose:  1 drop   Place 1 drop into both eyes daily   Quantity:  2.5 mL   Refills:  3       omeprazole 20 MG CR capsule   Commonly known as:  priLOSEC   Used for:  Gastroesophageal reflux disease without esophagitis        TAKE ONE CAPSULE BY MOUTH EVERY DAY (TAKE 30 TO 60 MINUTES BEFORE A MEAL)   Quantity:  90 capsule   Refills:  3       * ORDER FOR ALLERGEN IMMUNOTHERAPY 5 mL vial        Reported on 3/22/2017   Quantity:  13 mL   Refills:  PRN       * ORDER FOR ALLERGEN IMMUNOTHERAPY 5 mL vial        Reported on 3/22/2017   Quantity:  13 mL   Refills:  PRN       ORDER FOR ALLERGEN IMMUNOTHERAPY 5 mL vial   Used for:  Allergic rhinitis due to dust mite, Chronic seasonal allergic rhinitis due to pollen, Allergic rhinitis due to animal dander        Cat Hair, Standardized 10,000 BAU/mL, ALK  2.0 ml Dog Hair-Dander, A. P.  1:100 w/v, HS  1.0 ml Dust Mites DF 30,000AU/mL, HS  0.3 ml Dust Mites DP. 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, accidental or unintentional, subsequent encounter        Name of Mix: Mix #2  Wasp Wasp Venom 100 mcg/mL HS 13 ml Diluent: HSA qs to 13ml   Quantity:  13 mL   Refills:  PRN       polyethylene glycol powder   Commonly known as:  MIRALAX   Used for:  Chronic constipation        Dose:  1 capful   Take 17 g (1 capful) by mouth daily   Quantity:  510 g   Refills:  1       STATIN NOT PRESCRIBED (INTENTIONAL)   Used for:  Mitral valve disorders(424.0), Hyperlipidemia LDL goal <100        by Other route continuous prn Reported on 4/6/2017   Refills:  0       temazepam 15 MG capsule   Commonly known as:  RESTORIL   Used for:  Anxiety        Dose:  15 mg   Take 1 capsule (15 mg) by mouth nightly as needed for sleep   Quantity:  30 capsule   Refills:  0       * Notice:  This list has 4 medication(s) that are the same as other medications prescribed for you. Read the directions carefully, and ask your doctor or other care provider to review them with you.             Protect others around you: Learn how to safely use, store and throw away your medicines at www.disposemymeds.org.             Medication List: This is a list of all your medications and when to take them. Check marks below indicate your daily home schedule. Keep this list as a reference.      Medications           Morning Afternoon Evening Bedtime As Needed    amLODIPine 2.5 MG tablet   Commonly known as:  NORVASC   Take 1 tablet (2.5 mg) by mouth daily                                aspirin 81 MG tablet   ONE DAILY                                CLEAR-ATADINE 10 MG tablet   Take 10 mg by mouth daily   Generic drug:  loratadine                                 * EPINEPHrine 0.3 MG/0.3ML injection 2-pack   Commonly known as:  EPIPEN 2-MIGUELINA   Inject 0.3 mLs (0.3 mg) into the muscle once as needed for anaphylaxis                                * EPINEPHrine 0.3 MG/0.3ML injection 2-pack   Commonly known as:  AUVI-Q   Inject 0.3 mLs (0.3 mg) into the muscle as needed for anaphylaxis                                ezetimibe 10 MG tablet   Commonly known as:  ZETIA   Take 1 tablet (10 mg) by mouth daily At night                                fluticasone 50 MCG/ACT spray   Commonly known as:  FLONASE   Spray 2 sprays into both nostrils daily                                hydrochlorothiazide 25 MG tablet   Commonly known as:  HYDRODIURIL   Take 1 tablet (25 mg) by mouth daily                                MULTIVITAL PO   Take 1 tablet by mouth daily Reported on 3/22/2017                                olopatadine HCl 0.2 % Soln   Commonly known as:  PATADAY   Place 1 drop into both eyes daily                                omeprazole 20 MG CR capsule   Commonly known as:  priLOSEC   TAKE ONE CAPSULE BY MOUTH EVERY DAY (TAKE 30 TO 60 MINUTES BEFORE A MEAL)                                * ORDER FOR ALLERGEN IMMUNOTHERAPY 5 mL vial   Reported on 3/22/2017                                * ORDER FOR ALLERGEN IMMUNOTHERAPY 5 mL vial   Reported on 3/22/2017                                ORDER FOR ALLERGEN IMMUNOTHERAPY 5 mL vial   Cat Hair, Standardized 10,000 BAU/mL, ALK  2.0 ml Dog Hair-Dander, A. P.  1:100 w/v, HS  1.0 ml Dust Mites DF 30,000AU/mL, HS  0.3 ml Dust Mites DP. 30,000 AU/mL, HS  0.3 ml  Birch Mix PRW 1:20 w/v, HS  0.5 ml Grass Mix #7 100,000 BAU/mL, HS 0.4 ml Nettle 1:20 w/v, HS 0.5 ml Diluent: HSA qs to 5ml                                ORDER FOR ALLERGEN IMMUNOTHERAPY 5 mL vial   Name of Mix: Mix #1  Mixed Vespid Mixed Vespid Venom 300 mcg/mL HS 13 ml Diluent: HSA qs to 13ml                                ORDER FOR ALLERGEN IMMUNOTHERAPY  5 mL vial   Name of Mix: Mix #2  Wasp Wasp Venom 100 mcg/mL HS 13 ml Diluent: HSA qs to 13ml                                polyethylene glycol powder   Commonly known as:  MIRALAX   Take 17 g (1 capful) by mouth daily                                STATIN NOT PRESCRIBED (INTENTIONAL)   by Other route continuous prn Reported on 4/6/2017                                temazepam 15 MG capsule   Commonly known as:  RESTORIL   Take 1 capsule (15 mg) by mouth nightly as needed for sleep                                * Notice:  This list has 4 medication(s) that are the same as other medications prescribed for you. Read the directions carefully, and ask your doctor or other care provider to review them with you.

## 2018-11-12 NOTE — ANESTHESIA CARE TRANSFER NOTE
Patient: Jose Angel Cha    Procedure(s):  ESOPHAGOSCOPY, GASTROSCOPY, DUODENOSCOPY (EGD) [facility] multiple biopsy    Diagnosis: History of Cardenas's esophagus  Diagnosis Additional Information: No value filed.    Anesthesia Type:   MAC     Note:  Airway :Nasal Cannula  Patient transferred to:Phase II  Handoff Report: Identifed the Patient, Identified the Reponsible Provider, Reviewed the pertinent medical history, Discussed the surgical course, Reviewed Intra-OP anesthesia mangement and issues during anesthesia, Set expectations for post-procedure period and Allowed opportunity for questions and acknowledgement of understanding      Vitals: (Last set prior to Anesthesia Care Transfer)              Electronically Signed By: ETHEL Morales CRNA  November 12, 2018  3:19 PM

## 2018-11-12 NOTE — ANESTHESIA PREPROCEDURE EVALUATION
Anesthesia Evaluation     . Pt has had prior anesthetic. Type: General and MAC           ROS/MED HX    ENT/Pulmonary:     (+)sleep apnea, LISBET risk factors snores loudly, hypertension, tobacco use, Past use , . .    Neurologic:  - neg neurologic ROS     Cardiovascular:     (+) Dyslipidemia, hypertension----. Taking blood thinners Pt has received instructions: . . . :. valvular problems/murmurs type: MR . Previous cardiac testing Echodate:1/12/18results:Indication/Clinical History: 62-year-old male with previous mitral  valve replacement undergoing stress test for vague chest pain and  dyspnea on exertion.     Impression   1. Exercise: Average exercise capacity, target heart rate  achieved    2. EKG: Up to 1 mm ST depression at peak exercise, upsloping ST  segments, resolving quickly in recovery, likely physiologic changes,  no overt ischemic changes       3. Symptoms: No chest pain with exercise  4. Myocardial perfusion imaging using single isotope technique  demonstrated normal perfusion, no ischemia or infarct.   5. Gated images demonstrated normal wall motion.  The left ventricular  systolic function is 54% at rest, 56% with stress.  6. Compared to the prior study from no previous nuclear stress test  for comparison. On stress echo from February 2016 there was also 1 mm  of ST depression. .     Procedure  The patient performed treadmill exercise using a Kei protocol,  completing 9 minutes and 45 seconds with an estimated workload of 12.5  METS.  The test was terminated due to fatigue. The heart rate was 74  beats per minute at baseline and increased to 139 beats at peak  exercise, which was 88% of the maximum predicted heart rate. The rest  blood pressure was 142/88 mm/Hg and peak blood pressure is 184/88mm/Hg  with rate pressure product of 24,700. The patient experienced no chest  pain during the test. The patient was not on a beta blocker.     Myocardial perfusion imaging was performed at rest, approximately  45  minutes after the injection intravenously of 4.3of Tc-99m Myoview. At  peak exercise, the patient was injected intravenously with 12.5 mCi of   Tc-99m Myoview and exercise continued for approximately 1 minute.  Gated post-stress tomographic imaging was performed approximately 30  minutes after stress     EKG Findings  The resting EKG demonstrated sinus rhythm, very minor nonspecific ST  changes . The stress EKG demonstrated 1 mm upsloping ST depression.     Tomographic Findings  Overall, the study quality is excellent . On the stress images, normal  perfusion. On the rest images, normal perfusion . Gated images  demonstrated normal wall motion. The left ventricular ejection  fraction was calculated to be 54% at rest, 56% with stress. TID was  was not appreciated.date: results:ECG reviewed date: results:SR incomplete rt BBB date: results:          METS/Exercise Tolerance:  1 - Eating, dressing   Hematologic:     (+) History of Transfusion no previous transfusion reaction -      Musculoskeletal:   (+) arthritis, , , -       GI/Hepatic: Comment: 3-4 glasses wine per night    (+) GERD Asymptomatic on medication,       Renal/Genitourinary:     (+) Pt has no history of transplant, BPH,       Endo:     (+) thyroid problem Obesity, .      Psychiatric:     (+) psychiatric history depression      Infectious Disease:   (+) Recent Fever, Other Infectious Disease Mers      Malignancy:      - no malignancy   Other:    (+) No chance of pregnancy C-spine cleared: N/A, no H/O Chronic Pain,no other significant disability                    Physical Exam  Normal systems: cardiovascular and pulmonary    Airway   Mallampati: II  TM distance: >3 FB  Neck ROM: full    Dental   (+) implants  Comment: Lower permanent bridge    Cardiovascular   Rhythm and rate: regular and normal      Pulmonary    breath sounds clear to auscultation                        Anesthesia Plan      History & Physical Review  History and physical reviewed and  following examination; no interval change.    ASA Status:  3 .        Plan for MAC with Propofol and Intravenous induction. Maintenance will be Balanced.  Reason for MAC:  Deep or markedly invasive procedure (G8)    H&P to be performed before administration of anesthesia      Postoperative Care  Postoperative pain management:  IV analgesics.      Consents  Anesthetic plan, risks, benefits and alternatives discussed with:  Patient..                          .

## 2018-11-12 NOTE — IP AVS SNAPSHOT
[facility]    [address] [address]    Phone:  [phone]                                       After Visit Summary   11/12/2018    Jose Angel Cha    MRN: [id number]           After Visit Summary Signature Page     I have received my discharge instructions, and my questions have been answered. I have discussed any challenges I see with this plan with the nurse or doctor.    ..........................................................................................................................................  Patient/Patient Representative Signature      ..........................................................................................................................................  Patient Representative Print Name and Relationship to Patient    ..................................................               ................................................  Date                                   Time    ..........................................................................................................................................  Reviewed by Signature/Title    ...................................................              ..............................................  Date                                               Time          22EPIC Rev 08/18

## 2018-11-12 NOTE — DISCHARGE INSTRUCTIONS
[facility]    Home Care Following Endoscopy          Activity:    You have just undergone an endoscopic procedure usually performed with conscious sedation.  Do not work or operate machinery (including a car) for at least 12 hours.        Diet:    Return to the diet you were on before your procedure but eat lightly for the first 12-24 hours.    Drink plenty of water.    Resume any regular medications unless otherwise advised by your physician.  Please begin any new medication prescribed as a result of your procedure as directed by your physician.     If you had any biopsy or polyp removed please refrain from aspirin or aspirin products for 2 days.  If on Coumadin please restart as instructed by your physician.   Pain:    You may take Tylenol as needed for pain.  Expected Recovery:    You can expect some mild abdominal fullness and/or discomfort due to the air used to inflate your intestinal tract. It is also normal to have a mild sore throat after upper endoscopy.    Call Your Physician if You Have:    After Upper Endoscopy:  o Shoulder, back or chest pain.  o Difficulty breathing or swallowing.  o Vomiting blood.    Any questions or concerns about your recovery, please call [phone] or after hours [phone] Nurse Advice Line.    Follow-up Care:    You should receive a call or letter with your results within 1 week. Please call if you have not received a notification of your results.  Please make appointment to see Dr. Win in 1 week.  [phone]

## 2018-11-14 ENCOUNTER — TELEPHONE (OUTPATIENT)
Dept: SURGERY | Facility: CLINIC | Age: 63
End: 2018-11-14

## 2018-11-14 LAB — COPATH REPORT: NORMAL

## 2018-11-14 NOTE — TELEPHONE ENCOUNTER
Reason for Call:  Other call back    Detailed comments: Please contact patient to discuss upcoming procedure. Wants to know if he will be able to speak after endoscopy on Monday.    Phone Number Patient can be reached at: Home number on file [phone] (home)    Best Time: ASAP    Can we leave a detailed message on this number? YES    Call taken on 11/14/2018 at 2:54 PM by Vee Unger

## 2018-11-15 NOTE — TELEPHONE ENCOUNTER
Patient was confused as to what appointment was for on Monday 11/19/2018.  Will Discuss concerns with Physician at appointment    Shahida Martinez MA

## 2018-11-19 ENCOUNTER — TELEPHONE (OUTPATIENT)
Dept: SURGERY | Facility: CLINIC | Age: 63
End: 2018-11-19

## 2018-11-19 ENCOUNTER — OFFICE VISIT (OUTPATIENT)
Dept: SURGERY | Facility: CLINIC | Age: 63
End: 2018-11-19
Payer: COMMERCIAL

## 2018-11-19 VITALS
BODY MASS INDEX: 31.14 KG/M2 | HEART RATE: 82 BPM | SYSTOLIC BLOOD PRESSURE: 119 MMHG | TEMPERATURE: 97.7 F | WEIGHT: 217 LBS | OXYGEN SATURATION: 97 % | DIASTOLIC BLOOD PRESSURE: 80 MMHG

## 2018-11-19 DIAGNOSIS — K44.9 HIATAL HERNIA WITH GASTROESOPHAGEAL REFLUX DISEASE AND ESOPHAGITIS: Primary | ICD-10-CM

## 2018-11-19 DIAGNOSIS — I10 BENIGN ESSENTIAL HYPERTENSION: ICD-10-CM

## 2018-11-19 DIAGNOSIS — K21.00 HIATAL HERNIA WITH GASTROESOPHAGEAL REFLUX DISEASE AND ESOPHAGITIS: Primary | ICD-10-CM

## 2018-11-19 DIAGNOSIS — K22.70 BARRETT'S ESOPHAGUS WITHOUT DYSPLASIA: ICD-10-CM

## 2018-11-19 PROCEDURE — 99214 OFFICE O/P EST MOD 30 MIN: CPT | Performed by: SURGERY

## 2018-11-19 RX ORDER — HYDROCHLOROTHIAZIDE 25 MG/1
25 TABLET ORAL DAILY
Qty: 90 TABLET | Refills: 2 | Status: SHIPPED | OUTPATIENT
Start: 2018-11-19 | End: 2019-06-18

## 2018-11-19 ASSESSMENT — PAIN SCALES - GENERAL: PAINLEVEL: NO PAIN (0)
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
## 2018-11-19 NOTE — ASSESSMENT & PLAN NOTE
Repeat endoscopy showed small area (<0.5 cm) of salmon colored mucosa at the GE jxn that biopsy showed goblet cells w/out evidence of dysplasia or malignancy consistent with history of cardona's esophagus.

## 2018-11-19 NOTE — TELEPHONE ENCOUNTER
Reason for Call:  Other call back    Detailed comments: Patient called the [facility] to get scheduled for a procedure and they do not have the order yet. Call patient when completed and ready to schedule. Please advise.     Phone Number Patient can be reached at: Home number on file [phone] (home)    Best Time: any     Can we leave a detailed message on this number? YES    Call taken on 11/19/2018 at 11:46 AM by Joey Cai

## 2018-11-19 NOTE — ASSESSMENT & PLAN NOTE
Continued symptoms of dysphagia and reflux despite PPI use.     EGD re demonstrated known history of hiatal hernia.    Will order esophagram to help delineate between Type 1, 2, or 3   Order placed for esophageal manometry to possible surgical consideration.     Await results of esophagram and manometry to determine eligibility for surgical repair.

## 2018-11-19 NOTE — MR AVS SNAPSHOT
After Visit Summary   11/19/2018    Jose Angel Cha    MRN: [id number]           Patient Information     Date Of Birth          1955        Visit Information        Provider Department      11/19/2018 9:00 AM Gurpreet Thrasher,  [facility]        Today's Diagnoses     Hiatal hernia with gastroesophageal reflux disease and esophagitis    -  1       Follow-ups after your visit        Your next 10 appointments already scheduled     Nov 27, 2018  8:50 AM CST   Nurse Only with ER ALLERGY SHOTS   [facility]    [address]  [place] [address]   [phone]            Dec 06, 2018  1:30 PM CST   Nurse Only with ER ALLERGY SHOTS   [facility] ([facility])    [address]  [place] [address]   [phone]              Future tests that were ordered for you today     Open Future Orders        Priority Expected Expires Ordered    MANOMETRY (ESOPHAGEAL) Routine  11/19/2019 11/19/2018            Who to contact     If you have questions or need follow up information about today's clinic visit or your schedule please contact [facility] directly at [phone].  Normal or non-critical lab and imaging results will be communicated to you by MyChart, letter or phone within 4 business days after the clinic has received the results. If you do not hear from us within 7 days, please contact the clinic through Peer5hart or phone. If you have a critical or abnormal lab result, we will notify you by phone as soon as possible.  Submit refill requests through [employer] or call your pharmacy and they will forward the refill request to us. Please allow 3 business days for your refill to be completed.          Additional Information About Your Visit        Peer5hart Information     [employer] gives you secure access to your electronic health record. If you see a primary care  provider, you can also send messages to your care team and make appointments. If you have questions, please call your primary care clinic.  If you do not have a primary care provider, please call [phone] and they will assist you.        Care EveryWhere ID     This is your Care EveryWhere ID. This could be used by other organizations to access your [place] medical records  [id number]        Your Vitals Were     Pulse Temperature Pulse Oximetry BMI (Body Mass Index)          82 97.7  F (36.5  C) (Oral) 97% 31.14 kg/m2         Blood Pressure from Last 3 Encounters:   11/19/18 119/80   11/12/18 (!) 151/103   10/03/18 122/70    Weight from Last 3 Encounters:   11/19/18 98.4 kg (217 lb)   10/03/18 98.4 kg (217 lb)   10/03/18 98.4 kg (217 lb)               Primary Care Provider Office Phone # Fax #    Shari Tonia Paredes [phone]       [address] [place] [address]        Equal Access to Services     [facility]: Hadii aad ku hadasho Soomaali, waaxda luqadaha, qaybta kaalmada adeegyarichard, nico guillen . So [facility] [phone].    ATENCIÓN: Si habla español, tiene a armas disposición servicios gratuitos de asistencia lingüística. Llame al [phone].    We comply with applicable federal civil rights laws and [place] laws. We do not discriminate on the basis of race, color, national origin, age, disability, sex, sexual orientation, or gender identity.            Thank you!     Thank you for choosing [facility]  for your care. Our goal is always to provide you with excellent care. Hearing back from our patients is one way we can continue to improve our services. Please take a few minutes to complete the written survey that you may receive in the mail after your visit with us. Thank you!             Your Updated Medication List - Protect others around you: Learn how to safely use, store and throw away your medicines at www.disposemymeds.org.          This  list is accurate as of 11/19/18  9:45 AM.  Always use your most recent med list.                   Brand Name Dispense Instructions for use Diagnosis    amLODIPine 2.5 MG tablet    NORVASC    90 tablet    Take 1 tablet (2.5 mg) by mouth daily    Benign essential hypertension       aspirin 81 MG tablet     0    ONE DAILY    Other and unspecified hyperlipidemia, Family history of ischemic heart disease       CLEAR-ATADINE 10 MG tablet   Generic drug:  loratadine      Take 10 mg by mouth daily        * EPINEPHrine 0.3 MG/0.3ML injection 2-pack    EPIPEN 2-MIGUELINA    2 each    Inject 0.3 mLs (0.3 mg) into the muscle once as needed for anaphylaxis    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 sting       * EPINEPHrine 0.3 MG/0.3ML injection 2-pack    AUVI-Q    2 mL    Inject 0.3 mLs (0.3 mg) into the muscle as needed for anaphylaxis    Need for desensitization to allergens       ezetimibe 10 MG tablet    ZETIA    90 tablet    Take 1 tablet (10 mg) by mouth daily At night    Mixed hyperlipidemia       fluticasone 50 MCG/ACT spray    FLONASE    1 Bottle    Spray 2 sprays into both nostrils daily    Chronic seasonal allergic rhinitis due to pollen, House dust mite allergy, Allergic rhinitis due to animal dander       hydrochlorothiazide 25 MG tablet    HYDRODIURIL    90 tablet    Take 1 tablet (25 mg) by mouth daily    Benign essential hypertension       MULTIVITAL PO      Take 1 tablet by mouth daily Reported on 3/22/2017        olopatadine HCl 0.2 % Soln    PATADAY    2.5 mL    Place 1 drop into both eyes daily    Chronic seasonal allergic rhinitis due to pollen, House dust mite allergy       omeprazole 20 MG CR capsule    priLOSEC    90 capsule    TAKE ONE CAPSULE BY MOUTH EVERY DAY (TAKE 30 TO 60 MINUTES BEFORE A MEAL)    Gastroesophageal reflux disease without esophagitis       * ORDER FOR ALLERGEN IMMUNOTHERAPY 5 mL vial     13 mL    Reported on 3/22/2017        * ORDER FOR ALLERGEN IMMUNOTHERAPY 5 mL vial     13 mL    Reported on 3/22/2017         ORDER FOR ALLERGEN IMMUNOTHERAPY 5 mL vial     5 mL    Cat Hair, Standardized 10,000 BAU/mL, ALK  2.0 ml Dog Hair-Dander, A. P.  1:100 w/v, HS  1.0 ml Dust Mites DF 30,000AU/mL, HS  0.3 ml Dust Mites DP. 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, accidental or unintentional, subsequent encounter       ORDER FOR ALLERGEN IMMUNOTHERAPY 5 mL vial     13 mL    Name of Mix: Mix #2  Wasp Wasp Venom 100 mcg/mL HS 13 ml Diluent: HSA qs to 13ml    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 Read the directions carefully, and ask your doctor or other care provider to review them with you.
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
## 2018-11-27 ENCOUNTER — ALLIED HEALTH/NURSE VISIT (OUTPATIENT)
Dept: ALLERGY | Facility: OTHER | Age: 63
End: 2018-11-27
Payer: COMMERCIAL

## 2018-11-27 DIAGNOSIS — T63.441D TOXIC EFFECT OF VENOM OF BEES, UNINTENTIONAL, SUBSEQUENT ENCOUNTER: Primary | ICD-10-CM

## 2018-11-27 PROCEDURE — 95117 IMMUNOTHERAPY INJECTIONS: CPT

## 2018-11-27 PROCEDURE — 99207 ZZC DROP WITH A PROCEDURE: CPT

## 2018-11-27 NOTE — TELEPHONE ENCOUNTER
Called and spoke with patient, he reports that this order was placed the next day and this has been scheduled.     Kristina Wen RN. . .  11/27/2018, 9:01 AM

## 2018-11-27 NOTE — MR AVS SNAPSHOT
After Visit Summary   11/27/2018    Jose Angel Cha    MRN: [id number]           Patient Information     Date Of Birth          1955        Visit Information        Provider Department      11/27/2018 8:50 AM ER ALLERGY SHOTS [facility] Maries Burton        Today's Diagnoses     Toxic effect of venom of bees, unintentional, subsequent encounter    -  1       Follow-ups after your visit        Your next 10 appointments already scheduled     Nov 29, 2018  9:45 AM CST   XR ESOPHAGRAM W UPPPER GI with PHXR1, PH RAD   [facility])    [address] [place] [id number] [phone]           How do I prepare for my exam? (Food and drink instructions) Do not eat for 4 hours before the exam. Keep drinking clear liquids until 2 hours before the exam.  How do I prepare for my exam? (Other instructions) You may take pain medicine (with a sip of water) up to 4 hours before the exam. Do not swallow any other medicines unless your doctor tells you to. Talk to your doctor to be sure it s safe to stop your medicines.  What should I wear: Wear comfortable clothes.  How long does the exam take: The exam usually takes about 30-45 minutes.  What should I bring: Bring a list of your current medicines to your exam. (Include vitamins, minerals and over-the-counter medicines.) Leave your valuables at home. Do I need a :  No  is needed.  What do I need to tell my doctor:  If you think you might be pregnant, tell your doctor.  What should I do after the exam: Drink lots of fluids in the next one to two days. This will help you pass the rest of the barium. Your stool may be white. Take walks if possible. You may take a mild laxative (medicine to loosten your stools), but check with your doctor first. If you don t have a bowel movement within two days, call your doctor.  What is this test: This X-ray exam look at your esophagus. This exam uses barium (a  white liquid) to help the X-rays show up more clearly.  Who should I call with questions: If you have any questions, please call the Imaging Department where you will have your exam. Directions, parking instructions, and other information is available on our website, [email or web address].            Dec 05, 2018   Procedure with Jose Alberto Keenan MD   [facility], [place], Endoscopy ([facility])    [address]  [place] [address]   [phone]           The [facility] is located on the corner of [address] and [address] on the [facility]. It is easily accessible from virtually any point in the [place] area, via [address] and [address].            Dec 06, 2018  1:30 PM CST   Nurse Only with ER ALLERGY SHOTS   [facility]    [address]  [facility] [facility] [address] [phone]            Jan 08, 2019  8:30 AM CST   Nurse Only with ER ALLERGY SHOTS   [facility]    [address]  [facility] [id number] [phone]              Who to contact     If you have questions or need follow up information about today's clinic visit or your schedule please contact [facility] directly at [phone].  Normal or non-critical lab and imaging results will be communicated to you by MyChart, letter or phone within 4 business days after the clinic has received the results. If you do not hear from us within 7 days, please contact the clinic through MyChart or phone. If you have a critical or abnormal lab result, we will notify you by phone as soon as possible.  Submit refill requests through [employer] or call your pharmacy and they will forward the refill request to us. Please allow 3 business days for your refill to be completed.          Additional Information About Your Visit        MyChart Information      [employer] gives you secure access to your electronic health record. If you see a primary care provider, you can also send messages to your care team and make appointments. If you have questions, please call your primary care clinic.  If you do not have a primary care provider, please call [phone] and they will assist you.        Care EveryWhere ID     This is your Care EveryWhere ID. This could be used by other organizations to access your [facility] medical records  [id number]         Blood Pressure from Last 3 Encounters:   11/19/18 119/80   11/12/18 (!) 151/103   10/03/18 122/70    Weight from Last 3 Encounters:   11/19/18 98.4 kg (217 lb)   10/03/18 98.4 kg (217 lb)   10/03/18 98.4 kg (217 lb)              We Performed the Following     Allergy Shot: Two or more injections        Primary Care Provider Office Phone # Fax #    Shari Tonia Paredes [phone]       [address] [place] [address]        Equal Access to Services     [facility]: Hadii aad ku hadasho Soomaali, waaxda luqadaha, qaybta kaalmada adeegyada, waxay lokiin haychaz guillen . So [facility] [phone].    ATENCIÓN: Si habla español, tiene a armas disposición servicios gratuitos de asistencia lingüística. [facility] [phone].    We comply with applicable federal civil rights laws and [place] laws. We do not discriminate on the basis of race, color, national origin, age, disability, sex, sexual orientation, or gender identity.            Thank you!     Thank you for choosing [facility]  for your care. Our goal is always to provide you with excellent care. Hearing back from our patients is one way we can continue to improve our services. Please take a few minutes to complete the written survey that you may receive in the mail after your visit with us. Thank you!             Your Updated Medication List - Protect others around you: Learn how to safely use, store and throw away your medicines at  www.disposemymeds.org.          This list is accurate as of 11/27/18  9:35 AM.  Always use your most recent med list.                   Brand Name Dispense Instructions for use Diagnosis    amLODIPine 2.5 MG tablet    NORVASC    90 tablet    Take 1 tablet (2.5 mg) by mouth daily    Benign essential hypertension       aspirin 81 MG tablet    ASA    0    ONE DAILY    Other and unspecified hyperlipidemia, Family history of ischemic heart disease       CLEAR-ATADINE 10 MG tablet   Generic drug:  loratadine      Take 10 mg by mouth daily        * EPINEPHrine 0.3 MG/0.3ML injection 2-pack    EPIPEN 2-MIGUELINA    2 each    Inject 0.3 mLs (0.3 mg) into the muscle once as needed for anaphylaxis    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 sting       * EPINEPHrine 0.3 MG/0.3ML injection 2-pack    AUVI-Q    2 mL    Inject 0.3 mLs (0.3 mg) into the muscle as needed for anaphylaxis    Need for desensitization to allergens       ezetimibe 10 MG tablet    ZETIA    90 tablet    Take 1 tablet (10 mg) by mouth daily At night    Mixed hyperlipidemia       fluticasone 50 MCG/ACT nasal spray    FLONASE    1 Bottle    Spray 2 sprays into both nostrils daily    Chronic seasonal allergic rhinitis due to pollen, House dust mite allergy, Allergic rhinitis due to animal dander       hydrochlorothiazide 25 MG tablet    HYDRODIURIL    90 tablet    Take 1 tablet (25 mg) by mouth daily    Benign essential hypertension       MULTIVITAL PO      Take 1 tablet by mouth daily Reported on 3/22/2017        olopatadine 0.2 % ophthalmic solution    PATADAY    2.5 mL    Place 1 drop into both eyes daily    Chronic seasonal allergic rhinitis due to pollen, House dust mite allergy       omeprazole 20 MG DR capsule    priLOSEC    90 capsule    TAKE ONE CAPSULE BY MOUTH EVERY DAY (TAKE 30 TO 60 MINUTES BEFORE A MEAL)    Gastroesophageal reflux disease without esophagitis       * ORDER FOR ALLERGEN IMMUNOTHERAPY 5 mL vial     13 mL    Reported on 3/22/2017        * ORDER FOR ALLERGEN  IMMUNOTHERAPY 5 mL vial     13 mL    Reported on 3/22/2017        ORDER FOR ALLERGEN IMMUNOTHERAPY 5 mL vial     5 mL    Cat Hair, Standardized 10,000 BAU/mL, ALK  2.0 ml Dog Hair-Dander, A. P.  1:100 w/v, HS  1.0 ml Dust Mites DF 30,000AU/mL, HS  0.3 ml Dust Mites DP. 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, accidental or unintentional, subsequent encounter       ORDER FOR ALLERGEN IMMUNOTHERAPY 5 mL vial     13 mL    Name of Mix: Mix #2  Wasp Wasp Venom 100 mcg/mL HS 13 ml Diluent: HSA qs to 13ml    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 Read the directions carefully, and ask your doctor or other care provider to review them with you.

## 2018-11-29 ENCOUNTER — TELEPHONE (OUTPATIENT)
Dept: GASTROENTEROLOGY | Facility: CLINIC | Age: 63
End: 2018-11-29

## 2018-11-29 ENCOUNTER — HOSPITAL ENCOUNTER (OUTPATIENT)
Dept: GENERAL RADIOLOGY | Facility: CLINIC | Age: 63
Discharge: HOME | End: 2018-11-29
Attending: SURGERY | Admitting: SURGERY
Payer: COMMERCIAL

## 2018-11-29 DIAGNOSIS — K21.00 REFLUX ESOPHAGITIS: ICD-10-CM

## 2018-11-29 DIAGNOSIS — Z87.19 HISTORY OF BARRETT'S ESOPHAGUS: ICD-10-CM

## 2018-11-29 PROCEDURE — 74240 X-RAY XM UPR GI TRC 1CNTRST: CPT | Mod: TC

## 2018-11-29 PROCEDURE — 25500045 ZZH RX 255: Performed by: RADIOLOGY

## 2018-11-29 RX ADMIN — ANTACID/ANTIFLATULENT 4 G: 380; 550; 10; 10 GRANULE, EFFERVESCENT ORAL at 10:06

## 2018-11-29 NOTE — TELEPHONE ENCOUNTER
Call placed to patient to remind him of his scheduled Manometry. Patient received my chart reminder and directions. Patient verbalized understanding of plan of care.

## 2018-12-04 ENCOUNTER — TELEPHONE (OUTPATIENT)
Dept: GASTROENTEROLOGY | Facility: CLINIC | Age: 63
End: 2018-12-04

## 2018-12-04 NOTE — TELEPHONE ENCOUNTER
VM with information requested by patient regarding tomorrow's manometry testing.  Information requested in VM left with pre-assessment RN sent via [employer] as well as  msg. Telephone call-back number provided.    Halina Aguilar RN  [facility] End[facility]

## 2018-12-05 ENCOUNTER — HOSPITAL ENCOUNTER (OUTPATIENT)
Facility: CLINIC | Age: 63
Discharge: HOME | End: 2018-12-05
Attending: INTERNAL MEDICINE | Admitting: INTERNAL MEDICINE
Payer: COMMERCIAL

## 2018-12-05 ENCOUNTER — TRANSFERRED RECORDS (OUTPATIENT)
Dept: HEALTH INFORMATION MANAGEMENT | Facility: CLINIC | Age: 63
End: 2018-12-05

## 2018-12-05 PROCEDURE — 91010 ESOPHAGUS MOTILITY STUDY: CPT | Performed by: INTERNAL MEDICINE

## 2018-12-05 NOTE — IP AVS SNAPSHOT
[facility], [place], Endoscopy    [address] [address]    Phone:  [phone]                                       After Visit Summary   12/5/2018    Jose Angel Cha    MRN: [id number]           After Visit Summary Signature Page     I have received my discharge instructions, and my questions have been answered. I have discussed any challenges I see with this plan with the nurse or doctor.    ..........................................................................................................................................  Patient/Patient Representative Signature      ..........................................................................................................................................  Patient Representative Print Name and Relationship to Patient    ..................................................               ................................................  Date                                   Time    ..........................................................................................................................................  Reviewed by Signature/Title    ...................................................              ..............................................  Date                                               Time          22EPIC Rev 08/18

## 2018-12-05 NOTE — OR NURSING
Patient here for Esophageal Motility study.     Procedure explained to patient and consent signed.    Motility catheter placed via R nares to 55 Cm    Normal saline swallows given per protocol.    Patient tolerated swallows    Catheter removed.    Patient discharged to home

## 2018-12-05 NOTE — DISCHARGE INSTRUCTIONS
1. Resume normal diet.  2. You may a bloody or runny nose and/or sore throat after procedure.  3. Any questions or problems, call [phone] Monday-Friday 7-4:30, after hours call [phone] and ask for GI fellow on call.

## 2018-12-05 NOTE — IP AVS SNAPSHOT
MRN:[id number]                      After Visit Summary   12/5/2018    Jose Angel Cha    MRN: [id number]           Thank you!     Thank you for choosing [facility] for your care. Our goal is always to provide you with excellent care. Hearing back from our patients is one way we can continue to improve our services. Please take a few minutes to complete the written survey that you may receive in the mail after you visit with us. Thank you!        Patient Information     Date Of Birth          1955        About your hospital stay     You were admitted on:  December 5, 2018 You last received care in the:  [facility], Endoscopy    You were discharged on:  December 5, 2018       Who to Call     For medical emergencies, please call 911.  For non-urgent questions about your medical care, please call your primary care provider or clinic, [phone]  For questions related to your surgery, please call your surgery clinic        Attending Provider     Provider Jose Alberto Rossi MD Gastroenterology       Primary Care Provider Office Phone # Fax #    Shari Tonia Paredes [phone]      Your next 10 appointments already scheduled     Dec 06, 2018  1:30 PM CST   [facility]    [address]  [place] [id number] [phone]            Jan 08, 2019  8:30 AM CST   Nurse Only with ER [facility]ERGY SHOTS   [facility]    [address]  [place] [id number] [phone]              Further instructions from your care team       1. Resume normal diet.  2. You may a bloody or runny nose and/or sore throat after procedure.  3. Any questions or problems, call [phone] Monday-Friday 7-4:30, after hours call [phone] and ask for GI fellow on call.     Pending Results     No orders found from 12/3/2018 to 12/6/2018.             Admission Information     Date & Time Provider Department Dept. Phone    12/5/2018 Jose Alberto Keenan MD [facility], [place], Endoscopy [phone]      MyChart Information     [employer] gives you secure access to your electronic health record. If you see a primary care provider, you can also send messages to your care team and make appointments. If you have questions, please call your primary care clinic.  If you do not have a primary care provider, please call [phone] and they will assist you.        Care EveryWhere ID     This is your Care EveryWhere ID. This could be used by other organizations to access your [place] medical records  [id number]        Equal Access to Services     ANJEL TAFOYA : Hadii karina Castañeda, waaxda luqadaha, qaybta kaalmada neeru, nico dennison. So [facility] [phone].    ATENCIÓN: Si habla español, tiene a armas disposición servicios gratuitos de asistencia lingüística. Llame al [phone].    We comply with applicable federal civil rights laws and [place] laws. We do not discriminate on the basis of race, color, national origin, age, disability, sex, sexual orientation, or gender identity.               Review of your medicines      UNREVIEWED medicines. Ask your doctor about these medicines        Dose / Directions    amLODIPine 2.5 MG tablet   Commonly known as:  NORVASC   Used for:  Benign essential hypertension        Dose:  2.5 mg   Take 1 tablet (2.5 mg) by mouth daily   Quantity:  90 tablet   Refills:  2       aspirin 81 MG tablet   Commonly known as:  ASA   Used for:  Other and unspecified hyperlipidemia, Family history of ischemic heart disease        ONE DAILY   Quantity:  0   Refills:  0       CLEAR-ATADINE 10 MG tablet   Generic drug:  loratadine        Dose:  10 mg   Take 10 mg by mouth daily   Refills:  0       * EPINEPHrine 0.3 MG/0.3ML injection 2-pack   Commonly known as:  EPIPEN 2-MIGUELINA   Used for:  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  sting        Dose:  0.3 mg   Inject 0.3 mLs (0.3 mg) into the muscle once as needed for anaphylaxis   Quantity:  2 each   Refills:  3       * EPINEPHrine 0.3 MG/0.3ML injection 2-pack   Commonly known as:  AUVI-Q   Used for:  Need for desensitization to allergens        Dose:  0.3 mg   Inject 0.3 mLs (0.3 mg) into the muscle as needed for anaphylaxis   Quantity:  2 mL   Refills:  1       ezetimibe 10 MG tablet   Commonly known as:  ZETIA   Used for:  Mixed hyperlipidemia        Dose:  10 mg   Take 1 tablet (10 mg) by mouth daily At night   Quantity:  90 tablet   Refills:  3       fluticasone 50 MCG/ACT nasal spray   Commonly known as:  FLONASE   Used for:  Chronic seasonal allergic rhinitis due to pollen, House dust mite allergy, Allergic rhinitis due to animal dander        Dose:  2 spray   Spray 2 sprays into both nostrils daily   Quantity:  1 Bottle   Refills:  11       hydrochlorothiazide 25 MG tablet   Commonly known as:  HYDRODIURIL   Used for:  Benign essential hypertension        Dose:  25 mg   Take 1 tablet (25 mg) by mouth daily   Quantity:  90 tablet   Refills:  2       MULTIVITAL PO        Dose:  1 tablet   Take 1 tablet by mouth daily Reported on 3/22/2017   Refills:  0       olopatadine 0.2 % ophthalmic solution   Commonly known as:  PATADAY   Used for:  Chronic seasonal allergic rhinitis due to pollen, House dust mite allergy        Dose:  1 drop   Place 1 drop into both eyes daily   Quantity:  2.5 mL   Refills:  3       omeprazole 20 MG DR capsule   Commonly known as:  priLOSEC   Used for:  Gastroesophageal reflux disease without esophagitis        TAKE ONE CAPSULE BY MOUTH EVERY DAY (TAKE 30 TO 60 MINUTES BEFORE A MEAL)   Quantity:  90 capsule   Refills:  3       * ORDER FOR ALLERGEN IMMUNOTHERAPY 5 mL vial        Reported on 3/22/2017   Quantity:  13 mL   Refills:  PRN       * ORDER FOR ALLERGEN IMMUNOTHERAPY 5 mL vial        Reported on 3/22/2017   Quantity:  13 mL   Refills:  PRN       ORDER FOR  ALLERGEN IMMUNOTHERAPY 5 mL vial   Used for:  Allergic rhinitis due to dust mite, Chronic seasonal allergic rhinitis due to pollen, Allergic rhinitis due to animal dander        Cat Hair, Standardized 10,000 BAU/mL, ALK  2.0 ml Dog Hair-Dander, A. P.  1:100 w/v, HS  1.0 ml Dust Mites DF 30,000AU/mL, HS  0.3 ml Dust Mites DP. 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, accidental or unintentional, subsequent encounter        Name of Mix: Mix #2  Wasp Wasp Venom 100 mcg/mL HS 13 ml Diluent: HSA qs to 13ml   Quantity:  13 mL   Refills:  PRN       polyethylene glycol powder   Commonly known as:  MIRALAX   Used for:  Chronic constipation        Dose:  1 capful   Take 17 g (1 capful) by mouth daily   Quantity:  510 g   Refills:  1       STATIN NOT PRESCRIBED   Commonly known as:  INTENTIONAL   Used for:  Mitral valve disorders(424.0), Hyperlipidemia LDL goal <100        by Other route continuous prn Reported on 4/6/2017   Refills:  0       temazepam 15 MG capsule   Commonly known as:  RESTORIL   Used for:  Anxiety        Dose:  15 mg   Take 1 capsule (15 mg) by mouth nightly as needed for sleep   Quantity:  30 capsule   Refills:  0       * Notice:  This list has 4 medication(s) that are the same as other medications prescribed for you. Read the directions carefully, and ask your doctor or other care provider to review them with you.             Protect others around you: Learn how to safely use, store  and throw away your medicines at www.disposemymeds.org.             Medication List: This is a list of all your medications and when to take them. Check marks below indicate your daily home schedule. Keep this list as a reference.      Medications           Morning Afternoon Evening Bedtime As Needed    amLODIPine 2.5 MG tablet   Commonly known as:  NORVASC   Take 1 tablet (2.5 mg) by mouth daily                                aspirin 81 MG tablet   Commonly known as:  ASA   ONE DAILY                                CLEAR-ATADINE 10 MG tablet   Take 10 mg by mouth daily   Generic drug:  loratadine                                * EPINEPHrine 0.3 MG/0.3ML injection 2-pack   Commonly known as:  EPIPEN 2-MIGUELINA   Inject 0.3 mLs (0.3 mg) into the muscle once as needed for anaphylaxis                                * EPINEPHrine 0.3 MG/0.3ML injection 2-pack   Commonly known as:  AUVI-Q   Inject 0.3 mLs (0.3 mg) into the muscle as needed for anaphylaxis                                ezetimibe 10 MG tablet   Commonly known as:  ZETIA   Take 1 tablet (10 mg) by mouth daily At night                                fluticasone 50 MCG/ACT nasal spray   Commonly known as:  FLONASE   Spray 2 sprays into both nostrils daily                                hydrochlorothiazide 25 MG tablet   Commonly known as:  HYDRODIURIL   Take 1 tablet (25 mg) by mouth daily                                MULTIVITAL PO   Take 1 tablet by mouth daily Reported on 3/22/2017                                olopatadine 0.2 % ophthalmic solution   Commonly known as:  PATADAY   Place 1 drop into both eyes daily                                omeprazole 20 MG DR capsule   Commonly known as:  priLOSEC   TAKE ONE CAPSULE BY MOUTH EVERY DAY (TAKE 30 TO 60 MINUTES BEFORE A MEAL)                                * ORDER FOR ALLERGEN IMMUNOTHERAPY 5 mL vial   Reported on 3/22/2017                                * ORDER FOR ALLERGEN IMMUNOTHERAPY 5 mL vial   Reported  on 3/22/2017                                ORDER FOR ALLERGEN IMMUNOTHERAPY 5 mL vial   Cat Hair, Standardized 10,000 BAU/mL, ALK  2.0 ml Dog Hair-Dander, A. P.  1:100 w/v, HS  1.0 ml Dust Mites DF 30,000AU/mL, HS  0.3 ml Dust Mites DP. 30,000 AU/mL, HS  0.3 ml  Birch Mix PRW 1:20 w/v, HS  0.5 ml Grass Mix #7 100,000 BAU/mL, HS 0.4 ml Nettle 1:20 w/v, HS 0.5 ml Diluent: HSA qs to 5ml                                ORDER FOR ALLERGEN IMMUNOTHERAPY 5 mL vial   Name of Mix: Mix #1  Mixed Vespid Mixed Vespid Venom 300 mcg/mL HS 13 ml Diluent: HSA qs to 13ml                                ORDER FOR ALLERGEN IMMUNOTHERAPY 5 mL vial   Name of Mix: Mix #2  Wasp Wasp Venom 100 mcg/mL HS 13 ml Diluent: HSA qs to 13ml                                polyethylene glycol powder   Commonly known as:  MIRALAX   Take 17 g (1 capful) by mouth daily                                STATIN NOT PRESCRIBED   Commonly known as:  INTENTIONAL   by Other route continuous prn Reported on 4/6/2017                                temazepam 15 MG capsule   Commonly known as:  RESTORIL   Take 1 capsule (15 mg) by mouth nightly as needed for sleep                                * Notice:  This list has 4 medication(s) that are the same as other medications prescribed for you. Read the directions carefully, and ask your doctor or other care provider to review them with you.

## 2018-12-06 ENCOUNTER — ALLIED HEALTH/NURSE VISIT (OUTPATIENT)
Dept: ALLERGY | Facility: OTHER | Age: 63
End: 2018-12-06
Payer: COMMERCIAL

## 2018-12-06 DIAGNOSIS — J30.9 ALLERGIC RHINITIS: Primary | ICD-10-CM

## 2018-12-06 DIAGNOSIS — J30.89 ALLERGIC RHINITIS DUE TO DUST MITE: ICD-10-CM

## 2018-12-06 DIAGNOSIS — J30.1 CHRONIC SEASONAL ALLERGIC RHINITIS DUE TO POLLEN: ICD-10-CM

## 2018-12-06 DIAGNOSIS — J30.81 ALLERGIC RHINITIS DUE TO ANIMAL DANDER: ICD-10-CM

## 2018-12-06 PROCEDURE — 95115 IMMUNOTHERAPY ONE INJECTION: CPT

## 2018-12-06 PROCEDURE — 99207 ZZC DROP WITH A PROCEDURE: CPT

## 2018-12-06 NOTE — TELEPHONE ENCOUNTER
ALLERGY SOLUTION RE-ORDER REQUEST    Jose Angel Cha 1955 MRN: [id number]    DATE NEEDED:  1/3/2019  Vial Color Content   Top Dose W      Last Dose     Vial Size  Red 1:1 Cat, Dog, Grass, Dust Mite, Trees, Weeds   Red 1:1 0.5   Red 1:10.5 5      Serum reorder consent signed and patient/parent was advised that new serums would be ordered through the pharmacy and billed to their insurance company when they arrive in clinic. Yes    Shot Clinic Location:  [employer]  Ship to Location: [employer]  Serum billed to:  [employer]    Special Instructions:  none      Updated Prescription Needed: No      Requester Signature  Aar Mcbride

## 2018-12-06 NOTE — MR AVS SNAPSHOT
After Visit Summary   12/6/2018    Jose Angel Cha    MRN: [id number]           Patient Information     Date Of Birth          1955        Visit Information        Provider Department      12/6/2018 1:30 PM ER ALLERGY SHOTS [facility]        Today's Diagnoses     Allergic rhinitis    -  1       Follow-ups after your visit        Your next 10 appointments already scheduled     Jan 03, 2019  2:20 PM CST   [facility]    [address]  [place] [address]   [phone]            Jan 08, 2019  8:30 AM CST   [facility] with ER [facility] SHOT[facility]    [address]  [place] [address]   [phone]            Jan 31, 2019  1:30 PM CST   [facility] with ER [facility]OT[facility]    [address]  [place] [address]   [phone]            Feb 19, 2019  8:30 AM CST   [facility] with [facility]OT[facility]    [address]  [place] [address]   [phone]              Who to contact     If you have questions or need follow up information about today's clinic visit or your schedule please contact [facility] directly at [phone].  Normal or non-critical lab and imaging results will be communicated to you by MyChart, letter or phone within 4 business days after the clinic has received the results. If you do not hear from us within 7 days, please contact the clinic through MyChart or phone. If you have a critical or abnormal lab result, we will notify you by phone as soon as possible.  Submit refill requests through [employer] or call your pharmacy and they will forward the refill request to us. Please allow 3 business days for your refill to be completed.           Additional Information About Your Visit        m[employer] Information     [employer] gives you secure access to your electronic health record. If you see a primary care provider, you can also send messages to your care team and make appointments. If you have questions, please call your primary care clinic.  If you do not have a primary care provider, please call [phone] and they will assist you.        Care EveryWhere ID     This is your Care EveryWhere ID. This could be used by other organizations to access your [place] medical records  [id number]         Blood Pressure from Last 3 Encounters:   11/19/18 119/80   11/12/18 (!) 151/103   10/03/18 122/70    Weight from Last 3 Encounters:   11/19/18 98.4 kg (217 lb)   10/03/18 98.4 kg (217 lb)   10/03/18 98.4 kg (217 lb)              We Performed the Following     Allergy Shot: One injection        Primary Care Provider Office Phone # Fax #    Shari Tonia Paredes [phone]       [address] [place] [address]        Equal Access to Services     [facility]: Hadii aad ku hadasho Soomaali, waaxda luqadaha, qaybta kaalmada adeegyada, waxmallory guillen . So [facility] [phone].    ATENCIÓN: Si habla español, tiene a armas disposición servicios gratuitos de asistencia lingüística. Ll[facility] [phone].    We comply with applicable federal civil rights laws and [place] laws. We do not discriminate on the basis of race, color, national origin, age, disability, sex, sexual orientation, or gender identity.            Thank you!     Thank you for choosing [facility]  for your care. Our goal is always to provide you with excellent care. Hearing back from our patients is one way we can continue to improve our services. Please take a few minutes to complete the written survey that you may receive in the mail after your visit with us. Thank you!             Your Updated Medication List - Protect others around you: Learn  how to safely use, store and throw away your medicines at www.disposemymeds.org.          This list is accurate as of 12/6/18  2:16 PM.  Always use your most recent med list.                   Brand Name Dispense Instructions for use Diagnosis    amLODIPine 2.5 MG tablet    NORVASC    90 tablet    Take 1 tablet (2.5 mg) by mouth daily    Benign essential hypertension       aspirin 81 MG tablet    ASA    0    ONE DAILY    Other and unspecified hyperlipidemia, Family history of ischemic heart disease       CLEAR-ATADINE 10 MG tablet   Generic drug:  loratadine      Take 10 mg by mouth daily        * EPINEPHrine 0.3 MG/0.3ML injection 2-pack    EPIPEN 2-MIGUELINA    2 each    Inject 0.3 mLs (0.3 mg) into the muscle once as needed for anaphylaxis    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 sting       * EPINEPHrine 0.3 MG/0.3ML injection 2-pack    AUVI-Q    2 mL    Inject 0.3 mLs (0.3 mg) into the muscle as needed for anaphylaxis    Need for desensitization to allergens       ezetimibe 10 MG tablet    ZETIA    90 tablet    Take 1 tablet (10 mg) by mouth daily At night    Mixed hyperlipidemia       fluticasone 50 MCG/ACT nasal spray    FLONASE    1 Bottle    Spray 2 sprays into both nostrils daily    Chronic seasonal allergic rhinitis due to pollen, House dust mite allergy, Allergic rhinitis due to animal dander       hydrochlorothiazide 25 MG tablet    HYDRODIURIL    90 tablet    Take 1 tablet (25 mg) by mouth daily    Benign essential hypertension       MULTIVITAL PO      Take 1 tablet by mouth daily Reported on 3/22/2017        olopatadine 0.2 % ophthalmic solution    PATADAY    2.5 mL    Place 1 drop into both eyes daily    Chronic seasonal allergic rhinitis due to pollen, House dust mite allergy       omeprazole 20 MG DR capsule    priLOSEC    90 capsule    TAKE ONE CAPSULE BY MOUTH EVERY DAY (TAKE 30 TO 60 MINUTES BEFORE A MEAL)    Gastroesophageal reflux disease without esophagitis       * ORDER FOR ALLERGEN IMMUNOTHERAPY 5 mL vial     13 mL     Reported on 3/22/2017        * ORDER FOR ALLERGEN IMMUNOTHERAPY 5 mL vial     13 mL    Reported on 3/22/2017        ORDER FOR ALLERGEN IMMUNOTHERAPY 5 mL vial     5 mL    Cat Hair, Standardized 10,000 BAU/mL, ALK  2.0 ml Dog Hair-Dander, A. P.  1:100 w/v, HS  1.0 ml Dust Mites DF 30,000AU/mL, HS  0.3 ml Dust Mites DP. 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, accidental or unintentional, subsequent encounter       ORDER FOR ALLERGEN IMMUNOTHERAPY 5 mL vial     13 mL    Name of Mix: Mix #2  Wasp Wasp Venom 100 mcg/mL HS 13 ml Diluent: HSA qs to 13ml    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 Read the directions carefully, and ask your doctor or other care provider to review them with you.

## 2018-12-07 NOTE — TELEPHONE ENCOUNTER
Dear [facility] Team: Allergy orders have been reviewed by pharmacy & can be routed to the prescriber to sign to get a new prescription to fax to the pharmacy.   Thanks, [facility]

## 2018-12-07 NOTE — TELEPHONE ENCOUNTER
Routing to provider - please send new prescription for serum to compounding pharmacy.  Thank you.    Sofi Fong RN

## 2018-12-17 DIAGNOSIS — I10 BENIGN ESSENTIAL HYPERTENSION: ICD-10-CM

## 2018-12-17 RX ORDER — AMLODIPINE BESYLATE 2.5 MG/1
2.5 TABLET ORAL DAILY
Qty: 90 TABLET | Refills: 2 | Status: SHIPPED | OUTPATIENT
Start: 2018-12-17 | End: 2019-09-10

## 2019-01-03 ENCOUNTER — ALLIED HEALTH/NURSE VISIT (OUTPATIENT)
Dept: ALLERGY | Facility: OTHER | Age: 64
End: 2019-01-03
Payer: COMMERCIAL

## 2019-01-03 DIAGNOSIS — J30.1 SEASONAL ALLERGIC RHINITIS DUE TO POLLEN: Primary | ICD-10-CM

## 2019-01-03 DIAGNOSIS — J30.81 ALLERGIC RHINITIS DUE TO ANIMAL DANDER: Primary | ICD-10-CM

## 2019-01-03 PROCEDURE — 95115 IMMUNOTHERAPY ONE INJECTION: CPT

## 2019-01-03 PROCEDURE — 95165 ANTIGEN THERAPY SERVICES: CPT | Performed by: ALLERGY & IMMUNOLOGY

## 2019-01-03 PROCEDURE — 99207 ZZC DROP WITH A PROCEDURE: CPT

## 2019-01-03 NOTE — TELEPHONE ENCOUNTER
Allergy serums received at [facility].     Vials received below:    Vial Color Content                      Vial Size Expiration Date  Red 1:1 Cat, Dog, Grass, Dust Mite, Trees, Weeds 5 mL  12/13/2019        Signature  Ara Mcbride

## 2019-01-03 NOTE — PROGRESS NOTES
Patient presented after waiting 30 minutes with no reaction to allergy injections. Discharged from clinic.    Cornel Tejeda RN....1/3/2019 2:58 PM

## 2019-01-03 NOTE — PROGRESS NOTES
Allergy serums billed at [facility].     Vials received below:    Vial Color Content                      Vial Size Expiration Date  Red 1:1 Cat, Dog, Grass, Dust Mite, Trees, Weeds 5 mL  12/13/2019    Original Refill encounter date: 12/6/18      Signature  Ara Mcbride

## 2019-01-04 ENCOUNTER — TELEPHONE (OUTPATIENT)
Dept: SURGERY | Facility: CLINIC | Age: 64
End: 2019-01-04
Payer: COMMERCIAL

## 2019-01-04 DIAGNOSIS — J30.2 SEASONAL ALLERGIC RHINITIS: Primary | ICD-10-CM

## 2019-01-04 NOTE — TELEPHONE ENCOUNTER
Reason for Call:  Other call back    Detailed comments: Patient called [facility] wanting to hear back from Quirino regarding his endoscopy that was done on 12/5. He wants the results and to discuss what Quirino is deciding to do. Please call patient back.     Phone Number Patient can be reached at: Home number on file [phone] (home)    Best Time: any    Can we leave a detailed message on this number? YES    Call taken on 1/4/2019 at 2:45 PM by Milagros Galeas

## 2019-01-08 ENCOUNTER — ALLIED HEALTH/NURSE VISIT (OUTPATIENT)
Dept: ALLERGY | Facility: OTHER | Age: 64
End: 2019-01-08
Payer: COMMERCIAL

## 2019-01-08 DIAGNOSIS — T63.441D TOXIC EFFECT OF VENOM OF BEES, UNINTENTIONAL, SUBSEQUENT ENCOUNTER: Primary | ICD-10-CM

## 2019-01-08 PROCEDURE — 95117 IMMUNOTHERAPY INJECTIONS: CPT

## 2019-01-08 PROCEDURE — 99207 ZZC DROP WITH A PROCEDURE: CPT

## 2019-01-08 NOTE — PROGRESS NOTES
Patient presented after waiting 30 minutes with no reaction to allergy injections. Discharged from clinic.    Katharina Schultz RN

## 2019-01-09 ENCOUNTER — OFFICE VISIT (OUTPATIENT)
Dept: SURGERY | Facility: CLINIC | Age: 64
End: 2019-01-09
Payer: COMMERCIAL

## 2019-01-09 ENCOUNTER — HOSPITAL ENCOUNTER (INPATIENT)
Facility: CLINIC | Age: 64
Setting detail: SURGERY ADMIT
End: 2019-01-09
Attending: SURGERY | Admitting: SURGERY

## 2019-01-09 VITALS
SYSTOLIC BLOOD PRESSURE: 126 MMHG | WEIGHT: 218 LBS | DIASTOLIC BLOOD PRESSURE: 80 MMHG | TEMPERATURE: 96.2 F | BODY MASS INDEX: 31.21 KG/M2 | HEIGHT: 70 IN

## 2019-01-09 DIAGNOSIS — K21.00 HIATAL HERNIA WITH GASTROESOPHAGEAL REFLUX DISEASE AND ESOPHAGITIS: Primary | ICD-10-CM

## 2019-01-09 DIAGNOSIS — K44.9 HIATAL HERNIA WITH GASTROESOPHAGEAL REFLUX DISEASE AND ESOPHAGITIS: Primary | ICD-10-CM

## 2019-01-09 PROCEDURE — 99213 OFFICE O/P EST LOW 20 MIN: CPT | Performed by: SURGERY

## 2019-01-09 ASSESSMENT — MIFFLIN-ST. JEOR: SCORE: 1785.09
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
## 2019-01-09 NOTE — ASSESSMENT & PLAN NOTE
Plan for Laparoscopic Hiatal Hernia Repair with Phasix ST mesh and Fundoplication.    Risks and benefits discussed in detail to include, but not limited to; bleeding, infection, recurrence, dysphagia, damage to the vagus nerve, leak/perforation, and need for further surgery.     Discussion regarding post operative diet of full liquids for 2 weeks was had.
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
## 2019-01-10 ENCOUNTER — TELEPHONE (OUTPATIENT)
Dept: SURGERY | Facility: CLINIC | Age: 64
End: 2019-01-10

## 2019-01-21 DIAGNOSIS — F41.9 ANXIETY: ICD-10-CM

## 2019-01-21 RX ORDER — TEMAZEPAM 15 MG/1
15 CAPSULE ORAL
Qty: 30 CAPSULE | Refills: 0 | Status: SHIPPED | OUTPATIENT
Start: 2019-01-21 | End: 2019-05-22

## 2019-01-21 NOTE — TELEPHONE ENCOUNTER
Restoril      Last Written Prescription Date:  9/12/2018  Last Fill Quantity: 30,   # refills: 0  Last Office Visit: 10/3/2018  Future Office visit:    Next 5 appointments (look out 90 days)    Jan 31, 2019  1:30 PM CST  Nurse Only with ER ALLERGY SHOTS  [facility] [address] [place] [id number]  [phone]   Feb 14, 2019  9:00 AM CST  Pre-Op physical with Shari Pardees MD  [facility] [address] [place] [id number]  [phone]   Feb 19, 2019  8:30 AM CST  Nurse Only with ER ALLERGY SHOTS  [facility] [address] [place] [id number]  [phone]   Mar 13, 2019  1:00 PM CDT  Return Visit with Gurpreet Thrasher DO  [facility] [address] [place] [id number] [phone]           Routing refill request to provider for review/approval because:  Drug not on the [facility], [facility] or  Health refill protocol or controlled substance    Cheyenne Meek, LAURI, BSN

## 2019-01-21 NOTE — TELEPHONE ENCOUNTER
Rx placed in bin. Looks like this is filled every ~3 months. Recommend OV in 3 months prior to further refills.    Jamie Smart PA-C

## 2019-01-31 ENCOUNTER — ALLIED HEALTH/NURSE VISIT (OUTPATIENT)
Dept: ALLERGY | Facility: OTHER | Age: 64
End: 2019-01-31
Payer: COMMERCIAL

## 2019-01-31 DIAGNOSIS — J30.1 SEASONAL ALLERGIC RHINITIS DUE TO POLLEN: Primary | ICD-10-CM

## 2019-01-31 PROCEDURE — 99207 ZZC DROP WITH A PROCEDURE: CPT

## 2019-01-31 PROCEDURE — 95115 IMMUNOTHERAPY ONE INJECTION: CPT

## 2019-02-06 ENCOUNTER — TELEPHONE (OUTPATIENT)
Dept: CARDIOLOGY | Facility: CLINIC | Age: 64
End: 2019-02-06

## 2019-02-06 ENCOUNTER — TELEPHONE (OUTPATIENT)
Dept: FAMILY MEDICINE | Facility: OTHER | Age: 64
End: 2019-02-06

## 2019-02-06 DIAGNOSIS — E78.2 MIXED HYPERLIPIDEMIA: ICD-10-CM

## 2019-02-06 DIAGNOSIS — I10 BENIGN ESSENTIAL HYPERTENSION: Primary | ICD-10-CM

## 2019-02-06 NOTE — TELEPHONE ENCOUNTER
----- Message from Katharina Spear sent at 2/6/2019 11:33 AM CST -----  Hi Team 2,  Can you please extend this pt's orders to May 2019. He is turning 64 then and wants to be seen then due to Medicare.    Patient: Davi Javier  MRN [id number]    Thanks,    Katharina

## 2019-02-06 NOTE — TELEPHONE ENCOUNTER
Reason for Call:  Other call back    Detailed comments: pt was called and asked to reschedule his allergy appt on the 14th for his environmental shot. It says in the notes that he can come in tomorrow but he is in [place] until the 13th. His next appt is the 19th but for venom, not environmental. His next environmental shot is 2/21/19. Wondering if that is too much of a gap between shots. Please advise.     Phone Number Patient can be reached at: Home number on file [phone] (home)    Best Time: any     Can we leave a detailed message on this number? YES    Call taken on 2/6/2019 at 1:31 PM by Ramandeep Moon

## 2019-02-13 ENCOUNTER — TELEPHONE (OUTPATIENT)
Dept: FAMILY MEDICINE | Facility: OTHER | Age: 64
End: 2019-02-13

## 2019-02-13 NOTE — TELEPHONE ENCOUNTER
Ok for the 15 min near then. Otherwise I have long near the end of the day if that fills too fast.  Shari Paredes MD

## 2019-02-13 NOTE — TELEPHONE ENCOUNTER
Reason for Call:  Other appointment    Detailed comments: Had to cx his pre-op due to insurance problems. He is hoping to have insurance figured out by weeks end. He would like to try and come in next Thursday around same time as allergy shot. Nothing open he is wondering about a work in.  Phone Number Patient can be reached at: Cell number on file:    Telephone Information:   Mobile [phone]       Best Time: Any    Can we leave a detailed message on this number? YES    Call taken on 2/13/2019 at 11:21 AM by Annie Wilson

## 2019-02-13 NOTE — TELEPHONE ENCOUNTER
Has allergy shots that Thursday 02/21 at 1:50PM. You only have 15 minute appointments available. Are you able to work in? If not, we can send this to other providers.

## 2019-02-19 ENCOUNTER — ALLIED HEALTH/NURSE VISIT (OUTPATIENT)
Dept: ALLERGY | Facility: OTHER | Age: 64
End: 2019-02-19

## 2019-02-19 DIAGNOSIS — T63.441D TOXIC EFFECT OF VENOM OF BEES, UNINTENTIONAL, SUBSEQUENT ENCOUNTER: Primary | ICD-10-CM

## 2019-02-19 PROCEDURE — 95117 IMMUNOTHERAPY INJECTIONS: CPT

## 2019-02-19 PROCEDURE — 99207 ZZC DROP WITH A PROCEDURE: CPT

## 2019-02-19 NOTE — PROGRESS NOTES
Patient presented after waiting 30 minutes with normal reaction to allergy injections. Discharged from clinic.    Angela Bone RN ............   2/19/2019...9:09 AM

## 2019-02-21 ENCOUNTER — ALLIED HEALTH/NURSE VISIT (OUTPATIENT)
Dept: ALLERGY | Facility: OTHER | Age: 64
End: 2019-02-21

## 2019-02-21 DIAGNOSIS — J30.1 SEASONAL ALLERGIC RHINITIS DUE TO POLLEN: Primary | ICD-10-CM

## 2019-02-21 PROCEDURE — 99207 ZZC DROP WITH A PROCEDURE: CPT

## 2019-02-21 PROCEDURE — 95115 IMMUNOTHERAPY ONE INJECTION: CPT

## 2019-03-05 ENCOUNTER — ALLIED HEALTH/NURSE VISIT (OUTPATIENT)
Dept: ALLERGY | Facility: OTHER | Age: 64
End: 2019-03-05
Payer: COMMERCIAL

## 2019-03-05 DIAGNOSIS — J30.9 ALLERGIC RHINITIS: Primary | ICD-10-CM

## 2019-03-05 PROCEDURE — 95115 IMMUNOTHERAPY ONE INJECTION: CPT

## 2019-03-05 PROCEDURE — 99207 ZZC DROP WITH A PROCEDURE: CPT

## 2019-03-12 ENCOUNTER — ALLIED HEALTH/NURSE VISIT (OUTPATIENT)
Dept: ALLERGY | Facility: OTHER | Age: 64
End: 2019-03-12
Payer: COMMERCIAL

## 2019-03-12 DIAGNOSIS — J30.1 SEASONAL ALLERGIC RHINITIS DUE TO POLLEN: Primary | ICD-10-CM

## 2019-03-12 PROCEDURE — 99207 ZZC DROP WITH A PROCEDURE: CPT

## 2019-03-12 PROCEDURE — 95115 IMMUNOTHERAPY ONE INJECTION: CPT

## 2019-03-12 NOTE — PROGRESS NOTES
Patient presented after waiting 30 minutes with normal reaction to allergy injections. Discharged from clinic.    Angela Bone RN ............   3/12/2019...9:44 AM

## 2019-04-03 ENCOUNTER — ALLIED HEALTH/NURSE VISIT (OUTPATIENT)
Dept: ALLERGY | Facility: OTHER | Age: 64
End: 2019-04-03
Payer: COMMERCIAL

## 2019-04-03 ENCOUNTER — OFFICE VISIT (OUTPATIENT)
Dept: ALLERGY | Facility: OTHER | Age: 64
End: 2019-04-03
Payer: COMMERCIAL

## 2019-04-03 VITALS
RESPIRATION RATE: 18 BRPM | SYSTOLIC BLOOD PRESSURE: 122 MMHG | HEIGHT: 70 IN | WEIGHT: 217 LBS | DIASTOLIC BLOOD PRESSURE: 84 MMHG | OXYGEN SATURATION: 97 % | HEART RATE: 74 BPM | BODY MASS INDEX: 31.07 KG/M2

## 2019-04-03 DIAGNOSIS — T63.91XD VENOM-INDUCED ANAPHYLAXIS, ACCIDENTAL OR UNINTENTIONAL, SUBSEQUENT ENCOUNTER: ICD-10-CM

## 2019-04-03 DIAGNOSIS — R21 RASH: ICD-10-CM

## 2019-04-03 DIAGNOSIS — J30.1 CHRONIC SEASONAL ALLERGIC RHINITIS DUE TO POLLEN: ICD-10-CM

## 2019-04-03 DIAGNOSIS — J30.81 ALLERGIC RHINITIS DUE TO ANIMAL DANDER: ICD-10-CM

## 2019-04-03 DIAGNOSIS — Z51.6 NEED FOR DESENSITIZATION TO ALLERGENS: ICD-10-CM

## 2019-04-03 DIAGNOSIS — T63.441D TOXIC EFFECT OF VENOM OF BEES, UNINTENTIONAL, SUBSEQUENT ENCOUNTER: Primary | ICD-10-CM

## 2019-04-03 DIAGNOSIS — Z91.09 HOUSE DUST MITE ALLERGY: Primary | ICD-10-CM

## 2019-04-03 PROCEDURE — 99207 ZZC DROP WITH A PROCEDURE: CPT

## 2019-04-03 PROCEDURE — 95117 IMMUNOTHERAPY INJECTIONS: CPT

## 2019-04-03 PROCEDURE — 99214 OFFICE O/P EST MOD 30 MIN: CPT | Mod: 25 | Performed by: ALLERGY & IMMUNOLOGY

## 2019-04-03 RX ORDER — FLUTICASONE PROPIONATE 50 MCG
2 SPRAY, SUSPENSION (ML) NASAL DAILY
Qty: 16 G | Refills: 11 | Status: SHIPPED | OUTPATIENT
Start: 2019-04-03 | End: 2020-11-18

## 2019-04-03 RX ORDER — EPINEPHRINE 0.3 MG/.3ML
0.3 INJECTION SUBCUTANEOUS PRN
Qty: 2 ML | Refills: 1 | Status: SHIPPED | OUTPATIENT
Start: 2019-04-03 | End: 2021-04-21

## 2019-04-03 RX ORDER — OLOPATADINE HYDROCHLORIDE 2 MG/ML
1 SOLUTION/ DROPS OPHTHALMIC DAILY
Qty: 2.5 ML | Refills: 3 | Status: SHIPPED | OUTPATIENT
Start: 2019-04-03 | End: 2020-01-14

## 2019-04-03 ASSESSMENT — MIFFLIN-ST. JEOR: SCORE: 1780.56

## 2019-04-03 NOTE — ASSESSMENT & PLAN NOTE
History of systemic symptoms on 3 separate stings. 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 Positive testing for white faced hornet, yellow faced hornet and wasp. Negative testing for honey bee. Tolerating venom immunotherapy. He was stung by what he thinks was a honey bee last year and had no symptoms.  He additionally has been stung by a yellow jacket and no symptoms.  No interval stings. He has injectable epinephrine. No systemic reactions with allergy shots. Normal tryptase.     - Continue venom immunotherapy.  - Likely will keep on lifelong venom immunotherapy given severe reaction.  - Continue to keep EpiPen near him at all times in case of accidental sting.   Updated anaphylaxis action plan was provided.

## 2019-04-03 NOTE — ASSESSMENT & PLAN NOTE
Post nasal drainage, rhinorrhea and ocular watering in the morning. Perennial symptoms.  Using Zyrtec and Pataday as needed.  On allergen immunotherapy for 1 year.  Unclear if beneficial.      Skin testing recently  Positive for dust mites, cat, dog, grass mix, birch and nettle.    - Resume Flonase 2 sprays per nostril daily.  - Cetirizine or fexofenadine daily as needed.  - Pataday (olapatadine) 1 drop/eye daily as needed.  - Continue allergen immunotherapy for an additional 6 months. Will reevaluate need to continue at that visit.  -Nasal saline as needed.

## 2019-04-03 NOTE — LETTER
[facility]                   FOOD ALLERGY & ANAPHYLAXIS EMERGENCY CARE PLAN  [employer] Education         Name: Jose Angel CHRISTINA:  [id number]    Allergy to: Venom  Weight: 217 lbs 0 oz lbs.  Asthma:  No    -NOTE: Do not depend on antihistamines or inhalers (bronchodilators) to treat a severe reaction. USE EPINEPHRINE.     MEDICATIONS/DOSES  Epinephrine Brand: Auvi Q  Epinephrine Dose: 0.3 mg IM  Antihistamine Brand or Generic: Zyrtec (Cetirizine)  Antihistamine Dose: 10mg         FARE                   FOOD ALLERGY & ANAPHYLAXIS EMERGENCY CARE PLAN   [employer] Education           EMERGENCY CONTACTS - CALL 911  DOCTOR:  Juan Antonio Cardozo DO   PHONE: [phone]  PARENT/GUARDIAN:              PHONE:  OTHER EMERGENCY CONTACTS  NAME/RELATIONSHIP:   PHONE:   NAME/RELATIONSHIP:    PHONE:           PARENT/GUARDIAN AUTHORIZATION SIGNATURE     DATE              PHYSICIAN/H CP AUTHORIZATION SIGNATURE         DATE  FORM PROVIDED [employer] OF FOOD [employer]ERGY [employer] EDUCATION (FARE) ([email or web address]) 2014
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
## 2019-04-03 NOTE — PROGRESS NOTES
Patient presented after waiting 30 minutes with no reaction to allergy injections. Discharged from clinic.    Cornel Tejeda RN....4/3/2019 3:09 PM
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
## 2019-04-03 NOTE — PATIENT INSTRUCTIONS
Allergy Staff Appt Hours Shot Hours Locations    Physician     Juan Antonio Cardozo DO       Support Staff     LAURI Downs CMA  Tuesday:        [place] 7-4:20     Wednesday:        [place]: 7-5     Thursday:                    [place] 7-6:40     Friday:        [place] 7-2:40   [place]        Thursday: 1-5:50        Friday: 7-10:50     [place]        Tuesday: 7- 3:20        Wednesday: 7-4:20     [place] Monday: 7-4:20        Tuesday: 1-6:20         [facility]  [address] [place] [address]  Appt Line: [phone]  Allergy RN (Thurs & Fri):  [phone]    [facility]  [address] [place] [address]  Appt Line: [phone]  Allergy RN (Tues & Wed):  [phone]       Important Scheduling Information  Aspirin Desensitization: Appt will last 2 clinic days. Please call the Allergy RN line for your clinic to schedule. Discontinue antihistamines 7 days prior to the appointment.     Food Challenges: Appt will last 3-4 hours. Please call the Allergy RN line for your clinic to schedule. Discontinue antihistamines 7 days prior to the appointment.     Penicillin Testing: Appt will last 2-3 hours. Please call the Allergy RN line for your clinic to schedule. Discontinue antihistamines 7 days prior to the appointment.     Skin Testing: Appt will about 40 minutes. Call the appointment line for your clinic to schedule. Discontinue antihistamines 7 days prior to the appointment.     Venom Testing: Appt will last 2-3 hours. Please call the Allergy RN line for your clinic to schedule. Discontinue antihistamines 7 days prior to the appointment.     Thank you for trusting us with your Allergy, Asthma, and Immunology care. Please feel free to contact us with any questions or concerns you may have.      - Flonase 2 sprays/nostril daily.   - [place] or Jerauld Mist 2 sprays/nostril twice daily as needed.   - Zyrtec as needed.   - Pataday (olapatadine) 1 drop/eye daily as needed.   - Continue  allergy shots and venom shots.   - Dermatology referral.

## 2019-04-03 NOTE — ASSESSMENT & PLAN NOTE
Dry, flaky, rough patch noted on right ear.  Has been biopsied and came back conclusive.    Patch returned after biopsy.    He is going to follow-up with dermatology.

## 2019-04-09 ENCOUNTER — ALLIED HEALTH/NURSE VISIT (OUTPATIENT)
Dept: ALLERGY | Facility: OTHER | Age: 64
End: 2019-04-09
Payer: COMMERCIAL

## 2019-04-09 DIAGNOSIS — J30.1 ALLERGIC RHINITIS DUE TO POLLEN, UNSPECIFIED SEASONALITY: Primary | ICD-10-CM

## 2019-04-09 PROCEDURE — 95115 IMMUNOTHERAPY ONE INJECTION: CPT

## 2019-04-09 PROCEDURE — 99207 ZZC DROP WITH A PROCEDURE: CPT

## 2019-04-30 ENCOUNTER — ALLIED HEALTH/NURSE VISIT (OUTPATIENT)
Dept: ALLERGY | Facility: OTHER | Age: 64
End: 2019-04-30
Payer: COMMERCIAL

## 2019-04-30 DIAGNOSIS — J30.1 SEASONAL ALLERGIC RHINITIS DUE TO POLLEN: Primary | ICD-10-CM

## 2019-04-30 PROCEDURE — 99207 ZZC DROP WITH A PROCEDURE: CPT

## 2019-04-30 PROCEDURE — 95115 IMMUNOTHERAPY ONE INJECTION: CPT

## 2019-04-30 NOTE — PROGRESS NOTES
Patient presented after waiting 30 minutes with no reaction to allergy injections. Discharged from clinic.    Soraida Cano RN ............   4/30/2019...9:34 AM

## 2019-05-15 ENCOUNTER — ALLIED HEALTH/NURSE VISIT (OUTPATIENT)
Dept: ALLERGY | Facility: OTHER | Age: 64
End: 2019-05-15
Payer: MEDICARE

## 2019-05-15 DIAGNOSIS — T63.441D TOXIC EFFECT OF VENOM OF BEES, UNINTENTIONAL, SUBSEQUENT ENCOUNTER: Primary | ICD-10-CM

## 2019-05-15 PROCEDURE — 95117 IMMUNOTHERAPY INJECTIONS: CPT

## 2019-05-15 PROCEDURE — 99207 ZZC DROP WITH A PROCEDURE: CPT

## 2019-05-15 NOTE — PROGRESS NOTES
Patient presented after waiting 30 minutes with no reaction to allergy injections. Discharged from clinic.    Cornel Tejeda RN....5/15/2019 8:17 AM

## 2019-05-21 ENCOUNTER — ALLIED HEALTH/NURSE VISIT (OUTPATIENT)
Dept: ALLERGY | Facility: OTHER | Age: 64
End: 2019-05-21
Payer: MEDICARE

## 2019-05-21 DIAGNOSIS — J30.1 SEASONAL ALLERGIC RHINITIS DUE TO POLLEN: Primary | ICD-10-CM

## 2019-05-21 PROCEDURE — 99207 ZZC DROP WITH A PROCEDURE: CPT

## 2019-05-21 PROCEDURE — 95115 IMMUNOTHERAPY ONE INJECTION: CPT

## 2019-05-21 NOTE — PROGRESS NOTES
Patient presented after waiting 30 minutes with no reaction to allergy injections. Discharged from clinic.    Soraida Cano RN ............   5/21/2019...10:34 AM

## 2019-05-21 NOTE — PROGRESS NOTES
Subjective     Jose Angel Cha is a 63 year old male who presents to clinic today for the following health issues:    HPI   Acute Illness   Acute illness concerns: left ear pressure, has been fighting a cold/allergies/fluish for the last ten days   Onset: two days ago    Fever: no    Chills/Sweats: no    Headache (location?): YES    Sinus Pressure:YES    Conjunctivitis:  itchy    Ear Pain: YES: left. Had meineres surgery in right ear about ten years     Rhinorrhea: YES    Congestion: YES    Sore Throat: YES- but about a week ago was raw     Cough: YES-productive of green sputum    Wheeze: no    Decreased Appetite: no    Nausea: no    Vomiting: no    Diarrhea:  no    Dysuria/Freq.: no    Fatigue/Achiness: YES- but can't tell what is causing it    Sick/Strep Exposure: has grandchildren so maybe, also getting over something     Therapies Tried and outcome: ibuprofen    - Last night ear was draining bloody/pink fluid.   - pain is improved slightly from last night.   - Has had a cold prior to this that is improving.     Current Outpatient Medications   Medication Sig Dispense Refill     amLODIPine (NORVASC) 2.5 MG tablet Take 1 tablet (2.5 mg) by mouth daily 90 tablet 2     amoxicillin (AMOXIL) 875 MG tablet Take 1 tablet (875 mg) by mouth 2 times daily for 10 days 20 tablet 0     ASPIRIN 81 MG OR TABS ONE DAILY 0 0     EPINEPHrine (AUVI-Q) 0.3 MG/0.3ML injection 2-pack Inject 0.3 mLs (0.3 mg) into the muscle as needed for anaphylaxis 2 mL 1     EPINEPHrine (EPIPEN 2-MIGUELINA) 0.3 MG/0.3ML injection Inject 0.3 mLs (0.3 mg) into the muscle once as needed for anaphylaxis 2 each 3     ezetimibe (ZETIA) 10 MG tablet Take 1 tablet (10 mg) by mouth daily At night 90 tablet 3     fluticasone (FLONASE) 50 MCG/ACT nasal spray Spray 2 sprays into both nostrils daily 16 g 11     hydrochlorothiazide (HYDRODIURIL) 25 MG tablet Take 1 tablet (25 mg) by mouth daily 90 tablet 2     loratadine (CLEAR-ATADINE) 10 MG tablet Take 10 mg by mouth  daily       Multiple Vitamins-Minerals (MULTIVITAL PO) Take 1 tablet by mouth daily Reported on 3/22/2017       ofloxacin (FLOXIN) 0.3 % otic solution Place 5 drops Into the left ear daily for 7 days 2 mL 0     olopatadine (PATADAY) 0.2 % ophthalmic solution Place 1 drop into both eyes daily 2.5 mL 3     omeprazole (PRILOSEC) 20 MG CR capsule TAKE ONE CAPSULE BY MOUTH EVERY DAY (TAKE 30 TO 60 MINUTES BEFORE A MEAL) 90 capsule 3     ORDER FOR ALLERGEN IMMUNOTHERAPY Cat Hair, Standardized 10,000 BAU/mL, ALK  2.0 ml  Dog Hair-Dander, A. P.  1:100 w/v, HS  1.0 ml  Dust Mites DF 30,000AU/mL, HS  0.3 ml  Dust Mites DP. 30,000 AU/mL, HS  0.3 ml   Birch Mix PRW 1:20 w/v, HS  0.5 ml  Grass Mix #7 100,000 BAU/mL, HS 0.4 ml  Nettle 1:20 w/v, HS 0.5 ml  Diluent: HSA qs to 5ml 5 mL prn     ORDER FOR ALLERGEN IMMUNOTHERAPY Name of Mix: Mix #1  Mixed Vespid  Mixed Vespid Venom 300 mcg/mL HS 13 ml  Diluent: HSA qs to 13ml 13 mL PRN     ORDER FOR ALLERGEN IMMUNOTHERAPY Name of Mix: Mix #2  Wasp  Wasp Venom 100 mcg/mL HS 13 ml  Diluent: HSA qs to 13ml 13 mL PRN     ORDER FOR ALLERGEN IMMUNOTHERAPY Reported on 3/22/2017 13 mL PRN     ORDER FOR ALLERGEN IMMUNOTHERAPY Reported on 3/22/2017 13 mL PRN     polyethylene glycol (MIRALAX) powder Take 17 g (1 capful) by mouth daily 510 g 1     STATIN NOT PRESCRIBED, INTENTIONAL, by Other route continuous prn Reported on 4/6/2017  0     Allergies   Allergen Reactions     Anesthetic Ether      Bee Venom      Demerol Visual Disturbance     Statin [Hmg-Coa-R Inhibitors] Other (See Comments)     Muscle pain       Reviewed and updated as needed this visit by Provider  Tobacco  Allergies  Meds  Problems  Med Hx  Surg Hx  Fam Hx         Review of Systems   ROS COMP: Constitutional, HEENT, cardiovascular, pulmonary, gi and gu systems are negative, except as otherwise noted.      Objective    /80   Pulse 78   Temp 97.3  F (36.3  C) (Temporal)   Resp 16   Wt 99.8 kg (220 lb)   SpO2 97%    BMI 31.57 kg/m    Body mass index is 31.57 kg/m .  Physical Exam   GENERAL: healthy, alert and no distress  EYES: Eyes grossly normal to inspection, PERRL and conjunctivae and sclerae normal  HENT: normal cephalic/atraumatic, right ear: normal: no effusions, no erythema, normal landmarks, left ear: erythematous, bulging membrane, mucopurulent effusion and EAC is edematous approximately 25% occlusion with white discharge throughout, nasal mucosa edematous , rhinorrhea clear, oropharynx clear and oral mucous membranes moist.  Right external ear there is a lesion that is dry, rough, slightly erythematous in the fossa of the helix.   NECK: no adenopathy, no asymmetry, masses, or scars and thyroid normal to palpation  RESP: lungs clear to auscultation - no rales, rhonchi or wheezes  CV: regular rate and rhythm, normal S1 S2, no S3 or S4, no murmur, click or rub, no peripheral edema and peripheral pulses strong  MS: no gross musculoskeletal defects noted, no edema    Diagnostic Test Results:  Labs reviewed in Epic        Assessment & Plan       ICD-10-CM    1. Acute suppurative otitis media of left ear without spontaneous rupture of tympanic membrane, recurrence not specified H66.002 amoxicillin (AMOXIL) 875 MG tablet     ofloxacin (FLOXIN) 0.3 % otic solution   2. Acute diffuse otitis externa of left ear H60.312 amoxicillin (AMOXIL) 875 MG tablet     ofloxacin (FLOXIN) 0.3 % otic solution   3. Lesion of skin of right ear L98.9 DERMATOLOGY REFERRAL       Start the antibiotic drop and oral antibiotic for above listed infections. Possibly ruptured TM from the AOM but unable to identify any rupture on exam, the canal itself does appear infected as well with swelling and discharge therefore elected to treat topically and with oral antibiotics. Recommended use of nasal steroids, nasal saline rinses, decongestants to help with URI symptoms especially since he will be flying this weekend.  Do not put anything else in ear.   Tylenol/ibuprofen as needed for any pain.      Referred to dermatology for the ear lesion.     Options for treatment and follow-up care were reviewed with the patient and/or guardian. Patient and/or guardian engaged in the decision making process and verbalized understanding of the options discussed and agreed with the final plan.     Return in about 1 week (around 5/29/2019) for If not improving, sooner if worse or new concerns.    Florence Landers PA-C  [facility]

## 2019-05-22 ENCOUNTER — OFFICE VISIT (OUTPATIENT)
Dept: FAMILY MEDICINE | Facility: OTHER | Age: 64
End: 2019-05-22
Payer: MEDICARE

## 2019-05-22 VITALS
RESPIRATION RATE: 16 BRPM | DIASTOLIC BLOOD PRESSURE: 80 MMHG | BODY MASS INDEX: 31.57 KG/M2 | TEMPERATURE: 97.3 F | OXYGEN SATURATION: 97 % | SYSTOLIC BLOOD PRESSURE: 118 MMHG | HEART RATE: 78 BPM | WEIGHT: 220 LBS

## 2019-05-22 DIAGNOSIS — H61.91 LESION OF SKIN OF RIGHT EAR: ICD-10-CM

## 2019-05-22 DIAGNOSIS — H66.002 ACUTE SUPPURATIVE OTITIS MEDIA OF LEFT EAR WITHOUT SPONTANEOUS RUPTURE OF TYMPANIC MEMBRANE, RECURRENCE NOT SPECIFIED: Primary | ICD-10-CM

## 2019-05-22 DIAGNOSIS — H60.312 ACUTE DIFFUSE OTITIS EXTERNA OF LEFT EAR: ICD-10-CM

## 2019-05-22 PROCEDURE — 99214 OFFICE O/P EST MOD 30 MIN: CPT | Performed by: PHYSICIAN ASSISTANT

## 2019-05-22 RX ORDER — AMOXICILLIN 875 MG
875 TABLET ORAL 2 TIMES DAILY
Qty: 20 TABLET | Refills: 0 | Status: SHIPPED | OUTPATIENT
Start: 2019-05-22 | End: 2019-06-12

## 2019-05-22 RX ORDER — OFLOXACIN 3 MG/ML
5 SOLUTION AURICULAR (OTIC) DAILY
Qty: 2 ML | Refills: 0 | Status: SHIPPED | OUTPATIENT
Start: 2019-05-22 | End: 2019-06-12

## 2019-05-22 NOTE — PATIENT INSTRUCTIONS
" Nasal Saline: At the pharmacy you can find a \"Nettipot\" or NeilMed Nasal Rinse.  This is a salt solution that you mix with warm water.  I want you to perform nasal saline washes at least twice daily while you are sick.  You can do this anytime you feel like you are developing nasal congestion.  To properly utilize be sure you are leaning forward as far as you can and either tilt or squeeze slowly.  In the beginning this can be difficult to get to work properly but as the congestion is improved it will work easier.  If you don't use these properly you can feel as if you're drowning.     Nasal Steroid: Fluticasone/Flonase or Nasacort, These are OTC medications for allergies.  They are steroid sprays that are localized to the nose so no systemic effects. Two sprays each nostril once daily - allergist noted it is most effective if used before bed.  Ok to continue to use nasal saline as well.  When you spray it in the nose it should be up and out towards the eye on the side you are spraying the medication.  You should not taste the medication and it should not drip out the nose. This will decrease inflammation and also nasal mucous production.  You can also use this anytime you are developing nasal congestion.  Ok in the future to start these as soon as you feel you are developing nasal congestion, sinus pressure, increased nasal drainage.    IMPORTANT: When using other nasal inhaled products especially 12 hour sprays such as Afrin you should only use for a max of approximately 3 days, longer use could result in rebound congestion (congestion that develops because you're off the medication).    If no blood pressure issues recommend Mucinex DM Max, if issues with your blood pressure ok to just use plain mucinex product.        Follow-up if symptoms worsen or do not resolve.  Feel free to call with any questions or concerns.      "

## 2019-06-12 ENCOUNTER — OFFICE VISIT (OUTPATIENT)
Dept: DENTISTRY | Facility: CLINIC | Age: 64
End: 2019-06-12

## 2019-06-12 ENCOUNTER — OFFICE VISIT (OUTPATIENT)
Dept: OTOLARYNGOLOGY | Facility: OTHER | Age: 64
End: 2019-06-12
Payer: MEDICARE

## 2019-06-12 VITALS
OXYGEN SATURATION: 97 % | TEMPERATURE: 97.3 F | SYSTOLIC BLOOD PRESSURE: 133 MMHG | HEIGHT: 70 IN | DIASTOLIC BLOOD PRESSURE: 84 MMHG | HEART RATE: 71 BPM | WEIGHT: 221.5 LBS | BODY MASS INDEX: 31.71 KG/M2

## 2019-06-12 DIAGNOSIS — G47.33 OSA (OBSTRUCTIVE SLEEP APNEA): Primary | ICD-10-CM

## 2019-06-12 DIAGNOSIS — H65.02 ACUTE SEROUS OTITIS MEDIA OF LEFT EAR, RECURRENCE NOT SPECIFIED: Primary | ICD-10-CM

## 2019-06-12 PROCEDURE — 99203 OFFICE O/P NEW LOW 30 MIN: CPT | Performed by: OTOLARYNGOLOGY

## 2019-06-12 RX ORDER — TEMAZEPAM 15 MG/1
15 CAPSULE ORAL PRN
COMMUNITY
Start: 2019-01-21 | End: 2019-08-28

## 2019-06-12 ASSESSMENT — MIFFLIN-ST. JEOR: SCORE: 1795.97

## 2019-06-12 NOTE — PROGRESS NOTES
S:   - pt in no acute distress  - pt has mild sleep apnea  - still good treatment effect, reduced snoring, better sleep quality  - no functional problems (eating, chewing etc.)  - pt is aware that he needs to change health behaviors  - pt also complained that his upper lip is irritated by the appliance  - no bite problems  - no ear problems  - no dental issues  - no change in stress or job situation  - No family of arthritis or CA relevant for LISBET    O:  - Opening and protrusion: not limited, no pain  - Teeth ok, occlusion sound, checked with articulating paper  - No M. Mass. + Temp palpation pain or discomfort  - No TMJ palpation pain  - No neck palpation pain  - joint clicking  - V and VII are grossly intact  - lips ok, salivary glands ok, oral mucosa ok  - splint has a crack  - took appliance from pt    A:  - Obstructive sleep apnea  - Disc displacement with reduction    P:  - Talked with pt about strategy for appliance  - Two options: new appliance versus appliance repair, pt wants repair because he has Medicare now  - Talked with pt about discussing sleep situation with cardiologist  - Pt has appointment next week with cardiologist  - Total time: 35 min. 25 min spent counseling the patient about appliance treatment and oral health maintenance as well as coordinating care with sleep physician and dentist
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
## 2019-06-12 NOTE — LETTER
6/12/2019       RE: Jose Angel Cha  [address] [facility] [address]     Dear Colleague,    Thank you for referring your patient, Jose Angel Cha, to the [facility] at [facility]. Please see a copy of my visit note below.    S:   - pt in no acute distress  - pt has mild sleep apnea  - still good treatment effect, reduced snoring, better sleep quality  - no functional problems (eating, chewing etc.)  - pt is aware that he needs to change health behaviors  - pt also complained that his upper lip is irritated by the appliance  - no bite problems  - no ear problems  - no dental issues  - no change in stress or job situation  - No family of arthritis or CA relevant for LISBET    O:  - Opening and protrusion: not limited, no pain  - Teeth ok, occlusion sound, checked with articulating paper  - No M. Mass. + Temp palpation pain or discomfort  - No TMJ palpation pain  - No neck palpation pain  - joint clicking  - V and VII are grossly intact  - lips ok, salivary glands ok, oral mucosa ok  - splint has a crack  - took appliance from pt    A:  - Obstructive sleep apnea  - Disc displacement with reduction    P:  - Talked with pt about strategy for appliance  - Two options: new appliance versus appliance repair, pt wants repair because he has Medicare now  - Talked with pt about discussing sleep situation with cardiologist  - Pt has appointment next week with cardiologist  Total time: 35 min. 25 min spent counseling the patient about appliance treatment and oral health maintenance as well as coordinating care with sleep physician and dentist    Again, thank you for allowing me to participate in the care of your patient.      Sincerely,    Berlin Vázquez DDS
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
## 2019-06-13 ENCOUNTER — OFFICE VISIT (OUTPATIENT)
Dept: DERMATOLOGY | Facility: CLINIC | Age: 64
End: 2019-06-13
Attending: PHYSICIAN ASSISTANT
Payer: MEDICARE

## 2019-06-13 DIAGNOSIS — L81.4 SOLAR LENTIGO: ICD-10-CM

## 2019-06-13 DIAGNOSIS — L57.8 SUN-DAMAGED SKIN: ICD-10-CM

## 2019-06-13 DIAGNOSIS — L82.1 SEBORRHEIC KERATOSIS: ICD-10-CM

## 2019-06-13 DIAGNOSIS — D48.9 NEOPLASM OF UNCERTAIN BEHAVIOR: Primary | ICD-10-CM

## 2019-06-13 PROCEDURE — 99202 OFFICE O/P NEW SF 15 MIN: CPT | Mod: 25 | Performed by: DERMATOLOGY

## 2019-06-13 PROCEDURE — 88305 TISSUE EXAM BY PATHOLOGIST: CPT | Mod: TC | Performed by: DERMATOLOGY

## 2019-06-13 PROCEDURE — 11102 TANGNTL BX SKIN SINGLE LES: CPT | Performed by: DERMATOLOGY

## 2019-06-13 ASSESSMENT — PAIN SCALES - GENERAL: PAINLEVEL: NO PAIN (0)
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
## 2019-06-13 NOTE — NURSING NOTE
Jose Angel Cha's goals for this visit include:   Chief Complaint   Patient presents with     Derm Problem     Spot on right ear x 5 mos. Has tried using vaseline and hydrocortisone with no relief. Not itchy or painful, sometimes skin breaks and there is some light bleeding.       He requests these members of his care team be copied on today's visit information: Yes    PCP: Shari Paredes    Referring Provider:  Florence Landers PA-C  [address] [place], [place] [id number]    There were no vitals taken for this visit.    Do you need any medication refills at today's visit? No    Meme Traylor LPN on 6/13/2019 at 11:05 AM

## 2019-06-13 NOTE — NURSING NOTE
Jose Angel Cha's goals for this visit include:   Chief Complaint   Patient presents with     Derm Problem     Spot on right ear x 8 mos. Has tried using vaseline and hydrocortisone with no relief. Not itchy or painful, sometimes skin breaks and there is some light bleeding. Had biopsy done October 2018, was inconclusive,       He requests these members of his care team be copied on today's visit information: Yes    PCP: Shari Paredes    Referring Provider:  Florence Landers PA-C  [address] [place] [address]    There were no vitals taken for this visit.    Do you need any medication refills at today's visit? No      Meme Traylor LPN on 6/13/2019 at 11:07 AM

## 2019-06-13 NOTE — PATIENT INSTRUCTIONS
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
## 2019-06-17 ENCOUNTER — HOSPITAL ENCOUNTER (OUTPATIENT)
Dept: CARDIOLOGY | Facility: CLINIC | Age: 64
Discharge: HOME | End: 2019-06-17
Attending: INTERNAL MEDICINE | Admitting: INTERNAL MEDICINE
Payer: MEDICARE

## 2019-06-17 ENCOUNTER — OFFICE VISIT (OUTPATIENT)
Dept: CARDIOLOGY | Facility: CLINIC | Age: 64
End: 2019-06-17
Attending: INTERNAL MEDICINE
Payer: MEDICARE

## 2019-06-17 VITALS
WEIGHT: 216 LBS | HEART RATE: 79 BPM | HEIGHT: 70 IN | SYSTOLIC BLOOD PRESSURE: 138 MMHG | BODY MASS INDEX: 30.92 KG/M2 | DIASTOLIC BLOOD PRESSURE: 84 MMHG

## 2019-06-17 DIAGNOSIS — I10 BENIGN ESSENTIAL HYPERTENSION: ICD-10-CM

## 2019-06-17 DIAGNOSIS — Z98.890 H/O MITRAL VALVE REPAIR: ICD-10-CM

## 2019-06-17 DIAGNOSIS — E78.2 MIXED HYPERLIPIDEMIA: ICD-10-CM

## 2019-06-17 LAB
ALT SERPL W P-5'-P-CCNC: 27 U/L (ref 5–30)
ANION GAP SERPL CALCULATED.3IONS-SCNC: 13.4 MMOL/L (ref 6–17)
BUN SERPL-MCNC: 14 MG/DL (ref 7–30)
CALCIUM SERPL-MCNC: 9.7 MG/DL (ref 8.5–10.5)
CHLORIDE SERPL-SCNC: 100 MMOL/L (ref 98–107)
CHOLEST SERPL-MCNC: 238 MG/DL
CO2 SERPL-SCNC: 28 MMOL/L (ref 23–29)
COPATH REPORT: NORMAL
CREAT SERPL-MCNC: 1.41 MG/DL (ref 0.7–1.3)
GFR SERPL CREATININE-BSD FRML MDRD: 50 ML/MIN/{1.73_M2}
GLUCOSE SERPL-MCNC: 113 MG/DL (ref 70–105)
HDLC SERPL-MCNC: 58 MG/DL
LDLC SERPL CALC-MCNC: 149 MG/DL
NONHDLC SERPL-MCNC: 180 MG/DL
POTASSIUM SERPL-SCNC: 3.4 MMOL/L (ref 3.5–5.1)
SODIUM SERPL-SCNC: 138 MMOL/L (ref 136–145)
TRIGL SERPL-MCNC: 157 MG/DL

## 2019-06-17 PROCEDURE — 80048 BASIC METABOLIC PNL TOTAL CA: CPT | Performed by: INTERNAL MEDICINE

## 2019-06-17 PROCEDURE — 80061 LIPID PANEL: CPT | Performed by: INTERNAL MEDICINE

## 2019-06-17 PROCEDURE — 93306 TTE W/DOPPLER COMPLETE: CPT | Mod: 26 | Performed by: INTERNAL MEDICINE

## 2019-06-17 PROCEDURE — 36415 COLL VENOUS BLD VENIPUNCTURE: CPT | Performed by: INTERNAL MEDICINE

## 2019-06-17 PROCEDURE — 99214 OFFICE O/P EST MOD 30 MIN: CPT | Performed by: INTERNAL MEDICINE

## 2019-06-17 PROCEDURE — 84460 ALANINE AMINO (ALT) (SGPT): CPT | Performed by: INTERNAL MEDICINE

## 2019-06-17 PROCEDURE — 93306 TTE W/DOPPLER COMPLETE: CPT

## 2019-06-17 ASSESSMENT — MIFFLIN-ST. JEOR: SCORE: 1771.02

## 2019-06-17 NOTE — LETTER
6/17/2019      Shari Paredes MD, MD  [address] [facility] [address]      RE: Jose Angel Cha       Dear Colleague,    I had the pleasure of seeing Jose Angel Cha in the [facility].    Service Date: 06/17/2019      CARDIOLOGY FOLLOWUP      PATIENT HISTORY:  Reverend Jose Angel Cha returned for followup at [facility] in [place].  He is now 64 years old and is temporarily retired.  He is followed for a history of prior mitral valve repair, dyslipidemia and mild ankle edema.  He also has a history of hypertension.      He is overall feeling well.  He completed 2 interim pastorates and has decided to take a 3-month hiatus.  He just returned from a river cruise in the region of [facility].  He and his wife had a wonderful time and he notes that there was no limit on the foods they ate, so he was not surprised when I explained that his lipids were surprisingly abnormal.  He has a complete intolerance to statin therapy.  He had been approved for Repatha, but was on Zetia during that approval process and 1 year ago the LDL fraction had fallen to 106 mg/dL, the HDL was 47 and the total cholesterol was 182 mg/dL.  His lipids have steadily worsened since that time and with his current measurements the LDL was 149, HDL 58 and total cholesterol 238 mg/dL.  He admits he has not been exercising as he typically intends.      Echocardiography performed for this visit demonstrated normal left ventricular systolic performance and a very intact mitral valve repair.  There was no evidence of stenosis and only trace mitral insufficiency.  There was no evidence of pulmonary hypertension.      Reverend Cha has a family history of coronary artery disease, but had no coronary obstructive disease on his preoperative cardiac catheterization.  He denies any chest discomfort and has as noted felt quite well.      He also developed some mild ankle edema which has been treated with  "hydrochlorothiazide.  He admits that during the recent trip he began taking the hydrochlorothiazide on an every other day basis.  He does have evidence of some chronic kidney disease which I mentioned to him.  He notes that he was also taking his nonsteroidal anti-inflammatory medication on a much more frequent basis during the trip as well.  He notes that he previously used it for musculoskeletal discomfort and prior to his heart surgery he thinks he was \"abusing\" the nonsteroidal anti-inflammatory drugs.  He states that he does not need to take the NSAID any longer.  His intention is to stop it.  He has been prescribed hydrochlorothiazide 25 mg daily.  His recent basic metabolic panel revealed a GFR of 50 and a creatinine which had risen to 1.41, but had been 1.14 during 12/2017.      He continues on amlodipine at 2.5 mg daily, aspirin 81 mg daily, and Zetia 10 mg daily.      PHYSICAL EXAMINATION:   GENERAL:  This is a man in no apparent distress.  He was alert and oriented to person, place and time.   VITAL SIGNS:  Blood pressure was 138/84 mmHg, heart rate 79 beats per minute and regular, and respiratory rate 14-18 per minute.   CHEST:  Clear to auscultation.   CARDIAC:  On cardiac auscultation, there was an S1 and an S2 without extra sounds or murmur.  The rhythm was regular.      ASSESSMENT/RECOMMENDATIONS:  With regard to the prior mitral valve repair, I reassured him that it is completely intact.  I have recommended a followup echocardiogram next year.      With regard to his hypertension, he also follows with his primary care provider.  He is on a low dose of amlodipine because a higher dose resulted in worsening leg edema.  He likely has venous insufficiency and I discussed this with him.  He has not had venous insufficiency recently and I explained that hydrochlorothiazide is not necessarily a good long-term therapy.  I did recommend decreasing the HCTZ to 12.5 mg daily.  He will have a followup basic " metabolic panel in 3 months when he has followup lipids.  I have also suggested that he did discuss this with his primary care provider.  He is already aware of the link between NSAIDS and renal dysfunction and will stop the nonsteroidal anti-inflammatory medication that he has been utilizing.      With regard to his lipids, we discussed stopping the ezetimibe and switching to the Repatha now.  He would like to try a 3-month interval of improved diet and increased exercise.  We will reassess the lipids at 3 months, as noted above.  If there has not been a significant fall in the LDL fraction (the goal that I have recommended is 100 mg/dL or less), then I would suggest that Repatha be prescribed.  He has apparently previously obtained preapproval, but that will be checked.      In the absence of any interim problems, I will see him at 1 year with a repeat echocardiogram as described above.  He will call if he has any problems in the interim.         ERIKA PIPER MD, [facility]             D: 2019   T: 2019   MT: USMAN      Name:     KARTIK MILLS   MRN:      [id number]        Account:      [id number]   :      1955           Service Date: 2019      Document: [id number]         Outpatient Encounter Medications as of 2019   Medication Sig Dispense Refill     amLODIPine (NORVASC) 2.5 MG tablet Take 1 tablet (2.5 mg) by mouth daily 90 tablet 2     ASPIRIN 81 MG OR TABS ONE DAILY 0 0     EPINEPHrine (AUVI-Q) 0.3 MG/0.3ML injection 2-pack Inject 0.3 mLs (0.3 mg) into the muscle as needed for anaphylaxis 2 mL 1     EPINEPHrine (EPIPEN 2-MIGUELINA) 0.3 MG/0.3ML injection Inject 0.3 mLs (0.3 mg) into the muscle once as needed for anaphylaxis 2 each 3     ezetimibe (ZETIA) 10 MG tablet Take 1 tablet (10 mg) by mouth daily At night 90 tablet 3     fluticasone (FLONASE) 50 MCG/ACT nasal spray Spray 2 sprays into both nostrils daily 16 g 11     hydrochlorothiazide (HYDRODIURIL) 25 MG tablet Take 0.5 tablets  (12.5 mg) by mouth daily 90 tablet 2     loratadine (CLEAR-ATADINE) 10 MG tablet Take 10 mg by mouth daily       Multiple Vitamins-Minerals (MULTIVITAL PO) Take 1 tablet by mouth daily Reported on 3/22/2017       olopatadine (PATADAY) 0.2 % ophthalmic solution Place 1 drop into both eyes daily 2.5 mL 3     omeprazole (PRILOSEC) 20 MG CR capsule TAKE ONE CAPSULE BY MOUTH EVERY DAY (TAKE 30 TO 60 MINUTES BEFORE A MEAL) 90 capsule 3     polyethylene glycol (MIRALAX) powder Take 17 g (1 capful) by mouth daily 510 g 1     temazepam (RESTORIL) 15 MG capsule 15 mg as needed        ORDER FOR ALLERGEN IMMUNOTHERAPY Cat Hair, Standardized 10,000 BAU/mL, ALK  2.0 ml  Dog Hair-Dander, A. P.  1:100 w/v, HS  1.0 ml  Dust Mites DF 30,000AU/mL, HS  0.3 ml  Dust Mites DP. 30,000 AU/mL, HS  0.3 ml   Birch Mix PRW 1:20 w/v, HS  0.5 ml  Grass Mix #7 100,000 BAU/mL, HS 0.4 ml  Nettle 1:20 w/v, HS 0.5 ml  Diluent: HSA qs to 5ml 5 mL prn     ORDER FOR ALLERGEN IMMUNOTHERAPY Name of Mix: Mix #1  Mixed Vespid  Mixed Vespid Venom 300 mcg/mL HS 13 ml  Diluent: HSA qs to 13ml 13 mL PRN     ORDER FOR ALLERGEN IMMUNOTHERAPY Name of Mix: Mix #2  Wasp  Wasp Venom 100 mcg/mL HS 13 ml  Diluent: HSA qs to 13ml 13 mL PRN     ORDER FOR ALLERGEN IMMUNOTHERAPY Reported on 3/22/2017 13 mL PRN     ORDER FOR ALLERGEN IMMUNOTHERAPY Reported on 3/22/2017 13 mL PRN     STATIN NOT PRESCRIBED, INTENTIONAL, by Other route continuous prn Reported on 4/6/2017  0     [DISCONTINUED] hydrochlorothiazide (HYDRODIURIL) 25 MG tablet Take 1 tablet (25 mg) by mouth daily 90 tablet 2     No facility-administered encounter medications on file as of 6/17/2019.        Again, thank you for allowing me to participate in the care of your patient.      Sincerely,    Ynes Chirinos MD     [facility]

## 2019-06-17 NOTE — PATIENT INSTRUCTIONS
1. Decrease the hydrochlorothiazide to 12.5 mg daily (one-half tablet).    2. We will recheck cholesterol and kidney function in 3 months.
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
## 2019-06-18 RX ORDER — HYDROCHLOROTHIAZIDE 25 MG/1
12.5 TABLET ORAL DAILY
Qty: 90 TABLET | Refills: 2 | Status: SHIPPED | OUTPATIENT
Start: 2019-06-18 | End: 2020-09-09
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
## 2019-06-18 NOTE — PROGRESS NOTES
Service Date: 06/17/2019      CARDIOLOGY FOLLOWUP      PATIENT HISTORY:  Reverend Jose Angel Cha returned for followup at [facility] in [place].  He is now 64 years old and is temporarily retired.  He is followed for a history of prior mitral valve repair, dyslipidemia and mild ankle edema.  He also has a history of hypertension.      He is overall feeling well.  He completed 2 interim pastorates and has decided to take a 3-month hiatus.  He just returned from a river cruise in the region of [facility].  He and his wife had a wonderful time and he notes that there was no limit on the foods they ate, so he was not surprised when I explained that his lipids were surprisingly abnormal.  He has a complete intolerance to statin therapy.  He had been approved for Repatha, but was on Zetia during that approval process and 1 year ago the LDL fraction had fallen to 106 mg/dL, the HDL was 47 and the total cholesterol was 182 mg/dL.  His lipids have steadily worsened since that time and with his current measurements the LDL was 149, HDL 58 and total cholesterol 238 mg/dL.  He admits he has not been exercising as he typically intends.      Echocardiography performed for this visit demonstrated normal left ventricular systolic performance and a very intact mitral valve repair.  There was no evidence of stenosis and only trace mitral insufficiency.  There was no evidence of pulmonary hypertension.      Reverend Cha has a family history of coronary artery disease, but had no coronary obstructive disease on his preoperative cardiac catheterization.  He denies any chest discomfort and has as noted felt quite well.      He also developed some mild ankle edema which has been treated with hydrochlorothiazide.  He admits that during the recent trip he began taking the hydrochlorothiazide on an every other day basis.  He does have evidence of some chronic kidney disease which I mentioned to him.  He notes  "that he was also taking his nonsteroidal anti-inflammatory medication on a much more frequent basis during the trip as well.  He notes that he previously used it for musculoskeletal discomfort and prior to his heart surgery he thinks he was \"abusing\" the nonsteroidal anti-inflammatory drugs.  He states that he does not need to take the NSAID any longer.  His intention is to stop it.  He has been prescribed hydrochlorothiazide 25 mg daily.  His recent basic metabolic panel revealed a GFR of 50 and a creatinine which had risen to 1.41, but had been 1.14 during 12/2017.      He continues on amlodipine at 2.5 mg daily, aspirin 81 mg daily, and Zetia 10 mg daily.      PHYSICAL EXAMINATION:   GENERAL:  This is a man in no apparent distress.  He was alert and oriented to person, place and time.   VITAL SIGNS:  Blood pressure was 138/84 mmHg, heart rate 79 beats per minute and regular, and respiratory rate 14-18 per minute.   CHEST:  Clear to auscultation.   CARDIAC:  On cardiac auscultation, there was an S1 and an S2 without extra sounds or murmur.  The rhythm was regular.      ASSESSMENT/RECOMMENDATIONS:  With regard to the prior mitral valve repair, I reassured him that it is completely intact.  I have recommended a followup echocardiogram next year.      With regard to his hypertension, he also follows with his primary care provider.  He is on a low dose of amlodipine because a higher dose resulted in worsening leg edema.  He likely has venous insufficiency and I discussed this with him.  He has not had venous insufficiency recently and I explained that hydrochlorothiazide is not necessarily a good long-term therapy.  I did recommend decreasing the HCTZ to 12.5 mg daily.  He will have a followup basic metabolic panel in 3 months when he has followup lipids.  I have also suggested that he did discuss this with his primary care provider.  He is already aware of the link between NSAIDS and renal dysfunction and will stop " the nonsteroidal anti-inflammatory medication that he has been utilizing.      With regard to his lipids, we discussed stopping the ezetimibe and switching to the Repatha now.  He would like to try a 3-month interval of improved diet and increased exercise.  We will reassess the lipids at 3 months, as noted above.  If there has not been a significant fall in the LDL fraction (the goal that I have recommended is 100 mg/dL or less), then I would suggest that Repatha be prescribed.  He has apparently previously obtained preapproval, but that will be checked.      In the absence of any interim problems, I will see him at 1 year with a repeat echocardiogram as described above.  He will call if he has any problems in the interim.         ERIKA PIPER MD, [facility]             D: 2019   T: 2019   MT: USMAN      Name:     KARTIK MILLS   MRN:      [id number]        Account:      [id number]   :      1955           Service Date: 2019      Document: [id number]

## 2019-06-19 ENCOUNTER — TELEPHONE (OUTPATIENT)
Dept: DERMATOLOGY | Facility: CLINIC | Age: 64
End: 2019-06-19

## 2019-06-19 DIAGNOSIS — Z79.2 PROPHYLACTIC ANTIBIOTIC: Primary | ICD-10-CM

## 2019-06-19 RX ORDER — CEPHALEXIN 500 MG/1
CAPSULE ORAL
Qty: 4 CAPSULE | Refills: 0 | Status: SHIPPED | OUTPATIENT
Start: 2019-06-19 | End: 2019-08-28

## 2019-06-19 NOTE — TELEPHONE ENCOUNTER
Notes recorded by Teresita Roberts RN on 6/19/2019 at 4:29 PM CDT  I spoke with Davi and reviewed the results.  He vrebalized understanding and agreed with the plan.  Mohs scheduled for 7/3/19.  See 6/19/19 telephone encounter for Mohs previsit information.  Teresita Roberts RN    ------    Notes recorded by Christine Pickard CMA on 6/19/2019 at 1:21 PM CDT  Left message for patient to call [phone].    Christine Pickard CMA    ------    Notes recorded by Amado Morales MD on 6/18/2019 at 5:40 PM CDT  I agree with Mohs.   Ok to schedule after telephone screening unless the patient would prefer a consult.   Thank you.  ------    Notes recorded by Christine Pickard CMA on 6/18/2019 at 9:35 AM CDT  Dr. Mroales,    Please see results and photo and advise.  Patient has never had Mohs.    Christine Pickard CMA    ------    Notes recorded by Gabriella Aldana MD on 6/18/2019 at 8:54 AM CDT  Please let patient know biopsy showed BCC. This will need Mohs to clear. Patient has no history of skin cancer, so please offer him consultation visit if he prefers. Otherwise explain procedure to patient.    Next skin check with me in 6 months.    Gabriella Aldana MD    Department of Dermatology  [facility]: Phone: [phone], Fax:[phone]  [facility]: Phone: [phone], Fax: [phone]       Dermatological path order and indications   Order: [id number]   Status:  Final result   Visible to patient:  Yes (MyChart) Dx:  Neoplasm of uncertain behavior   Component 6d ago   Copath Report Patient Name: KARTIK MILLS   MR#: [id number]   Specimen #: [id number]   Collected: 6/13/2019   Received: 6/13/2019   Reported: 6/17/2019 15:55   Ordering Phy(s): GABRIELLA ALDANA     For improved result formatting, select 'View Enhanced Report Format' under    Linked Documents section.     SPECIMEN(S):   Shave, right anti helix     FINAL  DIAGNOSIS:   Shave, right anti helix:   - Basal cell carcinoma, nodular type, extending to the lateral and deep   margins - (see description)

## 2019-06-19 NOTE — LETTER
"June 19, 2019        Jose Angel Cha  [address] [facility] [address]        Dear Jose Angel,    You have an upcoming appointment with Dr. Morales for Mohs surgery. It is scheduled for 7/3/19 at 8:30 AM. Please check-in 15 minutes prior to your appointment at check-in desk \"D\" on the 2nd floor. Our [place] [address] [address] [facility].  Please review these important reminders:    1) Expect to be here the majority of the morning.  The Mohs surgical procedure can be an extensive surgery requiring multiple stages. Each stage may take 1-2 hours.     2) You may eat breakfast. You may bring snacks or beverages with you.  3) Take all normal medications morning of surgery -- unless otherwise indicated by your physician.    4) You will need a  to be with you if your surgical site is close to your eyes. You can get swelling/bruising immediately after surgery and will go home with a big bulky bandage that could obstruct your view of driving safely.     5) Wear comfortable clothing -- preferably a button down shirt or a loose fitted shirt (to avoid pulling over pressure bandage off when you get home). If your surgery site is on your leg, try wearing loose fitted pants. If your surgery site is on your arm, try wearing a short-sleeve shirt.     6) Bring reading material or other items to help pass time. We do have internet access available if you own a laptop, iPad, etc.    7) Women - do NOT wear make-up. We will be washing it off anyone needing surgery on the face     8) Do NOT stop blood thinning medication unless instructed to do so by your surgeon. This will include: Warfarin, Coumadin, Eliquis, Jantoven, Aspirin, Plavix and Pradaxa. If you are taking Warfarin or Coumadin, please have your INR blood test results sent to our office no more than 7 days prior to your surgery.     9) Tylenol is a good alternative to take for headaches and pain, and can be taken throughout your surgery and postoperative " recovery.    If you need to reschedule or have any questions or concerns, please call me at [phone].    Sincerely,      Teresita Roberts RN

## 2019-06-19 NOTE — TELEPHONE ENCOUNTER
[facility] Dermatologic Surgery Pre-Surgery Screening Note     Pre-screening Information:  Derm Surgery Pre-Surgery Screening 6/19/2019   Transplant No   Immunocompromised No   Hepatitis No   HIV/AIDS No   Bleeding Disorder(s) No   Pacemaker No   Defibrillator No   Brain/Nerve Stimulator No   Patient wears CPAP mask No   Prosthetic Heart Valve No   Lesion on Leg/Groin No   Prophylactic Antibiotic Needed Yes   Hx of Skin Cancer No   Hx of Mohs Surgery No   Current Tobacco Use No   Current Anticoagulant(s) Aspirin       Medications/Allergies  Medications and allergies review with patient: Yes     Current Outpatient Medications   Medication Sig Dispense Refill     amLODIPine (NORVASC) 2.5 MG tablet Take 1 tablet (2.5 mg) by mouth daily 90 tablet 2     ASPIRIN 81 MG OR TABS ONE DAILY 0 0     EPINEPHrine (AUVI-Q) 0.3 MG/0.3ML injection 2-pack Inject 0.3 mLs (0.3 mg) into the muscle as needed for anaphylaxis 2 mL 1     EPINEPHrine (EPIPEN 2-MIGUELINA) 0.3 MG/0.3ML injection Inject 0.3 mLs (0.3 mg) into the muscle once as needed for anaphylaxis 2 each 3     ezetimibe (ZETIA) 10 MG tablet Take 1 tablet (10 mg) by mouth daily At night 90 tablet 3     fluticasone (FLONASE) 50 MCG/ACT nasal spray Spray 2 sprays into both nostrils daily 16 g 11     hydrochlorothiazide (HYDRODIURIL) 25 MG tablet Take 0.5 tablets (12.5 mg) by mouth daily 90 tablet 2     loratadine (CLEAR-ATADINE) 10 MG tablet Take 10 mg by mouth daily       Multiple Vitamins-Minerals (MULTIVITAL PO) Take 1 tablet by mouth daily Reported on 3/22/2017       olopatadine (PATADAY) 0.2 % ophthalmic solution Place 1 drop into both eyes daily 2.5 mL 3     omeprazole (PRILOSEC) 20 MG CR capsule TAKE ONE CAPSULE BY MOUTH EVERY DAY (TAKE 30 TO 60 MINUTES BEFORE A MEAL) 90 capsule 3     ORDER FOR ALLERGEN IMMUNOTHERAPY Cat Hair, Standardized 10,000 BAU/mL, ALK  2.0 ml  Dog Hair-Dander, A. P.  1:100 w/v, HS  1.0 ml  Dust Mites DF 30,000AU/mL, HS  0.3 ml  Dust  Mites DP. 30,000 AU/mL, HS  0.3 ml   Birch Mix PRW 1:20 w/v, HS  0.5 ml  Grass Mix #7 100,000 BAU/mL, HS 0.4 ml  Nettle 1:20 w/v, HS 0.5 ml  Diluent: HSA qs to 5ml 5 mL prn     ORDER FOR ALLERGEN IMMUNOTHERAPY Name of Mix: Mix #1  Mixed Vespid  Mixed Vespid Venom 300 mcg/mL HS 13 ml  Diluent: HSA qs to 13ml 13 mL PRN     ORDER FOR ALLERGEN IMMUNOTHERAPY Name of Mix: Mix #2  Wasp  Wasp Venom 100 mcg/mL HS 13 ml  Diluent: HSA qs to 13ml 13 mL PRN     ORDER FOR ALLERGEN IMMUNOTHERAPY Reported on 3/22/2017 13 mL PRN     ORDER FOR ALLERGEN IMMUNOTHERAPY Reported on 3/22/2017 13 mL PRN     polyethylene glycol (MIRALAX) powder Take 17 g (1 capful) by mouth daily 510 g 1     STATIN NOT PRESCRIBED, INTENTIONAL, by Other route continuous prn Reported on 4/6/2017  0     temazepam (RESTORIL) 15 MG capsule 15 mg as needed        Allergies   Allergen Reactions     Anesthetic Ether      Bee Venom      Demerol Visual Disturbance     Statin [Hmg-Coa-R Inhibitors] Other (See Comments)     Muscle pain       Pertinent Labs:  Last Creatine:   Creatinine   Date Value Ref Range Status   06/17/2019 1.41 (H) 0.70 - 1.30 mg/dL Final     Last INR:   INR   Date Value Ref Range Status   04/23/2010 2.61 (H) 0.86 - 1.14 Final     INR Protime   Date Value Ref Range Status   06/11/2010 2.3          Other Questions:     needed? No    Do you have any mobility issues: No    Do you use any assistive devices/DME? No    Do you have any issues with lying for long periods of time? No    Do you have any other health issue that we should know about: Mitral valve repair    Reminded patient to take any order prophylactic antibiotics 1 hour prior to appointment: Yes    If on a prescribed anticoagulant, advised patient to continue or check with prescribing provider: n/a    If on an OTC anticoagulant/supplement, advised patient to hold these medications 10-14 days prior to surgery and resume 48 hrs after surgery: Yes    Teresita Roberts RN

## 2019-06-20 NOTE — PATIENT INSTRUCTIONS
- Ice 3-4 times daily or heat whichever is most comfortable.   - TYLENOL 1000 mg every 6 hours; maximum daily dose: 3000 mg daily alternating with ibuprofen 400mg every 6 hours with a maximum of 3200mg daily.   - Aspercreme over the counter or icy hot along joint area.   - Start Physical therapy  - Return to clinic in 4 weeks if no improvement. Return to clinic if any worsening symptoms.     ETHEL Paul CNP    
English

## 2019-06-26 ENCOUNTER — ALLIED HEALTH/NURSE VISIT (OUTPATIENT)
Dept: ALLERGY | Facility: OTHER | Age: 64
End: 2019-06-26
Payer: MEDICARE

## 2019-06-26 DIAGNOSIS — T63.441D TOXIC EFFECT OF VENOM OF BEES, UNINTENTIONAL, SUBSEQUENT ENCOUNTER: Primary | ICD-10-CM

## 2019-06-26 PROCEDURE — 95117 IMMUNOTHERAPY INJECTIONS: CPT

## 2019-06-26 PROCEDURE — 99207 ZZC DROP WITH A PROCEDURE: CPT

## 2019-06-26 NOTE — PROGRESS NOTES
Patient presented after waiting 30 minutes with no reaction to allergy injections. Discharged from clinic.    Cornel Tejeda RN....6/26/2019 8:53 AM

## 2019-07-02 ENCOUNTER — ALLIED HEALTH/NURSE VISIT (OUTPATIENT)
Dept: ALLERGY | Facility: OTHER | Age: 64
End: 2019-07-02
Payer: MEDICARE

## 2019-07-02 DIAGNOSIS — J30.1 SEASONAL ALLERGIC RHINITIS DUE TO POLLEN: Primary | ICD-10-CM

## 2019-07-02 PROCEDURE — 99207 ZZC DROP WITH A PROCEDURE: CPT

## 2019-07-02 PROCEDURE — 95115 IMMUNOTHERAPY ONE INJECTION: CPT

## 2019-07-02 NOTE — PROGRESS NOTES
Patient presented after waiting 30 minutes with no reaction to allergy injections. Discharged from clinic.    Soraida Cano RN ............   7/2/2019...9:27 AM

## 2019-07-03 ENCOUNTER — OFFICE VISIT (OUTPATIENT)
Dept: DERMATOLOGY | Facility: CLINIC | Age: 64
End: 2019-07-03
Payer: MEDICARE

## 2019-07-03 VITALS — DIASTOLIC BLOOD PRESSURE: 89 MMHG | SYSTOLIC BLOOD PRESSURE: 128 MMHG | OXYGEN SATURATION: 97 % | HEART RATE: 78 BPM

## 2019-07-03 DIAGNOSIS — C44.212 BASAL CELL CARCINOMA OF ANTIHELIX OF EAR, RIGHT: Primary | ICD-10-CM

## 2019-07-03 PROCEDURE — 17312 MOHS ADDL STAGE: CPT | Performed by: DERMATOLOGY

## 2019-07-03 PROCEDURE — 17311 MOHS 1 STAGE H/N/HF/G: CPT | Performed by: DERMATOLOGY

## 2019-07-03 PROCEDURE — 15275 SKIN SUB GRAFT FACE/NK/HF/G: CPT | Mod: 51 | Performed by: DERMATOLOGY

## 2019-07-03 RX ORDER — GENTAMICIN SULFATE 1 MG/G
OINTMENT TOPICAL
Qty: 30 G | Refills: 0 | Status: CANCELLED | OUTPATIENT
Start: 2019-07-03

## 2019-07-03 ASSESSMENT — PAIN SCALES - GENERAL: PAINLEVEL: NO PAIN (0)

## 2019-07-03 NOTE — LETTER
7/3/2019         RE: Jose Angel Cha  [address]  [facility] [address]        Dear Colleague,    Thank you for referring your patient, Jose Angel Cha, to the [facility]. Please see a copy of my visit note below.    [facility] Dermatologic Surgery Procedure Note    [facility]   [address], [place] [address]     Date of Service:  Jul 3, 2019  Surgery: Mohs micrographic surgery    Case 1  Repair Type: xenograft (Lot: , Exp: 1/2020)  Repair Size: 1.5 x 1.2 cm  Suture Material: Fast Absorbing Gut 5-0  Tumor Type: BCC - Basal cell carcinoma  Location: right antihelix  Derm-Path Accession #: [id number]  PreOp Size: 0.8 x 0.8 cm  PostOp Size: 1.5 x 1.2 cm  Mohs Accession #: [id number]  Level of Defect: perichondrium      Procedure:  We discussed the principles of treatment and most likely complications including scarring, bleeding, infection, swelling, pain, crusting, nerve damage, large wound,  incomplete excision, wound dehiscence,  nerve damage, recurrence, and a second procedure may be recommended to obtain the best cosmetic or functional result.    Informed consent was obtained and the patient underwent the procedure as follows:  The patient was placed supine on the operating table.  The cancer was identified, outlined with a marker, and verified by the patient.  The entire surgical field was prepped with Hibiclens.  The surgical site was anesthetized using Lidocaine 1% with epi 1:100,000.    The area of clinically apparent tumor was debulked. The layer of tissue was then surgically excised using a #15 blade and was then transferred onto a specimen sheet maintaining the orientation of the specimen. Hemostasis was obtained using bipolar electrocoagulation. The wound site was then covered with a dressing while the tissue samples were processed for examination.    The excised tissue was transported to the Mohs  histology laboratory maintaining the tissue orientation.  The tissue specimen was relaxed so that the entire surgical margin was in a a single horizontal plane for sectioning and inked for precise mapping.  A precise reference map was drawn to reflect the sectioning of the specimen, colored inking of the margins, and orientation on the patient.  The tissue was processed using horizontal sectioning of the base and continuous peripheral margins.  The histopathologic sections were reviewed in conjunction with the reference map.    Total blocks: 1    Total slides:  2    Residual tumor was identified and indicated in red on the reference map, identifying the location where further tissue excision was necessary. Therefore, an additional stage of Mohs Micrographic surgery was deemed necessary.     Stage II   The patient was returned to the operating room, and the area prepped in the usual manner. The residual tumor was excised using the reference map as a guide. The specimen was transfered to a labeled specimen sheet maintaining the orientation of the specimen. Hemostasis was obtained and the wound site was covered with a dressing while the tissue was processed for examination.     The excised tissue was transported to the Mohs histology laboratory maintaining orientation. The specimen margins were inked for precise mapping and a reference map was prepared for the is additional stage to maintain precise orientation as described above. The tissue was processed using horizontal sectioning of the base and continuous peripheral margins. The histopathologic sections were reviewed in conjunction with the reference map.     Total blocks: 1    Total slides:  1    There were no cancer cells visualized on examination, therefore Mohs surgery was complete.     XENOGRAFT:   Because of the large size of the defect and in order to facilitate healing by granulation a xenograft was planned.  After Betasept prep and local anesthesia, the  patient was draped.  1 sheet of xenograft material was used and trimmed to fit the defect.  It was placed into the defect and secured/sutured into place circumferentially.   Estimated blood loss, minimal; complications, none; bandaging and wound care, routine. Total anesthesia 2.5 ml. Patient was discharged in good condition and will return for bandage change in 1 week.            Staff involved:    Scribe Disclosure  I, Griffin Lemus, am serving as a scribe to document services personally performed by Dr. Amado Morales DO, based on data collection and the provider's statements to me.     Provider Disclosure:   The documentation recorded by the scribe accurately reflects the services I personally performed and the decisions made by me.    Amado Morales DO    Department of Dermatology  [facility]: Phone: [phone], Fax:[phone]  [facility]: Phone: [phone], Fax: [phone]    Again, thank you for allowing me to participate in the care of your patient.        Sincerely,        Amado Morales MD

## 2019-07-03 NOTE — NURSING NOTE
Jose Angel Cha's goals for this visit include:   Chief Complaint   Patient presents with     Procedure     MOHS BCC right anti helix       He requests these members of his care team be copied on today's visit information:     PCP: Shari Paredes    Referring Provider:  Gabriella Gómez MD  [address] [place] [id number]    BP (!) 147/93 (BP Location: Right arm, Patient Position: Sitting, Cuff Size: Adult Regular)   Pulse 78   SpO2 97%     Do you need any medication refills at today's visit? Mayra Almanzar LPN

## 2019-07-03 NOTE — PROGRESS NOTES
[facility] Mohs Dermatologic Surgery Procedure Note    [facility]   [address], [place] [address]     Date of Service:  Jul 3, 2019  Surgery: Mohs micrographic surgery    Case 1  Repair Type: xenograft (Lot: , Exp: 1/2020)  Repair Size: 1.5 x 1.2 cm  Suture Material: Fast Absorbing Gut 5-0  Tumor Type: BCC - Basal cell carcinoma  Location: right antihelix  Derm-Path Accession #: [id number]  PreOp Size: 0.8 x 0.8 cm  PostOp Size: 1.5 x 1.2 cm  Mohs Accession #: [id number]  Level of Defect: perichondrium      Procedure:  We discussed the principles of treatment and most likely complications including scarring, bleeding, infection, swelling, pain, crusting, nerve damage, large wound,  incomplete excision, wound dehiscence,  nerve damage, recurrence, and a second procedure may be recommended to obtain the best cosmetic or functional result.    Informed consent was obtained and the patient underwent the procedure as follows:  The patient was placed supine on the operating table.  The cancer was identified, outlined with a marker, and verified by the patient.  The entire surgical field was prepped with Hibiclens.  The surgical site was anesthetized using Lidocaine 1% with epi 1:100,000.    The area of clinically apparent tumor was debulked. The layer of tissue was then surgically excised using a #15 blade and was then transferred onto a specimen sheet maintaining the orientation of the specimen. Hemostasis was obtained using bipolar electrocoagulation. The wound site was then covered with a dressing while the tissue samples were processed for examination.    The excised tissue was transported to the Mohs histology laboratory maintaining the tissue orientation.  The tissue specimen was relaxed so that the entire surgical margin was in a a single horizontal plane for sectioning and inked for precise mapping.  A precise reference map was drawn to  reflect the sectioning of the specimen, colored inking of the margins, and orientation on the patient.  The tissue was processed using horizontal sectioning of the base and continuous peripheral margins.  The histopathologic sections were reviewed in conjunction with the reference map.    Total blocks: 1    Total slides:  2    Residual tumor was identified and indicated in red on the reference map, identifying the location where further tissue excision was necessary. Therefore, an additional stage of Mohs Micrographic surgery was deemed necessary.     Stage II   The patient was returned to the operating room, and the area prepped in the usual manner. The residual tumor was excised using the reference map as a guide. The specimen was transfered to a labeled specimen sheet maintaining the orientation of the specimen. Hemostasis was obtained and the wound site was covered with a dressing while the tissue was processed for examination.     The excised tissue was transported to the Mohs histology laboratory maintaining orientation. The specimen margins were inked for precise mapping and a reference map was prepared for the is additional stage to maintain precise orientation as described above. The tissue was processed using horizontal sectioning of the base and continuous peripheral margins. The histopathologic sections were reviewed in conjunction with the reference map.     Total blocks: 1    Total slides:  1    There were no cancer cells visualized on examination, therefore Mohs surgery was complete.     XENOGRAFT:   Because of the large size of the defect and in order to facilitate healing by granulation a xenograft was planned.  After Betasept prep and local anesthesia, the patient was draped.  1 sheet of xenograft material was used and trimmed to fit the defect.  It was placed into the defect and secured/sutured into place circumferentially.   Estimated blood loss, minimal; complications, none; bandaging and wound  care, routine. Total anesthesia 2.5 ml. Patient was discharged in good condition and will return for bandage change in 1 week.            Staff involved:    Scribe Disclosure  I, Griffin Lemus, am serving as a scribe to document services personally performed by Dr. Amado Morales DO, based on data collection and the provider's statements to me.     Provider Disclosure:   The documentation recorded by the scribe accurately reflects the services I personally performed and the decisions made by me.    Amado Morales DO    Department of Dermatology  [facility]: Phone: [phone], Fax:[phone]  [facility]: Phone: [phone], Fax: [phone]

## 2019-07-03 NOTE — NURSING NOTE
Gentamicin, Vaseline and pressure dressing applied to Mohs site on right anti helix.  Wound care instructions reviewed with patient and AVS provided.  Patient verbalized understanding.  No further questions or concerns at this time.      The following medication was given:     MEDICATION:  Lidocaine with epinephrine 1% [id number]  ROUTE: SQ  SITE: see procedure note  DOSE: 2.5cc  LOT #: -[id number]  : Hospira  EXPIRATION DATE: 01-apr-2020  NDC#: 8672-2509-87   Was there drug waste? 0.5cc  Multi-dose vial: Yes    Antoinette Almanzar LPN  July 3, 2019    Prior to injection, verified patient identity using patient's name and date of birth.    Lido with epi 1%    Drug Amount Wasted:  0.5cc  Vial/Syringe: Multi dose vial  Expiration Date:  1-apr-2020    Antoinette Almanzar LPN

## 2019-07-03 NOTE — PATIENT INSTRUCTIONS
"Post Operative Care for Granulating Site:  After your surgery, a pressure bandage will be placed over the area. This will help prevent bleeding. Please follow these instructions as they will help you to prevent complications as your wound heals.  For the First 24 hours After Surgery:  1. Leave the pressure bandage on and keep it dry. If it should come loose, you may retape it, but do not take it off.  2. Relax and take it easy. Do not do any vigorous exercise, heavy lifting, or bending forward. This could cause the wound to bleed.  3. Post-operative pain is usually mild. You may take plain or extra strength Tylenol every 4 hours as needed. Do not take any medicine that contains aspirin, ibuprofen or motrin.  Avoid alcohol and vitamin E as these may increase your tendency to bleed.  4. You may put an ice pack around the bandaged area for 20 minutes every 2-3 hours. This may help reduce swelling, bruising, and pain. Make sure the ice pack is waterproof so that the pressure bandage does not get wet.   5. You may see a small amount of drainage or blood on your pressure bandage. This is normal. However, if drainage or bleeding continues or saturates the bandage, you will need to apply firm pressure over the bandage with a washcloth for 15 minutes. If bleeding continues after applying pressure for 15 minutes, apply an ice pack to the bandaged area for 15 minutes. If bleeding still continues, go to the nearest emergency room.  24 Hours After Surgery  Carefully remove the bandage and start daily wound care and dressing changes. You may also now shower and get the wound wet. Use caution when washing the wound, be gentle.  Daily Wound Care:    Wash with mild soap and water every day until wound appears well-healed.  Rinse well and pat dry.    Apply Vaseline over site with a Q-tip and Re-apply a dressing until wound appears well-healed.  A well healed wound is \"pinked over\" with a shiny surface.  When area is \"pinked over\" it " is no longer necessary to apply Vaseline.      Call Us If:    You have pain that is not controlled with Tylenol.    You have signs or symptoms of an infection, such as: fever over 100 degrees F, redness, warmth, or foul-smelling or yellow/creamy drainage from the wound.    Who should I call with questions?    [facility]: [phone]     [facility]: [phone]    For urgent needs outside of business hours call the [facility] at [phone] and ask for the dermatology resident on call
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
## 2019-07-17 ENCOUNTER — OFFICE VISIT (OUTPATIENT)
Dept: OTOLARYNGOLOGY | Facility: OTHER | Age: 64
End: 2019-07-17
Payer: MEDICARE

## 2019-07-17 ENCOUNTER — ALLIED HEALTH/NURSE VISIT (OUTPATIENT)
Dept: ALLERGY | Facility: OTHER | Age: 64
End: 2019-07-17
Payer: MEDICARE

## 2019-07-17 ENCOUNTER — OFFICE VISIT (OUTPATIENT)
Dept: AUDIOLOGY | Facility: OTHER | Age: 64
End: 2019-07-17
Payer: MEDICARE

## 2019-07-17 VITALS
BODY MASS INDEX: 30.92 KG/M2 | HEIGHT: 70 IN | WEIGHT: 216 LBS | SYSTOLIC BLOOD PRESSURE: 102 MMHG | DIASTOLIC BLOOD PRESSURE: 78 MMHG

## 2019-07-17 DIAGNOSIS — J30.89 ALLERGIC RHINITIS DUE TO DUST MITE: ICD-10-CM

## 2019-07-17 DIAGNOSIS — J30.1 CHRONIC SEASONAL ALLERGIC RHINITIS DUE TO POLLEN: ICD-10-CM

## 2019-07-17 DIAGNOSIS — Z51.6 NEED FOR DESENSITIZATION TO ALLERGENS: Primary | ICD-10-CM

## 2019-07-17 DIAGNOSIS — H90.3 SENSORINEURAL HEARING LOSS, BILATERAL: Primary | ICD-10-CM

## 2019-07-17 DIAGNOSIS — H65.02 NON-RECURRENT ACUTE SEROUS OTITIS MEDIA OF LEFT EAR: Primary | ICD-10-CM

## 2019-07-17 DIAGNOSIS — H90.3 SENSORINEURAL HEARING LOSS, BILATERAL: ICD-10-CM

## 2019-07-17 DIAGNOSIS — J30.81 ALLERGIC RHINITIS DUE TO ANIMAL DANDER: ICD-10-CM

## 2019-07-17 PROCEDURE — 99207 ZZC NO CHARGE LOS: CPT | Performed by: AUDIOLOGIST

## 2019-07-17 PROCEDURE — 99213 OFFICE O/P EST LOW 20 MIN: CPT | Performed by: OTOLARYNGOLOGY

## 2019-07-17 PROCEDURE — 92567 TYMPANOMETRY: CPT | Performed by: AUDIOLOGIST

## 2019-07-17 PROCEDURE — 95115 IMMUNOTHERAPY ONE INJECTION: CPT

## 2019-07-17 PROCEDURE — 92557 COMPREHENSIVE HEARING TEST: CPT | Performed by: AUDIOLOGIST

## 2019-07-17 PROCEDURE — 99207 ZZC DROP WITH A PROCEDURE: CPT

## 2019-07-17 ASSESSMENT — MIFFLIN-ST. JEOR: SCORE: 1771.02

## 2019-07-17 ASSESSMENT — PAIN SCALES - GENERAL: PAINLEVEL: NO PAIN (0)

## 2019-07-17 NOTE — PROGRESS NOTES
AUDIOLOGY REPORT:    Patient was referred from ENT by Dr. Redmond for audiology evaluation. The patient was recently seen for otitis media in the left ear and has a history of Meniere's disease in the right ear. He reports that his hearing in the left ear has improved since the ear infection was treated. He reports that he had a hearing aid for the right ear from 2010 but does not have it anymore. The patient reports that he recently had basal cell carcinoma removed from the right pinna but otherwise denies ear pain bilaterally.    Testing:    Otoscopy:   Otoscopic exam indicates ears are clear of cerumen bilaterally     Tympanograms:    RIGHT: normal eardrum mobility     LEFT:   normal eardrum mobility with borderline negative pressure    Thresholds:   Pure Tone Thresholds assessed using conventional audiometry with good reliability from 250-8000 Hz bilaterally using insert earphones and TDH headphones     RIGHT:  Mild to moderately severe  sensorineural hearing loss    LEFT:    Mild to severe essentially sensorineural hearing loss with normal hearing sensitivity at 1000 and 2000 Hz    Speech Reception Threshold:    RIGHT: 45 dB HL    LEFT:   30 dB HL  Results are in agreement with pure tone average.     Word Recognition Score:     RIGHT: 88% at 85 dB HL using NU-6 recorded word list.    LEFT:   100% at 70 dB HL using NU-6 recorded word list.    Discussed results with the patient.     Patient was returned to ENT for follow up.     Francisco Dean, CCC-A  Licensed Audiologist #[id number]  7/17/2019

## 2019-07-17 NOTE — PROGRESS NOTES
Patient presented after waiting 30 minutes with no reaction to allergy injections. Discharged from clinic.    Soraida Cano RN ............   7/17/2019...3:12 PM

## 2019-07-17 NOTE — TELEPHONE ENCOUNTER
ALLERGY SOLUTION RE-ORDER REQUEST    Jose Angel Cha 1955 MRN: [id number]    DATE NEEDED:  7/23/2019  Vial Color Content   Top Dose       Last Dose     Vial Size  Red 1:1 Cat, Dog, Grass, Dust Mite, Trees, Weeds   Red 1:1 0.5   Red 1:10.5 5ml        Serum reorder consent signed and patient/parent was advised that new serums would be ordered through the pharmacy and billed to their insurance company when they arrive in clinic. Yes    Shot Clinic Location:  [facility]  Ship to Location: [facility]  Serum billed to:  [facility]    Special Instructions:  none      Updated Prescription Needed: No      Requester Signature  Rahel Camilo, RATNA ......7/17/2019...2:12 PM
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
## 2019-07-23 NOTE — TELEPHONE ENCOUNTER
Allergy serums received at [place].     Vials received below:    Vial Color Content                      Vial Size Expiration Date  Red 1:1 Cat, Dog, Grass, Dust Mite, Trees, Weeds 5mL  07/22/2020      Signature  Rahel Camilo CMA ......7/23/2019...11:51 AM

## 2019-07-26 DIAGNOSIS — E78.2 MIXED HYPERLIPIDEMIA: ICD-10-CM

## 2019-07-26 RX ORDER — EZETIMIBE 10 MG/1
10 TABLET ORAL DAILY
Qty: 90 TABLET | Refills: 0 | Status: SHIPPED | OUTPATIENT
Start: 2019-07-26 | End: 2019-10-30

## 2019-07-30 ENCOUNTER — OFFICE VISIT (OUTPATIENT)
Dept: DERMATOLOGY | Facility: CLINIC | Age: 64
End: 2019-07-30
Payer: MEDICARE

## 2019-07-30 DIAGNOSIS — R20.2 NOTALGIA PARESTHETICA: ICD-10-CM

## 2019-07-30 DIAGNOSIS — L81.4 SOLAR LENTIGO: ICD-10-CM

## 2019-07-30 DIAGNOSIS — L57.8 SUN-DAMAGED SKIN: ICD-10-CM

## 2019-07-30 DIAGNOSIS — D22.9 MULTIPLE BENIGN NEVI: ICD-10-CM

## 2019-07-30 DIAGNOSIS — Z85.828 HISTORY OF NONMELANOMA SKIN CANCER: Primary | ICD-10-CM

## 2019-07-30 DIAGNOSIS — L57.3 POIKILODERMA CIVATTE'S: ICD-10-CM

## 2019-07-30 DIAGNOSIS — L85.3 XEROSIS CUTIS: ICD-10-CM

## 2019-07-30 DIAGNOSIS — D23.9 DERMATOFIBROMA: ICD-10-CM

## 2019-07-30 DIAGNOSIS — L82.1 SEBORRHEIC KERATOSIS: ICD-10-CM

## 2019-07-30 PROCEDURE — 99214 OFFICE O/P EST MOD 30 MIN: CPT | Performed by: DERMATOLOGY

## 2019-07-30 RX ORDER — GENTAMICIN SULFATE 1 MG/G
OINTMENT TOPICAL
Qty: 30 G | Refills: 1 | Status: SHIPPED | OUTPATIENT
Start: 2019-07-30 | End: 2019-11-13
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
## 2019-07-30 NOTE — PATIENT INSTRUCTIONS
You can use Sarna lotion on the back to help with the itch    I recommend a cream based moisturizer, like CeraVe or Vanicream to be used on the face     Best to use creams right after showering

## 2019-07-31 ENCOUNTER — ALLIED HEALTH/NURSE VISIT (OUTPATIENT)
Dept: ALLERGY | Facility: OTHER | Age: 64
End: 2019-07-31
Payer: MEDICARE

## 2019-07-31 DIAGNOSIS — Z51.6 NEED FOR DESENSITIZATION TO ALLERGENS: Primary | ICD-10-CM

## 2019-07-31 DIAGNOSIS — T63.91XD TOXIC EFFECT OF VENOM, ACCIDENTAL OR UNINTENTIONAL, SUBSEQUENT ENCOUNTER: Primary | ICD-10-CM

## 2019-07-31 PROCEDURE — 99207 ZZC DROP WITH A PROCEDURE: CPT

## 2019-07-31 PROCEDURE — 95115 IMMUNOTHERAPY ONE INJECTION: CPT

## 2019-07-31 NOTE — PROGRESS NOTES
Patient presented after waiting 30 minutes with no reaction to allergy injections. Discharged from clinic.    Cornel Tejeda RN....7/31/2019 9:15 AM

## 2019-07-31 NOTE — PROGRESS NOTES
Immunotherapy 6/26/2019   Serum #1 Antigen(s) Wasp   Expiration Date #1 8/20/2019   Vial Concentration #1 1:1 (Red)   Dose (mL) #1 1.0   Location #1 [facility]   Antigen Top Dose #1 1   Comment #1 red   Given By    Verified By    Serum #2 Antigen(s) Mx   Expiration Date #2 8/20/2019   Vial Concentration #2 1:1 (Red)   Dose (mL)#2 1.0   Location #2 [facility]   Antigen Top Dose #2 1   Comment #2 red   Given By

## 2019-07-31 NOTE — TELEPHONE ENCOUNTER
ALLERGY SOLUTION RE-ORDER REQUEST    Jose Angel Cha 1955 MRN: [id number]    DATE NEEDED:  8/27/2019  Vial Color Content   Top Dose       Last Dose     Vial Size  Red 1:1 Mixed Vespid   Red 1:1   1   Red 1:1 1 13ml  Red 1:1 Wasp   Red 1:1   1   Red 1:1 1 13ml        Serum reorder consent signed and patient/parent was advised that new serums would be ordered through the pharmacy and billed to their insurance company when they arrive in clinic. Yes    Shot Clinic Location:  [facility]  Ship to Location: [facility]  Serum billed to:  [facility]    Special Instructions:  none      Updated Prescription Needed: No      Requester Signature  Rahel Camilo CMA ......7/31/2019...8:50 AM

## 2019-08-07 DIAGNOSIS — T63.441D TOXIC EFFECT OF VENOM OF BEES, UNINTENTIONAL, SUBSEQUENT ENCOUNTER: Primary | ICD-10-CM

## 2019-08-07 PROCEDURE — 95146 ANTIGEN THERAPY SERVICES: CPT | Performed by: ALLERGY & IMMUNOLOGY

## 2019-08-07 PROCEDURE — 95146 ANTIGEN THERAPY SERVICES: CPT | Mod: 59 | Performed by: ALLERGY & IMMUNOLOGY

## 2019-08-07 NOTE — TELEPHONE ENCOUNTER
Allergy serums received at [place].     Vials received below:    Vial Color Content                      Vial Size Expiration Date  Red 1:1 Wasp 13mL  08/05/2020  Red 1:1 Mixed Vespid 13mL  08/05/2020        Signature  Rahel Camilo [facility] ......8/7/2019...12:43 PM

## 2019-08-07 NOTE — PROGRESS NOTES
Allergy serums billed at [email or web address].     Vials billed below:    Vial Color Content                      Vial Size Expiration Date  Red 1:1 Wasp 12mL  08/05/2020  Red 1:1 Mixed Vespid 12mL  08/05/2020    Original Refill encounter date:07/31/2019    Signature  Rahel Camilo [facility] ......8/7/2019...12:45 PM

## 2019-08-14 ENCOUNTER — ALLIED HEALTH/NURSE VISIT (OUTPATIENT)
Dept: ALLERGY | Facility: OTHER | Age: 64
End: 2019-08-14
Payer: MEDICARE

## 2019-08-14 DIAGNOSIS — I10 BENIGN ESSENTIAL HYPERTENSION: ICD-10-CM

## 2019-08-14 DIAGNOSIS — Z51.6 NEED FOR DESENSITIZATION TO ALLERGENS: Primary | ICD-10-CM

## 2019-08-14 DIAGNOSIS — E78.2 MIXED HYPERLIPIDEMIA: ICD-10-CM

## 2019-08-14 LAB
ANION GAP SERPL CALCULATED.3IONS-SCNC: 10 MMOL/L (ref 3–14)
BUN SERPL-MCNC: 14 MG/DL (ref 7–30)
CALCIUM SERPL-MCNC: 9.1 MG/DL (ref 8.5–10.1)
CHLORIDE SERPL-SCNC: 107 MMOL/L (ref 94–109)
CHOLEST SERPL-MCNC: 225 MG/DL
CO2 SERPL-SCNC: 24 MMOL/L (ref 20–32)
CREAT SERPL-MCNC: 1.25 MG/DL (ref 0.66–1.25)
GFR SERPL CREATININE-BSD FRML MDRD: 60 ML/MIN/{1.73_M2}
GLUCOSE SERPL-MCNC: 100 MG/DL (ref 70–99)
HDLC SERPL-MCNC: 52 MG/DL
LDLC SERPL CALC-MCNC: 141 MG/DL
NONHDLC SERPL-MCNC: 173 MG/DL
POTASSIUM SERPL-SCNC: 4 MMOL/L (ref 3.4–5.3)
SODIUM SERPL-SCNC: 141 MMOL/L (ref 133–144)
TRIGL SERPL-MCNC: 160 MG/DL

## 2019-08-14 PROCEDURE — 36415 COLL VENOUS BLD VENIPUNCTURE: CPT | Performed by: INTERNAL MEDICINE

## 2019-08-14 PROCEDURE — 99207 ZZC DROP WITH A PROCEDURE: CPT

## 2019-08-14 PROCEDURE — 80048 BASIC METABOLIC PNL TOTAL CA: CPT | Performed by: INTERNAL MEDICINE

## 2019-08-14 PROCEDURE — 80061 LIPID PANEL: CPT | Performed by: INTERNAL MEDICINE

## 2019-08-14 PROCEDURE — 95117 IMMUNOTHERAPY INJECTIONS: CPT

## 2019-08-14 NOTE — PROGRESS NOTES
Patient presented after waiting 30 minutes with no reaction to allergy injections. Discharged from clinic.    Cornel Tejeda RN....8/14/2019 9:35 AM

## 2019-08-15 ENCOUNTER — DOCUMENTATION ONLY (OUTPATIENT)
Dept: CARDIOLOGY | Facility: CLINIC | Age: 64
End: 2019-08-15

## 2019-08-15 NOTE — PROGRESS NOTES
"Lipid panel 8/15/19 noted. Ordered after trying diet and exercise to decrease LDL.  MFU=548  Per Dr. Chirinos's dictation 617/19: \"With regard to his lipids, we discussed stopping the ezetimibe and switching to the Repatha now.  He would like to try a 3-month interval of improved diet and increased exercise.  We will reassess the lipids at 3 months, as noted above.  If there has not been a significant fall in the LDL fraction (the goal that I have recommended is 100 mg/dL or less), then I would suggest that Repatha be prescribed.  He has apparently previously obtained preapproval, but that will be checked. \"    Will message Dr. Chirinos to review    "

## 2019-08-16 ENCOUNTER — TELEPHONE (OUTPATIENT)
Dept: FAMILY MEDICINE | Facility: OTHER | Age: 64
End: 2019-08-16

## 2019-08-16 ENCOUNTER — TELEPHONE (OUTPATIENT)
Dept: CARDIOLOGY | Facility: CLINIC | Age: 64
End: 2019-08-16

## 2019-08-16 DIAGNOSIS — E78.2 MIXED HYPERLIPIDEMIA: Primary | ICD-10-CM

## 2019-08-16 NOTE — PROGRESS NOTES
Ynes Chirinos MD  [facility] Heart Team 2 16 hours ago (4:55 PM)         Agree - refer to Mariel to joet Repatha process.  Thanks. CD        Encounter forwarded to Mariel BARAJAS RN for initiation of repatha.

## 2019-08-16 NOTE — TELEPHONE ENCOUNTER
Reason for Call:  Same Day Appointment, Requested Provider:  Shari Paredes MD     PCP: Shari Paredes    Reason for visit: Patient needs follow up for a surgery and is having severe joint pain in his wrist    Duration of symptoms: 1 week    Have you been treated for this in the past? Yes    Additional comments: Patient is requesting to be worked in on August 21st, 23rd, 26th, or the 27th. Patient was not able to attend suggested appointments between those dates and declined seeing another provider. Please inform patient of work in possibilities.     Can we leave a detailed message on this number? YES    Phone number patient can be reached at: Home number on file [phone] (home)    Best Time: any    Call taken on 8/16/2019 at 1:10 PM by Ankit Rodriguez

## 2019-08-16 NOTE — TELEPHONE ENCOUNTER
I do not work 2 of those days. 21st has c3po time if needed or the 26th has a dr only inga Paredes MD

## 2019-08-16 NOTE — TELEPHONE ENCOUNTER
Patient can only come in on the 23rd, 26th, and 27th, and 28th. I put him in a Dr only spot so you can possibly see him at that time, if not we can call him and let him know. He refuses to see anyone else and is gone on other days so hes unable to come in.  Cristina Cabrera MA

## 2019-08-19 ENCOUNTER — TELEPHONE (OUTPATIENT)
Dept: CARDIOLOGY | Facility: CLINIC | Age: 64
End: 2019-08-19

## 2019-08-19 NOTE — TELEPHONE ENCOUNTER
PA Initiation    Medication: Repatha 140mg/ mL Sureclick pens  Insurance Company: [employer] Part D - Phone [phone] Fax [phone]  Pharmacy Filling the Rx: [facility] - [place], [place] - [facility] [address]  Filling Pharmacy Phone: [phone]  Filling Pharmacy Fax:    Start Date: 8/19/2019

## 2019-08-19 NOTE — TELEPHONE ENCOUNTER
PA Initiation    Medication: Praluent 75mg/ mL pens  Insurance Company: [employer] Part D - Phone [phone] Fax [phone]  Pharmacy Filling the Rx: [facility] - [place], [place] - [facility] [address]  Filling Pharmacy Phone: [phone]  Filling Pharmacy Fax:    Start Date: 8/19/2019

## 2019-08-19 NOTE — TELEPHONE ENCOUNTER
RN spoke with patient. Discussed the approval for Praluent. Pt is interested in the PASS program and will look up the application online to review the criteria. He will complete the application and send it to RN for MD signature.

## 2019-08-19 NOTE — TELEPHONE ENCOUNTER
Prior Authorization Approval    Authorization Effective Date: 5/21/2019  Authorization Expiration Date: 8/18/2020  Medication: Praluent 75mg/ mL pens  Approved Dose/Quantity: 2 per 28 days  Reference #: [facility] key# [id number]   Insurance Company: [employer] Part D - Phone [phone] Fax [phone]  Expected CoPay: $232 (1st month)     CoPay Card Available: No    Foundation Assistance Needed:    Which Pharmacy is filling the prescription (Not needed for infusion/clinic administered): [facility] - [place], [place] - [address]  Pharmacy Notified: Yes  Patient Notified:

## 2019-08-19 NOTE — TELEPHONE ENCOUNTER
RN called and left a voicemail to let him know he is approved for Praluent. Asked pt to call RN back.

## 2019-08-19 NOTE — TELEPHONE ENCOUNTER
Within question set for Repatha prior auth, insurance appears to prefer Praluent. Will complete form and alert RNCC, as it does not appear pt has tried/ failed Praluent.

## 2019-08-20 NOTE — TELEPHONE ENCOUNTER
Per pharmacy and RNCC, pt unable to afford $232 copay (uncertain if copay will decrease) and interested in PASS program. Will reach out to pt to assist as needed.    [email or web address]

## 2019-08-21 ENCOUNTER — ALLIED HEALTH/NURSE VISIT (OUTPATIENT)
Dept: ALLERGY | Facility: OTHER | Age: 64
End: 2019-08-21
Payer: MEDICARE

## 2019-08-21 DIAGNOSIS — Z51.6 NEED FOR DESENSITIZATION TO ALLERGENS: Primary | ICD-10-CM

## 2019-08-21 PROCEDURE — 99207 ZZC DROP WITH A PROCEDURE: CPT

## 2019-08-21 PROCEDURE — 95115 IMMUNOTHERAPY ONE INJECTION: CPT

## 2019-08-21 NOTE — PROGRESS NOTES
Patient presented after waiting 30 minutes with no reaction to allergy injections. Discharged from clinic.    Soraida Cano RN, RN ............   8/21/2019...4:34 PM

## 2019-08-22 NOTE — PROGRESS NOTES
"Subjective     Jose Angel Cha is a 64 year old male who presents to clinic today for the following health issues:    HPI   {SUPERLIST (Optional):085772}  {additonal problems for provider to add (Optional):916166}    {HIST REVIEW/ LINKS 2 (Optional):711871}    Reviewed and updated as needed this visit by Provider         Review of Systems   {ROS COMP (Optional):887722}      Objective    There were no vitals taken for this visit.  There is no height or weight on file to calculate BMI.  Physical Exam   {Exam List (Optional):220882}    {Diagnostic Test Results (Optional):866822::\"Diagnostic Test Results:\",\"Labs reviewed in Epic\"}        {PROVIDER CHARTING PREFERENCE:795803}    "

## 2019-08-22 NOTE — TELEPHONE ENCOUNTER
RN spoke with patient. He does not qualify for the PASS program due to income. RN offered samples. Pt is interested. Will call pt once samples arrive. Also offered the phone number for [employer] to check into a medicare plan next year for a reduced co-pay for Praluent. Will discuss training next week.

## 2019-08-26 ENCOUNTER — TELEPHONE (OUTPATIENT)
Dept: CARDIOLOGY | Facility: CLINIC | Age: 64
End: 2019-08-26

## 2019-08-26 DIAGNOSIS — E78.2 MIXED HYPERLIPIDEMIA: Primary | ICD-10-CM

## 2019-08-26 NOTE — TELEPHONE ENCOUNTER
RN spoke with patient regarding samples for Praluent 75 mg. Samples were ordered today and will arrive this week. RN will call pt when they arrive. Pt's wife is a nurse. They will view the online injection training video at [email or web address]. They will call if further training is necessary.

## 2019-08-27 ENCOUNTER — TELEPHONE (OUTPATIENT)
Dept: FAMILY MEDICINE | Facility: OTHER | Age: 64
End: 2019-08-27

## 2019-08-27 NOTE — TELEPHONE ENCOUNTER
May want to clarify if patient is coming for preop and we can adjust questions assigned. Not clear by appt notes.  Shari Paredes MD
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
## 2019-08-27 NOTE — TELEPHONE ENCOUNTER
Pt is unsure if this should be a preop as he does not have his surgery scheduled yet. Needs to discuss with you first as to what is going on. Also, is having right hand thumb/index knuckle pain/swelling. He states has been looked at before and thinks it was maybe a cyst. He is okay with answering preop questions just in case, so have assigned and prepped as preop.  Laly Johnson MA

## 2019-08-28 ENCOUNTER — OFFICE VISIT (OUTPATIENT)
Dept: FAMILY MEDICINE | Facility: OTHER | Age: 64
End: 2019-08-28
Payer: MEDICARE

## 2019-08-28 VITALS
TEMPERATURE: 97.1 F | SYSTOLIC BLOOD PRESSURE: 112 MMHG | DIASTOLIC BLOOD PRESSURE: 84 MMHG | BODY MASS INDEX: 29.96 KG/M2 | OXYGEN SATURATION: 95 % | HEART RATE: 72 BPM | RESPIRATION RATE: 20 BRPM | HEIGHT: 71 IN | WEIGHT: 214 LBS

## 2019-08-28 DIAGNOSIS — K22.70 BARRETT'S ESOPHAGUS WITHOUT DYSPLASIA: ICD-10-CM

## 2019-08-28 DIAGNOSIS — Z87.891 PERSONAL HISTORY OF TOBACCO USE: ICD-10-CM

## 2019-08-28 DIAGNOSIS — F51.01 PRIMARY INSOMNIA: ICD-10-CM

## 2019-08-28 DIAGNOSIS — M19.041 PRIMARY OSTEOARTHRITIS OF RIGHT HAND: ICD-10-CM

## 2019-08-28 DIAGNOSIS — F17.200 TOBACCO USE DISORDER: ICD-10-CM

## 2019-08-28 DIAGNOSIS — E78.2 MIXED HYPERLIPIDEMIA: ICD-10-CM

## 2019-08-28 DIAGNOSIS — E66.01 MORBID OBESITY (H): ICD-10-CM

## 2019-08-28 DIAGNOSIS — I48.0 PAROXYSMAL ATRIAL FIBRILLATION (H): ICD-10-CM

## 2019-08-28 DIAGNOSIS — Z01.818 PREOP GENERAL PHYSICAL EXAM: Primary | ICD-10-CM

## 2019-08-28 DIAGNOSIS — E03.8 SUBCLINICAL HYPOTHYROIDISM: ICD-10-CM

## 2019-08-28 DIAGNOSIS — I10 BENIGN ESSENTIAL HYPERTENSION: ICD-10-CM

## 2019-08-28 PROCEDURE — 93000 ELECTROCARDIOGRAM COMPLETE: CPT | Performed by: FAMILY MEDICINE

## 2019-08-28 PROCEDURE — 99214 OFFICE O/P EST MOD 30 MIN: CPT | Performed by: FAMILY MEDICINE

## 2019-08-28 PROCEDURE — G0296 VISIT TO DETERM LDCT ELIG: HCPCS | Performed by: FAMILY MEDICINE

## 2019-08-28 RX ORDER — TEMAZEPAM 15 MG/1
15 CAPSULE ORAL PRN
Qty: 30 CAPSULE | Refills: 1 | Status: SHIPPED | OUTPATIENT
Start: 2019-08-28 | End: 2020-06-15

## 2019-08-28 ASSESSMENT — MIFFLIN-ST. JEOR: SCORE: 1769.89

## 2019-08-28 ASSESSMENT — PAIN SCALES - GENERAL: PAINLEVEL: MODERATE PAIN (4)
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
## 2019-09-03 ENCOUNTER — TELEPHONE (OUTPATIENT)
Dept: SLEEP MEDICINE | Facility: CLINIC | Age: 64
End: 2019-09-03

## 2019-09-03 NOTE — TELEPHONE ENCOUNTER
Pt called in requesting information on his dental appliance that was sent in for repair by Dr. Vázquez's office.  Gave patient the number for the dental clinic.

## 2019-09-04 ENCOUNTER — OFFICE VISIT (OUTPATIENT)
Dept: SURGERY | Facility: CLINIC | Age: 64
End: 2019-09-04
Payer: MEDICARE

## 2019-09-04 VITALS
DIASTOLIC BLOOD PRESSURE: 70 MMHG | BODY MASS INDEX: 30.38 KG/M2 | TEMPERATURE: 97.7 F | SYSTOLIC BLOOD PRESSURE: 110 MMHG | HEIGHT: 71 IN | WEIGHT: 217 LBS

## 2019-09-04 DIAGNOSIS — K21.00 HIATAL HERNIA WITH GASTROESOPHAGEAL REFLUX DISEASE AND ESOPHAGITIS: Primary | ICD-10-CM

## 2019-09-04 DIAGNOSIS — K44.9 HIATAL HERNIA WITH GASTROESOPHAGEAL REFLUX DISEASE AND ESOPHAGITIS: Primary | ICD-10-CM

## 2019-09-04 PROCEDURE — 99214 OFFICE O/P EST MOD 30 MIN: CPT | Performed by: SURGERY

## 2019-09-04 ASSESSMENT — MIFFLIN-ST. JEOR: SCORE: 1783.5
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
## 2019-09-04 NOTE — TELEPHONE ENCOUNTER
Called pt, no answer, LVM that Praluent 75mg samples are available for  today and requested return call to confirm that he can pick them up today.      Received return call from pt who confirms that he can pick samples up today.  He states he and wife have not watched the injection training video yet but he states they will today and will call if any questions/concerns arise.  Reviewed with pt that will enter order for fasting lipids to be checked in 6-8 weeks after first dose of Praluent. He verbalized understanding and states he will plan to have labs done at [facility].      Pt provided with 8 Praluent 75mg samples.    Lot: [id number]  Exp: 31  10  2020    Reminder sent to call pt in 14wks regarding samples.     LAURI Cohen 10:39 AM 9/4/2019

## 2019-09-05 ENCOUNTER — TELEPHONE (OUTPATIENT)
Facility: CLINIC | Age: 64
End: 2019-09-05

## 2019-09-05 NOTE — TELEPHONE ENCOUNTER
Type of surgery: Laparoscopic hiatal hernia repair with mesh / egd / toupet fundoplication 19423, 32194, 24591  Hiatal hernia with gastroesophageal reflux disease and esophagitis [K44.9, K21.0]  - Primary   Location of surgery: [facility]  Date and time of surgery: 11/15/2019 / tbd   Surgeon: Quirino  Pre-Op Appt Date: 11/13/2019  Post-Op Appt Date: 11/27/2019   Packet sent out: Yes  Pre-cert/Authorization completed:  No prior auth per medicare and suppliment, [employer].(says on the card.)  Date: 09/05/2019

## 2019-09-05 NOTE — ASSESSMENT & PLAN NOTE
Plan for Laparoscopic Hiatal Hernia Repair with Phasix ST mesh and Toupet Fundoplication.    Risks and benefits discussed in detail to include, but not limited to; bleeding, infection, recurrence, dysphagia, damage to the vagus nerve, leak/perforation, and need for further surgery.     Discussion regarding post operative diet of full liquids for 2 weeks was had.
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
## 2019-09-06 NOTE — TELEPHONE ENCOUNTER
Surgery location has changed to [facility]   surgery date is the same    The preops and postop will remain the same     Sent to [facility] and was received. 10/16/2019    Medicare valid 11/01/2019  [facility] follows Medicare.

## 2019-09-10 DIAGNOSIS — I10 BENIGN ESSENTIAL HYPERTENSION: ICD-10-CM

## 2019-09-10 RX ORDER — AMLODIPINE BESYLATE 2.5 MG/1
2.5 TABLET ORAL DAILY
Qty: 90 TABLET | Refills: 3 | Status: SHIPPED | OUTPATIENT
Start: 2019-09-10 | End: 2020-09-09

## 2019-09-11 ENCOUNTER — OFFICE VISIT (OUTPATIENT)
Dept: DENTISTRY | Facility: CLINIC | Age: 64
End: 2019-09-11

## 2019-09-11 ENCOUNTER — ALLIED HEALTH/NURSE VISIT (OUTPATIENT)
Dept: ALLERGY | Facility: OTHER | Age: 64
End: 2019-09-11
Payer: MEDICARE

## 2019-09-11 DIAGNOSIS — G47.33 OSA (OBSTRUCTIVE SLEEP APNEA): Primary | ICD-10-CM

## 2019-09-11 DIAGNOSIS — Z51.6 NEED FOR DESENSITIZATION TO ALLERGENS: Primary | ICD-10-CM

## 2019-09-11 PROCEDURE — 95115 IMMUNOTHERAPY ONE INJECTION: CPT

## 2019-09-11 PROCEDURE — 99207 ZZC DROP WITH A PROCEDURE: CPT

## 2019-09-11 NOTE — LETTER
9/11/2019       RE: Jose Angel Cha  [id number] [facility] [address]     Dear Colleague,    Thank you for referring your patient, Jose Angel Cha, to the [facility] at [facility]. Please see a copy of my visit note below.    S:   - pt in no acute distress  - pt has mild sleep apnea  - repair insert  - no bite problems  - no ear problems  - no dental issues  - no change in stress or job situation  - No family of arthritis or CA relevant for LISBET    O:  - Opening and protrusion: not limited, no pain  - Teeth ok, occlusion sound, checked with articulating paper  - No M. Mass. + Temp palpation pain or discomfort  - No TMJ palpation pain  - No neck palpation pain  - joint clicking  - V and VII are grossly intact  - lips ok, salivary glands ok, oral mucosa ok  - appliance insert  - occlusion good  - splint fit, but pat mentioned initial tightness  - tightness goes away after a minute or so  -     A:  - Obstructive sleep apnea  - Disc displacement with reduction    P:  - Follow-up in 2 weeks  Appliance check    Again, thank you for allowing me to participate in the care of your patient.      Sincerely,    Berlin Vázquez DDS

## 2019-09-11 NOTE — PROGRESS NOTES
Patient presented after waiting 30 minutes with no reaction to allergy injections. Discharged from clinic.    Cornel Tejeda RN....9/11/2019 1:51 PM

## 2019-09-11 NOTE — PROGRESS NOTES
S:   - pt in no acute distress  - pt has mild sleep apnea  - repair insert  - no bite problems  - no ear problems  - no dental issues  - no change in stress or job situation  - No family of arthritis or CA relevant for LISBET    O:  - Opening and protrusion: not limited, no pain  - Teeth ok, occlusion sound, checked with articulating paper  - No M. Mass. + Temp palpation pain or discomfort  - No TMJ palpation pain  - No neck palpation pain  - joint clicking  - V and VII are grossly intact  - lips ok, salivary glands ok, oral mucosa ok  - appliance insert  - occlusion good  - splint fit, but pat mentioned initial tightness  - tightness goes away after a minute or so  -     A:  - Obstructive sleep apnea  - Disc displacement with reduction    P:  - Follow-up in 2 weeks  - Appliance check

## 2019-09-25 ENCOUNTER — ALLIED HEALTH/NURSE VISIT (OUTPATIENT)
Dept: ALLERGY | Facility: OTHER | Age: 64
End: 2019-09-25
Payer: MEDICARE

## 2019-09-25 DIAGNOSIS — Z51.6 NEED FOR DESENSITIZATION TO ALLERGENS: Primary | ICD-10-CM

## 2019-09-25 PROCEDURE — 99207 ZZC DROP WITH A PROCEDURE: CPT

## 2019-09-25 PROCEDURE — 95115 IMMUNOTHERAPY ONE INJECTION: CPT

## 2019-09-25 NOTE — PROGRESS NOTES
Patient presented after waiting 30 minutes with normal reaction to allergy injections. Discharged from clinic.    Angela Bone RN, RN ............   9/25/2019...1:25 PM

## 2019-10-02 ENCOUNTER — ALLIED HEALTH/NURSE VISIT (OUTPATIENT)
Dept: ALLERGY | Facility: OTHER | Age: 64
End: 2019-10-02
Payer: MEDICARE

## 2019-10-02 DIAGNOSIS — Z51.6 NEED FOR DESENSITIZATION TO ALLERGENS: Primary | ICD-10-CM

## 2019-10-02 PROCEDURE — 99207 ZZC DROP WITH A PROCEDURE: CPT

## 2019-10-02 PROCEDURE — 95115 IMMUNOTHERAPY ONE INJECTION: CPT

## 2019-10-02 NOTE — PROGRESS NOTES
Patient presented after waiting 30 minutes with no reaction to allergy injections. Discharged from clinic.    Cornel Tejeda RN....10/2/2019 11:34 AM

## 2019-10-08 ENCOUNTER — TELEPHONE (OUTPATIENT)
Dept: ALLERGY | Facility: OTHER | Age: 64
End: 2019-10-08

## 2019-10-08 NOTE — TELEPHONE ENCOUNTER
In review of patients chart, it was noted that patient is can receive allergy injections every 6 weeks. Patient is scheduled to receive injections on 10/9/19, which will be 8 weeks (56 days). Is it ok for patient to receive injections every 6 to 8 weeks (42-56 days)? Please advise. Soraida Cano RN

## 2019-10-08 NOTE — NURSING NOTE
Jose Angel Cha's goals for this visit include:   Chief Complaint   Patient presents with     Skin Check     Hx BCC, no family hx SC. Not taking immunosuppressants. Areas of concern: dry spot under right eye, inner right shoulder blade itchy spot, spot in front of right ear, under right clavicle patch of skin that keeps rubbing off.       He requests these members of his care team be copied on today's visit information: Yes    PCP: Shari Paredes    Referring Provider:  No referring provider defined for this encounter.    There were no vitals taken for this visit.    Do you need any medication refills at today's visit? No    Meme Traylor LPN       Left detailed message again that lab is at 9:45am and to please call back to scheduled a 4-6 week follow-up appointment.

## 2019-10-09 ENCOUNTER — ALLIED HEALTH/NURSE VISIT (OUTPATIENT)
Dept: ALLERGY | Facility: OTHER | Age: 64
End: 2019-10-09
Payer: MEDICARE

## 2019-10-09 ENCOUNTER — OFFICE VISIT (OUTPATIENT)
Dept: DENTISTRY | Facility: CLINIC | Age: 64
End: 2019-10-09

## 2019-10-09 DIAGNOSIS — T63.441D TOXIC EFFECT OF VENOM OF BEES, UNINTENTIONAL, SUBSEQUENT ENCOUNTER: Primary | ICD-10-CM

## 2019-10-09 DIAGNOSIS — G47.33 OSA (OBSTRUCTIVE SLEEP APNEA): Primary | ICD-10-CM

## 2019-10-09 PROCEDURE — 99207 ZZC DROP WITH A PROCEDURE: CPT

## 2019-10-09 PROCEDURE — 95117 IMMUNOTHERAPY INJECTIONS: CPT

## 2019-10-09 NOTE — PROGRESS NOTES
S:   - pt in no acute distress  - pt has mild sleep apnea  - follow-up after repair insert  - pt does not have appliance with him today  - reports no problems with repaired appliance  - effect is similar  - no bite problems  - no ear problems  - no dental issues  - no change in stress or job situation  - No family of arthritis or CA relevant for LISBET    O:  - Opening and protrusion: not limited, no pain  - Teeth ok, occlusion sound, checked with articulating paper  - No M. Mass. + Temp palpation pain or discomfort  - No TMJ palpation pain  - No neck palpation pain  - joint clicking  - V and VII are grossly intact  - lips ok, salivary glands ok, oral mucosa ok    A:  - Obstructive sleep apnea  - Disc displacement with reduction    P:  - Follow-up in 6 months  - If problem occur, pt should come earlier

## 2019-10-09 NOTE — LETTER
10/9/2019       RE: Jose Angel Cha  [id number] [facility] [address]     Dear Colleague,    Thank you for referring your patient, Jose Angel Cha, to the [facility] at [facility]. Please see a copy of my visit note below.    S:   - pt in no acute distress  - pt has mild sleep apnea  - follow-up after repair insert  - pt does not have appliance with him today  - reports no problems with repaired appliance  - effect is similar  - no bite problems  - no ear problems  - no dental issues  - no change in stress or job situation  - No family of arthritis or CA relevant for LISBET    O:  - Opening and protrusion: not limited, no pain  - Teeth ok, occlusion sound, checked with articulating paper  - No M. Mass. + Temp palpation pain or discomfort  - No TMJ palpation pain  - No neck palpation pain  - joint clicking  - V and VII are grossly intact  - lips ok, salivary glands ok, oral mucosa ok    A:  - Obstructive sleep apnea  - Disc displacement with reduction    P:  - Follow-up in 6 months  If problem occur, pt should come earlier    Again, thank you for allowing me to participate in the care of your patient.      Sincerely,    Berlin Vázquez DDS

## 2019-10-09 NOTE — NURSING NOTE
Patient presented after waiting 30 minutes with no reaction to allergy injections. Discharged from clinic.    Soraida Cano RN, RN ............   10/9/2019...2:10 PM

## 2019-10-16 ENCOUNTER — ALLIED HEALTH/NURSE VISIT (OUTPATIENT)
Dept: ALLERGY | Facility: OTHER | Age: 64
End: 2019-10-16
Payer: MEDICARE

## 2019-10-16 DIAGNOSIS — T63.441D TOXIC EFFECT OF VENOM OF BEES, UNINTENTIONAL, SUBSEQUENT ENCOUNTER: Primary | ICD-10-CM

## 2019-10-16 PROCEDURE — 99207 ZZC DROP WITH A PROCEDURE: CPT

## 2019-10-16 PROCEDURE — 95117 IMMUNOTHERAPY INJECTIONS: CPT

## 2019-10-16 NOTE — PROGRESS NOTES
Patient presented after waiting 30 minutes with no reaction to allergy injections. Discharged from clinic.    Cornel Tejeda RN....10/16/2019 11:38 AM

## 2019-10-23 ENCOUNTER — ALLIED HEALTH/NURSE VISIT (OUTPATIENT)
Dept: ALLERGY | Facility: OTHER | Age: 64
End: 2019-10-23
Payer: MEDICARE

## 2019-10-23 DIAGNOSIS — T63.441D TOXIC EFFECT OF VENOM OF BEES, UNINTENTIONAL, SUBSEQUENT ENCOUNTER: Primary | ICD-10-CM

## 2019-10-23 PROCEDURE — 99207 ZZC DROP WITH A PROCEDURE: CPT

## 2019-10-23 PROCEDURE — 95117 IMMUNOTHERAPY INJECTIONS: CPT

## 2019-10-23 NOTE — PROGRESS NOTES
Patient presented after waiting 30 minutes with no reaction to allergy injections. Discharged from clinic.    Cornel Tejeda RN....10/23/2019 11:40 AM

## 2019-10-29 DIAGNOSIS — K21.9 GASTROESOPHAGEAL REFLUX DISEASE WITHOUT ESOPHAGITIS: ICD-10-CM

## 2019-10-30 ENCOUNTER — ALLIED HEALTH/NURSE VISIT (OUTPATIENT)
Dept: ALLERGY | Facility: OTHER | Age: 64
End: 2019-10-30
Payer: MEDICARE

## 2019-10-30 DIAGNOSIS — E78.2 MIXED HYPERLIPIDEMIA: ICD-10-CM

## 2019-10-30 DIAGNOSIS — Z51.6 NEED FOR DESENSITIZATION TO ALLERGENS: Primary | ICD-10-CM

## 2019-10-30 PROCEDURE — 99207 ZZC DROP WITH A PROCEDURE: CPT

## 2019-10-30 PROCEDURE — 95115 IMMUNOTHERAPY ONE INJECTION: CPT

## 2019-10-30 RX ORDER — EZETIMIBE 10 MG/1
10 TABLET ORAL DAILY
Qty: 90 TABLET | Refills: 3 | Status: SHIPPED | OUTPATIENT
Start: 2019-10-30 | End: 2020-01-02

## 2019-10-30 NOTE — PROGRESS NOTES
Patient presented after waiting 30 minutes with no reaction to allergy injections. Discharged from clinic.    Cornel Tejeda RN....10/30/2019 11:32 AM

## 2019-10-30 NOTE — TELEPHONE ENCOUNTER
Pending Prescriptions:                       Disp   Refills    omeprazole (PRILOSEC) 20 MG DR capsule [P*90 cap*3            Sig: TAKE ONE CAPSULE BY MOUTH EVERY DAY (TAKE 30 TO           60 MINUTES BEFORE A MEAL)    Prescription approved per [facility] Refill Protocol.    Rajwinder Thompson, RN, BSN
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
## 2019-11-07 NOTE — PATIENT INSTRUCTIONS
--Patient is to take all scheduled medications on the day of surgery, although none of these medications are necessary to take on the day of surgery. You may want to check the blood pressure to decide whether or not to take hydrochlorothiazide/Norvasc. You may take all other medications as you normally would with just a small sip of water as necessary.       Before Your Surgery      Call your surgeon if there is any change in your health. This includes signs of a cold or flu (such as a sore throat, runny nose, cough, rash or fever).    Do not smoke, drink alcohol or take over the counter medicine (unless your surgeon or primary care doctor tells you to) for the 24 hours before and after surgery.    If you take prescribed drugs: Follow your doctor s orders about which medicines to take and which to stop until after surgery.    Eating and drinking prior to surgery: follow the instructions from your surgeon    Take a shower or bath the night before surgery. Use the soap your surgeon gave you to gently clean your skin. If you do not have soap from your surgeon, use your regular soap. Do not shave or scrub the surgery site.  Wear clean pajamas and have clean sheets on your bed.

## 2019-11-13 ENCOUNTER — OFFICE VISIT (OUTPATIENT)
Dept: FAMILY MEDICINE | Facility: OTHER | Age: 64
End: 2019-11-13
Payer: MEDICARE

## 2019-11-13 VITALS
HEART RATE: 78 BPM | WEIGHT: 224 LBS | BODY MASS INDEX: 31.36 KG/M2 | OXYGEN SATURATION: 96 % | HEIGHT: 71 IN | DIASTOLIC BLOOD PRESSURE: 90 MMHG | SYSTOLIC BLOOD PRESSURE: 138 MMHG | TEMPERATURE: 97.6 F | RESPIRATION RATE: 16 BRPM

## 2019-11-13 DIAGNOSIS — I48.0 PAROXYSMAL ATRIAL FIBRILLATION (H): ICD-10-CM

## 2019-11-13 DIAGNOSIS — F41.9 ANXIETY: ICD-10-CM

## 2019-11-13 DIAGNOSIS — I10 BENIGN ESSENTIAL HYPERTENSION: ICD-10-CM

## 2019-11-13 DIAGNOSIS — I25.10 CORONARY ARTERY DISEASE INVOLVING NATIVE CORONARY ARTERY OF NATIVE HEART WITHOUT ANGINA PECTORIS: ICD-10-CM

## 2019-11-13 DIAGNOSIS — N40.1 BENIGN LOCALIZED PROSTATIC HYPERPLASIA WITH LOWER URINARY TRACT SYMPTOMS (LUTS): ICD-10-CM

## 2019-11-13 DIAGNOSIS — G47.33 OSA (OBSTRUCTIVE SLEEP APNEA): ICD-10-CM

## 2019-11-13 DIAGNOSIS — Z01.818 PREOP GENERAL PHYSICAL EXAM: Primary | ICD-10-CM

## 2019-11-13 DIAGNOSIS — E78.2 MIXED HYPERLIPIDEMIA: ICD-10-CM

## 2019-11-13 DIAGNOSIS — F51.01 PRIMARY INSOMNIA: ICD-10-CM

## 2019-11-13 DIAGNOSIS — K22.70 BARRETT'S ESOPHAGUS WITHOUT DYSPLASIA: ICD-10-CM

## 2019-11-13 PROCEDURE — 99214 OFFICE O/P EST MOD 30 MIN: CPT | Performed by: FAMILY MEDICINE

## 2019-11-13 RX ORDER — TEMAZEPAM 15 MG/1
15 CAPSULE ORAL PRN
Qty: 30 CAPSULE | Refills: 2 | Status: CANCELLED | OUTPATIENT
Start: 2019-11-13

## 2019-11-13 ASSESSMENT — MIFFLIN-ST. JEOR: SCORE: 1815.25

## 2019-11-19 ENCOUNTER — ANCILLARY PROCEDURE (OUTPATIENT)
Dept: ULTRASOUND IMAGING | Facility: OTHER | Age: 64
End: 2019-11-19
Attending: SURGERY
Payer: MEDICARE

## 2019-11-19 ENCOUNTER — TELEPHONE (OUTPATIENT)
Facility: CLINIC | Age: 64
End: 2019-11-19

## 2019-11-19 DIAGNOSIS — M79.89 SWELLING OF LEFT UPPER EXTREMITY: Primary | ICD-10-CM

## 2019-11-19 DIAGNOSIS — M79.89 SWELLING OF LEFT UPPER EXTREMITY: ICD-10-CM

## 2019-11-19 PROCEDURE — 93971 EXTREMITY STUDY: CPT | Mod: LT

## 2019-11-19 NOTE — TELEPHONE ENCOUNTER
Reason for Call:  Other call back    Detailed comments: patient post hernia surgery stating his hand is swollen and bicep is achy from the IV.  He wants to speak with nurse.  Please call    Phone Number Patient can be reached at: Home number on file [phone] (home)    Best Time: any    Can we leave a detailed message on this number? YES    Call taken on 11/19/2019 at 12:34 PM by Marlene Carroll

## 2019-11-19 NOTE — TELEPHONE ENCOUNTER
Patient having pitting edema in his left hand up to his bicep since his surgery.  The arm feels tight and achey but no redness, heat or pain.  I spoke with Dr. Win who gave verbal order for an upper extremity US to r/o clot and have patient wrap arm and keep elevated above heart level.   He will also see Dr. Win tomorrow at 945.   Patient denies any fever or numbness/tingling. He was educated on the s/s that would warrant seeking care.     Kristina SALGADO, Lead RN, BSN. . .  11/19/2019, 2:25 PM

## 2019-11-20 ENCOUNTER — OFFICE VISIT (OUTPATIENT)
Dept: SURGERY | Facility: CLINIC | Age: 64
End: 2019-11-20
Payer: MEDICARE

## 2019-11-20 VITALS
WEIGHT: 217.3 LBS | TEMPERATURE: 96.3 F | HEART RATE: 75 BPM | SYSTOLIC BLOOD PRESSURE: 128 MMHG | DIASTOLIC BLOOD PRESSURE: 85 MMHG | BODY MASS INDEX: 30.74 KG/M2 | RESPIRATION RATE: 14 BRPM

## 2019-11-20 DIAGNOSIS — I82.619 BASILIC VEIN THROMBOSIS: ICD-10-CM

## 2019-11-20 PROCEDURE — 99024 POSTOP FOLLOW-UP VISIT: CPT | Performed by: SURGERY

## 2019-11-20 ASSESSMENT — PAIN SCALES - GENERAL: PAINLEVEL: MODERATE PAIN (4)
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
## 2019-11-21 PROBLEM — I82.619 BASILIC VEIN THROMBOSIS: Status: ACTIVE | Noted: 2019-11-21

## 2019-11-21 NOTE — ASSESSMENT & PLAN NOTE
Secondary to IV catheter  US shows patent Axillary vein    Conservative mgt  Warm compress  PRN Ibuprofen  Compressive sleeve  Elevate extremity

## 2019-11-27 ENCOUNTER — OFFICE VISIT (OUTPATIENT)
Dept: SURGERY | Facility: CLINIC | Age: 64
End: 2019-11-27
Payer: MEDICARE

## 2019-11-27 VITALS
TEMPERATURE: 96.5 F | SYSTOLIC BLOOD PRESSURE: 120 MMHG | BODY MASS INDEX: 31.36 KG/M2 | WEIGHT: 224 LBS | HEIGHT: 71 IN | DIASTOLIC BLOOD PRESSURE: 80 MMHG

## 2019-11-27 DIAGNOSIS — Z98.890 POST-OPERATIVE STATE: ICD-10-CM

## 2019-11-27 DIAGNOSIS — I82.619 BASILIC VEIN THROMBOSIS: ICD-10-CM

## 2019-11-27 DIAGNOSIS — K22.70 BARRETT'S ESOPHAGUS WITHOUT DYSPLASIA: Primary | ICD-10-CM

## 2019-11-27 PROCEDURE — 99024 POSTOP FOLLOW-UP VISIT: CPT | Performed by: SURGERY

## 2019-11-27 ASSESSMENT — MIFFLIN-ST. JEOR: SCORE: 1815.25
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
## 2019-12-04 ENCOUNTER — ALLIED HEALTH/NURSE VISIT (OUTPATIENT)
Dept: ALLERGY | Facility: OTHER | Age: 64
End: 2019-12-04
Payer: MEDICARE

## 2019-12-04 DIAGNOSIS — Z51.6 NEED FOR DESENSITIZATION TO ALLERGENS: Primary | ICD-10-CM

## 2019-12-04 PROCEDURE — 99207 ZZC DROP WITH A PROCEDURE: CPT

## 2019-12-04 PROCEDURE — 95115 IMMUNOTHERAPY ONE INJECTION: CPT

## 2019-12-04 NOTE — PROGRESS NOTES
Patient presented after waiting 30 minutes with no reaction to allergy injections. Discharged from clinic.    Cornel Tejeda RN....12/4/2019 11:47 AM

## 2019-12-09 NOTE — TELEPHONE ENCOUNTER
"Called and left message with patient regarding \"swelling\".  If things are worse then when I saw him at his post-op, he will need to see me in clinic on Wednesday.    Instructions left with Eileen Jaquez in office today.   "

## 2019-12-09 NOTE — TELEPHONE ENCOUNTER
Pt calling and still having the pain and swelling since surgery would like a call back ASAP from Dr Jackson team. Thank You Mari  Please call pt at#[phone].

## 2019-12-09 NOTE — TELEPHONE ENCOUNTER
"Call to patient-patient states \"arm is getting worse, not really new SX but just having pain that is radiating up to shoulder\".  No numbness or tingling sx- Patient states he has been wearing compression sleve, trying to keep arm elevated. Patient states he Has not been taking any IBU or applying warm compress  to arm. \" I am cautious about taking IBU as I don't want to damage my kidneys\" .    Verbally discussed patient concerns w/Dr. Win.     Informed patient per Dr. Win he would like patient to add warm compress to area, and if possible to start taking IBU. Will schedule appointment with Dr. Win for Wed. Dec. 11 at [facility] .  Patient verbally states he will start taking IBU and applying a warm compress to affected area, will cancel scheduled appointment if he feels the pain is getting better..........Eileen Jaquez CMA  ([facility])  "

## 2019-12-11 ENCOUNTER — ALLIED HEALTH/NURSE VISIT (OUTPATIENT)
Dept: ALLERGY | Facility: OTHER | Age: 64
End: 2019-12-11
Payer: MEDICARE

## 2019-12-11 ENCOUNTER — OFFICE VISIT (OUTPATIENT)
Dept: SURGERY | Facility: CLINIC | Age: 64
End: 2019-12-11
Payer: MEDICARE

## 2019-12-11 VITALS
WEIGHT: 219 LBS | BODY MASS INDEX: 30.66 KG/M2 | SYSTOLIC BLOOD PRESSURE: 124 MMHG | HEIGHT: 71 IN | DIASTOLIC BLOOD PRESSURE: 80 MMHG | TEMPERATURE: 96.3 F

## 2019-12-11 DIAGNOSIS — T63.441D TOXIC EFFECT OF VENOM OF BEES, UNINTENTIONAL, SUBSEQUENT ENCOUNTER: Primary | ICD-10-CM

## 2019-12-11 DIAGNOSIS — I82.619 BASILIC VEIN THROMBOSIS: ICD-10-CM

## 2019-12-11 PROCEDURE — 95117 IMMUNOTHERAPY INJECTIONS: CPT

## 2019-12-11 PROCEDURE — 99213 OFFICE O/P EST LOW 20 MIN: CPT | Mod: 24 | Performed by: SURGERY

## 2019-12-11 PROCEDURE — 99207 ZZC DROP WITH A PROCEDURE: CPT

## 2019-12-11 ASSESSMENT — MIFFLIN-ST. JEOR: SCORE: 1792.57

## 2019-12-11 NOTE — PROGRESS NOTES
Patient presented after waiting 30 minutes with no reaction to allergy injections. Discharged from clinic.    Cornel Tejeda RN....12/11/2019 11:47 AM
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
## 2019-12-12 NOTE — ASSESSMENT & PLAN NOTE
Reported increased discomfort in LUE    Patient was not wearing his compression sleeve or elevating the arm  Denied SOB/KYLIE    After placing the compressive sleeve, PRN Ibuprofen and elevation, discomfort resolved and swelling improved    Continue conservative mgt.  Monitor.
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
## 2019-12-27 DIAGNOSIS — E78.2 MIXED HYPERLIPIDEMIA: ICD-10-CM

## 2019-12-27 DIAGNOSIS — E03.8 SUBCLINICAL HYPOTHYROIDISM: ICD-10-CM

## 2019-12-27 DIAGNOSIS — I10 BENIGN ESSENTIAL HYPERTENSION: ICD-10-CM

## 2019-12-27 LAB
ANION GAP SERPL CALCULATED.3IONS-SCNC: 4 MMOL/L (ref 3–14)
BUN SERPL-MCNC: 14 MG/DL (ref 7–30)
CALCIUM SERPL-MCNC: 9.4 MG/DL (ref 8.5–10.1)
CHLORIDE SERPL-SCNC: 105 MMOL/L (ref 94–109)
CHOLEST SERPL-MCNC: 122 MG/DL
CO2 SERPL-SCNC: 30 MMOL/L (ref 20–32)
CREAT SERPL-MCNC: 1.16 MG/DL (ref 0.66–1.25)
GFR SERPL CREATININE-BSD FRML MDRD: 65 ML/MIN/{1.73_M2}
GLUCOSE SERPL-MCNC: 93 MG/DL (ref 70–99)
HDLC SERPL-MCNC: 57 MG/DL
LDLC SERPL CALC-MCNC: 25 MG/DL
NONHDLC SERPL-MCNC: 65 MG/DL
POTASSIUM SERPL-SCNC: 3.9 MMOL/L (ref 3.4–5.3)
SODIUM SERPL-SCNC: 139 MMOL/L (ref 133–144)
T4 FREE SERPL-MCNC: 0.77 NG/DL (ref 0.76–1.46)
TRIGL SERPL-MCNC: 200 MG/DL
TSH SERPL DL<=0.005 MIU/L-ACNC: 9.46 MU/L (ref 0.4–4)

## 2019-12-27 PROCEDURE — 80061 LIPID PANEL: CPT | Performed by: FAMILY MEDICINE

## 2019-12-27 PROCEDURE — 80048 BASIC METABOLIC PNL TOTAL CA: CPT | Performed by: FAMILY MEDICINE

## 2019-12-27 PROCEDURE — 84443 ASSAY THYROID STIM HORMONE: CPT | Performed by: FAMILY MEDICINE

## 2019-12-27 PROCEDURE — 84439 ASSAY OF FREE THYROXINE: CPT | Performed by: FAMILY MEDICINE

## 2019-12-27 PROCEDURE — 36415 COLL VENOUS BLD VENIPUNCTURE: CPT | Performed by: FAMILY MEDICINE

## 2019-12-30 ENCOUNTER — TELEPHONE (OUTPATIENT)
Dept: CARDIOLOGY | Facility: CLINIC | Age: 64
End: 2019-12-30

## 2019-12-30 DIAGNOSIS — E78.2 MIXED HYPERLIPIDEMIA: Primary | ICD-10-CM

## 2019-12-30 NOTE — TELEPHONE ENCOUNTER
Lipids done 12-27-19   Results :  12-27-19  Chol 122, HDL 57, LDL 25,       Praluent started 8-26-19 Also on Zetia 10 mg daily   8-14-19   Chol 225, HDL 52, ,    6-17-19   Chol 238, HDL 58, ,     12-27-19 TSH  9.46. PCP ordered T3/T4,     Last Dr. Chirinos's OV 6-19-19 - discussed stopping the ezetimibe and switching to the Repatha now.  He would like to try a 3-month interval of improved diet and increased exercise.  We will reassess the lipids at 3 months, as noted above.  If there has not been a significant fall in the LDL fraction (the goal that I have recommended is 100 mg/dL or less), then I would suggest that Repatha be prescribed.     Annual OV due June 2020.     Dr. Chirinos to review,

## 2019-12-31 ENCOUNTER — ALLIED HEALTH/NURSE VISIT (OUTPATIENT)
Dept: ALLERGY | Facility: OTHER | Age: 64
End: 2019-12-31
Payer: MEDICARE

## 2019-12-31 DIAGNOSIS — Z51.6 NEED FOR DESENSITIZATION TO ALLERGENS: Primary | ICD-10-CM

## 2019-12-31 PROCEDURE — 99207 ZZC DROP WITH A PROCEDURE: CPT

## 2019-12-31 PROCEDURE — 95115 IMMUNOTHERAPY ONE INJECTION: CPT

## 2020-01-02 ENCOUNTER — CARE COORDINATION (OUTPATIENT)
Dept: CARDIOLOGY | Facility: CLINIC | Age: 65
End: 2020-01-02

## 2020-01-02 NOTE — TELEPHONE ENCOUNTER
Remarkable fall in LDL - excellent.  Recommend stop Zetia now.  Recheck FLP in 6 months.  Thanks.  CD

## 2020-01-02 NOTE — TELEPHONE ENCOUNTER
Spoke with Patient and FLP's reviewed.  Patient agrees with stopping the zetia and repeat FLP's in 6 months.  Patient transferred to Carlee CARREON,  regarding possible more praulent samples.

## 2020-01-02 NOTE — PROGRESS NOTES
Received call from pt requesting to know if he can have more Praluent samples.  Informed pt that Praluent samples currently not available.  Pt reports he does have a different insurance for medications this year, [employer].  Recommended that new PA be submitted for Praluent to see if co-pay will be more affordable- he agrees with this plan.    Routed to Specialty Pharmacy Clinical Liaison to submit new Praluent PA    LAURI Cohen 10:49 AM 1/2/2020

## 2020-01-02 NOTE — TELEPHONE ENCOUNTER
PA Initiation    Medication: Praluent  Insurance Company: [employer] - Phone [phone] Fax [phone]  Pharmacy Filling the Rx: [facility] [place]  Filling Pharmacy Phone:    Filling Pharmacy Fax:    Start Date: 1/2/2020

## 2020-01-03 NOTE — PROGRESS NOTES
"Subjective     Jose Angel Cha is a 64 year old male who presents to clinic today for the following health issues:    HPI   {SUPERLIST (Optional):662224}  {additonal problems for provider to add (Optional):247505}    {HIST REVIEW/ LINKS 2 (Optional):091836}    Reviewed and updated as needed this visit by Provider         Review of Systems   {ROS COMP (Optional):196360}      Objective    There were no vitals taken for this visit.  There is no height or weight on file to calculate BMI.  Physical Exam   {Exam List (Optional):603099}    {Diagnostic Test Results (Optional):482302::\"Diagnostic Test Results:\",\"Labs reviewed in Epic\"}        {PROVIDER CHARTING PREFERENCE:083728}    "

## 2020-01-03 NOTE — TELEPHONE ENCOUNTER
Prior Authorization Approval    Authorization Effective Date: 12/19/2019  Authorization Expiration Date: 12/31/2099  Medication: Praluent 75mg/ mL pens  Approved Dose/Quantity: 2 per 28 days  Reference #: [facility] key# [id number]   Insurance Company: [employer] - Phone [phone] Fax [phone]  Expected CoPay:       CoPay Card Available: No    Foundation Assistance Needed:    Which Pharmacy is filling the prescription (Not needed for infusion/clinic administered): [facility] [place]  Pharmacy Notified: Yes  Patient Notified:

## 2020-01-04 ASSESSMENT — ANXIETY QUESTIONNAIRES
2. NOT BEING ABLE TO STOP OR CONTROL WORRYING: NOT AT ALL
3. WORRYING TOO MUCH ABOUT DIFFERENT THINGS: NOT AT ALL
GAD7 TOTAL SCORE: 3
7. FEELING AFRAID AS IF SOMETHING AWFUL MIGHT HAPPEN: NOT AT ALL
1. FEELING NERVOUS, ANXIOUS, OR ON EDGE: SEVERAL DAYS
4. TROUBLE RELAXING: SEVERAL DAYS
5. BEING SO RESTLESS THAT IT IS HARD TO SIT STILL: NOT AT ALL
6. BECOMING EASILY ANNOYED OR IRRITABLE: SEVERAL DAYS

## 2020-01-05 ASSESSMENT — ANXIETY QUESTIONNAIRES: GAD7 TOTAL SCORE: 3

## 2020-01-06 NOTE — PROGRESS NOTES
Called and spoke with pt.  Reviewed that new insurance has approved Praluent; however, copay not given to pharmacy Liaison.  Pt states he already spoke with insurance and found out Praluent copay; however, it is still not affordable for him since he is not retired and lives on a fixed income.  Discussed PASS program with pt and recommended he apply to see if he qualifies to receive Praluent at no cost- he agrees with this plan and will submit his portion of PASS application directly to PASS program and will have Dr. Chirinos sign provider portion of PASS applictation and have Specialty Pharmacy Liaison submit to PASS once received.     LAURI Cohen 4:50 PM 1/6/2020

## 2020-01-09 NOTE — PROGRESS NOTES
Called and spoke with pt regarding PASS application.  He reports he is unsure if he will qualify since he does have a pension.  Advised pt to call PASS to discuss his application further- he agrees with this plan and will callback once he talks with PASS.    LAURI oChen 9:12 AM 1/9/2020

## 2020-01-14 DIAGNOSIS — Z91.09 HOUSE DUST MITE ALLERGY: ICD-10-CM

## 2020-01-14 DIAGNOSIS — J30.1 CHRONIC SEASONAL ALLERGIC RHINITIS DUE TO POLLEN: ICD-10-CM

## 2020-01-14 RX ORDER — OLOPATADINE HYDROCHLORIDE 2 MG/ML
1 SOLUTION/ DROPS OPHTHALMIC DAILY
Qty: 2.5 ML | Refills: 2 | Status: SHIPPED | OUTPATIENT
Start: 2020-01-14 | End: 2020-06-11

## 2020-01-14 NOTE — TELEPHONE ENCOUNTER
Requested Prescriptions   Pending Prescriptions Disp Refills     olopatadine (PATADAY) 0.2 % ophthalmic solution 2.5 mL 3     Sig: Place 1 drop into both eyes daily       There is no refill protocol information for this order

## 2020-01-14 NOTE — TELEPHONE ENCOUNTER
"Requested Prescriptions   Pending Prescriptions Disp Refills     olopatadine (PATADAY) 0.2 % ophthalmic solution 2.5 mL 3     Sig: Place 1 drop into both eyes daily       Miscellaneous Opthalmic Allergy Drops Protocol Passed - 1/14/2020  3:56 PM        Passed - Patient is age 4 or older        Passed - Recent (12 mo) or future (30 days) visit within the authorizing provider's specialty     Patient has had an office visit with the authorizing provider or a provider within the authorizing providers department within the previous 12 mos or has a future within next 30 days. See \"Patient Info\" tab in inbasket, or \"Choose Columns\" in Meds & Orders section of the refill encounter.              Passed - Medication is active on med list        Medication filled per [facility] protocol.     Katharina Schultz RN  "

## 2020-01-16 ENCOUNTER — ANCILLARY PROCEDURE (OUTPATIENT)
Dept: GENERAL RADIOLOGY | Facility: OTHER | Age: 65
End: 2020-01-16
Attending: FAMILY MEDICINE
Payer: MEDICARE

## 2020-01-16 ENCOUNTER — OFFICE VISIT (OUTPATIENT)
Dept: FAMILY MEDICINE | Facility: OTHER | Age: 65
End: 2020-01-16
Payer: MEDICARE

## 2020-01-16 VITALS
HEART RATE: 77 BPM | DIASTOLIC BLOOD PRESSURE: 78 MMHG | HEIGHT: 71 IN | BODY MASS INDEX: 30.1 KG/M2 | SYSTOLIC BLOOD PRESSURE: 102 MMHG | RESPIRATION RATE: 18 BRPM | TEMPERATURE: 97.7 F | WEIGHT: 215 LBS | OXYGEN SATURATION: 98 %

## 2020-01-16 DIAGNOSIS — M54.50 LUMBAR BACK PAIN: ICD-10-CM

## 2020-01-16 DIAGNOSIS — I10 BENIGN ESSENTIAL HYPERTENSION: ICD-10-CM

## 2020-01-16 DIAGNOSIS — E03.8 SUBCLINICAL HYPOTHYROIDISM: ICD-10-CM

## 2020-01-16 DIAGNOSIS — R10.11 ABDOMINAL PAIN, RIGHT UPPER QUADRANT: ICD-10-CM

## 2020-01-16 DIAGNOSIS — Z00.00 ENCOUNTER FOR MEDICARE ANNUAL WELLNESS EXAM: Primary | ICD-10-CM

## 2020-01-16 DIAGNOSIS — E78.2 MIXED HYPERLIPIDEMIA: ICD-10-CM

## 2020-01-16 LAB
ALBUMIN SERPL-MCNC: 4.2 G/DL (ref 3.4–5)
ALP SERPL-CCNC: 85 U/L (ref 40–150)
ALT SERPL W P-5'-P-CCNC: 43 U/L (ref 0–70)
ANION GAP SERPL CALCULATED.3IONS-SCNC: 8 MMOL/L (ref 3–14)
AST SERPL W P-5'-P-CCNC: 42 U/L (ref 0–45)
BASOPHILS # BLD AUTO: 0 10E9/L (ref 0–0.2)
BASOPHILS NFR BLD AUTO: 0.4 %
BILIRUB SERPL-MCNC: 0.6 MG/DL (ref 0.2–1.3)
BUN SERPL-MCNC: 22 MG/DL (ref 7–30)
CALCIUM SERPL-MCNC: 9.3 MG/DL (ref 8.5–10.1)
CHLORIDE SERPL-SCNC: 105 MMOL/L (ref 94–109)
CO2 SERPL-SCNC: 28 MMOL/L (ref 20–32)
CREAT SERPL-MCNC: 1.16 MG/DL (ref 0.66–1.25)
DIFFERENTIAL METHOD BLD: ABNORMAL
EOSINOPHIL # BLD AUTO: 0.2 10E9/L (ref 0–0.7)
EOSINOPHIL NFR BLD AUTO: 3.4 %
ERYTHROCYTE [DISTWIDTH] IN BLOOD BY AUTOMATED COUNT: 12.3 % (ref 10–15)
GFR SERPL CREATININE-BSD FRML MDRD: 65 ML/MIN/{1.73_M2}
GLUCOSE SERPL-MCNC: 95 MG/DL (ref 70–99)
HCT VFR BLD AUTO: 47.9 % (ref 40–53)
HGB BLD-MCNC: 16.7 G/DL (ref 13.3–17.7)
LYMPHOCYTES # BLD AUTO: 1.3 10E9/L (ref 0.8–5.3)
LYMPHOCYTES NFR BLD AUTO: 22.7 %
MCH RBC QN AUTO: 36.8 PG (ref 26.5–33)
MCHC RBC AUTO-ENTMCNC: 34.9 G/DL (ref 31.5–36.5)
MCV RBC AUTO: 106 FL (ref 78–100)
MONOCYTES # BLD AUTO: 0.8 10E9/L (ref 0–1.3)
MONOCYTES NFR BLD AUTO: 14.9 %
NEUTROPHILS # BLD AUTO: 3.3 10E9/L (ref 1.6–8.3)
NEUTROPHILS NFR BLD AUTO: 58.6 %
PLATELET # BLD AUTO: 178 10E9/L (ref 150–450)
POTASSIUM SERPL-SCNC: 4.2 MMOL/L (ref 3.4–5.3)
PROT SERPL-MCNC: 7.4 G/DL (ref 6.8–8.8)
RBC # BLD AUTO: 4.54 10E12/L (ref 4.4–5.9)
SODIUM SERPL-SCNC: 141 MMOL/L (ref 133–144)
T4 FREE SERPL-MCNC: 0.87 NG/DL (ref 0.76–1.46)
TSH SERPL DL<=0.005 MIU/L-ACNC: 5.98 MU/L (ref 0.4–4)
WBC # BLD AUTO: 5.6 10E9/L (ref 4–11)

## 2020-01-16 PROCEDURE — 36415 COLL VENOUS BLD VENIPUNCTURE: CPT | Performed by: FAMILY MEDICINE

## 2020-01-16 PROCEDURE — 84439 ASSAY OF FREE THYROXINE: CPT | Performed by: FAMILY MEDICINE

## 2020-01-16 PROCEDURE — 84443 ASSAY THYROID STIM HORMONE: CPT | Performed by: FAMILY MEDICINE

## 2020-01-16 PROCEDURE — 72100 X-RAY EXAM L-S SPINE 2/3 VWS: CPT

## 2020-01-16 PROCEDURE — 99213 OFFICE O/P EST LOW 20 MIN: CPT | Mod: 25 | Performed by: FAMILY MEDICINE

## 2020-01-16 PROCEDURE — G0402 INITIAL PREVENTIVE EXAM: HCPCS | Performed by: FAMILY MEDICINE

## 2020-01-16 PROCEDURE — 80053 COMPREHEN METABOLIC PANEL: CPT | Performed by: FAMILY MEDICINE

## 2020-01-16 PROCEDURE — 80050 GENERAL HEALTH PANEL: CPT | Performed by: FAMILY MEDICINE

## 2020-01-16 RX ORDER — IBUPROFEN 600 MG/1
600 TABLET, FILM COATED ORAL
COMMUNITY
Start: 2019-11-16 | End: 2023-06-01

## 2020-01-16 ASSESSMENT — ANXIETY QUESTIONNAIRES
GAD7 TOTAL SCORE: 3
2. NOT BEING ABLE TO STOP OR CONTROL WORRYING: NOT AT ALL
7. FEELING AFRAID AS IF SOMETHING AWFUL MIGHT HAPPEN: NOT AT ALL
7. FEELING AFRAID AS IF SOMETHING AWFUL MIGHT HAPPEN: NOT AT ALL
1. FEELING NERVOUS, ANXIOUS, OR ON EDGE: SEVERAL DAYS
5. BEING SO RESTLESS THAT IT IS HARD TO SIT STILL: NOT AT ALL
4. TROUBLE RELAXING: SEVERAL DAYS
3. WORRYING TOO MUCH ABOUT DIFFERENT THINGS: NOT AT ALL
6. BECOMING EASILY ANNOYED OR IRRITABLE: SEVERAL DAYS
GAD7 TOTAL SCORE: 3

## 2020-01-16 ASSESSMENT — PAIN SCALES - GENERAL: PAINLEVEL: MILD PAIN (3)

## 2020-01-16 ASSESSMENT — MIFFLIN-ST. JEOR: SCORE: 1775.86

## 2020-01-16 ASSESSMENT — ACTIVITIES OF DAILY LIVING (ADL): CURRENT_FUNCTION: NO ASSISTANCE NEEDED

## 2020-01-16 NOTE — RESULT ENCOUNTER NOTE
Davi, sorry it took me so long to get back to you. But your blood did not show any infection typical of an appendicitis and you xray shows multiple levels of arthritis, but no fracture. We are still waiting on several more labs before everything was in, but had offered an US as well. Send a message if you are interested in getting one scheduled and I will order it so you can schedule.  Shari Paredes MD

## 2020-01-16 NOTE — PROGRESS NOTES
The patient was provided with suggestions to help him develop a healthy physical lifestyle.  He is at risk for lack of exercise and has been provided with information to increase physical activity for the benefit of his well-being.  The patient reports that he drinks more than one alcoholic drink per day and sometimes engages in binge or excessive drinking. He was counseled and given information about possible harmful effects of excessive alcohol intake as well as where to get help for alcohol problems.  The patient was provided with written information regarding signs of hearing loss.

## 2020-01-16 NOTE — PATIENT INSTRUCTIONS
Patient Education   Personalized Prevention Plan  You are due for the preventive services outlined below.  Your care team is available to assist you in scheduling these services.  If you have already completed any of these items, please share that information with your care team to update in your medical record.  Health Maintenance Due   Topic Date Due     HIV Screening  05/24/1970     Comprehensive Metabolic Panel  12/18/2018     FALL RISK ASSESSMENT  05/24/2019     AORTIC ANEURYSM SCREENING (SYSTEM ASSIGNED)  05/24/2019     Annual Wellness Visit  09/12/2019     Your Health Risk Assessment indicates you feel you are not in good health    A healthy lifestyle helps keep the body fit and the mind alert. It helps protect you from disease, helps you fight disease, and helps prevent chronic disease (disease that doesn't go away) from getting worse. This is important as you get older and begin to notice twinges in muscles and joints and a decline in the strength and stamina you once took for granted. A healthy lifestyle includes good healthcare, good nutrition, weight control, recreation, and regular exercise. Avoid harmful substances and do what you can to keep safe. Another part of a healthy lifestyle is stay mentally active and socially involved.    Good healthcare     Have a wellness visit every year.     If you have new symptoms, let us know right away. Don't wait until the next checkup.     Take medicines exactly as prescribed and keep your medicines in a safe place. Tell us if your medicine causes problems.   Healthy diet and weight control     Eat 3 or 4 small, nutritious, low-fat, high-fiber meals a day. Include a variety of fruits, vegetables, and whole-grain foods.     Make sure you get enough calcium in your diet. Calcium, vitamin D, and exercise help prevent osteoporosis (bone thinning).     If you live alone, try eating with others when you can. That way you get a good meal and have company while you eat  it.     Try to keep a healthy weight. If you eat more calories than your body uses for energy, it will be stored as fat and you will gain weight.     Recreation   Recreation is not limited to sports and team events. It includes any activity that provides relaxation, interest, enjoyment, and exercise. Recreation provides an outlet for physical, mental, and social energy. It can give a sense of worth and achievement. It can help you stay healthy.    Mental Exercise and Social Involvement  Mental and emotional health is as important as physical health. Keep in touch with friends and family. Stay as active as possible. Continue to learn and challenge yourself.   Things you can do to stay mentally active are:    Learn something new, like a foreign language or musical instrument.     Play SCRABBLE or do crossword puzzles. If you cannot find people to play these games with you at home, you can play them with others on your computer through the Internet.     Join a games club--anything from card games to chess or checkers or lawn bowling.     Start a new hobby.     Go back to school.     Volunteer.     Read.   Keep up with world events.    Exercise for a Healthier Heart     Exercise with a friend. When activity is fun, you're more likely to stick with it.   You may wonder how you can improve the health of your heart. If you re thinking about exercise, you re on the right track. You don t need to become an athlete, but you do need a certain amount of brisk exercise to help strengthen your heart. If you have been diagnosed with a heart condition, your doctor may recommend exercise to help stabilize your condition. To help make exercise a habit, choose safe, fun activities.  Be sure to check with your healthcare provider before starting an exercise program.  Why exercise?  Exercising regularly offers many healthy rewards. It can help you do all of the following:    Improve your blood cholesterol level to help prevent further  heart trouble    Lower your blood pressure to help prevent a stroke or heart attack    Control diabetes, or reduce your risk of getting this disease    Improve your heart and lung function    Reach and maintain a healthy weight    Make your muscles stronger and more limber so you can stay active    Prevent falls and fractures by slowing the loss of bone mass (osteoporosis)    Manage stress better    Reduce your blood pressure    Improve your sense of self and your body image  Exercise tips  Ease into your routine. Set small goals. Then build on them.  Exercise on most days. Aim for a total of 150 or more minutes of moderate to  vigorous intensity activity each week. Consider 40 minutes, 3 to 4 times a week. For best results, activity should last for 40 minutes on average. It is OK to work up to the 40 minute period over time. Examples of moderate-intensity activity is walking 1 mile in 15 minutes or 30 to 45 minutes of yard work.  Step up your daily activity level. Along with your exercise program, try being more active throughout the day. Walk instead of drive. Do more household tasks or yard work.  Choose one or more activities you enjoy. Walking is one of the easiest things you can do. You can also try swimming, riding a bike, dancing, or taking an exercise class.  Stop exercising and call your doctor if you:    Have chest pain or feel dizzy or lightheaded    Feel burning, tightness, pressure, or heaviness in your chest, neck, shoulders, back, or arms    Have unusual shortness of breath    Have increased joint or muscle pain    Have palpitations or an irregular heartbeat  Date Last Reviewed: 5/1/2016 2000-2019 [employer]. [address] [place] [id number]. All rights reserved. This information is not intended as a substitute for professional medical care. Always follow your healthcare professional's instructions.         Patient Education   Alcohol Use     Many people can enjoy a glass  of wine or beer without any negative consequences to their health. According to the Centers for Disease Control and Prevention (CDC), having one or fewer drinks per day for women and two or fewer per day for men is considered moderate drinking.     When people drink more than moderately, it can become concerning. Excessive drinking is defined as consuming 15 drinks or more per week for men and 8 drinks or more per week for women. There are various health problems associated with excessive drinking, which include:    Damage to vital organs like the heart, brain, liver and pancreas    Harm to the digestive tract    Weaken the immune system    Higher risk for heart disease and cancer    There are many resources available to people who are addicted to alcohol. A counselor or other health care provider can give you support. So can a , , or rabbi who is trained in substance abuse counseling. Friends and family may also help once you are connected with professionals.           Signs of Hearing Loss     Hearing much better with one ear can be a sign of hearing loss.     Hearing loss is a problem shared by many people. In fact, it is one of the most common health conditions, particularly as people age. Most people over age 65 have some hearing loss, and by age 80, almost everyone does. Because hearing loss usually occurs slowly over the years, you may not realize your hearing ability has gotten worse.  Have your hearing checked  Contact your healthcare provider if you:    Have to strain to hear normal conversation    Have to watch other people s faces very carefully to follow what they re saying    Need to ask people to repeat what they ve said    Often misunderstand what people are saying    Turn the volume of the television or radio up so high that others complain    Feel that people are mumbling when they re talking to you    Find that the effort to hear leaves you feeling tired and irritated    Notice, when  using the phone, that you hear better with one ear than the other  Date Last Reviewed: 12/1/2016 2000-2019 [employer]. [address] [place] [address], [place] [address]. All rights reserved. This information is not intended as a substitute for professional medical care. Always follow your healthcare professional's instructions.

## 2020-01-16 NOTE — PROGRESS NOTES
"SUBJECTIVE:   Jose Angel Cha is a 64 year old male who presents for Preventive Visit.      Are you in the first 12 months of your Medicare coverage?  Yes,  Visual Acuity:  Right Eye: 20/20   Left Eye: 20/16  Both Eyes: 20/16 - screening done corrected/with glasses on    Healthy Habits:     In general, how would you rate your overall health?  Fair    Frequency of exercise:  None    Do you usually eat at least 4 servings of fruit and vegetables a day, include whole grains    & fiber and avoid regularly eating high fat or \"junk\" foods?  Yes    Taking medications regularly:  Yes    Barriers to taking medications:  None    Medication side effects:  None    Ability to successfully perform activities of daily living:  No assistance needed    Home Safety:  No safety concerns identified    Hearing Impairment:  Need to ask people to speak up or repeat themselves (hearing aid broken not able to afford new one )    In the past 6 months, have you been bothered by leaking of urine?  No    In general, how would you rate your overall mental or emotional health?  Very good      PHQ-2 Total Score: 1    Additional concerns today:  Yes  Musculoskeletal Problem   This is a new problem. The current episode started 1 to 4 weeks ago. The problem occurs constantly. The problem has been gradually worsening. The symptoms are aggravated by bending. The treatment provided no relief.   History of Present Illness        He eats 4 or more servings of fruits and vegetables daily.He consumes 3 sweetened beverage(s) daily.   He is not taking prescribed medications regularly due to None.      Low back pain and feels that makes him fair health.  Do you feel safe in your environment? Yes    Have you ever done Advance Care Planning? (For example, a Health Directive, POLST, or a discussion with a medical provider or your loved ones about your wishes): Yes, patient states has an Advance Care Planning document and will bring a copy to the clinic.      Fall " risk  Fallen 2 or more times in the past year?: (P) No  Any fall with injury in the past year?: (P) No    Cognitive Screening   1) Repeat 3 items (Leader, Season, Table)    2) Clock draw: NORMAL  3) 3 item recall: Recalls 3 objects  Results: NORMAL CLOCK 3 items recalled   Mini-CogTM Copyright KAREN Stein. Licensed by the author for use in [facility]; reprinted with permission ([email or web address]). All rights reserved.      Do you have sleep apnea, excessive snoring or daytime drowsiness?: no    Reviewed and updated as needed this visit by clinical staff  Tobacco  Allergies  Meds  Problems  Med Hx  Surg Hx  Fam Hx  Soc Hx          Reviewed and updated as needed this visit by Provider  Tobacco  Allergies  Meds  Problems  Med Hx  Surg Hx  Fam Hx        Social History     Tobacco Use     Smoking status: Former Smoker     Types: Cigars     Last attempt to quit: 11/10/2017     Years since quittin.1     Smokeless tobacco: Never Used     Tobacco comment: Occasional  Cigar   Substance Use Topics     Alcohol use: Yes     Comment: 3-4 glasses wine/night     If you drink alcohol do you typically have >3 drinks per day or >7 drinks per week? Yes      Alcohol Use 2020   Prescreen: >3 drinks/day or >7 drinks/week? -   Prescreen: >3 drinks/day or >7 drinks/week? -   AUDIT SCORE  11     AUDIT - Alcohol Use Disorders Identification Test - Reproduced from the World Health Organization Audit 2001 (Second Edition) 2020   1.  How often do you have a drink containing alcohol? 4 or more times a week   2.  How many drinks containing alcohol do you have on a typical day when you are drinking? 3 or 4   3.  How often do you have five or more drinks on one occasion? Less than monthly   4.  How often during the last year have you found that you were not able to stop drinking once you had started? Never   5.  How often during the last year have you failed to do what was normally expected of you because of  drinking? Never   6.  How often during the last year have you needed a first drink in the morning to get yourself going after a heavy drinking session? Never   7.  How often during the last year have you had a feeling of guilt or remorse after drinking? Less than monthly   8.  How often during the last year have you been unable to remember what happened the night before because of your drinking? Never   9.  Have you or someone else been injured because of your drinking? No   10. Has a relative, friend, doctor or other health care worker been concerned about your drinking or suggested you cut down? Yes, during the last year   TOTAL SCORE 11           Hyperlipidemia Follow-Up      Are you regularly taking any medication or supplement to lower your cholesterol?   No    Are you having muscle aches or other side effects that you think could be caused by your cholesterol lowering medication?  n/a    Hypertension Follow-up      Do you check your blood pressure regularly outside of the clinic? Yes     Are you following a low salt diet? Yes    Are your blood pressures ever more than 140 on the top number (systolic) OR more   than 90 on the bottom number (diastolic), for example 140/90? Yes    Anxiety Follow-Up    How are you doing with your anxiety since your last visit? Improved     Are you having other symptoms that might be associated with anxiety? No    Have you had a significant life event? OTHER: Daughter geting       Are you feeling depressed? No    Do you have any concerns with your use of alcohol or other drugs? No    Social History     Tobacco Use     Smoking status: Former Smoker     Types: Cigars     Last attempt to quit: 11/10/2017     Years since quittin.1     Smokeless tobacco: Never Used     Tobacco comment: Occasional  Cigar   Substance Use Topics     Alcohol use: Yes     Comment: 3-4 glasses wine/night     Drug use: No     PANKAJ-7 SCORE 2018   Total Score - - -   Total Score  "3 (minimal anxiety) - 3 (minimal anxiety)   Total Score 3 3 3     PHQ 9/12/2018   PHQ-9 Total Score 5   Q9: Thoughts of better off dead/self-harm past 2 weeks Not at all         Hypothyroidism Follow-up      Since last visit, patient describes the following symptoms: dry skin, constipation and fatigue      Current providers sharing in care for this patient include:   Patient Care Team:  Shari Paredes MD as PCP - General (Family Practice)  Shari Paredes MD as Assigned PCP    The following health maintenance items are reviewed in Epic and correct as of today:  Health Maintenance   Topic Date Due     HIV SCREENING  05/24/1970     CMP  12/18/2018     FALL RISK ASSESSMENT  05/24/2019     AORTIC ANEURYSM SCREENING (SYSTEM ASSIGNED)  05/24/2019     MEDICARE ANNUAL WELLNESS VISIT  09/12/2019     TOBACCO CESSATION COUNSELING  08/28/2020     PNEUMOCOCCAL IMMUNIZATION 65+ LOW/MEDIUM RISK (2 of 2 - PPSV23) 09/11/2020     BMP  12/27/2020     LIPID  12/27/2020     COLONOSCOPY  09/08/2022     DTAP/TDAP/TD IMMUNIZATION (2 - Td) 06/20/2023     ADVANCE CARE PLANNING  01/16/2025     HEPATITIS C SCREENING  Completed     PHQ-2  Completed     INFLUENZA VACCINE  Completed     ZOSTER IMMUNIZATION  Completed     IPV IMMUNIZATION  Aged Out     MENINGITIS IMMUNIZATION  Aged Out     Lab work is in process      Review of Systems  Constitutional, HEENT, cardiovascular, pulmonary, GI, , musculoskeletal, neuro, skin, endocrine and psych systems are negative, except as otherwise noted.    OBJECTIVE:   /78 (BP Location: Left arm, Patient Position: Sitting, Cuff Size: Adult Large)   Pulse 77   Temp 97.7  F (36.5  C) (Temporal)   Resp 18   Ht 1.793 m (5' 10.59\")   Wt 97.5 kg (215 lb)   SpO2 98%   BMI 30.33 kg/m   Estimated body mass index is 30.33 kg/m  as calculated from the following:    Height as of this encounter: 1.793 m (5' 10.59\").    Weight as of this encounter: 97.5 kg (215 lb).  Physical Exam          ASSESSMENT / " "PLAN:       ICD-10-CM    1. Encounter for Medicare annual wellness exam Z00.00    2. Subclinical hypothyroidism E03.9 TSH with free T4 reflex   3. Benign essential hypertension I10 Comprehensive metabolic panel (BMP + Alb, Alk Phos, ALT, AST, Total. Bili, TP)   4. Mixed hyperlipidemia E78.2 Comprehensive metabolic panel (BMP + Alb, Alk Phos, ALT, AST, Total. Bili, TP)   5. Lumbar back pain M54.5 XR Lumbar Spine 2/3 Views   6. Abdominal pain, right upper quadrant R10.11 CBC with platelets and differential     Mood can be influenced by alcohol, though he states he is just drinking more as he prepares for his daughter's wedding and has been socializing. Has some financial concerns which has been worsening his anxiety with the upcoming wedding and he states new hearing aids were too expensive to replace at this time.  Low back pain had been doing well, but worsened lately, not sure if stress, but as it seemed to be so much worse lately, got Xray to reassure that it is not a compression fracture. Has done well with PT in past and plans to return when he gets time in  Near future.  Also updated labs as he had a R upper ab pain today and had h/o R lower ab pain in past that had him concerned for appendix. Though never had fever or chills with this. No food has made it better and it does not come and go with the low back pain.  Advised US and planned to schedule today, but there was delay in my return after back xray and patient left. Message left to call if still interested in scheduling.  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    COUNSELING:  Reviewed preventive health counseling, as reflected in patient instructions       Regular exercise       Healthy diet/nutrition    Estimated body mass index is 30.33 kg/m  as calculated from the following:    Height as of this encounter: 1.793 m (5' 10.59\").    Weight as of this encounter: 97.5 kg (215 lb).    Weight management plan: " Discussed healthy diet and exercise guidelines     reports that he quit smoking about 2 years ago. His smoking use included cigars. He has never used smokeless tobacco.      Appropriate preventive services were discussed with this patient, including applicable screening as appropriate for cardiovascular disease, diabetes, osteopenia/osteoporosis, and glaucoma.  As appropriate for age/gender, discussed screening for colorectal cancer, prostate cancer, breast cancer, and cervical cancer. Checklist reviewing preventive services available has been given to the patient.    Reviewed patients plan of care and provided an AVS. The Basic Care Plan (routine screening as documented in Health Maintenance) for Jose Angel meets the Care Plan requirement. This Care Plan has been established and reviewed with the Patient.    Counseling Resources:  ATP IV Guidelines  Pooled Cohorts Equation Calculator  Breast Cancer Risk Calculator  FRAX Risk Assessment  ICSI Preventive Guidelines  Dietary Guidelines for Americans, 2010  USDA's MyPlate  ASA Prophylaxis  Lung CA Screening    Shari Paredes MD, MD  [facility]    Identified Health Risks:  Answers for HPI/ROS submitted by the patient on 1/16/2020   Chronic problems general questions HPI Form  PANKAJ 7 TOTAL SCORE: 3

## 2020-01-17 NOTE — RESULT ENCOUNTER NOTE
Davi, your thyroid has much improved otherwise labs look good.  Please let me know if you have any questions.  Shari Paredes MD

## 2020-01-27 ENCOUNTER — ANCILLARY PROCEDURE (OUTPATIENT)
Dept: ULTRASOUND IMAGING | Facility: OTHER | Age: 65
End: 2020-01-27
Attending: FAMILY MEDICINE
Payer: MEDICARE

## 2020-01-27 DIAGNOSIS — R10.11 ABDOMINAL PAIN, RIGHT UPPER QUADRANT: ICD-10-CM

## 2020-01-27 PROCEDURE — 76700 US EXAM ABDOM COMPLETE: CPT

## 2020-01-27 NOTE — RESULT ENCOUNTER NOTE
Davi, your results generally look good. Just a small cyst seen on the R kidney, but generally you will not have much for symptoms from this. Please let us know if there is a pattern for the symptoms or if they are worsening and we need to reevaluate.   Please let me know if you have any questions.  Shari Paredes MD

## 2020-01-29 ENCOUNTER — ALLIED HEALTH/NURSE VISIT (OUTPATIENT)
Dept: ALLERGY | Facility: OTHER | Age: 65
End: 2020-01-29
Payer: MEDICARE

## 2020-01-29 DIAGNOSIS — Z51.6 NEED FOR DESENSITIZATION TO ALLERGENS: Primary | ICD-10-CM

## 2020-01-29 PROCEDURE — 95115 IMMUNOTHERAPY ONE INJECTION: CPT

## 2020-01-29 PROCEDURE — 99207 ZZC DROP WITH A PROCEDURE: CPT

## 2020-01-29 NOTE — NURSING NOTE
Patient presented after waiting 30 minutes with no reaction to allergy injections. Discharged from clinic.    Soraida Cano RN, RN ............   1/29/2020...4:46 PM

## 2020-01-29 NOTE — PROGRESS NOTES
Called pt, no answer, LVM requesting an update on what pt found out from [employer] program and how he would like to proceed.     LAURI Cohen 1:11 PM 1/29/2020

## 2020-01-30 ENCOUNTER — TELEPHONE (OUTPATIENT)
Dept: FAMILY MEDICINE | Facility: OTHER | Age: 65
End: 2020-01-30

## 2020-01-30 DIAGNOSIS — J30.1 CHRONIC SEASONAL ALLERGIC RHINITIS DUE TO POLLEN: Primary | ICD-10-CM

## 2020-01-30 NOTE — PROGRESS NOTES
Received VM from pt who reports that he spoke to [employer] representative who confirmed that his income/savings/social security does disqualify him from receiving Praluent at no cost. He reports he also looked at [employer]; however, monthly cost is still not affordable.    Will review with Specialty Pharmacy Liaison to see if pt would qualify for any seth options.    LAURI Cohen 1:26 PM 1/30/2020

## 2020-01-30 NOTE — TELEPHONE ENCOUNTER
I called and spoke to Davi he is going to get his exact income info and call me back so we can try enrolling him in [employer].  I let him know I was off at 4 and he said he would call back tomorrow.

## 2020-01-30 NOTE — TELEPHONE ENCOUNTER
Reason for Call:  Other     Detailed comments: Patient is calling needing a prior  authorization for his medication EPINEPHrine (AUVI-Q) 0.3 MG/0.3ML injection 2-pack sent to Medicare phone# [phone]. Patient don't have a Fax# for Medicare.     Phone Number Patient can be reached at: Cell number on file:    Telephone Information:   Mobile [phone]       Best Time: Anytime     Can we leave a detailed message on this number? YES    Call taken on 1/30/2020 at 4:07 PM by Michelle Chairez

## 2020-01-31 RX ORDER — EPINEPHRINE 0.3 MG/.3ML
0.3 INJECTION SUBCUTANEOUS PRN
Qty: 0.6 ML | Refills: 1 | Status: SHIPPED | OUTPATIENT
Start: 2020-01-31 | End: 2021-06-02

## 2020-01-31 NOTE — TELEPHONE ENCOUNTER
RN left message for patient to return call to [phone].  Left detailed message and requested he let us know which pharmacy to send the EpiPen to.    Sofi Fong RN

## 2020-01-31 NOTE — TELEPHONE ENCOUNTER
Davi called back and it turns out his income is closer to $150,000 a year which is well above the seth guidelines.  I explained to him that with medicare part D he has to pay for 25% of the cost once his deductible is met (about $120/month)..  After this he asked if it would be cheaper to only do 1 pen a month.  I let him know it would likely be less but I wasn't sure that was okay medically.  I told him I would send the nurse a message to call him back about the lower dosing.

## 2020-01-31 NOTE — TELEPHONE ENCOUNTER
[employer]'s AuviQ program is not available for patients on medicare or medicaid.  We can prescribe a regular EpiPen for patient to be sent to local pharmacy.  Patient will need to be contacted to confirm local pharmacy.    Sofi Fong RN

## 2020-01-31 NOTE — PROGRESS NOTES
RN spoke with patient in response to a question to pharmacy. Pt wanted to know if he could decrease his Praluent dose to 1 per month. RN advised pt the drug is only indicated for twice monthly. Pt states he cannot afford the co-pay and he does not tolerate statins. Will route to Dr. Chirinos's team for review and recommendation

## 2020-01-31 NOTE — TELEPHONE ENCOUNTER
Spoke with patient.  He would like refill sent to [facility] in [place].    Signed Prescriptions:                        Disp   Refills    EPINEPHrine (EPIPEN/ADRENACLICK/OR ANY BX *0.6 mL 1        Sig: Inject 0.3 mLs (0.3 mg) into the muscle as needed for           anaphylaxis  Authorizing Provider: TAMERA LEYVA  Ordering User: JO FONG RN refilled medication per [facility] Refill Protocol.     Jo Fong RN

## 2020-02-05 ENCOUNTER — ALLIED HEALTH/NURSE VISIT (OUTPATIENT)
Dept: ALLERGY | Facility: OTHER | Age: 65
End: 2020-02-05
Payer: MEDICARE

## 2020-02-05 DIAGNOSIS — T63.441D TOXIC EFFECT OF VENOM OF BEES, UNINTENTIONAL, SUBSEQUENT ENCOUNTER: Primary | ICD-10-CM

## 2020-02-05 PROCEDURE — 99207 ZZC DROP WITH A PROCEDURE: CPT

## 2020-02-05 PROCEDURE — 95117 IMMUNOTHERAPY INJECTIONS: CPT

## 2020-02-05 NOTE — NURSING NOTE
Patient presented after waiting 30 minutes with no reaction to allergy injections. Discharged from clinic.    Soraida Cano RN, RN ............   2/5/2020...3:55 PM

## 2020-02-06 ENCOUNTER — OFFICE VISIT (OUTPATIENT)
Dept: DERMATOLOGY | Facility: CLINIC | Age: 65
End: 2020-02-06
Payer: MEDICARE

## 2020-02-06 DIAGNOSIS — L82.1 SEBORRHEIC KERATOSIS: ICD-10-CM

## 2020-02-06 DIAGNOSIS — D23.9 DERMATOFIBROMA: ICD-10-CM

## 2020-02-06 DIAGNOSIS — D22.9 MULTIPLE BENIGN NEVI: ICD-10-CM

## 2020-02-06 DIAGNOSIS — D18.01 CHERRY ANGIOMA: ICD-10-CM

## 2020-02-06 DIAGNOSIS — L57.8 SUN-DAMAGED SKIN: ICD-10-CM

## 2020-02-06 DIAGNOSIS — L81.4 SOLAR LENTIGO: ICD-10-CM

## 2020-02-06 DIAGNOSIS — L57.0 ACTINIC KERATOSIS: Primary | ICD-10-CM

## 2020-02-06 DIAGNOSIS — L57.3 POIKILODERMA CIVATTE'S: ICD-10-CM

## 2020-02-06 DIAGNOSIS — Z85.828 HISTORY OF NONMELANOMA SKIN CANCER: ICD-10-CM

## 2020-02-06 DIAGNOSIS — R20.2 NOTALGIA PARESTHETICA: ICD-10-CM

## 2020-02-06 PROCEDURE — 17000 DESTRUCT PREMALG LESION: CPT | Performed by: DERMATOLOGY

## 2020-02-06 PROCEDURE — 17003 DESTRUCT PREMALG LES 2-14: CPT | Performed by: DERMATOLOGY

## 2020-02-06 PROCEDURE — 99214 OFFICE O/P EST MOD 30 MIN: CPT | Mod: 25 | Performed by: DERMATOLOGY
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
## 2020-02-06 NOTE — PATIENT INSTRUCTIONS
Cryotherapy    What is it?    Use of a very cold liquid, such as liquid nitrogen, to freeze and destroy abnormal skin cells that need to be removed    What should I expect?    Tenderness and redness    A small blister that might grow and fill with dark purple blood. There may be crusting.    More than one treatment may be needed if the lesions do not go away.    How do I care for the treated area?    Gently wash the area with your hands when bathing.    Use a thin layer of Vaseline to help with healing. You may use a Band-Aid.     The area should heal within 7-10 days and may leave behind a pink or lighter color.     Do not use an antibiotic or Neosporin ointment.     You may take acetaminophen (Tylenol) for pain.     Call your Doctor if you have:    Severe pain    Signs of infection (warmth, redness, cloudy yellow drainage, and or a bad smell)    Questions or concerns    Who should I call with questions?       [facility]: [phone]       [facility]: [phone]       For urgent needs outside of business hours call the [facility] at [phone]        and ask for the dermatology resident on call

## 2020-02-06 NOTE — NURSING NOTE
Jose Angel Cha's goals for this visit include:   Chief Complaint   Patient presents with     Skin Check     History of BCC, NMSC. No family history of skin cancer. Itchy spot of concern on right scapula     He requests these members of his care team be copied on today's visit information:     PCP: Shari Paredes    Referring Provider:  No referring provider defined for this encounter.    There were no vitals taken for this visit.    Do you need any medication refills at today's visit? No
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
## 2020-02-07 NOTE — PROGRESS NOTES
Ynes Chirinos MD  You; [facility] Heart Team 2 Just now (3:57 PM)      We will need to return to Zetia 10 mg daily unless his insurance relents.   Thanks.   CD        Called patient to review Dr Chirinos's recommendation to restart zetia, LVM to call back.

## 2020-02-10 ENCOUNTER — TELEPHONE (OUTPATIENT)
Dept: CARDIOLOGY | Facility: CLINIC | Age: 65
End: 2020-02-10

## 2020-02-10 DIAGNOSIS — E78.2 MIXED HYPERLIPIDEMIA: Primary | ICD-10-CM

## 2020-02-10 RX ORDER — EZETIMIBE 10 MG/1
10 TABLET ORAL DAILY
Qty: 90 TABLET | Refills: 3 | Status: SHIPPED | OUTPATIENT
Start: 2020-02-10 | End: 2021-03-03

## 2020-02-10 NOTE — TELEPHONE ENCOUNTER
Message from Dr. Chirinos re: no insurance coverage for praluent:  We will need to return to Zetia 10 mg daily unless his insurance relents.   Thanks.   CD     Spoke with patient to review plan to go back to zetia 10mg daily until he can reapply for the PCSK9 injections. Patient verbalized understanding and agreed with plan. Rx escripted.
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
## 2020-02-26 ENCOUNTER — ALLIED HEALTH/NURSE VISIT (OUTPATIENT)
Dept: ALLERGY | Facility: OTHER | Age: 65
End: 2020-02-26
Payer: MEDICARE

## 2020-02-26 DIAGNOSIS — Z51.6 NEED FOR DESENSITIZATION TO ALLERGENS: Primary | ICD-10-CM

## 2020-02-26 PROCEDURE — 99207 ZZC DROP WITH A PROCEDURE: CPT

## 2020-02-26 PROCEDURE — 95115 IMMUNOTHERAPY ONE INJECTION: CPT

## 2020-02-26 NOTE — NURSING NOTE
Patient presented after waiting 30 minutes with no reaction to allergy injections. Discharged from clinic.    Kristina Wen RN, RN ............   2/26/2020...1:04 PM

## 2020-02-27 ENCOUNTER — TELEPHONE (OUTPATIENT)
Dept: FAMILY MEDICINE | Facility: OTHER | Age: 65
End: 2020-02-27

## 2020-02-27 NOTE — TELEPHONE ENCOUNTER
AuviQ not needed.  Patient has medicare is is not eligible for AuviQ program.  An EpiPen was already sent to his local pharmacy last month and he was notified at that time.    Sofi Fong RN

## 2020-02-27 NOTE — TELEPHONE ENCOUNTER
Reason for call:  Other  Reason for Call: Request for an order or referral:    Order or referral being requested: Diagnosis code for Avique - needs this for assistance with high copay cost    Date needed: as soon as possible    Has the patient been seen by the PCP for this problem? YES    Additional comments: Please call to give diagnosis code    Phone number Patient can be reached at:  Other phone number:  [phone]  Or fax diagnosis code to: [phone]      Best Time:  ASAP    Can we leave a detailed message on this number?  YES    Call taken on 2/27/2020 at 11:40 AM by Annie Wilson

## 2020-03-16 ENCOUNTER — TELEPHONE (OUTPATIENT)
Dept: ALLERGY | Facility: OTHER | Age: 65
End: 2020-03-16

## 2020-03-16 NOTE — TELEPHONE ENCOUNTER
Left message   Due to the recent COVID-19 pandemic, we are temporarily closing the allergy shot clinic. Please do not come in for your allergy shot appointment 3/18/2020. We will contact you regarding future scheduling.      Nurys Toscano MA on 3/16/2020 at 2:07 PM

## 2020-04-23 ENCOUNTER — MYC MEDICAL ADVICE (OUTPATIENT)
Dept: ALLERGY | Facility: CLINIC | Age: 65
End: 2020-04-23

## 2020-05-18 ENCOUNTER — TELEPHONE (OUTPATIENT)
Dept: ALLERGY | Facility: OTHER | Age: 65
End: 2020-05-18

## 2020-05-18 NOTE — TELEPHONE ENCOUNTER
Please advise new dosing orders, building schedule, and date which orders are valid through.  Patient's last shot was 2/5/20, dose was 1.0 in venoms, next appointment scheduled for 5/19/2020 which will be 83 days. Patient also receives environmental allergy shots. Will address orders for this when patient schedules appointment to receive them.      New orders will be added to immunotherapy flowsheet once they are received.     Soraida Cano RN

## 2020-05-18 NOTE — TELEPHONE ENCOUNTER
Reason for Call:  Other appointment    Detailed comments: Patient calling, he had to cancel his appointments due to him being stuck in [place]. He would like to know if he can get in to see Dr. Cardozo and have an Allergy Shot later this week. Please call back to advise.    Phone Number Patient can be reached at: Cell number on file:    Telephone Information:   Mobile [phone]       Best Time: any    Can we leave a detailed message on this number? YES    Call taken on 5/18/2020 at 11:25 AM by Tapan Hair

## 2020-06-11 DIAGNOSIS — J30.1 CHRONIC SEASONAL ALLERGIC RHINITIS DUE TO POLLEN: ICD-10-CM

## 2020-06-11 DIAGNOSIS — Z91.09 HOUSE DUST MITE ALLERGY: ICD-10-CM

## 2020-06-11 RX ORDER — OLOPATADINE HYDROCHLORIDE 2 MG/ML
1 SOLUTION/ DROPS OPHTHALMIC DAILY
Qty: 2.5 ML | Refills: 0 | Status: SHIPPED | OUTPATIENT
Start: 2020-06-11 | End: 2020-08-10

## 2020-06-11 NOTE — TELEPHONE ENCOUNTER
Signed Prescriptions:                        Disp   Refills    olopatadine (PATADAY) 0.2 % ophthalmic sol*2.5 mL 0        Sig: Place 1 drop into both eyes daily  Authorizing Provider: TAMERA LEYVA  Ordering User: JO FONG    Medication is being filled for 1 time refill only due to:  Patient needs to be seen because it has been more than one year since last visit.  Appt already scheduled.    Jo Fong RN

## 2020-06-12 NOTE — TELEPHONE ENCOUNTER
Please advise new dosing orders, building schedule, and date which orders are valid through.      Venom: last shot was 2/5/2020, dose was 1.0 mL red, next appointment scheduled for 6/30/2020 which will be 146 days.     New orders will be added to immunotherapy flowsheet once they are received.     Soraida Cano RN

## 2020-06-15 ENCOUNTER — MYC REFILL (OUTPATIENT)
Dept: FAMILY MEDICINE | Facility: OTHER | Age: 65
End: 2020-06-15

## 2020-06-15 DIAGNOSIS — F51.01 PRIMARY INSOMNIA: ICD-10-CM

## 2020-06-16 RX ORDER — TEMAZEPAM 15 MG/1
15 CAPSULE ORAL PRN
Qty: 30 CAPSULE | Refills: 1 | Status: SHIPPED | OUTPATIENT
Start: 2020-06-16 | End: 2021-04-21

## 2020-06-16 NOTE — TELEPHONE ENCOUNTER
Pending Prescriptions:                       Disp   Refills    temazepam (RESTORIL) 15 MG capsule         30 cap*1        Sig: Take 1 capsule (15 mg) by mouth as needed for sleep    Last Written Prescription Date:  8/28/19  Last Fill Quantity: 30,  # refills: 1   Last office visit: 1/16/2020  Future Office Visit:   Next 5 appointments (look out 90 days)    Jun 30, 2020 10:00 AM CDT  [facility],   [facility] [address] [place] [address]  [phone]   Jun 30, 2020 10:30 AM CDT  Nurse Only with AN ALLERGY SHOTS  [facility] [address] [place] [address]  [phone]         Routing refill request to provider for review/approval because:  Drug not on the [facility] refill protocol     Rajwinder Thompson, JONATHANN, RN, PHN

## 2020-06-30 ENCOUNTER — OFFICE VISIT (OUTPATIENT)
Dept: ALLERGY | Facility: CLINIC | Age: 65
End: 2020-06-30
Payer: MEDICARE

## 2020-06-30 ENCOUNTER — TELEPHONE (OUTPATIENT)
Dept: ALLERGY | Facility: OTHER | Age: 65
End: 2020-06-30

## 2020-06-30 ENCOUNTER — ALLIED HEALTH/NURSE VISIT (OUTPATIENT)
Dept: ALLERGY | Facility: CLINIC | Age: 65
End: 2020-06-30
Payer: MEDICARE

## 2020-06-30 VITALS
HEART RATE: 72 BPM | SYSTOLIC BLOOD PRESSURE: 118 MMHG | DIASTOLIC BLOOD PRESSURE: 72 MMHG | WEIGHT: 220 LBS | BODY MASS INDEX: 30.8 KG/M2 | HEIGHT: 71 IN | OXYGEN SATURATION: 98 %

## 2020-06-30 DIAGNOSIS — T63.441D TOXIC EFFECT OF VENOM OF BEES, UNINTENTIONAL, SUBSEQUENT ENCOUNTER: Primary | ICD-10-CM

## 2020-06-30 DIAGNOSIS — Z91.09 HOUSE DUST MITE ALLERGY: ICD-10-CM

## 2020-06-30 DIAGNOSIS — T63.91XD VENOM-INDUCED ANAPHYLAXIS, ACCIDENTAL OR UNINTENTIONAL, SUBSEQUENT ENCOUNTER: ICD-10-CM

## 2020-06-30 DIAGNOSIS — J30.1 CHRONIC SEASONAL ALLERGIC RHINITIS DUE TO POLLEN: ICD-10-CM

## 2020-06-30 DIAGNOSIS — J30.81 ALLERGIC RHINITIS DUE TO ANIMAL DANDER: Primary | ICD-10-CM

## 2020-06-30 PROCEDURE — 99214 OFFICE O/P EST MOD 30 MIN: CPT | Mod: 25 | Performed by: ALLERGY & IMMUNOLOGY

## 2020-06-30 PROCEDURE — 95117 IMMUNOTHERAPY INJECTIONS: CPT

## 2020-06-30 PROCEDURE — 99207 ZZC DROP WITH A PROCEDURE: CPT

## 2020-06-30 ASSESSMENT — MIFFLIN-ST. JEOR: SCORE: 1792.1

## 2020-06-30 NOTE — PATIENT INSTRUCTIONS
Allergy Staff Appt Hours Shot Hours Locations    Physician     Juan Antonio Cardozo DO       Support Staff     LAURI Downs, RATNA  Tuesday:        [place] 7-4:20     Wednesday:        [place]: 7-5     Thursday:                    [place] 7-6:40     Friday:  [place]  7-2:40   [place]        Thursday: 1-5:50        Friday: 7-10:50     [place]        Tuesday: 7- 3:20        Wednesday: 7-4:20     [place] Monday: 7-4:20        Tuesday: 1-6:20         [facility]  [address] [place] [address]  Appt Line: [phone]  Allergy RN:  [phone]    [facility]  [address] [place] [address]  Appt Line: [phone]  Allergy RN:  [phone]       Important Scheduling Information  Aspirin Desensitization: Appt will last 2 clinic days. Please call the Allergy RN line for your clinic to schedule. Discontinue antihistamines 7 days prior to the appointment.     Food Challenges: Appt will last 3-4 hours. Please call the Allergy RN line for your clinic to schedule. Discontinue antihistamines 7 days prior to the appointment.     Penicillin Testing: Appt will last 2-3 hours. Please call the Allergy RN line for your clinic to schedule. Discontinue antihistamines 7 days prior to the appointment.     Skin Testing: Appt will about 40 minutes. Call the appointment line for your clinic to schedule. Discontinue antihistamines 7 days prior to the appointment.     Venom Testing: Appt will last 2-3 hours. Please call the Allergy RN line for your clinic to schedule. Discontinue antihistamines 7 days prior to the appointment.     Thank you for trusting us with your Allergy, Asthma, and Immunology care. Please feel free to contact us with any questions or concerns you may have.      Continue on venom and environmental allergy shots.
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
## 2020-06-30 NOTE — ASSESSMENT & PLAN NOTE
History of systemic symptoms on 3 separate stings. 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 Positive testing for white faced hornet, yellow faced hornet and wasp. Negative testing for honey bee. Tolerating venom immunotherapy. He has injectable epinephrine. No systemic reactions with allergy shots. Normal tryptase.     - Continue venom immunotherapy.  - Likely will keep on lifelong venom immunotherapy given severe reaction.  - Continue to keep EpiPen near him at all times in case of accidental sting.

## 2020-06-30 NOTE — PROGRESS NOTES
Patient presented after waiting 30 minutes with no reaction to allergy injections. Discharged from clinic.    Soraida Cano RN, RN ............   6/30/2020...11:26 AM

## 2020-06-30 NOTE — TELEPHONE ENCOUNTER
Please advise new dosing orders, building schedule, and date which orders are valid through.  Patient's last shot was 2/26/2020, dose was 0.5 red, next appointment scheduled for End of July. Date To Be Determined when serums received.      New orders will be added to immunotherapy flowsheet once they are received.     Rahel Camilo MA, CMA ......6/30/2020...11:08 AM

## 2020-06-30 NOTE — ASSESSMENT & PLAN NOTE
Post nasal drainage, rhinorrhea and ocular watering and redness. Worse symptoms since he has paused on shots for covid. On flonase, claritin and pataday.On allergen immunotherapy for 2 years.        Skin testing recently  Positive for dust mites, cat, dog, grass mix, birch and nettle.     - Flonase 2 sprays per nostril daily.  - Claritin daily as needed.  - Pataday (olapatadine) 1 drop/eye daily as needed.  - Continue allergen immunotherapy. Since stopped for covid he can resume at dose of red 0.1ml and build by 0.1ml back to top dose every 14-28 days.

## 2020-07-10 ENCOUNTER — ALLIED HEALTH/NURSE VISIT (OUTPATIENT)
Dept: ALLERGY | Facility: CLINIC | Age: 65
End: 2020-07-10
Payer: MEDICARE

## 2020-07-10 DIAGNOSIS — T63.441D TOXIC EFFECT OF VENOM OF BEES, UNINTENTIONAL, SUBSEQUENT ENCOUNTER: Primary | ICD-10-CM

## 2020-07-10 PROCEDURE — 95117 IMMUNOTHERAPY INJECTIONS: CPT

## 2020-07-10 NOTE — PROGRESS NOTES
Patient presented after waiting 30 minutes with no reaction to allergy injections. Discharged from clinic.    Soraida Cano RN, RN ............   7/10/2020...10:28 AM

## 2020-07-13 ENCOUNTER — TELEPHONE (OUTPATIENT)
Dept: ALLERGY | Facility: CLINIC | Age: 65
End: 2020-07-13

## 2020-07-13 NOTE — TELEPHONE ENCOUNTER
Spoke with patient. Cancelled appointment for 7/17 per patient request.     Katharina Schultz RN

## 2020-07-13 NOTE — TELEPHONE ENCOUNTER
Patient calling to reschedule his allergy injection appointment from Thursday to earlier in the week. I did let him know of available options but he would like a call back to see if he can change it to next week potentially.

## 2020-07-13 NOTE — TELEPHONE ENCOUNTER
Left message to call back [phone] if patient has further questions or concerns.    Ok to leave message was stated when phone message was created.    Advised patient that he does not have any appointments currently scheduled on Thursday.  Advised patient he has appointments scheduled on 07- and 07- (Friday's).    Marely Carroll RN

## 2020-07-24 ENCOUNTER — TELEPHONE (OUTPATIENT)
Dept: ALLERGY | Facility: CLINIC | Age: 65
End: 2020-07-24

## 2020-07-24 ENCOUNTER — ALLIED HEALTH/NURSE VISIT (OUTPATIENT)
Dept: ALLERGY | Facility: CLINIC | Age: 65
End: 2020-07-24
Payer: MEDICARE

## 2020-07-24 DIAGNOSIS — J30.1 CHRONIC SEASONAL ALLERGIC RHINITIS DUE TO POLLEN: ICD-10-CM

## 2020-07-24 DIAGNOSIS — T63.481D ANAPHYLAXIS DUE TO HYMENOPTERA VENOM, ACCIDENTAL OR UNINTENTIONAL, SUBSEQUENT ENCOUNTER: ICD-10-CM

## 2020-07-24 DIAGNOSIS — J30.89 ALLERGIC RHINITIS DUE TO DUST MITE: ICD-10-CM

## 2020-07-24 DIAGNOSIS — T63.91XD TOXIC EFFECT OF VENOM, ACCIDENTAL OR UNINTENTIONAL, SUBSEQUENT ENCOUNTER: ICD-10-CM

## 2020-07-24 DIAGNOSIS — T78.2XXD ANAPHYLAXIS DUE TO HYMENOPTERA VENOM, ACCIDENTAL OR UNINTENTIONAL, SUBSEQUENT ENCOUNTER: ICD-10-CM

## 2020-07-24 DIAGNOSIS — T63.441D TOXIC EFFECT OF VENOM OF BEES, UNINTENTIONAL, SUBSEQUENT ENCOUNTER: Primary | ICD-10-CM

## 2020-07-24 DIAGNOSIS — J30.81 ALLERGIC RHINITIS DUE TO ANIMAL DANDER: ICD-10-CM

## 2020-07-24 PROCEDURE — 95117 IMMUNOTHERAPY INJECTIONS: CPT

## 2020-07-24 PROCEDURE — 99207 ZZC DROP WITH A PROCEDURE: CPT

## 2020-07-24 NOTE — TELEPHONE ENCOUNTER
Just cut back as usual to 0.5ml and build back to top dose as usual for venom. Can build to 1.0 and do not need to repeat 0.9ml. Thanks.     Juan Antonio Cardozo DO FAAAAI  Medical Director for Allergy/Immunology at [place], [place]

## 2020-07-24 NOTE — TELEPHONE ENCOUNTER
ALLERGY SOLUTION RE-ORDER REQUEST    Jose Angel Cha 1955 MRN: [id number]    DATE NEEDED:  8/6/2020  Vial Color Content      Top Dose       Last Dose     Vial Size  Red 1:1 Cat, Dog, Grass, Dust Mite, Trees, Weeds   Red 1:1  0.5      Red 1:1    0.5 5ml  Red 1:1 Wasp      Red 1:1 1       Red 1:1    0.9 13ml  Red 1:1 Mixed Vespid      Red 1:1 1        Red 1:1    0.9 13ml        Serum reorder consent signed and patient/parent was advised that new serums would be ordered through the pharmacy and billed to their insurance company when they arrive in clinic. Yes    Shot Clinic Location:  [facility]  Ship to Location: [facility]  Serum billed to:  [facility]    Special Instructions:  None        Updated Prescription Needed: Yes      Requester Signature  Rahel Camilo MA, CMA ......7/24/2020...10:07 AM

## 2020-07-24 NOTE — TELEPHONE ENCOUNTER
Patient received 0.9 mL red in venoms on 7/24/2020. He will start new vials at next appointment, which is scheduled for 8/7/2020. Please review and advise of new vial cut back orders. Soraida Cano RN

## 2020-07-28 NOTE — TELEPHONE ENCOUNTER
Routing to provider - please send new prescription for serum to compounding pharmacy.  Thank you.      Hina Lopez MA

## 2020-08-04 DIAGNOSIS — J30.2 SEASONAL ALLERGIC RHINITIS: Primary | ICD-10-CM

## 2020-08-04 PROCEDURE — 95165 ANTIGEN THERAPY SERVICES: CPT | Performed by: ALLERGY & IMMUNOLOGY

## 2020-08-04 PROCEDURE — 95165 ANTIGEN THERAPY SERVICES: CPT | Mod: 59 | Performed by: ALLERGY & IMMUNOLOGY

## 2020-08-04 NOTE — TELEPHONE ENCOUNTER
Allergy serums received at [facility].     Vials received below:    Vial Color Content                          Vial Size Expiration Date  Red 1:1 Cat, Dog, Grass, Dust Mite, Trees, Weeds 5mL  7/30/2021  Red 1:1 Wasp    13mL  7/30/2021  Red 1:1 Mixed Vespid    13mL  7/30/2021        Signature  Rahel Camilo MA, CMA ......8/4/2020...9:26 AM

## 2020-08-04 NOTE — PROGRESS NOTES
Allergy serums billed at [place].     Vials billed below:    Vial Color Content                         Vial Size Expiration Date  Red 1:1 Cat, Dog, Grass, Dust Mite, Trees, Weeds 5mL  7/30/2021  Red 1:1 Wasp    13mL  7/30/2021  Red 1:1 Mixed Vespid    13mL  7/30/2021    Original Refill encounter date: 7/24/2020      Signature  Rahel Camilo MA, CMA ......8/4/2020...9:27 AM

## 2020-08-07 ENCOUNTER — ALLIED HEALTH/NURSE VISIT (OUTPATIENT)
Dept: ALLERGY | Facility: CLINIC | Age: 65
End: 2020-08-07
Payer: MEDICARE

## 2020-08-07 DIAGNOSIS — J30.1 CHRONIC SEASONAL ALLERGIC RHINITIS DUE TO POLLEN: ICD-10-CM

## 2020-08-07 DIAGNOSIS — T63.441D TOXIC EFFECT OF VENOM OF BEES, UNINTENTIONAL, SUBSEQUENT ENCOUNTER: Primary | ICD-10-CM

## 2020-08-07 DIAGNOSIS — Z53.9 NO SHOW: ICD-10-CM

## 2020-08-07 DIAGNOSIS — Z91.09 HOUSE DUST MITE ALLERGY: ICD-10-CM

## 2020-08-07 PROCEDURE — 99207 ZZC DROP WITH A PROCEDURE: CPT

## 2020-08-07 PROCEDURE — 95117 IMMUNOTHERAPY INJECTIONS: CPT

## 2020-08-07 NOTE — TELEPHONE ENCOUNTER
"Reason for Call:  Other prescription    Detailed comments: eye drops were prescribed (olopatadine) but when pt went for refill he was told that he was\"not known to the provider\" please call to verify the prescription    Phone Number Patient can be reached at: Work number on file:   or Cell number on file:    Telephone Information:   Mobile [phone]       Best Time: any    Can we leave a detailed message on this number? YES    Call taken on 8/7/2020 at 1:40 PM by Jessica Severino      "

## 2020-08-07 NOTE — ADDENDUM NOTE
Addended by: JAMES WU on: 8/7/2020 12:41 PM     Modules accepted: Orders, Level of Service, SmartSet

## 2020-08-07 NOTE — PROGRESS NOTES
Patient presented after waiting 30 minutes with no reaction to allergy injections. Discharged from clinic.    Soraida Cano RN, RN ............   8/7/2020...12:41 PM

## 2020-08-10 RX ORDER — OLOPATADINE HYDROCHLORIDE 2 MG/ML
1 SOLUTION/ DROPS OPHTHALMIC DAILY
Qty: 2.5 ML | Refills: 0 | Status: SHIPPED | OUTPATIENT
Start: 2020-08-10 | End: 2020-09-25

## 2020-08-10 NOTE — TELEPHONE ENCOUNTER
"RN refilled medication per [facility] Refill Protocol.     Marely Carroll RN    LOV: 06-, recommended follow up in one year.    Requested Prescriptions   Pending Prescriptions Disp Refills     olopatadine (PATADAY) 0.2 % ophthalmic solution 2.5 mL 0     Sig: Place 1 drop into both eyes daily       Miscellaneous Opthalmic Allergy Drops Protocol Passed - 8/10/2020 10:12 AM        Passed - Patient is age 4 or older        Passed - Recent (12 mo) or future (30 days) visit within the authorizing provider's specialty     Patient has had an office visit with the authorizing provider or a provider within the authorizing providers department within the previous 12 mos or has a future within next 30 days. See \"Patient Info\" tab in inbasket, or \"Choose Columns\" in Meds & Orders section of the refill encounter.              Passed - Medication is active on med list               "

## 2020-08-10 NOTE — TELEPHONE ENCOUNTER
Called patient.  Left patient a message stating that he had no refills on the medication.  Advised that RN sent the prescription to his pharmacy and that patient should contact the pharmacy to arrange a  time.    Marely Carroll RN

## 2020-08-18 DIAGNOSIS — I34.0 NONRHEUMATIC MITRAL VALVE INSUFFICIENCY: Primary | ICD-10-CM

## 2020-08-20 DIAGNOSIS — E78.2 MIXED HYPERLIPIDEMIA: ICD-10-CM

## 2020-08-20 LAB
ALT SERPL W P-5'-P-CCNC: 50 U/L (ref 0–70)
CHOLEST SERPL-MCNC: 224 MG/DL
HDLC SERPL-MCNC: 48 MG/DL
LDLC SERPL CALC-MCNC: 136 MG/DL
NONHDLC SERPL-MCNC: 176 MG/DL
TRIGL SERPL-MCNC: 199 MG/DL

## 2020-08-20 PROCEDURE — 36415 COLL VENOUS BLD VENIPUNCTURE: CPT | Performed by: INTERNAL MEDICINE

## 2020-08-20 PROCEDURE — 80061 LIPID PANEL: CPT | Performed by: INTERNAL MEDICINE

## 2020-08-20 PROCEDURE — 84460 ALANINE AMINO (ALT) (SGPT): CPT | Performed by: INTERNAL MEDICINE

## 2020-08-21 ENCOUNTER — ALLIED HEALTH/NURSE VISIT (OUTPATIENT)
Dept: ALLERGY | Facility: CLINIC | Age: 65
End: 2020-08-21
Payer: MEDICARE

## 2020-08-21 DIAGNOSIS — T63.441D TOXIC EFFECT OF VENOM OF BEES, UNINTENTIONAL, SUBSEQUENT ENCOUNTER: Primary | ICD-10-CM

## 2020-08-21 PROCEDURE — 95117 IMMUNOTHERAPY INJECTIONS: CPT

## 2020-08-21 PROCEDURE — 99207 ZZC DROP WITH A PROCEDURE: CPT

## 2020-08-21 NOTE — PROGRESS NOTES
Patient presented after waiting 30 minutes with no reaction to allergy injections. Discharged from clinic.    Soraida Cano RN, RN ............   8/21/2020...9:46 AM

## 2020-08-28 ENCOUNTER — HOSPITAL ENCOUNTER (OUTPATIENT)
Dept: CARDIOLOGY | Facility: CLINIC | Age: 65
Discharge: HOME | End: 2020-08-28
Attending: INTERNAL MEDICINE | Admitting: INTERNAL MEDICINE
Payer: MEDICARE

## 2020-08-28 DIAGNOSIS — I34.0 NONRHEUMATIC MITRAL VALVE INSUFFICIENCY: ICD-10-CM

## 2020-08-28 PROCEDURE — 93306 TTE W/DOPPLER COMPLETE: CPT | Mod: 26 | Performed by: INTERNAL MEDICINE

## 2020-08-28 PROCEDURE — 93306 TTE W/DOPPLER COMPLETE: CPT

## 2020-09-02 ENCOUNTER — VIRTUAL VISIT (OUTPATIENT)
Dept: CARDIOLOGY | Facility: CLINIC | Age: 65
End: 2020-09-02
Payer: MEDICARE

## 2020-09-02 VITALS — BODY MASS INDEX: 30.98 KG/M2 | WEIGHT: 219 LBS

## 2020-09-02 DIAGNOSIS — E78.2 MIXED HYPERLIPIDEMIA: ICD-10-CM

## 2020-09-02 DIAGNOSIS — Z98.890 H/O MITRAL VALVE REPAIR: Primary | ICD-10-CM

## 2020-09-02 PROCEDURE — 99213 OFFICE O/P EST LOW 20 MIN: CPT | Mod: 95 | Performed by: INTERNAL MEDICINE

## 2020-09-02 NOTE — PROGRESS NOTES
"Reverend Jose Angel Cha is 65 years old and is followed for a history of prior mitral valve repair, non-obstructive coronary artery disease, dyslipidemia and mild ankle edema.  He also has a history of hypertension.      He notes that overall, he is feeling well.  He is again on ezetimibe 10 mg and the LDL has risen to 136 mg/dl.   He has a complete intolerance to statin therapy.  He had been approved for Repatha and had an excellent response but early this year, insurance refused coverage and he is again on ezetimibe.      Echocardiography (8/28/2020) performed for this visit demonstrated normal left ventricular systolic performance and an intact mitral valve repair.  There was no evidence of stenosis and only trace mitral insufficiency.  There was no evidence of pulmonary hypertension.      Reverend Cha has a family history of coronary artery disease, but had no coronary obstructive disease on his preoperative cardiac catheterization. The mid to distal LAD was described as diffusely small (and \"moderately\" diffusely diseased) but noted to dilate with contrast.  An exercise stress nuclear study in 2018 demonstrated normal perfusion after 9 minutes and 45 seconds of exercise.  He denies any chest discomfort and feels quite well. 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  He and his wife have a cabin there so he is familiar with the [place].  He is walking more again but had been relatively sedentary.     He developed some mild ankle edema which was not surprising given the use of low-dose amlodipine for hypertension and the edema has been treated with hydrochlorothiazide.    He has a history of some chronic kidney disease (creatinine at the peak was 1.4) but the most recent GFR from January 2020 was very normal at 65.  No longer is he using nonsteroidal anti-inflammatory drugs as frequently as in the past.  He continues on amlodipine at 2.5 mg " "daily, HCTZ 12.5 mg daily, aspirin 81 mg daily, and Zetia 10 mg daily. The blood pressure was 126/74 mmHg at the time of his echocardiogram.     Exam:   This is a man in no apparent distress.  He was alert and oriented to person, place and time.      Assessment/Plan:    With regard to the prior mitral valve repair,  it remains completely intact.  A followup echocardiogram is recommended for next year.      With regard to his hypertension, it is controlled and also followed by primary care.  He is on a low dose of amlodipine because a higher dose resulted in worsening leg edema, likely an exacerbation of some underlying venous insufficiency.  The HCTZ was reduced to 12.5 mg daily.     With regard to his lipids, the ezetimibe provides benefit but does not control lipids as well as Repatha.  The lipids will be reassessed at one year.  Repatha be prescribed at the beginning of 2021 to see if the cost has fallen through his insurance plan.       With regard o his history of non-obstructive coronary disease, he is asymptomatic.  Further therapy with a more effective (for him) therapy such as Repatha would be of benefit if affordable.  His blood pressure is controlled.  He is on low dose aspirin without complication.     In the absence of any interim problems, he will return at six months.  A repeat echocardiogram will be done at one year.  He will call if he has any problems in the interim.      Jose Angel Cha is a 65 year old male who is being evaluated via a billable video visit.      The patient has been notified of following:     \"This video visit will be conducted via a call between you and your physician/provider. We have found that certain health care needs can be provided without the need for an in-person physical exam.  This service lets us provide the care you need with a video conversation.  If a prescription is necessary we can send it directly to your pharmacy.  If lab work is needed we can place an order " "for that and you can then stop by our lab to have the test done at a later time.    Video visits are billed at different rates depending on your insurance coverage.  Please reach out to your insurance provider with any questions.    If during the course of the call the physician/provider feels a video visit is not appropriate, you will not be charged for this service.\"    [phone]  Pt reported wt today 219#  BRANDO Toth    Patient has given verbal consent for Video visit? Yes  How would you like to obtain your AVS? MyChart  If you are dropped from the video visit, the video invite should be resent to: Text to cell phone: [phone]  Will anyone else be joining your video visit? No        "

## 2020-09-02 NOTE — LETTER
"9/2/2020    Shari Paredes MD, MD  [address] [facility] [address]    RE: Jose Angel Cha       Dear Colleague,    I had the pleasure of seeing Jose Angel Cha in the [facility].    Reverend Jose Angel Cha is 65 years old and is followed for a history of prior mitral valve repair, non-obstructive coronary artery disease, dyslipidemia and mild ankle edema.  He also has a history of hypertension.      He notes that overall, he is feeling well.  He is again on ezetimibe 10 mg and the LDL has risen to 136 mg/dl.   He has a complete intolerance to statin therapy.  He had been approved for Repatha and had an excellent response but early this year, insurance refused coverage and he is again on ezetimibe.      Echocardiography (8/28/2020) performed for this visit demonstrated normal left ventricular systolic performance and an intact mitral valve repair.  There was no evidence of stenosis and only trace mitral insufficiency.  There was no evidence of pulmonary hypertension.      Reverend Cha has a family history of coronary artery disease, but had no coronary obstructive disease on his preoperative cardiac catheterization. The mid to distal LAD was described as diffusely small (and \"moderately\" diffusely diseased) but noted to dilate with contrast.  An exercise stress nuclear study in 2018 demonstrated normal perfusion after 9 minutes and 45 seconds of exercise.  He denies any chest discomfort and feels quite well. 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  He and his wife have a cabin there so he is familiar with the [employer].  He is walking more again but had been relatively sedentary.     He developed some mild ankle edema which was not surprising given the use of low-dose amlodipine for hypertension and the edema has been treated with hydrochlorothiazide.    He has a history of some chronic kidney disease (creatinine at " "the peak was 1.4) but the most recent GFR from January 2020 was very normal at 65.  No longer is he using nonsteroidal anti-inflammatory drugs as frequently as in the past.  He continues on amlodipine at 2.5 mg daily, HCTZ 12.5 mg daily, aspirin 81 mg daily, and Zetia 10 mg daily. The blood pressure was 126/74 mmHg at the time of his echocardiogram.     Exam:   This is a man in no apparent distress.  He was alert and oriented to person, place and time.      Assessment/Plan:    With regard to the prior mitral valve repair,  it remains completely intact.  A followup echocardiogram is recommended for next year.      With regard to his hypertension, it is controlled and also followed by primary care.  He is on a low dose of amlodipine because a higher dose resulted in worsening leg edema, likely an exacerbation of some underlying venous insufficiency.  The HCTZ was reduced to 12.5 mg daily.     With regard to his lipids, the ezetimibe provides benefit but does not control lipids as well as Repatha.  The lipids will be reassessed at one year.  Repatha be prescribed at the beginning of 2021 to see if the cost has fallen through his insurance plan.       With regard o his history of non-obstructive coronary disease, he is asymptomatic.  Further therapy with a more effective (for him) therapy such as Repatha would be of benefit if affordable.  His blood pressure is controlled.  He is on low dose aspirin without complication.     In the absence of any interim problems, he will return at six months.  A repeat echocardiogram will be done at one year.  He will call if he has any problems in the interim.      Jose Angel Cha is a 65 year old male who is being evaluated via a billable video visit.      The patient has been notified of following:     \"This video visit will be conducted via a call between you and your physician/provider. We have found that certain health care needs can be provided without the need for an in-person " "physical exam.  This service lets us provide the care you need with a video conversation.  If a prescription is necessary we can send it directly to your pharmacy.  If lab work is needed we can place an order for that and you can then stop by our lab to have the test done at a later time.    Video visits are billed at different rates depending on your insurance coverage.  Please reach out to your insurance provider with any questions.    If during the course of the call the physician/provider feels a video visit is not appropriate, you will not be charged for this service.\"    [phone]  Pt reported wt today 219#  Michelle Huerta, BRANDO    Patient has given verbal consent for Video visit? Yes  How would you like to obtain your AVS? MyChart  If you are dropped from the video visit, the video invite should be resent to: Text to cell phone: [phone]  Will anyone else be joining your video visit? No        Thank you for allowing me to participate in the care of your patient.    Sincerely,     Ynes Chirinos MD     [facility]    "

## 2020-09-09 DIAGNOSIS — I10 BENIGN ESSENTIAL HYPERTENSION: ICD-10-CM

## 2020-09-09 RX ORDER — HYDROCHLOROTHIAZIDE 25 MG/1
12.5 TABLET ORAL DAILY
Qty: 45 TABLET | Refills: 3 | Status: SHIPPED | OUTPATIENT
Start: 2020-09-09 | End: 2021-06-03

## 2020-09-09 RX ORDER — AMLODIPINE BESYLATE 2.5 MG/1
2.5 TABLET ORAL DAILY
Qty: 90 TABLET | Refills: 1 | Status: SHIPPED | OUTPATIENT
Start: 2020-09-09 | End: 2021-03-02

## 2020-09-17 ENCOUNTER — TELEPHONE (OUTPATIENT)
Dept: ALLERGY | Facility: OTHER | Age: 65
End: 2020-09-17

## 2020-09-17 ENCOUNTER — TELEPHONE (OUTPATIENT)
Dept: ALLERGY | Facility: CLINIC | Age: 65
End: 2020-09-17

## 2020-09-17 NOTE — TELEPHONE ENCOUNTER
Reason for Call:  Other call back    Detailed comments: patient is calling to schedule allergy shots, please call. Thank you.    Phone Number Patient can be reached at: [phone]    Best Time:     Can we leave a detailed message on this number? YES    Call taken on 9/17/2020 at 3:29 PM by Filomena Hair

## 2020-09-17 NOTE — TELEPHONE ENCOUNTER
Pt scheduled venom injection 9/23/2020. Last dose was 8/21/2020, 33 days ago between shots. Pt received Red 0.75 mL of new vial. Routing to provider to see what dosing should be.     Please advise new dosing orders, building schedule, and date which orders are valid through.      New orders will be added to immunotherapy flowsheet once they are received.       Hina Lopez MA

## 2020-09-18 NOTE — TELEPHONE ENCOUNTER
Huddled with Dr. Cardozo. Received confirmation that it is ok to admin 1.0mL at 33 days. Noted to flowsheet. Soraida Cano RN
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
## 2020-09-23 ENCOUNTER — ALLIED HEALTH/NURSE VISIT (OUTPATIENT)
Dept: ALLERGY | Facility: OTHER | Age: 65
End: 2020-09-23
Payer: MEDICARE

## 2020-09-23 DIAGNOSIS — Z51.6 NEED FOR DESENSITIZATION TO ALLERGENS: Primary | ICD-10-CM

## 2020-09-23 DIAGNOSIS — T63.441D TOXIC EFFECT OF VENOM OF BEES, UNINTENTIONAL, SUBSEQUENT ENCOUNTER: ICD-10-CM

## 2020-09-23 PROCEDURE — 99207 ZZC DROP WITH A PROCEDURE: CPT

## 2020-09-23 PROCEDURE — 95117 IMMUNOTHERAPY INJECTIONS: CPT

## 2020-09-23 NOTE — PROGRESS NOTES
Patient presented after waiting 30 minutes with no reaction to allergy injections. Discharged from clinic.    Soraida Cano RN ............   9/23/2020...2:57 PM

## 2020-09-25 ENCOUNTER — TELEPHONE (OUTPATIENT)
Dept: ALLERGY | Facility: CLINIC | Age: 65
End: 2020-09-25

## 2020-09-25 DIAGNOSIS — J30.1 CHRONIC SEASONAL ALLERGIC RHINITIS DUE TO POLLEN: ICD-10-CM

## 2020-09-25 DIAGNOSIS — Z91.09 HOUSE DUST MITE ALLERGY: ICD-10-CM

## 2020-09-25 RX ORDER — OLOPATADINE HYDROCHLORIDE 2 MG/ML
1 SOLUTION/ DROPS OPHTHALMIC DAILY
Qty: 2.5 ML | Refills: 3 | Status: SHIPPED | OUTPATIENT
Start: 2020-09-25 | End: 2021-02-23

## 2020-09-25 NOTE — TELEPHONE ENCOUNTER
Signed Prescriptions:                        Disp   Refills    olopatadine (PATADAY) 0.2 % ophthalmic sol*2.5 mL 3        Sig: Place 1 drop into both eyes daily  Authorizing Provider: TAMERA LEYVA  Ordering User: JO FONG RN refilled medication per [facility] Protocol. Voicemail left for patient (ok per message).    Jo Fong RN

## 2020-09-25 NOTE — TELEPHONE ENCOUNTER
Reason for Call:  Medication or medication refill:    Do you use a [facility]?  Name of the pharmacy and phone number for the current request:  [facility] [address] [facility] [address] [facility]  [phone]    Name of the medication requested: olopatadine (PATADAY) 0.2 % ophthalmic solution    Other request: patient is calling for status on refill, states [facility] pharmacy has put in two requests and patient even called to leave message yesterday. Please call to discuss as patient is going out of town this afternoon. Thank you.    Can we leave a detailed message on this number? YES    Phone number patient can be reached at: [phone]    Best Time:     Call taken on 9/25/2020 at 10:02 AM by Filomena Hair

## 2020-10-07 ENCOUNTER — TELEPHONE (OUTPATIENT)
Dept: DERMATOLOGY | Facility: CLINIC | Age: 65
End: 2020-10-07

## 2020-10-07 NOTE — TELEPHONE ENCOUNTER
[facility] Call Center    Phone Message    May a detailed message be left on voicemail: yes     Reason for Call: Other: Pt is requesting a call back having some spots on the Ear (basil cell) pt states its painful. Pt states he has been seen before spots was located on Right Ear now the Left Ear has the same issues. Pt wouldl like call back to discuss     Action Taken: Message routed to:  Adult Clinics: Dermatology p [id number]    Travel Screening: Not Applicable

## 2020-10-08 NOTE — TELEPHONE ENCOUNTER
I spoke with Davi and offered him a sooner appointment on 10/20 with Dr. Gómez.  He is out of town and only available on Thursdays and Fridays.  Appt rescheduled for 10/29/20.  I notified him that I would add him to the cancelation list.    Teresita Roberts RN

## 2020-10-28 ENCOUNTER — ALLIED HEALTH/NURSE VISIT (OUTPATIENT)
Dept: ALLERGY | Facility: OTHER | Age: 65
End: 2020-10-28
Payer: MEDICARE

## 2020-10-28 DIAGNOSIS — Z51.6 NEED FOR DESENSITIZATION TO ALLERGENS: Primary | ICD-10-CM

## 2020-10-28 PROCEDURE — 95017 ALL TSTG PERQ&IQ W/VENOMS: CPT

## 2020-10-28 PROCEDURE — 99207 PR DROP WITH A PROCEDURE: CPT

## 2020-10-29 ENCOUNTER — OFFICE VISIT (OUTPATIENT)
Dept: DERMATOLOGY | Facility: CLINIC | Age: 65
End: 2020-10-29
Payer: MEDICARE

## 2020-10-29 DIAGNOSIS — L57.0 ACTINIC KERATOSIS: Primary | ICD-10-CM

## 2020-10-29 DIAGNOSIS — Z85.828 HISTORY OF NONMELANOMA SKIN CANCER: ICD-10-CM

## 2020-10-29 DIAGNOSIS — L82.1 SEBORRHEIC KERATOSIS: ICD-10-CM

## 2020-10-29 PROCEDURE — 17003 DESTRUCT PREMALG LES 2-14: CPT | Performed by: DERMATOLOGY

## 2020-10-29 PROCEDURE — 17000 DESTRUCT PREMALG LESION: CPT | Performed by: DERMATOLOGY

## 2020-10-29 PROCEDURE — 99213 OFFICE O/P EST LOW 20 MIN: CPT | Mod: 25 | Performed by: DERMATOLOGY

## 2020-10-29 ASSESSMENT — PAIN SCALES - GENERAL: PAINLEVEL: NO PAIN (0)

## 2020-10-29 NOTE — PROGRESS NOTES
Jose Angel Cha's goals for this visit include:   Chief Complaint   Patient presents with     Derm Problem     spot on left ear, feels crusty and slightly painful. Spot on left clavicle that keeps peeling. Not immunosuppressed. Hx NMSC       He requests these members of his care team be copied on today's visit information: no    PCP: Shari Paredes    Referring Provider:  No referring provider defined for this encounter.    There were no vitals taken for this visit.    Do you need any medication refills at today's visit? No    Meme Traylor LPN

## 2020-10-29 NOTE — PATIENT INSTRUCTIONS
Cryotherapy    What is it?    Use of a very cold liquid, such as liquid nitrogen, to freeze and destroy abnormal skin cells that need to be removed    What should I expect?    Tenderness and redness    A small blister that might grow and fill with dark purple blood. There may be crusting.    More than one treatment may be needed if the lesions do not go away.    How do I care for the treated area?    Gently wash the area with your hands when bathing.    Use a thin layer of Vaseline to help with healing. You may use a Band-Aid.     The area should heal within 7-10 days and may leave behind a pink or lighter color.     Do not use an antibiotic or Neosporin ointment.     You may take acetaminophen (Tylenol) for pain.     Call your Doctor if you have:    Severe pain    Signs of infection (warmth, redness, cloudy yellow drainage, and or a bad smell)    Questions or concerns    Who should I call with questions?       [facility]: [phone]       [facility]: [phone]       For urgent needs outside of business hours call the [facility] at [phone]        and ask for the dermatology resident on call
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
## 2020-11-18 ENCOUNTER — TELEPHONE (OUTPATIENT)
Dept: ALLERGY | Facility: OTHER | Age: 65
End: 2020-11-18

## 2020-11-18 DIAGNOSIS — Z91.09 HOUSE DUST MITE ALLERGY: ICD-10-CM

## 2020-11-18 DIAGNOSIS — J30.81 ALLERGIC RHINITIS DUE TO ANIMAL DANDER: ICD-10-CM

## 2020-11-18 DIAGNOSIS — J30.1 CHRONIC SEASONAL ALLERGIC RHINITIS DUE TO POLLEN: ICD-10-CM

## 2020-11-18 RX ORDER — FLUTICASONE PROPIONATE 50 MCG
2 SPRAY, SUSPENSION (ML) NASAL DAILY
Qty: 16 G | Refills: 5 | Status: SHIPPED | OUTPATIENT
Start: 2020-11-18 | End: 2022-09-06

## 2020-11-18 NOTE — TELEPHONE ENCOUNTER
Reason for Call:  Medication or medication refill:    Do you use a [facility]?  Name of the pharmacy and phone number for the current request:  [facility] - [phone]    Name of the medication requested: FLONASE      Other request: Pharmacy told patient that the refill request had been sent almost 2 weeks ago but there is no documentation in the patients chart of receiving this refill request. He is frustrated because this happened to him in September with a RX for drops. Please call patient with update    Can we leave a detailed message on this number? YES    Phone number patient can be reached at: MOBILE # [phone]    Best Time: any    Call taken on 11/18/2020 at 2:56 PM by Ankit Rodriguez

## 2020-11-18 NOTE — TELEPHONE ENCOUNTER
Signed Prescriptions:                        Disp   Refills    fluticasone (FLONASE) 50 MCG/ACT nasal spr*16 g   5        Sig: Spray 2 sprays into both nostrils daily  Authorizing Provider: TAMERA LEYVA  Ordering User: JO FONG RN refilled medication per [facility] Refill Protocol.     Notified patient of same.  This has happened several times where he has requested refills through pharmacy, but we never receive them and he ends up out of medication.  Recommended when he needs refills to check with pharmacy to see if he has any remaining refills there.  If he doesn't, then he should send us a [employer] message to refill.    Jo Fong RN

## 2020-12-10 ENCOUNTER — ALLIED HEALTH/NURSE VISIT (OUTPATIENT)
Dept: ALLERGY | Facility: CLINIC | Age: 65
End: 2020-12-10
Payer: MEDICARE

## 2020-12-10 DIAGNOSIS — T63.441D TOXIC EFFECT OF VENOM OF BEES, UNINTENTIONAL, SUBSEQUENT ENCOUNTER: ICD-10-CM

## 2020-12-10 DIAGNOSIS — Z51.6 NEED FOR DESENSITIZATION TO ALLERGENS: Primary | ICD-10-CM

## 2020-12-10 PROCEDURE — 99207 PR DROP WITH A PROCEDURE: CPT

## 2020-12-10 PROCEDURE — 95117 IMMUNOTHERAPY INJECTIONS: CPT

## 2020-12-10 NOTE — PROGRESS NOTES
Patient presented after waiting 30 minutes with no reaction to allergy injections. Discharged from clinic.      Michelle Johnson RN Specialty Triage 12/10/2020 2:21 PM

## 2020-12-24 ENCOUNTER — ALLIED HEALTH/NURSE VISIT (OUTPATIENT)
Dept: ALLERGY | Facility: CLINIC | Age: 65
End: 2020-12-24
Payer: MEDICARE

## 2020-12-24 DIAGNOSIS — T63.441D TOXIC EFFECT OF VENOM OF BEES, UNINTENTIONAL, SUBSEQUENT ENCOUNTER: Primary | ICD-10-CM

## 2020-12-24 PROCEDURE — 99207 PR DROP WITH A PROCEDURE: CPT

## 2020-12-24 PROCEDURE — 95117 IMMUNOTHERAPY INJECTIONS: CPT

## 2020-12-24 NOTE — PROGRESS NOTES
Patient presented after waiting 30 minutes with normal reaction to  injections. Discharged from clinic.    Nikki SHINE RN, Allergy Clinic, 12/24/20 11:59 AM

## 2021-01-07 ENCOUNTER — OFFICE VISIT (OUTPATIENT)
Dept: SURGERY | Facility: CLINIC | Age: 66
End: 2021-01-07
Payer: MEDICARE

## 2021-01-07 ENCOUNTER — HOSPITAL ENCOUNTER (OUTPATIENT)
Dept: CT IMAGING | Facility: CLINIC | Age: 66
Discharge: HOME | End: 2021-01-07
Attending: SPECIALIST | Admitting: SPECIALIST
Payer: MEDICARE

## 2021-01-07 VITALS
HEIGHT: 70 IN | DIASTOLIC BLOOD PRESSURE: 70 MMHG | BODY MASS INDEX: 32.45 KG/M2 | WEIGHT: 226.7 LBS | SYSTOLIC BLOOD PRESSURE: 132 MMHG

## 2021-01-07 DIAGNOSIS — M62.08 DIASTASIS RECTI: ICD-10-CM

## 2021-01-07 DIAGNOSIS — R10.30 LOWER ABDOMINAL PAIN: Primary | ICD-10-CM

## 2021-01-07 DIAGNOSIS — R10.30 LOWER ABDOMINAL PAIN: ICD-10-CM

## 2021-01-07 PROCEDURE — G1004 CDSM NDSC: HCPCS

## 2021-01-07 PROCEDURE — 250N000011 HC RX IP 250 OP 636: Performed by: SPECIALIST

## 2021-01-07 PROCEDURE — 99214 OFFICE O/P EST MOD 30 MIN: CPT | Performed by: SPECIALIST

## 2021-01-07 RX ORDER — IOPAMIDOL 755 MG/ML
500 INJECTION, SOLUTION INTRAVASCULAR ONCE
Status: COMPLETED | OUTPATIENT
Start: 2021-01-07 | End: 2021-01-07

## 2021-01-07 RX ADMIN — IOPAMIDOL 100 ML: 755 INJECTION, SOLUTION INTRAVENOUS at 12:53

## 2021-01-07 ASSESSMENT — MIFFLIN-ST. JEOR: SCORE: 1814.55
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
## 2021-01-08 ENCOUNTER — TELEPHONE (OUTPATIENT)
Dept: ALLERGY | Facility: OTHER | Age: 66
End: 2021-01-08

## 2021-01-08 NOTE — TELEPHONE ENCOUNTER
Sent allergy serums back to [facility] through . Tracking #[id number]     Nikki SHINE RN, Allergy Clinic 01/08/21 7:06 AM

## 2021-01-11 ENCOUNTER — TELEPHONE (OUTPATIENT)
Dept: FAMILY MEDICINE | Facility: OTHER | Age: 66
End: 2021-01-11

## 2021-01-11 ENCOUNTER — TELEPHONE (OUTPATIENT)
Dept: SURGERY | Facility: CLINIC | Age: 66
End: 2021-01-11

## 2021-01-11 DIAGNOSIS — R10.30 LOWER ABDOMINAL PAIN: Primary | ICD-10-CM

## 2021-01-11 DIAGNOSIS — Z11.59 ENCOUNTER FOR SCREENING FOR OTHER VIRAL DISEASES: ICD-10-CM

## 2021-01-11 NOTE — LETTER

## 2021-01-11 NOTE — TELEPHONE ENCOUNTER
Date of colonoscopy/EGD: 1/20  Surgeon: Dr. Singh  Prep:Miralax  Packet: mychart ok per patient    Date: 1/11/2021      Surgery Scheduler

## 2021-01-11 NOTE — TELEPHONE ENCOUNTER
Reviewed CT with patient.  Still feels pressure post BM.  CT - no obvious cause.  Will proceed to colonoscopy.   The procedure, risks, benefits, and alternatives were discussed and the patient agrees to proceed.

## 2021-01-13 ENCOUNTER — ALLIED HEALTH/NURSE VISIT (OUTPATIENT)
Dept: ALLERGY | Facility: OTHER | Age: 66
End: 2021-01-13
Payer: MEDICARE

## 2021-01-13 ENCOUNTER — TELEPHONE (OUTPATIENT)
Dept: ALLERGY | Facility: OTHER | Age: 66
End: 2021-01-13

## 2021-01-13 DIAGNOSIS — T63.441D TOXIC EFFECT OF VENOM OF BEES, UNINTENTIONAL, SUBSEQUENT ENCOUNTER: Primary | ICD-10-CM

## 2021-01-13 PROCEDURE — 99207 PR NO CHARGE NURSE ONLY: CPT

## 2021-01-13 NOTE — TELEPHONE ENCOUNTER
Patient is on venom injections. Dosing is 1.0 red. Patient was at top dose over a year ago at 1.0, but then stopped injections 2/26/20 and restarted after 146 days on 6/30/20. Patient is coming in this afternoon. Last injection was 12/24/20 (20 days ago). Should we inject patient today? Should patient be following maintenance dose X1 year increased to every 42-56 days protocol? Please advise.     Nikki SHINE RN, Allergy Clinic 01/13/21 7:58 AM

## 2021-01-13 NOTE — PROGRESS NOTES
Patient came to clinic today without epi. Patient rescheduled for next week and not seen in allergy clinic today.     Nikki SHINE RN, Allergy Clinic 01/13/21 2:53 PM
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
## 2021-01-16 DIAGNOSIS — Z11.59 ENCOUNTER FOR SCREENING FOR OTHER VIRAL DISEASES: ICD-10-CM

## 2021-01-16 LAB
SARS-COV-2 RNA RESP QL NAA+PROBE: NORMAL
SPECIMEN SOURCE: NORMAL

## 2021-01-16 PROCEDURE — U0005 INFEC AGEN DETEC AMPLI PROBE: HCPCS | Performed by: SPECIALIST

## 2021-01-16 PROCEDURE — U0003 INFECTIOUS AGENT DETECTION BY NUCLEIC ACID (DNA OR RNA); SEVERE ACUTE RESPIRATORY SYNDROME CORONAVIRUS 2 (SARS-COV-2) (CORONAVIRUS DISEASE [COVID-19]), AMPLIFIED PROBE TECHNIQUE, MAKING USE OF HIGH THROUGHPUT TECHNOLOGIES AS DESCRIBED BY CMS-2020-01-R: HCPCS | Performed by: SPECIALIST

## 2021-01-17 LAB
LABORATORY COMMENT REPORT: NORMAL
SARS-COV-2 RNA RESP QL NAA+PROBE: NEGATIVE
SPECIMEN SOURCE: NORMAL

## 2021-01-19 ENCOUNTER — ALLIED HEALTH/NURSE VISIT (OUTPATIENT)
Dept: ALLERGY | Facility: OTHER | Age: 66
End: 2021-01-19
Payer: MEDICARE

## 2021-01-19 DIAGNOSIS — T63.441D TOXIC EFFECT OF VENOM OF BEES, UNINTENTIONAL, SUBSEQUENT ENCOUNTER: Primary | ICD-10-CM

## 2021-01-19 PROCEDURE — 95117 IMMUNOTHERAPY INJECTIONS: CPT

## 2021-01-19 PROCEDURE — 99207 PR DROP WITH A PROCEDURE: CPT

## 2021-01-19 NOTE — PROGRESS NOTES
Surgery    Patient is well-known to me for low abdominal pain and bloating and some mucus in the stool.  His CT scan was essentially negative he now presents today for colonoscopy.  No changes since he was last seen in clinic.  Proceed to Colonoscopy as planned.  The procedure, risks(bleeding, perforation), benefits and alternatives were discussed and the patient agrees to proceed. Cleared for Anesthesia      Miguel Singh MD, FACS

## 2021-01-19 NOTE — PROGRESS NOTES
Patient presented after waiting 30 minutes with no reaction to allergy injections. Discharged from clinic.    Soraida Cano RN, RN ............   1/19/2021...8:52 AM

## 2021-01-20 ENCOUNTER — ANESTHESIA (OUTPATIENT)
Dept: GASTROENTEROLOGY | Facility: CLINIC | Age: 66
End: 2021-01-20
Payer: MEDICARE

## 2021-01-20 ENCOUNTER — HOSPITAL ENCOUNTER (OUTPATIENT)
Facility: CLINIC | Age: 66
Discharge: HOME | End: 2021-01-20
Attending: SPECIALIST | Admitting: SPECIALIST
Payer: MEDICARE

## 2021-01-20 ENCOUNTER — ANESTHESIA EVENT (OUTPATIENT)
Dept: GASTROENTEROLOGY | Facility: CLINIC | Age: 66
End: 2021-01-20
Payer: MEDICARE

## 2021-01-20 VITALS
SYSTOLIC BLOOD PRESSURE: 135 MMHG | RESPIRATION RATE: 16 BRPM | HEART RATE: 66 BPM | OXYGEN SATURATION: 97 % | DIASTOLIC BLOOD PRESSURE: 92 MMHG | TEMPERATURE: 97.9 F

## 2021-01-20 DIAGNOSIS — R10.30 LOWER ABDOMINAL PAIN: ICD-10-CM

## 2021-01-20 LAB — COLONOSCOPY: NORMAL

## 2021-01-20 PROCEDURE — 45378 DIAGNOSTIC COLONOSCOPY: CPT | Performed by: SPECIALIST

## 2021-01-20 PROCEDURE — 370N000017 HC ANESTHESIA TECHNICAL FEE, PER MIN: Performed by: SPECIALIST

## 2021-01-20 PROCEDURE — 258N000003 HC RX IP 258 OP 636: Performed by: SPECIALIST

## 2021-01-20 PROCEDURE — 250N000011 HC RX IP 250 OP 636: Performed by: NURSE ANESTHETIST, CERTIFIED REGISTERED

## 2021-01-20 PROCEDURE — 250N000009 HC RX 250: Performed by: SPECIALIST

## 2021-01-20 PROCEDURE — 250N000009 HC RX 250: Performed by: NURSE ANESTHETIST, CERTIFIED REGISTERED

## 2021-01-20 RX ORDER — LIDOCAINE 40 MG/G
CREAM TOPICAL
Status: DISCONTINUED | OUTPATIENT
Start: 2021-01-20 | End: 2021-01-20 | Stop reason: HOSPADM

## 2021-01-20 RX ORDER — LIDOCAINE HYDROCHLORIDE 20 MG/ML
INJECTION, SOLUTION INFILTRATION; PERINEURAL PRN
Status: DISCONTINUED | OUTPATIENT
Start: 2021-01-20 | End: 2021-01-20

## 2021-01-20 RX ORDER — SODIUM CHLORIDE, SODIUM LACTATE, POTASSIUM CHLORIDE, CALCIUM CHLORIDE 600; 310; 30; 20 MG/100ML; MG/100ML; MG/100ML; MG/100ML
INJECTION, SOLUTION INTRAVENOUS CONTINUOUS
Status: DISCONTINUED | OUTPATIENT
Start: 2021-01-20 | End: 2021-01-20 | Stop reason: HOSPADM

## 2021-01-20 RX ORDER — PROPOFOL 10 MG/ML
INJECTION, EMULSION INTRAVENOUS PRN
Status: DISCONTINUED | OUTPATIENT
Start: 2021-01-20 | End: 2021-01-20

## 2021-01-20 RX ORDER — PROPOFOL 10 MG/ML
INJECTION, EMULSION INTRAVENOUS CONTINUOUS PRN
Status: DISCONTINUED | OUTPATIENT
Start: 2021-01-20 | End: 2021-01-20

## 2021-01-20 RX ADMIN — PROPOFOL 60 MG: 10 INJECTION, EMULSION INTRAVENOUS at 10:08

## 2021-01-20 RX ADMIN — PROPOFOL 150 MCG/KG/MIN: 10 INJECTION, EMULSION INTRAVENOUS at 10:08

## 2021-01-20 RX ADMIN — LIDOCAINE HYDROCHLORIDE 1 ML: 10 INJECTION, SOLUTION EPIDURAL; INFILTRATION; INTRACAUDAL; PERINEURAL at 10:00

## 2021-01-20 RX ADMIN — SODIUM CHLORIDE, POTASSIUM CHLORIDE, SODIUM LACTATE AND CALCIUM CHLORIDE: 600; 310; 30; 20 INJECTION, SOLUTION INTRAVENOUS at 10:01

## 2021-01-20 RX ADMIN — SODIUM CHLORIDE, POTASSIUM CHLORIDE, SODIUM LACTATE AND CALCIUM CHLORIDE: 600; 310; 30; 20 INJECTION, SOLUTION INTRAVENOUS at 10:00

## 2021-01-20 RX ADMIN — LIDOCAINE HYDROCHLORIDE 40 MG: 20 INJECTION, SOLUTION INFILTRATION; PERINEURAL at 10:08

## 2021-01-20 ASSESSMENT — ENCOUNTER SYMPTOMS: DYSRHYTHMIAS: 1

## 2021-01-20 NOTE — DISCHARGE INSTRUCTIONS
[facility]    Home Care Following Endoscopy          Activity:    You have just undergone an endoscopic procedure usually performed with conscious sedation.  Do not work or operate machinery (including a car) for at least 12 hours.      I encourage you to walk and attempt to pass this air as soon as possible.    Diet:    Return to the diet you were on before your procedure but eat lightly for the first 12-24 hours.    Drink plenty of water.    Resume any regular medications unless otherwise advised by your physician.  Please begin any new medication prescribed as a result of your procedure as directed by your physician.     If you had any biopsy or polyp removed please refrain from aspirin or aspirin products for 2 days.  If on Coumadin please restart as instructed by your physician.   Pain:    You may take Tylenol as needed for pain.  Expected Recovery:    You can expect some mild abdominal fullness and/or discomfort due to the air used to inflate your intestinal tract.     Call Your Physician if You Have:      After Colonoscopy:  o Worsening persisting abdominal pain which is worse with activity.  o Fevers (>101 degrees F), chills or shakes.  o Passage of continued blood with bowel movements.   Any questions or concerns about your recovery, please call [phone] or after hours [phone] Nurse Advice Line.
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
## 2021-01-20 NOTE — ANESTHESIA CARE TRANSFER NOTE
Patient: Jose Angel Cha    Procedure(s):  COLONOSCOPY    Diagnosis: Lower abdominal pain [R10.30]  Diagnosis Additional Information: No value filed.    Anesthesia Type:   MAC     Note:    Oropharynx: oropharynx clear of all foreign objects and spontaneously breathing  Level of Consciousness: drowsy  Oxygen Supplementation: face mask    Independent Airway: airway patency satisfactory and stable  Dentition: dentition unchanged  Vital Signs Stable: post-procedure vital signs reviewed and stable  Report to RN Given: handoff report given  Patient transferred to: Phase II    Handoff Report: Identifed the Patient, Identified the Reponsible Provider, Reviewed the pertinent medical history, Discussed the surgical course, Reviewed Intra-OP anesthesia mangement and issues during anesthesia, Set expectations for post-procedure period and Allowed opportunity for questions and acknowledgement of understanding      Vitals: (Last set prior to Anesthesia Care Transfer)  CRNA VITALS  1/20/2021 0957 - 1/20/2021 1037      1/20/2021             Resp Rate (observed):  (!) 4        Electronically Signed By: ETHEL Reyes CRNA  January 20, 2021  10:37 AM

## 2021-01-20 NOTE — ANESTHESIA POSTPROCEDURE EVALUATION
Patient: Jose Angel Cha    Procedure(s):  COLONOSCOPY    Diagnosis:Lower abdominal pain [R10.30]  Diagnosis Additional Information: No value filed.    Anesthesia Type:  MAC    Note:  Disposition: Outpatient   Postop Pain Control: Uneventful            Sign Out: Well controlled pain   PONV: No   Neuro/Psych: Uneventful            Sign Out: Acceptable/Baseline neuro status   Airway/Respiratory: Uneventful            Sign Out: Acceptable/Baseline resp. status   CV/Hemodynamics: Uneventful            Sign Out: Acceptable CV status   Other NRE: NONE   DID A NON-ROUTINE EVENT OCCUR? No    Event details/Postop Comments:  Pt was happy with anesthesia care.  No complications.  I will follow up with the pt if needed.         Last vitals:  Vitals:    01/20/21 1050 01/20/21 1100 01/20/21 1110   BP: (!) 122/99 (!) 131/96 (!) 135/92   Pulse: 66 67 66   Resp:      Temp:      SpO2: 97% 100% 97%       Electronically Signed By: ETHEL Reyes CRNA  January 20, 2021  11:28 AM

## 2021-02-04 ENCOUNTER — OFFICE VISIT (OUTPATIENT)
Dept: DERMATOLOGY | Facility: CLINIC | Age: 66
End: 2021-02-04
Payer: MEDICARE

## 2021-02-04 DIAGNOSIS — L71.9 ROSACEA: ICD-10-CM

## 2021-02-04 DIAGNOSIS — L21.9 DERMATITIS, SEBORRHEIC: ICD-10-CM

## 2021-02-04 DIAGNOSIS — L57.8 SUN-DAMAGED SKIN: ICD-10-CM

## 2021-02-04 DIAGNOSIS — D18.01 CHERRY ANGIOMA: ICD-10-CM

## 2021-02-04 DIAGNOSIS — L81.4 SOLAR LENTIGO: ICD-10-CM

## 2021-02-04 DIAGNOSIS — L82.1 SEBORRHEIC KERATOSES: ICD-10-CM

## 2021-02-04 DIAGNOSIS — Z85.828 HISTORY OF NONMELANOMA SKIN CANCER: Primary | ICD-10-CM

## 2021-02-04 DIAGNOSIS — D23.9 DERMATOFIBROMA: ICD-10-CM

## 2021-02-04 DIAGNOSIS — D22.9 MULTIPLE BENIGN NEVI: ICD-10-CM

## 2021-02-04 PROCEDURE — 99214 OFFICE O/P EST MOD 30 MIN: CPT | Performed by: DERMATOLOGY

## 2021-02-04 RX ORDER — TRIAMCINOLONE ACETONIDE 1 MG/G
OINTMENT TOPICAL
Qty: 30 G | Refills: 11 | Status: SHIPPED | OUTPATIENT
Start: 2021-02-04 | End: 2021-09-30

## 2021-02-04 RX ORDER — METRONIDAZOLE 7.5 MG/G
GEL TOPICAL 2 TIMES DAILY
Status: CANCELLED | OUTPATIENT
Start: 2021-02-04

## 2021-02-04 ASSESSMENT — PAIN SCALES - GENERAL: PAINLEVEL: NO PAIN (0)
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
## 2021-02-04 NOTE — PATIENT INSTRUCTIONS
Apply Metronidazole gel once or twice daily to the face to improve your redness.    Apply triamcinolone once or twice daily to the ear as needed to deal with itch.

## 2021-02-04 NOTE — NURSING NOTE
Jose Angel Cha's goals for this visit include:   Chief Complaint   Patient presents with     Skin Check     Spot on concern on the left ear. Hx of NMSC     He requests these members of his care team be copied on today's visit information:     PCP: Shari Paredes    Referring Provider:  No referring provider defined for this encounter.    There were no vitals taken for this visit.    Do you need any medication refills at today's visit? No    Sis Johnson LPN

## 2021-02-09 ENCOUNTER — TELEPHONE (OUTPATIENT)
Dept: FAMILY MEDICINE | Facility: OTHER | Age: 66
End: 2021-02-09

## 2021-02-09 NOTE — TELEPHONE ENCOUNTER
Reason for Call:  Other appointment    Detailed comments: Patient couldn't make his appointment on 2/24/2021 due to a meeting.  He changed his appointment to March 3.  He is wondering if that is too late.  Please call patient if it won't work for him to wait until then.    Phone Number Patient can be reached at: Cell number on file:    Telephone Information:   Mobile [phone]       Best Time: any    Can we leave a detailed message on this number? YES    Call taken on 2/9/2021 at 1:29 PM by Mihir Kellogg

## 2021-02-15 ENCOUNTER — TELEPHONE (OUTPATIENT)
Dept: ALLERGY | Facility: OTHER | Age: 66
End: 2021-02-15

## 2021-02-15 NOTE — TELEPHONE ENCOUNTER
Patient wants to know if he can  Switch he Allergy shots to [place] for 6 weeks to Thursday or Fridays due to Lent services. He is a . Please advise how he would do this. Ok to leave a detailed message.

## 2021-02-16 NOTE — TELEPHONE ENCOUNTER
Pt scheduled venom injection tomorrow 2/17 in [place]. Pt is 29 days between injections and too early for shot. Will call patient and reschedule venom shot and send serums to [place].       Hina Lopez MA

## 2021-02-16 NOTE — TELEPHONE ENCOUNTER
Pt scheduled venom allergy shot 3/5 in [place]. Venom serums send to [facility].      Tracking number: Your Job ID is: [id number]     Hina Lopez MA

## 2021-02-22 ENCOUNTER — MYC MEDICAL ADVICE (OUTPATIENT)
Dept: ALLERGY | Facility: CLINIC | Age: 66
End: 2021-02-22

## 2021-02-22 DIAGNOSIS — J30.1 CHRONIC SEASONAL ALLERGIC RHINITIS DUE TO POLLEN: ICD-10-CM

## 2021-02-22 DIAGNOSIS — Z91.09 HOUSE DUST MITE ALLERGY: ICD-10-CM

## 2021-02-23 RX ORDER — OLOPATADINE HYDROCHLORIDE 2 MG/ML
1 SOLUTION/ DROPS OPHTHALMIC DAILY
Qty: 2.5 ML | Refills: 3 | Status: SHIPPED | OUTPATIENT
Start: 2021-02-23 | End: 2022-09-06

## 2021-02-23 NOTE — CONFIDENTIAL NOTE
Requested Prescriptions   Pending Prescriptions Disp Refills     olopatadine (PATADAY) 0.2 % ophthalmic solution 2.5 mL 3     Sig: Place 1 drop into both eyes daily       There is no refill protocol information for this order        Signed Prescriptions:                        Disp   Refills    olopatadine (PATADAY) 0.2 % ophthalmic sol*2.5 mL 3        Sig: Place 1 drop into both eyes daily  Authorizing Provider: TAMERA LEYVA  Ordering User: JO FONG RN refilled medication per [facility] Refill Protocol.     Jo Fong RN

## 2021-03-02 DIAGNOSIS — I10 BENIGN ESSENTIAL HYPERTENSION: ICD-10-CM

## 2021-03-02 RX ORDER — AMLODIPINE BESYLATE 2.5 MG/1
2.5 TABLET ORAL DAILY
Qty: 90 TABLET | Refills: 0 | Status: SHIPPED | OUTPATIENT
Start: 2021-03-02 | End: 2021-06-03

## 2021-03-03 DIAGNOSIS — E78.2 MIXED HYPERLIPIDEMIA: ICD-10-CM

## 2021-03-03 RX ORDER — EZETIMIBE 10 MG/1
10 TABLET ORAL DAILY
Qty: 90 TABLET | Refills: 0 | Status: SHIPPED | OUTPATIENT
Start: 2021-03-03 | End: 2021-06-03

## 2021-03-05 ENCOUNTER — ALLIED HEALTH/NURSE VISIT (OUTPATIENT)
Dept: ALLERGY | Facility: CLINIC | Age: 66
End: 2021-03-05
Payer: MEDICARE

## 2021-03-05 DIAGNOSIS — Z51.6 NEED FOR DESENSITIZATION TO ALLERGENS: ICD-10-CM

## 2021-03-05 DIAGNOSIS — T63.441D TOXIC EFFECT OF VENOM OF BEES, UNINTENTIONAL, SUBSEQUENT ENCOUNTER: Primary | ICD-10-CM

## 2021-03-05 PROCEDURE — 99207 PR DROP WITH A PROCEDURE: CPT

## 2021-03-05 PROCEDURE — 95117 IMMUNOTHERAPY INJECTIONS: CPT

## 2021-03-05 NOTE — PROGRESS NOTES
Patient presented after waiting 30 minutes with no reaction to allergy injections. Discharged from clinic.    Soraida Cano RN............   3/5/2021...11:44 AM

## 2021-03-05 NOTE — NURSING NOTE
Jensen set up to send patient's allergy serums back to [facility] at his request. Allergy serums packaged and placed at [place]  for .  Chastity PATEL MA

## 2021-03-13 ENCOUNTER — HEALTH MAINTENANCE LETTER (OUTPATIENT)
Age: 66
End: 2021-03-13

## 2021-04-19 NOTE — PATIENT INSTRUCTIONS
Preventive Health Recommendations:     See your health care provider every year to    Review health changes.     Discuss preventive care.      Review your medicines if your doctor has prescribed any.      Talk with your health care provider about whether you should have a test to screen for prostate cancer (PSA).    Every 3 years, have a diabetes test (fasting glucose). If you are at risk for diabetes, you should have this test more often.    Every 5 years, have a cholesterol test. Have this test more often if you are at risk for high cholesterol or heart disease.     Every 10 years, have a colonoscopy. Or, have a yearly FIT test (stool test). These exams will check for colon cancer.    Talk to with your health care provider about screening for Abdominal Aortic Aneurysm if you have a family history of AAA or have a history of smoking.    Shots:     Get a flu shot each year.     Get a tetanus shot every 10 years.     Talk to your doctor about your pneumonia vaccines. There are now two you should receive - Pneumovax (PPSV 23) and Prevnar (PCV 13).     Talk to your pharmacist about a shingles vaccine.     Talk to your doctor about the hepatitis B vaccine.  Nutrition:     Eat at least 5 servings of fruits and vegetables each day.     Eat whole-grain bread, whole-wheat pasta and brown rice instead of white grains and rice.     Get adequate Calcium and Vitamin D.   Lifestyle    Exercise for at least 150 minutes a week (30 minutes a day, 5 days a week). This will help you control your weight and prevent disease.     Limit alcohol to one drink per day.     No smoking.     Wear sunscreen to prevent skin cancer.    See your dentist every six months for an exam and cleaning.    See your eye doctor every 1 to 2 years to screen for conditions such as glaucoma, macular degeneration, cataracts, etc.    Personalized Prevention Plan  You are due for the preventive services outlined below.  Your care team is available to assist you  in scheduling these services.  If you have already completed any of these items, please share that information with your care team to update in your medical record.  Health Maintenance Due   Topic Date Due     Pneumococcal Vaccine (2 of 2 - PPSV23) 09/11/2020     PHQ-2  01/01/2021     Annual Wellness Visit  01/16/2021     Basic Metabolic Panel  01/16/2021     Comprehensive Metabolic Panel  01/16/2021     FALL RISK ASSESSMENT  01/16/2021     Patient Education   Personalized Prevention Plan  You are due for the preventive services outlined below.  Your care team is available to assist you in scheduling these services.  If you have already completed any of these items, please share that information with your care team to update in your medical record.  Health Maintenance Due   Topic Date Due     ANNUAL REVIEW OF HM ORDERS  Never done     Pneumococcal Vaccine (2 of 2 - PPSV23) 09/11/2020     Basic Metabolic Panel  01/16/2021     Comprehensive Metabolic Panel  01/16/2021     FALL RISK ASSESSMENT  01/16/2021      Patient Education   Alcohol Use     Many people can enjoy a glass of wine or beer without any negative consequences to their health. According to the Centers for Disease Control and Prevention (CDC), having one or fewer drinks per day for women and two or fewer per day for men is considered moderate drinking.     When people drink more than moderately, it can become concerning. Excessive drinking is defined as consuming 15 drinks or more per week for men and 8 drinks or more per week for women. There are various health problems associated with excessive drinking, which include:    Damage to vital organs like the heart, brain, liver and pancreas    Harm to the digestive tract    Weaken the immune system    Higher risk for heart disease and cancer    There are many resources available to people who are addicted to alcohol. A counselor or other health care provider can give you support. So can a , , or   who is trained in substance abuse counseling. Friends and family may also help once you are connected with professionals.           Signs of Hearing Loss      Hearing much better with one ear can be a sign of hearing loss.   Hearing loss is a problem shared by many people. In fact, it is one of the most common health problems, particularly as people age. Most people age 65 and older have some hearing loss. By age 80, almost everyone does. Hearing loss often occurs slowly over the years. So you may not realize your hearing has gotten worse.  Have your hearing checked  Call your healthcare provider if you:    Have to strain to hear normal conversation    Have to watch other people s faces very carefully to follow what they re saying    Need to ask people to repeat what they ve said    Often misunderstand what people are saying    Turn the volume of the television or radio up so high that others complain    Feel that people are mumbling when they re talking to you    Find that the effort to hear leaves you feeling tired and irritated    Notice, when using the phone, that you hear better with one ear than the other  [employer] last reviewed this educational content on 1/1/2020 2000-2021 [employer]. All rights reserved. This information is not intended as a substitute for professional medical care. Always follow your healthcare professional's instruct

## 2021-04-19 NOTE — PROGRESS NOTES
"SUBJECTIVE:   Jose Angel Cha is a 65 year old male who presents for Preventive Visit.  Patient has been advised of split billing requirements and indicates understanding: Yes   Are you in the first 12 months of your Medicare coverage?  No    Healthy Habits:     In general, how would you rate your overall health?  Good    Frequency of exercise:  2-3 days/week    Duration of exercise:  30-45 minutes    Do you usually eat at least 4 servings of fruit and vegetables a day, include whole grains    & fiber and avoid regularly eating high fat or \"junk\" foods?  Yes    Taking medications regularly:  Yes    Medication side effects:  Lightheadedness    Ability to successfully perform activities of daily living:  No assistance needed    Home Safety:  Lack of grab bars in the bathroom    Hearing Impairment:  Difficulty following a conversation in a noisy restaurant or crowded room, feel that people are mumbling or not speaking clearly, difficult to understand a speaker at a public meeting or [employer] service, need to ask people to speak up or repeat themselves and difficulty understanding soft or whispered speech    In the past 6 months, have you been bothered by leaking of urine?  No    In general, how would you rate your overall mental or emotional health?  Good      PHQ-2 Total Score: 1    Additional concerns today:  Yes (has appointment for testing/fitting for hearing aids on Monday so would like ears cleaned if needed. Would like to review colonoscopy results - has ongoing abdominal pain)     Do you feel safe in your environment? Yes    Have you ever done Advance Care Planning? (For example, a Health Directive, POLST, or a discussion with a medical provider or your loved ones about your wishes): Yes, patient states has an Advance Care Planning document and will bring a copy to the clinic.       Fall risk  Fallen 2 or more times in the past year?: No  Any fall with injury in the past year?: No  Cognitive Screening   1) " Repeat 3 items (Leader, Season, Table)    2) Clock draw: NORMAL  3) 3 item recall: Recalls 3 objects  Results: 3 items recalled: COGNITIVE IMPAIRMENT LESS LIKELY    Mini-CogTM Copyright KAREN Stein. Licensed by the author for use in [facility]; reprinted with permission (javi@[facility]). All rights reserved.      Do you have sleep apnea, excessive snoring or daytime drowsiness?: yes    Has LISBET and using oral device which has been working well.    Reviewed and updated as needed this visit by clinical staff  Tobacco  Allergies  Meds  Problems  Med Hx  Surg Hx  Fam Hx  Soc Hx          Reviewed and updated as needed this visit by Provider  Tobacco  Allergies  Meds  Problems  Med Hx  Surg Hx  Fam Hx         Social History     Tobacco Use     Smoking status: Former Smoker     Types: Cigars     Quit date: 11/10/2017     Years since quitting: 3.4     Smokeless tobacco: Never Used   Substance Use Topics     Alcohol use: Yes     Comment: 3-4 glasses wine/night     If you drink alcohol do you typically have >3 drinks per day or >7 drinks per week? Yes    Alcohol Use 4/21/2021   Prescreen: >3 drinks/day or >7 drinks/week? Yes   Prescreen: >3 drinks/day or >7 drinks/week? -   AUDIT SCORE  13     AUDIT - Alcohol Use Disorders Identification Test - Reproduced from the World Health Organization Audit 2001 (Second Edition) 4/21/2021   1.  How often do you have a drink containing alcohol? 4 or more times a week   2.  How many drinks containing alcohol do you have on a typical day when you are drinking? 3 or 4   3.  How often do you have five or more drinks on one occasion? Monthly   4.  How often during the last year have you found that you were not able to stop drinking once you had started? Never   5.  How often during the last year have you failed to do what was normally expected of you because of drinking? Never   6.  How often during the last year have you needed a first drink in the morning to get yourself  going after a heavy drinking session? Never   7.  How often during the last year have you had a feeling of guilt or remorse after drinking? Monthly   8.  How often during the last year have you been unable to remember what happened the night before because of your drinking? Never   9.  Have you or someone else been injured because of your drinking? No   10. Has a relative, friend, doctor or other health care worker been concerned about your drinking or suggested you cut down? Yes, during the last year   TOTAL SCORE 13       Current providers sharing in care for this patient include:   Patient Care Team:  Shari Paredes MD as PCP - General (Family Practice)  Shari Paredes MD as Assigned PCP  Ynes Chirinos MD as Assigned Heart and Vascular Provider  Juan Antonio Cardozo DO as Assigned Allergy Provider  Gabriella Gómez MD as Assigned Surgical Provider    The following health maintenance items are reviewed in Epic and correct as of today:  Health Maintenance Due   Topic Date Due     ANNUAL REVIEW OF HM ORDERS  Never done     Pneumococcal Vaccine: 65+ Years (2 of 2 - PPSV23) 09/11/2020     BMP  01/16/2021     CMP  01/16/2021     FALL RISK ASSESSMENT  01/16/2021     Lab work is in process    Any new diagnosis of family breast, ovarian, or bowel cancer? No    FSH-7: No flowsheet data found.    Above and the R of the naval ab pain - no constipation, though sometimes mucous. Feels like his stomach twists at times. Belly feels harder than normal. Has been ongoign for months.    Review of Systems   Constitutional: Negative for chills and fever.   HENT: Positive for hearing loss. Negative for congestion, ear pain and sore throat.    Eyes: Positive for pain and visual disturbance.   Respiratory: Positive for cough and shortness of breath.    Cardiovascular: Negative for chest pain, palpitations and peripheral edema.   Gastrointestinal: Positive for abdominal pain, heartburn and nausea. Negative for constipation,  "diarrhea and hematochezia.   Genitourinary: Positive for impotence. Negative for discharge, dysuria, frequency, genital sores, hematuria and urgency.   Musculoskeletal: Positive for arthralgias, joint swelling and myalgias.   Skin: Negative for rash.   Neurological: Positive for dizziness and weakness. Negative for headaches and paresthesias.   Psychiatric/Behavioral: Positive for mood changes. The patient is not nervous/anxious.          OBJECTIVE:   /74   Pulse 84   Temp 97.1  F (36.2  C) (Temporal)   Resp 16   Ht 1.795 m (5' 10.67\")   Wt 100.2 kg (221 lb)   SpO2 99%   BMI 31.11 kg/m   Estimated body mass index is 31.11 kg/m  as calculated from the following:    Height as of this encounter: 1.795 m (5' 10.67\").    Weight as of this encounter: 100.2 kg (221 lb).  Physical Exam  GENERAL: healthy, alert and no distress  HENT: R canal blocked by cerumen, will need flush as planning hearing aid evalaution  NECK: no adenopathy, no asymmetry, masses, or scars and thyroid normal to palpation  RESP: lungs clear to auscultation - no rales, rhonchi or wheezes  CV: regular rate and rhythm, normal S1 S2, no S3 or S4, no murmur, click or rub, no peripheral edema and peripheral pulses strong  ABDOMEN: soft, nontender, no hepatosplenomegaly, no masses and bowel sounds normal  MS: no gross musculoskeletal defects noted, no edema  SKIN: no suspicious lesions or rashes  NEURO: Normal strength and tone, mentation intact and speech normal  PSYCH: mentation appears normal, affect normal/bright        ASSESSMENT / PLAN:       ICD-10-CM    1. Routine general medical examination at a health care facility  Z00.00    2. Anxiety  F41.9    3. Diastasis recti  M62.08    4. Cardenas's esophagus without dysplasia  K22.70    5. Benign essential hypertension  I10 COMPREHENSIVE METABOLIC PANEL     CANCELED: BASIC METABOLIC PANEL   6. Mixed hyperlipidemia  E78.2    7. LISBET (obstructive sleep apnea) AHI 13.8  G47.33    8. Paroxysmal atrial " fibrillation (H)  I48.0    9. Subclinical hypothyroidism  E03.9 TSH with free T4 reflex   10. Primary insomnia  F51.01 temazepam (RESTORIL) 7.5 MG capsule   11. Morbid obesity (H)  E66.01    12. Encounter for Medicare annual wellness exam  Z00.00    13. Erectile dysfunction due to arterial insufficiency  N52.01 sildenafil (VIAGRA) 50 MG tablet     Patient with multiple concerns today to start his his abdominal pain.  This was evaluated by surgery with both the CT scan and colonoscopy.  The resulting discomfort appeared initially to be lower abdominal but later described as upper abdominal.  It appears to follow the midline over an area he is known to have a diastases recti.  Given information on exercises to try and complete this as this is likely going to help his discomforts.  Patient has sleep apnea chronically and is doing well with his oral appliance.  Though he states it started to wear and may need fitting for another so discussed updating this oral appliance.  Patient has had chronic insomnia issues and has been able to use his Restoril less often.  Discussed with his age and the high risk status of this medication that he may do best by decreasing the dose.  And consideration of weaning it due to risk.  Given a 7-1/2 mg dosing today to see if we can minimize the medication needed for relief.  Lastly the patient complains of erectile dysfunction.  He is following with cardiology over his hypertension and hyperlipidemia which can contribute to progression to erectile dysfunction.  He has his cardiology follow-up planned in the near future.  So in the meantime did discuss the use of Viagra to his treat his ED symptoms.  Given warning over Viagra use with nitrates.  Patient expressed understanding.  In addition to preventative visit, 30 min spent on the date of the encounter in chart review, patient visit, review of tests, documentation and/or discussion with other providers about the issues documented above.  "      Patient has been advised of split billing requirements and indicates understanding: Yes  COUNSELING:  Reviewed preventive health counseling, as reflected in patient instructions       Regular exercise       Healthy diet/nutrition    Estimated body mass index is 31.11 kg/m  as calculated from the following:    Height as of this encounter: 1.795 m (5' 10.67\").    Weight as of this encounter: 100.2 kg (221 lb).    Weight management plan: Discussed healthy diet and exercise guidelines    He reports that he quit smoking about 3 years ago. His smoking use included cigars. He has never used smokeless tobacco.      Appropriate preventive services were discussed with this patient, including applicable screening as appropriate for cardiovascular disease, diabetes, osteopenia/osteoporosis, and glaucoma.  As appropriate for age/gender, discussed screening for colorectal cancer, prostate cancer, breast cancer, and cervical cancer. Checklist reviewing preventive services available has been given to the patient.    Reviewed patients plan of care and provided an AVS. The Basic Care Plan (routine screening as documented in Health Maintenance) for Jose Angel meets the Care Plan requirement. This Care Plan has been established and reviewed with the Patient.    Counseling Resources:  ATP IV Guidelines  Pooled Cohorts Equation Calculator  Breast Cancer Risk Calculator  Breast Cancer: Medication to Reduce Risk  FRAX Risk Assessment  ICSI Preventive Guidelines  Dietary Guidelines for Americans, 2010  USDA's MyPlate  ASA Prophylaxis  Lung CA Screening    Shari Paredes MD, MD  [facility]    Identified Health Risks:    The patient reports that he drinks more than one alcoholic drink per day and sometimes engages in binge or excessive drinking. He was counseled and given information about possible harmful effects of excessive alcohol intake as well as where to get help for alcohol problems.  The patient was provided " with written information regarding signs of hearing loss.

## 2021-04-21 ENCOUNTER — OFFICE VISIT (OUTPATIENT)
Dept: FAMILY MEDICINE | Facility: OTHER | Age: 66
End: 2021-04-21
Payer: MEDICARE

## 2021-04-21 ENCOUNTER — ALLIED HEALTH/NURSE VISIT (OUTPATIENT)
Dept: ALLERGY | Facility: OTHER | Age: 66
End: 2021-04-21
Payer: MEDICARE

## 2021-04-21 ENCOUNTER — TELEPHONE (OUTPATIENT)
Dept: ALLERGY | Facility: OTHER | Age: 66
End: 2021-04-21

## 2021-04-21 VITALS
SYSTOLIC BLOOD PRESSURE: 122 MMHG | TEMPERATURE: 97.1 F | BODY MASS INDEX: 30.94 KG/M2 | DIASTOLIC BLOOD PRESSURE: 74 MMHG | WEIGHT: 221 LBS | HEART RATE: 84 BPM | RESPIRATION RATE: 16 BRPM | OXYGEN SATURATION: 99 % | HEIGHT: 71 IN

## 2021-04-21 DIAGNOSIS — M62.08 DIASTASIS RECTI: ICD-10-CM

## 2021-04-21 DIAGNOSIS — E03.8 SUBCLINICAL HYPOTHYROIDISM: ICD-10-CM

## 2021-04-21 DIAGNOSIS — F41.9 ANXIETY: ICD-10-CM

## 2021-04-21 DIAGNOSIS — T78.2XXD ANAPHYLAXIS DUE TO HYMENOPTERA VENOM, ACCIDENTAL OR UNINTENTIONAL, SUBSEQUENT ENCOUNTER: Primary | ICD-10-CM

## 2021-04-21 DIAGNOSIS — F51.01 PRIMARY INSOMNIA: ICD-10-CM

## 2021-04-21 DIAGNOSIS — I48.0 PAROXYSMAL ATRIAL FIBRILLATION (H): ICD-10-CM

## 2021-04-21 DIAGNOSIS — K22.70 BARRETT'S ESOPHAGUS WITHOUT DYSPLASIA: ICD-10-CM

## 2021-04-21 DIAGNOSIS — E78.2 MIXED HYPERLIPIDEMIA: ICD-10-CM

## 2021-04-21 DIAGNOSIS — I10 BENIGN ESSENTIAL HYPERTENSION: ICD-10-CM

## 2021-04-21 DIAGNOSIS — G47.33 OSA (OBSTRUCTIVE SLEEP APNEA): ICD-10-CM

## 2021-04-21 DIAGNOSIS — Z00.00 ENCOUNTER FOR MEDICARE ANNUAL WELLNESS EXAM: ICD-10-CM

## 2021-04-21 DIAGNOSIS — E66.01 MORBID OBESITY (H): ICD-10-CM

## 2021-04-21 DIAGNOSIS — T63.481D ANAPHYLAXIS DUE TO HYMENOPTERA VENOM, ACCIDENTAL OR UNINTENTIONAL, SUBSEQUENT ENCOUNTER: Primary | ICD-10-CM

## 2021-04-21 DIAGNOSIS — Z00.00 ROUTINE GENERAL MEDICAL EXAMINATION AT A HEALTH CARE FACILITY: Primary | ICD-10-CM

## 2021-04-21 DIAGNOSIS — T63.441D TOXIC EFFECT OF VENOM OF BEES, UNINTENTIONAL, SUBSEQUENT ENCOUNTER: ICD-10-CM

## 2021-04-21 DIAGNOSIS — N52.01 ERECTILE DYSFUNCTION DUE TO ARTERIAL INSUFFICIENCY: ICD-10-CM

## 2021-04-21 LAB
ALBUMIN SERPL-MCNC: 3.8 G/DL (ref 3.4–5)
ALP SERPL-CCNC: 95 U/L (ref 40–150)
ALT SERPL W P-5'-P-CCNC: 53 U/L (ref 0–70)
ANION GAP SERPL CALCULATED.3IONS-SCNC: 9 MMOL/L (ref 3–14)
AST SERPL W P-5'-P-CCNC: 31 U/L (ref 0–45)
BILIRUB SERPL-MCNC: 0.5 MG/DL (ref 0.2–1.3)
BUN SERPL-MCNC: 14 MG/DL (ref 7–30)
CALCIUM SERPL-MCNC: 9.2 MG/DL (ref 8.5–10.1)
CHLORIDE SERPL-SCNC: 105 MMOL/L (ref 94–109)
CO2 SERPL-SCNC: 26 MMOL/L (ref 20–32)
CREAT SERPL-MCNC: 1.23 MG/DL (ref 0.66–1.25)
GFR SERPL CREATININE-BSD FRML MDRD: 60 ML/MIN/{1.73_M2}
GLUCOSE SERPL-MCNC: 85 MG/DL (ref 70–99)
POTASSIUM SERPL-SCNC: 3.7 MMOL/L (ref 3.4–5.3)
PROT SERPL-MCNC: 7.4 G/DL (ref 6.8–8.8)
SODIUM SERPL-SCNC: 140 MMOL/L (ref 133–144)
T4 FREE SERPL-MCNC: 0.78 NG/DL (ref 0.76–1.46)
TSH SERPL DL<=0.005 MIU/L-ACNC: 6.38 MU/L (ref 0.4–4)

## 2021-04-21 PROCEDURE — 84443 ASSAY THYROID STIM HORMONE: CPT | Performed by: FAMILY MEDICINE

## 2021-04-21 PROCEDURE — 99213 OFFICE O/P EST LOW 20 MIN: CPT | Mod: 25 | Performed by: FAMILY MEDICINE

## 2021-04-21 PROCEDURE — 99207 PR DROP WITH A PROCEDURE: CPT

## 2021-04-21 PROCEDURE — 95117 IMMUNOTHERAPY INJECTIONS: CPT

## 2021-04-21 PROCEDURE — 80053 COMPREHEN METABOLIC PANEL: CPT | Performed by: FAMILY MEDICINE

## 2021-04-21 PROCEDURE — G0439 PPPS, SUBSEQ VISIT: HCPCS | Performed by: FAMILY MEDICINE

## 2021-04-21 PROCEDURE — 84439 ASSAY OF FREE THYROXINE: CPT | Performed by: FAMILY MEDICINE

## 2021-04-21 PROCEDURE — 36415 COLL VENOUS BLD VENIPUNCTURE: CPT | Performed by: FAMILY MEDICINE

## 2021-04-21 PROCEDURE — G0009 ADMIN PNEUMOCOCCAL VACCINE: HCPCS | Performed by: FAMILY MEDICINE

## 2021-04-21 PROCEDURE — 90732 PPSV23 VACC 2 YRS+ SUBQ/IM: CPT | Performed by: FAMILY MEDICINE

## 2021-04-21 RX ORDER — TEMAZEPAM 7.5 MG/1
7.5 CAPSULE ORAL PRN
Qty: 30 CAPSULE | Refills: 1 | Status: SHIPPED | OUTPATIENT
Start: 2021-04-21 | End: 2022-02-28

## 2021-04-21 RX ORDER — SILDENAFIL 50 MG/1
50 TABLET, FILM COATED ORAL DAILY PRN
Qty: 30 TABLET | Refills: 1 | Status: SHIPPED | OUTPATIENT
Start: 2021-04-21 | End: 2022-08-11

## 2021-04-21 ASSESSMENT — ENCOUNTER SYMPTOMS
DIARRHEA: 0
CHILLS: 0
PALPITATIONS: 0
SORE THROAT: 0
ABDOMINAL PAIN: 1
WEAKNESS: 1
NAUSEA: 1
MYALGIAS: 1
EYE PAIN: 1
DYSURIA: 0
SHORTNESS OF BREATH: 1
JOINT SWELLING: 1
FEVER: 0
HEADACHES: 0
HEMATOCHEZIA: 0
FREQUENCY: 0
HEMATURIA: 0
CONSTIPATION: 0
PARESTHESIAS: 0
HEARTBURN: 1
NERVOUS/ANXIOUS: 0
COUGH: 1
ARTHRALGIAS: 1
DIZZINESS: 1

## 2021-04-21 ASSESSMENT — MIFFLIN-ST. JEOR: SCORE: 1799.32

## 2021-04-21 ASSESSMENT — ACTIVITIES OF DAILY LIVING (ADL): CURRENT_FUNCTION: NO ASSISTANCE NEEDED

## 2021-04-21 NOTE — TELEPHONE ENCOUNTER
Please advise new dosing orders, building schedule, and date which orders are valid through.  Patient's last shot was 3/5/21, dose was 1.0red, next appointment scheduled for today which will be 47 days. Last orders in 1/2021 read dosing q 6 weeks     New orders will be added to immunotherapy flowsheet once they are received.     Michelle IRVIN RN Specialty Triage 4/21/2021 8:48 AM

## 2021-04-22 ENCOUNTER — OFFICE VISIT (OUTPATIENT)
Dept: CARDIOLOGY | Facility: CLINIC | Age: 66
End: 2021-04-22
Attending: INTERNAL MEDICINE
Payer: MEDICARE

## 2021-04-22 VITALS
DIASTOLIC BLOOD PRESSURE: 87 MMHG | HEIGHT: 71 IN | BODY MASS INDEX: 30.8 KG/M2 | WEIGHT: 220 LBS | HEART RATE: 78 BPM | SYSTOLIC BLOOD PRESSURE: 108 MMHG

## 2021-04-22 DIAGNOSIS — E78.2 MIXED HYPERLIPIDEMIA: ICD-10-CM

## 2021-04-22 DIAGNOSIS — Z98.890 H/O MITRAL VALVE REPAIR: ICD-10-CM

## 2021-04-22 DIAGNOSIS — I10 BENIGN ESSENTIAL HYPERTENSION: Primary | ICD-10-CM

## 2021-04-22 PROCEDURE — 99214 OFFICE O/P EST MOD 30 MIN: CPT | Performed by: INTERNAL MEDICINE

## 2021-04-22 ASSESSMENT — MIFFLIN-ST. JEOR: SCORE: 1792.1
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
## 2021-04-29 ENCOUNTER — CARE COORDINATION (OUTPATIENT)
Dept: CARDIOLOGY | Facility: CLINIC | Age: 66
End: 2021-04-29

## 2021-04-29 DIAGNOSIS — E78.2 MIXED HYPERLIPIDEMIA: Primary | ICD-10-CM

## 2021-04-29 NOTE — PROGRESS NOTES
Per Dr. Chirinos's request, will send Repatha Rx to pharmacy so that [facility] can submit PA to pt's insurance to see if it will be covered this year (has not been covered in the past).    LAURI Cohen 2:46 PM 4/29/2021

## 2021-05-14 NOTE — PROGRESS NOTES
Received note from [facility] that pt's insurance prefers Praluent over Repatha. Per chart review, pt was on Praluent in the past and tolerated it well but had to stop last year because insurance did not cover.     Updated Praluent Rx sent to [facility] to see if PA will be covered this year and Repatha Rx discontinued.     LAURI Cohen 1:25 PM 5/14/2021

## 2021-05-17 NOTE — TELEPHONE ENCOUNTER
Prior Authorization Not Needed per Insurance    Medication: Praluent 75MG/ML auto injectors (NO PA NEEDED)  Insurance Company: [employer] - Phone [phone] Fax [phone]  Expected CoPay: $277.47    Pharmacy Filling the Rx: [facility] - [place], [place] - [address]  Pharmacy Notified:    Patient Notified:      Praluent is a covered benefit under his formulary. The cost for the first fill is $277.47. This due to the deductible of $67.94 still needing to be met, plus a co-insurance of $209.53. Patient may be eligible for a seth through the [employer].     Thank you,    Justyna Fairchild h-T  [facility] [address] [place]  [address] [place] [address]  Ph: [phone] Fax: [phone]  [email or web address]

## 2021-05-18 NOTE — PROGRESS NOTES
Called and spoke with pt. Discussed that Praluent PA is not needed by his insurance and The cost for the first fill is $277.47. This due to the deductible of $67.94 still needing to be met, plus a co-insurance of $209.53. Patient may be eligible for a seth through the [employer]. Pt states he is willing to apply for the seth; however, he thinks his income is probably too high to qualify. He states he will review the annual household income with his wife and will call back either today or tomorrow with that information for their household of 2 people.     Awaiting return call from pt.     LAURI Cohen 2:31 PM 5/18/2021

## 2021-05-19 NOTE — PROGRESS NOTES
Received return call from pt who reports he reviewed with his wife and their annual household income for a household of 2 people is $112,800. Discussed with pt that he is likely correct that he will not qualify for the [employer] seth; however, discussed that will still send to [facility] to review and see if there are any other options available. He agrees with this plan.     LUARI Cohen 3:52 PM 5/19/2021

## 2021-05-20 NOTE — PROGRESS NOTES
Called and spoke with pt. Discussed that, unfortunately, his annual income is still too high for him to qualify for the [facility] and [facility]. Pt verbalized understanding but confirms that he cannot afford to pay for Praluent at this time and would like to know what other option Dr. Chirinos recommends.     Routed to Dr. Chirinos for review.     LAURI Cohen 10:53 AM 5/20/2021

## 2021-05-24 ENCOUNTER — CARE COORDINATION (OUTPATIENT)
Dept: CARDIOLOGY | Facility: CLINIC | Age: 66
End: 2021-05-24

## 2021-05-24 DIAGNOSIS — I25.10 CORONARY ARTERY DISEASE INVOLVING NATIVE CORONARY ARTERY OF NATIVE HEART WITHOUT ANGINA PECTORIS: Primary | ICD-10-CM

## 2021-05-24 NOTE — PROGRESS NOTES
Called and spoke with pt and updated him with Dr. Chirinos's response:        Pt verbalized understanding and agrees with this plan.    LAURI Cohen 2:13 PM 5/24/2021

## 2021-05-24 NOTE — PROGRESS NOTES
During my call to pt regarding PCSK9 Inhibitor he mentioned that he has been noticing that his feet/ankles and hands are becoming swollen by the afternoons and he is also noticing some shortness of breath with activity. He reports these symptoms are new since his visit with Dr. Chirinos on 4/22/21 and have gradually come on over the last few weeks. He reports the swelling always goes away overnight, but comes back during the day. He does not currently weight himself at home daily. He confirms he is taking hydrochlorothiazide 12.5mg daily.   Advised pt to go to urgent care or ED for evaluation if he starts to develop shortness of breath at rest, otherwise will have Dr. Chirinos's nurse team follow up with him once Dr. Chirinos reviews and gives recommendations. He agrees with this plan.     LAURI Cohen 2:21 PM 5/24/2021

## 2021-05-26 NOTE — PROGRESS NOTES
Called and spoke with pt and reviewed Dr. Chirinos's recommendations with him. He verbalized understanding and agrees to have CMP lab done at [facility] on 6/2/21 and then was transferred to scheduling to get [facility] appt set up after that on 6/3 or 6/4.     LAURI Cohen 3:56 PM 5/26/2021

## 2021-06-02 ENCOUNTER — ALLIED HEALTH/NURSE VISIT (OUTPATIENT)
Dept: ALLERGY | Facility: OTHER | Age: 66
End: 2021-06-02
Payer: MEDICARE

## 2021-06-02 DIAGNOSIS — I25.10 CORONARY ARTERY DISEASE INVOLVING NATIVE CORONARY ARTERY OF NATIVE HEART WITHOUT ANGINA PECTORIS: ICD-10-CM

## 2021-06-02 DIAGNOSIS — J30.1 CHRONIC SEASONAL ALLERGIC RHINITIS DUE TO POLLEN: ICD-10-CM

## 2021-06-02 DIAGNOSIS — T63.441D TOXIC EFFECT OF VENOM OF BEES, UNINTENTIONAL, SUBSEQUENT ENCOUNTER: Primary | ICD-10-CM

## 2021-06-02 LAB
ALBUMIN SERPL-MCNC: 4.1 G/DL (ref 3.4–5)
ALP SERPL-CCNC: 113 U/L (ref 40–150)
ALT SERPL W P-5'-P-CCNC: 62 U/L (ref 0–70)
ANION GAP SERPL CALCULATED.3IONS-SCNC: 4 MMOL/L (ref 3–14)
AST SERPL W P-5'-P-CCNC: 41 U/L (ref 0–45)
BILIRUB SERPL-MCNC: 0.5 MG/DL (ref 0.2–1.3)
BUN SERPL-MCNC: 16 MG/DL (ref 7–30)
CALCIUM SERPL-MCNC: 9.5 MG/DL (ref 8.5–10.1)
CHLORIDE SERPL-SCNC: 105 MMOL/L (ref 94–109)
CO2 SERPL-SCNC: 28 MMOL/L (ref 20–32)
CREAT SERPL-MCNC: 1.3 MG/DL (ref 0.66–1.25)
GFR SERPL CREATININE-BSD FRML MDRD: 56 ML/MIN/{1.73_M2}
GLUCOSE SERPL-MCNC: 86 MG/DL (ref 70–99)
POTASSIUM SERPL-SCNC: 4.2 MMOL/L (ref 3.4–5.3)
PROT SERPL-MCNC: 7.5 G/DL (ref 6.8–8.8)
SODIUM SERPL-SCNC: 137 MMOL/L (ref 133–144)

## 2021-06-02 PROCEDURE — 80053 COMPREHEN METABOLIC PANEL: CPT | Performed by: INTERNAL MEDICINE

## 2021-06-02 PROCEDURE — 95117 IMMUNOTHERAPY INJECTIONS: CPT

## 2021-06-02 PROCEDURE — 36415 COLL VENOUS BLD VENIPUNCTURE: CPT | Performed by: INTERNAL MEDICINE

## 2021-06-02 PROCEDURE — 99207 PR DROP WITH A PROCEDURE: CPT

## 2021-06-02 RX ORDER — EPINEPHRINE 0.3 MG/.3ML
0.3 INJECTION SUBCUTANEOUS PRN
Qty: 0.6 ML | Refills: 1 | Status: SHIPPED | OUTPATIENT
Start: 2021-06-02 | End: 2022-07-06

## 2021-06-02 NOTE — PROGRESS NOTES
Patient presented after waiting 30 minutes with normal reaction to  injections. Discharged from clinic.    Nikki SHINE RN, [facility] 06/02/21 4:15 PM

## 2021-06-03 ENCOUNTER — OFFICE VISIT (OUTPATIENT)
Dept: CARDIOLOGY | Facility: CLINIC | Age: 66
End: 2021-06-03
Attending: INTERNAL MEDICINE
Payer: MEDICARE

## 2021-06-03 VITALS
BODY MASS INDEX: 30.8 KG/M2 | DIASTOLIC BLOOD PRESSURE: 90 MMHG | HEIGHT: 71 IN | SYSTOLIC BLOOD PRESSURE: 141 MMHG | HEART RATE: 73 BPM | WEIGHT: 220 LBS

## 2021-06-03 DIAGNOSIS — I10 BENIGN ESSENTIAL HYPERTENSION: ICD-10-CM

## 2021-06-03 DIAGNOSIS — R60.0 BILATERAL LEG EDEMA: Primary | ICD-10-CM

## 2021-06-03 DIAGNOSIS — Z98.890 H/O MITRAL VALVE REPAIR: ICD-10-CM

## 2021-06-03 DIAGNOSIS — E78.2 MIXED HYPERLIPIDEMIA: ICD-10-CM

## 2021-06-03 PROCEDURE — 99215 OFFICE O/P EST HI 40 MIN: CPT | Performed by: PHYSICIAN ASSISTANT

## 2021-06-03 RX ORDER — HYDROCHLOROTHIAZIDE 12.5 MG/1
12.5 TABLET ORAL DAILY
Qty: 90 TABLET | Refills: 3 | Status: SHIPPED | OUTPATIENT
Start: 2021-06-03 | End: 2022-08-11 | Stop reason: ALTCHOICE

## 2021-06-03 RX ORDER — EZETIMIBE 10 MG/1
10 TABLET ORAL DAILY
Qty: 90 TABLET | Refills: 3 | Status: SHIPPED | OUTPATIENT
Start: 2021-06-03 | End: 2022-06-01

## 2021-06-03 RX ORDER — AMLODIPINE BESYLATE 2.5 MG/1
2.5 TABLET ORAL DAILY
Qty: 90 TABLET | Refills: 3 | Status: SHIPPED | OUTPATIENT
Start: 2021-06-03 | End: 2022-06-01

## 2021-06-03 ASSESSMENT — MIFFLIN-ST. JEOR: SCORE: 1787.1

## 2021-06-03 NOTE — PROGRESS NOTES
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    His cardiovascular history includes mitral valve repair with a P2 resection and placement of a 28 mm annuloplasty ring in 2010, mild diffuse coronary artery disease, dyslipidemia, hypertension and chronic mild lower extremity edema, which has been exacerbated in the past with higher doses of amlodipine.    He was in to see Dr. Chirinos for his annual followup in April.  It appears that he was overall doing well at that time.  He had had an echocardiogram in 08/2020 which showed normal LV systolic function and an intact mitral valve repair without any evidence of stenosis and only trace mitral insufficiency.  Dr. Chirinos noted that the patient had previously been on a PCSK9 inhibitor due to his history of statin intolerance in the setting of dyslipidemia and a strong family history of coronary artery disease but that this had been continued due to insurance coverage.  It appears that they worked to try to get him better coverage but unfortunately medication was still cost prohibitive.  During a conversation on followup in regard to this medical therapy, the patient mentioned that he had had an increase in lower extremity edema and noted puffiness in his hands.  He also mentioned some dyspnea on exertion, and today's office visit was set up.    The patient states that for a few weeks he did note an increase in lower extremity edema and puffiness in his hands; however, about a week ago his symptoms resolved and returned to baseline, and he has not had further recurrence.  He does endorse some dyspnea on exertion which has been present for a couple of years.  He attributes this to generalized deconditioning and weight gain over time.  It does not sound like there has been any acute change to his symptoms.  He does mention that he is in need of  refills on several of his cardiac medications.    CURRENT CARDIAC MEDICATIONS:     1.  Amlodipine 2.5 mg daily.  2.  Aspirin 81 mg daily.  3.  Zetia 10 mg daily.  4.  Hydrochlorothiazide 25 mg half tablet daily.    The remainder of his medications, allergies, and review of systems were reviewed and as are documented separately.    PHYSICAL EXAMINATION:    GENERAL:  The patient is a pleasant 66-year-old gentleman who appears his stated age.  He is in no apparent distress.  VITAL SIGNS:  His blood pressure is 141/90, pulse 73, weight is 220 pounds, which is stable.  RESPIRATORY:  Breathing is nonlabored.  Lungs are clear to auscultation.  CARDIAC:  Examination reveals a regular rate and rhythm.  NEUROLOGIC:  Alert and oriented.    He had a complete metabolic panel on 06/02.   It was all within normal limits aside from a mildly elevated creatinine at 1.3, which appears consistent with the baseline.    ASSESSMENT AND PLAN:   A 66-year-old gentleman with prior history of mitral valve repair, hypertension, dyslipidemia and chronic lower extremity edema whom presents to the office today after recently noting an increase in lower extremity edema; however, this subsequently has resolved.  1.  Chronic lower extremity edema.  We discussed conservative measures such as lower extremity compression stockings, low-sodium diet, increasing activity and leg elevation.  If these conservative measures do not keep his symptoms under control, we may want to consider venous competency testing.  2.  Status post mitral valve repair.  Echocardiography in 2020 shows stable intact repair.  3.  Hypertension.  Blood pressure is mildly elevated, however, typically has been quite well controlled recently.  He will continue to keep an eye on this.  I did not make any medication changes.  He is currently dividing a 25 mg tablet of hydrochlorothiazide in half.  He did request a refill of hydrochlorothiazide, and I will refill it with a 12.5 mg tablet  for ease of dosing.  4.  Hyperlipidemia.  Continue Zetia.  Unfortunately, Praluent is not financially feasible for him.    We will plan to have him return to the Cardiology Clinic in spring of 2022.  He will establish with a new cardiologist after Dr. Chirinos's upcoming [facility].  He lives near our [facility] and prefers to establish care there.  He will have a repeat echocardiogram and lab work prior to that office visit.    Thank you for allowing me to participate in the care of this pleasant patient.    Forty minutes spent on the date of service with chart review, review of prior testing, patient visit and documentation.    Denise Devi PA-C        D: 2021   T: 2021   MT: gatito    Name:     KARTIK MILLSFrank  MRN:      [id number]        Account:      [id number]   :      1955           Service Date: 2021       Document: [id number]
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
## 2021-06-03 NOTE — PATIENT INSTRUCTIONS
"Thank you for your [facility] visit today. Your provider has recommended the following:  Medication Changes:  None  Recommendations:  Let us know if the swelling returns. Some \"conservative measures\" you can try are compression stocking, watching sodium intake, increasing activity and leg elevation  Follow-up:  See Aubrey Tompkins or Latoya for cardiology follow up in [place] spring 2022.    "
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
## 2021-07-14 ENCOUNTER — OFFICE VISIT (OUTPATIENT)
Dept: ALLERGY | Facility: OTHER | Age: 66
End: 2021-07-14
Payer: MEDICARE

## 2021-07-14 ENCOUNTER — ALLIED HEALTH/NURSE VISIT (OUTPATIENT)
Dept: ALLERGY | Facility: OTHER | Age: 66
End: 2021-07-14
Payer: MEDICARE

## 2021-07-14 VITALS
SYSTOLIC BLOOD PRESSURE: 120 MMHG | HEIGHT: 71 IN | BODY MASS INDEX: 30.8 KG/M2 | HEART RATE: 70 BPM | WEIGHT: 220 LBS | OXYGEN SATURATION: 98 % | DIASTOLIC BLOOD PRESSURE: 86 MMHG

## 2021-07-14 DIAGNOSIS — T63.91XD VENOM-INDUCED ANAPHYLAXIS, ACCIDENTAL OR UNINTENTIONAL, SUBSEQUENT ENCOUNTER: Primary | ICD-10-CM

## 2021-07-14 DIAGNOSIS — J30.1 CHRONIC SEASONAL ALLERGIC RHINITIS DUE TO POLLEN: ICD-10-CM

## 2021-07-14 DIAGNOSIS — T78.2XXD ANAPHYLAXIS DUE TO HYMENOPTERA VENOM, ACCIDENTAL OR UNINTENTIONAL, SUBSEQUENT ENCOUNTER: ICD-10-CM

## 2021-07-14 DIAGNOSIS — Z91.09 HOUSE DUST MITE ALLERGY: ICD-10-CM

## 2021-07-14 DIAGNOSIS — T63.441D TOXIC EFFECT OF VENOM OF BEES, UNINTENTIONAL, SUBSEQUENT ENCOUNTER: Primary | ICD-10-CM

## 2021-07-14 DIAGNOSIS — T63.481D ANAPHYLAXIS DUE TO HYMENOPTERA VENOM, ACCIDENTAL OR UNINTENTIONAL, SUBSEQUENT ENCOUNTER: ICD-10-CM

## 2021-07-14 DIAGNOSIS — J30.81 ALLERGIC RHINITIS DUE TO ANIMAL DANDER: ICD-10-CM

## 2021-07-14 PROCEDURE — 99213 OFFICE O/P EST LOW 20 MIN: CPT | Mod: 25 | Performed by: ALLERGY & IMMUNOLOGY

## 2021-07-14 PROCEDURE — 95117 IMMUNOTHERAPY INJECTIONS: CPT

## 2021-07-14 ASSESSMENT — MIFFLIN-ST. JEOR: SCORE: 1787.1

## 2021-07-14 NOTE — ASSESSMENT & PLAN NOTE
History of systemic symptoms on 3 separate stings. 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 Positive testing for white faced hornet, yellow faced hornet and wasp. Negative testing for honey bee. Tolerating venom immunotherapy. He has injectable epinephrine. No systemic reactions with allergy shots. Normal tryptase.     - Continue venom immunotherapy every 6 weeks.   - Likely will keep on lifelong venom immunotherapy given severe reaction.  - Continue to keep EpiPen near him at all times in case of accidental sting.   However, he does not need to bring to clinic appointments given on shots for long duration without symptoms.

## 2021-07-14 NOTE — PROGRESS NOTES
Patient presented after waiting 30 minutes with normal reaction to  injections. Discharged from clinic.    Nikki SHINE RN, [facility] 07/14/21 4:00 PM

## 2021-07-14 NOTE — ASSESSMENT & PLAN NOTE
Symptoms well controlled on Claritin, Flonase and Pataday.  He had been on allergy shots for 2 years.  Stopped allergen immunotherapy when Covid started.  Doing well since that time.        Skin testing recently  Positive for dust mites, cat, dog, grass mix, birch and nettle.     - Flonase 2 sprays per nostril daily.  - Claritin daily as needed.  - Pataday (olapatadine) 1 drop/eye daily as needed.

## 2021-07-14 NOTE — PATIENT INSTRUCTIONS
Allergy Staff Appt Hours Shot Hours Locations    Physician     Juan Antonio Cardozo DO       Support Staff     LAURI Pierre CMA  Tuesday:   [place] 7-5 Wednesday:  [place]: 7-5 Thursday:                    [place] 7-6     Friday:  [place]  7-2   [place]        Thursday: 7-5:20        Friday: 7-12:20     [place]        Tuesday: 7- 3:20 Wednesday: 7-4:20 [place] Monday: 7-2:20 Tuesday: 9-5:20         [facility]  [address] [place] [address]  Appt Line: [phone]      [facility] ([facility]  [address] [place] [address]  Appt Line: [phone]         Important Scheduling Information  Aspirin Desensitization: Appt will last 2 clinic days. Please call the Allergy RN line for your clinic to schedule. Discontinue antihistamines 7 days prior to the appointment.     Food Challenges: Appt will last 3-4 hours. Please call the Allergy RN line for your clinic to schedule. Discontinue antihistamines 7 days prior to the appointment.     Penicillin Testing: Appt will last 2-3 hours. Please call the Allergy RN line for your clinic to schedule. Discontinue antihistamines 7 days prior to the appointment.     Skin Testing: Appt will about 40 minutes. Call the appointment line for your clinic to schedule. Discontinue antihistamines 7 days prior to the appointment.     Venom Testing: Appt will last 2-3 hours. Please call the Allergy RN line for your clinic to schedule. Discontinue antihistamines 7 days prior to the appointment.     Thank you for trusting us with your Allergy, Asthma, and Immunology care. Please feel free to contact us with any questions or concerns you may have.
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
## 2021-07-14 NOTE — TELEPHONE ENCOUNTER
ALLERGY SOLUTION RE-ORDER REQUEST    Jose Angel Cha 1955 MRN: [id number]    DATE NEEDED:  07/28/2021  Vial Color Content    Vial Size  Red 1:1 Wasp    13mL  Red 1:1 Mixed Vespid   13mL      Serum reorder consent signed and patient/parent was advised that new serums would be ordered through the pharmacy and billed to their insurance company when they arrive in clinic. Yes    Shot Clinic Location:  [place]  Ship to Location: Our Security Team  Serum billed to:  Our Security Team      Requester Signature  Nikki Dunne, RN

## 2021-07-21 DIAGNOSIS — T78.2XXD ANAPHYLAXIS DUE TO HYMENOPTERA VENOM, ACCIDENTAL OR UNINTENTIONAL, SUBSEQUENT ENCOUNTER: Primary | ICD-10-CM

## 2021-07-21 DIAGNOSIS — T63.481D ANAPHYLAXIS DUE TO HYMENOPTERA VENOM, ACCIDENTAL OR UNINTENTIONAL, SUBSEQUENT ENCOUNTER: Primary | ICD-10-CM

## 2021-07-21 PROCEDURE — 95148 ANTIGEN THERAPY SERVICES: CPT | Performed by: ALLERGY & IMMUNOLOGY

## 2021-07-21 NOTE — PROGRESS NOTES
Allergy serums billed to [facility].     Vials billed below:    Vial Color Content                      Vial Size Expiration   Red 1:1                                   Wasp                               13mL 7/21/2022  Red 1:1                                   Mixed Vespid                    13mL 7/21/2022    Checked by China JONES  20 units billed    Signature  Marlene Sloan RN

## 2021-07-27 NOTE — TELEPHONE ENCOUNTER
Allergy serums received at [facility].     Vials received below:    Vial Color Content                      Vial Size Expiration Date  Red 1:1 Wasp 13mL  07/21/2022  Red 1:1 Mixed Vespid 13mL  07/21/2022      Signature  Nikki Dunne, RN

## 2021-08-25 ENCOUNTER — ALLIED HEALTH/NURSE VISIT (OUTPATIENT)
Dept: ALLERGY | Facility: OTHER | Age: 66
End: 2021-08-25
Payer: MEDICARE

## 2021-08-25 DIAGNOSIS — T63.441D TOXIC EFFECT OF VENOM OF BEES, UNINTENTIONAL, SUBSEQUENT ENCOUNTER: Primary | ICD-10-CM

## 2021-08-25 PROCEDURE — 95117 IMMUNOTHERAPY INJECTIONS: CPT

## 2021-08-25 NOTE — PROGRESS NOTES
Patient presented after waiting 30 minutes with no reaction to allergy injections. Discharged from clinic.    Sofi Calero RN

## 2021-09-08 ENCOUNTER — ALLIED HEALTH/NURSE VISIT (OUTPATIENT)
Dept: ALLERGY | Facility: OTHER | Age: 66
End: 2021-09-08
Payer: MEDICARE

## 2021-09-08 DIAGNOSIS — T63.441D TOXIC EFFECT OF VENOM OF BEES, UNINTENTIONAL, SUBSEQUENT ENCOUNTER: Primary | ICD-10-CM

## 2021-09-08 PROCEDURE — 99207 PR DROP WITH A PROCEDURE: CPT

## 2021-09-08 PROCEDURE — 95117 IMMUNOTHERAPY INJECTIONS: CPT

## 2021-09-08 NOTE — PROGRESS NOTES
Patient presented after waiting 30 minutes with no reaction to allergy injections. Discharged from clinic.    Darshan MACIAS RN ............   9/8/2021...4:47 PM

## 2021-09-22 ENCOUNTER — ALLIED HEALTH/NURSE VISIT (OUTPATIENT)
Dept: ALLERGY | Facility: OTHER | Age: 66
End: 2021-09-22
Payer: MEDICARE

## 2021-09-22 DIAGNOSIS — T63.441D TOXIC EFFECT OF VENOM OF BEES, UNINTENTIONAL, SUBSEQUENT ENCOUNTER: Primary | ICD-10-CM

## 2021-09-22 DIAGNOSIS — T63.481D ANAPHYLAXIS DUE TO HYMENOPTERA VENOM, ACCIDENTAL OR UNINTENTIONAL, SUBSEQUENT ENCOUNTER: ICD-10-CM

## 2021-09-22 DIAGNOSIS — T78.2XXD ANAPHYLAXIS DUE TO HYMENOPTERA VENOM, ACCIDENTAL OR UNINTENTIONAL, SUBSEQUENT ENCOUNTER: ICD-10-CM

## 2021-09-22 PROCEDURE — 99207 PR DROP WITH A PROCEDURE: CPT

## 2021-09-22 PROCEDURE — 95117 IMMUNOTHERAPY INJECTIONS: CPT

## 2021-09-22 NOTE — PROGRESS NOTES
Patient presented after waiting 30 minutes with no reaction to allergy injections. Discharged from clinic.    Darshan MACIAS RN ............   9/22/2021...4:35 PM

## 2021-09-30 ENCOUNTER — OFFICE VISIT (OUTPATIENT)
Dept: DERMATOLOGY | Facility: CLINIC | Age: 66
End: 2021-09-30
Payer: MEDICARE

## 2021-09-30 DIAGNOSIS — L21.9 DERMATITIS, SEBORRHEIC: Primary | ICD-10-CM

## 2021-09-30 PROCEDURE — 99213 OFFICE O/P EST LOW 20 MIN: CPT | Performed by: DERMATOLOGY

## 2021-09-30 RX ORDER — TRIAMCINOLONE ACETONIDE 1 MG/G
OINTMENT TOPICAL
Qty: 30 G | Refills: 11 | Status: SHIPPED | OUTPATIENT
Start: 2021-09-30 | End: 2022-02-24

## 2021-09-30 ASSESSMENT — PAIN SCALES - GENERAL: PAINLEVEL: NO PAIN (0)

## 2021-09-30 NOTE — NURSING NOTE
"Jose Angel Cha's goals for this visit include:   He requests these members of his care team be copied on today's visit information:   Chief Complaint   Patient presents with     Derm Problem     bilateral ear hardening skin intermitten since last time prescribed medication, sore, bumps . pt stated \" Triamcinolon cream. it works but issue comes back, doesnt solve issue permenatly.\"         PCP: Shari Paredes    Referring Provider:  Referred Self, MD  No address on file    There were no vitals taken for this visit.    Do you need any medication refills at today's visit? triamcinolon 0.1% ointment    "
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
## 2021-10-06 ENCOUNTER — ALLIED HEALTH/NURSE VISIT (OUTPATIENT)
Dept: ALLERGY | Facility: OTHER | Age: 66
End: 2021-10-06
Payer: MEDICARE

## 2021-10-06 DIAGNOSIS — T63.441D TOXIC EFFECT OF VENOM OF BEES, UNINTENTIONAL, SUBSEQUENT ENCOUNTER: Primary | ICD-10-CM

## 2021-10-06 PROCEDURE — 95117 IMMUNOTHERAPY INJECTIONS: CPT

## 2021-10-06 NOTE — PROGRESS NOTES
Patient presented after waiting 30 minutes with no reaction to allergy injections. Discharged from clinic.    Darshan MACIAS RN ............   10/6/2021...2:31 PM

## 2021-10-23 ENCOUNTER — HEALTH MAINTENANCE LETTER (OUTPATIENT)
Age: 66
End: 2021-10-23

## 2021-11-03 ENCOUNTER — ALLIED HEALTH/NURSE VISIT (OUTPATIENT)
Dept: ALLERGY | Facility: OTHER | Age: 66
End: 2021-11-03
Payer: MEDICARE

## 2021-11-03 DIAGNOSIS — T63.441D TOXIC EFFECT OF VENOM OF BEES, UNINTENTIONAL, SUBSEQUENT ENCOUNTER: Primary | ICD-10-CM

## 2021-11-03 PROCEDURE — 95117 IMMUNOTHERAPY INJECTIONS: CPT

## 2021-11-03 NOTE — PROGRESS NOTES
Patient presented after waiting 30 minutes with no reaction to allergy injections. Discharged from clinic.    Darshan MACIAS RN ............   11/3/2021...9:38 AM

## 2021-12-14 ENCOUNTER — ALLIED HEALTH/NURSE VISIT (OUTPATIENT)
Dept: ALLERGY | Facility: OTHER | Age: 66
End: 2021-12-14
Payer: MEDICARE

## 2021-12-14 DIAGNOSIS — T63.441D TOXIC EFFECT OF VENOM OF BEES, UNINTENTIONAL, SUBSEQUENT ENCOUNTER: Primary | ICD-10-CM

## 2021-12-14 PROCEDURE — 95117 IMMUNOTHERAPY INJECTIONS: CPT

## 2021-12-14 NOTE — PROGRESS NOTES
Patient presented after waiting 30 minutes with no reaction to allergy injections. Discharged from clinic.    Darshan GARCIA RN ............   12/14/2021...9:28 AM

## 2021-12-28 ENCOUNTER — ALLIED HEALTH/NURSE VISIT (OUTPATIENT)
Dept: ALLERGY | Facility: OTHER | Age: 66
End: 2021-12-28
Payer: MEDICARE

## 2021-12-28 VITALS — OXYGEN SATURATION: 98 % | HEART RATE: 68 BPM | SYSTOLIC BLOOD PRESSURE: 154 MMHG | DIASTOLIC BLOOD PRESSURE: 96 MMHG

## 2021-12-28 DIAGNOSIS — T63.441D TOXIC EFFECT OF VENOM OF BEES, UNINTENTIONAL, SUBSEQUENT ENCOUNTER: Primary | ICD-10-CM

## 2021-12-28 PROCEDURE — 95117 IMMUNOTHERAPY INJECTIONS: CPT

## 2021-12-28 NOTE — PROGRESS NOTES
Patient presented after waiting 30 minutes with no reaction to allergy injections. Discharged from clinic.    Darshan GARCIA RN ............   12/28/2021...10:37 AM

## 2021-12-28 NOTE — PROGRESS NOTES
Patient presented to clinic at 0952 with complaints of bilateral hand numbness. Patient received allergy injections at 0941 and symptoms developed 11 minutes post allergy injections. Denies any chest tightness, chest pain, wheezing, coughing, throat or tongue swelling.     Patient did present to clinic with a long sleeve shirt on, instead of taking shirt off, he preferred to raise the sleeves up. After injections, ice was applied and coban wrapped around arms to keep ice in place. Patient preferred to have ice applied directly on his skin instead of over his sleeves.     After ice was removed and sleeves were lowered, patient noticed the numbness resolving.     Write informed Dr. Delcid of patient's symptoms. Recommendations were to change location of injections and make sure patient either wears a short sleeve shirt or takes off a long sleeve shirt.     Patient voiced understanding and agrees with plan.    Darshan GARCIA RN

## 2022-01-25 ENCOUNTER — ALLIED HEALTH/NURSE VISIT (OUTPATIENT)
Dept: ALLERGY | Facility: OTHER | Age: 67
End: 2022-01-25
Payer: MEDICARE

## 2022-01-25 DIAGNOSIS — T63.441D TOXIC EFFECT OF VENOM OF BEES, UNINTENTIONAL, SUBSEQUENT ENCOUNTER: Primary | ICD-10-CM

## 2022-01-25 PROCEDURE — 95117 IMMUNOTHERAPY INJECTIONS: CPT

## 2022-01-25 NOTE — PROGRESS NOTES
Patient presented after waiting 30 minutes with no reaction to allergy injections. Discharged from clinic.    Darshan Martinez RN ............   1/25/2022...10:13 AM

## 2022-01-27 NOTE — PROGRESS NOTES
Patient presented after waiting 30 minutes with no reaction to allergy injections. Discharged from clinic.    Soraida Cano RN, RN ............   7/24/2020...10:53 AM     
FAMILY HISTORY:  Mother  Still living? Unknown  FH: HIV infection, Age at diagnosis: Age Unknown

## 2022-02-24 ENCOUNTER — OFFICE VISIT (OUTPATIENT)
Dept: DERMATOLOGY | Facility: CLINIC | Age: 67
End: 2022-02-24
Payer: MEDICARE

## 2022-02-24 DIAGNOSIS — Z85.828 HISTORY OF NONMELANOMA SKIN CANCER: Primary | ICD-10-CM

## 2022-02-24 DIAGNOSIS — L21.9 SEBORRHEIC DERMATITIS: ICD-10-CM

## 2022-02-24 DIAGNOSIS — D22.9 MULTIPLE BENIGN NEVI: ICD-10-CM

## 2022-02-24 DIAGNOSIS — L21.9 DERMATITIS, SEBORRHEIC: ICD-10-CM

## 2022-02-24 DIAGNOSIS — K13.0 ANGULAR CHEILITIS: ICD-10-CM

## 2022-02-24 DIAGNOSIS — L57.8 SUN-DAMAGED SKIN: ICD-10-CM

## 2022-02-24 DIAGNOSIS — L81.4 SOLAR LENTIGO: ICD-10-CM

## 2022-02-24 DIAGNOSIS — D18.01 CHERRY ANGIOMA: ICD-10-CM

## 2022-02-24 DIAGNOSIS — L82.1 SEBORRHEIC KERATOSIS: ICD-10-CM

## 2022-02-24 PROCEDURE — 99214 OFFICE O/P EST MOD 30 MIN: CPT | Performed by: DERMATOLOGY

## 2022-02-24 RX ORDER — TRIAMCINOLONE ACETONIDE 1 MG/G
OINTMENT TOPICAL
Qty: 30 G | Refills: 11 | Status: SHIPPED | OUTPATIENT
Start: 2022-02-24 | End: 2023-01-02
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
## 2022-02-24 NOTE — NURSING NOTE
Jose Angel Cha's goals for this visit include:   Chief Complaint   Patient presents with     Skin Check     FBSE. Area of concern-neck and left ear. Hx of NMSC       He requests these members of his care team be copied on today's visit information:     PCP: Shari Paredes    Referring Provider:  No referring provider defined for this encounter.    There were no vitals taken for this visit.    Do you need any medication refills at today's visit? Triammundo Hogan on 2/24/2022 at 2:07 PM

## 2022-02-24 NOTE — PATIENT INSTRUCTIONS
PLAN: Apply a small dab of the triamcinolone 0.1% ointment to the corners of the mouth twice daily for one week. If not resolved, send me a [employer] message.

## 2022-02-25 ENCOUNTER — TELEPHONE (OUTPATIENT)
Dept: CARDIOLOGY | Facility: CLINIC | Age: 67
End: 2022-02-25
Payer: MEDICARE

## 2022-02-25 NOTE — TELEPHONE ENCOUNTER
Spoke with patient who would like F/Up visits to be done in [place]. Patient will call [place] cardiology clinic to arrange annual OV.

## 2022-02-25 NOTE — TELEPHONE ENCOUNTER
Health Call Center    Phone Message    May a detailed message be left on voicemail: yes     Reason for Call: Other: Per pt he thought that Dr. Chirinos recommended someone to him to be seen or maybe it was Denise -[facility] states any MD offer pt to schedule he declined at this time he would like Dr. Chirinos former team to reach back out to him. He has some questions and maybe find out who was the MD Chirinos recommended for him.    Action Taken: Other: Cardiology    Travel Screening: Not Applicable

## 2022-02-28 DIAGNOSIS — F51.01 PRIMARY INSOMNIA: ICD-10-CM

## 2022-02-28 RX ORDER — TEMAZEPAM 7.5 MG/1
7.5 CAPSULE ORAL
Qty: 30 CAPSULE | Refills: 0 | Status: SHIPPED | OUTPATIENT
Start: 2022-02-28 | End: 2022-08-11

## 2022-02-28 NOTE — TELEPHONE ENCOUNTER
Pending Prescriptions:                       Disp   Refills    temazepam (RESTORIL) 7.5 MG capsule        30 cap*1        Sig: Take 1 capsule (7.5 mg) by mouth    Routing refill request to provider for review/approval because:  Drug not on the [facility] refill protocol   A break in medication  temazepam (RESTORIL) 7.5 MG capsule 30 capsule 1 4/21/2021  No   Sig - Route: Take 1 capsule (7.5 mg) by mouth as needed for sleep - Oral   Sent to pharmacy as: Temazepam 7.5 MG Oral Capsule (RESTORIL)   Class: E-Prescribe   Order: [id number]   E-Prescribing Status: Receipt confirmed by pharmacy (4/21/2021  4:34 PM CDT)       Requested Prescriptions   Pending Prescriptions Disp Refills    temazepam (RESTORIL) 7.5 MG capsule 30 capsule 1     Sig: Take 1 capsule (7.5 mg) by mouth        There is no refill protocol information for this order

## 2022-03-15 ENCOUNTER — ALLIED HEALTH/NURSE VISIT (OUTPATIENT)
Dept: ALLERGY | Facility: OTHER | Age: 67
End: 2022-03-15
Payer: MEDICARE

## 2022-03-15 DIAGNOSIS — T63.441D TOXIC EFFECT OF VENOM OF BEES, UNINTENTIONAL, SUBSEQUENT ENCOUNTER: Primary | ICD-10-CM

## 2022-03-15 PROCEDURE — 95117 IMMUNOTHERAPY INJECTIONS: CPT

## 2022-03-15 NOTE — PROGRESS NOTES
Patient presented after waiting 30 minutes with no reaction to allergy injections. Discharged from clinic.    Darshan Martinez RN ............   3/15/2022...9:14 AM

## 2022-03-18 ENCOUNTER — HOSPITAL ENCOUNTER (OUTPATIENT)
Dept: CARDIOLOGY | Facility: CLINIC | Age: 67
Discharge: HOME | End: 2022-03-18
Attending: INTERNAL MEDICINE | Admitting: INTERNAL MEDICINE
Payer: MEDICARE

## 2022-03-18 ENCOUNTER — LAB (OUTPATIENT)
Dept: LAB | Facility: CLINIC | Age: 67
End: 2022-03-18
Payer: MEDICARE

## 2022-03-18 DIAGNOSIS — Z98.890 H/O MITRAL VALVE REPAIR: ICD-10-CM

## 2022-03-18 DIAGNOSIS — E78.2 MIXED HYPERLIPIDEMIA: ICD-10-CM

## 2022-03-18 DIAGNOSIS — I10 BENIGN ESSENTIAL HYPERTENSION: ICD-10-CM

## 2022-03-18 LAB
ALT SERPL W P-5'-P-CCNC: 37 U/L (ref 0–70)
ANION GAP SERPL CALCULATED.3IONS-SCNC: 1 MMOL/L (ref 3–14)
BUN SERPL-MCNC: 13 MG/DL (ref 7–30)
CALCIUM SERPL-MCNC: 9 MG/DL (ref 8.5–10.1)
CHLORIDE BLD-SCNC: 108 MMOL/L (ref 94–109)
CHOLEST SERPL-MCNC: 203 MG/DL
CO2 SERPL-SCNC: 31 MMOL/L (ref 20–32)
CREAT SERPL-MCNC: 1.15 MG/DL (ref 0.66–1.25)
FASTING STATUS PATIENT QL REPORTED: YES
GFR SERPL CREATININE-BSD FRML MDRD: 70 ML/MIN/1.73M2
GLUCOSE BLD-MCNC: 90 MG/DL (ref 70–99)
HDLC SERPL-MCNC: 47 MG/DL
LDLC SERPL CALC-MCNC: 127 MG/DL
LVEF ECHO: NORMAL
NONHDLC SERPL-MCNC: 156 MG/DL
POTASSIUM BLD-SCNC: 4 MMOL/L (ref 3.4–5.3)
SODIUM SERPL-SCNC: 140 MMOL/L (ref 133–144)
TRIGL SERPL-MCNC: 145 MG/DL

## 2022-03-18 PROCEDURE — 36415 COLL VENOUS BLD VENIPUNCTURE: CPT

## 2022-03-18 PROCEDURE — 93306 TTE W/DOPPLER COMPLETE: CPT | Mod: 26 | Performed by: INTERNAL MEDICINE

## 2022-03-18 PROCEDURE — 84460 ALANINE AMINO (ALT) (SGPT): CPT | Performed by: INTERNAL MEDICINE

## 2022-03-18 PROCEDURE — 80061 LIPID PANEL: CPT | Performed by: INTERNAL MEDICINE

## 2022-03-18 PROCEDURE — 80048 BASIC METABOLIC PNL TOTAL CA: CPT | Performed by: INTERNAL MEDICINE

## 2022-03-18 PROCEDURE — 93306 TTE W/DOPPLER COMPLETE: CPT

## 2022-03-22 ENCOUNTER — OFFICE VISIT (OUTPATIENT)
Dept: CARDIOLOGY | Facility: CLINIC | Age: 67
End: 2022-03-22
Attending: PHYSICIAN ASSISTANT
Payer: MEDICARE

## 2022-03-22 VITALS
HEART RATE: 76 BPM | BODY MASS INDEX: 30 KG/M2 | DIASTOLIC BLOOD PRESSURE: 76 MMHG | OXYGEN SATURATION: 98 % | HEIGHT: 71 IN | WEIGHT: 214.3 LBS | SYSTOLIC BLOOD PRESSURE: 122 MMHG

## 2022-03-22 DIAGNOSIS — Z98.890 H/O MITRAL VALVE REPAIR: ICD-10-CM

## 2022-03-22 DIAGNOSIS — I10 BENIGN ESSENTIAL HYPERTENSION: ICD-10-CM

## 2022-03-22 DIAGNOSIS — E78.2 MIXED HYPERLIPIDEMIA: ICD-10-CM

## 2022-03-22 DIAGNOSIS — R60.0 BILATERAL LEG EDEMA: ICD-10-CM

## 2022-03-22 PROCEDURE — 99214 OFFICE O/P EST MOD 30 MIN: CPT | Performed by: INTERNAL MEDICINE

## 2022-03-22 NOTE — PROGRESS NOTES
Service Date: 03/22/2022    PRIMARY CARE PROVIDER:  Shari Paredes MD    HISTORY OF PRESENT ILLNESS:  Pastor Jose Angel Cha, a 66-year-old man with a history of mitral insufficiency (status post mitral valve repair), nonobstructive coronary disease, dyslipidemia, and hypertension was seen today at your request for followup.    Since last seen in 04/2021, Pastor Cha remains free of chest, arm, neck, jaw or back discomfort with exertion, worsening dyspnea, orthopnea, PND or pedal edema.  The patient successfully lost about 15 pounds, which he attributes to lifestyle intervention.  He asks whether he could discontinue his blood pressure medications.    Mr. Cha is intolerant of all statin therapy, and finds that PCSK9 inhibitors are way too expensive.  He has been taking ezetimibe and following a Mediterranean-style diet.    Pastor Cha continues to serve with a local [employer]    PAST MEDICAL HISTORY:    1.  Mitral insufficiency -- status post mitral valve repair with annuloplasty 2010.  Serial echos showing a continued successful repair with no significant regurgitation or stenosis.  2.  Nonobstructive coronary artery disease -- diagnosed on preoperative coronary angiogram.  Intolerant of statins.  Unable to afford PCSK9 inhibitors.  Remains on ezetimibe and a Mediterranean diet.  3.  Hypertension -- currently well controlled on low-dose amlodipine and hydrochlorothiazide.  4.  Previous history of bilateral lower extremity edema on amlodipine.  Resolved.    PHYSICAL EXAMINATION:    GENERAL:  Exam today demonstrates a very pleasant, cooperative, and intelligent 66-year-old man.  VITAL SIGNS:  His blood pressure is 122/76, his heart rate is 76.  RESPIRATORY:  His lungs are clear to percussion and auscultation.  CARDIOVASCULAR:  Shows a normal S1 with a normal S2.  There is no S3.  There is no murmur, rub or click.  His pulses are full and symmetrical in the carotid, radial, brachial femoral, popliteal,  dorsalis pedis and posterior tibials.    LABORATORY STUDIES:  Cholesterol is 203.  LDL is 127.  Potassium is 4.0.  Creatinine is 1.15.    MEDICATIONS:    1.  Amlodipine 2.5 daily.  2.  Aspirin 81 daily.  3.  Ezetimibe 10 mg daily.  4.  Hydrochlorothiazide 12.5 daily.    ECHOCARDIOGRAM:  I have personally reviewed his echo.  His prosthetic valve function repair remains intact with a mean diastolic  gradient of only 3mmHg.  There is left atrial enlargement, normal left ventricular systolic performance, and the repair is intact.     There is no significant mitral insufficiency.    ASSESSMENT:  Pastor Cha is now approximately 12 years out from successful mitral valve repair.  His most recent echo was reassuring.  Through aggressive lifestyle intervention, the patient has markedly improved his blood pressure levels.  I believe a trial of trying to withdraw his blood pressure medications is reasonable as long as he follows his blood pressures closely long-term.    RECOMMENDATIONS:    1.  Mediterranean-style weight loss diet.  2.  Regular exercise program.  3.  The patient will trial slowly coming off his amlodipine and hydrochlorothiazide.  If his blood pressures remain less than 130/80 mmHg, he will remain off of blood pressure medications long-term but continue to follow them with his home blood pressure cuff.    RECOMMENDATIONS:    1.  Mediterranean-style weight loss diet.  2.  Regular exercise program.  3.  The patient will trial coming off both amlodipine and hydrochlorothiazide.  He will notify us if he has to resume the medications.  4.  Follow up with me in about 1 year with an echo at that time.    We greatly appreciate the opportunity to be involved in the care of your patient, Mr. Jose Angel Cha.    Sherman Gtz MD    cc:  Shari Paredes MD  [address] [address]    Sherman Gtz MD        D: 03/22/2022   T: 03/22/2022   MT: [email or web address]    Name:     LINA  KARTIK MACIAS  MRN:      [id number]        Account:      [id number]   :      1955           Service Date: 2022       Document: [id number]

## 2022-03-22 NOTE — LETTER
3/22/2022    Shari Paredes MD, MD  [address] [facility] [address]    RE: Jose Angel Cha       Dear Colleague,     I had the pleasure of seeing Jose Angel Cha in the [facility].  HISTORY OF PRESENT ILLNESS:  Lost 15 lbs simply limiting alcohol. Would like to come off blood pressure medications if possible. Cannot afford PCSK9 inhibitor, intolerant of statins.    Orders this Visit:  No orders of the defined types were placed in this encounter.    No orders of the defined types were placed in this encounter.    There are no discontinued medications.    Encounter Diagnoses   Name Primary?     Benign essential hypertension      Mixed hyperlipidemia      Bilateral leg edema      H/O mitral valve repair        CURRENT MEDICATIONS:  Current Outpatient Medications   Medication Sig Dispense Refill     amLODIPine (NORVASC) 2.5 MG tablet Take 1 tablet (2.5 mg) by mouth daily 90 tablet 3     ASPIRIN 81 MG OR TABS ONE DAILY 0 0     CALCIUM PO        EPINEPHrine (ANY BX GENERIC EQUIV) 0.3 MG/0.3ML injection 2-pack Inject 0.3 mLs (0.3 mg) into the muscle as needed for anaphylaxis 0.6 mL 1     ezetimibe (ZETIA) 10 MG tablet Take 1 tablet (10 mg) by mouth daily 90 tablet 3     fluticasone (FLONASE) 50 MCG/ACT nasal spray Spray 2 sprays into both nostrils daily 16 g 5     hydrochlorothiazide (HYDRODIURIL) 12.5 MG tablet Take 1 tablet (12.5 mg) by mouth daily 90 tablet 3     ibuprofen (ADVIL/MOTRIN) 600 MG tablet Take 600 mg by mouth       loratadine (CLEAR-ATADINE) 10 MG tablet Take 10 mg by mouth daily       Multiple Vitamins-Minerals (MULTIVITAL PO) Take 1 tablet by mouth daily Reported on 3/22/2017       olopatadine (PATADAY) 0.2 % ophthalmic solution Place 1 drop into both eyes daily 2.5 mL 3     ORDER FOR ALLERGEN IMMUNOTHERAPY Name of Mix: Mix #1  Mixed Vespid  Mixed Vespid Venom 300 mcg/mL HS 13 ml  Diluent: HSA qs to 13ml 13 mL PRN     ORDER FOR ALLERGEN IMMUNOTHERAPY Name of Mix: Mix #2  Wasp  Wasp Venom  "100 mcg/mL HS 13 ml  Diluent: HSA qs to 13ml 13 mL PRN     ORDER FOR ALLERGEN IMMUNOTHERAPY Cat Hair, Standardized 10,000 BAU/mL, ALK  2.0 ml  Dog Hair-Dander, A. P.  1:100 w/v, HS  1.0 ml  Dust Mites DF 30,000AU/mL, HS  0.3 ml  Dust Mites DP. 30,000 AU/mL, HS  0.3 ml   Birch Mix PRW 1:20 w/v, HS  0.5 ml  Grass Mix #7 100,000 BAU/mL, HS 0.4 ml  Nettle 1:20 w/v, HS 0.5 ml  Diluent: HSA qs to 5ml 5 mL prn     ORDER FOR ALLERGEN IMMUNOTHERAPY Reported on 3/22/2017 13 mL PRN     ORDER FOR ALLERGEN IMMUNOTHERAPY Reported on 3/22/2017 13 mL PRN     psyllium (METAMUCIL/KONSYL) Packet Take 1 packet by mouth daily       sildenafil (VIAGRA) 50 MG tablet Take 1 tablet (50 mg) by mouth daily as needed (ed) 30 tablet 1     STATIN NOT PRESCRIBED, INTENTIONAL, by Other route continuous prn Reported on 4/6/2017  0     temazepam (RESTORIL) 7.5 MG capsule Take 1 capsule (7.5 mg) by mouth nightly as needed for sleep DUE for April f/u, please schedule 30 capsule 0     triamcinolone (KENALOG) 0.1 % external ointment Apply thin layer up to twice daily as needed for itch/rash on the ear. 30 g 11       ALLERGIES     Allergies   Allergen Reactions     Anesthetic Ether      Bee Venom      Demerol Visual Disturbance     Statin [Hmg-Coa-R Inhibitors] Other (See Comments)     Muscle pain       PAST MEDICAL, SURGICAL, FAMILY, SOCIAL HISTORY:  History was reviewed and updated as needed, see medical record.    Review of Systems:  A 12-point review of systems was completed, see medical record for detailed review of systems information.    Physical Exam:  Vitals: /76 (BP Location: Right arm, Patient Position: Sitting, Cuff Size: Adult Large)   Pulse 76   Ht 1.791 m (5' 10.5\")   Wt 97.2 kg (214 lb 4.8 oz)   SpO2 98%   BMI 30.31 kg/m      Constitutional:           Skin:           Head:           Eyes:           ENT:           Neck:           Chest:           Cardiac:                    Abdomen:           Vascular:                            "             Extremities and Back:           Neurological:           ASSESSMENT: Doing very well.     RECOMMENDATIONS:   Mediterranean diet   Regular exercise   May trial stopping bp meds as long as 130/80 or less continue  Lifestyle intervention      Recent Lab Results:  LIPID RESULTS:  Lab Results   Component Value Date    CHOL 203 (H) 03/18/2022    CHOL 224 (H) 08/20/2020    HDL 47 03/18/2022    HDL 48 08/20/2020     (H) 03/18/2022     (H) 08/20/2020    TRIG 145 03/18/2022    TRIG 199 (H) 08/20/2020    CHOLHDLRATIO 5.0 07/11/2014       LIVER ENZYME RESULTS:  Lab Results   Component Value Date    AST 41 06/02/2021    ALT 37 03/18/2022    ALT 62 06/02/2021       CBC RESULTS:  Lab Results   Component Value Date    WBC 5.6 01/16/2020    RBC 4.54 01/16/2020    HGB 16.7 01/16/2020    HCT 47.9 01/16/2020     (H) 01/16/2020    MCH 36.8 (H) 01/16/2020    MCHC 34.9 01/16/2020    RDW 12.3 01/16/2020     01/16/2020       BMP RESULTS:  Lab Results   Component Value Date     03/18/2022     06/02/2021    POTASSIUM 4.0 03/18/2022    POTASSIUM 4.2 06/02/2021    CHLORIDE 108 03/18/2022    CHLORIDE 105 06/02/2021    CO2 31 03/18/2022    CO2 28 06/02/2021    ANIONGAP 1 (L) 03/18/2022    ANIONGAP 4 06/02/2021    GLC 90 03/18/2022    GLC 86 06/02/2021    BUN 13 03/18/2022    BUN 16 06/02/2021    CR 1.15 03/18/2022    CR 1.30 (H) 06/02/2021    GFRESTIMATED 70 03/18/2022    GFRESTIMATED 56 (L) 06/02/2021    GFRESTBLACK 65 06/02/2021    PEYTON 9.0 03/18/2022    PEYTON 9.5 06/02/2021        A1C RESULTS:  Lab Results   Component Value Date    A1C 5.1 09/26/2017       INR RESULTS:  Lab Results   Component Value Date    INR 2.3 06/11/2010    INR 2.8 05/28/2010    INR 2.61 (H) 04/23/2010    INR 1.70 (H) 04/22/2010       We greatly appreciate the opportunity to be involved in the care of your patient, Jose Angel Cha.    Sincerely,  Sherman Gtz MD      CC  Denise Devi PA-C    Thank you for  allowing me to participate in the care of your patient.    Sincerely,   Sherman Gtz MD   [facility]  cc: Denise Devi PA-C

## 2022-03-22 NOTE — PROGRESS NOTES
HISTORY OF PRESENT ILLNESS:  Lost 15 lbs simply limiting alcohol. Would like to come off blood pressure medications if possible. Cannot afford PCSK9 inhibitor, intolerant of statins.    Orders this Visit:  No orders of the defined types were placed in this encounter.    No orders of the defined types were placed in this encounter.    There are no discontinued medications.    Encounter Diagnoses   Name Primary?     Benign essential hypertension      Mixed hyperlipidemia      Bilateral leg edema      H/O mitral valve repair        CURRENT MEDICATIONS:  Current Outpatient Medications   Medication Sig Dispense Refill     amLODIPine (NORVASC) 2.5 MG tablet Take 1 tablet (2.5 mg) by mouth daily 90 tablet 3     ASPIRIN 81 MG OR TABS ONE DAILY 0 0     CALCIUM PO        EPINEPHrine (ANY BX GENERIC EQUIV) 0.3 MG/0.3ML injection 2-pack Inject 0.3 mLs (0.3 mg) into the muscle as needed for anaphylaxis 0.6 mL 1     ezetimibe (ZETIA) 10 MG tablet Take 1 tablet (10 mg) by mouth daily 90 tablet 3     fluticasone (FLONASE) 50 MCG/ACT nasal spray Spray 2 sprays into both nostrils daily 16 g 5     hydrochlorothiazide (HYDRODIURIL) 12.5 MG tablet Take 1 tablet (12.5 mg) by mouth daily 90 tablet 3     ibuprofen (ADVIL/MOTRIN) 600 MG tablet Take 600 mg by mouth       loratadine (CLEAR-ATADINE) 10 MG tablet Take 10 mg by mouth daily       Multiple Vitamins-Minerals (MULTIVITAL PO) Take 1 tablet by mouth daily Reported on 3/22/2017       olopatadine (PATADAY) 0.2 % ophthalmic solution Place 1 drop into both eyes daily 2.5 mL 3     ORDER FOR ALLERGEN IMMUNOTHERAPY Name of Mix: Mix #1  Mixed Vespid  Mixed Vespid Venom 300 mcg/mL HS 13 ml  Diluent: HSA qs to 13ml 13 mL PRN     ORDER FOR ALLERGEN IMMUNOTHERAPY Name of Mix: Mix #2  Wasp  Wasp Venom 100 mcg/mL HS 13 ml  Diluent: HSA qs to 13ml 13 mL PRN     ORDER FOR ALLERGEN IMMUNOTHERAPY Cat Hair, Standardized 10,000 BAU/mL, ALK  2.0 ml  Dog Hair-Dander, A. P.  1:100 w/v, HS  1.0 ml  Dust  "Mites DF 30,000AU/mL, HS  0.3 ml  Dust Mites DP. 30,000 AU/mL, HS  0.3 ml   Birch Mix PRW 1:20 w/v, HS  0.5 ml  Grass Mix #7 100,000 BAU/mL, HS 0.4 ml  Nettle 1:20 w/v, HS 0.5 ml  Diluent: HSA qs to 5ml 5 mL prn     ORDER FOR ALLERGEN IMMUNOTHERAPY Reported on 3/22/2017 13 mL PRN     ORDER FOR ALLERGEN IMMUNOTHERAPY Reported on 3/22/2017 13 mL PRN     psyllium (METAMUCIL/KONSYL) Packet Take 1 packet by mouth daily       sildenafil (VIAGRA) 50 MG tablet Take 1 tablet (50 mg) by mouth daily as needed (ed) 30 tablet 1     STATIN NOT PRESCRIBED, INTENTIONAL, by Other route continuous prn Reported on 4/6/2017  0     temazepam (RESTORIL) 7.5 MG capsule Take 1 capsule (7.5 mg) by mouth nightly as needed for sleep DUE for April f/u, please schedule 30 capsule 0     triamcinolone (KENALOG) 0.1 % external ointment Apply thin layer up to twice daily as needed for itch/rash on the ear. 30 g 11       ALLERGIES     Allergies   Allergen Reactions     Anesthetic Ether      Bee Venom      Demerol Visual Disturbance     Statin [Hmg-Coa-R Inhibitors] Other (See Comments)     Muscle pain       PAST MEDICAL, SURGICAL, FAMILY, SOCIAL HISTORY:  History was reviewed and updated as needed, see medical record.    Review of Systems:  A 12-point review of systems was completed, see medical record for detailed review of systems information.    Physical Exam:  Vitals: /76 (BP Location: Right arm, Patient Position: Sitting, Cuff Size: Adult Large)   Pulse 76   Ht 1.791 m (5' 10.5\")   Wt 97.2 kg (214 lb 4.8 oz)   SpO2 98%   BMI 30.31 kg/m      Constitutional:           Skin:           Head:           Eyes:           ENT:           Neck:           Chest:           Cardiac:                    Abdomen:           Vascular:                                        Extremities and Back:           Neurological:           ASSESSMENT: Doing very well.     RECOMMENDATIONS:   Mediterranean diet   Regular exercise   May trial stopping bp meds as long " as 130/80 or less continue  Lifestyle intervention      Recent Lab Results:  LIPID RESULTS:  Lab Results   Component Value Date    CHOL 203 (H) 03/18/2022    CHOL 224 (H) 08/20/2020    HDL 47 03/18/2022    HDL 48 08/20/2020     (H) 03/18/2022     (H) 08/20/2020    TRIG 145 03/18/2022    TRIG 199 (H) 08/20/2020    CHOLHDLRATIO 5.0 07/11/2014       LIVER ENZYME RESULTS:  Lab Results   Component Value Date    AST 41 06/02/2021    ALT 37 03/18/2022    ALT 62 06/02/2021       CBC RESULTS:  Lab Results   Component Value Date    WBC 5.6 01/16/2020    RBC 4.54 01/16/2020    HGB 16.7 01/16/2020    HCT 47.9 01/16/2020     (H) 01/16/2020    MCH 36.8 (H) 01/16/2020    MCHC 34.9 01/16/2020    RDW 12.3 01/16/2020     01/16/2020       BMP RESULTS:  Lab Results   Component Value Date     03/18/2022     06/02/2021    POTASSIUM 4.0 03/18/2022    POTASSIUM 4.2 06/02/2021    CHLORIDE 108 03/18/2022    CHLORIDE 105 06/02/2021    CO2 31 03/18/2022    CO2 28 06/02/2021    ANIONGAP 1 (L) 03/18/2022    ANIONGAP 4 06/02/2021    GLC 90 03/18/2022    GLC 86 06/02/2021    BUN 13 03/18/2022    BUN 16 06/02/2021    CR 1.15 03/18/2022    CR 1.30 (H) 06/02/2021    GFRESTIMATED 70 03/18/2022    GFRESTIMATED 56 (L) 06/02/2021    GFRESTBLACK 65 06/02/2021    PEYTON 9.0 03/18/2022    PEYTON 9.5 06/02/2021        A1C RESULTS:  Lab Results   Component Value Date    A1C 5.1 09/26/2017       INR RESULTS:  Lab Results   Component Value Date    INR 2.3 06/11/2010    INR 2.8 05/28/2010    INR 2.61 (H) 04/23/2010    INR 1.70 (H) 04/22/2010       We greatly appreciate the opportunity to be involved in the care of your patient, Jose Angel Cha.    Sincerely,  Sherman tGz MD      CC  Denise Devi PA-C  No address on file

## 2022-06-01 DIAGNOSIS — E78.2 MIXED HYPERLIPIDEMIA: ICD-10-CM

## 2022-06-01 DIAGNOSIS — I10 BENIGN ESSENTIAL HYPERTENSION: ICD-10-CM

## 2022-06-01 RX ORDER — AMLODIPINE BESYLATE 2.5 MG/1
2.5 TABLET ORAL DAILY
Qty: 90 TABLET | Refills: 3 | Status: SHIPPED | OUTPATIENT
Start: 2022-06-01 | End: 2023-06-01

## 2022-06-01 RX ORDER — EZETIMIBE 10 MG/1
10 TABLET ORAL DAILY
Qty: 90 TABLET | Refills: 3 | Status: SHIPPED | OUTPATIENT
Start: 2022-06-01 | End: 2023-06-01

## 2022-06-04 ENCOUNTER — HEALTH MAINTENANCE LETTER (OUTPATIENT)
Age: 67
End: 2022-06-04

## 2022-07-05 DIAGNOSIS — J30.1 CHRONIC SEASONAL ALLERGIC RHINITIS DUE TO POLLEN: ICD-10-CM

## 2022-07-06 RX ORDER — EPINEPHRINE 0.3 MG/.3ML
0.3 INJECTION SUBCUTANEOUS PRN
Qty: 0.6 ML | Refills: 1 | Status: SHIPPED | OUTPATIENT
Start: 2022-07-06 | End: 2023-03-07

## 2022-08-04 ASSESSMENT — ENCOUNTER SYMPTOMS
NAUSEA: 0
CHILLS: 0
CONSTIPATION: 0
WEAKNESS: 1
EYE PAIN: 0
JOINT SWELLING: 1
ARTHRALGIAS: 1
HEADACHES: 0
MYALGIAS: 1
FEVER: 0
SHORTNESS OF BREATH: 1
HEARTBURN: 1
HEMATOCHEZIA: 0
FREQUENCY: 0
SORE THROAT: 0
PARESTHESIAS: 0
NERVOUS/ANXIOUS: 0
HEMATURIA: 0
DIZZINESS: 1
ABDOMINAL PAIN: 1
PALPITATIONS: 0
DIARRHEA: 0
COUGH: 0
DYSURIA: 0

## 2022-08-04 ASSESSMENT — ACTIVITIES OF DAILY LIVING (ADL): CURRENT_FUNCTION: NO ASSISTANCE NEEDED
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
## 2022-08-11 ENCOUNTER — OFFICE VISIT (OUTPATIENT)
Dept: FAMILY MEDICINE | Facility: OTHER | Age: 67
End: 2022-08-11
Payer: MEDICARE

## 2022-08-11 VITALS
BODY MASS INDEX: 29.78 KG/M2 | DIASTOLIC BLOOD PRESSURE: 76 MMHG | SYSTOLIC BLOOD PRESSURE: 120 MMHG | HEIGHT: 70 IN | OXYGEN SATURATION: 98 % | WEIGHT: 208 LBS | TEMPERATURE: 97.4 F | HEART RATE: 68 BPM | RESPIRATION RATE: 16 BRPM

## 2022-08-11 DIAGNOSIS — N40.1 BENIGN PROSTATIC HYPERPLASIA WITH INCOMPLETE BLADDER EMPTYING: ICD-10-CM

## 2022-08-11 DIAGNOSIS — Z12.5 ENCOUNTER FOR SCREENING FOR MALIGNANT NEOPLASM OF PROSTATE: ICD-10-CM

## 2022-08-11 DIAGNOSIS — E78.2 MIXED HYPERLIPIDEMIA: ICD-10-CM

## 2022-08-11 DIAGNOSIS — F41.9 ANXIETY: ICD-10-CM

## 2022-08-11 DIAGNOSIS — K64.4 EXTERNAL HEMORRHOIDS: ICD-10-CM

## 2022-08-11 DIAGNOSIS — N53.19 EJACULATORY DISORDER: ICD-10-CM

## 2022-08-11 DIAGNOSIS — Z00.00 ENCOUNTER FOR MEDICARE ANNUAL WELLNESS EXAM: Primary | ICD-10-CM

## 2022-08-11 DIAGNOSIS — E66.01 MORBID OBESITY (H): ICD-10-CM

## 2022-08-11 DIAGNOSIS — M72.2 PLANTAR FASCIITIS: ICD-10-CM

## 2022-08-11 DIAGNOSIS — K22.70 BARRETT'S ESOPHAGUS WITHOUT DYSPLASIA: ICD-10-CM

## 2022-08-11 DIAGNOSIS — I10 BENIGN ESSENTIAL HYPERTENSION: ICD-10-CM

## 2022-08-11 DIAGNOSIS — F51.01 PRIMARY INSOMNIA: ICD-10-CM

## 2022-08-11 DIAGNOSIS — I48.0 PAROXYSMAL ATRIAL FIBRILLATION (H): ICD-10-CM

## 2022-08-11 DIAGNOSIS — R39.14 BENIGN PROSTATIC HYPERPLASIA WITH INCOMPLETE BLADDER EMPTYING: ICD-10-CM

## 2022-08-11 LAB
ALBUMIN SERPL-MCNC: 4 G/DL (ref 3.4–5)
ALP SERPL-CCNC: 78 U/L (ref 40–150)
ALT SERPL W P-5'-P-CCNC: 32 U/L (ref 0–70)
ANION GAP SERPL CALCULATED.3IONS-SCNC: 4 MMOL/L (ref 3–14)
AST SERPL W P-5'-P-CCNC: 26 U/L (ref 0–45)
BILIRUB SERPL-MCNC: 0.9 MG/DL (ref 0.2–1.3)
BUN SERPL-MCNC: 13 MG/DL (ref 7–30)
CALCIUM SERPL-MCNC: 9.1 MG/DL (ref 8.5–10.1)
CHLORIDE BLD-SCNC: 109 MMOL/L (ref 94–109)
CHOLEST SERPL-MCNC: 207 MG/DL
CO2 SERPL-SCNC: 28 MMOL/L (ref 20–32)
CREAT SERPL-MCNC: 1.14 MG/DL (ref 0.66–1.25)
FASTING STATUS PATIENT QL REPORTED: YES
GFR SERPL CREATININE-BSD FRML MDRD: 70 ML/MIN/1.73M2
GLUCOSE BLD-MCNC: 86 MG/DL (ref 70–99)
HDLC SERPL-MCNC: 43 MG/DL
LDLC SERPL CALC-MCNC: 131 MG/DL
NONHDLC SERPL-MCNC: 164 MG/DL
POTASSIUM BLD-SCNC: 4.3 MMOL/L (ref 3.4–5.3)
PROT SERPL-MCNC: 7.3 G/DL (ref 6.8–8.8)
PSA SERPL-MCNC: 3.73 UG/L (ref 0–4)
SODIUM SERPL-SCNC: 141 MMOL/L (ref 133–144)
TRIGL SERPL-MCNC: 163 MG/DL

## 2022-08-11 PROCEDURE — 80061 LIPID PANEL: CPT | Performed by: FAMILY MEDICINE

## 2022-08-11 PROCEDURE — 99214 OFFICE O/P EST MOD 30 MIN: CPT | Mod: 25 | Performed by: FAMILY MEDICINE

## 2022-08-11 PROCEDURE — G0439 PPPS, SUBSEQ VISIT: HCPCS | Performed by: FAMILY MEDICINE

## 2022-08-11 PROCEDURE — 36415 COLL VENOUS BLD VENIPUNCTURE: CPT | Performed by: FAMILY MEDICINE

## 2022-08-11 PROCEDURE — 80053 COMPREHEN METABOLIC PANEL: CPT | Performed by: FAMILY MEDICINE

## 2022-08-11 PROCEDURE — G0103 PSA SCREENING: HCPCS | Performed by: FAMILY MEDICINE

## 2022-08-11 RX ORDER — TEMAZEPAM 7.5 MG/1
7.5 CAPSULE ORAL
Qty: 30 CAPSULE | Refills: 0 | Status: SHIPPED | OUTPATIENT
Start: 2022-08-11 | End: 2023-06-01

## 2022-08-11 RX ORDER — POLYETHYLENE GLYCOL 3350 17 G/17G
1 POWDER, FOR SOLUTION ORAL DAILY
Qty: 578 G | Refills: 1 | Status: SHIPPED | OUTPATIENT
Start: 2022-08-11 | End: 2023-01-02

## 2022-08-11 RX ORDER — OXYBUTYNIN CHLORIDE 5 MG/1
5 TABLET ORAL 2 TIMES DAILY
Qty: 60 TABLET | Refills: 1 | Status: SHIPPED | OUTPATIENT
Start: 2022-08-11 | End: 2023-01-02

## 2022-08-11 ASSESSMENT — ANXIETY QUESTIONNAIRES
4. TROUBLE RELAXING: SEVERAL DAYS
7. FEELING AFRAID AS IF SOMETHING AWFUL MIGHT HAPPEN: NOT AT ALL
6. BECOMING EASILY ANNOYED OR IRRITABLE: SEVERAL DAYS
3. WORRYING TOO MUCH ABOUT DIFFERENT THINGS: NOT AT ALL
GAD7 TOTAL SCORE: 2
GAD7 TOTAL SCORE: 2
5. BEING SO RESTLESS THAT IT IS HARD TO SIT STILL: NOT AT ALL
2. NOT BEING ABLE TO STOP OR CONTROL WORRYING: NOT AT ALL
1. FEELING NERVOUS, ANXIOUS, OR ON EDGE: NOT AT ALL

## 2022-08-11 ASSESSMENT — ENCOUNTER SYMPTOMS
NERVOUS/ANXIOUS: 0
CONSTIPATION: 0
WEAKNESS: 1
NAUSEA: 0
FEVER: 0
ARTHRALGIAS: 1
EYE PAIN: 0
DIARRHEA: 0
HEADACHES: 0
SORE THROAT: 0
COUGH: 0
DYSURIA: 0
DIZZINESS: 1
ABDOMINAL PAIN: 1
PALPITATIONS: 0
HEMATURIA: 0
PARESTHESIAS: 0
JOINT SWELLING: 1
CHILLS: 0
MYALGIAS: 1
SHORTNESS OF BREATH: 1
HEARTBURN: 1
FREQUENCY: 0
HEMATOCHEZIA: 0

## 2022-08-11 ASSESSMENT — PAIN SCALES - GENERAL: PAINLEVEL: MILD PAIN (3)

## 2022-08-11 ASSESSMENT — ACTIVITIES OF DAILY LIVING (ADL): CURRENT_FUNCTION: NO ASSISTANCE NEEDED

## 2022-08-11 NOTE — PATIENT INSTRUCTIONS
Patient Education   Personalized Prevention Plan  You are due for the preventive services outlined below.  Your care team is available to assist you in scheduling these services.  If you have already completed any of these items, please share that information with your care team to update in your medical record.  Health Maintenance Due   Topic Date Due     ANNUAL REVIEW OF HM ORDERS  Never done     LUNG CANCER SCREENING  Never done     EGD  11/12/2019     Comprehensive Metabolic Panel  06/02/2022       Exercise for a Healthier Heart  You may wonder how you can improve the health of your heart. If you re thinking about exercise, you re on the right track. You don t need to become an athlete. But you do need a certain amount of brisk exercise to help strengthen your heart. If you have been diagnosed with a heart condition, your healthcare provider may advise exercise to help stabilize your condition. To help make exercise a habit, choose safe, fun activities.      Exercise with a friend. When activity is fun, you're more likely to stick with it.   Before you start  Check with your healthcare provider before starting an exercise program. This is especially important if you have not been active for a while. It's also important if you have a long-term (chronic) health problem such as heart disease, diabetes, or obesity. Or if you are at high risk for having these problems.   Why exercise?  Exercising regularly offers many healthy rewards. It can help you do all of the following:     Improve your blood cholesterol level to help prevent further heart trouble    Lower your blood pressure to help prevent a stroke or heart attack    Control diabetes, or reduce your risk of getting this disease    Improve your heart and lung function    Reach and stay at a healthy weight    Make your muscles stronger so you can stay active    Prevent falls and fractures by slowing the loss of bone mass (osteoporosis)    Manage stress  better    Reduce your blood pressure    Improve your sense of self and your body image  Exercise tips      Ease into your routine. Set small goals. Then build on them. If you are not sure what your activity level should be, talk with your healthcare provider first before starting an exercise routine.    Exercise on most days. Aim for a total of 150 minutes (2 hours and 30 minutes) or more of moderate-intensity aerobic activity each week. Or 75 minutes (1 hour and 15 minutes) or more of vigorous-intensity aerobic activity each week. Or try for a combination of both. Moderate activity means that you breathe heavier and your heart rate increases but you can still talk. Think about doing 40 minutes of moderate exercise, 3 to 4 times a week. For best results, activity should last for about 40 minutes to lower blood pressure and cholesterol. It's OK to work up to the 40-minute period over time. Examples of moderate-intensity activity are walking 1 mile in 15 minutes. Or doing 30 to 45 minutes of yard work.    Step up your daily activity level.  Along with your exercise program, try being more active the whole day. Walk instead of drive. Or park further away so that you take more steps each day. Do more household tasks or yard work. You may not be able to meet the advised mount of physical activity. But doing some moderate- or vigorous-intensity aerobic activity can help reduce your risk for heart disease. Your healthcare provider can help you figure out what is best for you.    Choose 1 or more activities you enjoy.  Walking is one of the easiest things you can do. You can also try swimming, riding a bike, dancing, or taking an exercise class.    When to call your healthcare provider  Call your healthcare provider if you have any of these:     Chest pain or feel dizzy or lightheaded    Burning, tightness, pressure, or heaviness in your chest, neck, shoulders, back, or arms    Abnormal shortness of breath    More joint or  muscle pain    A very fast or irregular heartbeat (palpitations)  [employer] last reviewed this educational content on 7/1/2019 2000-2021 [employer]. All rights reserved. This information is not intended as a substitute for professional medical care. Always follow your healthcare professional's instructions.          Signs of Hearing Loss      Hearing much better with one ear can be a sign of hearing loss.   Hearing loss is a problem shared by many people. In fact, it is one of the most common health problems, particularly as people age. Most people age 65 and older have some hearing loss. By age 80, almost everyone does. Hearing loss often occurs slowly over the years. So you may not realize your hearing has gotten worse.  Have your hearing checked  Call your healthcare provider if you:    Have to strain to hear normal conversation    Have to watch other people s faces very carefully to follow what they re saying    Need to ask people to repeat what they ve said    Often misunderstand what people are saying    Turn the volume of the television or radio up so high that others complain    Feel that people are mumbling when they re talking to you    Find that the effort to hear leaves you feeling tired and irritated    Notice, when using the phone, that you hear better with one ear than the other  [employer] last reviewed this educational content on 1/1/2020 2000-2021 [employer]. All rights reserved. This information is not intended as a substitute for professional medical care. Always follow your healthcare professional's instructions.

## 2022-08-11 NOTE — PROGRESS NOTES
"SUBJECTIVE:   Jose Angel Cha is a 67 year old male who presents for Preventive Visit.      Patient has been advised of split billing requirements and indicates understanding: Yes  Are you in the first 12 months of your Medicare coverage?  No    Healthy Habits:     In general, how would you rate your overall health?  Good    Frequency of exercise:  None    Do you usually eat at least 4 servings of fruit and vegetables a day, include whole grains    & fiber and avoid regularly eating high fat or \"junk\" foods?  Yes    Taking medications regularly:  Yes    Ability to successfully perform activities of daily living:  No assistance needed    Home Safety:  Throw rugs in the hallway and lack of grab bars in the bathroom    Hearing Impairment:  Difficulty following a conversation in a noisy restaurant or crowded room, feel that people are mumbling or not speaking clearly, difficult to understand a speaker at a public meeting or Jew service, need to ask people to speak up or repeat themselves and difficulty understanding soft or whispered speech    In the past 6 months, have you been bothered by leaking of urine?  No    In general, how would you rate your overall mental or emotional health?  Good      PHQ-2 Total Score: 0    Additional concerns today:  Yes    Do you feel safe in your environment? Yes    Have you ever done Advance Care Planning? (For example, a Health Directive, POLST, or a discussion with a medical provider or your loved ones about your wishes): Yes, patient states has an Advance Care Planning document and will bring a copy to the clinic.       Fall risk  Fallen 2 or more times in the past year?: No  Any fall with injury in the past year?: No    Cognitive Screening   1) Repeat 3 items (Leader, Season, Table)    2) Clock draw: NORMAL  3) 3 item recall: Recalls 3 objects  Results: 3 items recalled: COGNITIVE IMPAIRMENT LESS LIKELY    Mini-CogTM Copyright S Emil. Licensed by the author for use in " [facility]; reprinted with permission (javi@[facility]). All rights reserved.      HAs pain with hot points on hips with sleeping.  Alcohol use  Ear rash noted - seborrheic dermatitis  Prostate - urination is not as easy - doesn't empty.  Hemorrhoids, bloating  R foot arch pain       Reviewed and updated as needed this visit by clinical staff   Tobacco  Allergies  Meds  Problems  Med Hx  Surg Hx  Fam Hx  Soc   Hx          Reviewed and updated as needed this visit by Provider   Tobacco  Allergies  Meds  Problems  Med Hx  Surg Hx  Fam Hx           Social History     Tobacco Use     Smoking status: Former Smoker     Packs/day: 0.00     Types: Cigars     Quit date: 11/10/2017     Years since quittin.7     Smokeless tobacco: Never Used     Tobacco comment: very occasion use of cigars formerly   Substance Use Topics     Alcohol use: Not Currently     Comment: Quit 10/10/21         Alcohol Use 2022   Prescreen: >3 drinks/day or >7 drinks/week? No   Prescreen: >3 drinks/day or >7 drinks/week? -   AUDIT SCORE  -               Current providers sharing in care for this patient include:   Patient Care Team:  Shari Paredes MD as PCP - General (Family Practice)  Shari Paredes MD as Assigned PCP  Juan Antonio Cardozo MD as Assigned Allergy Provider  Gabriella Gómez MD as Assigned Surgical Provider  Gabriella Gómez MD as MD (Dermatology)  Sherman Gtz MD as Assigned Heart and Vascular Provider  Cathy Wilson MD as MD (Dermatology)    The following health maintenance items are reviewed in Epic and correct as of today:  Health Maintenance Due   Topic Date Due     EGD  2019               Review of Systems   Constitutional: Negative for chills and fever.   HENT: Positive for hearing loss. Negative for congestion, ear pain and sore throat.    Eyes: Negative for pain and visual disturbance.   Respiratory: Positive for shortness of breath. Negative for cough.    Cardiovascular:  "Positive for peripheral edema. Negative for chest pain and palpitations.   Gastrointestinal: Positive for abdominal pain and heartburn. Negative for constipation, diarrhea, hematochezia and nausea.   Genitourinary: Negative for dysuria, frequency, genital sores, hematuria, impotence, penile discharge and urgency.   Musculoskeletal: Positive for arthralgias, joint swelling and myalgias.   Skin: Positive for rash.   Neurological: Positive for dizziness and weakness. Negative for headaches and paresthesias.   Psychiatric/Behavioral: Negative for mood changes. The patient is not nervous/anxious.          OBJECTIVE:   /76 (BP Location: Left arm, Patient Position: Sitting, Cuff Size: Adult Regular)   Pulse 68   Temp 97.4  F (36.3  C) (Temporal)   Resp 16   Ht 1.781 m (5' 10.1\")   Wt 94.3 kg (208 lb)   SpO2 98%   BMI 29.76 kg/m   Estimated body mass index is 29.76 kg/m  as calculated from the following:    Height as of this encounter: 1.781 m (5' 10.1\").    Weight as of this encounter: 94.3 kg (208 lb).  Physical Exam  GENERAL: healthy, alert and no distress  RESP: lungs clear to auscultation - no rales, rhonchi or wheezes  CV: regular rate and rhythm, normal S1 S2, no S3 or S4, no murmur, click or rub, no peripheral edema and peripheral pulses strong  ABDOMEN: soft, nontender, no hepatosplenomegaly, no masses and bowel sounds normal  MS: no gross musculoskeletal defects noted, no edema  SKIN: no suspicious lesions or rashes  NEURO: Normal strength and tone, mentation intact and speech normal  PSYCH: mentation appears normal, affect normal/bright  Foot exam: noted pain at the heel and into the arch, most c/w plantar fasciitis  Rectal: declined      ASSESSMENT / PLAN:       ICD-10-CM    1. Encounter for Medicare annual wellness exam  Z00.00    2. Anxiety  F41.9    3. Mixed hyperlipidemia  E78.2 Lipid panel reflex to direct LDL Fasting     Comprehensive metabolic panel (BMP + Alb, Alk Phos, ALT, AST, Total. " Bili, TP)     Lipid panel reflex to direct LDL Fasting     Comprehensive metabolic panel (BMP + Alb, Alk Phos, ALT, AST, Total. Bili, TP)     CANCELED: COMPREHENSIVE METABOLIC PANEL   4. Benign essential hypertension  I10 CANCELED: COMPREHENSIVE METABOLIC PANEL   5. Benign prostatic hyperplasia with incomplete bladder emptying  N40.1 oxybutynin (DITROPAN) 5 MG tablet    R39.14 PSA, screen     PSA, screen   6. Ejaculatory disorder  N53.19    7. Primary insomnia  F51.01 temazepam (RESTORIL) 7.5 MG capsule   8. Plantar fasciitis  M72.2    9. External hemorrhoids  K64.4 polyethylene glycol (MIRALAX) 17 GM/Dose powder   10. Cardenas's esophagus without dysplasia  K22.70 Adult GI  Referral - Procedure Only   11. Encounter for screening for malignant neoplasm of prostate   Z12.5 PSA, screen     PSA, screen   12. Paroxysmal atrial fibrillation (H)  I48.0    13. Morbid obesity (H)  E66.01      Patient has quit his alcohol use and has been doing much better with his quality of sleep as well as his energy.  He plans to continue this and has been managing with significantly less temazepam although would like to have some available for him in case he needs.  He also has concerns over bloating and discomfort in his abdomen as well as difficulty with feeling like his bladder empties with urination and has been seen by urology in the past due to complications with his BPH treatment.  We discussed that although he had side effects with Flomax we certainly can try Ditropan and if he still having issues we can consider urology consult next  Patient has a history of Cardenas's esophagus and is overdue for his EGD which she plans to schedule soon.  Lastly he was having hip pain.  The left hip hurts when he is sleeping on it and has not been able to find good positions of comfort.  And is plantar fasciitis of the difficulties have to do with position movements and support and will try to pull between his legs for his hip pain  "although I advised also he could roll to his back more to try and help as well as the foot inserts may need to be done for his sandals considering his wearing them significantly and they are not giving him support he needs for his plantar fasciitis.  We could make an orthotics referral for like though he has a previous pair of orthotics he is considering brain tissue which she was to try to match.    Patient has been advised of split billing requirements and indicates understanding: Yes    COUNSELING:  Reviewed preventive health counseling, as reflected in patient instructions       Regular exercise       Healthy diet/nutrition       Alcohol Use     Estimated body mass index is 29.76 kg/m  as calculated from the following:    Height as of this encounter: 1.781 m (5' 10.1\").    Weight as of this encounter: 94.3 kg (208 lb).    Weight management plan: Discussed healthy diet and exercise guidelines    He reports that he quit smoking about 4 years ago. His smoking use included cigars. He smoked 0.00 packs per day. He has never used smokeless tobacco.      Appropriate preventive services were discussed with this patient, including applicable screening as appropriate for cardiovascular disease, diabetes, osteopenia/osteoporosis, and glaucoma.  As appropriate for age/gender, discussed screening for colorectal cancer, prostate cancer, breast cancer, and cervical cancer. Checklist reviewing preventive services available has been given to the patient.    Reviewed patients plan of care and provided an AVS. The Basic Care Plan (routine screening as documented in Health Maintenance) for Jose Angel meets the Care Plan requirement. This Care Plan has been established and reviewed with the Patient.    Counseling Resources:  ATP IV Guidelines  Pooled Cohorts Equation Calculator  Breast Cancer Risk Calculator  Breast Cancer: Medication to Reduce Risk  FRAX Risk Assessment  ICSI Preventive Guidelines  Dietary Guidelines for Americans, " 2010  USDA's MyPlate  ASA Prophylaxis  Lung CA Screening    Shari Paredes MD, MD  [facility]    Identified Health Risks:    He is at risk for lack of exercise and has been provided with information to increase physical activity for the benefit of his well-being.  The patient was provided with written information regarding signs of hearing loss.

## 2022-08-12 ENCOUNTER — TELEPHONE (OUTPATIENT)
Dept: GASTROENTEROLOGY | Facility: CLINIC | Age: 67
End: 2022-08-12

## 2022-08-12 NOTE — TELEPHONE ENCOUNTER
Screening Questions    BlueKIND OF PREP RedLOCATION [review exclusion criteria] GreenSEDATION TYPE      1. Are you active on mychart? Y    2. What insurance is in the chart? UCARE/Medicare     3.   Ordering/Referring Provider: Shari Paredes MD     4. BMI   (If greater than 40 review exclusion criteria [PAC APPT IF [MAC] @ [facility])  29.8  [If yes, BMI OVER 40-EXTENDED PREP]      **(Sedation review/consideration needed)**  Do you have a legal guardian or Medical Power of    and/or are you able to give consent for your medical care?     Y    5. Have you had a positive covid test in the last 90 days?   N -     6.  Are you currently on dialysis?   N [ If yes, G-PREP & HOSPITAL setting ONLY]     7.  Do you have chronic kidney disease?  N [ If yes, G-PREP ]    8.   Do you have a diagnosis of diabetes?   N   [ If yes, G-PREP ]    9.  On a regular basis do you go 3-5 days between bowel movements?   N   [ If yes, EXTENDED PREP]    10.  Are you taking any prescription pain medications on a routine schedule?    N -  [ If yes, EXTENDED PREP] [If yes, MAC]      11.   Do you have any chemical dependencies such as alcohol, street drugs, or methadone?    N [If yes, MAC]    12.   Do you have any history of post-traumatic stress syndrome, severe anxiety or history of psychosis?    N  [If yes, MAC]    13.  [FEMALES] Are you currently pregnant? NA    If yes, how many weeks?       Respiratory/Heart Screening:  [If yes to any of the following HOSPITAL setting only]     14. Do you have Pulmonary Hypertension [Lungs]?   N       15. Do you have UNCONTROLLED asthma?   N     16.  Do you use daily home oxygen?  N      17. Do you have mod to severe Obstructive Sleep Apnea?         (OKAY @ [facility]  [facility]  SH  PH  RI  MG - if pt is not on OXYGEN)  N      18.   Have you had a heart or lung transplant?   N      19.   Have you had a stroke or Transient ischemic attack (TIA - aka  mini stroke ) within 6 months?  (If yes, please review  exclusion criteria)  N     20.   In the past 6 months, have you had any heart related issues including cardiomyopathy or heart attack?   NA           If yes, did it require cardiac stenting or other implantable device?   N      21.   Do you have any implantable devices in your body (pacemaker, defib, LVAD)? (If yes, please review exclusion criteria)  N     22.  Do you take the medication Phentermine?  NO        23. Do you take nitroglycerin?   N           If yes, how often? NA  (if yes, HOSPITAL setting ONLY)    24.  Are you currently taking any blood thinners?    [If yes, INFORM patient to follw up w/ ORDERING PROVIDER FOR BRIDGING INSTRUCTIONS]     N    25.   Do you transfer independently?                (If NO, please HOSPITAL setting ONLY)  Y    26.   Preferred LOCAL Pharmacy for Pre Prescription:        [id number] - [place], [place] - 1 Admittor    Scheduling Details  (Please ask for phone number if not scheduled by patient)      Caller : Jose Angel Cha    Date of Procedure: 9/27  Surgeon: Sulaiman  Location:         Sedation Type: MAC l Per Scheduling Availablity  Conscious Sedation- Needs  for 6 hours after the procedure  MAC/General-Needs  for 24 hours after procedure    N :[Pre-op Required] at [facility]  SH  MG and OR for MAC sedation   (advise patient they will need a pre-op WITH IN 30 DAYS of procedure date)     Type of Procedure Scheduled:   Upper Endoscopy [EGD]    Which Colonoscopy Prep was Sent?:   KRAIG -     ROSA CF PATIENTS & GROEN'S PATIENTS NEEDS EXTENDED PREP       Informed patient they will need an adult  Y  Cannot take any type of public or medical transportation alone    Pre-Procedure Covid test to be completed at [facility] or Externally: Y  **INFORMED OF HOME TESTING & LAB OPTION**        Confirmed Nurse will call to complete assessment Y    Additional comments:

## 2022-09-06 ENCOUNTER — OFFICE VISIT (OUTPATIENT)
Dept: ALLERGY | Facility: OTHER | Age: 67
End: 2022-09-06
Payer: MEDICARE

## 2022-09-06 VITALS
WEIGHT: 213.41 LBS | OXYGEN SATURATION: 96 % | DIASTOLIC BLOOD PRESSURE: 83 MMHG | BODY MASS INDEX: 30.55 KG/M2 | HEART RATE: 74 BPM | SYSTOLIC BLOOD PRESSURE: 129 MMHG | HEIGHT: 70 IN

## 2022-09-06 DIAGNOSIS — J30.1 CHRONIC SEASONAL ALLERGIC RHINITIS DUE TO POLLEN: ICD-10-CM

## 2022-09-06 DIAGNOSIS — T63.461D TOXIC REACTION TO HORNETS, WASPS AND BEES, ACCIDENTAL OR UNINTENTIONAL, SUBSEQUENT ENCOUNTER: Primary | ICD-10-CM

## 2022-09-06 DIAGNOSIS — T63.441D TOXIC REACTION TO HORNETS, WASPS AND BEES, ACCIDENTAL OR UNINTENTIONAL, SUBSEQUENT ENCOUNTER: Primary | ICD-10-CM

## 2022-09-06 DIAGNOSIS — Z91.09 HOUSE DUST MITE ALLERGY: ICD-10-CM

## 2022-09-06 DIAGNOSIS — T63.451D TOXIC REACTION TO HORNETS, WASPS AND BEES, ACCIDENTAL OR UNINTENTIONAL, SUBSEQUENT ENCOUNTER: Primary | ICD-10-CM

## 2022-09-06 DIAGNOSIS — J30.81 ALLERGIC RHINITIS DUE TO ANIMAL DANDER: ICD-10-CM

## 2022-09-06 DIAGNOSIS — Z23 NEED FOR PROPHYLACTIC VACCINATION AND INOCULATION AGAINST INFLUENZA: ICD-10-CM

## 2022-09-06 PROCEDURE — 82785 ASSAY OF IGE: CPT | Performed by: ALLERGY & IMMUNOLOGY

## 2022-09-06 PROCEDURE — 99214 OFFICE O/P EST MOD 30 MIN: CPT | Mod: 25 | Performed by: ALLERGY & IMMUNOLOGY

## 2022-09-06 PROCEDURE — 90662 IIV NO PRSV INCREASED AG IM: CPT | Performed by: ALLERGY & IMMUNOLOGY

## 2022-09-06 PROCEDURE — 86003 ALLG SPEC IGE CRUDE XTRC EA: CPT | Performed by: ALLERGY & IMMUNOLOGY

## 2022-09-06 PROCEDURE — 36415 COLL VENOUS BLD VENIPUNCTURE: CPT | Performed by: ALLERGY & IMMUNOLOGY

## 2022-09-06 PROCEDURE — G0008 ADMIN INFLUENZA VIRUS VAC: HCPCS | Performed by: ALLERGY & IMMUNOLOGY

## 2022-09-06 RX ORDER — AZELASTINE 1 MG/ML
2 SPRAY, METERED NASAL 2 TIMES DAILY PRN
Qty: 30 ML | Refills: 3 | Status: SHIPPED | OUTPATIENT
Start: 2022-09-06 | End: 2023-01-02

## 2022-09-06 RX ORDER — OLOPATADINE HYDROCHLORIDE 2 MG/ML
1 SOLUTION/ DROPS OPHTHALMIC DAILY
Qty: 2.5 ML | Refills: 3 | Status: SHIPPED | OUTPATIENT
Start: 2022-09-06 | End: 2023-01-02

## 2022-09-06 RX ORDER — FLUTICASONE PROPIONATE 50 MCG
2 SPRAY, SUSPENSION (ML) NASAL DAILY
Qty: 16 G | Refills: 5 | Status: SHIPPED | OUTPATIENT
Start: 2022-09-06 | End: 2023-01-02

## 2022-09-06 ASSESSMENT — ENCOUNTER SYMPTOMS
EYE DISCHARGE: 0
HEADACHES: 0
STRIDOR: 0
FEVER: 0
COUGH: 0
WHEEZING: 0
RHINORRHEA: 0
SHORTNESS OF BREATH: 0
ACTIVITY CHANGE: 0
DIARRHEA: 0
NAUSEA: 0
ALLERGIC/IMMUNOLOGIC COMMENTS: VENOM ALLERGY
EYE ITCHING: 0
FATIGUE: 0
VOMITING: 0
CHEST TIGHTNESS: 0
FACIAL SWELLING: 0
EYE REDNESS: 0
ADENOPATHY: 0
SINUS PRESSURE: 0
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
## 2022-09-06 NOTE — PATIENT INSTRUCTIONS
Get the bloodwork done.    -Continue avoidance measure.  - Continue carrying epinephrine autoinjector and oral antihistamines in high risk seasons.      -start Flonase 1-2 sprays/each nostril once a day.  -Use azelastine 2 sprays in each nostril twice a day when necessary.   Continue with olopatadine eyedrops as needed.       Allergy Staff Appt Hours Shot Hours Locations    Physician     Saud Delcid MD       Support Staff     Sofi ESPINOZA, LAURI ESPINOZA, [facility]  Tuesday:   [place] 7-5     Wednesday:  [place]: :         [place]:  [place]-3 [place]        Tuesday: : : : :       [place]       Tues & Wed:  & Thurs:        Fri:          [facility]  [address] [place] [address]  Appt Line: [phone]    [facility]  [address] [place] [address]  Appt Line: [phone]    Pulmonary Function Scheduling:  [place]: [phone]  [place]: [phone]  [phone]     Prescription Assistance    If you need assistance with your prescriptions (cost, coverage, etc) please contact: [place] Prescription Assistance Program [phone]    Important Scheduling Information    Appointments for skin testing: Appointment will last approximately 45 minutes.  Please call the appointment line for your clinic to schedule.  Discontinue oral antihistamines 7 days prior to the appointment.  Discontinue nasal and ocular antihistamines 4 days prior to appointment.    Appointments for challenges (oral food challenge, penicillin testing, aspirin desensitization) If your provider instructs you to that this additional testing is indicated, it will need to be scheduled directly through the allergy department.  Please contact them via [employer] or by calling the clinic and asking to speak with the allergy team.  They will provide  additional information and instructions for the appointment.  Discontinue oral antihistamines 7 days prior to the appointment.  Discontinue nasal and ocular antihistamines 4 days prior to the appointment.    Thank you for trusting us with your care. Please feel free to contact us with any questions or concerns you may have.
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
## 2022-09-07 LAB — IGE SERPL-ACNC: 28 KU/L (ref 0–114)

## 2022-09-08 LAB
HONEY BEE IGE QN: <0.1 KU(A)/L
PAPER WASP IGE QN: 0.69 KU(A)/L
WHITEFACED HORNET IGE QN: 0.54 KU(A)/L
YELLOW HORNET IGE QN: 0.56 KU(A)/L
YELLOW JACKET IGE QN: 4.33 KU(A)/L

## 2022-09-12 NOTE — RESULT ENCOUNTER NOTE
[employer] message sent:      Positive serum IgE for yellow jacket, wasp, yellow hornet, and white faced hornet.   Negative serum IgE for honey bee.   I recommend skin test with honey bee venom and then discussing venom immunotherapy.

## 2022-09-12 NOTE — RESULT ENCOUNTER NOTE
[email or web address] message sent:     Positive serum IgE for yellow jacket, wasp, yellow hornet, and white faced hornet.   Negative serum IgE for honey bee.   I recommend skin test with honey bee venom and then discussing venom immunotherapy.

## 2022-09-26 NOTE — H&P
[facility] Anesthesia Pre-op History and Physical    Jose Angel Cha MRN# [id number]   Age: 67 year old YOB: 1955      Date of Surgery: 9/27/2022 Location [facility]      Date of Exam 9/27/2022 Facility (Same day)       Home clinic: [facility]  Primary care provider: Shari Paredes         Chief Complaint and/or Reason for Procedure:   No chief complaint on file.  EGD.  Hx irregular Z line follow up exam 2018.         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     Priority: Medium     Paroxysmal atrial fibrillation (H) 08/28/2019     Priority: Medium     Need for desensitization to allergens 04/03/2019     Priority: Medium     Rash 04/03/2019     Priority: Medium     Grade II internal hemorrhoids 10/04/2018     Priority: Medium     Allergic rhinitis due to animal dander 11/15/2017     Priority: Medium     Chronic seasonal allergic rhinitis due to pollen 11/15/2017     Priority: Medium     Cardenas's esophagus without dysplasia 10/11/2017     Priority: Medium     Annual endoscopy recommended 10/11/2017         Subclinical hypothyroidism 09/27/2017     Priority: Medium     Benign essential hypertension 03/27/2017     Priority: Medium     Coronary artery disease involving native coronary artery of native heart without angina pectoris      Priority: Medium     cardiac cath 2010: mild diffuse disease       Need for SBE (subacute bacterial endocarditis) prophylaxis      Priority: Medium     s/p mitral valve ring repair 2010       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     Priority: Medium     LISBET (obstructive sleep apnea) AHI 13.8 06/15/2016     Priority: Medium     PSG at [facility] 5/19/2016 Mild       Allergy to  bee sting 04/01/2016     Priority: Medium     House dust mite allergy      Priority: Medium     Dupuytren's contracture 07/17/2014     Priority: Medium     Other symptoms involving nervous and musculoskeletal systems(781.99) 10/02/2013     Priority: Medium     Dizziness and giddiness 10/02/2013     Priority: Medium     Mixed hyperlipidemia 08/07/2013     Priority: Medium     Advanced directives, counseling/discussion 10/25/2012     Priority: Medium     Discussed advance care planning with patient; information given to patient to review. 10/25/2012          Anxiety 09/19/2011     Priority: Medium     Health Care Home 08/12/2011     Priority: Medium     X  Unable to contact the patient at this time. Three phone calls to the patient have been placed, and an [facility] letter has been sent. 8/30/11  Josias Gibbons RNC.,[facility]  Clinical Care Coordinator  [phone]  DX V65.8 REPLACED WITH [id number]ALTH CARE HOME (04/08/2013)       Pain in joint involving shoulder region 12/10/2010     Priority: Medium     Nonrheumatic mitral valve insufficiency 01/01/2010     Priority: Medium     with prolapse, s/p P2 resection and 28mm annuloplasty ring 2010       Meniere disease 01/24/2009     Priority: Medium     Benign localized prostatic hyperplasia with lower urinary tract symptoms (LUTS) 06/01/2007     Priority: Medium     Family history of ischemic heart disease 02/23/2007     Priority: Medium     Hearing loss      Priority: Medium     right more than left  Problem list name updated by automated process. Provider to review       Lumbago      Priority: Medium     chronic LBP              Medications (include herbals and vitamins):   Any Plavix use in the last 7 days? No     No current facility-administered medications for this encounter.     Current Outpatient Medications   Medication Sig     amLODIPine (NORVASC) 2.5 MG tablet Take 1 tablet (2.5 mg) by mouth daily     ASPIRIN 81 MG OR TABS ONE DAILY     azelastine (ASTELIN) 0.1 % nasal  spray Spray 2 sprays into both nostrils 2 times daily as needed for rhinitis     CALCIUM PO      EPINEPHrine (ANY BX GENERIC EQUIV) 0.3 MG/0.3ML injection 2-pack Inject 0.3 mLs (0.3 mg) into the muscle as needed for anaphylaxis     ezetimibe (ZETIA) 10 MG tablet Take 1 tablet (10 mg) by mouth daily     fluticasone (FLONASE) 50 MCG/ACT nasal spray Spray 2 sprays into both nostrils daily     ibuprofen (ADVIL/MOTRIN) 600 MG tablet Take 600 mg by mouth     loratadine (CLARITIN) 10 MG tablet Take 10 mg by mouth daily     Multiple Vitamins-Minerals (MULTIVITAL PO) Take 1 tablet by mouth daily Reported on 3/22/2017     olopatadine (PATADAY) 0.2 % ophthalmic solution Place 1 drop into both eyes daily     ORDER FOR ALLERGEN IMMUNOTHERAPY Name of Mix: Mix #1  Mixed Vespid  Mixed Vespid Venom 300 mcg/mL HS 13 ml  Diluent: HSA qs to 13ml     ORDER FOR ALLERGEN IMMUNOTHERAPY Name of Mix: Mix #2  Wasp  Wasp Venom 100 mcg/mL HS 13 ml  Diluent: HSA qs to 13ml     ORDER FOR ALLERGEN IMMUNOTHERAPY Cat Hair, Standardized 10,000 BAU/mL, ALK  2.0 ml  Dog Hair-Dander, A. P.  1:100 w/v, HS  1.0 ml  Dust Mites DF 30,000AU/mL, HS  0.3 ml  Dust Mites DP. 30,000 AU/mL, HS  0.3 ml   Birch Mix PRW 1:20 w/v, HS  0.5 ml  Grass Mix #7 100,000 BAU/mL, HS 0.4 ml  Nettle 1:20 w/v, HS 0.5 ml  Diluent: HSA qs to 5ml (Patient not taking: Reported on 9/6/2022)     ORDER FOR ALLERGEN IMMUNOTHERAPY Reported on 3/22/2017 (Patient not taking: Reported on 9/6/2022)     ORDER FOR ALLERGEN IMMUNOTHERAPY Reported on 3/22/2017 (Patient not taking: Reported on 9/6/2022)     oxybutynin (DITROPAN) 5 MG tablet Take 1 tablet (5 mg) by mouth 2 times daily     polyethylene glycol (MIRALAX) 17 GM/Dose powder Take 17 g (1 capful) by mouth daily     psyllium (METAMUCIL/KONSYL) Packet Take 1 packet by mouth daily     STATIN NOT PRESCRIBED, INTENTIONAL, by Other route continuous prn Reported on 4/6/2017     temazepam (RESTORIL) 7.5 MG capsule Take 1 capsule (7.5 mg) by mouth  nightly as needed for sleep DUE for April f/u, please schedule     triamcinolone (KENALOG) 0.1 % external ointment Apply thin layer up to twice daily as needed for itch/rash on the ear.             Allergies:      Allergies   Allergen Reactions     Anesthetic Ether      Bee Venom      Demerol Visual Disturbance     Statin [Hmg-Coa-R Inhibitors] Other (See Comments)     Muscle pain     Allergy to Latex? No  Allergy to tape?   No  Intolerances:             Physical Exam:   All vitals have been reviewed  No data found.  No intake/output data recorded.  Lungs:   No increased work of breathing, good air exchange, clear to auscultation bilaterally, no crackles or wheezing     Cardiovascular:   Normal apical impulse, regular rate and rhythm, normal S1 and S2, no S3 or S4, and no murmur noted             Lab / Radiology Results:            Anesthetic risk and/or ASA classification:       Sherman Hilario MD     Addendum: Pt request PPI Rx.  Standard of care for Cardenas's.

## 2022-09-27 ENCOUNTER — HOSPITAL ENCOUNTER (OUTPATIENT)
Facility: CLINIC | Age: 67
Discharge: HOME | End: 2022-09-27
Attending: INTERNAL MEDICINE | Admitting: INTERNAL MEDICINE
Payer: MEDICARE

## 2022-09-27 ENCOUNTER — TELEPHONE (OUTPATIENT)
Dept: GASTROENTEROLOGY | Facility: CLINIC | Age: 67
End: 2022-09-27

## 2022-09-27 ENCOUNTER — ANESTHESIA (OUTPATIENT)
Dept: GASTROENTEROLOGY | Facility: CLINIC | Age: 67
End: 2022-09-27
Payer: MEDICARE

## 2022-09-27 ENCOUNTER — ANESTHESIA EVENT (OUTPATIENT)
Dept: GASTROENTEROLOGY | Facility: CLINIC | Age: 67
End: 2022-09-27
Payer: MEDICARE

## 2022-09-27 VITALS
OXYGEN SATURATION: 97 % | TEMPERATURE: 97.1 F | BODY MASS INDEX: 30.49 KG/M2 | SYSTOLIC BLOOD PRESSURE: 111 MMHG | DIASTOLIC BLOOD PRESSURE: 77 MMHG | WEIGHT: 213 LBS | HEIGHT: 70 IN | HEART RATE: 56 BPM | RESPIRATION RATE: 16 BRPM

## 2022-09-27 DIAGNOSIS — K22.70 BARRETT'S ESOPHAGUS WITHOUT DYSPLASIA: Primary | ICD-10-CM

## 2022-09-27 LAB — UPPER GI ENDOSCOPY: NORMAL

## 2022-09-27 PROCEDURE — 258N000003 HC RX IP 258 OP 636: Performed by: NURSE ANESTHETIST, CERTIFIED REGISTERED

## 2022-09-27 PROCEDURE — 88305 TISSUE EXAM BY PATHOLOGIST: CPT | Mod: TC | Performed by: INTERNAL MEDICINE

## 2022-09-27 PROCEDURE — 370N000017 HC ANESTHESIA TECHNICAL FEE, PER MIN: Performed by: INTERNAL MEDICINE

## 2022-09-27 PROCEDURE — 250N000011 HC RX IP 250 OP 636: Performed by: NURSE ANESTHETIST, CERTIFIED REGISTERED

## 2022-09-27 PROCEDURE — 250N000009 HC RX 250: Performed by: NURSE ANESTHETIST, CERTIFIED REGISTERED

## 2022-09-27 PROCEDURE — 43239 EGD BIOPSY SINGLE/MULTIPLE: CPT | Performed by: INTERNAL MEDICINE

## 2022-09-27 RX ORDER — PROPOFOL 10 MG/ML
INJECTION, EMULSION INTRAVENOUS PRN
Status: DISCONTINUED | OUTPATIENT
Start: 2022-09-27 | End: 2022-09-27

## 2022-09-27 RX ORDER — PROPOFOL 10 MG/ML
INJECTION, EMULSION INTRAVENOUS CONTINUOUS PRN
Status: DISCONTINUED | OUTPATIENT
Start: 2022-09-27 | End: 2022-09-27

## 2022-09-27 RX ORDER — LIDOCAINE 40 MG/G
CREAM TOPICAL
Status: DISCONTINUED | OUTPATIENT
Start: 2022-09-27 | End: 2022-09-27 | Stop reason: HOSPADM

## 2022-09-27 RX ORDER — SODIUM CHLORIDE, SODIUM LACTATE, POTASSIUM CHLORIDE, CALCIUM CHLORIDE 600; 310; 30; 20 MG/100ML; MG/100ML; MG/100ML; MG/100ML
INJECTION, SOLUTION INTRAVENOUS CONTINUOUS
Status: DISCONTINUED | OUTPATIENT
Start: 2022-09-27 | End: 2022-09-27 | Stop reason: HOSPADM

## 2022-09-27 RX ORDER — LIDOCAINE HYDROCHLORIDE 20 MG/ML
INJECTION, SOLUTION INFILTRATION; PERINEURAL PRN
Status: DISCONTINUED | OUTPATIENT
Start: 2022-09-27 | End: 2022-09-27

## 2022-09-27 RX ADMIN — LIDOCAINE HYDROCHLORIDE 100 MG: 20 INJECTION, SOLUTION INFILTRATION; PERINEURAL at 11:08

## 2022-09-27 RX ADMIN — SODIUM CHLORIDE, POTASSIUM CHLORIDE, SODIUM LACTATE AND CALCIUM CHLORIDE: 600; 310; 30; 20 INJECTION, SOLUTION INTRAVENOUS at 09:53

## 2022-09-27 RX ADMIN — PROPOFOL 50 MG: 10 INJECTION, EMULSION INTRAVENOUS at 11:12

## 2022-09-27 RX ADMIN — PROPOFOL 30 MG: 10 INJECTION, EMULSION INTRAVENOUS at 11:10

## 2022-09-27 RX ADMIN — PROPOFOL 150 MCG/KG/MIN: 10 INJECTION, EMULSION INTRAVENOUS at 11:08

## 2022-09-27 RX ADMIN — PROPOFOL 70 MG: 10 INJECTION, EMULSION INTRAVENOUS at 11:08

## 2022-09-27 ASSESSMENT — ACTIVITIES OF DAILY LIVING (ADL): ADLS_ACUITY_SCORE: 35

## 2022-09-27 ASSESSMENT — LIFESTYLE VARIABLES: TOBACCO_USE: 1

## 2022-09-27 ASSESSMENT — ENCOUNTER SYMPTOMS: DYSRHYTHMIAS: 1

## 2022-09-27 NOTE — ANESTHESIA POSTPROCEDURE EVALUATION
Patient: Jose Angel Cha    Procedure: Procedure(s):  ESOPHAGOGASTRODUODENOSCOPY, WITH BIOPSY       Anesthesia Type:  MAC    Note:  Disposition: Outpatient   Postop Pain Control: Uneventful            Sign Out: Well controlled pain   PONV: No   Neuro/Psych: Uneventful            Sign Out: Acceptable/Baseline neuro status   Airway/Respiratory: Uneventful            Sign Out: Acceptable/Baseline resp. status   CV/Hemodynamics: Uneventful            Sign Out: Acceptable CV status   Other NRE: NONE   DID A NON-ROUTINE EVENT OCCUR? No    Event details/Postop Comments:  Pt was happy with anesthesia care.  No complications.  I will follow up with the pt if needed.           Last vitals:  Vitals Value Taken Time   /79 09/27/22 1122   Temp     Pulse 67 09/27/22 1122   Resp     SpO2 96 % 09/27/22 1127   Vitals shown include unvalidated device data.    Electronically Signed By: ETHEL Reyes CRNA  September 27, 2022  11:28 AM

## 2022-09-27 NOTE — LETTER
October 3, 2022      Davi Cha  [id number] [facility] [id number]        Dear ,    We are writing to inform you of your test results.    This is ok.  No concerns.  A repeat exam in 3--5 yrs is suggested for monitoring.      Resulted Orders   Surgical Pathology Exam   Result Value Ref Range    Case Report       Surgical Pathology Report                         Case: [id number]                                  Authorizing Provider:  Sherman Hilario MD        Collected:           09/27/2022 11:15 AM          Ordering Location:     [facility]          Received:            09/27/2022 11:51 AM                                 [facility]                                                          Pathologist:           Brianna Mendez MD                                                           Specimen:    Esophagus, Esophagel Biopsies at 40cm for Barretts                                         Final Diagnosis       Esophagus, at 40 cm, biopsy-  - Mild esophagitis, nonspecific.  - Negative for goblet cell-type intestinal metaplasia, dysplasia, or malignancy.  - Sampling includes: Squamous mucosal fragments.       Clinical Information       Cardenas's esophagus without dysplasia.  Follow-up of Cardenas's esophagus.  Assessment following fundoplication with Toupet 2019.  Prior exam 2018.  Endoscopy reports irregular Z-line and ultrashort segment.      Gross Description       A(1). Esophagus, Esophagel Biopsies at 40cm for Barretts:  The specimen is received in formalin, labeled with the patient's name, medical record number and other identifying information and designated  esophageal biopsies at 40 cm for Cardenas's . It consists of 2 tan soft tissue fragments ranging from 0.1-0.2 cm in greatest dimension. Entirely submitted in one cassette.   ([facility])        Microscopic Description       Microscopic examination is performed.        Performing Labs       The technical component of this  testing was completed at [facility]      Case Images         If you have any questions or concerns, please call the clinic at the number listed above.       Sincerely,      Sherman Hilario MD

## 2022-09-27 NOTE — ANESTHESIA CARE TRANSFER NOTE
Patient: Jose Angel Cha    Procedure: Procedure(s):  ESOPHAGOGASTRODUODENOSCOPY, WITH BIOPSY       Diagnosis: Cardenas's esophagus without dysplasia [K22.70]  Diagnosis Additional Information: No value filed.    Anesthesia Type:   MAC     Note:    Oropharynx: oropharynx clear of all foreign objects and spontaneously breathing  Level of Consciousness: drowsy  Oxygen Supplementation: face mask    Independent Airway: airway patency satisfactory and stable  Dentition: dentition unchanged  Vital Signs Stable: post-procedure vital signs reviewed and stable  Report to RN Given: handoff report given  Patient transferred to: Phase II    Handoff Report: Identifed the Patient, Identified the Reponsible Provider, Reviewed the pertinent medical history, Discussed the surgical course, Reviewed Intra-OP anesthesia mangement and issues during anesthesia, Set expectations for post-procedure period and Allowed opportunity for questions and acknowledgement of understanding      Vitals:  Vitals Value Taken Time   BP     Temp     Pulse     Resp     SpO2         Electronically Signed By: ETHEL Reyes CRNA  September 27, 2022  11:20 AM

## 2022-09-27 NOTE — DISCHARGE INSTRUCTIONS
[facility]    Home Care Following Endoscopy          Activity:  You have just undergone an endoscopic procedure usually performed with conscious sedation.  Do not work or operate machinery (including a car) for at least 12 hours.    I encourage you to walk and attempt to pass this air as soon as possible.    Diet:  Return to the diet you were on before your procedure but eat lightly for the first 12-24 hours.  Drink plenty of water.  Resume any regular medications unless otherwise advised by your physician.  Please begin any new medication prescribed as a result of your procedure as directed by your physician.   If you had any biopsy or polyp removed please refrain from aspirin or aspirin products for 2 days.  If on Coumadin please restart as instructed by your physician.   Pain:  You may take Tylenol as needed for pain.  Expected Recovery:  You can expect some mild abdominal fullness and/or discomfort due to the air used to inflate your intestinal tract. It is also normal to have a mild sore throat after upper endoscopy.    Call Your Physician if You Have:  After Upper Endoscopy:  Shoulder, back or chest pain.  Difficulty breathing or swallowing.  Vomiting blood.    Any questions or concerns about your recovery, please call [phone] or after hours [phone] Nurse Advice Line.    Follow-up Care:  You did have biopsy tissue sample(s) removed.  The tissue sample(s) will be sent to pathology.  You should receive letter in your My Chart from Dr Hilario with your results within 1-2 weeks. If you do not participate in My Chart a physical letter will come in the mail in 2-3 weeks.  Please call if you have not received a notification of your results.            [phone].
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
## 2022-09-30 LAB
PATH REPORT.COMMENTS IMP SPEC: NORMAL
PATH REPORT.COMMENTS IMP SPEC: NORMAL
PATH REPORT.FINAL DX SPEC: NORMAL
PATH REPORT.GROSS SPEC: NORMAL
PATH REPORT.MICROSCOPIC SPEC OTHER STN: NORMAL
PATH REPORT.RELEVANT HX SPEC: NORMAL
PHOTO IMAGE: NORMAL

## 2022-09-30 PROCEDURE — 88305 TISSUE EXAM BY PATHOLOGIST: CPT | Mod: 26

## 2022-10-20 ENCOUNTER — OFFICE VISIT (OUTPATIENT)
Dept: DERMATOLOGY | Facility: CLINIC | Age: 67
End: 2022-10-20
Payer: MEDICARE

## 2022-10-20 DIAGNOSIS — D48.9 NEOPLASM OF UNCERTAIN BEHAVIOR: Primary | ICD-10-CM

## 2022-10-20 DIAGNOSIS — R21 LOCALIZED PUSTULAR ERUPTION: ICD-10-CM

## 2022-10-20 DIAGNOSIS — L21.9 DERMATITIS, SEBORRHEIC: ICD-10-CM

## 2022-10-20 DIAGNOSIS — L82.1 SK (SEBORRHEIC KERATOSIS): ICD-10-CM

## 2022-10-20 DIAGNOSIS — L73.9 FOLLICULITIS: ICD-10-CM

## 2022-10-20 DIAGNOSIS — D48.5 NEOPLASM OF UNCERTAIN BEHAVIOR OF SKIN: ICD-10-CM

## 2022-10-20 DIAGNOSIS — Z85.828 HISTORY OF NONMELANOMA SKIN CANCER: ICD-10-CM

## 2022-10-20 PROCEDURE — 99214 OFFICE O/P EST MOD 30 MIN: CPT | Mod: 25 | Performed by: DERMATOLOGY

## 2022-10-20 PROCEDURE — 87070 CULTURE OTHR SPECIMN AEROBIC: CPT | Performed by: DERMATOLOGY

## 2022-10-20 PROCEDURE — 88305 TISSUE EXAM BY PATHOLOGIST: CPT | Performed by: DERMATOLOGY

## 2022-10-20 PROCEDURE — 11102 TANGNTL BX SKIN SINGLE LES: CPT | Performed by: DERMATOLOGY

## 2022-10-20 PROCEDURE — 11103 TANGNTL BX SKIN EA SEP/ADDL: CPT | Performed by: DERMATOLOGY

## 2022-10-20 RX ORDER — CEPHALEXIN 500 MG/1
500 CAPSULE ORAL 3 TIMES DAILY
Qty: 30 CAPSULE | Refills: 0 | Status: SHIPPED | OUTPATIENT
Start: 2022-10-20 | End: 2023-01-02

## 2022-10-20 RX ORDER — CLINDAMYCIN PHOSPHATE 11.9 MG/ML
SOLUTION TOPICAL
Qty: 60 ML | Refills: 0 | Status: SHIPPED | OUTPATIENT
Start: 2022-10-20 | End: 2023-01-02

## 2022-10-20 ASSESSMENT — PAIN SCALES - GENERAL: PAINLEVEL: MILD PAIN (3)
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
## 2022-10-20 NOTE — NURSING NOTE
The following medication was given:     MEDICATION:  Lidocaine with epinephrine 1% [id number]  ROUTE: SQ  SITE: see procedure note  DOSE: 0.5 mL  LOT #: [id number]  : [employer]  EXPIRATION DATE: 01/31/2024  NDC#: [address]-79  Was there drug waste? 0.5 mL  Multi-dose vial: Yes    Gabriella Baldwin CMA  October 20, 2022

## 2022-10-20 NOTE — PATIENT INSTRUCTIONS
For neck we will do a culture and watch this. Start Keflex (cephalexin) antibiotic three times daily for ten days. Go to ER or urgent care for signs of infection including increasing pain, increasing redness, discharge such as pus, fevers, chills or if not feeling well.     We biopsied 2 spots today, see wound care below      Corners of mouth are called angular cheilitis. They are from the wrinkle and from any saliva getting trapped at night. Apply vaseline petroleum jelly to the creases of the mouth nightly. Add Dr. Gómez triamcinolone ointment for 1 week if it gets red and rashy.        For the scalp, start head and shoulders clinical strength-see photo below      Skin care  I recommend using Vanicream to moisturize your skin daily after a shower. This is available downstairs in the pharmacy, or at most major retailers like [employer] and [employer].                             Wound Care After a Biopsy    What is a skin biopsy?  A skin biopsy allows the doctor to examine a very small piece of tissue under the microscope to determine the diagnosis and the best treatment for the skin condition. A local anesthetic (numbing medicine)  is injected with a very small needle into the skin area to be tested. A small piece of skin is taken from the area. Sometimes a suture (stitch) is used.     What are the risks of a skin biopsy?  I will experience scar, bleeding, swelling, pain, crusting and redness. I may experience incomplete removal or recurrence. Risks of this procedure are excessive bleeding, bruising, infection, nerve damage, numbness, thick (hypertrophic or keloidal) scar and non-diagnostic biopsy.    How should I care for my wound for the first 24 hours?  Keep the wound dry and covered for 24 hours  If it bleeds, hold direct pressure on the area for 15 minutes. If bleeding does not stop then go to the emergency room  Avoid strenuous exercise the first 1-2 days or as your doctor instructs you    How should I care  for the wound after 24 hours?  After 24 hours, remove the bandage  You may bathe or shower as normal  If you had a scalp biopsy, you can shampoo as usual and can use shower water to clean the biopsy site daily  Clean the wound twice a day with gentle soap and water  Do not scrub, be gentle  Apply white petroleum/Vaseline after cleaning the wound with a cotton swab or a clean finger, and keep the site covered with a Bandaid /bandage. Bandages are not necessary with a scalp biopsy  If you are unable to cover the site with a Bandaid /bandage, re-apply ointment 2-3 times a day to keep the site moist. Moisture will help with healing  Avoid strenuous activity for first 1-2 days  Avoid lakes, rivers, pools, and oceans until the stitches are removed or the site is healed    How do I clean my wound?  Wash hands thoroughly with soap or use hand  before all wound care  Clean the wound with gentle soap and water  Apply white petroleum/Vaseline  to wound after it is clean  Replace the Bandaid /bandage to keep the wound covered for the first few days or as instructed by your doctor  If you had a scalp biopsy, warm shower water to the area on a daily basis should suffice    What should I use to clean my wound?   Cotton-tipped applicators (Qtips )  White petroleum jelly (Vaseline ). Use a clean new container and use Q-tips to apply.  Bandaids   as needed  Gentle soap     How should I care for my wound long term?  Do not get your wound dirty  Keep up with wound care for one week or until the area is healed.  A small scab will form and fall off by itself when the area is completely healed. The area will be red and will become pink in color as it heals. Sun protection is very important for how your scar will turn out. Sunscreen with an SPF 30 or greater is recommended once the area is healed.  You should have some soreness but it should be mild and slowly go away over several days. Talk to your doctor about using tylenol for  pain,    When should I call my doctor?  If you have increased:   Pain or swelling  Pus or drainage (clear or slightly yellow drainage is ok)  Temperature over 100F  Spreading redness or warmth around wound    When will I hear about my results?  The biopsy results can take 2 weeks to come back.  Your results will automatically release to [facility] before your provider has even reviewed them.  The clinic will call you with the results, send you a [employer] message, or have you schedule a follow-up clinic or phone time to discuss the results.  Contact our clinics if you do not hear from us in 2 weeks.    Who should I call with questions?  [facility]: [phone]  [facility]: [phone]  For urgent needs outside of business hours call the [facility] at [phone] and ask for the dermatology resident on call
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
## 2022-10-20 NOTE — NURSING NOTE
"Jose Angel Cha's chief complaint for this visit includes:  Chief Complaint   Patient presents with     Derm Problem     Seborrheic dermatitis on bilateral ears is spreading to face & neck, primarily on left side- triamcinolone does not clear the dermatitis. \"Painful and itchy.\"   Would like spots on posterior neck, bilateral subclavicular chest.     PCP: Shari Paredes    Referring Provider:  Referred Self, MD  No address on file    There were no vitals taken for this visit.  Mild Pain (3)        Allergies   Allergen Reactions     Anesthetic Ether      Bee Venom      Demerol Visual Disturbance     Statin [Hmg-Coa-R Inhibitors] Other (See Comments)     Muscle pain         Do you need any medication refills at today's visit? No    Gabriella Baldwin CMA        "

## 2022-10-22 LAB — BACTERIA SKIN AEROBE CULT: NORMAL

## 2022-10-24 LAB
PATH REPORT.COMMENTS IMP SPEC: NORMAL
PATH REPORT.COMMENTS IMP SPEC: NORMAL
PATH REPORT.FINAL DX SPEC: NORMAL
PATH REPORT.GROSS SPEC: NORMAL
PATH REPORT.MICROSCOPIC SPEC OTHER STN: NORMAL
PATH REPORT.RELEVANT HX SPEC: NORMAL

## 2022-10-26 ENCOUNTER — DOCUMENTATION ONLY (OUTPATIENT)
Dept: DERMATOLOGY | Facility: CLINIC | Age: 67
End: 2022-10-26
Payer: MEDICARE

## 2022-10-26 NOTE — PROGRESS NOTES
Called patient. Reports behind ear better. Will STart BP and head and shoulders    He is using clindamycin  And the oral antibiotic      He is better    CeraVE acne foaming cleanser, bleach    Patient will call if not resolving.     Cathy Wilson MD    Department of Dermatology  [facility]: Phone: [phone], Fax:[phone]  [facility]: Phone: [phone], Fax: [phone]

## 2023-01-02 ENCOUNTER — OFFICE VISIT (OUTPATIENT)
Dept: FAMILY MEDICINE | Facility: CLINIC | Age: 68
End: 2023-01-02
Payer: MEDICARE

## 2023-01-02 ENCOUNTER — TELEPHONE (OUTPATIENT)
Dept: ALLERGY | Facility: CLINIC | Age: 68
End: 2023-01-02

## 2023-01-02 ENCOUNTER — ANCILLARY PROCEDURE (OUTPATIENT)
Dept: GENERAL RADIOLOGY | Facility: CLINIC | Age: 68
End: 2023-01-02
Attending: FAMILY MEDICINE
Payer: MEDICARE

## 2023-01-02 VITALS
TEMPERATURE: 97.9 F | OXYGEN SATURATION: 100 % | DIASTOLIC BLOOD PRESSURE: 80 MMHG | RESPIRATION RATE: 20 BRPM | HEART RATE: 73 BPM | BODY MASS INDEX: 30.15 KG/M2 | HEIGHT: 71 IN | SYSTOLIC BLOOD PRESSURE: 134 MMHG | WEIGHT: 215.4 LBS

## 2023-01-02 DIAGNOSIS — R05.2 SUBACUTE COUGH: Primary | ICD-10-CM

## 2023-01-02 DIAGNOSIS — R05.2 SUBACUTE COUGH: ICD-10-CM

## 2023-01-02 PROCEDURE — 71046 X-RAY EXAM CHEST 2 VIEWS: CPT | Mod: TC | Performed by: RADIOLOGY

## 2023-01-02 PROCEDURE — 99214 OFFICE O/P EST MOD 30 MIN: CPT | Performed by: FAMILY MEDICINE

## 2023-01-02 RX ORDER — AZITHROMYCIN 250 MG/1
TABLET, FILM COATED ORAL
Qty: 6 TABLET | Refills: 0 | Status: SHIPPED | OUTPATIENT
Start: 2023-01-02 | End: 2023-01-07

## 2023-01-02 RX ORDER — BENZONATATE 200 MG/1
200 CAPSULE ORAL 3 TIMES DAILY PRN
Qty: 15 CAPSULE | Refills: 0 | Status: SHIPPED | OUTPATIENT
Start: 2023-01-02 | End: 2023-02-17

## 2023-01-02 RX ORDER — PREDNISONE 20 MG/1
TABLET ORAL
Qty: 10 TABLET | Refills: 0 | Status: SHIPPED | OUTPATIENT
Start: 2023-01-02 | End: 2023-02-17
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
## 2023-01-02 NOTE — PROGRESS NOTES
Assessment & Plan     Subacute cough    He has had a 6-week history of cough  He likely had influenza in late November based on symptoms and exposure  Consider possible pneumonia versus bronchitis versus postviral cough    There are no obvious signs of congestive heart failure  Chest x-ray is negative for acute infiltrate.  This was personally reviewed and will be reviewed by radiology  Recommend a trial of prednisone 20 milligrams p.o. twice daily x5 days  Prescribed Tessalon Perles to take as needed  Encourage fluids and rest  Given the duration of his cough will prescribe azithromycin to cover atypical infection  Recommend follow-up with his primary physician if not improving  He may need further evaluation    - XR Chest 2 Views  - predniSONE (DELTASONE) 20 MG tablet  Dispense: 10 tablet; Refill: 0  - benzonatate (TESSALON) 200 MG capsule  Dispense: 15 capsule; Refill: 0  - azithromycin (ZITHROMAX) 250 MG tablet  Dispense: 6 tablet; Refill: 0    Spent 30 minutes including time for chart preparation and review as well as time with patient and in reviewing the chest x-ray and treatment plan      Review of external notes as documented elsewhere in note             No follow-ups on file.    Forrest Mckay MD  [facility]    Charity Tomlinson is a 67 year old, presenting for the following health issues:  Cough (Coughing since Sunday after thanksgiving ) and Nasal Congestion (Congestion that is in the face/nasal, eyes, and chest )      This is a pleasant 67-year-old male who presents to the clinic with at least a 6-week history of a cough.  He states that around Thanksgiving he developed a constellation of symptoms including a cough, sore throat, head congestion, nasal congestion, burning chest, and fatigue.  He states that at least 4 of his family members that he was exposed to had influenza.  He never tested but suspects he did have influenza.  He has noted that his cough has  "persisted over time.  This can be occasionally productive.  He denies recent fevers but can feel mildly short of breath.  He does have a cardiac history including a previous mitral valve repair and paroxysmal atrial fibrillation.  He has obstructive sleep apnea treated with a dental appliance.    In reviewing the chart he did have an echocardiogram in 2022 revealed a normal ejection fraction was negative for acute abnormalities.  He continues to have lingering symptoms and states that over-the-counter therapies have not been helpful.  This has been waking him up at least 3 times per night.  He has felt exhausted.  He does have allergies but no known history of asthma.  He has no known history of chronic obstructive pulmonary disease.  He has had an occasional cigar.    History of Present Illness       Reason for visit:  Cough  fatigue headace sore throat achy eyes  Symptom onset:  More than a month  Symptom intensity:  Severe  Symptom progression:  Worsening  Had these symptoms before:  Yes  Has tried/received treatment for these symptoms:  Yes  Previous treatment was successful:  No  What makes it worse:  Exersion work  What makes it better:  No    He eats 2-3 servings of fruits and vegetables daily.He consumes 2 sweetened beverage(s) daily.He exercises with enough effort to increase his heart rate 9 or less minutes per day.  He exercises with enough effort to increase his heart rate 3 or less days per week.   He is taking medications regularly.             Review of Systems         Objective    /80 (BP Location: Left arm, Patient Position: Sitting, Cuff Size: Adult Regular)   Pulse 73   Temp 97.9  F (36.6  C) (Oral)   Resp 20   Ht 1.791 m (5' 10.5\")   Wt 97.7 kg (215 lb 6.4 oz)   SpO2 100%   BMI 30.47 kg/m    Body mass index is 30.47 kg/m .  Physical Exam   GENERAL: healthy, alert and no distress  EYES: Eyes grossly normal to inspection, PERRL and conjunctivae and sclerae normal  HENT: ear canals and " TM's normal, nose and mouth without ulcers or lesions  NECK: no adenopathy, no asymmetry, masses, or scars and thyroid normal to palpation  RESP: lungs clear to auscultation - no rales, rhonchi or wheezes  CV: regular rate and rhythm, normal S1 S2, no S3 or S4, no murmur, click or rub, no peripheral edema and peripheral pulses strong  MS: no gross musculoskeletal defects noted, no edema  SKIN: no suspicious lesions or rashes  NEURO: Normal strength and tone, mentation intact and speech normal  PSYCH: mentation appears normal, affect normal/bright

## 2023-01-30 ENCOUNTER — TELEPHONE (OUTPATIENT)
Dept: FAMILY MEDICINE | Facility: OTHER | Age: 68
End: 2023-01-30
Payer: MEDICARE

## 2023-01-30 NOTE — TELEPHONE ENCOUNTER
Patient has an appt with Dr Delcid for honeybee challenge tomorrow 1/31/23 but states he has not gone off his antihistamines and wanted you to know.  Did you want him to reschedule or can he keep this visit tomorrow?    Teresa Estevez RN  [facility] ~ Registered Nurse  Clinic Triage ~ [place] [facility]  January 30, 2023

## 2023-02-17 ENCOUNTER — OFFICE VISIT (OUTPATIENT)
Dept: DERMATOLOGY | Facility: CLINIC | Age: 68
End: 2023-02-17
Payer: MEDICARE

## 2023-02-17 DIAGNOSIS — L81.4 SOLAR LENTIGO: ICD-10-CM

## 2023-02-17 DIAGNOSIS — D22.9 MULTIPLE BENIGN NEVI: ICD-10-CM

## 2023-02-17 DIAGNOSIS — L82.1 SK (SEBORRHEIC KERATOSIS): ICD-10-CM

## 2023-02-17 DIAGNOSIS — Z85.828 HISTORY OF NONMELANOMA SKIN CANCER: Primary | ICD-10-CM

## 2023-02-17 DIAGNOSIS — L21.9 SEBORRHEIC DERMATITIS: ICD-10-CM

## 2023-02-17 DIAGNOSIS — L91.8 SKIN TAG: ICD-10-CM

## 2023-02-17 PROCEDURE — 99214 OFFICE O/P EST MOD 30 MIN: CPT | Mod: 25 | Performed by: DERMATOLOGY

## 2023-02-17 PROCEDURE — 17110 DESTRUCTION B9 LES UP TO 14: CPT | Performed by: DERMATOLOGY

## 2023-02-17 RX ORDER — CLINDAMYCIN PHOSPHATE 11.9 MG/ML
SOLUTION TOPICAL
Qty: 60 ML | Refills: 1 | Status: SHIPPED | OUTPATIENT
Start: 2023-02-17 | End: 2023-03-07

## 2023-02-17 RX ORDER — FLUOCINONIDE TOPICAL SOLUTION USP, 0.05% 0.5 MG/ML
SOLUTION TOPICAL AT BEDTIME
Qty: 60 ML | Refills: 1 | Status: SHIPPED | OUTPATIENT
Start: 2023-02-17 | End: 2024-02-20

## 2023-02-17 ASSESSMENT — PAIN SCALES - GENERAL: PAINLEVEL: NO PAIN (0)

## 2023-02-17 NOTE — PATIENT INSTRUCTIONS
- Start Lidex 0.1% solution (fluocinonide) nightly. Use for 3 weeks  - Apply clindamycin 1% solution in the AM. Use for 3 weeks          Cryotherapy    What is it?  Use of a very cold liquid, such as liquid nitrogen, to freeze and destroy abnormal skin cells that need to be removed    What should I expect?  Tenderness and redness  A small blister that might grow and fill with dark purple blood. There may be crusting.  More than one treatment may be needed if the lesions do not go away.    How do I care for the treated area?  Gently wash the area with your hands when bathing.  Use a thin layer of Vaseline to help with healing. You may use a Band-Aid.   The area should heal within 7-10 days and may leave behind a pink or lighter color.   Do not use an antibiotic or Neosporin ointment.   You may take acetaminophen (Tylenol) for pain.     Call your doctor if you have:  Severe pain  Signs of infection (warmth, redness, cloudy yellow drainage, and or a bad smell)  Questions or concerns    Who should I call with questions?      [facility]: [phone]      [facility]: [phone]      For urgent needs outside of business hours call the [facility] at [phone] and ask for the dermatology resident on call         Patient Education     Checking for Skin Cancer  You can find cancer early by checking your skin each month. There are 3 kinds of skin cancer. They are melanoma, basal cell carcinoma, and squamous cell carcinoma. Doing monthly skin checks is the best way to find new marks or skin changes. Follow the instructions below for checking your skin.   The ABCDEs of checking moles for melanoma   Check your moles or growths for signs of melanoma using ABCDE:   Asymmetry: the sides of the mole or growth don t match  Border: the edges are ragged, notched, or blurred  Color: the color within the mole or growth varies  Diameter: the mole or growth is  larger than 6 mm (size of a pencil eraser)  Evolving: the size, shape, or color of the mole or growth is changing (evolving is not shown in the images below)    Checking for other types of skin cancer  Basal cell carcinoma or squamous cell carcinoma have symptoms such as:     A spot or mole that looks different from all other marks on your skin  Changes in how an area feels, such as itching, tenderness, or pain  Changes in the skin's surface, such as oozing, bleeding, or scaliness  A sore that does not heal  New swelling or redness beyond the border of a mole    Who s at risk?  Anyone can get skin cancer. But you are at greater risk if you have:   Fair skin, light-colored hair, or light-colored eyes  Many moles or abnormal moles on your skin  A history of sunburns from sunlight or tanning beds  A family history of skin cancer  A history of exposure to radiation or chemicals  A weakened immune system  If you have had skin cancer in the past, you are at risk for recurring skin cancer.   How to check your skin  Do your monthly skin checkups in front of a full-length mirror. Check all parts of your body, including your:   Head (ears, face, neck, and scalp)  Torso (front, back, and sides)  Arms (tops, undersides, upper, and lower armpits)  Hands (palms, backs, and fingers, including under the nails)  Buttocks and genitals  Legs (front, back, and sides)  Feet (tops, soles, toes, including under the nails, and between toes)  If you have a lot of moles, take digital photos of them each month. Make sure to take photos both up close and from a distance. These can help you see if any moles change over time.   Most skin changes are not cancer. But if you see any changes in your skin, call your doctor right away. Only he or she can diagnose a problem. If you have skin cancer, seeing your doctor can be the first step toward getting the treatment that could save your life.   Chelsie last reviewed this educational content on  4/1/2019 2000-2020 [employer]. [address] [place] [address], [place] [address]. All rights reserved. This information is not intended as a substitute for professional medical care. Always follow your healthcare professional's instructions.       When should I call my doctor?  If you are worsening or not improving, please, contact us or seek urgent care as noted below.     Who should I call with questions (adults)?  [facility] (adult and pediatric): [phone]  [facility] (adult): [phone]  For urgent needs outside of business hours call the [facility] at [phone] and ask for the dermatology resident on call to be paged  If this is a medical emergency and you are unable to reach an ER, Call [phone]    Who should I call with questions (pediatric)?  [facility]- Pediatric Dermatology  Dr. Yessy Merrill, Dr. Jeff Last, Dr. Christy Patino, CLINTON Roche, Dr. Vania Wren, Dr. Edna Loja & Dr. Jaycob Estevez  Non-urgent nurse triage line; [phone]- Claudia and Laura RN Care Coordinators   Noni (/Complex ) [phone]    If you need a prescription refill, please contact your pharmacy. Refills are approved or denied by our Physicians during normal business hours, Monday through Fridays  Per office policy, refills will not be granted if you have not been seen within the past year (or sooner depending on your child's condition)    Scheduling Information:  Pediatric Appointment Scheduling and Call Center [phone]  Radiology Scheduling- [phone]  Sedation Unit Scheduling- [phone]  [place] Scheduling- General [phone]; Pediatric Dermatology [phone]  Main  Services: [phone]  Persian: [phone]  South African: [phone]  Hmong/Divehi/Yi: [phone]  Preadmission Nursing Department Fax Number: [phone] (Fax all  pre-operative paperwork to this number)    For urgent matters arising during evenings, weekends, or holidays that cannot wait for normal business hours please call [phone] and ask for the dermatology resident on call to be paged.
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
## 2023-02-17 NOTE — NURSING NOTE
Jose Angel Cha's chief complaint for this visit includes:  Chief Complaint   Patient presents with     Skin Check     Patient is here for a skin check. Patient has concerns regarding left shoulder blade and itching in scalp. Patient has a hx of bcc on right helix.      PCP: Shari Paredes    Referring Provider:  No referring provider defined for this encounter.    There were no vitals taken for this visit.  No Pain (0)        Allergies   Allergen Reactions     Anesthetic Ether      Bee Venom      Demerol Visual Disturbance     Statin [Hmg-Coa-R Inhibitors] Other (See Comments)     Muscle pain         Do you need any medication refills at today's visit?YES- if meds are prescribed for scalp    Sis Lawler MA

## 2023-03-07 ENCOUNTER — OFFICE VISIT (OUTPATIENT)
Dept: ALLERGY | Facility: OTHER | Age: 68
End: 2023-03-07
Payer: MEDICARE

## 2023-03-07 VITALS
WEIGHT: 220 LBS | OXYGEN SATURATION: 99 % | HEIGHT: 71 IN | HEART RATE: 68 BPM | DIASTOLIC BLOOD PRESSURE: 87 MMHG | SYSTOLIC BLOOD PRESSURE: 126 MMHG | BODY MASS INDEX: 30.8 KG/M2

## 2023-03-07 DIAGNOSIS — T63.441D TOXIC EFFECT OF VENOM OF BEES, UNINTENTIONAL, SUBSEQUENT ENCOUNTER: Primary | ICD-10-CM

## 2023-03-07 DIAGNOSIS — H04.203 WATERY EYES: ICD-10-CM

## 2023-03-07 PROCEDURE — 99213 OFFICE O/P EST LOW 20 MIN: CPT | Mod: 25 | Performed by: ALLERGY & IMMUNOLOGY

## 2023-03-07 PROCEDURE — 95017 ALL TSTG PERQ&IQ W/VENOMS: CPT | Performed by: ALLERGY & IMMUNOLOGY

## 2023-03-07 RX ORDER — OLOPATADINE HYDROCHLORIDE 2 MG/ML
1 SOLUTION/ DROPS OPHTHALMIC DAILY
COMMUNITY
Start: 2023-03-07

## 2023-03-07 RX ORDER — EPINEPHRINE 0.3 MG/.3ML
0.3 INJECTION SUBCUTANEOUS PRN
Qty: 0.6 ML | Refills: 1 | Status: SHIPPED | OUTPATIENT
Start: 2023-03-07 | End: 2023-03-10

## 2023-03-07 RX ORDER — FLUTICASONE PROPIONATE 50 MCG
SPRAY, SUSPENSION (ML) NASAL
COMMUNITY
Start: 2023-01-10

## 2023-03-07 RX ORDER — OLOPATADINE HYDROCHLORIDE 2 MG/ML
1 SOLUTION/ DROPS OPHTHALMIC DAILY
Status: CANCELLED | OUTPATIENT
Start: 2023-03-07

## 2023-03-07 RX ORDER — EPINEPHRINE 0.3 MG/.3ML
0.3 INJECTION SUBCUTANEOUS PRN
Qty: 4 EACH | Refills: 0 | Status: SHIPPED | OUTPATIENT
Start: 2023-03-07 | End: 2023-06-14

## 2023-03-07 RX ORDER — OLOPATADINE HYDROCHLORIDE 2 MG/ML
1 SOLUTION/ DROPS OPHTHALMIC DAILY
COMMUNITY
End: 2023-03-07

## 2023-03-07 ASSESSMENT — ENCOUNTER SYMPTOMS
STRIDOR: 0
FATIGUE: 0
CHEST TIGHTNESS: 0
ACTIVITY CHANGE: 0
HEADACHES: 0
WHEEZING: 0
COUGH: 0
FACIAL SWELLING: 0
RHINORRHEA: 0
SHORTNESS OF BREATH: 0
ADENOPATHY: 0
DIARRHEA: 0
VOMITING: 0
EYE ITCHING: 0
EYE REDNESS: 0
FEVER: 0
SINUS PRESSURE: 0
EYE DISCHARGE: 1
NAUSEA: 0
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
## 2023-03-07 NOTE — NURSING NOTE
Patient evaluated by provider prior to start of venom testing.  RN administered skin prick testing and intradermals per physician directed guidelines.  Patient was monitored for 15 minutes after each administered dose.  Vital signs were recorded prior to testing. Patient tolerated the testing well. All questions and concerns were addressed in clinic during testing.     Sofi Calero, RN, RN
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
## 2023-03-08 ENCOUNTER — TELEPHONE (OUTPATIENT)
Dept: ALLERGY | Facility: CLINIC | Age: 68
End: 2023-03-08
Payer: MEDICARE

## 2023-03-08 NOTE — TELEPHONE ENCOUNTER
Pt notified and will call [facility] pharmacy to check the moreno.      Hina Lopez MA

## 2023-03-08 NOTE — TELEPHONE ENCOUNTER
General Call    Contacts       Type Contact Phone/Fax    03/08/2023 12:50 PM CST Phone (Incoming) StarlaDavi (Self) [phone] (M)        Reason for Call:  Needs prior authorization started for   Auvq epi pen. Needs this sent to [phone]    What are your questions or concerns:  Prior authorization     Date of last appointment with provider: Farhana     Could we send this information to you in [employer] or would you prefer to receive a phone call?:   Patient would prefer a phone call   Okay to leave a detailed message?: Yes at Cell number on file:    Telephone Information:   Mobile [phone]

## 2023-03-10 ENCOUNTER — TELEPHONE (OUTPATIENT)
Dept: ALLERGY | Facility: CLINIC | Age: 68
End: 2023-03-10
Payer: MEDICARE

## 2023-03-10 DIAGNOSIS — T63.441D TOXIC EFFECT OF VENOM OF BEES, UNINTENTIONAL, SUBSEQUENT ENCOUNTER: ICD-10-CM

## 2023-03-10 RX ORDER — EPINEPHRINE 0.3 MG/.3ML
0.3 INJECTION SUBCUTANEOUS PRN
Qty: 0.6 ML | Refills: 1 | Status: SHIPPED | OUTPATIENT
Start: 2023-03-10

## 2023-03-10 NOTE — TELEPHONE ENCOUNTER
Called and spoke with patient to discuss affordable options for Epi Pen 0.3 mg/0.3mL injection 2-pack. Informed patient about the GoodRx prescription discount which can provide a lower cost option for medications.  Patient requested the prescription for Epi Pen be sent to [facility] in [facility] located on [address] Darshan [place]AMILTON. Per Annalisa, the cost of medication with the coupon would be $107.81.    Prescription was transferred to [facility] in [place].     Darshan Martinez RN on 3/10/2023 at 12:48 PM

## 2023-03-10 NOTE — TELEPHONE ENCOUNTER
FYI - Status Update    Who is Calling: patient    Update: Pt calling to inform Dr. Delcid about cost of EpiPen. Hoping to speak with his nurse/care team about problems getting coverage with insurance.    Cost at [facility] is $282 per pt.    Does caller want a call/response back: Yes     Could we send this information to you in [employer] or would you prefer to receive a phone call?:   Patient would prefer a phone call   Okay to leave a detailed message?: Yes at Cell number on file:    Telephone Information:   Mobile [phone]

## 2023-03-21 ENCOUNTER — OFFICE VISIT (OUTPATIENT)
Dept: ORTHOPEDICS | Facility: CLINIC | Age: 68
End: 2023-03-21
Payer: MEDICARE

## 2023-03-21 ENCOUNTER — ANCILLARY PROCEDURE (OUTPATIENT)
Dept: GENERAL RADIOLOGY | Facility: CLINIC | Age: 68
End: 2023-03-21
Attending: ORTHOPAEDIC SURGERY
Payer: MEDICARE

## 2023-03-21 VITALS — RESPIRATION RATE: 18 BRPM | BODY MASS INDEX: 30.8 KG/M2 | HEIGHT: 71 IN | WEIGHT: 220 LBS

## 2023-03-21 DIAGNOSIS — M25.559 HIP PAIN: ICD-10-CM

## 2023-03-21 DIAGNOSIS — M25.559 HIP PAIN: Primary | ICD-10-CM

## 2023-03-21 DIAGNOSIS — M75.41 IMPINGEMENT SYNDROME OF BOTH SHOULDERS: ICD-10-CM

## 2023-03-21 DIAGNOSIS — M70.61 TROCHANTERIC BURSITIS OF BOTH HIPS: ICD-10-CM

## 2023-03-21 DIAGNOSIS — M75.42 IMPINGEMENT SYNDROME OF BOTH SHOULDERS: ICD-10-CM

## 2023-03-21 DIAGNOSIS — M70.62 TROCHANTERIC BURSITIS OF BOTH HIPS: ICD-10-CM

## 2023-03-21 PROCEDURE — 99214 OFFICE O/P EST MOD 30 MIN: CPT | Performed by: ORTHOPAEDIC SURGERY

## 2023-03-21 PROCEDURE — 73522 X-RAY EXAM HIPS BI 3-4 VIEWS: CPT | Mod: TC | Performed by: RADIOLOGY

## 2023-03-21 RX ORDER — MELOXICAM 15 MG/1
15 TABLET ORAL DAILY
Qty: 30 TABLET | Refills: 11 | Status: SHIPPED | OUTPATIENT
Start: 2023-03-21 | End: 2023-08-22
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
## 2023-03-21 NOTE — PATIENT INSTRUCTIONS
Trochanteric Bursitis           What is trochanteric bursitis?   Trochanteric bursitis is irritation or inflammation of the trochanteric bursa. A bursa is a fluid-filled sac that acts as a cushion between tendons, bones, and skin. The trochanteric bursa is located on the upper, outer area of the thigh. There is a bump on the outer side of the upper part of the thigh bone (femur) called the greater trochanter. The trochanteric bursa is located over the greater trochanter.   How does it occur?   The trochanteric bursa may be inflamed by a group of muscles or tendons rubbing over the bursa and causing friction against the thigh bone. This injury can occur with running, walking, or bicycling, especially when the bicycle seat is too high.   What are the symptoms?   You have pain on the upper outer area of your thigh or in your hip. The pain is worse when you walk, bicycle, or go up or down stairs. You have pain when you move your thigh bone and feel tenderness in the area over the greater trochanter.   How is it diagnosed?   Your healthcare provider will ask about your symptoms and examine your hip and thigh.   How is it treated?   To treat this condition:   Put an ice pack, gel pack, or package of frozen vegetables, wrapped in a cloth on the area every 3 to 4 hours, for up to 20 minutes at a time.   Sleep with a pillow between your legs.  Take an anti-inflammatory medicine such as ibuprofen, or other medicine as directed by your provider. Nonsteroidal anti-inflammatory medicines (NSAIDs) may cause stomach bleeding and other problems. These risks increase with age. Read the label and take as directed. Unless recommended by your healthcare provider, do not take for more than 10 days.   Your provider may give you an injection of a corticosteroid medicine.   While you are recovering from your injury you will need to change your sport or activity to one that does not make your condition worse. For example,  you may need to swim instead of running or bicycling. If you are bicycling, you may need to lower your bicycle seat.   How long do the effects last?   The length of recovery depends on many factors such as your age, health, and if you have had a previous injury. Recovery time also depends on the severity of the injury. A bursa that is only mildly inflamed and has just started to hurt may improve within a few weeks. A bursa that is significantly inflamed and has been painful for a long time may take up to a few months to improve. You need to stop doing the activities that cause pain until your bursa has healed. If you continue doing activities that cause pain, your symptoms will return and it will take longer to recover.   When can I return to my normal activities?   Everyone recovers from an injury at a different rate. Return to your activities depends on how soon your leg recovers, not by how many days or weeks it has been since your injury has occurred. In general, the longer you have symptoms before you start treatment, the longer it will take to get better. The goal of rehabilitation is to return to your normal activities as soon as is safely possible. If you return too soon you may worsen your injury.   You may safely return to your normal activities when, starting from the top of the list and progressing to the end, each of the following is true:   You have full range of motion in the injured leg compared to the uninjured leg.   You have full strength of the injured leg compared to the uninjured leg.   You can walk straight ahead without pain or limping.   How can I prevent trochanteric bursitis?   Trochanteric bursitis is best prevented by warming up properly and stretching the muscles on the outer side of your upper thigh.     Published by [employer].  This content is reviewed periodically and is subject to change as new health information becomes available. The information is intended to inform and educate  and is not a replacement for medical evaluation, advice, diagnosis or treatment by a healthcare professional.   Written by Tirso Lucas MD, for [employer].   ? 2010 [employer] and/or its affiliates. All Rights Reserved.   Copyright   [employer] 2011                   Stretches lower back, side of hip, and neck  Sit on floor with left leg straight out in front   Bend right leg, cross right foot over, place outside left knee   Bend left elbow and rest it outside right knee   Place right hand behind hips on floor   Turn head over right shoulder, rotate upper body right   Hold 10 to 15 seconds   Repeat on other side   Breathe in slowly               Trochanteric Bursitis Rehabilitation Exercises   You can begin stretching the muscles that run along the outside of your hip using the first exercise. You can do the strengthening exercises when the sharp pain lessens.                    Strengthening exercises:  Start abductor strengthening and begin the exercises demonstrated today.  Leg strengthening:  Hold onto the sink with painful leg toward the sink.  Lift painful leg out to touch the wall with the heel.  Lift 10-15 times, twice a day.                                  Step-up: Stand with the foot of your injured leg on a support 3 to 5 inches high (like a small step or block of wood). Keep your other foot flat on the floor. Shift your weight onto the injured leg on the support. Straighten your injured leg as the other leg comes off the floor. Return to the starting position by bending your injured leg and slowly lowering your uninjured leg back to the floor. Do 3 sets of 10.   Clam exercise: Lie on your uninjured side with your hips and knees bent and feet together. Slowly raise your top leg toward the ceiling while keeping your heels touching each other. Hold for 2 seconds and lower slowly. Do 3 sets of 10 repetitions.   Iliotibial band stretch: Side-bending: Cross one leg in front of the other  leg and lean in the opposite direction from the front leg. Reach the arm on the side of the back leg over your head while you do this. Hold this position for 15 to 30 seconds. Return to the starting position. Repeat 3 times and then switch legs and repeat the exercise.   Published by [employer].  This content is reviewed periodically and is subject to change as new health information becomes available. The information is intended to inform and educate and is not a replacement for medical evaluation, advice, diagnosis or treatment by a healthcare professional.   Written by Nimisha Woodall, MS, PT, and Paulette Ying PT, [facility], for [employer].   ? 2010 [facility] and/or its affiliates. All Rights Reserved.   Copyright   [employer] 2011  Adult Health Advisor

## 2023-03-22 PROBLEM — M75.41 IMPINGEMENT SYNDROME OF BOTH SHOULDERS: Status: ACTIVE | Noted: 2023-03-22

## 2023-03-22 PROBLEM — M75.42 IMPINGEMENT SYNDROME OF BOTH SHOULDERS: Status: ACTIVE | Noted: 2023-03-22

## 2023-03-23 ENCOUNTER — HOSPITAL ENCOUNTER (OUTPATIENT)
Dept: CARDIOLOGY | Facility: CLINIC | Age: 68
Discharge: HOME | End: 2023-03-23
Attending: INTERNAL MEDICINE | Admitting: INTERNAL MEDICINE
Payer: MEDICARE

## 2023-03-23 DIAGNOSIS — I10 BENIGN ESSENTIAL HYPERTENSION: ICD-10-CM

## 2023-03-23 DIAGNOSIS — Z98.890 H/O MITRAL VALVE REPAIR: ICD-10-CM

## 2023-03-23 LAB — LVEF ECHO: NORMAL

## 2023-03-23 PROCEDURE — 93306 TTE W/DOPPLER COMPLETE: CPT

## 2023-03-23 PROCEDURE — 93306 TTE W/DOPPLER COMPLETE: CPT | Mod: 26 | Performed by: INTERNAL MEDICINE
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
## 2023-04-05 ENCOUNTER — OFFICE VISIT (OUTPATIENT)
Dept: CARDIOLOGY | Facility: CLINIC | Age: 68
End: 2023-04-05
Payer: MEDICARE

## 2023-04-05 VITALS
HEART RATE: 77 BPM | HEIGHT: 71 IN | DIASTOLIC BLOOD PRESSURE: 76 MMHG | SYSTOLIC BLOOD PRESSURE: 118 MMHG | WEIGHT: 220.6 LBS | OXYGEN SATURATION: 99 % | BODY MASS INDEX: 30.88 KG/M2

## 2023-04-05 DIAGNOSIS — I10 BENIGN ESSENTIAL HYPERTENSION: ICD-10-CM

## 2023-04-05 DIAGNOSIS — Z98.890 H/O MITRAL VALVE REPAIR: Primary | ICD-10-CM

## 2023-04-05 DIAGNOSIS — I25.10 CORONARY ARTERY DISEASE INVOLVING NATIVE CORONARY ARTERY OF NATIVE HEART WITHOUT ANGINA PECTORIS: ICD-10-CM

## 2023-04-05 PROCEDURE — 99214 OFFICE O/P EST MOD 30 MIN: CPT | Performed by: INTERNAL MEDICINE

## 2023-04-05 NOTE — PROGRESS NOTES
Service Date: 04/05/2023    HISTORY OF PRESENT ILLNESS:  Jose Angel Cha, a 66-year-old man with a history of mitral regurgitation (status post mitral valve repair), nonobstructive coronary disease, dyslipidemia, obesity, and hypertension was seen today at your request for followup.    Since last seen on 03/22/2022, Pastor Cha reports an isolated episode of left arm and chest discomfort that lasted less than 10 minutes about 3 months ago.  The symptoms have not recurred since then.  There was no relationship to activity, and he feels well at this time.  Pastor Cha has been intolerant of all statin therapy and finds both ezetimibe and PCSK9 inhibitors too expensive.  Presently, he prefers to follow a Mediterranean-style diet and is engaged in exercise program.  He and his wife plan a trip to [place] for hiking this spring.    An echocardiogram performed just prior to this visit shows his mitral valve repair remains intact with normal left ventricular systolic performance.    PAST MEDICAL HISTORY:    1.  Mitral insufficiency -- status post mitral valve repair with annuloplasty in 2010.  Most recent echo showing successful repair with no regurgitation or significant stenosis and preserved ejection fraction.  2.  Nonobstructive coronary artery disease -- diagnosed on preoperative coronary angiogram.  Intolerant of statins and cannot afford either PCSK9 inhibitors or ezetimibe.  3.  Hypertension.  4.  Previous history of bilateral lower extremity edema on amlodipine.  Currently resolved.    PHYSICAL EXAMINATION:    GENERAL:  Exam today demonstrates a very pleasant, cooperative, and intelligent 67-year-old man who is overweight.  VITAL SIGNS:  His blood pressure is 118/76, heart rate 77 and regular.  His height is 1.8 meters, his weight is 100.1 kg.  His BMI is 31.2.  RESPIRATORY:  His lungs are clear to percussion and auscultation.  CARDIOVASCULAR:  Shows a normal S1 with a normal S2.  There is no S3.   There is no murmur, rub or click.  His pulses are full and symmetrical.    MEDICATIONS:    1.  Aspirin 81 daily.  2.  Amlodipine 2.5 mg daily.  3.  Ezetimibe (currently off the medication).  4.  Meloxicam as needed.    LABORATORY STUDIES:  I have personally reviewed his echocardiogram from 2023.  His echo shows that the mitral valve repair is intact with no significant gradient across the repair.  Left ventricular systolic performance remains well preserved.  There has been no change since his last echo.    ASSESSMENT:  I am pleased that Mr. Cha's mitral valve repair remains intact with well-preserved left ventricular function.  I am uncertain as to the cause of the isolated episode of chest discomfort about 3 months ago, but since the discomfort has not recurred and he is able to exercise without limitations, I would not recommend any other testing at this time.  I have asked the patient to contact us should he have any recurrent symptoms and we can decide regarding further testing at that time.    The patient is intolerant of statin therapy and cannot afford either ezetimibe or PCSK9 inhibitors.  He at this point does not have a financial assistance program available for either of these agents.  He preferred to proceed with exercise and diet.    RECOMMENDATIONS:    1.  We reviewed the features of a Mediterranean-style diet and a regular exercise program.  2.  The patient will contact us with any recurrent chest discomfort.  3.  Followup visit with me with an echo at that time to recheck his mitral valve repair.    We appreciate the opportunity to see your patient, Mr. Kartik Cha.    Sherman Gtz MD    cc:  Shari Paredes MD  [address] [address]    Sherman Gtz MD        D: 2023   T: 2023   MT: [email or web address]    Name:     KARTIK CHA  MRN:      [id number]        Account:      [id number]   :      1955           Service Date:  04/05/2023       Document: [id number]

## 2023-04-05 NOTE — LETTER
4/5/2023    Shari Paredes MD, MD  [address] [facility] [address]    RE: Jose Angel Cha       Dear Colleague,     I had the pleasure of seeing Jose Angel Cha in the [facility].  HISTORY OF PRESENT ILLNESS:  One episode of chest tightness/left arm discomfort 10 minutes. Nothing since.Zetia very expensive, wants to stop.    Orders this Visit:  No orders of the defined types were placed in this encounter.    No orders of the defined types were placed in this encounter.    There are no discontinued medications.    No diagnosis found.    CURRENT MEDICATIONS:  Current Outpatient Medications   Medication Sig Dispense Refill    amLODIPine (NORVASC) 2.5 MG tablet Take 1 tablet (2.5 mg) by mouth daily 90 tablet 3    ASPIRIN 81 MG OR TABS ONE DAILY 0 0    CALCIUM PO       ezetimibe (ZETIA) 10 MG tablet Take 1 tablet (10 mg) by mouth daily 90 tablet 3    fluocinonide (LIDEX) 0.05 % external solution Apply topically At Bedtime 60 mL 1    fluticasone (FLONASE) 50 MCG/ACT nasal spray USE 2 SPRAY(S) IN THE AFFECTED NOSTRIL ONCE DAILY      ibuprofen (ADVIL/MOTRIN) 600 MG tablet Take 600 mg by mouth      meloxicam (MOBIC) 15 MG tablet Take 1 tablet (15 mg) by mouth daily 30 tablet 11    Multiple Vitamins-Minerals (MULTIVITAL PO) Take 1 tablet by mouth daily Reported on 3/22/2017      olopatadine (PATADAY) 0.2 % ophthalmic solution 0.05 mLs (1 drop) daily      omeprazole (PRILOSEC) 20 MG DR capsule Take 1 capsule (20 mg) by mouth daily 90 capsule 3    ORDER FOR ALLERGEN IMMUNOTHERAPY Name of Mix: Mix #1  Mixed Vespid  Mixed Vespid Venom 300 mcg/mL HS 13 ml  Diluent: HSA qs to 13ml 13 mL PRN    ORDER FOR ALLERGEN IMMUNOTHERAPY Name of Mix: Mix #2  Wasp  Wasp Venom 100 mcg/mL HS 13 ml  Diluent: HSA qs to 13ml 13 mL PRN    psyllium (METAMUCIL/KONSYL) Packet Take 1 packet by mouth daily      temazepam (RESTORIL) 7.5 MG capsule Take 1 capsule (7.5 mg) by mouth nightly as needed for sleep DUE for April f/u, please  "schedule 30 capsule 0    EPINEPHrine (ANY BX GENERIC EQUIV) 0.3 MG/0.3ML injection 2-pack Inject 0.3 mLs (0.3 mg) into the muscle as needed for anaphylaxis (Patient not taking: Reported on 4/5/2023) 0.6 mL 1    EPINEPHrine (AUVI-Q) 0.3 MG/0.3ML injection 2-pack Inject 0.3 mLs (0.3 mg) into the muscle as needed for anaphylaxis (Patient not taking: Reported on 4/5/2023) 4 each 0       ALLERGIES     Allergies   Allergen Reactions    Anesthetic Ether     Bee Venom     Demerol Visual Disturbance    Statin [Hmg-Coa-R Inhibitors] Other (See Comments)     Muscle pain       PAST MEDICAL, SURGICAL, FAMILY, SOCIAL HISTORY:  History was reviewed and updated as needed, see medical record.    Review of Systems:  A 12-point review of systems was completed, see medical record for detailed review of systems information.    Physical Exam:  Vitals: /76 (BP Location: Right arm, Patient Position: Sitting, Cuff Size: Adult Large)   Pulse 77   Ht 1.791 m (5' 10.5\")   Wt 100.1 kg (220 lb 9.6 oz)   SpO2 99%   BMI 31.21 kg/m      Constitutional:           Skin:           Head:           Eyes:           ENT:           Neck:           Chest:           Cardiac:                    Abdomen:           Vascular:                                        Extremities and Back:           Neurological:           ASSESSMENT: stable.       RECOMMENDATIONS:   Intolerant of statin cannot afford ezetamibe or PCSK 9.   Will hold off on testing at this time      Recent Lab Results:  LIPID RESULTS:  Lab Results   Component Value Date    CHOL 207 (H) 08/11/2022    CHOL 224 (H) 08/20/2020    HDL 43 08/11/2022    HDL 48 08/20/2020     (H) 08/11/2022     (H) 08/20/2020    TRIG 163 (H) 08/11/2022    TRIG 199 (H) 08/20/2020    CHOLHDLRATIO 5.0 07/11/2014       LIVER ENZYME RESULTS:  Lab Results   Component Value Date    AST 26 08/11/2022    AST 41 06/02/2021    ALT 32 08/11/2022    ALT 62 06/02/2021       CBC RESULTS:  Lab Results   Component " Value Date    WBC 5.6 01/16/2020    RBC 4.54 01/16/2020    HGB 16.7 01/16/2020    HCT 47.9 01/16/2020     (H) 01/16/2020    MCH 36.8 (H) 01/16/2020    MCHC 34.9 01/16/2020    RDW 12.3 01/16/2020     01/16/2020       BMP RESULTS:  Lab Results   Component Value Date     08/11/2022     06/02/2021    POTASSIUM 4.3 08/11/2022    POTASSIUM 4.2 06/02/2021    CHLORIDE 109 08/11/2022    CHLORIDE 105 06/02/2021    CO2 28 08/11/2022    CO2 28 06/02/2021    ANIONGAP 4 08/11/2022    ANIONGAP 4 06/02/2021    GLC 86 08/11/2022    GLC 86 06/02/2021    BUN 13 08/11/2022    BUN 16 06/02/2021    CR 1.14 08/11/2022    CR 1.30 (H) 06/02/2021    GFRESTIMATED 70 08/11/2022    GFRESTIMATED 56 (L) 06/02/2021    GFRESTBLACK 65 06/02/2021    PEYTON 9.1 08/11/2022    PEYTON 9.5 06/02/2021        A1C RESULTS:  Lab Results   Component Value Date    A1C 5.1 09/26/2017       INR RESULTS:  Lab Results   Component Value Date    INR 2.3 06/11/2010    INR 2.8 05/28/2010    INR 2.61 (H) 04/23/2010    INR 1.70 (H) 04/22/2010       We greatly appreciate the opportunity to be involved in the care of your patient, Jose Angel Cha.    Sincerely,  Sherman Gtz MD      CC  No referring provider defined for this encounter.

## 2023-04-05 NOTE — PROGRESS NOTES
HISTORY OF PRESENT ILLNESS:  One episode of chest tightness/left arm discomfort 10 minutes. Nothing since.Zetia very expensive, wants to stop.    Orders this Visit:  No orders of the defined types were placed in this encounter.    No orders of the defined types were placed in this encounter.    There are no discontinued medications.    No diagnosis found.    CURRENT MEDICATIONS:  Current Outpatient Medications   Medication Sig Dispense Refill     amLODIPine (NORVASC) 2.5 MG tablet Take 1 tablet (2.5 mg) by mouth daily 90 tablet 3     ASPIRIN 81 MG OR TABS ONE DAILY 0 0     CALCIUM PO        ezetimibe (ZETIA) 10 MG tablet Take 1 tablet (10 mg) by mouth daily 90 tablet 3     fluocinonide (LIDEX) 0.05 % external solution Apply topically At Bedtime 60 mL 1     fluticasone (FLONASE) 50 MCG/ACT nasal spray USE 2 SPRAY(S) IN THE AFFECTED NOSTRIL ONCE DAILY       ibuprofen (ADVIL/MOTRIN) 600 MG tablet Take 600 mg by mouth       meloxicam (MOBIC) 15 MG tablet Take 1 tablet (15 mg) by mouth daily 30 tablet 11     Multiple Vitamins-Minerals (MULTIVITAL PO) Take 1 tablet by mouth daily Reported on 3/22/2017       olopatadine (PATADAY) 0.2 % ophthalmic solution 0.05 mLs (1 drop) daily       omeprazole (PRILOSEC) 20 MG DR capsule Take 1 capsule (20 mg) by mouth daily 90 capsule 3     ORDER FOR ALLERGEN IMMUNOTHERAPY Name of Mix: Mix #1  Mixed Vespid  Mixed Vespid Venom 300 mcg/mL HS 13 ml  Diluent: HSA qs to 13ml 13 mL PRN     ORDER FOR ALLERGEN IMMUNOTHERAPY Name of Mix: Mix #2  Wasp  Wasp Venom 100 mcg/mL HS 13 ml  Diluent: HSA qs to 13ml 13 mL PRN     psyllium (METAMUCIL/KONSYL) Packet Take 1 packet by mouth daily       temazepam (RESTORIL) 7.5 MG capsule Take 1 capsule (7.5 mg) by mouth nightly as needed for sleep DUE for April f/u, please schedule 30 capsule 0     EPINEPHrine (ANY BX GENERIC EQUIV) 0.3 MG/0.3ML injection 2-pack Inject 0.3 mLs (0.3 mg) into the muscle as needed for anaphylaxis (Patient not taking: Reported on  "4/5/2023) 0.6 mL 1     EPINEPHrine (AUVI-Q) 0.3 MG/0.3ML injection 2-pack Inject 0.3 mLs (0.3 mg) into the muscle as needed for anaphylaxis (Patient not taking: Reported on 4/5/2023) 4 each 0       ALLERGIES     Allergies   Allergen Reactions     Anesthetic Ether      Bee Venom      Demerol Visual Disturbance     Statin [Hmg-Coa-R Inhibitors] Other (See Comments)     Muscle pain       PAST MEDICAL, SURGICAL, FAMILY, SOCIAL HISTORY:  History was reviewed and updated as needed, see medical record.    Review of Systems:  A 12-point review of systems was completed, see medical record for detailed review of systems information.    Physical Exam:  Vitals: /76 (BP Location: Right arm, Patient Position: Sitting, Cuff Size: Adult Large)   Pulse 77   Ht 1.791 m (5' 10.5\")   Wt 100.1 kg (220 lb 9.6 oz)   SpO2 99%   BMI 31.21 kg/m      Constitutional:           Skin:           Head:           Eyes:           ENT:           Neck:           Chest:           Cardiac:                    Abdomen:           Vascular:                                        Extremities and Back:           Neurological:           ASSESSMENT: stable.       RECOMMENDATIONS:   Intolerant of statin cannot afford ezetamibe or PCSK 9.   Will hold off on testing at this time      Recent Lab Results:  LIPID RESULTS:  Lab Results   Component Value Date    CHOL 207 (H) 08/11/2022    CHOL 224 (H) 08/20/2020    HDL 43 08/11/2022    HDL 48 08/20/2020     (H) 08/11/2022     (H) 08/20/2020    TRIG 163 (H) 08/11/2022    TRIG 199 (H) 08/20/2020    CHOLHDLRATIO 5.0 07/11/2014       LIVER ENZYME RESULTS:  Lab Results   Component Value Date    AST 26 08/11/2022    AST 41 06/02/2021    ALT 32 08/11/2022    ALT 62 06/02/2021       CBC RESULTS:  Lab Results   Component Value Date    WBC 5.6 01/16/2020    RBC 4.54 01/16/2020    HGB 16.7 01/16/2020    HCT 47.9 01/16/2020     (H) 01/16/2020    MCH 36.8 (H) 01/16/2020    MCHC 34.9 01/16/2020    RDW " 12.3 01/16/2020     01/16/2020       BMP RESULTS:  Lab Results   Component Value Date     08/11/2022     06/02/2021    POTASSIUM 4.3 08/11/2022    POTASSIUM 4.2 06/02/2021    CHLORIDE 109 08/11/2022    CHLORIDE 105 06/02/2021    CO2 28 08/11/2022    CO2 28 06/02/2021    ANIONGAP 4 08/11/2022    ANIONGAP 4 06/02/2021    GLC 86 08/11/2022    GLC 86 06/02/2021    BUN 13 08/11/2022    BUN 16 06/02/2021    CR 1.14 08/11/2022    CR 1.30 (H) 06/02/2021    GFRESTIMATED 70 08/11/2022    GFRESTIMATED 56 (L) 06/02/2021    GFRESTBLACK 65 06/02/2021    PEYTON 9.1 08/11/2022    PEYTON 9.5 06/02/2021        A1C RESULTS:  Lab Results   Component Value Date    A1C 5.1 09/26/2017       INR RESULTS:  Lab Results   Component Value Date    INR 2.3 06/11/2010    INR 2.8 05/28/2010    INR 2.61 (H) 04/23/2010    INR 1.70 (H) 04/22/2010       We greatly appreciate the opportunity to be involved in the care of your patient, Jose Angel Cha.    Sincerely,  Sherman Gtz MD      CC  No referring provider defined for this encounter.

## 2023-06-01 ENCOUNTER — VIRTUAL VISIT (OUTPATIENT)
Dept: FAMILY MEDICINE | Facility: OTHER | Age: 68
End: 2023-06-01
Payer: MEDICARE

## 2023-06-01 ENCOUNTER — TELEPHONE (OUTPATIENT)
Dept: FAMILY MEDICINE | Facility: OTHER | Age: 68
End: 2023-06-01

## 2023-06-01 DIAGNOSIS — F51.01 PRIMARY INSOMNIA: ICD-10-CM

## 2023-06-01 DIAGNOSIS — M16.11 PRIMARY OSTEOARTHRITIS OF RIGHT HIP: Primary | ICD-10-CM

## 2023-06-01 DIAGNOSIS — I10 BENIGN ESSENTIAL HYPERTENSION: ICD-10-CM

## 2023-06-01 DIAGNOSIS — F51.01 PRIMARY INSOMNIA: Primary | ICD-10-CM

## 2023-06-01 DIAGNOSIS — K64.4 EXTERNAL HEMORRHOIDS: ICD-10-CM

## 2023-06-01 DIAGNOSIS — K22.70 BARRETT'S ESOPHAGUS WITHOUT DYSPLASIA: ICD-10-CM

## 2023-06-01 PROCEDURE — 99214 OFFICE O/P EST MOD 30 MIN: CPT | Mod: VID | Performed by: FAMILY MEDICINE

## 2023-06-01 RX ORDER — AMLODIPINE BESYLATE 2.5 MG/1
2.5 TABLET ORAL DAILY
Qty: 90 TABLET | Refills: 3 | Status: SHIPPED | OUTPATIENT
Start: 2023-06-01 | End: 2023-08-30

## 2023-06-01 RX ORDER — TEMAZEPAM 7.5 MG/1
7.5 CAPSULE ORAL
Qty: 30 CAPSULE | Refills: 0 | Status: SHIPPED | OUTPATIENT
Start: 2023-06-01 | End: 2023-06-14

## 2023-06-01 RX ORDER — LIDOCAINE 50 MG/G
1 PATCH TOPICAL EVERY 24 HOURS
Qty: 12 PATCH | Refills: 3 | Status: SHIPPED | OUTPATIENT
Start: 2023-06-01 | End: 2023-08-22

## 2023-06-01 ASSESSMENT — ANXIETY QUESTIONNAIRES
GAD7 TOTAL SCORE: 9
1. FEELING NERVOUS, ANXIOUS, OR ON EDGE: NEARLY EVERY DAY
GAD7 TOTAL SCORE: 9
7. FEELING AFRAID AS IF SOMETHING AWFUL MIGHT HAPPEN: NOT AT ALL
6. BECOMING EASILY ANNOYED OR IRRITABLE: NEARLY EVERY DAY
5. BEING SO RESTLESS THAT IT IS HARD TO SIT STILL: NOT AT ALL
3. WORRYING TOO MUCH ABOUT DIFFERENT THINGS: NOT AT ALL
2. NOT BEING ABLE TO STOP OR CONTROL WORRYING: NOT AT ALL
8. IF YOU CHECKED OFF ANY PROBLEMS, HOW DIFFICULT HAVE THESE MADE IT FOR YOU TO DO YOUR WORK, TAKE CARE OF THINGS AT HOME, OR GET ALONG WITH OTHER PEOPLE?: SOMEWHAT DIFFICULT
7. FEELING AFRAID AS IF SOMETHING AWFUL MIGHT HAPPEN: NOT AT ALL
IF YOU CHECKED OFF ANY PROBLEMS ON THIS QUESTIONNAIRE, HOW DIFFICULT HAVE THESE PROBLEMS MADE IT FOR YOU TO DO YOUR WORK, TAKE CARE OF THINGS AT HOME, OR GET ALONG WITH OTHER PEOPLE: SOMEWHAT DIFFICULT
4. TROUBLE RELAXING: NEARLY EVERY DAY

## 2023-06-01 NOTE — PROGRESS NOTES
Davi is a 68 year old who is being evaluated via a billable video visit.      How would you like to obtain your AVS? MyChart  If the video visit is dropped, the invitation should be resent by: Text to cell phone: [phone]  Will anyone else be joining your video visit? No        Assessment & Plan       ICD-10-CM    1. Primary osteoarthritis of right hip  M16.11 lidocaine (LIDODERM) 5 % patch      2. Primary insomnia  F51.01 temazepam (RESTORIL) 7.5 MG capsule      3. Benign essential hypertension  I10 amLODIPine (NORVASC) 2.5 MG tablet      4. Cardenas's esophagus without dysplasia  K22.70 omeprazole (PRILOSEC) 20 MG DR capsule      5. External hemorrhoids  K64.4 Adult General Surg Referral        Having a lot of stress which is impacting mood and pain. He has plans to change the stressors in his life and is expecting improvement. So will renew meds at current plan, but advised to monitor for bp or mood changes and we can adjust as needed.  Reviewed risks/benefits/alternatives for sleep and this is a high risk medication. Discussed with emphasis on falls and injury why he should not be taking this. He has occasional use for years and finds he can get into bad sleep patterns in which taking only 1/2 tab corrects this and acknowledges the risk, but would like it available in case of need. Agreed to this.  Lastly, he has been working with ortho and if his hip is having increasing pain despite stretching exercises and mobic, we can try the lidocaine patches. Otherwise he will need to return to ortho to discuss possible injections  Also he mentions hemorrhoids just before he leaves - these are long standing and more irritating than anything. He had some banding done years ago and would like to consider again. Discussed risks of worsening others with time given the movement of fluid. But he is maximizing conserative management and is well controlled on stools and pressure, so is reasonable to consult and discuss with  "surgeon.      30 minutes spent by me on the date of the encounter doing chart review, history and exam, documentation and further activities per the note       BMI:   Estimated body mass index is 31.21 kg/m  as calculated from the following:    Height as of 4/5/23: 1.791 m (5' 10.5\").    Weight as of 4/5/23: 100.1 kg (220 lb 9.6 oz).   Weight management plan: Discussed healthy diet and exercise guidelines        Shari Paredes MD, MD  [facility] is a 68 year old, presenting for the following health issues:  Refill Request (temazepam)        1/2/2023     9:33 AM   Additional Questions   Roomed by Laurence SANDERS CMA     History of Present Illness       Reason for visit:  Sleep quality n hip pain  Symptom onset:  More than a month  Symptom intensity:  Severe  Symptom progression:  Worsening  Had these symptoms before:  Yes  Has tried/received treatment for these symptoms:  Yes  Previous treatment was successful:  No  What makes it worse:  Fatigue  What makes it better:  No    He eats 4 or more servings of fruits and vegetables daily.He consumes 2 sweetened beverage(s) daily.He exercises with enough effort to increase his heart rate 9 or less minutes per day.  He exercises with enough effort to increase his heart rate 3 or less days per week.   He is taking medications regularly.  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  Has been feeling a little stress and anger toward these events.    Has found stress exacerbates pain and he has had some difficulty. Though stops his 1.5 FTE position on 6/11 and is hoping to get by until then. Has 2 temazepam monthly.    HEmorrrhoids and worsening sizes. Had banded in past. Would like to visit about this again  Thinking it is time to be taking walks.       Review of Systems   Constitutional, HEENT, cardiovascular, pulmonary, GI, , musculoskeletal, neuro, skin, endocrine and psych systems " are negative, except as otherwise noted.      Objective           Vitals:  No vitals were obtained today due to virtual visit.    Physical Exam   GENERAL: Healthy, alert and no distress  EYES: Eyes grossly normal to inspection.  No discharge or erythema, or obvious scleral/conjunctival abnormalities.  RESP: No audible wheeze, cough, or visible cyanosis.  No visible retractions or increased work of breathing.    SKIN: Visible skin clear. No significant rash, abnormal pigmentation or lesions.  NEURO: Cranial nerves grossly intact.  Mentation and speech appropriate for age.  PSYCH: Mentation appears normal, affect normal/bright, judgement and insight intact, normal speech and appearance well-groomed.                Video-Visit Details    Type of service:  Video Visit   Video Start Time: 10:22  Video End Time:10:47 AM    Originating Location (pt. Location): Home    Distant Location (provider location):  On-site  Platform used for Video Visit: [employer]

## 2023-06-01 NOTE — TELEPHONE ENCOUNTER
lidocaine (LIDODERM) 5 % patch      Prior Authorization Retail Medication Request    Medication/Dose: lidocaine (LIDODERM) 5 % patch  ICD code (if different than what is on RX):    Previously Tried and Failed:    Rationale:      Insurance Name:    Insurance ID:        Pharmacy Information (if different than what is on RX)  Name:    Phone:

## 2023-06-01 NOTE — TELEPHONE ENCOUNTER
temazepam (RESTORIL) 7.5 MG capsule    Prior Authorization Retail Medication Request    Medication/Dose: temazepam (RESTORIL) 7.5 MG capsule  ICD code (if different than what is on RX):    Previously Tried and Failed:    Rationale:      Insurance Name:    Insurance ID:        Pharmacy Information (if different than what is on RX)  Name:    Phone:

## 2023-06-05 ASSESSMENT — HOOS JR
SITTING: SEVERE
GOING UP OR DOWN STAIRS: EXTREME
BENDING TO THE FLOOR TO PICK UP OBJECT: MODERATE
WALKING ON UNEVEN SURFACE: MODERATE
LYING IN BED (TURNING OVER, MAINTAINING HIP POSITION): EXTREME
HOOS JR TOTAL INTERVAL SCORE: 32.74
RISING FROM SITTING: SEVERE

## 2023-06-06 ENCOUNTER — OFFICE VISIT (OUTPATIENT)
Dept: ORTHOPEDICS | Facility: CLINIC | Age: 68
End: 2023-06-06
Payer: MEDICARE

## 2023-06-06 VITALS
DIASTOLIC BLOOD PRESSURE: 85 MMHG | RESPIRATION RATE: 18 BRPM | WEIGHT: 220 LBS | HEIGHT: 71 IN | SYSTOLIC BLOOD PRESSURE: 133 MMHG | BODY MASS INDEX: 30.8 KG/M2 | HEART RATE: 91 BPM

## 2023-06-06 DIAGNOSIS — M70.61 GREATER TROCHANTERIC BURSITIS OF BOTH HIPS: Primary | ICD-10-CM

## 2023-06-06 DIAGNOSIS — M70.62 GREATER TROCHANTERIC BURSITIS OF BOTH HIPS: Primary | ICD-10-CM

## 2023-06-06 PROCEDURE — 99213 OFFICE O/P EST LOW 20 MIN: CPT | Performed by: ORTHOPAEDIC SURGERY
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
## 2023-06-07 NOTE — TELEPHONE ENCOUNTER
Central Prior Authorization Team   Phone: [phone]      PA Initiation    Medication: LIDOCAINE 5 % EX PTCH  Insurance Company: [employer] - Phone [phone] Fax [phone]  Pharmacy Filling the Rx: [facility] [address] [place], [place] - [address] [facility]  Filling Pharmacy Phone: [phone]  Filling Pharmacy Fax:    Start Date: 6/7/2023

## 2023-06-07 NOTE — TELEPHONE ENCOUNTER
Central Prior Authorization Team   Phone: [phone]      PA Initiation    Medication: TEMAZEPAM 7.5 MG PO CAPS  Insurance Company: [employer] - Phone [phone] Fax [phone]  Pharmacy Filling the Rx: [facility] [address] [place], [place] - [address] [facility]  Filling Pharmacy Phone: [phone]  Filling Pharmacy Fax:    Start Date: 6/7/2023

## 2023-06-08 NOTE — TELEPHONE ENCOUNTER
PRIOR AUTHORIZATION DENIED    Medication: LIDOCAINE 5 % EX PTCH  Insurance Company: [employer] - Phone [phone] Fax [phone]  Denial Date: 6/7/2023  Denial Rational:           Appeal Information:         Patient Notified: No

## 2023-06-08 NOTE — TELEPHONE ENCOUNTER
Called patient and informed him of denial.  Patient wanted Mychart message sent as well since he is driving.  Mychart message sent.

## 2023-06-10 NOTE — TELEPHONE ENCOUNTER
PRIOR AUTHORIZATION DENIED    Medication: TEMAZEPAM 7.5 MG PO CAPS  Insurance Company: [employer] - Phone [phone] Fax [phone]  Denial Date: 6/7/2023  Denial Rational:           Appeal Information:         Patient Notified: No

## 2023-06-12 RX ORDER — DOXEPIN HYDROCHLORIDE 10 MG/1
10 CAPSULE ORAL AT BEDTIME
Qty: 30 CAPSULE | Refills: 0 | Status: SHIPPED | OUTPATIENT
Start: 2023-06-12 | End: 2024-07-31

## 2023-06-12 NOTE — PROGRESS NOTES
Jose Angel Cha  :  1955  DOS: 2023  MRN: [id number]    Sports Medicine Clinic Procedure    Ultrasound Guided Bilateral Greater Trochanteric Bursa Injection    Clinical History:  Bilateral GTPS, failed conservative treatment.  Discussed his symptoms and he feels that the right side is way worse and would like to address it with a corticosteroid injection. The left side is not as bad and he would like to defer on the left injection for now and monitor symptoms.     Diagnosis:   1. Greater trochanteric pain syndrome of both lower extremities       Referring Physician: Dr. Jaycob Ross.    Technique: The risks of the procedure were explained to the patient.  A consent was signed for the greater trochanter injection.  The patient was evaluated with a Trello P9 ultrasound machine using a 12 MHz linear probe.     The right lateral hip was prepped and draped in a sterile manner.  Ultrasound identification of the greater trochanter, gluteus minimus, gluteus medius, iliotibial band and gluteus aminta muscles in both long and short axis.  The location of the femoral vessels were noted and marked.  The probe was placed in short axis to the right side greater trochanter.  A 3.5 inch 25 gauge needle was placed under ultrasound guidance into the greater trochanteric bursa.  A mixture of 1 ml's 1% lidocaine, 1 ml's 0.5% marcaine, and 1 ml kenalog (40mg/ml) was injected without difficulty.  The needle was removed and there was good hemostasis without complications.  There was ultrasound documentation of needle placement and injection.     Large Joint Injection/Arthocentesis: R greater trochanteric bursa    Date/Time: 2023 9:40 AM    Performed by: Cj Saldaña DO  Authorized by: Cj Saldaña DO    Indications:  Pain  Needle Size:  25 G  Guidance: ultrasound    Approach:  Lateral  Location:  Hip      Site:  R greater trochanteric bursa  Medications:  40 mg triamcinolone 40 MG/ML; 1 mL lidocaine 1 %; 1 mL  bupivacaine 0.5 %  Outcome:  Tolerated well, no immediate complications  Procedure discussed: discussed risks, benefits, and alternatives    Consent Given by:  Patient  Timeout: timeout called immediately prior to procedure    Prep: patient was prepped and draped in usual sterile fashion          Impression:  Successful ultrasound guided Right greater trochanteric bursa corticosteroid injection.    Plan:  - Injection:    - Expectations and goals of the injection were discussed and verbal and written consent was obtained.  - Performed a corticosteroid injection of the right greater trochanteric bursa today in clinic. Patient tolerated the procedure well without complications.    - Post-procedure instructions:    - Keep the injection site clean and dry.   - Do not submerge the injection site for 24 hours (no baths, pools). Showers are ok.   - Rest the area for 24-48 hours before resuming normal activities. Avoid overexerting the area for the first few weeks.   - It may take 2-3 days to start noticing the effects of the injection and up to 3-4 weeks to feel significant benefits.   - Follow up:          - With Dr. Rsos as recommended for updates to treatment plan, or sooner for new/worsening symptoms.  - Patient has clinic contact information for questions or concerns.      Cj Saldaña DO, MARILEE  [facility]  [facility] - Department of Orthopedic Surgery     Disclaimer:  This note was prepared and written using Dragon Medical dictation software. As a result, there may be errors in the script that have gone undetected. Please consider this when interpreting the information in this note.

## 2023-06-13 NOTE — TELEPHONE ENCOUNTER
I definitely discussed risks/benefits, etc. But he has not tried doxepin that I can see. May need to try this first and then we can attempt PA if not successful  Shari Paredes MD

## 2023-06-14 ENCOUNTER — OFFICE VISIT (OUTPATIENT)
Dept: SURGERY | Facility: OTHER | Age: 68
End: 2023-06-14
Payer: MEDICARE

## 2023-06-14 VITALS
SYSTOLIC BLOOD PRESSURE: 116 MMHG | TEMPERATURE: 96.9 F | WEIGHT: 215 LBS | BODY MASS INDEX: 30.1 KG/M2 | HEIGHT: 71 IN | DIASTOLIC BLOOD PRESSURE: 72 MMHG

## 2023-06-14 DIAGNOSIS — K64.2 GRADE III INTERNAL HEMORRHOIDS: Primary | ICD-10-CM

## 2023-06-14 PROCEDURE — 46600 DIAGNOSTIC ANOSCOPY SPX: CPT | Performed by: SURGERY

## 2023-06-14 PROCEDURE — 99213 OFFICE O/P EST LOW 20 MIN: CPT | Mod: 25 | Performed by: SURGERY

## 2023-06-14 ASSESSMENT — PAIN SCALES - GENERAL: PAINLEVEL: NO PAIN (0)
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
## 2023-06-21 ENCOUNTER — OFFICE VISIT (OUTPATIENT)
Dept: ORTHOPEDICS | Facility: CLINIC | Age: 68
End: 2023-06-21
Payer: MEDICARE

## 2023-06-21 VITALS — BODY MASS INDEX: 30.1 KG/M2 | RESPIRATION RATE: 17 BRPM | WEIGHT: 215 LBS | HEIGHT: 71 IN

## 2023-06-21 DIAGNOSIS — M70.62 GREATER TROCHANTERIC BURSITIS OF BOTH HIPS: ICD-10-CM

## 2023-06-21 DIAGNOSIS — M25.551 GREATER TROCHANTERIC PAIN SYNDROME OF BOTH LOWER EXTREMITIES: Primary | ICD-10-CM

## 2023-06-21 DIAGNOSIS — M70.61 GREATER TROCHANTERIC BURSITIS OF BOTH HIPS: ICD-10-CM

## 2023-06-21 DIAGNOSIS — M25.552 GREATER TROCHANTERIC PAIN SYNDROME OF BOTH LOWER EXTREMITIES: Primary | ICD-10-CM

## 2023-06-21 PROCEDURE — 99207 PR DROP WITH A PROCEDURE: CPT | Performed by: STUDENT IN AN ORGANIZED HEALTH CARE EDUCATION/TRAINING PROGRAM

## 2023-06-21 PROCEDURE — 20611 DRAIN/INJ JOINT/BURSA W/US: CPT | Mod: RT | Performed by: STUDENT IN AN ORGANIZED HEALTH CARE EDUCATION/TRAINING PROGRAM

## 2023-06-21 RX ORDER — TRIAMCINOLONE ACETONIDE 40 MG/ML
40 INJECTION, SUSPENSION INTRA-ARTICULAR; INTRAMUSCULAR
Status: DISCONTINUED | OUTPATIENT
Start: 2023-06-21 | End: 2024-01-30

## 2023-06-21 RX ORDER — BUPIVACAINE HYDROCHLORIDE 5 MG/ML
1 INJECTION, SOLUTION PERINEURAL
Status: DISCONTINUED | OUTPATIENT
Start: 2023-06-21 | End: 2024-01-30

## 2023-06-21 RX ORDER — LIDOCAINE HYDROCHLORIDE 10 MG/ML
1 INJECTION, SOLUTION INFILTRATION; PERINEURAL
Status: DISCONTINUED | OUTPATIENT
Start: 2023-06-21 | End: 2024-01-30

## 2023-06-21 RX ADMIN — LIDOCAINE HYDROCHLORIDE 1 ML: 10 INJECTION, SOLUTION INFILTRATION; PERINEURAL at 09:40

## 2023-06-21 RX ADMIN — BUPIVACAINE HYDROCHLORIDE 1 ML: 5 INJECTION, SOLUTION PERINEURAL at 09:40

## 2023-06-21 RX ADMIN — TRIAMCINOLONE ACETONIDE 40 MG: 40 INJECTION, SUSPENSION INTRA-ARTICULAR; INTRAMUSCULAR at 09:40

## 2023-06-21 ASSESSMENT — PAIN SCALES - GENERAL: PAINLEVEL: MILD PAIN (3)

## 2023-06-21 NOTE — LETTER
2023         RE: Jose Angel Cha  [address]  [facility] [address]        Dear Colleague,    Thank you for referring your patient, Jose Angel Cha, to the [facility] [place]. Please see a copy of my visit note below.    Jose Angel Cha  :  1955  DOS: 2023  MRN: [id number]    Sports Medicine Clinic Procedure    Ultrasound Guided Bilateral Greater Trochanteric Bursa Injection    Clinical History:  Bilateral GTPS, failed conservative treatment.  Discussed his symptoms and he feels that the right side is way worse and would like to address it with a corticosteroid injection. The left side is not as bad and he would like to defer on the left injection for now and monitor symptoms.     Diagnosis:   1. Greater trochanteric pain syndrome of both lower extremities       Referring Physician: Dr. Jaycob Ross.    Technique: The risks of the procedure were explained to the patient.  A consent was signed for the greater trochanter injection.  The patient was evaluated with a [employer] P9 ultrasound machine using a 12 MHz linear probe.     The right lateral hip was prepped and draped in a sterile manner.  Ultrasound identification of the greater trochanter, gluteus minimus, gluteus medius, iliotibial band and gluteus aminta muscles in both long and short axis.  The location of the femoral vessels were noted and marked.  The probe was placed in short axis to the right side greater trochanter.  A 3.5 inch 25 gauge needle was placed under ultrasound guidance into the greater trochanteric bursa.  A mixture of 1 ml's 1% lidocaine, 1 ml's 0.5% marcaine, and 1 ml kenalog (40mg/ml) was injected without difficulty.  The needle was removed and there was good hemostasis without complications.  There was ultrasound documentation of needle placement and injection.     Large Joint Injection/Arthocentesis: R greater trochanteric bursa    Date/Time: 2023 9:40 AM    Performed by:  Cj Saldaña DO  Authorized by: Cj Saldaña DO    Indications:  Pain  Needle Size:  25 G  Guidance: ultrasound    Approach:  Lateral  Location:  Hip      Site:  R greater trochanteric bursa  Medications:  40 mg triamcinolone 40 MG/ML; 1 mL lidocaine 1 %; 1 mL bupivacaine 0.5 %  Outcome:  Tolerated well, no immediate complications  Procedure discussed: discussed risks, benefits, and alternatives    Consent Given by:  Patient  Timeout: timeout called immediately prior to procedure    Prep: patient was prepped and draped in usual sterile fashion          Impression:  Successful ultrasound guided Right greater trochanteric bursa corticosteroid injection.    Plan:  - Injection:    - Expectations and goals of the injection were discussed and verbal and written consent was obtained.  - Performed a corticosteroid injection of the right greater trochanteric bursa today in clinic. Patient tolerated the procedure well without complications.    - Post-procedure instructions:    - Keep the injection site clean and dry.   - Do not submerge the injection site for 24 hours (no baths, pools). Showers are ok.   - Rest the area for 24-48 hours before resuming normal activities. Avoid overexerting the area for the first few weeks.   - It may take 2-3 days to start noticing the effects of the injection and up to 3-4 weeks to feel significant benefits.   - Follow up:          - With Dr. Ross as recommended for updates to treatment plan, or sooner for new/worsening symptoms.  - Patient has clinic contact information for questions or concerns.      Cj Saldaña DO, CAQSM  [facility]  [facility] - Department of Orthopedic Surgery     Disclaimer:  This note was prepared and written using Dragon Medical dictation software. As a result, there may be errors in the script that have gone undetected. Please consider this when interpreting the information in this note.                  Again, thank you for allowing me to participate in the care of your patient.        Sincerely,        Cj Saldaña, DO

## 2023-06-28 ENCOUNTER — OFFICE VISIT (OUTPATIENT)
Dept: SURGERY | Facility: CLINIC | Age: 68
End: 2023-06-28
Payer: MEDICARE

## 2023-06-28 VITALS
TEMPERATURE: 98.4 F | HEIGHT: 71 IN | SYSTOLIC BLOOD PRESSURE: 126 MMHG | BODY MASS INDEX: 30.1 KG/M2 | DIASTOLIC BLOOD PRESSURE: 74 MMHG | WEIGHT: 215 LBS

## 2023-06-28 DIAGNOSIS — K64.2 GRADE III INTERNAL HEMORRHOIDS: Primary | ICD-10-CM

## 2023-06-28 PROCEDURE — 46221 LIGATION OF HEMORRHOID(S): CPT | Performed by: SURGERY

## 2023-06-28 ASSESSMENT — PAIN SCALES - GENERAL: PAINLEVEL: NO PAIN (0)

## 2023-06-28 NOTE — LETTER
6/28/2023         RE: Jose Angel Cha  [address]  [facility] [address]        Dear Colleague,    Thank you for referring your patient, Jose Angel Cha, to the [facility]. Please see a copy of my visit note below.    PROCEDURE: Internal hemorrhoid banding x2     Written consent was obtained    Patient was positioned in left lateral decubitus position.  Digital rectal exam was performed.  No masses or bleeding.  Luck speculum was used to evaluate the anal canal.  Small to medium size internal hemorrhoids located at the left lateral and right posterior positions.  Suction internal hemorrhoid banding device used to deploy band to left lateral and right anterior internal hemorrhoid columns.  Patient tolerated the procedure well without significant discomfort at the completion of the procedure.    Dr. Jerrod DO, FACS        Again, thank you for allowing me to participate in the care of your patient.        Sincerely,        Jose Angel Elder DO

## 2023-06-28 NOTE — PROGRESS NOTES
PROCEDURE: Internal hemorrhoid banding x2     Written consent was obtained    Patient was positioned in left lateral decubitus position.  Digital rectal exam was performed.  No masses or bleeding.  [place] speculum was used to evaluate the anal canal.  Small to medium size internal hemorrhoids located at the left lateral and right posterior positions.  Suction internal hemorrhoid banding device used to deploy band to left lateral and right anterior internal hemorrhoid columns.  Patient tolerated the procedure well without significant discomfort at the completion of the procedure.    Dr. Elder, DO, FACS

## 2023-06-29 NOTE — TELEPHONE ENCOUNTER
PRIOR AUTHORIZATION DENIED    Medication: Repatha 140mg/ mL Sureclick pens - DENIED    Denial Date: 8/19/2019    Denial Rationale: Praluent is preferred and no documentation of pt trying Praluent    Appeal Information: N/A - assume will change to Praluent due to insurance preference               Progress note - Palliative and Supportive Care   Kemar Summers 79 y.o. male [id number]    Patient Active Problem List   Diagnosis   • Obesity, unspecified obesity severity, unspecified obesity type   • Type 2 diabetes mellitus, without long-term current use of insulin ([facility])   • Hydrocele   • Chronic anticoagulation   • Acute on chronic anemia   • Paroxysmal atrial fibrillation (HCC)   • History of AVR   • PVD (peripheral vascular disease) ([facility])   • Thrombophlebitis   • History of ventricular tachycardia   • Edema   • Acute on chronic diastolic CHF   • Venous ulcer of right ankle   • Peripheral venous insufficiency   • Ventricular tachycardia   • Secondary lymphedema   • CKD (chronic kidney disease) stage 3, GFR 30-59 ml/min ([facility])   • Morbid obesity   • Benign prostatic hyperplasia with lower urinary tract symptoms   • Acute cholecystitis   • REMA (acute kidney injury) ([address])   • Cardiomegaly   • Chronic bilateral low back pain without sciatica   • Chronic venous hypertension with ulcer involving right side   • Dyslipidemia   • Endocarditis   • Hyperlipidemia   • Osteoarthritis, knee   • Renal cyst   • Status post right knee replacement   • Swelling of limb   • BPH (benign prostatic hyperplasia)   • History of venous thromboembolism   • Acute on chronic cholecystitis   • Cholecystostomy care [facility])   • Gout   • Calculus of gallbladder   • Extrahepatic biloma   • Risk for aspiration pneumonia ([address])   • Acute hypoxemic respiratory failure (HCC)   • COVID-19   • Pleural effusion on right   • Melena   • Pulmonary embolism ([facility])   • Acute on chronic right-sided congestive heart failure ([address])   • Fall   • Acidosis   • Hyponatremia   • Hypophosphatemia   • Prolonged QT interval   • Severe oropharyngeal dysphagia   • Acute low back pain   • Acute respiratory failure with hypoxia ([facility])   • Acute metabolic encephalopathy     Active issues specifically addressed today include:   Dysphagia, NG tube in place, VVS on Monday  Severe sepsis secondary to UTI  Acute on chronic low back pain  Heart failure preserved EF, EF 70%  REMA, currently requiring dialysis  Palliative care patient  Frailty in an elderly adult  Goals of care     Plan:  1. Symptom management -    - per critical  care  2. Goals -   -  Limits set of DNAR/DNI. -  WifeLevar is medical decision maker. Code Status: dnr dni  - Level 3   Decisional apparatus:  Patient is not competent on my exam today. If competence is lost, patient's substitute decision maker would default to wife by [place] Act [place]. Advance Directive / Living Will / POLST:        Interval history:       Last evening the patient had a acute change in respiratoru status, a rapid response was called and the patient was transferred to the ICU. Significant time spent with the patient and family discussing medical diagnosis as well as potential outcomes. The patient has significant amounts of respiratory secretions that he can not clear. Overall prognosis very poor. Medical updates provided to family by critical care team and nephrology. Time spent reviewing outcomes, goals of care.       MEDICATIONS / ALLERGIES:     current meds:   Current Facility-Administered Medications   Medication Dose Route Frequency   • glycopyrrolate (ROBINUL) injection 0.1 mg  0.1 mg Intravenous Q4H PRN   • haloperidol lactate (HALDOL) injection 0.5 mg  0.5 mg Intravenous Q2H PRN   • HYDROmorphone (DILAUDID) 50 mg in sodium chloride 0.9% 50mL drip  0.5 mg/hr Intravenous Continuous   • HYDROmorphone (DILAUDID) injection 0.5 mg  0.5 mg Intravenous Q2H PRN   • lidocaine (LMX) 4 % cream   Topical 4x Daily PRN   • LORazepam (ATIVAN) injection 1 mg  1 mg Intravenous Q10 Min PRN   • trimethobenzamide (TIGAN) IM injection 200 mg  200 mg Intramuscular Q6H PRN     Facility-Administered Medications Ordered in Other Encounters   Medication Dose Route Frequency   • sodium chloride 0.9 % infusion  75 mL/hr Intravenous Continuous Allergies   Allergen Reactions   • Tramadol Other (See Comments)     intolerance       OBJECTIVE:    Physical Exam  Physical Exam  Vitals and nursing note reviewed. Constitutional:       General: He is not in acute distress. Appearance: He is well-developed. Eyes:      General: Lids are normal.      Conjunctiva/sclera: Conjunctivae normal.   Cardiovascular:      Rate and Rhythm: Normal rate. Heart sounds: No murmur heard. Comments: Generalized edema  Pulmonary:      Effort: Pulmonary effort is normal. No respiratory distress. Breath sounds: Normal breath sounds. Abdominal:      Palpations: Abdomen is soft. Tenderness: There is no abdominal tenderness. Musculoskeletal:         General: No swelling. Cervical back: Neck supple. Comments: Generally weak   Skin:     General: Skin is cool and dry. Coloration: Skin is pale. Neurological:      General: No focal deficit present. Mental Status: He is disoriented. Psychiatric:         Attention and Perception: He is inattentive. Behavior: Behavior is slowed. Behavior is cooperative. Lab Results:   CBC:   Lab Results   Component Value Date    WBC 11.64 (H) 06/29/2023    HGB 8.1 (L) 06/29/2023    HCT 27.9 (L) 06/29/2023     (H) 06/29/2023     06/29/2023    RBC 2.63 (L) 06/29/2023    MCH 30.8 06/29/2023    MCHC 29.0 (L) 06/29/2023    RDW 20.1 (H) 06/29/2023    MPV 11.4 06/29/2023    NRBC 0 06/29/2023   , CMP:   Lab Results   Component Value Date    SODIUM 147 06/29/2023    K 5.5 (H) 06/29/2023     (H) 06/29/2023    CO2 32 06/29/2023    BUN 44 (H) 06/29/2023    CREATININE 1.87 (H) 06/29/2023    CALCIUM 8.8 06/29/2023    EGFR 31 06/29/2023   , PT/PTT:No results found for: "PT", "PTT"      Counseling / Coordination of Care    Total floor / unit time spent today 45+ minutes. Greater than 50% of total time was spent with the patient and / or family counseling and / or coordination of care. A description of the counseling / coordination of care: reveiew of goalc of care, support for family, review of symptoms, support for family, support for patient.

## 2023-07-12 ENCOUNTER — PATIENT OUTREACH (OUTPATIENT)
Dept: CARE COORDINATION | Facility: CLINIC | Age: 68
End: 2023-07-12
Payer: MEDICARE

## 2023-07-14 ENCOUNTER — TELEPHONE (OUTPATIENT)
Dept: SURGERY | Facility: CLINIC | Age: 68
End: 2023-07-14
Payer: MEDICARE

## 2023-07-14 NOTE — TELEPHONE ENCOUNTER
Patient calling today to report symptoms. Patient had an internal hemorrhoid banding with Dr. Elder in clinic on 6/28. Patient is calling today due to feeling prolapsed hemorrhoid. Patient concerned that bands are no longer present. Patient has no other symptoms at this time and reports that bowel movements have been better and more regular. Writing RN spoke with Dr. Elder regarding patient's symptoms. Per provider, bands typically only stay in place for 5-7 days. Patient notified that this is possibly a new hemorrhoid. Patient given the choice to return to clinic for another banding with Dr. Elder or we can place a referral for colorectal surgeons. Patient would like to see Dr. Elder for this. Patient scheduled and has no further questions or concerns.    Tali Mendiola RN on 7/14/2023 at 2:06 PM

## 2023-07-14 NOTE — TELEPHONE ENCOUNTER
Reason for Call:  Other call back    Detailed comments: Patient had internal hemorrhoid banding on 6/28/23 with  and patient thinks that the bands released and would like to know what he should do. Please call him at [phone]    Phone Number Patient can be reached at: Cell number on file:    Telephone Information:   Mobile [phone]       Best Time: any    Can we leave a detailed message on this number? YES    Call taken on 7/14/2023 at 12:07 PM by Romy aCldwell

## 2023-07-19 ENCOUNTER — OFFICE VISIT (OUTPATIENT)
Dept: SURGERY | Facility: CLINIC | Age: 68
End: 2023-07-19
Payer: MEDICARE

## 2023-07-19 VITALS
OXYGEN SATURATION: 96 % | BODY MASS INDEX: 29.71 KG/M2 | HEART RATE: 66 BPM | TEMPERATURE: 96.8 F | SYSTOLIC BLOOD PRESSURE: 124 MMHG | WEIGHT: 212.2 LBS | DIASTOLIC BLOOD PRESSURE: 70 MMHG | HEIGHT: 71 IN

## 2023-07-19 DIAGNOSIS — K64.2 GRADE III INTERNAL HEMORRHOIDS: Primary | ICD-10-CM

## 2023-07-19 PROCEDURE — 46221 LIGATION OF HEMORRHOID(S): CPT | Performed by: SURGERY

## 2023-07-19 ASSESSMENT — PAIN SCALES - GENERAL: PAINLEVEL: MODERATE PAIN (4)

## 2023-07-19 NOTE — PROGRESS NOTES
PROCEDURE: Internal hemorrhoid banding x2      Written consent was obtained    Patient was positioned in left lateral decubitus position. Digital rectal exam was performed. No masses or bleeding. [place] speculum was used to evaluate the anal canal. Small to medium size internal hemorrhoids located at the left anterior and right posterior positions. Suction internal hemorrhoid banding device used to deploy band to left anterior and right posterior internal hemorrhoid columns. Patient tolerated the procedure well without significant discomfort at the completion of the procedure.     Dr. Elder, DO, FACS

## 2023-07-19 NOTE — LETTER
7/19/2023         RE: Jose Angel Cha  [address]  [facility] [address]        Dear Colleague,    Thank you for referring your patient, Jose Angel Cha, to the [facility]. Please see a copy of my visit note below.    PROCEDURE: Internal hemorrhoid banding x2      Written consent was obtained    Patient was positioned in left lateral decubitus position. Digital rectal exam was performed. No masses or bleeding. [place] speculum was used to evaluate the anal canal. Small to medium size internal hemorrhoids located at the left anterior and right posterior positions. Suction internal hemorrhoid banding device used to deploy band to left anterior and right posterior internal hemorrhoid columns. Patient tolerated the procedure well without significant discomfort at the completion of the procedure.     Dr. Jerrod DO, FACS      Again, thank you for allowing me to participate in the care of your patient.        Sincerely,        Jose Angel Elder DO

## 2023-07-20 ENCOUNTER — TELEPHONE (OUTPATIENT)
Dept: SURGERY | Facility: CLINIC | Age: 68
End: 2023-07-20
Payer: MEDICARE

## 2023-07-20 DIAGNOSIS — K64.2 GRADE III INTERNAL HEMORRHOIDS: Primary | ICD-10-CM

## 2023-07-20 RX ORDER — OXYCODONE HYDROCHLORIDE 5 MG/1
5 TABLET ORAL EVERY 6 HOURS PRN
Qty: 12 TABLET | Refills: 0 | Status: CANCELLED | OUTPATIENT
Start: 2023-07-20 | End: 2023-07-23

## 2023-07-20 NOTE — TELEPHONE ENCOUNTER
Patient notified of below, patient will call to update writer tomorrow on status. Patient has no further questions or concerns.    Tali Mendiola, RN on 7/20/2023 at 2:37 PM              Jose Angel Elder, DO  You 4 minutes ago (1:51 PM)     RB  I would prefer to avoid narcotics due to the nature of the procedure as well as there shouldn't be that level of pain from hemorrhoid banding. If he's having ongoing issues with increased bleeding or worsening pain he would need to be evaluated. But some bleeding, like you said, is pretty normal.    Dr PATEL

## 2023-07-20 NOTE — TELEPHONE ENCOUNTER
Writer spoke with patient regarding below. Patient underwent internal hemorrhoid banding x 2 on 7/19 with Dr. Elder in clinic. Patient in concerned, due to having some bleeding after the procedure. Writing RN educated patient that it is not uncommon to have a small amount of bleeding 7-10 days after the procedure. Writing RN reminded patient to avoid heavy lifting and straining with bowel movements. Patient stated that bowel movements have been normal and he has not been straining. Patient denies fever or chills. Patient stated that he is having pain that he can feel in his legs. Patient has been using Tylenol. Patient is wondering if some pain medication may help for severe pain only and to use in addition to Tylenol. Writing nurse will send to provider to review and advise.    Tali Mendiola RN on 7/20/2023 at 1:24 PM

## 2023-07-20 NOTE — TELEPHONE ENCOUNTER
Reason for Call:  Other Bleeding after a procedure    Detailed comments: Davi had a procedure yesterday, he said he still has some bleeding and would like to discuss this with someone on Dr Montez team. Please call.    Phone Number Patient can be reached at:  [phone]    Best Time: Please call after 1 pm, he is in a meeting until 1:00    Can we leave a detailed message on this number? YES    Call taken on 7/20/2023 at 10:40 AM by Mariel Geller

## 2023-07-26 ENCOUNTER — PATIENT OUTREACH (OUTPATIENT)
Dept: CARE COORDINATION | Facility: CLINIC | Age: 68
End: 2023-07-26
Payer: MEDICARE

## 2023-08-21 ENCOUNTER — NURSE TRIAGE (OUTPATIENT)
Dept: FAMILY MEDICINE | Facility: OTHER | Age: 68
End: 2023-08-21
Payer: MEDICARE

## 2023-08-21 ENCOUNTER — MYC MEDICAL ADVICE (OUTPATIENT)
Dept: FAMILY MEDICINE | Facility: OTHER | Age: 68
End: 2023-08-21
Payer: MEDICARE
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
## 2023-08-21 NOTE — TELEPHONE ENCOUNTER
Patient was informed and scheduled.   Next 5 appointments (look out 90 days)      Aug 30, 2023 11:00 AM  (Arrive by 10:40 AM)  Provider Visit with Shari Paredes MD  [facility] ([facility] ) [address] [address]  [phone]     Oct 02, 2023  7:30 AM  (Arrive by 7:10 AM)  Annual Wellness Visit with Shari Paredes MD  [facility] ([facility] ) [address] [address]  [phone]

## 2023-08-21 NOTE — TELEPHONE ENCOUNTER
Chronic issue, so ok to be non-urgent. Though ok to use a same day as available.  Shari Paredes MD

## 2023-08-22 ENCOUNTER — OFFICE VISIT (OUTPATIENT)
Dept: GASTROENTEROLOGY | Facility: CLINIC | Age: 68
End: 2023-08-22
Payer: MEDICARE

## 2023-08-22 VITALS
BODY MASS INDEX: 29.54 KG/M2 | DIASTOLIC BLOOD PRESSURE: 74 MMHG | HEIGHT: 71 IN | WEIGHT: 211 LBS | SYSTOLIC BLOOD PRESSURE: 126 MMHG

## 2023-08-22 DIAGNOSIS — K22.70 BARRETT'S ESOPHAGUS WITHOUT DYSPLASIA: Primary | ICD-10-CM

## 2023-08-22 DIAGNOSIS — R09.A2 GLOBUS SENSATION: ICD-10-CM

## 2023-08-22 PROCEDURE — 99213 OFFICE O/P EST LOW 20 MIN: CPT | Performed by: INTERNAL MEDICINE

## 2023-08-22 RX ORDER — FAMOTIDINE 40 MG/1
40 TABLET, FILM COATED ORAL
Qty: 30 TABLET | Refills: 3 | Status: SHIPPED | OUTPATIENT
Start: 2023-08-22

## 2023-08-22 ASSESSMENT — PAIN SCALES - GENERAL: PAINLEVEL: MILD PAIN (3)

## 2023-08-22 NOTE — PATIENT INSTRUCTIONS
As we had discussed    1.  We will increase your Prilosec omeprazole to 20 mg Premeal by 30 to 60 minutes twice daily for a month or 2.  This should help with the symptoms    2.  If needed evening Pepcid can be added.  This works better with intermittent use as opposed to daily use    3.  No need for repeat EGD at this point.  Follow-up upper endoscopy due in 2025--2027.    As needed return

## 2023-08-22 NOTE — LETTER
8/22/2023         RE: Jose Angel Cha  [address]  [facility] [address]        Dear Colleague,    Thank you for referring your patient, Jose Angel Cha, to the [facility]. Please see a copy of my visit note below.    Gastroenterology    Return.  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  The biopsies were within normal limits and surveillance advised 3 to 5-year follow-up.  Interval 6914-3949.  Over the last several months he has had a sense of a lump in his throat when he is not swallowing.  There is no dysphagia.  At times it feels somewhat uncomfortable there is a tendency toward spasm.  Lately he has been taking omeprazole before bed.    Past medical history reviewed on epic    Medications reviewed on epic    Allergies reviewed on epic    Review of systems otherwise negative noncontributory.    On exam, no acute distress.  Blood pressure 126 x 74, pulse 66, BMI 29.85 head and neck unremarkable, cardiac S1-S2 without murmur lungs clear throughout abdomen soft nontender without organomegaly no lower extremity edema skin without rash.    EGD October 2022 irregular Z-line previous Toupet fundoplication with intact wrap.  Ultrashort segment Cardenas's, biology mild esophagitis nonspecific no intestinal metaplasia.    Impression: Globus sense.  Association with acid is felt to be 50%.  Reassurance regarding safety of proton pump inhibitors.  Discussed use of proton pump inhibitors and parietal cell physiology.    Plan: 1.  Omeprazole 20 mg 30 to 60 minutes pre-meal twice daily for 2 months, 2.  Add famotidine 40 mg q. evening as needed, 3.  No need to repeat endoscopy now.  Follow-up EGD for surveillance likely reasonable in 2027 with history of Cardensa's.  Discussed.  As needed return    Again, thank you for allowing me to participate in the care of your patient.        Sincerely,        Sherman Hilario MD

## 2023-08-22 NOTE — PROGRESS NOTES
Gastroenterology    Return.  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  The biopsies were within normal limits and surveillance advised 3 to 5-year follow-up.  Interval 5908-9672.  Over the last several months he has had a sense of a lump in his throat when he is not swallowing.  There is no dysphagia.  At times it feels somewhat uncomfortable there is a tendency toward spasm.  Lately he has been taking omeprazole before bed.    Past medical history reviewed on epic    Medications reviewed on epic    Allergies reviewed on epic    Review of systems otherwise negative noncontributory.    On exam, no acute distress.  Blood pressure 126 x 74, pulse 66, BMI 29.85 head and neck unremarkable, cardiac S1-S2 without murmur lungs clear throughout abdomen soft nontender without organomegaly no lower extremity edema skin without rash.    EGD October 2022 irregular Z-line previous Toupet fundoplication with intact wrap.  Ultrashort segment Cardenas's, biology mild esophagitis nonspecific no intestinal metaplasia.    Impression: Globus sense.  Association with acid is felt to be 50%.  Reassurance regarding safety of proton pump inhibitors.  Discussed use of proton pump inhibitors and parietal cell physiology.    Plan: 1.  Omeprazole 20 mg 30 to 60 minutes pre-meal twice daily for 2 months, 2.  Add famotidine 40 mg q. evening as needed, 3.  No need to repeat endoscopy now.  Follow-up EGD for surveillance likely reasonable in 2027 with history of Cardenas's.  Discussed.  As needed return

## 2023-08-30 ENCOUNTER — OFFICE VISIT (OUTPATIENT)
Dept: FAMILY MEDICINE | Facility: OTHER | Age: 68
End: 2023-08-30
Payer: MEDICARE

## 2023-08-30 ENCOUNTER — ANCILLARY PROCEDURE (OUTPATIENT)
Dept: GENERAL RADIOLOGY | Facility: OTHER | Age: 68
End: 2023-08-30
Attending: FAMILY MEDICINE
Payer: MEDICARE

## 2023-08-30 VITALS
DIASTOLIC BLOOD PRESSURE: 86 MMHG | HEIGHT: 70 IN | HEART RATE: 67 BPM | RESPIRATION RATE: 16 BRPM | BODY MASS INDEX: 30.49 KG/M2 | OXYGEN SATURATION: 99 % | SYSTOLIC BLOOD PRESSURE: 136 MMHG | TEMPERATURE: 96.9 F | WEIGHT: 213 LBS

## 2023-08-30 DIAGNOSIS — I48.0 PAROXYSMAL ATRIAL FIBRILLATION (H): ICD-10-CM

## 2023-08-30 DIAGNOSIS — Z00.00 ENCOUNTER FOR MEDICARE ANNUAL WELLNESS EXAM: Primary | ICD-10-CM

## 2023-08-30 DIAGNOSIS — R07.81 RIB PAIN: ICD-10-CM

## 2023-08-30 DIAGNOSIS — E78.2 MIXED HYPERLIPIDEMIA: ICD-10-CM

## 2023-08-30 DIAGNOSIS — Z23 NEED FOR TDAP VACCINATION: ICD-10-CM

## 2023-08-30 DIAGNOSIS — I25.10 CORONARY ARTERY DISEASE INVOLVING NATIVE CORONARY ARTERY OF NATIVE HEART WITHOUT ANGINA PECTORIS: ICD-10-CM

## 2023-08-30 DIAGNOSIS — E66.01 MORBID OBESITY (H): ICD-10-CM

## 2023-08-30 DIAGNOSIS — N52.9 VASCULOGENIC ERECTILE DYSFUNCTION, UNSPECIFIED VASCULOGENIC ERECTILE DYSFUNCTION TYPE: ICD-10-CM

## 2023-08-30 DIAGNOSIS — I10 BENIGN ESSENTIAL HYPERTENSION: ICD-10-CM

## 2023-08-30 LAB
ALBUMIN SERPL BCG-MCNC: 4.7 G/DL (ref 3.5–5.2)
ALP SERPL-CCNC: 94 U/L (ref 40–129)
ALT SERPL W P-5'-P-CCNC: 31 U/L (ref 0–70)
ANION GAP SERPL CALCULATED.3IONS-SCNC: 9 MMOL/L (ref 7–15)
AST SERPL W P-5'-P-CCNC: 33 U/L (ref 0–45)
BILIRUB SERPL-MCNC: 0.5 MG/DL
BUN SERPL-MCNC: 15.3 MG/DL (ref 8–23)
CALCIUM SERPL-MCNC: 9.8 MG/DL (ref 8.8–10.2)
CHLORIDE SERPL-SCNC: 103 MMOL/L (ref 98–107)
CHOLEST SERPL-MCNC: 284 MG/DL
CREAT SERPL-MCNC: 1.17 MG/DL (ref 0.67–1.17)
DEPRECATED HCO3 PLAS-SCNC: 27 MMOL/L (ref 22–29)
GFR SERPL CREATININE-BSD FRML MDRD: 67 ML/MIN/1.73M2
GLUCOSE SERPL-MCNC: 61 MG/DL (ref 70–99)
HDLC SERPL-MCNC: 48 MG/DL
LDLC SERPL CALC-MCNC: 200 MG/DL
NONHDLC SERPL-MCNC: 236 MG/DL
POTASSIUM SERPL-SCNC: 4.1 MMOL/L (ref 3.4–5.3)
PROT SERPL-MCNC: 7.2 G/DL (ref 6.4–8.3)
SODIUM SERPL-SCNC: 139 MMOL/L (ref 136–145)
TRIGL SERPL-MCNC: 180 MG/DL

## 2023-08-30 PROCEDURE — 90715 TDAP VACCINE 7 YRS/> IM: CPT | Performed by: FAMILY MEDICINE

## 2023-08-30 PROCEDURE — 71101 X-RAY EXAM UNILAT RIBS/CHEST: CPT | Mod: TC | Performed by: RADIOLOGY

## 2023-08-30 PROCEDURE — 90471 IMMUNIZATION ADMIN: CPT | Performed by: FAMILY MEDICINE

## 2023-08-30 PROCEDURE — 99214 OFFICE O/P EST MOD 30 MIN: CPT | Mod: 25 | Performed by: FAMILY MEDICINE

## 2023-08-30 PROCEDURE — G0439 PPPS, SUBSEQ VISIT: HCPCS | Performed by: FAMILY MEDICINE

## 2023-08-30 PROCEDURE — 36415 COLL VENOUS BLD VENIPUNCTURE: CPT | Performed by: FAMILY MEDICINE

## 2023-08-30 PROCEDURE — 80061 LIPID PANEL: CPT | Performed by: FAMILY MEDICINE

## 2023-08-30 PROCEDURE — 80053 COMPREHEN METABOLIC PANEL: CPT | Performed by: FAMILY MEDICINE

## 2023-08-30 RX ORDER — SILDENAFIL 25 MG/1
25 TABLET, FILM COATED ORAL DAILY PRN
Qty: 30 TABLET | Refills: 1 | Status: SHIPPED | OUTPATIENT
Start: 2023-08-30 | End: 2024-09-10

## 2023-08-30 RX ORDER — AMLODIPINE BESYLATE 2.5 MG/1
2.5 TABLET ORAL DAILY
Qty: 90 TABLET | Refills: 3 | Status: SHIPPED | OUTPATIENT
Start: 2023-08-30 | End: 2024-09-03

## 2023-08-30 ASSESSMENT — ENCOUNTER SYMPTOMS
SHORTNESS OF BREATH: 1
PARESTHESIAS: 0
DIZZINESS: 1
HEARTBURN: 1
EYE PAIN: 0
HEMATOCHEZIA: 0
MYALGIAS: 1
DYSURIA: 0
COUGH: 0
FREQUENCY: 0
HEMATURIA: 0
JOINT SWELLING: 1
PALPITATIONS: 0
CONSTIPATION: 0
NAUSEA: 0
ABDOMINAL PAIN: 0
NERVOUS/ANXIOUS: 0
ARTHRALGIAS: 1
SORE THROAT: 0
DIARRHEA: 0
WEAKNESS: 1
CHILLS: 0
HEADACHES: 1
FEVER: 0

## 2023-08-30 ASSESSMENT — PAIN SCALES - GENERAL: PAINLEVEL: MODERATE PAIN (4)

## 2023-08-30 ASSESSMENT — ACTIVITIES OF DAILY LIVING (ADL): CURRENT_FUNCTION: NO ASSISTANCE NEEDED

## 2023-08-30 NOTE — PATIENT INSTRUCTIONS
Patient Education   Personalized Prevention Plan  You are due for the preventive services outlined below.  Your care team is available to assist you in scheduling these services.  If you have already completed any of these items, please share that information with your care team to update in your medical record.  Health Maintenance Due   Topic Date Due     COVID-19 Vaccine (6 - Pfizer series) 02/14/2023     Diptheria Tetanus Pertussis (DTAP/TDAP/TD) Vaccine (2 - Td or Tdap) 06/20/2023     Basic Metabolic Panel  08/11/2023     Comprehensive Metabolic Panel  08/11/2023     Cholesterol Lab  08/11/2023     ANNUAL REVIEW OF HM ORDERS  08/11/2023     Annual Wellness Visit  08/11/2023     EGD  09/27/2023     Your Health Risk Assessment indicates you feel you are not in good health    A healthy lifestyle helps keep the body fit and the mind alert. It helps protect you from disease, helps you fight disease, and helps prevent chronic disease (disease that doesn't go away) from getting worse. This is important as you get older and begin to notice twinges in muscles and joints and a decline in the strength and stamina you once took for granted. A healthy lifestyle includes good healthcare, good nutrition, weight control, recreation, and regular exercise. Avoid harmful substances and do what you can to keep safe. Another part of a healthy lifestyle is stay mentally active and socially involved.    Good healthcare   Have a wellness visit every year.   If you have new symptoms, let us know right away. Don't wait until the next checkup.   Take medicines exactly as prescribed and keep your medicines in a safe place. Tell us if your medicine causes problems.   Healthy diet and weight control   Eat 3 or 4 small, nutritious, low-fat, high-fiber meals a day. Include a variety of fruits, vegetables, and whole-grain foods.   Make sure you get enough calcium in your diet. Calcium, vitamin D, and exercise help prevent osteoporosis (bone  "thinning).   If you live alone, try eating with others when you can. That way you get a good meal and have company while you eat it.   Try to keep a healthy weight. If you eat more calories than your body uses for energy, it will be stored as fat and you will gain weight.     Recreation   Recreation is not limited to sports and team events. It includes any activity that provides relaxation, interest, enjoyment, and exercise. Recreation provides an outlet for physical, mental, and social energy. It can give a sense of worth and achievement. It can help you stay healthy.    Mental Exercise and Social Involvement  Mental and emotional health is as important as physical health. Keep in touch with friends and family. Stay as active as possible. Continue to learn and challenge yourself.   Things you can do to stay mentally active are:  Learn something new, like a foreign language or musical instrument.   Play SCRABBLE or do crossword puzzles. If you cannot find people to play these games with you at home, you can play them with others on your computer through the Internet.   Join a games club--anything from card games to chess or checkers or lawn bowling.   Start a new hobby.   Go back to school.   Volunteer.   Read.   Keep up with world events.  Learning About Being Physically Active  What is physical activity?     Being physically active means doing any kind of activity that gets your body moving.  The types of physical activity that can help you get fit and stay healthy include:  Aerobic or \"cardio\" activities. These make your heart beat faster and make you breathe harder, such as brisk walking, riding a bike, or running. They strengthen your heart and lungs and build up your endurance.  Strength training activities. These make your muscles work against, or \"resist,\" something. Examples include lifting weights or doing push-ups. These activities help tone and strengthen your muscles and bones.  Stretches. These let you " move your joints and muscles through their full range of motion. Stretching helps you be more flexible.  Reaching a balance between these three types of physical activity is important because each one contributes to your overall fitness.  What are the benefits of being active?  Being active is one of the best things you can do for your health. It helps you to:  Feel stronger and have more energy to do all the things you like to do.  Focus better at school or work.  Feel, think, and sleep better.  Reach and stay at a healthy weight.  Lose fat and build lean muscle.  Lower your risk for serious health problems, including diabetes, heart attack, high blood pressure, and some cancers.  Keep your heart, lungs, bones, muscles, and joints strong and healthy.  How can you make being active part of your life?  Start slowly. Make it your long-term goal to get at least 30 minutes of exercise on most days of the week. Walking is a good choice. You also may want to do other activities, such as running, swimming, cycling, or playing tennis or team sports.  Pick activities that you like--ones that make your heart beat faster, your muscles stronger, and your muscles and joints more flexible. If you find more than one thing you like doing, do them all. You don't have to do the same thing every day.  Get your heart pumping every day. Any activity that makes your heart beat faster and keeps it at that rate for a while counts.  Here are some great ways to get your heart beating faster:  Go for a brisk walk, run, or bike ride.  Go for a hike or swim.  Go in-line skating.  Play a game of touch football, basketball, or soccer.  Ride a bike.  Play tennis or racquetball.  Climb stairs.  Even some household chores can be aerobic--just do them at a faster pace. Vacuuming, raking or mowing the lawn, sweeping the garage, and washing and waxing the car all can help get your heart rate up.  Strengthen your muscles during the week. You don't have  "to lift heavy weights or grow big, bulky muscles to get stronger. Doing a few simple activities that make your muscles work against, or \"resist,\" something can help you get stronger.  For example, you can:  Do push-ups or sit-ups, which use your own body weight as resistance.  Lift weights or dumbbells or use stretch bands at home or in a gym or community center.  Stretch your muscles often. Stretching will help you as you become more active. It can help you stay flexible, loosen tight muscles, and avoid injury. It can also help improve your balance and posture and can be a great way to relax.  Be sure to stretch the muscles you'll be using when you work out. It's best to warm your muscles slightly before you stretch them. Walk or do some other light aerobic activity for a few minutes, and then start stretching.  When you stretch your muscles:  Do it slowly. Stretching is not about going fast or making sudden movements.  Don't push or bounce during a stretch.  Hold each stretch for at least 15 to 30 seconds, if you can. You should feel a stretch in the muscle, but not pain.  Breathe out as you do the stretch. Then breathe in as you hold the stretch. Don't hold your breath.  If you're worried about how more activity might affect your health, have a checkup before you start. Follow any special advice your doctor gives you for getting a smart start.  Where can you learn more?  Go to https://www.[employer].net/patiented  Enter W332 in the search box to learn more about \"Learning About Being Physically Active.\"  Current as of: October 10, 2022               Content Version: 13.7    4405-9691 [employer].   Care instructions adapted under license by your healthcare professional. If you have questions about a medical condition or this instruction, always ask your healthcare professional. [employer] disclaims any warranty or liability for your use of this information.      Hearing Loss: Care " Instructions  Overview     Hearing loss is a sudden or slow decrease in how well you hear. It can range from slight to profound. Permanent hearing loss can occur with aging. It also can happen when you are exposed long-term to loud noise. Examples include listening to loud music, riding motorcycles, or being around other loud machines.  Hearing loss can affect your work and home life. It can make you feel lonely or depressed. You may feel that you have lost your independence. But hearing aids and other devices can help you hear better and feel connected to others.  Follow-up care is a key part of your treatment and safety. Be sure to make and go to all appointments, and call your doctor if you are having problems. It's also a good idea to know your test results and keep a list of the medicines you take.  How can you care for yourself at home?  Avoid loud noises whenever possible. This helps keep your hearing from getting worse.  Always wear hearing protection around loud noises.  Wear a hearing aid as directed.  A professional can help you pick a hearing aid that will work best for you.  You can also get hearing aids over the counter for mild to moderate hearing loss.  Have hearing tests as your doctor suggests. They can show whether your hearing has changed. Your hearing aid may need to be adjusted.  Use other devices as needed. These may include:  Telephone amplifiers and hearing aids that can connect to a television, stereo, radio, or microphone.  Devices that use lights or vibrations. These alert you to the doorbell, a ringing telephone, or a baby monitor.  Television closed-captioning. This shows the words at the bottom of the screen. Most new TVs can do this.  TTY (text telephone). This lets you type messages back and forth on the telephone instead of talking or listening. These devices are also called TDD. When messages are typed on the keyboard, they are sent over the phone line to a receiving TTY. The  "message is shown on a monitor.  Use text messaging, social media, and email if it is hard for you to communicate by telephone.  Try to learn a listening technique called speechreading. It is not lipreading. You pay attention to people's gestures, expressions, posture, and tone of voice. These clues can help you understand what a person is saying. Face the person you are talking to, and have them face you. Make sure the lighting is good. You need to see the other person's face clearly.  Think about counseling if you need help to adjust to your hearing loss.  When should you call for help?  Watch closely for changes in your health, and be sure to contact your doctor if:    You think your hearing is getting worse.     You have new symptoms, such as dizziness or nausea.   Where can you learn more?  Go to https://www.[employer].net/patiented  Enter R798 in the search box to learn more about \"Hearing Loss: Care Instructions.\"  Current as of: March 1, 2023               Content Version: 13.7    8424-6815 [employer].   Care instructions adapted under license by your healthcare professional. If you have questions about a medical condition or this instruction, always ask your healthcare professional. [employer] disclaims any warranty or liability for your use of this information.         "

## 2023-08-30 NOTE — PROGRESS NOTES
The patient was provided with suggestions to help him develop a healthy physical lifestyle.  He is at risk for lack of exercise and has been provided with information to increase physical activity for the benefit of his well-being.  The patient was provided with written information regarding signs of hearing loss.

## 2023-08-30 NOTE — PROGRESS NOTES
"SUBJECTIVE:   Davi is a 68 year old who presents for Preventive Visit.      8/30/2023    10:55 AM   Additional Questions   Roomed by velma   Accompanied by alone         8/30/2023    10:55 AM   Patient Reported Additional Medications   Patient reports taking the following new medications none       Are you in the first 12 months of your Medicare coverage?  No    Healthy Habits:     In general, how would you rate your overall health?  Fair    Frequency of exercise:  1 day/week    Duration of exercise:  Less than 15 minutes    Do you usually eat at least 4 servings of fruit and vegetables a day, include whole grains    & fiber and avoid regularly eating high fat or \"junk\" foods?  Yes    Taking medications regularly:  Yes    Medication side effects:  None    Ability to successfully perform activities of daily living:  No assistance needed    Home Safety:  Throw rugs in the hallway and lack of grab bars in the bathroom    Hearing Impairment:  Difficulty following a conversation in a noisy restaurant or crowded room, feel that people are mumbling or not speaking clearly, need to ask people to speak up or repeat themselves and difficulty understanding soft or whispered speech    In the past 6 months, have you been bothered by leaking of urine?  No    In general, how would you rate your overall mental or emotional health?  Good    Additional concerns today:  Yes        Have you ever done Advance Care Planning? (For example, a Health Directive, POLST, or a discussion with a medical provider or your loved ones about your wishes): Yes, patient states has an Advance Care Planning document and will bring a copy to the clinic.       Fall risk  Fallen 2 or more times in the past year?: No  Any fall with injury in the past year?: No    Cognitive Screening   1) Repeat 3 items (Leader, Season, Table)    2) Clock draw: NORMAL  3) 3 item recall: Recalls 2 objects   Results: NORMAL clock, 1-2 items recalled: COGNITIVE IMPAIRMENT LESS " LIKELY    Mini-CogTM Copyright KAREN Stein. Licensed by the author for use in [facility]; reprinted with permission (javi@[facility]). All rights reserved.          Reviewed and updated as needed this visit by clinical staff   Tobacco  Allergies  Meds  Problems  Med Hx  Surg Hx  Fam Hx          Reviewed and updated as needed this visit by Provider   Tobacco  Allergies  Meds  Problems  Med Hx  Surg Hx  Fam Hx         Social History     Tobacco Use    Smoking status: Former     Packs/day: 0.00     Types: Cigars, Cigarettes     Quit date: 11/10/2017     Years since quittin.8    Smokeless tobacco: Never   Substance Use Topics    Alcohol use: Not Currently     Comment: Quit 10/10/21             2023    10:51 AM   Alcohol Use   Prescreen: >3 drinks/day or >7 drinks/week? No     Do you have a current opioid prescription? No  Do you use any other controlled substances or medications that are not prescribed by a provider? None              Current providers sharing in care for this patient include:   Patient Care Team:  Shari Paredes MD as PCP - General (Family Practice)  Shari Paredes MD as Assigned PCP  Gabriella Gómez MD as MD (Dermatology)  Sherman Gtz MD as Assigned Heart and Vascular Provider  Cathy Wilson MD as MD (Dermatology)  Saud Delcid MD as Assigned Allergy Provider  Jaycob Ross MD as Assigned Musculoskeletal Provider  Cj Saldaña DO as Assigned Neuroscience Provider  Jose Angel Elder DO as Assigned Surgical Provider    The following health maintenance items are reviewed in Epic and correct as of today:  Health Maintenance   Topic Date Due    COVID-19 Vaccine (6 - Pfizer series) 2023    BMP  2023    CMP  2023    LIPID  2023    EGD  2023    INFLUENZA VACCINE (1) 2023    PANKAJ ASSESSMENT  2023    MEDICARE ANNUAL WELLNESS VISIT  2024    ANNUAL REVIEW OF HM ORDERS  2024    FALL RISK  "ASSESSMENT  08/30/2024    ADVANCE CARE PLANNING  08/30/2028    COLORECTAL CANCER SCREENING  01/20/2031    DTAP/TDAP/TD IMMUNIZATION (3 - Td or Tdap) 08/30/2033    HEPATITIS C SCREENING  Completed    PHQ-2 (once per calendar year)  Completed    Pneumococcal Vaccine: 65+ Years  Completed    ZOSTER IMMUNIZATION  Completed    AORTIC ANEURYSM SCREENING (SYSTEM ASSIGNED)  Completed    IPV IMMUNIZATION  Aged Out    HPV IMMUNIZATION  Aged Out    MENINGITIS IMMUNIZATION  Aged Out    LUNG CANCER SCREENING  Discontinued               Review of Systems   Constitutional:  Negative for chills and fever.   HENT:  Positive for hearing loss. Negative for congestion, ear pain and sore throat.    Eyes:  Positive for visual disturbance. Negative for pain.   Respiratory:  Positive for shortness of breath. Negative for cough.    Cardiovascular:  Negative for chest pain, palpitations and peripheral edema.   Gastrointestinal:  Positive for heartburn. Negative for abdominal pain, constipation, diarrhea, hematochezia and nausea.   Genitourinary:  Positive for impotence. Negative for dysuria, frequency, genital sores, hematuria, penile discharge and urgency.   Musculoskeletal:  Positive for arthralgias, joint swelling and myalgias.   Skin:  Negative for rash.   Neurological:  Positive for dizziness, weakness and headaches. Negative for paresthesias.   Psychiatric/Behavioral:  Negative for mood changes. The patient is not nervous/anxious.          OBJECTIVE:   /86   Pulse 67   Temp 96.9  F (36.1  C) (Temporal)   Resp 16   Ht 1.79 m (5' 10.47\")   Wt 96.6 kg (213 lb)   SpO2 99%   BMI 30.15 kg/m   Estimated body mass index is 30.15 kg/m  as calculated from the following:    Height as of this encounter: 1.79 m (5' 10.47\").    Weight as of this encounter: 96.6 kg (213 lb).  Physical Exam  GENERAL: healthy, alert and no distress  EYES: Eyes grossly normal to inspection, PERRL and conjunctivae and sclerae normal  HENT: ear canals and " TM's normal, nose and mouth without ulcers or lesions  NECK: no adenopathy, no asymmetry, masses, or scars and thyroid normal to palpation  RESP: lungs clear to auscultation - no rales, rhonchi or wheezes  CV: regular rate and rhythm, normal S1 S2, no S3 or S4, no murmur, click or rub, no peripheral edema and peripheral pulses strong  ABDOMEN: soft, nontender, no hepatosplenomegaly, no masses and bowel sounds normal  MS: no gross musculoskeletal defects noted, no edema  SKIN: no suspicious lesions or rashes  NEURO: Normal strength and tone, mentation intact and speech normal  PSYCH: mentation appears normal, affect normal/bright        ASSESSMENT / PLAN:       ICD-10-CM    1. Encounter for Medicare annual wellness exam  Z00.00       2. Benign essential hypertension  I10 BASIC METABOLIC PANEL     COMPREHENSIVE METABOLIC PANEL     amLODIPine (NORVASC) 2.5 MG tablet     COMPREHENSIVE METABOLIC PANEL     OFFICE/OUTPT VISIT,EST,LEVL III     CANCELED: BASIC METABOLIC PANEL      3. Coronary artery disease involving native coronary artery of native heart without angina pectoris  I25.10 Lipid panel reflex to direct LDL Non-fasting     Lipid panel reflex to direct LDL Non-fasting     OFFICE/OUTPT VISIT,EST,LEVL III      4. Morbid obesity (H)  E66.01 OFFICE/OUTPT VISIT,EST,LEVL III      5. Paroxysmal atrial fibrillation (H)  I48.0 OFFICE/OUTPT VISIT,EST,LEVL III      6. Rib pain  R07.81 XR Ribs & Chest Right G/E 3 Views     OFFICE/OUTPT VISIT,EST,LEVL III      7. Need for Tdap vaccination  Z23 Tdap, tetanus-diptheria-acell pertussis, (BOOSTRIX) 5-2.5-18.5 LF-MCG/0.5 LUCY injection      8. Vasculogenic erectile dysfunction, unspecified vasculogenic erectile dysfunction type  N52.9 sildenafil (VIAGRA) 25 MG tablet     OFFICE/OUTPT VISIT,EST,LEVL III        Patient was doing well for his general wellness exam though he has been finding it is harder for him to sleep on his side due to some rib pain that we did evaluate today.  He  also has been having reflux-like symptoms or globus sensation that he has been working on and recently had his medications changed.  Hopefully this will help him.  We also offered him Viagra at a lower dose since he had side effects that made it difficult for him to tolerate this last year when he was prescribed from cardiology.  Ultimately he is overdue for his cardiology visit that we will prescribe his medications so he does not run down on these.  As for the rib pain he does have a lump there and it is unclear how long it has been there though he had an accident in this area he stated 1996 he had worsening just in the last few months and I cannot give a good indication as to why a lump or worsening pain would have her happen lately though we will get an x-ray to start the evaluation for this    Patient has been advised of split billing requirements and indicates understanding: Yes      COUNSELING:  Reviewed preventive health counseling, as reflected in patient instructions       Regular exercise       Healthy diet/nutrition        He reports that he quit smoking about 5 years ago. His smoking use included cigars and cigarettes. He has never used smokeless tobacco.      Appropriate preventive services were discussed with this patient, including applicable screening as appropriate for cardiovascular disease, diabetes, osteopenia/osteoporosis, and glaucoma.  As appropriate for age/gender, discussed screening for colorectal cancer, prostate cancer, breast cancer, and cervical cancer. Checklist reviewing preventive services available has been given to the patient.    Reviewed patients plan of care and provided an AVS. The Basic Care Plan (routine screening as documented in Health Maintenance) for Jose Angel meets the Care Plan requirement. This Care Plan has been established and reviewed with the Patient.          Shrai Paredes MD, MD  [facility]    Identified Health Risks:  I have reviewed Opioid  Use Disorder and Substance Use Disorder risk factors and made any needed referrals.

## 2023-08-31 RX ORDER — FENOFIBRATE 145 MG/1
145 TABLET, COATED ORAL DAILY
Qty: 90 TABLET | Refills: 0 | Status: SHIPPED | OUTPATIENT
Start: 2023-08-31 | End: 2024-03-06

## 2023-08-31 NOTE — ADDENDUM NOTE
Addended by: SAAD JORDAN on: 8/30/2023 05:31 PM     Modules accepted: Orders    
Addended by: SAAD JORDAN on: 8/31/2023 07:01 AM     Modules accepted: Orders    
negative

## 2023-09-06 ENCOUNTER — THERAPY VISIT (OUTPATIENT)
Dept: PHYSICAL THERAPY | Facility: CLINIC | Age: 68
End: 2023-09-06
Payer: MEDICARE

## 2023-09-06 DIAGNOSIS — R07.81 RIB PAIN: ICD-10-CM

## 2023-09-06 PROCEDURE — 97161 PT EVAL LOW COMPLEX 20 MIN: CPT | Mod: GP | Performed by: PHYSICAL THERAPIST

## 2023-09-06 PROCEDURE — 97110 THERAPEUTIC EXERCISES: CPT | Mod: GP | Performed by: PHYSICAL THERAPIST

## 2023-09-06 PROCEDURE — 97035 APP MDLTY 1+ULTRASOUND EA 15: CPT | Mod: GP | Performed by: PHYSICAL THERAPIST
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
## 2023-09-12 ENCOUNTER — THERAPY VISIT (OUTPATIENT)
Dept: PHYSICAL THERAPY | Facility: CLINIC | Age: 68
End: 2023-09-12
Payer: MEDICARE

## 2023-09-12 DIAGNOSIS — R07.81 RIB PAIN: Primary | ICD-10-CM

## 2023-09-12 PROCEDURE — 97035 APP MDLTY 1+ULTRASOUND EA 15: CPT | Mod: GP | Performed by: PHYSICAL THERAPIST

## 2023-09-12 PROCEDURE — 97110 THERAPEUTIC EXERCISES: CPT | Mod: GP | Performed by: PHYSICAL THERAPIST

## 2023-09-14 ENCOUNTER — THERAPY VISIT (OUTPATIENT)
Dept: PHYSICAL THERAPY | Facility: CLINIC | Age: 68
End: 2023-09-14
Payer: MEDICARE

## 2023-09-14 DIAGNOSIS — R07.81 RIB PAIN: Primary | ICD-10-CM

## 2023-09-14 PROCEDURE — 97035 APP MDLTY 1+ULTRASOUND EA 15: CPT | Mod: GP | Performed by: PHYSICAL THERAPIST

## 2023-09-28 DIAGNOSIS — M47.814 SPONDYLOSIS OF THORACIC REGION WITHOUT MYELOPATHY OR RADICULOPATHY: Primary | ICD-10-CM

## 2023-09-28 DIAGNOSIS — M47.24 OSTEOARTHRITIS OF SPINE WITH RADICULOPATHY, THORACIC REGION: ICD-10-CM

## 2023-10-03 ENCOUNTER — TELEPHONE (OUTPATIENT)
Dept: PALLIATIVE MEDICINE | Facility: CLINIC | Age: 68
End: 2023-10-03
Payer: MEDICARE

## 2023-10-05 ENCOUNTER — THERAPY VISIT (OUTPATIENT)
Dept: PHYSICAL THERAPY | Facility: CLINIC | Age: 68
End: 2023-10-05
Attending: FAMILY MEDICINE
Payer: MEDICARE

## 2023-10-05 DIAGNOSIS — R07.81 RIB PAIN: Primary | ICD-10-CM

## 2023-10-05 PROCEDURE — 97035 APP MDLTY 1+ULTRASOUND EA 15: CPT | Mod: GP | Performed by: PHYSICAL THERAPIST

## 2023-10-05 PROCEDURE — 97110 THERAPEUTIC EXERCISES: CPT | Mod: GP | Performed by: PHYSICAL THERAPIST

## 2023-10-09 ENCOUNTER — THERAPY VISIT (OUTPATIENT)
Dept: PHYSICAL THERAPY | Facility: CLINIC | Age: 68
End: 2023-10-09
Attending: FAMILY MEDICINE
Payer: MEDICARE

## 2023-10-09 DIAGNOSIS — R07.81 RIB PAIN: Primary | ICD-10-CM

## 2023-10-09 PROCEDURE — 97110 THERAPEUTIC EXERCISES: CPT | Mod: GP | Performed by: PHYSICAL THERAPIST

## 2023-10-09 PROCEDURE — 97035 APP MDLTY 1+ULTRASOUND EA 15: CPT | Mod: GP | Performed by: PHYSICAL THERAPIST

## 2023-10-11 ENCOUNTER — THERAPY VISIT (OUTPATIENT)
Dept: PHYSICAL THERAPY | Facility: CLINIC | Age: 68
End: 2023-10-11
Attending: FAMILY MEDICINE
Payer: MEDICARE

## 2023-10-11 DIAGNOSIS — R07.81 RIB PAIN: Primary | ICD-10-CM

## 2023-10-11 PROCEDURE — 97035 APP MDLTY 1+ULTRASOUND EA 15: CPT | Mod: GP | Performed by: PHYSICAL THERAPIST

## 2023-10-11 PROCEDURE — 97110 THERAPEUTIC EXERCISES: CPT | Mod: GP | Performed by: PHYSICAL THERAPIST

## 2023-10-23 ENCOUNTER — THERAPY VISIT (OUTPATIENT)
Dept: PHYSICAL THERAPY | Facility: CLINIC | Age: 68
End: 2023-10-23
Attending: FAMILY MEDICINE
Payer: MEDICARE

## 2023-10-23 DIAGNOSIS — R07.81 RIB PAIN: Primary | ICD-10-CM

## 2023-10-23 PROCEDURE — 97110 THERAPEUTIC EXERCISES: CPT | Mod: GP | Performed by: PHYSICAL THERAPIST

## 2023-10-23 PROCEDURE — 97035 APP MDLTY 1+ULTRASOUND EA 15: CPT | Mod: GP | Performed by: PHYSICAL THERAPIST

## 2023-10-26 ENCOUNTER — HOSPITAL ENCOUNTER (OUTPATIENT)
Facility: CLINIC | Age: 68
Discharge: HOME | End: 2023-10-26
Attending: ANESTHESIOLOGY | Admitting: ANESTHESIOLOGY
Payer: MEDICARE

## 2023-10-26 VITALS
SYSTOLIC BLOOD PRESSURE: 117 MMHG | TEMPERATURE: 98 F | OXYGEN SATURATION: 98 % | RESPIRATION RATE: 16 BRPM | DIASTOLIC BLOOD PRESSURE: 92 MMHG | HEART RATE: 63 BPM

## 2023-10-26 PROCEDURE — 20553 NJX 1/MLT TRIGGER POINTS 3/>: CPT | Performed by: ANESTHESIOLOGY

## 2023-10-26 PROCEDURE — 250N000009 HC RX 250: Performed by: ANESTHESIOLOGY

## 2023-10-26 PROCEDURE — 250N000011 HC RX IP 250 OP 636: Mod: JZ | Performed by: ANESTHESIOLOGY

## 2023-10-26 RX ORDER — TRIAMCINOLONE ACETONIDE 40 MG/ML
INJECTION, SUSPENSION INTRA-ARTICULAR; INTRAMUSCULAR PRN
Status: DISCONTINUED | OUTPATIENT
Start: 2023-10-26 | End: 2023-10-26 | Stop reason: HOSPADM

## 2023-10-26 ASSESSMENT — ACTIVITIES OF DAILY LIVING (ADL): ADLS_ACUITY_SCORE: 35
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
## 2023-10-26 NOTE — DISCHARGE INSTRUCTIONS
Home Care Instructions                Procedure: Thoracic trigger point injections    Activity:  Rest today  Do not work today  Resume normal activity tomorrow    Pain:  You may experience soreness at the injection site for 1 to 3 days.  You may use an ice pack for 20 minutes every 2 hours for the first 24 hours  You may use a heating pad after the first 24 hours  You may use Tylenol  (acetaminophen) every 4 hours or other pain medicines as directed by your physician    Safety  Sedation medicine, if given may remain active for many hours.    It is important for the next 24 hours that you do not:  Drive a car  Operate machines or power tools  Consume alcohol, including beer  Sign any important papers or legal documents    You may experience numbness radiating into your legs or arms, (depending on the procedure location)  This numbness may last several hours.  Until the numb sensation returns to normal please use caution in walking, climbing stairs, stepping out of your vehicle, etc.    Common side effects of steroids:  Not everyone will experience corticosteroid side effects. If side effects are experienced they will gradually subside in the 7-10 day period following an injection.    Most common side effects include:  Flushed face and/or chest  Feeling of warmth, particularly in face but could be overall feeling of warmth  Increased blood sugar in diabetic patients  Menstrual irregularities may occur.  If taking hormone based birth control an alternate method of birth control is recommended  Sleep disturbances and/or mood swings are possible  Leg cramps    Please contact us if you have:  Severe pain   Fever more than 101.5 degrees Fahrenheit  Signs of infection (redness, swelling or drainage)       PLEASE GO TO THE NEAREST EMERGENCY ROOM IF YOU HAVE:   - Increasing of difficulty breathing or shortness of breath with it in the first 12-24 hours of the procedure.  If the symptoms were to occur I recommended going to  the nearest emergency room.    If you have questions during normal business hours (8am-5pm Monday-Friday) contact the [facility] at [phone] to access the [facility] . If you need help after hours, we recommend that you go to a hospital emergency room or dial 911.     Patient should also consider frequency specific microcurrent stimulation.

## 2023-11-01 ENCOUNTER — THERAPY VISIT (OUTPATIENT)
Dept: PHYSICAL THERAPY | Facility: CLINIC | Age: 68
End: 2023-11-01
Attending: FAMILY MEDICINE
Payer: MEDICARE

## 2023-11-01 DIAGNOSIS — R07.81 RIB PAIN: Primary | ICD-10-CM

## 2023-11-01 PROCEDURE — 97110 THERAPEUTIC EXERCISES: CPT | Mod: GP | Performed by: PHYSICAL THERAPIST

## 2023-11-01 PROCEDURE — 97035 APP MDLTY 1+ULTRASOUND EA 15: CPT | Mod: GP | Performed by: PHYSICAL THERAPIST

## 2023-11-21 ENCOUNTER — THERAPY VISIT (OUTPATIENT)
Dept: PHYSICAL THERAPY | Facility: CLINIC | Age: 68
End: 2023-11-21
Attending: FAMILY MEDICINE
Payer: MEDICARE

## 2023-11-21 DIAGNOSIS — R07.81 RIB PAIN: Primary | ICD-10-CM

## 2023-11-21 PROCEDURE — 97035 APP MDLTY 1+ULTRASOUND EA 15: CPT | Mod: GP | Performed by: PHYSICAL THERAPIST

## 2023-11-21 PROCEDURE — 97110 THERAPEUTIC EXERCISES: CPT | Mod: GP | Performed by: PHYSICAL THERAPIST

## 2023-11-21 NOTE — PROGRESS NOTES
DISCHARGE  Reason for Discharge: Patient has met all goals.    Equipment Issued: theraband     Discharge Plan: Patient to continue home program.    Referring Provider:  Shari Paredes     11/21/23 0500   Appointment Info   Signing clinician's name / credentials eugenesilvina arzate PT   Total/Authorized Visits 9 medicare   Medical Diagnosis rib pain R07.81   PT Tx Diagnosis right anterior rib   Precautions/Limitations none   Quick Adds Certification   Progress Note/Certification   Start of Care Date 09/06/23   Onset of illness/injury or Date of Surgery 03/06/23   Therapy Frequency 2x week x 2 weeks 1x week x 10 weeks   Certification date from 09/06/23   Certification date to 12/04/23   GOALS   PT Goals 2;3   PT Goal 1   Goal Identifier 1   Goal Description instruction in HEP and compliant with it 5 of 7 days to improve quality of life   Target Date 12/04/23   PT Goal 2   Goal Identifier 2   Goal Description patient to have 50% reduction in pain level currently 3-8/10   Target Date 12/04/23   PT Goal 3   Goal Identifier 3   Goal Description patient to be able to sleep 50% better   Target Date 12/04/23   Objective Measure 1   Objective Measure pain 3-8/10 at eval   Details 11/21/23 pain 1-6/10 is now able to sleep on right side   PT Modalities   PT Modalities Ultrasound   Ultrasound   Ultrasound Minutes (64291) 15   Treatment Detail sidelying % 1.5 w/cm2 x 12 to right anterior ribs   Patient Response/Progress tolerated well   Treatment Interventions (PT)   Interventions Therapeutic Procedure/Exercise   Therapeutic Procedure/Exercise   Therapeutic Procedures: strength, endurance, ROM, flexibillity minutes (37202) 25   Ther Proc 1 nustep 1640   Ther Proc 1 - Details reviewed HEP and POC , exercises , wall slide for flexion and laterial trunk flexion , sidelying laterial trunk stretch with UE and LE extension hold 20-30 2-3reps, added green theraband B UE horizontal abduction and diagonals 10x goal 15x2, added nustep  level 1 x 20 level 3   Skilled Intervention instruction in HEP and POC   Patient Response/Progress increase nustep and issued blue theraband   Plan   Home program instruction in HEP and POC , exercises , wall slide for flexion and laterial trunk flexion , sidelying laterial trunk stretch with UE and LE extension hold 20-30 2-3reps, added green theraband B UE horizontal abduction and diagonals 10x goal 15x2   Updates to plan of care 2xweek x 2 weeks 1x week x 10 week   Plan for next session discharge to [facility]   Total Session Time   Timed Code Treatment Minutes 40   Total Treatment Time (sum of timed and untimed services) 40

## 2023-11-28 ENCOUNTER — TELEPHONE (OUTPATIENT)
Dept: FAMILY MEDICINE | Facility: OTHER | Age: 68
End: 2023-11-28
Payer: MEDICARE

## 2023-11-28 DIAGNOSIS — E78.2 MIXED HYPERLIPIDEMIA: Primary | ICD-10-CM

## 2023-11-28 NOTE — TELEPHONE ENCOUNTER
Order/Referral Request    Who is requesting:  Patient would like lipids to be drawn at labs. Patient is having joint pain and is wondering if his cholesterol medication is working.     Orders being requested: labs for lipids    Reason service is needed/diagnosis: Patient is having increase joint pain and wants to see if his cholesterol medication is working before he gets a refill.    When are orders needed by: ASAP    Has this been discussed with Provider: No    Does patient have a preference on a Group/Provider/Facility? [facility]    Does patient have an appointment scheduled?: No    Where to send orders: Place orders within Epic    Could we send this information to you in [employer] or would you prefer to receive a phone call?:   Patient would prefer a phone call   Okay to leave a detailed message?: Yes at Cell number on file:    Telephone Information:   Mobile [phone]

## 2023-11-28 NOTE — TELEPHONE ENCOUNTER
Lipids ordered, also added labs to monitor the statin to observe for possible issues that may be arising from them  Shari Paredes MD

## 2023-11-29 ENCOUNTER — LAB (OUTPATIENT)
Dept: LAB | Facility: OTHER | Age: 68
End: 2023-11-29
Payer: MEDICARE

## 2023-11-29 DIAGNOSIS — E78.2 MIXED HYPERLIPIDEMIA: ICD-10-CM

## 2023-11-29 LAB
ALBUMIN SERPL BCG-MCNC: 4.3 G/DL (ref 3.5–5.2)
ALP SERPL-CCNC: 59 U/L (ref 40–150)
ALT SERPL W P-5'-P-CCNC: 30 U/L (ref 0–70)
AST SERPL W P-5'-P-CCNC: 40 U/L (ref 0–45)
BILIRUB DIRECT SERPL-MCNC: <0.2 MG/DL (ref 0–0.3)
BILIRUB SERPL-MCNC: 0.8 MG/DL
CHOLEST SERPL-MCNC: 223 MG/DL
CK SERPL-CCNC: 172 U/L (ref 39–308)
HDLC SERPL-MCNC: 50 MG/DL
LDLC SERPL CALC-MCNC: 151 MG/DL
NONHDLC SERPL-MCNC: 173 MG/DL
PROT SERPL-MCNC: 6.9 G/DL (ref 6.4–8.3)
TRIGL SERPL-MCNC: 108 MG/DL

## 2023-11-29 PROCEDURE — 80061 LIPID PANEL: CPT

## 2023-11-29 PROCEDURE — 82550 ASSAY OF CK (CPK): CPT

## 2023-11-29 PROCEDURE — 36415 COLL VENOUS BLD VENIPUNCTURE: CPT

## 2023-11-29 PROCEDURE — 80076 HEPATIC FUNCTION PANEL: CPT

## 2023-12-05 ENCOUNTER — TELEPHONE (OUTPATIENT)
Dept: FAMILY MEDICINE | Facility: OTHER | Age: 68
End: 2023-12-05
Payer: MEDICARE

## 2023-12-05 DIAGNOSIS — I25.10 CORONARY ARTERY DISEASE INVOLVING NATIVE CORONARY ARTERY OF NATIVE HEART WITHOUT ANGINA PECTORIS: ICD-10-CM

## 2023-12-05 DIAGNOSIS — E78.2 MIXED HYPERLIPIDEMIA: Primary | ICD-10-CM

## 2023-12-05 NOTE — TELEPHONE ENCOUNTER
Davi calling stating his cholesterol levels have improved since taking the Fenofibrate 90 days ago. However, he is noticing that he has had an increase in joint pain since taking it. He states the pain is tolerable but if there is another medication option, he would like to try that. He states Lipitor did not agree with his body last time.     Patient is aware that PCP is out of office and is okay with awaiting her recommendations upon return.

## 2023-12-06 DIAGNOSIS — I25.10 CORONARY ARTERY DISEASE INVOLVING NATIVE CORONARY ARTERY OF NATIVE HEART WITHOUT ANGINA PECTORIS: ICD-10-CM

## 2023-12-06 DIAGNOSIS — E78.2 MIXED HYPERLIPIDEMIA: ICD-10-CM

## 2023-12-06 RX ORDER — PRAVASTATIN SODIUM 20 MG
TABLET ORAL
Qty: 105 TABLET | Refills: 0 | OUTPATIENT
Start: 2023-12-06

## 2023-12-06 RX ORDER — PRAVASTATIN SODIUM 20 MG
TABLET ORAL
Qty: 105 TABLET | Refills: 0 | Status: SHIPPED | OUTPATIENT
Start: 2023-12-06 | End: 2024-01-31

## 2023-12-06 NOTE — TELEPHONE ENCOUNTER
Troubleshooting for cholesterol med aches typically include checking for CPK which it looks like he just did. So we can change to another lower ache med, or we can add coq10 to see if it can help reduce the ache.   I will send pravastain, though if he wants to try coq10, it is over the counter and can try to add this first (or both as it can help the new med reduce aches as well if they occur).  Shari Paredes MD

## 2023-12-06 NOTE — TELEPHONE ENCOUNTER
Relayed message to patient. He will be picking up the new medication as well as start the CoQ10.    Sobeida Simpson CMA
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
## 2023-12-14 ENCOUNTER — OFFICE VISIT (OUTPATIENT)
Dept: ORTHOPEDICS | Facility: CLINIC | Age: 68
End: 2023-12-14
Payer: MEDICARE

## 2023-12-14 VITALS — DIASTOLIC BLOOD PRESSURE: 94 MMHG | SYSTOLIC BLOOD PRESSURE: 145 MMHG | BODY MASS INDEX: 30.15 KG/M2 | WEIGHT: 213 LBS

## 2023-12-14 DIAGNOSIS — M25.552 GREATER TROCHANTERIC PAIN SYNDROME OF BOTH LOWER EXTREMITIES: Primary | ICD-10-CM

## 2023-12-14 DIAGNOSIS — M25.551 GREATER TROCHANTERIC PAIN SYNDROME OF BOTH LOWER EXTREMITIES: Primary | ICD-10-CM

## 2023-12-14 PROCEDURE — 20611 DRAIN/INJ JOINT/BURSA W/US: CPT | Mod: RT | Performed by: STUDENT IN AN ORGANIZED HEALTH CARE EDUCATION/TRAINING PROGRAM

## 2023-12-14 PROCEDURE — 99214 OFFICE O/P EST MOD 30 MIN: CPT | Mod: 25 | Performed by: STUDENT IN AN ORGANIZED HEALTH CARE EDUCATION/TRAINING PROGRAM

## 2023-12-14 RX ADMIN — BUPIVACAINE HYDROCHLORIDE 1 ML: 5 INJECTION, SOLUTION PERINEURAL at 15:25

## 2023-12-14 RX ADMIN — TRIAMCINOLONE ACETONIDE 40 MG: 40 INJECTION, SUSPENSION INTRA-ARTICULAR; INTRAMUSCULAR at 15:25

## 2023-12-14 RX ADMIN — LIDOCAINE HYDROCHLORIDE 1 ML: 10 INJECTION, SOLUTION INFILTRATION; PERINEURAL at 15:25

## 2023-12-14 ASSESSMENT — PAIN SCALES - GENERAL: PAINLEVEL: MILD PAIN (2)
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
## 2023-12-18 RX ORDER — LIDOCAINE HYDROCHLORIDE 10 MG/ML
1 INJECTION, SOLUTION INFILTRATION; PERINEURAL
Status: DISCONTINUED | OUTPATIENT
Start: 2023-12-14 | End: 2024-01-30

## 2023-12-18 RX ORDER — TRIAMCINOLONE ACETONIDE 40 MG/ML
40 INJECTION, SUSPENSION INTRA-ARTICULAR; INTRAMUSCULAR
Status: DISCONTINUED | OUTPATIENT
Start: 2023-12-14 | End: 2024-01-30

## 2023-12-18 RX ORDER — BUPIVACAINE HYDROCHLORIDE 5 MG/ML
1 INJECTION, SOLUTION PERINEURAL
Status: DISCONTINUED | OUTPATIENT
Start: 2023-12-14 | End: 2024-01-30

## 2024-01-09 ENCOUNTER — OFFICE VISIT (OUTPATIENT)
Dept: FAMILY MEDICINE | Facility: CLINIC | Age: 69
End: 2024-01-09
Payer: MEDICARE

## 2024-01-09 VITALS
DIASTOLIC BLOOD PRESSURE: 72 MMHG | SYSTOLIC BLOOD PRESSURE: 116 MMHG | OXYGEN SATURATION: 97 % | HEIGHT: 70 IN | HEART RATE: 70 BPM | TEMPERATURE: 97.8 F | RESPIRATION RATE: 15 BRPM | WEIGHT: 213.9 LBS | BODY MASS INDEX: 30.62 KG/M2

## 2024-01-09 DIAGNOSIS — J01.00 ACUTE MAXILLARY SINUSITIS, RECURRENCE NOT SPECIFIED: Primary | ICD-10-CM

## 2024-01-09 DIAGNOSIS — H65.02 ACUTE SEROUS OTITIS MEDIA OF LEFT EAR, RECURRENCE NOT SPECIFIED: ICD-10-CM

## 2024-01-09 PROCEDURE — 99213 OFFICE O/P EST LOW 20 MIN: CPT | Performed by: FAMILY MEDICINE

## 2024-01-09 ASSESSMENT — ANXIETY QUESTIONNAIRES
5. BEING SO RESTLESS THAT IT IS HARD TO SIT STILL: NOT AT ALL
3. WORRYING TOO MUCH ABOUT DIFFERENT THINGS: NOT AT ALL
4. TROUBLE RELAXING: NOT AT ALL
8. IF YOU CHECKED OFF ANY PROBLEMS, HOW DIFFICULT HAVE THESE MADE IT FOR YOU TO DO YOUR WORK, TAKE CARE OF THINGS AT HOME, OR GET ALONG WITH OTHER PEOPLE?: NOT DIFFICULT AT ALL
1. FEELING NERVOUS, ANXIOUS, OR ON EDGE: NOT AT ALL
GAD7 TOTAL SCORE: 1
7. FEELING AFRAID AS IF SOMETHING AWFUL MIGHT HAPPEN: NOT AT ALL
2. NOT BEING ABLE TO STOP OR CONTROL WORRYING: NOT AT ALL
7. FEELING AFRAID AS IF SOMETHING AWFUL MIGHT HAPPEN: NOT AT ALL
IF YOU CHECKED OFF ANY PROBLEMS ON THIS QUESTIONNAIRE, HOW DIFFICULT HAVE THESE PROBLEMS MADE IT FOR YOU TO DO YOUR WORK, TAKE CARE OF THINGS AT HOME, OR GET ALONG WITH OTHER PEOPLE: NOT DIFFICULT AT ALL
GAD7 TOTAL SCORE: 1
6. BECOMING EASILY ANNOYED OR IRRITABLE: SEVERAL DAYS

## 2024-01-09 ASSESSMENT — PAIN SCALES - GENERAL: PAINLEVEL: NO PAIN (0)

## 2024-01-09 NOTE — PROGRESS NOTES
"  Assessment & Plan     Acute maxillary sinusitis, recurrence not specified  Symptomatic therapy suggested: push fluids, rest, use vaporizer or mist needed , and use acetaminophen, ibuprofen as needed.  - amoxicillin-clavulanate (AUGMENTIN) 875-125 MG tablet; Take 1 tablet by mouth 2 times daily for 7 days    Acute serous otitis media of left ear, recurrence not specified  - amoxicillin-clavulanate (AUGMENTIN) 875-125 MG tablet; Take 1 tablet by mouth 2 times daily for 7 days      BMI:   Estimated body mass index is 30.28 kg/m  as calculated from the following:    Height as of this encounter: 1.79 m (5' 10.47\").    Weight as of this encounter: 97 kg (213 lb 14.4 oz).       Daljit Ron MD  [facility] BOGDAN Tomlinson is a 68 year old, presenting for the following health issues:  Flu and Otalgia        1/9/2024     4:41 PM   Additional Questions   Roomed by Ginny TORIBIO   Accompanied by None         1/9/2024     4:41 PM   Patient Reported Additional Medications   Patient reports taking the following new medications NA       History of Present Illness       Reason for visit:  Flu  ear ache  Symptom onset:  1-2 weeks ago  Symptoms include:  Ear ache body ache cough sore throat eyes ache runny nose flem  Symptom intensity:  Moderate  Symptom progression:  Staying the same  Had these symptoms before:  Yes  Has tried/received treatment for these symptoms:  No  What makes it worse:  Early morn late taiwo trouble breathing at night  What makes it better:  Soup tea not realy    He eats 2-3 servings of fruits and vegetables daily.He consumes 2 sweetened beverage(s) daily.He exercises with enough effort to increase his heart rate 9 or less minutes per day.  He exercises with enough effort to increase his heart rate 3 or less days per week.   He is taking medications regularly.           Acute Illness  Acute illness concerns: Flu  Onset/Duration: 1 week ago   Symptoms:  Fever: No  Chills/Sweats: " "No  Headache (location?): YES  Sinus Pressure: YES  Conjunctivitis:  No  Ear Pain: YES: left  Rhinorrhea: YES  Congestion: YES  Sore Throat: YES  Cough: YES-productive of green sputum  Wheeze: YES  Decreased Appetite: No  Nausea: No  Vomiting: No  Diarrhea: No  Dysuria/Freq.: No  Dysuria or Hematuria: No  Fatigue/Achiness: YES  Sick/Strep Exposure: YES  Therapies tried and outcome: Tylenol and ibuprofen- not effective         Review of Systems   Constitutional, HEENT, cardiovascular, pulmonary, GI, , musculoskeletal, neuro, skin, endocrine and psych systems are negative, except as otherwise noted.      Objective    /72   Pulse 70   Temp 97.8  F (36.6  C) (Temporal)   Resp 15   Ht 1.79 m (5' 10.47\")   Wt 97 kg (213 lb 14.4 oz)   SpO2 97%   BMI 30.28 kg/m    Body mass index is 30.28 kg/m .  Physical Exam   GENERAL: healthy, alert and no distress  EYES: Eyes grossly normal to inspection, PERRL and conjunctivae and sclerae normal  HENT: normal cephalic/atraumatic, right ear: TM within normal limits , left ear: clear effusion, nose and mouth without ulcers or lesions, oropharynx clear, and oral mucous membranes moist  NECK: no adenopathy, no asymmetry, masses, or scars and thyroid normal to palpation  RESP: lungs clear to auscultation - no rales, rhonchi or wheezes  CV: regular rate and rhythm, normal S1 S2, no S3 or S4, no murmur, click or rub, no peripheral edema and peripheral pulses strong  ABDOMEN: soft, nontender, no hepatosplenomegaly, no masses and bowel sounds normal                  "

## 2024-01-25 NOTE — TELEPHONE ENCOUNTER
[employer] is asking that we change this to a same day surgery. States that their codes say it should be scheduled as same day.   
Patient called to cancel surgery. He has new insurance and he is unsure what the coverage will be. He will call us back as soon as he is ready to reschedule.   
Type of surgery: Laparoscopic hiatal hernia repair with mesh and toupet fundoplication and EGD  Location of surgery: [facility]   Date of surgery: 2/25/19  Surgeon: Dr. Win  Pre-Op Appt Date: 2/14/19  Post-Op Appt Date: 3/13/19   Packet sent out: Surgery packet was given to patient in clinic.   Pre-cert/Authorization completed: NA  Date: 1/10/2019    Ashia Munoz  Surgery Scheduler    
Yes

## 2024-01-26 NOTE — TELEPHONE ENCOUNTER
Contacted patient to review Dr. Chirinos's recommendation to stop amlodipine. Patient will check BP at home and call if BP > 140/90.   Reviewed recommendation for US lower extremity and lymphedema clinic to manage edema, also for echo, bmp and LOLIS f/u visit with LOLIS Ginette Garcia. Patient is willing to have image testing and office visits, he notes that his PMD just did a bmp 12/18/17 for his fatigue with normal results.  Connected patient to scheduling to set up image testing and LOLIS visit.   [facility]  Colorectal Surgery  Discharge Summary      Patient Name: Danyell Mathias  MRN: [id number]  Admission Date: 1/24/2024  Hospital Length of Stay: 2 days  Discharge Date and Time:  01/26/2024 6:36 AM  Attending Physician: DAI Benitez MD   Discharging Provider: Kingsley Jules MD  Primary Care Provider: Louis Aleman MD     HPI:  No notes on file    Procedure(s) (LRB):  COLECTOMY, RIGHT, LAPAROSCOPIC, ERAS low (N/A)  CHOLECYSTECTOMY, LAPAROSCOPIC (N/A)     Hospital Course:  Ms. Mathias is a 68yo female who presented with cholecystocolonic fistula who underwent laparoscopic cholecystectomy and right colectomy on 1/24. Post-op she did well with appropriate pain control, tolerance of diet, ambulating on her own, and return of bowel function on POD1. 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  Medications:  Reconciled Home Medications:      Medication List        START taking these medications      oxyCODONE 5 MG immediate release tablet  Commonly known as: ROXICODONE  Take 1 tablet (5 mg total) by mouth every 4 (four) hours as needed for Pain.            CHANGE how you take these medications      diclofenac 50 MG EC tablet  Commonly  known as: VOLTAREN  TAKE 1 TABLET TWICE DAILY  What changed:   how much to take  how to take this  when to take this  additional instructions            CONTINUE taking these medications      B6-folic-B12-coffee-phosphatid 1.7 mg-400 mcg- 2.4 mcg Cap  Take by mouth.     fenofibrate 145 MG tablet  Commonly known as: TRICOR  Take 1 tablet by mouth once daily.     levothyroxine 75 MCG tablet  Commonly known as: SYNTHROID  Take 75 mcg by mouth.     losartan-hydrochlorothiazide 100-25 mg 100-25 mg per tablet  Commonly known as: HYZAAR  Take 1 tablet by mouth once daily.     multivitamin capsule  Take 1 capsule by mouth once daily.     omeprazole 40 MG capsule  Commonly known as: PRILOSEC  Take 1 capsule (40 mg total) by mouth once daily.     topiramate 50 MG tablet  Commonly known as: TOPAMAX  Take 1 tablet (50 mg total) by mouth 2 (two) times daily.     VITAMIN C 1000 MG tablet  Generic drug: ascorbic acid (vitamin C)  Take 1,000 mg by mouth once daily.     vitamin E 400 UNIT capsule  Take 400 Units by mouth once daily.            STOP taking these medications      ciprofloxacin HCl 500 MG tablet  Commonly known as: CIPRO     metroNIDAZOLE 500 MG tablet  Commonly known as: FLAGYL            ASK your doctor about these medications      lisinopriL-hydrochlorothiazide 10-12.5 mg per tablet  Commonly known as: PRINZIDE,ZESTORETIC  Take 1 tablet by mouth once daily.     traMADoL 50 mg tablet  Commonly known as: ULTRAM  TAKE ONE TABLET BY MOUTH THREE TIMES DAILY AS NEEDED              Kingsley Jules MD  Colorectal Surgery  [facility]

## 2024-01-29 ENCOUNTER — LAB (OUTPATIENT)
Dept: LAB | Facility: OTHER | Age: 69
End: 2024-01-29
Payer: MEDICARE

## 2024-01-29 ENCOUNTER — TELEPHONE (OUTPATIENT)
Dept: UROLOGY | Facility: CLINIC | Age: 69
End: 2024-01-29

## 2024-01-29 DIAGNOSIS — N40.0 BPH (BENIGN PROSTATIC HYPERPLASIA): ICD-10-CM

## 2024-01-29 DIAGNOSIS — N40.0 BPH (BENIGN PROSTATIC HYPERPLASIA): Primary | ICD-10-CM

## 2024-01-29 LAB — PSA SERPL DL<=0.01 NG/ML-MCNC: 4.85 NG/ML (ref 0–4.5)

## 2024-01-29 PROCEDURE — 84153 ASSAY OF PSA TOTAL: CPT

## 2024-01-29 PROCEDURE — 36415 COLL VENOUS BLD VENIPUNCTURE: CPT

## 2024-01-29 NOTE — TELEPHONE ENCOUNTER
[facility] Call Center    Phone Message    May a detailed message be left on voicemail: no     Reason for Call: Other: Patient called stating that he will take the 10AM on 2/1. Please schedule patient. Writer also scheduled patient for labs.     Action Taken: Other: [facility]    Travel Screening: Not Applicable

## 2024-01-29 NOTE — TELEPHONE ENCOUNTER
Bernard Blank MD  P [facility] team,    He needs a PSA, AUASS, and PVR for BPH. Ok to schedule Thursday or anytime in the next few weeks that works for him.    Thanks,  Bernard Blank M.D.    Left voicemail for patient to call back, gave generic phone number [phone].  Need to schedule appointment with  for 2/1 and also needs to have PSA drawn either today or tomorrow 1/30 - Order has been put in.     When patient calls back we can do 2/1 with  at either 10am, 11am, 1pm or 1:45pm.       Christine Doe MA on 1/29/2024 at 9:01 AM

## 2024-01-29 NOTE — TELEPHONE ENCOUNTER
Called patient to inform him that we got him scheduled with  for 2/1 at 10am - Confirmed location with patient and gave address.    Closing encounter.  Christine Doe MA on 1/29/2024 at 11:04 AM

## 2024-01-30 ENCOUNTER — OFFICE VISIT (OUTPATIENT)
Dept: FAMILY MEDICINE | Facility: CLINIC | Age: 69
End: 2024-01-30
Payer: MEDICARE

## 2024-01-30 VITALS
SYSTOLIC BLOOD PRESSURE: 122 MMHG | WEIGHT: 218 LBS | TEMPERATURE: 96.7 F | HEIGHT: 70 IN | BODY MASS INDEX: 31.21 KG/M2 | RESPIRATION RATE: 16 BRPM | HEART RATE: 71 BPM | DIASTOLIC BLOOD PRESSURE: 70 MMHG | OXYGEN SATURATION: 97 %

## 2024-01-30 DIAGNOSIS — H92.02 LEFT EAR PAIN: Primary | ICD-10-CM

## 2024-01-30 DIAGNOSIS — J01.10 ACUTE NON-RECURRENT FRONTAL SINUSITIS: ICD-10-CM

## 2024-01-30 PROCEDURE — 99213 OFFICE O/P EST LOW 20 MIN: CPT | Performed by: FAMILY MEDICINE

## 2024-01-30 RX ORDER — PREDNISONE 20 MG/1
20 TABLET ORAL DAILY
Qty: 5 TABLET | Refills: 0 | Status: SHIPPED | OUTPATIENT
Start: 2024-01-30 | End: 2024-02-20

## 2024-01-30 RX ORDER — DOXYCYCLINE 100 MG/1
100 CAPSULE ORAL 2 TIMES DAILY
Qty: 20 CAPSULE | Refills: 0 | Status: SHIPPED | OUTPATIENT
Start: 2024-01-30 | End: 2024-02-20

## 2024-01-30 ASSESSMENT — PAIN SCALES - GENERAL: PAINLEVEL: MODERATE PAIN (4)

## 2024-01-30 NOTE — PROGRESS NOTES
"  Assessment & Plan     Left ear pain  Acute non-recurrent frontal sinusitis  Patient completed a course of augmentin and has continued symptoms.   He feels a lot of sinus pressure and ear plugging.   On exam, has evidence of sinusitis and fluid left ear.   Recommend course of doxycycline as well as prednisone.   Push fluids. Rest.   Recheck if fails to improve or if worsening in any way.   - predniSONE (DELTASONE) 20 MG tablet; Take 1 tablet (20 mg) by mouth daily  - doxycycline hyclate (VIBRAMYCIN) 100 MG capsule; Take 1 capsule (100 mg) by mouth 2 times daily            BMI  Estimated body mass index is 30.86 kg/m  as calculated from the following:    Height as of this encounter: 1.79 m (5' 10.47\").    Weight as of this encounter: 98.9 kg (218 lb).   Weight management plan: Patient was referred to their PCP to discuss a diet and exercise plan.          Charity Tomlinson is a 68 year old, presenting for the following health issues:  Ear Problem      1/30/2024     2:35 PM   Additional Questions   Roomed by Dhara BARAJAS CMA   Accompanied by self         1/30/2024     2:35 PM   Patient Reported Additional Medications   Patient reports taking the following new medications n/a     Ear Problem    History of Present Illness       Headaches:   Since the patient's last clinic visit, headaches are: no change  The patient is getting headaches:  Daily  He is able to do normal daily activities when he has a migraine.  The patient is taking the following rescue/relief medications:  Ibuprofen (Advil, Motrin)   Patient states \"The relief is inconsistent\" from the rescue/relief medications.   The patient is taking the following medications to prevent migraines:  No medications to prevent migraines  In the past 4 weeks, the patient has gone to an Urgent Care or Emergency Room 0 times times due to headaches.    He eats 4 or more servings of fruits and vegetables daily.He consumes 2 sweetened beverage(s) daily.He exercises with enough " "effort to increase his heart rate 9 or less minutes per day.  He exercises with enough effort to increase his heart rate 3 or less days per week.   He is taking medications regularly.     Having a constant earache.  Left side.   Finished a course of augmentin and mucinex.    Has only had one day of relief.   Continued post nasal drainage.   No fever.     Started with a cold.   Continued cough. That is improved some.   Headaches in sinus area.     Going on a plane next week.   History of ear issues when a child.                       Objective    /70   Pulse 71   Temp (!) 96.7  F (35.9  C) (Temporal)   Resp 16   Ht 1.79 m (5' 10.47\")   Wt 98.9 kg (218 lb)   SpO2 97%   BMI 30.86 kg/m    Body mass index is 30.86 kg/m .  Physical Exam   GENERAL: alert and no distress  EYES: Eyes grossly normal to inspection, PERRL and conjunctivae and sclerae normal  HENT: normal cephalic/atraumatic, right ear: normal: no effusions, no erythema, normal landmarks, left ear: clear effusion, nose and mouth without ulcers or lesions, oral mucous membranes moist, and postnasal drainage. Frontal and maxillary sinus tenderness.   NECK: no adenopathy, no asymmetry, masses, or scars  RESP: lungs clear to auscultation - no rales, rhonchi or wheezes  CV: regular rate and rhythm, normal S1 S2, no S3 or S4, no murmur, click or rub, no peripheral edema  MS: no gross musculoskeletal defects noted, no edema            Signed Electronically by: Sofi Gagnon MD    "

## 2024-01-31 DIAGNOSIS — I25.10 CORONARY ARTERY DISEASE INVOLVING NATIVE CORONARY ARTERY OF NATIVE HEART WITHOUT ANGINA PECTORIS: ICD-10-CM

## 2024-01-31 DIAGNOSIS — E78.2 MIXED HYPERLIPIDEMIA: ICD-10-CM

## 2024-01-31 RX ORDER — PRAVASTATIN SODIUM 20 MG
20 TABLET ORAL DAILY
Qty: 90 TABLET | Refills: 1 | Status: SHIPPED | OUTPATIENT
Start: 2024-01-31 | End: 2024-04-15

## 2024-01-31 NOTE — TELEPHONE ENCOUNTER
RN called patient to confirm. He is now taking 1 tablet daily. RN did pend medication with the updated sig if still appropriate.     JONATHAN LingN, RN

## 2024-02-01 ENCOUNTER — OFFICE VISIT (OUTPATIENT)
Dept: UROLOGY | Facility: CLINIC | Age: 69
End: 2024-02-01
Payer: MEDICARE

## 2024-02-01 DIAGNOSIS — R97.20 ELEVATED PROSTATE SPECIFIC ANTIGEN (PSA): Primary | ICD-10-CM

## 2024-02-01 DIAGNOSIS — N40.1 BPH WITH OBSTRUCTION/LOWER URINARY TRACT SYMPTOMS: ICD-10-CM

## 2024-02-01 DIAGNOSIS — N13.8 BPH WITH OBSTRUCTION/LOWER URINARY TRACT SYMPTOMS: ICD-10-CM

## 2024-02-01 PROCEDURE — 99204 OFFICE O/P NEW MOD 45 MIN: CPT | Mod: 25 | Performed by: UROLOGY

## 2024-02-01 PROCEDURE — 51798 US URINE CAPACITY MEASURE: CPT | Performed by: UROLOGY

## 2024-02-01 NOTE — NURSING NOTE
Jose Angel Cha's chief complaint for this visit includes:  Chief Complaint   Patient presents with    New Patient     AUASS/PVR; BPH per  (PSA 4.85 resulted 1/29)     PCP: Shari Paredes    Referring Provider:  No referring provider defined for this encounter.    There were no vitals taken for this visit.  Data Unavailable     post void residual: 59 mls         Allergies   Allergen Reactions    Anesthetic Ether     Bee Venom     Demerol Visual Disturbance    Statin [Statins] Other (See Comments)     Muscle pain         Do you need any medication refills at today's visit? Mayra tan Clinic Assistant- Surgical Specialties

## 2024-02-01 NOTE — ADDENDUM NOTE
Addended by: VINCENT CHANG on: 2/1/2024 02:49 PM     Modules accepted: Orders    
within normal limits
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
## 2024-02-01 NOTE — TELEPHONE ENCOUNTER
Called pt and let him know this Rx was sent, he just picked this up and he said the Urologist ordering CT on prostate, needs a pre-op for that. I sched'd his pre-op for that.

## 2024-02-20 ENCOUNTER — OFFICE VISIT (OUTPATIENT)
Dept: FAMILY MEDICINE | Facility: OTHER | Age: 69
End: 2024-02-20
Payer: MEDICARE

## 2024-02-20 VITALS
BODY MASS INDEX: 30.38 KG/M2 | RESPIRATION RATE: 16 BRPM | HEART RATE: 81 BPM | DIASTOLIC BLOOD PRESSURE: 78 MMHG | WEIGHT: 217 LBS | TEMPERATURE: 97.2 F | OXYGEN SATURATION: 96 % | HEIGHT: 71 IN | SYSTOLIC BLOOD PRESSURE: 126 MMHG

## 2024-02-20 DIAGNOSIS — G47.33 OSA (OBSTRUCTIVE SLEEP APNEA): ICD-10-CM

## 2024-02-20 DIAGNOSIS — N40.0 BENIGN PROSTATIC HYPERPLASIA, UNSPECIFIED WHETHER LOWER URINARY TRACT SYMPTOMS PRESENT: ICD-10-CM

## 2024-02-20 DIAGNOSIS — M70.61 GREATER TROCHANTERIC BURSITIS OF BOTH HIPS: ICD-10-CM

## 2024-02-20 DIAGNOSIS — I48.0 PAROXYSMAL ATRIAL FIBRILLATION (H): ICD-10-CM

## 2024-02-20 DIAGNOSIS — M47.24 OSTEOARTHRITIS OF SPINE WITH RADICULOPATHY, THORACIC REGION: ICD-10-CM

## 2024-02-20 DIAGNOSIS — M70.62 GREATER TROCHANTERIC BURSITIS OF BOTH HIPS: ICD-10-CM

## 2024-02-20 DIAGNOSIS — Z01.818 PREOP GENERAL PHYSICAL EXAM: Primary | ICD-10-CM

## 2024-02-20 PROBLEM — E66.01 MORBID OBESITY (H): Status: RESOLVED | Noted: 2021-04-21 | Resolved: 2024-02-20

## 2024-02-20 PROBLEM — R21 RASH: Status: RESOLVED | Noted: 2019-04-03 | Resolved: 2024-02-20

## 2024-02-20 PROCEDURE — 99214 OFFICE O/P EST MOD 30 MIN: CPT | Performed by: PHYSICIAN ASSISTANT

## 2024-02-20 RX ORDER — RESPIRATORY SYNCYTIAL VIRUS VACCINE 120MCG/0.5
0.5 KIT INTRAMUSCULAR ONCE
Qty: 1 EACH | Refills: 0 | Status: CANCELLED | OUTPATIENT
Start: 2024-02-20 | End: 2024-02-20
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
## 2024-02-20 NOTE — PATIENT INSTRUCTIONS
Preparing for Your Surgery  Getting started  A nurse will call you to review your health history and instructions. They will give you an arrival time based on your scheduled surgery time. Please be ready to share:  Your doctor's clinic name and phone number  Your medical, surgical, and anesthesia history  A list of allergies and sensitivities  A list of medicines, including herbal treatments and over-the-counter drugs  Whether the patient has a legal guardian (ask how to send us the papers in advance)  Please tell us if you're pregnant--or if there's any chance you might be pregnant. Some surgeries may injure a fetus (unborn baby), so they require a pregnancy test. Surgeries that are safe for a fetus don't always need a test, and you can choose whether to have one.   If you have a child who's having surgery, please ask for a copy of Preparing for Your Child's Surgery.    Preparing for surgery  Within 10 to 30 days of surgery: Have a pre-op exam (sometimes called an H&P, or History and Physical). This can be done at a clinic or pre-operative center.  If you're having a , you may not need this exam. Talk to your care team.  At your pre-op exam, talk to your care team about all medicines you take. If you need to stop any medicines before surgery, ask when to start taking them again.  We do this for your safety. Many medicines can make you bleed too much during surgery. Some change how well surgery (anesthesia) drugs work.  Call your insurance company to let them know you're having surgery. (If you don't have insurance, call [phone].)  Call your clinic if there's any change in your health. This includes signs of a cold or flu (sore throat, runny nose, cough, rash, fever). It also includes a scrape or scratch near the surgery site.  If you have questions on the day of surgery, call your hospital or surgery center.  Eating and drinking guidelines  For your safety: Unless your surgeon tells you otherwise,  follow the guidelines below.  Eat and drink as usual until 8 hours before you arrive for surgery. After that, no food or milk.  Drink clear liquids until 2 hours before you arrive. These are liquids you can see through, like water, Gatorade, and Propel Water. They also include plain black coffee and tea (no cream or milk), candy, and breath mints. You can spit out gum when you arrive.  If you drink alcohol: Stop drinking it the night before surgery.  If your care team tells you to take medicine on the morning of surgery, it's okay to take it with a sip of water.  Hold multivitamins 10-14 days prior to procedure  Avoid NSAIDs (ibuprofen, advil, motrin, aleve, naproxen, etc) 3-5 days prior  Hold as needed and topicals (creams, nasal sprays) day of procedure  May continue amlodipine, doxepin, aspirin and statin without change  Preventing infection  Shower or bathe the night before and morning of your surgery. Follow the instructions your clinic gave you. (If no instructions, use regular soap.)  Don't shave or clip hair near your surgery site. We'll remove the hair if needed.  Don't smoke or vape the morning of surgery. You may chew nicotine gum up to 2 hours before surgery. A nicotine patch is okay.  Note: Some surgeries require you to completely quit smoking and nicotine. Check with your surgeon.  Your care team will make every effort to keep you safe from infection. We will:  Clean our hands often with soap and water (or an alcohol-based hand rub).  Clean the skin at your surgery site with a special soap that kills germs.  Give you a special gown to keep you warm. (Cold raises the risk of infection.)  Wear special hair covers, masks, gowns and gloves during surgery.  Give antibiotic medicine, if prescribed. Not all surgeries need antibiotics.  What to bring on the day of surgery  Photo ID and insurance card  Copy of your health care directive, if you have one  Glasses and hearing aids (bring cases)  You can't wear  contacts during surgery  Inhaler and eye drops, if you use them (tell us about these when you arrive)  CPAP machine or breathing device, if you use them  A few personal items, if spending the night  If you have . . .  A pacemaker, ICD (cardiac defibrillator) or other implant: Bring the ID card.  An implanted stimulator: Bring the remote control.  A legal guardian: Bring a copy of the certified (court-stamped) guardianship papers.  Please remove any jewelry, including body piercings. Leave jewelry and other valuables at home.  If you're going home the day of surgery  You must have a responsible adult drive you home. They should stay with you overnight as well.  If you don't have someone to stay with you, and you aren't safe to go home alone, we may keep you overnight. Insurance often won't pay for this.  After surgery  If it's hard to control your pain or you need more pain medicine, please call your surgeon's office.  Questions?   If you have any questions for your care team, list them here: _________________________________________________________________________________________________________________________________________________________________________ ____________________________________ ____________________________________ ____________________________________  For informational purposes only. Not to replace the advice of your health care provider. Copyright   2003, 2019 [facility]. All rights reserved. Clinically reviewed by Anette Mi MD. [employer] [id number] - REV 12/22.

## 2024-02-21 DIAGNOSIS — R09.A2 GLOBUS SENSATION: ICD-10-CM

## 2024-02-21 DIAGNOSIS — K22.70 BARRETT'S ESOPHAGUS WITHOUT DYSPLASIA: ICD-10-CM

## 2024-02-21 NOTE — TELEPHONE ENCOUNTER
Chief Complaint   Patient presents with    Medication Refill      Refill request received via Interface, Eprescribing    Pending Prescriptions:                       Disp   Refills    omeprazole (PRILOSEC) 20 MG DR capsule [P*60 cap*0            Sig: Take 1 capsule by mouth twice daily    Omeprazole  Last Written Prescription Date:  8/22/2023  Last Fill Quantity: 60,   # refills: 1  Last Office Visit with prescribing provider: 8/22/2023  Future Office visit: N/A follow up is PRN   Next 5 appointments (look out 90 days)      Feb 22, 2024 11:30 AM  (Arrive by 11:15 AM)  Return Visit with Cathy Wilson MD  [facility] ([facility]) [id number] [address] [place] [id number] [phone]        Cate Pulliam cma.....2/21/2024 at 1:58 PM

## 2024-02-22 ENCOUNTER — OFFICE VISIT (OUTPATIENT)
Dept: DERMATOLOGY | Facility: CLINIC | Age: 69
End: 2024-02-22
Payer: MEDICARE

## 2024-02-22 DIAGNOSIS — Z87.2 HISTORY OF ACTINIC KERATOSES: ICD-10-CM

## 2024-02-22 DIAGNOSIS — L30.9 DERMATITIS: ICD-10-CM

## 2024-02-22 DIAGNOSIS — Z85.828 HISTORY OF NONMELANOMA SKIN CANCER: Primary | ICD-10-CM

## 2024-02-22 DIAGNOSIS — L73.9 FOLLICULITIS: ICD-10-CM

## 2024-02-22 PROCEDURE — 99214 OFFICE O/P EST MOD 30 MIN: CPT | Performed by: DERMATOLOGY

## 2024-02-22 RX ORDER — DOXYCYCLINE 100 MG/1
100 CAPSULE ORAL 2 TIMES DAILY
Qty: 200 CAPSULE | Refills: 0 | Status: CANCELLED | OUTPATIENT
Start: 2024-02-22

## 2024-02-22 RX ORDER — CLINDAMYCIN PHOSPHATE 11.9 MG/ML
SOLUTION TOPICAL
Qty: 60 ML | Refills: 2 | Status: SHIPPED | OUTPATIENT
Start: 2024-02-22 | End: 2024-07-11

## 2024-02-22 NOTE — NURSING NOTE
Jose Angel Cha's goals for this visit include:   Chief Complaint   Patient presents with    Skin Check     Patient is here for a skin check, areas of concern dry skin HX of NMSC       He requests these members of his care team be copied on today's visit information:     PCP: Shari Paredes    Referring Provider:  No referring provider defined for this encounter.    There were no vitals taken for this visit.    Do you need any medication refills at today's visit?        Mora Rolon EMT
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
## 2024-02-22 NOTE — PATIENT INSTRUCTIONS
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
## 2024-02-27 ENCOUNTER — OFFICE VISIT (OUTPATIENT)
Dept: ORTHOPEDICS | Facility: OTHER | Age: 69
End: 2024-02-27
Attending: PHYSICIAN ASSISTANT
Payer: MEDICARE

## 2024-02-27 ENCOUNTER — MYC MEDICAL ADVICE (OUTPATIENT)
Dept: INTERVENTIONAL RADIOLOGY/VASCULAR | Facility: CLINIC | Age: 69
End: 2024-02-27

## 2024-02-27 DIAGNOSIS — M70.62 GREATER TROCHANTERIC BURSITIS OF BOTH HIPS: Primary | ICD-10-CM

## 2024-02-27 DIAGNOSIS — M70.61 GREATER TROCHANTERIC BURSITIS OF BOTH HIPS: Primary | ICD-10-CM

## 2024-02-27 PROCEDURE — 99213 OFFICE O/P EST LOW 20 MIN: CPT | Performed by: STUDENT IN AN ORGANIZED HEALTH CARE EDUCATION/TRAINING PROGRAM
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
## 2024-03-04 NOTE — MR AVS SNAPSHOT
After Visit Summary   1/24/2018    Jose Angel Cha    MRN: [id number]           Patient Information     Date Of Birth          1955        Visit Information        Provider Department      1/24/2018 3:00 PM Skyla Garcia APRN [facility]        Today's Diagnoses     Coronary artery disease involving native coronary artery of native heart without angina pectoris    -  1    Benign essential hypertension        Mixed hyperlipidemia          Care Instructions    Please restart amlodipine 2.5 mg daily.  See your primary in a month or so to follow up on your blood pressure.    Keep follow up with Dr. Chirinos in March.    [place] [id number]/[phone]          Follow-ups after your visit        Your next 10 appointments already scheduled     Jan 25, 2018  3:20 PM CST   (Arrive by 3:15 PM)   Nurse [facility] with [facility] SH[facility]    [address]  [facility] [address]   [phone]            Jan 31, 2018  1:50 PM CST   LUIS F Extremity with Amanda K Hilligoss, PT   [place] [facility] ([facility]  )    [address] [place] [address]. [address]  [facility] [address]   [phone]            Feb 01, 2018  3:10 PM CST   Nurse Only with ER ALLERGY SHOTS   [facility]    [address]  [place] [facility] [address]   [phone]            Feb 15, 2018  4:50 PM CST   Nurse Only with ER ALLERGY SHOTS   [facility]    [address]  [facility] [facility] [address]   [phone]            Feb 22, 2018  4:50 PM CST   Nurse Only with ER ALLERGY SHOTS   [facility]    [address]  [facility] [address]   [phone]            Mar 06, 2018  9:00 AM CST   Nurse Only with ER ALLERGY SHOTS   [facility]  "[place] [facility])    [address]  [facility] [address] [phone]            Mar 26, 2018  8:45 AM CDT   Return Visit with Ynes Chirinos MD   [facility]   [place] ([facility])    [address]  [place] [id number] [phone]              Who to contact     If you have questions or need follow up information about today's clinic visit or your schedule please contact [facility]   [place] directly at [phone].  Normal or non-critical lab and imaging results will be communicated to you by [employer], letter or phone within 4 business days after the clinic has received the results. If you do not hear from us within 7 days, please contact the clinic through [employer] or phone. If you have a critical or abnormal lab result, we will notify you by phone as soon as possible.  Submit refill requests through [employer] or call your pharmacy and they will forward the refill request to us. Please allow 3 business days for your refill to be completed.          Additional Information About Your Visit        MyChart Information     [employer] gives you secure access to your electronic health record. If you see a primary care provider, you can also send messages to your care team and make appointments. If you have questions, please call your primary care clinic.  If you do not have a primary care provider, please call [phone] and they will assist you.        Care EveryWhere ID     This is your Care EveryWhere ID. This could be used by other organizations to access your [place] medical records  [id number]        Your Vitals Were     Pulse Height BMI (Body Mass Index)             74 1.778 m (5' 10\") 31.55 kg/m2          Blood Pressure from Last 3 Encounters:   01/24/18 (!) 141/93   12/29/17 134/74   12/29/17 147/88    Weight from Last 3 Encounters:   01/24/18 99.7 kg (219 lb 14.4 oz)   12/29/17 97.7 " kg (215 lb 6.4 oz)   12/29/17 98.4 kg (217 lb)              Today, you had the following     No orders found for display         Today's Medication Changes          These changes are accurate as of 1/24/18  3:04 PM.  If you have any questions, ask your nurse or doctor.               Start taking these medicines.        Dose/Directions    amLODIPine 2.5 MG tablet   Commonly known as:  NORVASC   Used for:  Benign essential hypertension   Started by:  Skyla Garcia APRN CNP        Dose:  2.5 mg   Take 1 tablet (2.5 mg) by mouth daily   Quantity:  30 tablet   Refills:  1            Where to get your medicines      These medications were sent to [facility] [address] [address]     Phone:  [phone]     amLODIPine 2.5 MG tablet                Primary Care Provider Office Phone # Fax #    Shari Tonia Paredes [phone]       [address] [place] [address]        Equal Access to Services     [facility]ANDELL [facility]IA : Hadii karina dowling hadasho Soomaali, waaxda luqadaha, qaybta kaalmada adeegyada, waxay alyce guillen . So [facility] [phone].    ATENCIÓN: Si habla español, tiene a armas disposición servicios gratuitos de asistencia lingüística. Llame al [phone].    We comply with applicable federal civil rights laws and [place] laws. We do not discriminate on the basis of race, color, national origin, age, disability, sex, sexual orientation, or gender identity.            Thank you!     Thank you for choosing [facility]  for your care. Our goal is always to provide you with excellent care. Hearing back from our patients is one way we can continue to improve our services. Please take a few minutes to complete the written survey that you may receive in the mail after your visit with us. Thank you!             Your Updated Medication List - Protect others around you: Learn how to  safely use, store and throw away your medicines at www.disposemymeds.org.          This list is accurate as of 1/24/18  3:04 PM.  Always use your most recent med list.                   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 P.  1:100 w/v, HS  1.0 ml Dust Mites DF 30,000AU/mL, HS  0.3 ml Dust Mites DP. 30,000 AU/mL, HS  0.3 ml  Birch Mix PRW 1:20 w/v, HS  0.5 ml Grass Mix #7 100,000 BAU/mL, HS 0.4 ml Nettle 1:20 w/v, HS 0.5 ml Diluent: HSA qs to 5ml    Allergic rhinitis due to dust mite, Chronic seasonal allergic rhinitis due to pollen, Allergic rhinitis due to animal dander       amLODIPine 2.5 MG tablet    NORVASC    30 tablet    Take 1 tablet (2.5 mg) by mouth daily    Benign essential hypertension       aspirin 81 MG tablet     0    ONE DAILY    Other and unspecified hyperlipidemia, Family history of ischemic heart disease       CLEAR-ATADINE 10 MG tablet   Generic drug:  loratadine      Take 10 mg by mouth daily        EPINEPHrine 0.3 MG/0.3ML injection 2-pack    EPIPEN 2-MIGUELINA    2 each    Inject 0.3 mLs (0.3 mg) into the muscle once as needed for anaphylaxis    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 sting       ezetimibe 10 MG tablet    ZETIA    30 tablet    Take 1 tablet (10 mg)  by mouth daily At night    Mixed hyperlipidemia       fluticasone 50 MCG/ACT spray    FLONASE    1 Bottle    Spray 2 sprays into both nostrils daily    Chronic seasonal allergic rhinitis due to pollen, House dust mite allergy, Allergic rhinitis due to animal dander       hydrochlorothiazide 25 MG tablet    HYDRODIURIL    30 tablet    Take 1 tablet (25 mg) by mouth daily    Benign essential hypertension       MULTIVITAL PO      Take 1 tablet by mouth daily Reported on 3/22/2017        olopatadine HCl 0.2 % Soln    PATADAY    2.5 mL    Place 1 drop into both eyes daily    Chronic seasonal allergic rhinitis due to pollen, House dust mite allergy       omeprazole 20 MG CR capsule    priLOSEC    90 capsule    TAKE ONE CAPSULE BY MOUTH EVERY DAY (TAKE 30 TO 60 MINUTES BEFORE A MEAL)    Gastroesophageal reflux disease without esophagitis       polyethylene glycol powder    MIRALAX    510 g    Take 17 g (1 capful) by mouth daily    Chronic constipation       STATIN NOT PRESCRIBED (INTENTIONAL)      by Other route continuous prn Reported on 4/6/2017    Mitral valve disorders(424.0), Hyperlipidemia LDL goal <100       temazepam 15 MG capsule    RESTORIL    30 capsule    Take 1 capsule (15 mg) by mouth nightly as needed for sleep    Anxiety       * Notice:  This list has 5 medication(s) that are the same as other medications prescribed for you. Read the directions carefully, and ask your doctor or other care provider to review them with you.       none

## 2024-03-06 ENCOUNTER — HOSPITAL ENCOUNTER (OUTPATIENT)
Dept: MRI IMAGING | Facility: HOSPITAL | Age: 69
Discharge: HOME | End: 2024-03-06
Attending: UROLOGY | Admitting: UROLOGY
Payer: MEDICARE

## 2024-03-06 VITALS
TEMPERATURE: 97.8 F | DIASTOLIC BLOOD PRESSURE: 82 MMHG | OXYGEN SATURATION: 99 % | SYSTOLIC BLOOD PRESSURE: 136 MMHG | RESPIRATION RATE: 15 BRPM | HEART RATE: 64 BPM

## 2024-03-06 DIAGNOSIS — N13.8 BPH WITH OBSTRUCTION/LOWER URINARY TRACT SYMPTOMS: ICD-10-CM

## 2024-03-06 DIAGNOSIS — N40.1 BPH WITH OBSTRUCTION/LOWER URINARY TRACT SYMPTOMS: ICD-10-CM

## 2024-03-06 DIAGNOSIS — R97.20 ELEVATED PROSTATE SPECIFIC ANTIGEN (PSA): ICD-10-CM

## 2024-03-06 PROCEDURE — G1010 CDSM STANSON: HCPCS

## 2024-03-06 PROCEDURE — A9585 GADOBUTROL INJECTION: HCPCS | Performed by: UROLOGY

## 2024-03-06 PROCEDURE — 250N000011 HC RX IP 250 OP 636: Performed by: RADIOLOGY

## 2024-03-06 PROCEDURE — 255N000002 HC RX 255 OP 636: Performed by: UROLOGY

## 2024-03-06 RX ORDER — GADOBUTROL 604.72 MG/ML
10 INJECTION INTRAVENOUS ONCE
Status: COMPLETED | OUTPATIENT
Start: 2024-03-06 | End: 2024-03-06

## 2024-03-06 RX ORDER — FLUMAZENIL 0.1 MG/ML
0.2 INJECTION, SOLUTION INTRAVENOUS
Status: DISCONTINUED | OUTPATIENT
Start: 2024-03-06 | End: 2024-03-07 | Stop reason: HOSPADM

## 2024-03-06 RX ORDER — NALOXONE HYDROCHLORIDE 0.4 MG/ML
0.2 INJECTION, SOLUTION INTRAMUSCULAR; INTRAVENOUS; SUBCUTANEOUS
Status: DISCONTINUED | OUTPATIENT
Start: 2024-03-06 | End: 2024-03-07 | Stop reason: HOSPADM

## 2024-03-06 RX ORDER — DIAZEPAM 5 MG
5 TABLET ORAL EVERY 30 MIN PRN
Status: DISCONTINUED | OUTPATIENT
Start: 2024-03-06 | End: 2024-03-07 | Stop reason: HOSPADM

## 2024-03-06 RX ORDER — FENTANYL CITRATE 50 UG/ML
25-50 INJECTION, SOLUTION INTRAMUSCULAR; INTRAVENOUS EVERY 5 MIN PRN
Status: DISCONTINUED | OUTPATIENT
Start: 2024-03-06 | End: 2024-03-07 | Stop reason: HOSPADM

## 2024-03-06 RX ORDER — NALOXONE HYDROCHLORIDE 0.4 MG/ML
0.4 INJECTION, SOLUTION INTRAMUSCULAR; INTRAVENOUS; SUBCUTANEOUS
Status: DISCONTINUED | OUTPATIENT
Start: 2024-03-06 | End: 2024-03-07 | Stop reason: HOSPADM

## 2024-03-06 RX ADMIN — MIDAZOLAM HYDROCHLORIDE 2 MG: 1 INJECTION, SOLUTION INTRAMUSCULAR; INTRAVENOUS at 08:56

## 2024-03-06 RX ADMIN — FENTANYL CITRATE 100 MCG: 50 INJECTION, SOLUTION INTRAMUSCULAR; INTRAVENOUS at 08:54

## 2024-03-06 RX ADMIN — GADOBUTROL 10 ML: 604.72 INJECTION INTRAVENOUS at 08:49

## 2024-03-06 NOTE — IR NOTE
Patient Name: Jose Angel hCa  Medical Record Number: [id number]  Today's Date: 3/6/2024    Procedure: MRI  Proceduralist: Odette    Procedure Start: 0750  Procedure end: 0840  Sedation medications administered: 2 mg midazolam and 100 mcg fentanyl   Sedation time: 50 minutes

## 2024-03-06 NOTE — INTERVAL H&P NOTE
"I have reviewed the surgical (or preoperative) H&P that is linked to this encounter, and examined the patient. There are no significant changes    Clinical Conditions Present on Arrival:  Clinically Significant Risk Factors Present on Admission                 # Drug Induced Platelet Defect: home medication list includes an antiplatelet medication  # Obesity: Estimated body mass index is 30.48 kg/m  as calculated from the following:    Height as of 2/20/24: 1.797 m (5' 10.75\").    Weight as of 2/20/24: 98.4 kg (217 lb).       "

## 2024-03-06 NOTE — PRE-PROCEDURE
GENERAL PRE-PROCEDURE:   Procedure:  MRI with moderate sedation  Date/Time:  3/6/2024 7:46 AM    Written consent obtained?: Yes    Risks and benefits: Risks, benefits and alternatives were discussed    Consent given by:  Patient  Patient states understanding of procedure being performed: Yes    Patient's understanding of procedure matches consent: Yes    Procedure consent matches procedure scheduled: Yes    Expected level of sedation:  Moderate  Appropriately NPO:  Yes  ASA Class:  2  Mallampati  :  Grade 2- soft palate, base of uvula, tonsillar pillars, and portion of posterior pharyngeal wall visible  Lungs:  Lungs clear with good breath sounds bilaterally  Heart:  Normal heart sounds and rate  History & Physical reviewed:  History and physical reviewed and no updates needed  Statement of review:  I have reviewed the lab findings, diagnostic data, medications, and the plan for sedation

## 2024-03-06 NOTE — DISCHARGE INSTRUCTIONS
* Recovery After Conscious Sedation (Adult)  We gave you medicine by vein to make you sleepy or relaxed during your procedure. This may have included both a pain medicine and sleeping medicine. Most of the effects have worn off. But you may still feel sleepy for the next 6 to 8 hours.  Home care  Follow these guidelines when you get home:  You may feel sleepy and clumsy and have poor balance for the next few hours.  A responsible adult should stay with you for the next 8 hours. This person should make sure your condition doesn t get worse.  Don't drink any alcohol for the next 24 hours.  Don't drive, operate dangerous machinery, make important business or personal decisions or sign legal documents during the next 24 hours.  You may vomit (throw up) if you eat too soon after the procedure. If this happens, drink small amounts of water, juice or clear broth. Wait to try solid food until you no longer have nausea (upset stomach).  Note: Your care team may tell you not to take any medicine by mouth for pain or sleep in the next 4 hours. These medicines may react with the medicines you had in the hospital. This could cause a much stronger response than usual.  Follow-up care  Follow up with your care team if you are not alert and back to your usual level of activity within 12 hours.  When to seek medical advice  Call your care team right away if any of these occur:  You still feel sleepy or clumsy after 12 hours, or your sleepiness gets worse  Weakness or dizziness gets worse  Repeated vomiting  If you can't be woken up and someone is staying with you, they should call 911.  For informational purposes only. Not to replace the advice of your health care provider.  Copyright   2018 [employer]. All rights reserved.

## 2024-03-11 ENCOUNTER — MYC MEDICAL ADVICE (OUTPATIENT)
Dept: UROLOGY | Facility: CLINIC | Age: 69
End: 2024-03-11
Payer: MEDICARE

## 2024-03-11 NOTE — TELEPHONE ENCOUNTER
Bernard Blank MD  [facility] Urology Adult [facility] team,    I don't see any follow up scheduled to discuss the MRI results. Please reach out to him to schedule follow up. Could add on for 3/14.    Plan  - Schedule follow up    Thanks,  Bernard SANDERS. MD Abhay      Received above message from provider.    Christine Doe MA on 3/11/2024 at 12:01 PM

## 2024-03-14 ENCOUNTER — VIRTUAL VISIT (OUTPATIENT)
Dept: UROLOGY | Facility: CLINIC | Age: 69
End: 2024-03-14
Payer: MEDICARE

## 2024-03-14 DIAGNOSIS — N13.8 BPH WITH OBSTRUCTION/LOWER URINARY TRACT SYMPTOMS: Primary | ICD-10-CM

## 2024-03-14 DIAGNOSIS — R97.20 ELEVATED PROSTATE SPECIFIC ANTIGEN (PSA): ICD-10-CM

## 2024-03-14 DIAGNOSIS — N40.1 BPH WITH OBSTRUCTION/LOWER URINARY TRACT SYMPTOMS: Primary | ICD-10-CM

## 2024-03-14 PROCEDURE — 99214 OFFICE O/P EST MOD 30 MIN: CPT | Mod: 95 | Performed by: UROLOGY

## 2024-03-14 RX ORDER — FINASTERIDE 5 MG/1
5 TABLET, FILM COATED ORAL DAILY
Qty: 90 TABLET | Refills: 3 | Status: SHIPPED | OUTPATIENT
Start: 2024-03-14

## 2024-03-14 ASSESSMENT — PAIN SCALES - GENERAL: PAINLEVEL: NO PAIN (0)

## 2024-03-14 NOTE — NURSING NOTE
Is the patient currently in the state of [place]? YES    Visit mode:VIDEO    If the visit is dropped, the patient can be reconnected by: VIDEO VISIT: Text to cell phone:   Telephone Information:   Mobile [phone]       Will anyone else be joining the visit? NO  (If patient encounters technical issues they should call [phone]    How would you like to obtain your AVS? MyChart    Are changes needed to the allergy or medication list? No    Reason for visit: RECHECK    Mary VILLARREAL

## 2024-03-14 NOTE — PROGRESS NOTES
[facility]   CHIEF COMPLAINT   It was my pleasure to see Jose Angel Cha who is a 68 year old male for follow-up of BPH with LUTS, Elevated PSA .      HPI   Jose Angel Cha is a very pleasant 68 year old male     Initially seen 2/1/2024:  Jose Angel Cha is a 68 year old male who is being seen for evaluation of LUTS  He has noticed a gradual worsening of his urinary symptoms over the last several years  He notes a slower stream with increased urgency and at times a few drops of urge incontinence  He did try tamsulosin in the past, this was at least a decade ago, with very bothersome side effects such as retrograde ejaculation and changes to his climax and orgasm  He has had some difficulty maintaining an erection and has used sildenafil 25 mg as needed with very good results  1 cup of tea, water, 1 pop per day    AUASS: 4-2-3-4-3-1-2/3 = 19/20  QOL:4/5  PVR = 59cc    No known family history of prostate cancer    TODAY 3/14/2024:  Follow-up today to review his MRI  He continues to have bothersome urinary symptoms    PHYSICAL EXAM  Patient is a 68 year old  male   Vitals: There were no vitals taken for this visit.  There is no height or weight on file to calculate BMI.  General Appearance Adult:   Alert, no acute distress, oriented  Neuro: Alert, oriented, speech and mentation normal  Psych: affect and mood normal            PSA PSA Tumor Marker   Latest Ref Rng 0.00 - 4.00 ug/L 0.00 - 4.50 ng/mL   2/22/2007 1.44      9/19/2011 1.53      10/25/2012 1.73      7/26/2013 2.04      8/11/2022 3.73      1/29/2024   4.85 (H)       IMAGING:  All pertinent imaging reviewed:    All imaging studies reviewed by me.  I personally reviewed these imaging films.  A formal report from radiology will follow.    MRI PROSTATE 3/6/2024:  FINDINGS:  PERIPHERAL ZONE: No suspicious focal finding in the peripheral zone. Multiple linear and wedge-shaped foci of T2 hypointensity and enhancement without abnormal diffusion restriction consistent  with nonspecific postinflammatory change.     TRANSITIONAL ZONE: No suspicious focal finding in the transitional zone. Stromal and glandular BPH.     No seminal vesicle invasion.  No pelvic lymphadenopathy.  No lesions in the visualized bones.     PROSTATE GLAND VOLUME: 67 cc.     Small bilateral fat-containing inguinal hernias unchanged.                                                            IMPRESSION:  1.  No suspicious focal finding in the prostate gland which is assigned PI-RADS CATEGORY 2: Low. Clinically significant cancer is unlikely to be present.  2.  Peripheral zone with mild nonspecific postinflammatory change.  3.  Moderate prostate enlargement secondary to BPH.  4.  Small bilateral fat-containing inguinal hernias unchanged.        ASSESSMENT and PLAN  68-year-old man with BPH and LUTS and elevated PSA    BPH with LUTS  - We again discussed the pathophysiology of the bladder and the prostate and the normal changes associated with the development of BPH and LUTS  - Reviewed his MRI and reviewed these images personally.  I agree with radiologist interpretation.  -We discussed that his prostate measures 67 g on MRI  - Previous trial of tamsulosin with bothersome side effects  - We discussed the option for trial of finasteride 5 mg daily.  We discussed the mechanism of action, side effects and he is interested in trying this medication.  Prescription sent to the pharmacy  - Follow-up in 6 months with an AUA symptom score and PVR    Elevated PSA  - Reviewed his PSA history and trend  - I reviewed his MRI and agree with the radiologist interpretation.  We discussed that it is very reassuring there are no PI-RADS 3 or greater lesions  - Plan for PSA recheck in 6 months and we discussed the expected change with finasteride    Time spent: 18 minutes spent on the date of the encounter doing chart review, history and exam, documentation and further activities as noted above.    Bernard Blank MD    Urology  [facility]  [facility] Phone: [phone]  [facility] Phone: [phone]          Virtual Visit Details    Type of service:  Video Visit   Video Start Time:  2:12 PM  Video End Time: 2:22 PM    Originating Location (pt. Location): Home    Distant Location (provider location):  On-site  Platform used for Video Visit: Teodora

## 2024-04-05 ENCOUNTER — HOSPITAL ENCOUNTER (OUTPATIENT)
Dept: CARDIOLOGY | Facility: CLINIC | Age: 69
Discharge: HOME | End: 2024-04-05
Attending: INTERNAL MEDICINE | Admitting: INTERNAL MEDICINE
Payer: MEDICARE

## 2024-04-05 DIAGNOSIS — Z98.890 H/O MITRAL VALVE REPAIR: ICD-10-CM

## 2024-04-05 DIAGNOSIS — I10 BENIGN ESSENTIAL HYPERTENSION: ICD-10-CM

## 2024-04-05 DIAGNOSIS — I25.10 CORONARY ARTERY DISEASE INVOLVING NATIVE CORONARY ARTERY OF NATIVE HEART WITHOUT ANGINA PECTORIS: ICD-10-CM

## 2024-04-05 LAB — LVEF ECHO: NORMAL

## 2024-04-05 PROCEDURE — 93306 TTE W/DOPPLER COMPLETE: CPT | Mod: 26 | Performed by: INTERNAL MEDICINE

## 2024-04-05 PROCEDURE — 93306 TTE W/DOPPLER COMPLETE: CPT

## 2024-04-08 ENCOUNTER — TELEPHONE (OUTPATIENT)
Dept: CARDIOLOGY | Facility: CLINIC | Age: 69
End: 2024-04-08
Payer: MEDICARE

## 2024-04-08 NOTE — TELEPHONE ENCOUNTER
----- Message from Sherman Gtz MD sent at 4/5/2024  1:43 PM CDT -----  Regarding: TTE  Ranjeet Fabian  Please inform Pastor Cha his TTE looks great. MV repair intact and pumping function normal. It looks as if a follow up I scheduled for 4/15. I will CC , his primary care MD, on this message.  Thanks

## 2024-04-08 NOTE — TELEPHONE ENCOUNTER
SkuRun message sent to patient with result note from Dr. Gtz. Patient has follow up visit scheduled with Dr. Gtz on 4/15/24.

## 2024-04-15 ENCOUNTER — OFFICE VISIT (OUTPATIENT)
Dept: CARDIOLOGY | Facility: CLINIC | Age: 69
End: 2024-04-15
Payer: MEDICARE

## 2024-04-15 VITALS
BODY MASS INDEX: 30.8 KG/M2 | DIASTOLIC BLOOD PRESSURE: 82 MMHG | RESPIRATION RATE: 18 BRPM | SYSTOLIC BLOOD PRESSURE: 124 MMHG | HEART RATE: 74 BPM | WEIGHT: 220 LBS | HEIGHT: 71 IN | OXYGEN SATURATION: 97 %

## 2024-04-15 DIAGNOSIS — Z98.890 S/P MITRAL VALVE REPAIR: ICD-10-CM

## 2024-04-15 DIAGNOSIS — I25.10 CORONARY ARTERY DISEASE INVOLVING NATIVE CORONARY ARTERY OF NATIVE HEART WITHOUT ANGINA PECTORIS: Primary | ICD-10-CM

## 2024-04-15 DIAGNOSIS — E78.5 DYSLIPIDEMIA (HIGH LDL; LOW HDL): ICD-10-CM

## 2024-04-15 PROCEDURE — 99214 OFFICE O/P EST MOD 30 MIN: CPT | Performed by: INTERNAL MEDICINE

## 2024-04-15 ASSESSMENT — PAIN SCALES - GENERAL: PAINLEVEL: NO PAIN (0)

## 2024-04-15 NOTE — PROGRESS NOTES
HISTORY OF PRESENT ILLNESS:  Jose Angel Cha a 68 year old man with a history of mitral insufficiency (status post mitral valve repair), nonobstructive coronary disease, dyslipidemia, and hypertension was seen today at your request for followup.    Since last seen 4/5/2023 the patient remains entirely free of cardiac symptoms and denies chest pain dyspnea syncope lightheadedness or palpitation.  I have personally reviewed his most recent echocardiogram that continues to show his mitral valve repair remains intact with normal left ventricular systolic performance and no significant gradient across the repair.  The patient remains intolerant of all statin drugs and finds the muscle aches on pravastatin very limiting.  He has previously failed trials of atorvastatin, rosuvastatin, and simvastatin.  Ezetimibe has been ineffective.  A preoperative coronary angiogram showed nonobstructive coronary disease he has dyslipidemia with LDL cholesterols remain greater than 150.      PAST MEDICAL HISTORY:    1.  Mitral insufficiency -- status post mitral valve repair with annuloplasty in 2010.  Most recent echo showing successful repair with no regurgitation or significant stenosis and preserved ejection fraction.  2.  Nonobstructive coronary artery disease -- diagnosed on preoperative coronary angiogram.  Intolerant of statins and cannot afford either PCSK9 inhibitors or ezetimibe.  3.  Hypertension.  4.  Previous history of bilateral lower extremity edema on amlodipine.  Currently resolved.  5) dyslipidemia     Orders this Visit:  No orders of the defined types were placed in this encounter.    No orders of the defined types were placed in this encounter.    There are no discontinued medications.    No diagnosis found.    CURRENT MEDICATIONS:  Current Outpatient Medications   Medication Sig Dispense Refill    amLODIPine (NORVASC) 2.5 MG tablet Take 1 tablet (2.5 mg) by mouth daily 90 tablet 3    ASPIRIN 81 MG OR TABS ONE DAILY  "0 0    CALCIUM PO       clindamycin (CLEOCIN T) 1 % external solution Apply to scalp for 3 weeks in a row 60 mL 2    doxepin (SINEQUAN) 10 MG capsule Take 1 capsule (10 mg) by mouth At Bedtime (Patient taking differently: Take 10 mg by mouth at bedtime as needed, may repeat once) 30 capsule 0    EPINEPHrine (ANY BX GENERIC EQUIV) 0.3 MG/0.3ML injection 2-pack Inject 0.3 mLs (0.3 mg) into the muscle as needed for anaphylaxis 0.6 mL 1    famotidine (PEPCID) 40 MG tablet Take 1 tablet (40 mg) by mouth nightly as needed for heartburn 30 tablet 3    finasteride (PROSCAR) 5 MG tablet Take 1 tablet (5 mg) by mouth daily 90 tablet 3    fluticasone (FLONASE) 50 MCG/ACT nasal spray USE 2 SPRAY(S) IN THE AFFECTED NOSTRIL ONCE DAILY      Multiple Vitamins-Minerals (MULTIVITAL PO) Take 1 tablet by mouth daily Reported on 3/22/2017      olopatadine (PATADAY) 0.2 % ophthalmic solution 0.05 mLs (1 drop) daily      omeprazole (PRILOSEC) 20 MG DR capsule Take 1 capsule by mouth twice daily 60 capsule 0    pravastatin (PRAVACHOL) 20 MG tablet Take 1 tablet (20 mg) by mouth daily 90 tablet 1    psyllium (METAMUCIL/KONSYL) Packet Take 1 packet by mouth daily      sildenafil (VIAGRA) 25 MG tablet Take 1 tablet (25 mg) by mouth daily as needed (erectile dysfunction) 30 tablet 1       ALLERGIES     Allergies   Allergen Reactions    Anesthetic Ether     Bee Venom     Demerol Visual Disturbance    Statin [Statins] Other (See Comments)     Muscle pain       PAST MEDICAL, SURGICAL, FAMILY, SOCIAL HISTORY:  History was reviewed and updated as needed, see medical record.        Review of Systems:  A 12-point review of systems was completed, see medical record for detailed review of systems information.    Physical Exam:  Vitals: Ht 1.797 m (5' 10.75\")   BMI 30.48 kg/m      Constitutional: No apparent distress    HEENT: pupils equal round reactive to light, thyroid normal size, JVP is normal    Chest: Clear to percussion and " auscultation    Cardiac: Normal S1, normal S2 no S3, no murmur or click        ASSESSMENT:  The patient's mitral valve repair remains intact and his systolic blood pressure is optimal.  Since he has documented coronary disease, it would be preferable to lower his LDL cholesterol to current guideline recommendations of less than 70 and preferably near 55 mg DL.  Since he has failed statin therapy and ezetimibe, it would be reasonable to trial a PCSK9 inhibitor or bempedoic acid in addition to continued aggressive lifestyle intervention.    RECOMMENDATIONS:   1) Continue Mediterranean style weight loss diet  2) Regular exercise program 45 minutes 45 minutes daily  3) Alirocumab 75 mg,if too expensive,consider bempedoic acid  4) Follow up with me in one year with TTE at that time.         Recent Lab Results:  LIPID RESULTS:  Lab Results   Component Value Date    CHOL 223 (H) 11/29/2023    CHOL 224 (H) 08/20/2020    HDL 50 11/29/2023    HDL 48 08/20/2020     (H) 11/29/2023     (H) 08/20/2020    TRIG 108 11/29/2023    TRIG 199 (H) 08/20/2020    CHOLHDLRATIO 5.0 07/11/2014       LIVER ENZYME RESULTS:  Lab Results   Component Value Date    AST 40 11/29/2023    AST 41 06/02/2021    ALT 30 11/29/2023    ALT 62 06/02/2021       CBC RESULTS:  Lab Results   Component Value Date    WBC 5.6 01/16/2020    RBC 4.54 01/16/2020    HGB 16.7 01/16/2020    HCT 47.9 01/16/2020     (H) 01/16/2020    MCH 36.8 (H) 01/16/2020    MCHC 34.9 01/16/2020    RDW 12.3 01/16/2020     01/16/2020       BMP RESULTS:  Lab Results   Component Value Date     08/30/2023     06/02/2021    POTASSIUM 4.1 08/30/2023    POTASSIUM 4.3 08/11/2022    POTASSIUM 4.2 06/02/2021    CHLORIDE 103 08/30/2023    CHLORIDE 109 08/11/2022    CHLORIDE 105 06/02/2021    CO2 27 08/30/2023    CO2 28 08/11/2022    CO2 28 06/02/2021    ANIONGAP 9 08/30/2023    ANIONGAP 4 08/11/2022    ANIONGAP 4 06/02/2021    GLC 61 (L) 08/30/2023    GLC 86  08/11/2022    GLC 86 06/02/2021    BUN 15.3 08/30/2023    BUN 13 08/11/2022    BUN 16 06/02/2021    CR 1.17 08/30/2023    CR 1.30 (H) 06/02/2021    GFRESTIMATED 67 08/30/2023    GFRESTIMATED 56 (L) 06/02/2021    GFRESTBLACK 65 06/02/2021    PEYTON 9.8 08/30/2023    PEYTON 9.5 06/02/2021        A1C RESULTS:  Lab Results   Component Value Date    A1C 5.1 09/26/2017       INR RESULTS:  Lab Results   Component Value Date    INR 2.3 06/11/2010    INR 2.8 05/28/2010    INR 2.61 (H) 04/23/2010    INR 1.70 (H) 04/22/2010       We greatly appreciate the opportunity to be involved in the care of your patient, Jose Angel Cha.    Sincerely,  Sherman Gtz MD        Shari Paredes MD  [address] [place], [place] [id number]

## 2024-04-15 NOTE — LETTER
4/15/2024    Shari Paredes MD, MD  [address] [facility] [address]    RE: Jose Angel Cha       Dear Colleague,     I had the pleasure of seeing Jose Angel Cha in the [facility].  HISTORY OF PRESENT ILLNESS:  Jose Angel Cha a 68 year old man with a history of mitral insufficiency (status post mitral valve repair), nonobstructive coronary disease, dyslipidemia, and hypertension was seen today at your request for followup.    Since last seen 4/5/2023 the patient remains entirely free of cardiac symptoms and denies chest pain dyspnea syncope lightheadedness or palpitation.  I have personally reviewed his most recent echocardiogram that continues to show his mitral valve repair remains intact with normal left ventricular systolic performance and no significant gradient across the repair.  The patient remains intolerant of all statin drugs and finds the muscle aches on pravastatin very limiting.  He has previously failed trials of atorvastatin, rosuvastatin, and simvastatin.  Ezetimibe has been ineffective.  A preoperative coronary angiogram showed nonobstructive coronary disease he has dyslipidemia with LDL cholesterols remain greater than 150.      PAST MEDICAL HISTORY:    1.  Mitral insufficiency -- status post mitral valve repair with annuloplasty in 2010.  Most recent echo showing successful repair with no regurgitation or significant stenosis and preserved ejection fraction.  2.  Nonobstructive coronary artery disease -- diagnosed on preoperative coronary angiogram.  Intolerant of statins and cannot afford either PCSK9 inhibitors or ezetimibe.  3.  Hypertension.  4.  Previous history of bilateral lower extremity edema on amlodipine.  Currently resolved.  5) dyslipidemia     Orders this Visit:  No orders of the defined types were placed in this encounter.    No orders of the defined types were placed in this encounter.    There are no discontinued medications.    No diagnosis found.    CURRENT  MEDICATIONS:  Current Outpatient Medications   Medication Sig Dispense Refill    amLODIPine (NORVASC) 2.5 MG tablet Take 1 tablet (2.5 mg) by mouth daily 90 tablet 3    ASPIRIN 81 MG OR TABS ONE DAILY 0 0    CALCIUM PO       clindamycin (CLEOCIN T) 1 % external solution Apply to scalp for 3 weeks in a row 60 mL 2    doxepin (SINEQUAN) 10 MG capsule Take 1 capsule (10 mg) by mouth At Bedtime (Patient taking differently: Take 10 mg by mouth at bedtime as needed, may repeat once) 30 capsule 0    EPINEPHrine (ANY BX GENERIC EQUIV) 0.3 MG/0.3ML injection 2-pack Inject 0.3 mLs (0.3 mg) into the muscle as needed for anaphylaxis 0.6 mL 1    famotidine (PEPCID) 40 MG tablet Take 1 tablet (40 mg) by mouth nightly as needed for heartburn 30 tablet 3    finasteride (PROSCAR) 5 MG tablet Take 1 tablet (5 mg) by mouth daily 90 tablet 3    fluticasone (FLONASE) 50 MCG/ACT nasal spray USE 2 SPRAY(S) IN THE AFFECTED NOSTRIL ONCE DAILY      Multiple Vitamins-Minerals (MULTIVITAL PO) Take 1 tablet by mouth daily Reported on 3/22/2017      olopatadine (PATADAY) 0.2 % ophthalmic solution 0.05 mLs (1 drop) daily      omeprazole (PRILOSEC) 20 MG DR capsule Take 1 capsule by mouth twice daily 60 capsule 0    pravastatin (PRAVACHOL) 20 MG tablet Take 1 tablet (20 mg) by mouth daily 90 tablet 1    psyllium (METAMUCIL/KONSYL) Packet Take 1 packet by mouth daily      sildenafil (VIAGRA) 25 MG tablet Take 1 tablet (25 mg) by mouth daily as needed (erectile dysfunction) 30 tablet 1       ALLERGIES     Allergies   Allergen Reactions    Anesthetic Ether     Bee Venom     Demerol Visual Disturbance    Statin [Statins] Other (See Comments)     Muscle pain       PAST MEDICAL, SURGICAL, FAMILY, SOCIAL HISTORY:  History was reviewed and updated as needed, see medical record.        Review of Systems:  A 12-point review of systems was completed, see medical record for detailed review of systems information.    Physical Exam:  Vitals: Ht 1.797 m (5'  "10.75\")   BMI 30.48 kg/m      Constitutional: No apparent distress    HEENT: pupils equal round reactive to light, thyroid normal size, JVP is normal    Chest: Clear to percussion and auscultation    Cardiac: Normal S1, normal S2 no S3, no murmur or click        ASSESSMENT:  The patient's mitral valve repair remains intact and his systolic blood pressure is optimal.  Since he has documented coronary disease, it would be preferable to lower his LDL cholesterol to current guideline recommendations of less than 70 and preferably near 55 mg DL.  Since he has failed statin therapy and ezetimibe, it would be reasonable to trial a PCSK9 inhibitor or bempedoic acid in addition to continued aggressive lifestyle intervention.    RECOMMENDATIONS:   1) Continue Mediterranean style weight loss diet  2) Regular exercise program 45 minutes 45 minutes daily  3) Alirocumab 75 mg,if too expensive,consider bempedoic acid  4) Follow up with me in one year with TTE at that time.         Recent Lab Results:  LIPID RESULTS:  Lab Results   Component Value Date    CHOL 223 (H) 11/29/2023    CHOL 224 (H) 08/20/2020    HDL 50 11/29/2023    HDL 48 08/20/2020     (H) 11/29/2023     (H) 08/20/2020    TRIG 108 11/29/2023    TRIG 199 (H) 08/20/2020    CHOLHDLRATIO 5.0 07/11/2014       LIVER ENZYME RESULTS:  Lab Results   Component Value Date    AST 40 11/29/2023    AST 41 06/02/2021    ALT 30 11/29/2023    ALT 62 06/02/2021       CBC RESULTS:  Lab Results   Component Value Date    WBC 5.6 01/16/2020    RBC 4.54 01/16/2020    HGB 16.7 01/16/2020    HCT 47.9 01/16/2020     (H) 01/16/2020    MCH 36.8 (H) 01/16/2020    MCHC 34.9 01/16/2020    RDW 12.3 01/16/2020     01/16/2020       BMP RESULTS:  Lab Results   Component Value Date     08/30/2023     06/02/2021    POTASSIUM 4.1 08/30/2023    POTASSIUM 4.3 08/11/2022    POTASSIUM 4.2 06/02/2021    CHLORIDE 103 08/30/2023    CHLORIDE 109 08/11/2022    CHLORIDE 105 " 06/02/2021    CO2 27 08/30/2023    CO2 28 08/11/2022    CO2 28 06/02/2021    ANIONGAP 9 08/30/2023    ANIONGAP 4 08/11/2022    ANIONGAP 4 06/02/2021    GLC 61 (L) 08/30/2023    GLC 86 08/11/2022    GLC 86 06/02/2021    BUN 15.3 08/30/2023    BUN 13 08/11/2022    BUN 16 06/02/2021    CR 1.17 08/30/2023    CR 1.30 (H) 06/02/2021    GFRESTIMATED 67 08/30/2023    GFRESTIMATED 56 (L) 06/02/2021    GFRESTBLACK 65 06/02/2021    PEYTON 9.8 08/30/2023    PETYON 9.5 06/02/2021        A1C RESULTS:  Lab Results   Component Value Date    A1C 5.1 09/26/2017       INR RESULTS:  Lab Results   Component Value Date    INR 2.3 06/11/2010    INR 2.8 05/28/2010    INR 2.61 (H) 04/23/2010    INR 1.70 (H) 04/22/2010       We greatly appreciate the opportunity to be involved in the care of your patient, Jose Angel Cha.    Sincerely,  Sherman Gtz MD        Shari Paredes MD  [address] [place], [place] [id number]

## 2024-04-30 DIAGNOSIS — R09.A2 GLOBUS SENSATION: ICD-10-CM

## 2024-04-30 DIAGNOSIS — K22.70 BARRETT'S ESOPHAGUS WITHOUT DYSPLASIA: ICD-10-CM

## 2024-05-02 ENCOUNTER — TELEPHONE (OUTPATIENT)
Dept: CARDIOLOGY | Facility: CLINIC | Age: 69
End: 2024-05-02
Payer: MEDICARE

## 2024-05-02 NOTE — TELEPHONE ENCOUNTER
Patient's pharmacy [facility] stating patient needs Prior Authorization for Praluent 75mg/ml Subcutaneous Solution Auto-injector ordered by Dr. Gtz.    Will route to prior auth team to assist.

## 2024-05-03 NOTE — TELEPHONE ENCOUNTER
Central Prior Authorization Team   Phone: [phone]    PA Initiation    Medication: Praluent 75MG/ML auto-injectors    Insurance Company: [employer] - Phone [phone] Fax [phone]  Pharmacy Filling the Rx: [facility] [facility] [address] [facility]  Filling Pharmacy Phone: [phone]  Filling Pharmacy Fax:    Start Date: 5/3/2024

## 2024-05-06 ENCOUNTER — MYC MEDICAL ADVICE (OUTPATIENT)
Dept: CARDIOLOGY | Facility: CLINIC | Age: 69
End: 2024-05-06
Payer: MEDICARE

## 2024-05-07 NOTE — TELEPHONE ENCOUNTER
Prior Authorization Approval    Medication: PRALUENT 75 MG/ML SC SOAJ  Authorization Effective Date: 5/1/2024  Authorization Expiration Date:    Approved Dose/Quantity:   Reference #:     Insurance Company: [employer] - Phone [phone] Fax [phone]  Expected CoPay: $    CoPay Card Available:      Financial Assistance Needed:   Which Pharmacy is filling the prescription: [facility] [address] [place] [id number] [facility]  Pharmacy Notified: YES  Patient Notified: **Instructed pharmacy to notify patient when script is ready to /ship.**

## 2024-05-15 ENCOUNTER — LAB (OUTPATIENT)
Dept: LAB | Facility: CLINIC | Age: 69
End: 2024-05-15
Payer: MEDICARE

## 2024-05-15 DIAGNOSIS — I25.10 CORONARY ARTERY DISEASE INVOLVING NATIVE CORONARY ARTERY OF NATIVE HEART WITHOUT ANGINA PECTORIS: ICD-10-CM

## 2024-05-15 DIAGNOSIS — Z98.890 S/P MITRAL VALVE REPAIR: ICD-10-CM

## 2024-05-15 DIAGNOSIS — E78.5 DYSLIPIDEMIA (HIGH LDL; LOW HDL): ICD-10-CM

## 2024-05-15 LAB
CHOLEST SERPL-MCNC: 242 MG/DL
FASTING STATUS PATIENT QL REPORTED: YES
HDLC SERPL-MCNC: 39 MG/DL
LDLC SERPL CALC-MCNC: 173 MG/DL
NONHDLC SERPL-MCNC: 203 MG/DL
TRIGL SERPL-MCNC: 148 MG/DL

## 2024-05-15 PROCEDURE — 36415 COLL VENOUS BLD VENIPUNCTURE: CPT

## 2024-05-15 PROCEDURE — 80061 LIPID PANEL: CPT

## 2024-05-20 NOTE — RESULT ENCOUNTER NOTE
Thanks We should repeat once he starts repatha. No significant change seen on repeat lipid profile        Updated patient with results and comments from Dr. Gtz via [employer]. Sent message to Aleksandar CARREON, who has been chatting with patient about starting Praluent, regarding recommendations.
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
## 2024-07-08 NOTE — PROGRESS NOTES
Assessment & Plan   Jose Angel Cha is a 69 year old male who presents today for evaluation of right hand pain and right foot pain.  Patient reports that the right hand pain started few days ago after he hit the dorsal aspect of the right hand against a trash bin, he developed some swelling.  Foot pain started 2 months ago no injury or trauma to the area.  Hand injury, right, initial encounter  X-ray obtained in clinic and demonstrate no acute fracture or dislocation of the right hand.  Discussed conservative management ice, Tylenol as needed for pain.  - XR Hand Right G/E 3 Views; Future    Right foot pain  Could be related to gout, will check uric acid and CRP.  Trial of Medrol Dosepak.  - Uric acid; Future  - CRP, inflammation; Future  - methylPREDNISolone (MEDROL DOSEPAK) 4 MG tablet therapy pack; Follow Package Directions    Pain of right hand  No fracture or dislocation of the right hand.          Charity Tomlinson is a 69 year old, presenting for the following health issues:  Musculoskeletal Problem (Hit hand on recycling can-right hand 12 days ago. )        7/9/2024     8:05 AM   Additional Questions   Roomed by Sis SEGUNDO   Accompanied by Self     History of Present Illness       Reason for visit:  Hand injured maybe broken bone  Symptom onset:  1-2 weeks ago  Symptoms include:  Pain n swelling  Symptom intensity:  Moderate  Symptom progression:  Staying the same  Had these symptoms before:  No  What makes it worse:  Use of hand  What makes it better:  Voltaren n ibuprofen    He eats 4 or more servings of fruits and vegetables daily.He consumes 2 sweetened beverage(s) daily.He exercises with enough effort to increase his heart rate 9 or less minutes per day.  He exercises with enough effort to increase his heart rate 3 or less days per week.   He is taking medications regularly.     Right hand pain hitted against trash bin 12 days ago     Pain in the right toes and foor started cou0le months ago     Review  "of Systems  Constitutional, HEENT, cardiovascular, pulmonary, gi and gu systems are negative, except as otherwise noted.      Objective    /86   Pulse 68   Temp 97.8  F (36.6  C) (Oral)   Resp 16   Ht 1.797 m (5' 10.75\")   Wt 97.5 kg (215 lb)   SpO2 96%   BMI 30.20 kg/m    Body mass index is 30.2 kg/m .  Physical Exam  Vitals and nursing note reviewed.   Constitutional:       General: He is not in acute distress.     Appearance: Normal appearance. He is not ill-appearing, toxic-appearing or diaphoretic.   HENT:      Head: Normocephalic and atraumatic.   Musculoskeletal:        Hands:         Feet:    Neurological:      Mental Status: He is alert.                    Signed Electronically by: Francisca Cisneros MD    "

## 2024-07-09 ENCOUNTER — OFFICE VISIT (OUTPATIENT)
Dept: FAMILY MEDICINE | Facility: CLINIC | Age: 69
End: 2024-07-09
Payer: MEDICARE

## 2024-07-09 ENCOUNTER — ANCILLARY PROCEDURE (OUTPATIENT)
Dept: GENERAL RADIOLOGY | Facility: CLINIC | Age: 69
End: 2024-07-09
Attending: FAMILY MEDICINE
Payer: MEDICARE

## 2024-07-09 VITALS
OXYGEN SATURATION: 96 % | DIASTOLIC BLOOD PRESSURE: 86 MMHG | SYSTOLIC BLOOD PRESSURE: 132 MMHG | RESPIRATION RATE: 16 BRPM | BODY MASS INDEX: 30.1 KG/M2 | TEMPERATURE: 97.8 F | HEART RATE: 68 BPM | WEIGHT: 215 LBS | HEIGHT: 71 IN

## 2024-07-09 DIAGNOSIS — S69.91XA HAND INJURY, RIGHT, INITIAL ENCOUNTER: Primary | ICD-10-CM

## 2024-07-09 DIAGNOSIS — M79.671 RIGHT FOOT PAIN: ICD-10-CM

## 2024-07-09 DIAGNOSIS — S69.91XA HAND INJURY, RIGHT, INITIAL ENCOUNTER: ICD-10-CM

## 2024-07-09 DIAGNOSIS — M79.641 PAIN OF RIGHT HAND: ICD-10-CM

## 2024-07-09 LAB
CRP SERPL-MCNC: <3 MG/L
URATE SERPL-MCNC: 6.7 MG/DL (ref 3.4–7)

## 2024-07-09 PROCEDURE — 84550 ASSAY OF BLOOD/URIC ACID: CPT | Performed by: FAMILY MEDICINE

## 2024-07-09 PROCEDURE — 86140 C-REACTIVE PROTEIN: CPT | Performed by: FAMILY MEDICINE

## 2024-07-09 PROCEDURE — 99213 OFFICE O/P EST LOW 20 MIN: CPT | Performed by: FAMILY MEDICINE

## 2024-07-09 PROCEDURE — 36415 COLL VENOUS BLD VENIPUNCTURE: CPT | Performed by: FAMILY MEDICINE

## 2024-07-09 PROCEDURE — 73130 X-RAY EXAM OF HAND: CPT | Mod: TC | Performed by: RADIOLOGY

## 2024-07-09 RX ORDER — METHYLPREDNISOLONE 4 MG
TABLET, DOSE PACK ORAL
Qty: 21 TABLET | Refills: 0 | Status: SHIPPED | OUTPATIENT
Start: 2024-07-09 | End: 2024-07-31

## 2024-07-09 ASSESSMENT — PAIN SCALES - GENERAL: PAINLEVEL: SEVERE PAIN (7)

## 2024-07-09 ASSESSMENT — ANXIETY QUESTIONNAIRES
7. FEELING AFRAID AS IF SOMETHING AWFUL MIGHT HAPPEN: NOT AT ALL
5. BEING SO RESTLESS THAT IT IS HARD TO SIT STILL: NOT AT ALL
GAD7 TOTAL SCORE: 4
7. FEELING AFRAID AS IF SOMETHING AWFUL MIGHT HAPPEN: NOT AT ALL
GAD7 TOTAL SCORE: 4
4. TROUBLE RELAXING: NEARLY EVERY DAY
IF YOU CHECKED OFF ANY PROBLEMS ON THIS QUESTIONNAIRE, HOW DIFFICULT HAVE THESE PROBLEMS MADE IT FOR YOU TO DO YOUR WORK, TAKE CARE OF THINGS AT HOME, OR GET ALONG WITH OTHER PEOPLE: SOMEWHAT DIFFICULT
3. WORRYING TOO MUCH ABOUT DIFFERENT THINGS: NOT AT ALL
2. NOT BEING ABLE TO STOP OR CONTROL WORRYING: NOT AT ALL
6. BECOMING EASILY ANNOYED OR IRRITABLE: SEVERAL DAYS
1. FEELING NERVOUS, ANXIOUS, OR ON EDGE: NOT AT ALL
8. IF YOU CHECKED OFF ANY PROBLEMS, HOW DIFFICULT HAVE THESE MADE IT FOR YOU TO DO YOUR WORK, TAKE CARE OF THINGS AT HOME, OR GET ALONG WITH OTHER PEOPLE?: SOMEWHAT DIFFICULT

## 2024-07-11 ENCOUNTER — OFFICE VISIT (OUTPATIENT)
Dept: DERMATOLOGY | Facility: CLINIC | Age: 69
End: 2024-07-11
Payer: MEDICARE

## 2024-07-11 VITALS — WEIGHT: 215 LBS | BODY MASS INDEX: 30.1 KG/M2 | HEIGHT: 71 IN

## 2024-07-11 DIAGNOSIS — L82.0 INFLAMED SEBORRHEIC KERATOSIS: Primary | ICD-10-CM

## 2024-07-11 PROCEDURE — 17110 DESTRUCTION B9 LES UP TO 14: CPT | Performed by: STUDENT IN AN ORGANIZED HEALTH CARE EDUCATION/TRAINING PROGRAM

## 2024-07-11 RX ORDER — CLINDAMYCIN PHOSPHATE 11.9 MG/ML
SOLUTION TOPICAL
Qty: 60 ML | Refills: 2 | Status: SHIPPED | OUTPATIENT
Start: 2024-07-11

## 2024-07-11 ASSESSMENT — PAIN SCALES - GENERAL: PAINLEVEL: NO PAIN (0)
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
## 2024-07-26 ENCOUNTER — MYC MEDICAL ADVICE (OUTPATIENT)
Dept: FAMILY MEDICINE | Facility: OTHER | Age: 69
End: 2024-07-26
Payer: MEDICARE

## 2024-07-26 ENCOUNTER — NURSE TRIAGE (OUTPATIENT)
Dept: FAMILY MEDICINE | Facility: OTHER | Age: 69
End: 2024-07-26
Payer: MEDICARE

## 2024-07-26 NOTE — TELEPHONE ENCOUNTER
Called patient from [employer] message for right foot swelling.    Patient was seen 7/9 for right hand and foot pain along with edema in right foot. Gout was ruled out from that appt but no answer about the swelling. Pain has resolved but swelling has continued and patient is annoyed with this. Swelling is not evident in the morning but increases in the right foot/leg throughout the day. Gone the next morning. No pain , discoloration. Now reaching  out to PCP.    Denies any chest pain or shortness of breath. Attempted to schedule patient but he wants to see PCP and no openings at this time. Made patient aware that PCP is out until 8/5.   Reviewed red flag symptoms and when he should be concerned. He expressed understanding.     Will send to PCP to see if patient can get into see her otherwise we can schedule him elsewhere.    Mary Hobson RN on 7/26/2024 at 4:12 PM      Reason for Disposition   Mild swelling of both ankles and chronic (unchanged)    Additional Information   Negative: Sounds like a life-threatening emergency to the triager   Negative: Chest pain   Negative: Followed an insect bite and has localized swelling (e.g., small area of puffy or swollen skin)   Negative: Followed a knee injury   Negative: Ankle or foot injury   Negative: Pregnant with leg swelling or edema   Negative: Difficulty breathing at rest   Negative: Entire foot is cool or blue in comparison to other side   Negative: SEVERE swelling (e.g., swelling extends above knee, entire leg is swollen, weeping fluid)   Negative: Cast on leg or ankle and has increasing pain   Negative: Can't walk or can barely stand (new-onset)   Negative: Fever and red area (or area very tender to touch)   Negative: Patient sounds very sick or weak to the triager   Negative: Swelling of face, arm or hands  (Exception: Slight puffiness of fingers during hot weather.)   Negative: Pregnant 20 or more weeks and sudden weight gain (i.e., > 2 lbs, 1 kg in one  week)   Negative: Thigh or calf pain and only 1 side and present > 1 hour   Negative: Thigh, calf, or ankle swelling in only one leg   Negative: Thigh, calf, or ankle swelling in both legs, but one side is definitely more swollen (Exception: Longstanding difference between legs.)   Negative: MODERATE swelling of both ankles (e.g., swelling extends up to the knees) AND new-onset or worsening   Negative: Difficulty breathing with exertion AND worsening or new-onset   Negative: Looks like a boil, infected sore, deep ulcer, or other infected rash (spreading redness, pus)   Negative: Patient wants to be seen   Negative: MILD swelling of both ankles (i.e., pedal edema) AND new-onset or worsening   Negative: Mild swelling of both ankles and varicose veins   Negative: MILD swelling of both ankles (i.e., pedal edema) and caused by hot weather    Protocols used: Leg Swelling and Edema-A-OH

## 2024-07-29 ASSESSMENT — SLEEP AND FATIGUE QUESTIONNAIRES
HOW LIKELY ARE YOU TO NOD OFF OR FALL ASLEEP WHILE LYING DOWN TO REST IN THE AFTERNOON WHEN CIRCUMSTANCES PERMIT: HIGH CHANCE OF DOZING
HOW LIKELY ARE YOU TO NOD OFF OR FALL ASLEEP WHEN YOU ARE A PASSENGER IN A CAR FOR AN HOUR WITHOUT A BREAK: SLIGHT CHANCE OF DOZING
HOW LIKELY ARE YOU TO NOD OFF OR FALL ASLEEP WHILE SITTING QUIETLY AFTER LUNCH WITHOUT ALCOHOL: SLIGHT CHANCE OF DOZING
HOW LIKELY ARE YOU TO NOD OFF OR FALL ASLEEP WHILE SITTING AND READING: SLIGHT CHANCE OF DOZING
HOW LIKELY ARE YOU TO NOD OFF OR FALL ASLEEP WHILE WATCHING TV: WOULD NEVER DOZE
HOW LIKELY ARE YOU TO NOD OFF OR FALL ASLEEP WHILE SITTING AND TALKING TO SOMEONE: WOULD NEVER DOZE
HOW LIKELY ARE YOU TO NOD OFF OR FALL ASLEEP WHILE SITTING INACTIVE IN A PUBLIC PLACE: SLIGHT CHANCE OF DOZING
HOW LIKELY ARE YOU TO NOD OFF OR FALL ASLEEP IN A CAR, WHILE STOPPED FOR A FEW MINUTES IN TRAFFIC: WOULD NEVER DOZE

## 2024-07-31 ENCOUNTER — OFFICE VISIT (OUTPATIENT)
Dept: SLEEP MEDICINE | Facility: CLINIC | Age: 69
End: 2024-07-31
Attending: PHYSICIAN ASSISTANT
Payer: MEDICARE

## 2024-07-31 VITALS
HEART RATE: 74 BPM | DIASTOLIC BLOOD PRESSURE: 84 MMHG | RESPIRATION RATE: 12 BRPM | WEIGHT: 213.6 LBS | SYSTOLIC BLOOD PRESSURE: 126 MMHG | OXYGEN SATURATION: 95 % | HEIGHT: 71 IN | BODY MASS INDEX: 29.9 KG/M2

## 2024-07-31 DIAGNOSIS — G47.10 HYPERSOMNIA: ICD-10-CM

## 2024-07-31 DIAGNOSIS — G47.33 OSA (OBSTRUCTIVE SLEEP APNEA): Primary | ICD-10-CM

## 2024-07-31 PROCEDURE — G2211 COMPLEX E/M VISIT ADD ON: HCPCS | Performed by: INTERNAL MEDICINE

## 2024-07-31 PROCEDURE — 99204 OFFICE O/P NEW MOD 45 MIN: CPT | Performed by: INTERNAL MEDICINE
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
## 2024-07-31 NOTE — PATIENT INSTRUCTIONS
Patient education: What is a sleep study?     What is a sleep study? -- A sleep study is a test that measures how well you sleep and checks for sleep problems. For some sleep studies, you stay overnight in a sleep lab at a hospital or sleep center.     What happens during a sleep study? -- Before you go to sleep, a technician attaches small, sticky patches called  electrodes  to your head, chest, and legs. He or she will also place a small tube beneath your nose and might wrap 1 or 2 belts around your chest.   Each of these items has wires that connect to monitors. The monitors record your movement, brain activity, breathing, and other body functions while you sleep.  If you have a history of trouble falling asleep, your doctor might prescribe a medicine to help you fall asleep in the lab. If you have never taken the medicine before, your doctor might ask you take it on a night before your sleep study to see how it affects you.   Why might my doctor order a sleep study? -- Your doctor will order a sleep study if he or she thinks you have sleep apnea or a different condition that makes you:   ?Have sudden jerking leg movements while you sleep, called  periodic limb movements.    ?Feel very sleepy during the day and fall asleep all of a sudden, called  narcolepsy.    ?Have trouble falling asleep or staying asleep over a long period of time, called  chronic insomnia.    ?Do odd things while you sleep, such as walking.  How should I prepare for a sleep study? -- On the day of your sleep study, you should:   ?Avoid alcohol   ?Avoid drinking coffee, tea, sodas, and other drinks that have caffeine in the afternoon and evening   ?Take all of your regular medicines     The cost of care estimate line is [phone]. They are able to give the patient an estimate of the charges and also an estimate of their insurance coverage/patient responsibility.   After your sleep study is performed, please call us at [phone] or  [phone]  to schedule for a follow up to review the results of the sleep study.    Please bring one tab of low dose melatonin 3 mg or less to the night of the study.    Melatonin intake is completely voluntary.    You may take own melatonin after arrival to sleep center. Do not drive or operate machinery after intake of melatonin.     Please make every effort you can to sleep on your back with one pillow behind your head.    Please also call your insurance company next week to see if your sleep study is approved and find out your co-pay.

## 2024-07-31 NOTE — NURSING NOTE
"Chief Complaint   Patient presents with    Sleep Problem     Patient presents to clinic to discuss sleep apnea. Patient has a MAD.       Initial /84   Pulse 74   Resp 12   Ht 1.791 m (5' 10.5\")   Wt 96.9 kg (213 lb 9.6 oz)   SpO2 95%   BMI 30.22 kg/m   Estimated body mass index is 30.22 kg/m  as calculated from the following:    Height as of this encounter: 1.791 m (5' 10.5\").    Weight as of this encounter: 96.9 kg (213 lb 9.6 oz).    Medication Reconciliation: complete  ESS: 7  Neck circumference: 18 inches / 46 centimeters.  DME: N/A  Sejal Friedman CMA      "

## 2024-07-31 NOTE — NURSING NOTE
PSG and follow-up appointment with provider have both been scheduled. PSG hand-out given to and reviewed with patient at clinic visit. AVS printed and given to patient.  Sejal Friedman, RATNA

## 2024-08-01 ENCOUNTER — MYC MEDICAL ADVICE (OUTPATIENT)
Dept: CARDIOLOGY | Facility: CLINIC | Age: 69
End: 2024-08-01
Payer: MEDICARE

## 2024-08-01 ENCOUNTER — TELEPHONE (OUTPATIENT)
Dept: CARDIOLOGY | Facility: CLINIC | Age: 69
End: 2024-08-01
Payer: MEDICARE

## 2024-08-01 NOTE — TELEPHONE ENCOUNTER
Sherman Gtz MD Lidke, Jen M RN  Caller: Unspecified (Today, 11:57 AM)  I last saw the patient 4/24/2024, he underwent a TTE that showed his mitral valve repair was functioning normally,he was NSR and had normal LV function. Since I see him only on an annual basis at [facility] I am uncertain as to the cause of his concerns, but I am not able to adequately address them via E mail.  I would suggest he see his primary care MD as a first step and if they feel there is need to see cardiology a follow up visit can be arranged earlier     MyChart message returned to patient with provider recommendations.  Madhuri Denton, LAURI on 8/1/2024 at 3:36 PM

## 2024-08-01 NOTE — TELEPHONE ENCOUNTER
Patient reports edema predominantly in right leg and specifically right foot but sometimes left as well.  Does not weight self daily but states he is   215 pounds give or take  no noticeable changes.  Patient notes some SOB with exertion but not generally.  Also noted one day of left shoulder pain but that was 10 - 12 days ago and has not recurred.  Also reports increased fatigue.  Will route to provider to advise.  Madhuri Denton RN on 8/1/2024 at 11:59 AM

## 2024-08-05 NOTE — TELEPHONE ENCOUNTER
Nothing came available yet. Can offer him 11:30 Wed if he would like. Otherwise may need to offer alternate provider.  Shari Paredes MD

## 2024-08-07 ENCOUNTER — OFFICE VISIT (OUTPATIENT)
Dept: FAMILY MEDICINE | Facility: OTHER | Age: 69
End: 2024-08-07
Payer: MEDICARE

## 2024-08-07 VITALS
HEART RATE: 71 BPM | RESPIRATION RATE: 16 BRPM | TEMPERATURE: 97.6 F | SYSTOLIC BLOOD PRESSURE: 128 MMHG | WEIGHT: 215.5 LBS | HEIGHT: 71 IN | OXYGEN SATURATION: 98 % | BODY MASS INDEX: 30.17 KG/M2 | DIASTOLIC BLOOD PRESSURE: 88 MMHG

## 2024-08-07 DIAGNOSIS — M79.671 RIGHT FOOT PAIN: ICD-10-CM

## 2024-08-07 DIAGNOSIS — R07.81 RIB PAIN: ICD-10-CM

## 2024-08-07 DIAGNOSIS — M25.511 PAIN IN JOINT OF RIGHT SHOULDER: ICD-10-CM

## 2024-08-07 DIAGNOSIS — I10 BENIGN ESSENTIAL HYPERTENSION: ICD-10-CM

## 2024-08-07 DIAGNOSIS — I83.11 VARICOSE VEINS OF RIGHT LOWER EXTREMITY WITH INFLAMMATION: Primary | ICD-10-CM

## 2024-08-07 PROBLEM — I48.0 PAROXYSMAL ATRIAL FIBRILLATION (H): Status: RESOLVED | Noted: 2019-08-28 | Resolved: 2024-08-07

## 2024-08-07 PROCEDURE — 99214 OFFICE O/P EST MOD 30 MIN: CPT | Performed by: FAMILY MEDICINE

## 2024-08-07 ASSESSMENT — PAIN SCALES - GENERAL: PAINLEVEL: SEVERE PAIN (6)

## 2024-08-07 NOTE — TELEPHONE ENCOUNTER
Patient is scheduled to see PCP in about 1 hour.   RN called to discuss symptoms and make sure okay to be seen in office or if higher level of care is needed.   LM for patient to return call and speak with triage if concerns before appointment.     Mora CASTILLON, RN

## 2024-08-07 NOTE — PROGRESS NOTES
Assessment & Plan         ICD-10-CM    1. Varicose veins of right lower extremity with inflammation  I83.11 Compression Sleeve/Stocking Order      2. Benign essential hypertension  I10       3. Pain in joint of right shoulder  M25.511 Pain Management  Referral      4. Rib pain  R07.81 Pain Management  Referral      5. Right foot pain  M79.671 Pain Management  Referral          History sounds very much like he may have had a gouty attack due to the sharp significant pain as well as redness and swelling and pain even to light touch.  Though he did have a normal uric acid level about the time he was evaluated his swelling pain and already gone down.  It still historically sounds like it may have been a gouty event.  At this time though his pain is more at the ankle and is right across an area of a varicose vein.  We did talk about need for using compression socks as the easiest way to manage his pain though if it is not sufficient to give him enough discomfort resolution he may be a candidate to talking to the vein specialist about procedural-based plans.  He does have many other pains mostly on the right side of his body.  We did question whether there was some relationship as he may move differently or walk differently with all these pains and he identifies the rib pain is the primary and initial source of pain and he has seen pain clinic for this in the past.  He has discussed with his sleep provider recently about his misery scale and how it is keeping him from functioning as well as he could and he has been agreeable to returning to pain clinic to see if there is more that can be done for his chronic unrelenting rib pain that is now progressed and hip and shoulder pain and potentially could even be related to the new ankle pain.  Given all this a referral for pain clinic was also given.    I spent a total of 33 minutes on the day of the visit.   Time spent by me doing chart review,  "history and exam, documentation and further activities per the note    Shari Paredes MD           BMI  Estimated body mass index is 30.48 kg/m  as calculated from the following:    Height as of this encounter: 1.791 m (5' 10.5\").    Weight as of this encounter: 97.8 kg (215 lb 8 oz).   Weight management plan: Discussed healthy diet and exercise guidelines          Subjective   Davi is a 69 year old, presenting for the following health issues:  Swelling        8/7/2024    11:18 AM   Additional Questions   Roomed by velma   Accompanied by alone         8/7/2024    11:18 AM   Patient Reported Additional Medications   Patient reports taking the following new medications none     History of Present Illness       Reason for visit:  Pain swelling  Symptom onset:  3-4 weeks ago  Symptoms include:  Pain swelling  Symptom intensity:  Severe  Symptom progression:  Worsening  Had these symptoms before:  No  What makes it worse:  Pressure  standing  What makes it better:  Ellevation sleep    He eats 4 or more servings of fruits and vegetables daily.He exercises with enough effort to increase his heart rate 10 to 19 minutes per day.  He exercises with enough effort to increase his heart rate 3 or less days per week.   He is taking medications regularly.                 Review of Systems  Constitutional, HEENT, cardiovascular, pulmonary, GI, , musculoskeletal, neuro, skin, endocrine and psych systems are negative, except as otherwise noted.      Objective    /88   Pulse 71   Temp 97.6  F (36.4  C) (Temporal)   Resp 16   Ht 1.791 m (5' 10.5\")   Wt 97.8 kg (215 lb 8 oz)   SpO2 98%   BMI 30.48 kg/m    Body mass index is 30.48 kg/m .  Physical Exam   GENERAL: alert and no distress  RESP: lungs clear to auscultation - no rales, rhonchi or wheezes  CV: regular rate and rhythm, normal S1 S2, no S3 or S4, no murmur, click or rub, no peripheral edema  ABDOMEN: soft, nontender, no hepatosplenomegaly, no masses and bowel sounds " normal  MS: no gross musculoskeletal defects noted, no edema  SKIN: no suspicious lesions or rashes  NEURO: Normal strength and tone, mentation intact and speech normal  PSYCH: mentation appears normal, affect normal/bright            Signed Electronically by: Shari Paredes MD, MD

## 2024-08-13 ENCOUNTER — TELEPHONE (OUTPATIENT)
Dept: UROLOGY | Facility: CLINIC | Age: 69
End: 2024-08-13

## 2024-08-13 NOTE — TELEPHONE ENCOUNTER
Patient is scheduled for an upcoming appointment with Dr. Blank  on 9/17/24.     Per last note patient was to: Follow-up in 6 months with an AUA symptom score and PVR   Plan for PSA recheck in 6 months and we discussed the expected change with finasteride     Orders placed: PSA in and Current     Patient is scheduled 9/12/24 to complete his PSA lab.     Routing to the inbasket to ensure patient completes lab/imaging    Future Appointments 8/13/2024 - 2/9/2025        Date Visit Type Length Department Provider     8/29/2024 10:00 AM NEW PATIENT 60 min CS PAIN MANAGEMENT Gabriella Cao APRN CNP    Location Instructions:     [address] [place] [address], [address] [facility] [address]              9/11/2024  1:30 PM [facility] MEDICARE ANNUAL WELLNESS 30 min ER FAMILY PRACTICE Shari Paredes MD    Location Instructions:     [facility] is located at [address] [facility], between [address]. Free lot parking is available. Upon entering the building, the clinic is to the left.  DUE TO CONSTRUCTION NEAR THE CLINIC  AND THE SURROUNDING STREETS, PLEASE ALLOW FOR EXTRA TIME TO GET TO YOUR APPT ON TIME.              9/12/2024  2:00 PM LAB 15 min ER LABORATORY ER LAB [facility]    Location Instructions:     DUE TO CONSTRUCTION NEAR THE CLINIC  AND THE SURROUNDING STREETS, PLEASE ALLOW FOR EXTRA TIME TO GET TO YOUR APPT ON TIME.               9/17/2024 10:45 AM RETURN PATIENT 15 min [facility] UROLOGY Bernard Blank MD              12/26/2024  8:00 PM PSG TITRATION W/ min [facility] SLEEP CLINIC [facility] BED 2    Location Instructions:     Enter through the main entrance at the front of the building.Proceed to the 2nd floor, using either the stairs or the elevator and check in at the reception desk inside the sleep center [address].              1/13/2025  8:00 AM RETURN SLEEP 30 min [facility] Lenny Patel DO    Location Instructions:     Enter through the main entrance at the front of the  building.Proceed to the 2nd floor, using either the stairs or the elevator and check in at the reception desk inside the sleep center [address].
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
## 2024-08-29 ENCOUNTER — OFFICE VISIT (OUTPATIENT)
Dept: PALLIATIVE MEDICINE | Facility: CLINIC | Age: 69
End: 2024-08-29
Attending: FAMILY MEDICINE
Payer: MEDICARE

## 2024-08-29 VITALS — SYSTOLIC BLOOD PRESSURE: 137 MMHG | OXYGEN SATURATION: 97 % | HEART RATE: 74 BPM | DIASTOLIC BLOOD PRESSURE: 85 MMHG

## 2024-08-29 DIAGNOSIS — Z86.59 HISTORY OF CLAUSTROPHOBIA: ICD-10-CM

## 2024-08-29 DIAGNOSIS — M54.2 CERVICALGIA: ICD-10-CM

## 2024-08-29 DIAGNOSIS — R51.9 CHRONIC DAILY HEADACHE: ICD-10-CM

## 2024-08-29 DIAGNOSIS — M25.511 PAIN IN JOINT OF RIGHT SHOULDER: ICD-10-CM

## 2024-08-29 DIAGNOSIS — F41.9 ANXIETY AND DEPRESSION: ICD-10-CM

## 2024-08-29 DIAGNOSIS — G89.29 CHRONIC INTRACTABLE PAIN: ICD-10-CM

## 2024-08-29 DIAGNOSIS — M79.671 RIGHT FOOT PAIN: ICD-10-CM

## 2024-08-29 DIAGNOSIS — R07.81 RIB PAIN ON RIGHT SIDE: Primary | ICD-10-CM

## 2024-08-29 DIAGNOSIS — F32.A ANXIETY AND DEPRESSION: ICD-10-CM

## 2024-08-29 PROCEDURE — 99215 OFFICE O/P EST HI 40 MIN: CPT | Performed by: NURSE PRACTITIONER

## 2024-08-29 PROCEDURE — 99417 PROLNG OP E/M EACH 15 MIN: CPT | Performed by: NURSE PRACTITIONER

## 2024-08-29 PROCEDURE — G2211 COMPLEX E/M VISIT ADD ON: HCPCS | Performed by: NURSE PRACTITIONER

## 2024-08-29 RX ORDER — DIAZEPAM 5 MG
5 TABLET ORAL
Qty: 1 TABLET | Refills: 0 | Status: SHIPPED | OUTPATIENT
Start: 2024-08-29

## 2024-08-29 RX ORDER — NORTRIPTYLINE HCL 10 MG
10 CAPSULE ORAL AT BEDTIME
Qty: 30 CAPSULE | Refills: 0 | Status: SHIPPED | OUTPATIENT
Start: 2024-08-29 | End: 2024-09-28

## 2024-08-29 ASSESSMENT — PAIN SCALES - GENERAL: PAINLEVEL: MODERATE PAIN (5)

## 2024-08-29 ASSESSMENT — PAIN SCALES - PAIN ENJOYMENT GENERAL ACTIVITY SCALE (PEG)
INTERFERED_ENJOYMENT_LIFE: 7
PEG_TOTALSCORE: INCOMPLETE
AVG_PAIN_PASTWEEK: 7

## 2024-08-29 NOTE — PATIENT INSTRUCTIONS
Plan on initial consult on 8/28/2024:  A multimodal plan was developed today to treat your pain.  Multimodal analgesia is a strategy that reduces reliance on opioids through the use of non-opioid analgesics and therapies that have different mechanisms of action.    Refer to Neurology for headaches.  Consider referral to Ortho for R) should pain and decreased ROM.    Diagnostics:   R) shoulder MRI ordered.        Medications:  Start nortiptyline 10mg 1 tab q hs for 5 days may increase to two tabs q hs.    Certified for medical cannabis today.    The following OTC pain medications may be helpful, use as directed: Voltaren Gel 1%, CBD products, Arnica products, Capsaicin products, Australian Dream Cream, Epson It, Lidocaine Patch, Solanpas, Biofreeze, Aspercream, Tiger Balm and Cristian Emu cream.  Apply heat or cold PRN.      Therapies:  Discussed anti-inflammatory lifestyle consider Functional Medicine.    Discussed Pain Psychology - referred today  Psychological treatments are also important part of pain management.  Understanding and managing the thoughts, emotions and behaviors that accompany the discomfort can help you cope more effectively with your pain and can actually reduce the intensity of your pain.      PHYSICAL THERAPY - referral place today     Continue to do exercises learned at PT on a daily basis.  Discussed the importance of core strengthening, ROM, stretching exercises with the patient and how each of these entities is important in decreasing pain.  Explained to the patient that the purpose of physical therapy is to teach the patient a home exercise program.  These exercises need to be performed every day in order to decrease pain and prevent future occurrences of pain.        Discussed Grounding Mat - handout provided  https://www.youtube.com/watch?v=[email or web address]    Discussed Frequency Specific Microcurrent - handout provided  Treatment for Neuropathic Pain.   [facility]  [phone]  [facility] [phone]  [facility] [phone]   chiropractic [phone]  May be able to be billed as a chiropractic service depending on your insurance coverage.     Discussed Acupuncture.    Discussed TENs unit.      Interventions:  R) intercostal nerve blocks with Dr. Briceño T7,8,9          Follow up:     Return to clinic in 2 wks.        ETHEL Cooley, RN, CNP, FNP  [facility] Locations          ----------------------------------------------------------------  Clinic Number:  [phone]   Call with any questions about your care and for scheduling assistance.   Calls are returned Monday through Friday between 8 AM and 4:30 PM. We usually get back to you within 2 business days depending on the issue/request.    If we are prescribing your medications:  For opioid medication refills, call the clinic or send a [employer] message 7 days in advance.  Please include:  Name of requested medication  Name of the pharmacy.  For non-opioid medications, call your pharmacy directly to request a refill. Please allow 3-4 days to be processed.   Per [place] State Law:  All controlled substance prescriptions must be filled within 30 days of being written.    For those controlled substances allowing refills, pickup must occur within 30 days of last fill.      We believe regular attendance is key to your success in our program!    Any time you are unable to keep your appointment we ask that you call us at least 24 hours in advance to cancel.This will allow us to offer the appointment time to another patient.   Multiple missed appointments may lead to dismissal from the clinic.

## 2024-09-06 ENCOUNTER — TELEPHONE (OUTPATIENT)
Dept: UROLOGY | Facility: CLINIC | Age: 69
End: 2024-09-06
Payer: MEDICARE

## 2024-09-06 NOTE — TELEPHONE ENCOUNTER
Called and left voicemail for patient to call the clinic back.     Per Dr. Blank note on 3/14/24 - - We discussed the option for trial of finasteride 5 mg daily. We discussed the mechanism of action, side effects and he is interested in trying this medication. Prescription sent to the pharmacy.     Message sent to Dr. Blank to confirm.     Estela Delong LPN on 9/6/2024 at 1:06 PM

## 2024-09-06 NOTE — TELEPHONE ENCOUNTER
[facility] Call Center    Phone Message    May a detailed message be left on voicemail: no     Reason for Call: Other: Patient is wondering if he has to keep taking his finasteride up until his appointment on 9/17. Please call patient to clarify.      Action Taken: Other: [facility]    Travel Screening: Not Applicable

## 2024-09-06 NOTE — TELEPHONE ENCOUNTER
Talked to patient and informed him to continue taking his medication until his visit with Dr. Blank. Patient stated he understood.     Estela Delong LPN on 9/6/2024 at 2:03 PM

## 2024-09-06 NOTE — TELEPHONE ENCOUNTER
[facility] Call [facility]    Phone Message    May a detailed message be left on voicemail: yes     Reason for Call: Other: Patient called in and is asking for a call back from Estela. Please call patient back to further discuss.      Action Taken: Other: Urology    Travel Screening: Not Applicable

## 2024-09-09 ENCOUNTER — OFFICE VISIT (OUTPATIENT)
Dept: ORTHOPEDICS | Facility: CLINIC | Age: 69
End: 2024-09-09
Payer: MEDICARE

## 2024-09-09 ENCOUNTER — ANCILLARY PROCEDURE (OUTPATIENT)
Dept: GENERAL RADIOLOGY | Facility: CLINIC | Age: 69
End: 2024-09-09
Attending: PEDIATRICS
Payer: MEDICARE

## 2024-09-09 DIAGNOSIS — M25.572 ACUTE LEFT ANKLE PAIN: Primary | ICD-10-CM

## 2024-09-09 DIAGNOSIS — M25.571 RIGHT ANKLE PAIN: ICD-10-CM

## 2024-09-09 PROCEDURE — 99214 OFFICE O/P EST MOD 30 MIN: CPT | Performed by: PEDIATRICS

## 2024-09-09 PROCEDURE — 73610 X-RAY EXAM OF ANKLE: CPT | Mod: TC | Performed by: RADIOLOGY

## 2024-09-09 NOTE — LETTER
"9/9/2024      Jose Angel Cha  [id number] [facility] [address]      Dear Colleague,    Thank you for referring your patient, Jose Angel Cha, to the [facility]. Please see a copy of my visit note below.    ASSESSMENT & PLAN    Davi was seen today for pain.    Diagnoses and all orders for this visit:    Acute left ankle pain  -     XR Ankle Left G/E 3 Views; Future  -     Ankle/Foot Bracing Supplies Order Walking Boot; Left; Non-pneumatic; Tall      This issue is acute and Unchanged.        ICD-10-CM    1. Acute left ankle pain  M25.572 XR Ankle Left G/E 3 Views     Ankle/Foot Bracing Supplies Order Walking Boot; Left; Non-pneumatic; Tall        Patient Instructions   We discussed these other possible diagnosis: Left ankle pain and swelling, pain over lateral ankle like a sprain, however, no specific injury. Gout less likely, flare of arthritis possible, however, x-rays reassuring.    Plan:  - Today's Plan of Care:  Walking boot immobilization    Continue with relative rest and activity modification, Ice, Compression, and Elevation.  Can apply ice 10-15 minutes 3-4 times per day as needed. OTC medications as needed.    Home Exercise Program    -We also discussed other future treatment options:  MRI left ankle   Referral to Physical Therapy    Follow Up: ~ 2 weeks  - Scheduled with primary care in 2 days - would evaluate systemic causes of swelling    Concerning signs and symptoms were reviewed and all questions were answered at this time.    Ester Li MD [facility]  Sports Medicine Physician  [facility]    -----  Chief Complaint   Patient presents with     Left Ankle - Pain       SUBJECTIVE  Jose Angel Cha is a/an 69 year old male who is seen as a WALK IN patient for evaluation of left ankle pain.     The patient is seen by themselves.    Onset: 2 day(s) ago. Patient describes injury as standing in his garage, felt a \"twinge\" and there was no mechanism of " injury.    Location of Pain: left ankle, lateral, dorsum  Worsened by: dorsiflexion, PF, walking, sleeping   Better with: wrapping, elevation  Treatments tried: elevation and casting/splinting/bracing  Associated symptoms: swelling, weakness of left ankle, feeling of instability, and discoloration, tenderness to certain areas   - no fevers or chills    Orthopedic/Surgical history: Yes, has had previous issues with the right foot. Spider bite and right foot swelling,   Social History/Occupation: working at a desk, clergy retired.     REVIEW OF SYSTEMS:  Review of Systems    OBJECTIVE:  There were no vitals taken for this visit.   General: healthy, alert and in no distress  Skin: no suspicious lesions or rash.  CV: distal perfusion intact   Resp: normal respiratory effort without conversational dyspnea   Psych: normal mood and affect  Gait: NORMAL  Neuro: Normal light sensory exam of lower extremity    Bilateral Foot and Ankle Exam:    Inspection:       edema noted left lateral ankle and some anterior ankle  - right foot mild swelling, more in foot and toes    Foot inspection:       no deformity noted    Tender:       Some tenderness over ATFL, lateral ankle    Non-Tender:       remainder of ankle and foot bilateral    ROM:        Decreased ROM left ankle    Strength:       ankle dorsiflexion 5/5 bilateral       plantarflexion 5/5 bilateral       inversion 5/5 bilateral       eversion 5/5 bilateral    Special Tests:       neg (-) anterior drawer left       neg (-) talar tilt left       neg (-)  Kerns test left    Gait:       Normal    Neurovascular:       2+ peripheral pulses bilaterally and brisk capillary refill       sensation grossly intact    RADIOLOGY:  Final results and radiologist's interpretation, available in [facility].  Images were reviewed with the patient in the office today.  My personal interpretation of the performed imaging:    3 XR views of left ankle reviewed: no acute bony  abnormality, no significant degenerative change, arterial calcifications noted  - will follow official read    Reviewed prior uric acid 7/9/2024 - 6.7    Review of the result(s) of each unique test - XR             Again, thank you for allowing me to participate in the care of your patient.        Sincerely,        Ester Li MD

## 2024-09-09 NOTE — PROGRESS NOTES
"ASSESSMENT & PLAN    Davi was seen today for pain.    Diagnoses and all orders for this visit:    Acute left ankle pain  -     XR Ankle Left G/E 3 Views; Future  -     Ankle/Foot Bracing Supplies Order Walking Boot; Left; Non-pneumatic; Tall      This issue is acute and Unchanged.        ICD-10-CM    1. Acute left ankle pain  M25.572 XR Ankle Left G/E 3 Views     Ankle/Foot Bracing Supplies Order Walking Boot; Left; Non-pneumatic; Tall        Patient Instructions   We discussed these other possible diagnosis: Left ankle pain and swelling, pain over lateral ankle like a sprain, however, no specific injury. Gout less likely, flare of arthritis possible, however, x-rays reassuring.    Plan:  - Today's Plan of Care:  Walking boot immobilization    Continue with relative rest and activity modification, Ice, Compression, and Elevation.  Can apply ice 10-15 minutes 3-4 times per day as needed. OTC medications as needed.    Home Exercise Program    -We also discussed other future treatment options:  MRI left ankle   Referral to Physical Therapy    Follow Up: ~ 2 weeks  - Scheduled with primary care in 2 days - would evaluate systemic causes of swelling    Concerning signs and symptoms were reviewed and all questions were answered at this time.    Ester Li MD [facility]  Sports Medicine Physician  [facility]    -----  Chief Complaint   Patient presents with    Left Ankle - Pain       SUBJECTIVE  Jose Angel Cha is a/an 69 year old male who is seen as a WALK IN patient for evaluation of left ankle pain.     The patient is seen by themselves.    Onset: 2 day(s) ago. Patient describes injury as standing in his garage, felt a \"twinge\" and there was no mechanism of injury.    Location of Pain: left ankle, lateral, dorsum  Worsened by: dorsiflexion, PF, walking, sleeping   Better with: wrapping, elevation  Treatments tried: elevation and casting/splinting/bracing  Associated symptoms: swelling, weakness of left " ankle, feeling of instability, and discoloration, tenderness to certain areas   - no fevers or chills    Orthopedic/Surgical history: Yes, has had previous issues with the right foot. Spider bite and right foot swelling,   Social History/Occupation: working at a desk, clergy retired.     REVIEW OF SYSTEMS:  Review of Systems    OBJECTIVE:  There were no vitals taken for this visit.   General: healthy, alert and in no distress  Skin: no suspicious lesions or rash.  CV: distal perfusion intact   Resp: normal respiratory effort without conversational dyspnea   Psych: normal mood and affect  Gait: NORMAL  Neuro: Normal light sensory exam of lower extremity    Bilateral Foot and Ankle Exam:    Inspection:       edema noted left lateral ankle and some anterior ankle  - right foot mild swelling, more in foot and toes    Foot inspection:       no deformity noted    Tender:       Some tenderness over ATFL, lateral ankle    Non-Tender:       remainder of ankle and foot bilateral    ROM:        Decreased ROM left ankle    Strength:       ankle dorsiflexion 5/5 bilateral       plantarflexion 5/5 bilateral       inversion 5/5 bilateral       eversion 5/5 bilateral    Special Tests:       neg (-) anterior drawer left       neg (-) talar tilt left       neg (-)  Kerns test left    Gait:       Normal    Neurovascular:       2+ peripheral pulses bilaterally and brisk capillary refill       sensation grossly intact    RADIOLOGY:  Final results and radiologist's interpretation, available in the [facility] health record.  Images were reviewed with the patient in the office today.  My personal interpretation of the performed imaging:    3 XR views of left ankle reviewed: no acute bony abnormality, no significant degenerative change, arterial calcifications noted  - will follow official read    Reviewed prior uric acid 7/9/2024 - 6.7    Review of the result(s) of each unique test - XR

## 2024-09-09 NOTE — PATIENT INSTRUCTIONS
We discussed these other possible diagnosis: Left ankle pain and swelling, pain over lateral ankle like a sprain, however, no specific injury. Gout less likely, flare of arthritis possible, however, x-rays reassuring.    Plan:  - Today's Plan of Care:  Walking boot immobilization    Continue with relative rest and activity modification, Ice, Compression, and Elevation.  Can apply ice 10-15 minutes 3-4 times per day as needed. OTC medications as needed.    Home Exercise Program    -We also discussed other future treatment options:  MRI left ankle   Referral to Physical Therapy    Follow Up: ~ 2 weeks  - Scheduled with primary care in 2 days - would evaluate systemic causes of swelling    If you have any further questions for your physician or physician s care team you can call [phone].

## 2024-09-09 NOTE — Clinical Note
Davi will see you this week. Walked in because of ankle pain and swellin, however, no injury. Could still be MSK and I put him in a walking boot for 2 weeks but I wonder if there's a more systemic cause of his swelling (though it's in different placed in both feet). Just wanted to let you know.

## 2024-09-10 DIAGNOSIS — N52.9 VASCULOGENIC ERECTILE DYSFUNCTION, UNSPECIFIED VASCULOGENIC ERECTILE DYSFUNCTION TYPE: ICD-10-CM

## 2024-09-10 RX ORDER — SILDENAFIL 25 MG/1
TABLET, FILM COATED ORAL
Qty: 30 TABLET | Refills: 0 | Status: SHIPPED | OUTPATIENT
Start: 2024-09-10 | End: 2024-09-11

## 2024-09-11 ENCOUNTER — OFFICE VISIT (OUTPATIENT)
Dept: FAMILY MEDICINE | Facility: OTHER | Age: 69
End: 2024-09-11
Attending: FAMILY MEDICINE
Payer: MEDICARE

## 2024-09-11 ENCOUNTER — LAB (OUTPATIENT)
Dept: LAB | Facility: OTHER | Age: 69
End: 2024-09-11
Payer: MEDICARE

## 2024-09-11 VITALS
BODY MASS INDEX: 29.96 KG/M2 | WEIGHT: 214 LBS | DIASTOLIC BLOOD PRESSURE: 78 MMHG | OXYGEN SATURATION: 97 % | HEIGHT: 71 IN | TEMPERATURE: 97.5 F | RESPIRATION RATE: 16 BRPM | HEART RATE: 78 BPM | SYSTOLIC BLOOD PRESSURE: 128 MMHG

## 2024-09-11 DIAGNOSIS — I10 BENIGN ESSENTIAL HYPERTENSION: Primary | ICD-10-CM

## 2024-09-11 DIAGNOSIS — I10 BENIGN ESSENTIAL HYPERTENSION: ICD-10-CM

## 2024-09-11 DIAGNOSIS — N52.9 VASCULOGENIC ERECTILE DYSFUNCTION, UNSPECIFIED VASCULOGENIC ERECTILE DYSFUNCTION TYPE: ICD-10-CM

## 2024-09-11 DIAGNOSIS — M25.572 ACUTE LEFT ANKLE PAIN: ICD-10-CM

## 2024-09-11 DIAGNOSIS — Z00.00 ENCOUNTER FOR MEDICARE ANNUAL WELLNESS EXAM: Primary | ICD-10-CM

## 2024-09-11 DIAGNOSIS — K22.70 BARRETT'S ESOPHAGUS WITHOUT DYSPLASIA: ICD-10-CM

## 2024-09-11 DIAGNOSIS — R97.20 ELEVATED PROSTATE SPECIFIC ANTIGEN (PSA): ICD-10-CM

## 2024-09-11 DIAGNOSIS — R09.A2 GLOBUS SENSATION: ICD-10-CM

## 2024-09-11 DIAGNOSIS — Z29.11 NEED FOR VACCINATION AGAINST RESPIRATORY SYNCYTIAL VIRUS: ICD-10-CM

## 2024-09-11 PROBLEM — R10.30 LOWER ABDOMINAL PAIN: Status: RESOLVED | Noted: 2021-01-11 | Resolved: 2024-09-11

## 2024-09-11 LAB
ALBUMIN SERPL BCG-MCNC: 4.3 G/DL (ref 3.5–5.2)
ALP SERPL-CCNC: 88 U/L (ref 40–150)
ALT SERPL W P-5'-P-CCNC: 23 U/L (ref 0–70)
ANION GAP SERPL CALCULATED.3IONS-SCNC: 15 MMOL/L (ref 7–15)
AST SERPL W P-5'-P-CCNC: 35 U/L (ref 0–45)
BILIRUB SERPL-MCNC: 0.6 MG/DL
BUN SERPL-MCNC: 12.1 MG/DL (ref 8–23)
CALCIUM SERPL-MCNC: 9.6 MG/DL (ref 8.8–10.4)
CHLORIDE SERPL-SCNC: 104 MMOL/L (ref 98–107)
CREAT SERPL-MCNC: 1.22 MG/DL (ref 0.67–1.17)
EGFRCR SERPLBLD CKD-EPI 2021: 64 ML/MIN/1.73M2
GLUCOSE SERPL-MCNC: 99 MG/DL (ref 70–99)
HCO3 SERPL-SCNC: 21 MMOL/L (ref 22–29)
POTASSIUM SERPL-SCNC: 4.2 MMOL/L (ref 3.4–5.3)
PROT SERPL-MCNC: 6.9 G/DL (ref 6.4–8.3)
PSA SERPL DL<=0.01 NG/ML-MCNC: 2.49 NG/ML (ref 0–6.5)
SODIUM SERPL-SCNC: 140 MMOL/L (ref 135–145)

## 2024-09-11 PROCEDURE — 99213 OFFICE O/P EST LOW 20 MIN: CPT | Mod: 25 | Performed by: FAMILY MEDICINE

## 2024-09-11 PROCEDURE — 84153 ASSAY OF PSA TOTAL: CPT

## 2024-09-11 PROCEDURE — G0439 PPPS, SUBSEQ VISIT: HCPCS | Performed by: FAMILY MEDICINE

## 2024-09-11 PROCEDURE — 36415 COLL VENOUS BLD VENIPUNCTURE: CPT

## 2024-09-11 PROCEDURE — 80053 COMPREHEN METABOLIC PANEL: CPT

## 2024-09-11 RX ORDER — SILDENAFIL 25 MG/1
25 TABLET, FILM COATED ORAL DAILY PRN
Qty: 30 TABLET | Refills: 4 | Status: SHIPPED | OUTPATIENT
Start: 2024-09-11

## 2024-09-11 RX ORDER — AMLODIPINE BESYLATE 2.5 MG/1
2.5 TABLET ORAL DAILY
Qty: 90 TABLET | Refills: 3 | Status: SHIPPED | OUTPATIENT
Start: 2024-09-11

## 2024-09-11 ASSESSMENT — ANXIETY QUESTIONNAIRES
GAD7 TOTAL SCORE: 6
8. IF YOU CHECKED OFF ANY PROBLEMS, HOW DIFFICULT HAVE THESE MADE IT FOR YOU TO DO YOUR WORK, TAKE CARE OF THINGS AT HOME, OR GET ALONG WITH OTHER PEOPLE?: NOT DIFFICULT AT ALL
7. FEELING AFRAID AS IF SOMETHING AWFUL MIGHT HAPPEN: SEVERAL DAYS
GAD7 TOTAL SCORE: 6
GAD7 TOTAL SCORE: 6

## 2024-09-11 ASSESSMENT — PAIN SCALES - GENERAL: PAINLEVEL: SEVERE PAIN (6)

## 2024-09-11 NOTE — PROGRESS NOTES
Preventive Care Visit  [facility]  Shari Paredes MD, MD, Family Medicine  Sep 11, 2024      Assessment & Plan         ICD-10-CM    1. Encounter for Medicare annual wellness exam  Z00.00       2. Acute left ankle pain  M25.572       3. Cardenas's esophagus without dysplasia  K22.70       4. Vasculogenic erectile dysfunction, unspecified vasculogenic erectile dysfunction type  N52.9 sildenafil (VIAGRA) 25 MG tablet      5. Globus sensation  R09.A2       6. Need for vaccination against respiratory syncytial virus  Z29.11       7. Benign essential hypertension  I10 amLODIPine (NORVASC) 2.5 MG tablet          Ankle is acting and recovering like a sprained ankle. Though he did not initially remember an incident. Upon review of activities day of issue, he was lifting boxes to stack and demonstrated a rocking motion onto his toes over and over. Likely an overuse injury that is improving in the booted foot. Discussed that they both are a bit overused, so we could offer something for the other ankle as well, but would prefer something lighter for overuse as the boot can be heavy for use especially bilaterally. Reviewed taping ankle and offered ACE banadage for next step when ready. But mostly just rest, ice, elevation can be helpful as well.  HE has started to have globus sensations as well, is due early next year for endoscopy likely (within range) and given new symptoms, would direct him back to GI and likely EGD.  Bp and ED symptoms are well controlled with meds, renewed.    No LOS data to display   Time spent by me doing chart review, history and exam, documentation and further activities per the note    Shari Paredes MD     Patient has been advised of split billing requirements and indicates understanding: Yes        Counseling  Appropriate preventive services were addressed with this patient via screening, questionnaire, or discussion as appropriate for fall prevention, nutrition, physical activity,  Tobacco-use cessation, social engagement, weight loss and cognition.  Checklist reviewing preventive services available has been given to the patient.  Reviewed patient's diet, addressing concerns and/or questions.   He is at risk for lack of exercise and has been provided with information to increase physical activity for the benefit of his well-being.   Discussed possible causes of fatigue. Patient reported safety concerns were addressed today.The patient was provided with written information regarding signs of hearing loss.   Information on urinary incontinence and treatment options given to patient.           Charity Tomlinson is a 69 year old, presenting for the following:  Wellness Visit        9/11/2024     1:09 PM   Additional Questions   Roomed by velma   Accompanied by alone         9/11/2024     1:09 PM   Patient Reported Additional Medications   Patient reports taking the following new medications none         Health Care Directive  Patient does not have a Health Care Directive or Living Will: Patient states has Advance Directive and will bring in a copy to clinic.    HPI      Had swollen left ankle and pain. Now has some swelling in right ankle (left is improving but still a little sore)          9/6/2024   General Health   How would you rate your overall physical health? (!) POOR   Feel stress (tense, anxious, or unable to sleep) To some extent      (!) STRESS CONCERN      9/6/2024   Nutrition   Diet: Regular (no restrictions)            9/6/2024   Exercise   Days per week of moderate/strenous exercise 1 day   Average minutes spent exercising at this level 90 min      (!) EXERCISE CONCERN      9/6/2024   Social Factors   Frequency of gathering with friends or relatives Twice a week   Worry food won't last until get money to buy more No   Food not last or not have enough money for food? No   Do you have housing? (Housing is defined as stable permanent housing and does not include staying ouside in a  car, in a tent, in an abandoned building, in an overnight shelter, or couch-surfing.) Yes   Are you worried about losing your housing? No   Lack of transportation? No   Unable to get utilities (heat,electricity)? No            9/11/2024   Fall Risk   Gait Speed Test (Document in seconds) 3.36   Gait Speed Test Interpretation Less than or equal to 5.00 seconds - PASS             9/6/2024   Activities of Daily Living- Home Safety   Needs help with the following daily activites None of the above   Safety concerns in the home No grab bars in the bathroom            9/6/2024   Dental   Dentist two times every year? Yes            9/6/2024   Hearing Screening   Hearing concerns? (!) I NEED TO ASK PEOPLE TO SPEAK UP OR REPEAT THEMSELVES.    (!) IT'S HARD TO FOLLOW A CONVERSATION IN A NOISY RESTAURANT OR CROWDED ROOM.    (!) TROUBLE UNDERSTANDING SOFT OR WHISPERED SPEECH.       Multiple values from one day are sorted in reverse-chronological order         9/6/2024   Driving Risk Screening   Patient/family members have concerns about driving No            9/6/2024   General Alertness/Fatigue Screening   Have you been more tired than usual lately? (!) YES            9/6/2024   Urinary Incontinence Screening   Bothered by leaking urine in past 6 months Yes            9/6/2024   TB Screening   Were you born outside of the [place]? No            Today's PHQ-2 Score:       9/11/2024    11:09 AM   PHQ-2 ( 1999 Pf[employer])   Q1: Little interest or pleasure in doing things 0   Q2: Feeling down, depressed or hopeless 1   PHQ-2 Score 1   Q1: Little interest or pleasure in doing things Not at all   Q2: Feeling down, depressed or hopeless Several days   PHQ-2 Score 1           9/6/2024   Substance Use   Alcohol more than 3/day or more than 7/wk No   Do you have a current opioid prescription? No   How severe/bad is pain from 1 to 10? 6/10   Do you use any other substances recreationally? No        Social History     Tobacco Use    Smoking status:  Former     Types: Cigars     Passive exposure: Never    Smokeless tobacco: Never    Tobacco comments:     very occasion use of cigars formerly   Vaping Use    Vaping status: Never Used   Substance Use Topics    Alcohol use: Not Currently     Comment: Quit 10/10/21    Drug use: No       ASCVD Risk   The 10-year ASCVD risk score (Shahida ORONA, et al., 2019) is: 25.3%    Values used to calculate the score:      Age: 69 years      Sex: Male      Is Non- : No      Diabetic: No      Tobacco smoker: No      Systolic Blood Pressure: 128 mmHg      Is BP treated: Yes      HDL Cholesterol: 39 mg/dL      Total Cholesterol: 242 mg/dL            Reviewed and updated as needed this visit by Provider   Tobacco  Allergies  Meds  Problems  Med Hx  Surg Hx  Fam Hx              Current providers sharing in care for this patient include:  Patient Care Team:  Shari Paredes MD as PCP - General (Family Practice)  Shari Paredes MD as Assigned PCP  Gabriella Gómez MD as MD (Dermatology)  Sherman Gtz MD as Assigned Heart and Vascular Provider  Cathy Wilson MD as MD (Dermatology)  Saud Delcid MD as Assigned Allergy Provider  Jaycob Ross MD as Assigned Musculoskeletal Provider  Cj Saldaña DO as Assigned Neuroscience Provider  Cathy Wilson MD as MD (Dermatology)  Jose Angel Elder DO as Assigned Surgical Provider  Rolanda Hurst PA-C (Dermatology)  Gabriella Cao APRN CNP as Nurse Practitioner (Pain Medicine)  Lenny Patel DO as Assigned Sleep Provider  Mary Hernandez DO as Physician (Neurology)    The following health maintenance items are reviewed in Epic and correct as of today:  Health Maintenance   Topic Date Due    RSV VACCINE (1 - Risk 60-74 years 1-dose series) Never done    EGD  09/27/2023    BMP  08/30/2024    CMP  08/30/2024    PANKAJ ASSESSMENT  03/11/2025    LIPID  05/15/2025    ANNUAL REVIEW OF HM ORDERS  08/07/2025    MEDICARE  "ANNUAL WELLNESS VISIT  09/11/2025    FALL RISK ASSESSMENT  09/11/2025    GLUCOSE  08/30/2026    ADVANCE CARE PLANNING  02/20/2029    COLORECTAL CANCER SCREENING  01/20/2031    DTAP/TDAP/TD IMMUNIZATION (3 - Td or Tdap) 08/30/2033    HEPATITIS C SCREENING  Completed    PHQ-2 (once per calendar year)  Completed    INFLUENZA VACCINE  Completed    Pneumococcal Vaccine: 65+ Years  Completed    ZOSTER IMMUNIZATION  Completed    AORTIC ANEURYSM SCREENING (SYSTEM ASSIGNED)  Completed    COVID-19 Vaccine  Completed    HPV IMMUNIZATION  Aged Out    MENINGITIS IMMUNIZATION  Aged Out    RSV MONOCLONAL ANTIBODY  Aged Out    LUNG CANCER SCREENING  Discontinued         Review of Systems  Constitutional, HEENT, cardiovascular, pulmonary, GI, , musculoskeletal, neuro, skin, endocrine and psych systems are negative, except as otherwise noted.     Objective    Exam  /78   Pulse 78   Temp 97.5  F (36.4  C) (Temporal)   Resp 16   Ht 1.791 m (5' 10.5\")   Wt 97.1 kg (214 lb)   SpO2 97%   BMI 30.27 kg/m     Estimated body mass index is 30.27 kg/m  as calculated from the following:    Height as of this encounter: 1.791 m (5' 10.5\").    Weight as of this encounter: 97.1 kg (214 lb).    Physical Exam  GENERAL: alert and no distress  EYES: Eyes grossly normal to inspection, PERRL and conjunctivae and sclerae normal  HENT: ear canals and TM's normal, nose and mouth without ulcers or lesions  NECK: no adenopathy, no asymmetry, masses, or scars  RESP: lungs clear to auscultation - no rales, rhonchi or wheezes  CV: regular rate and rhythm, normal S1 S2, no S3 or S4, no murmur, click or rub, no peripheral edema  ABDOMEN: soft, nontender, no hepatosplenomegaly, no masses and bowel sounds normal  MS: pain over ATFL ligament only, swelling has gone down and present only in the foot now. Both side involved, less swelling on the left now. No redness or warmth  SKIN: no suspicious lesions or rashes  NEURO: Normal strength and tone, " mentation intact and speech normal  PSYCH: mentation appears normal, affect normal/bright        9/11/2024   Mini Cog   Clock Draw Score 2 Normal   3 Item Recall 3 objects recalled   Mini Cog Total Score 5             Answers submitted by the patient for this visit:  Patient Health Questionnaire (G7) (Submitted on 9/11/2024)  PANKAJ 7 TOTAL SCORE: 6      Signed Electronically by: Shari Paredes MD, MD

## 2024-09-17 ENCOUNTER — OFFICE VISIT (OUTPATIENT)
Dept: UROLOGY | Facility: CLINIC | Age: 69
End: 2024-09-17
Payer: MEDICARE

## 2024-09-17 DIAGNOSIS — R35.0 URINARY FREQUENCY: ICD-10-CM

## 2024-09-17 DIAGNOSIS — N13.8 BPH WITH OBSTRUCTION/LOWER URINARY TRACT SYMPTOMS: Primary | ICD-10-CM

## 2024-09-17 DIAGNOSIS — N40.1 BPH WITH OBSTRUCTION/LOWER URINARY TRACT SYMPTOMS: Primary | ICD-10-CM

## 2024-09-17 PROCEDURE — 51798 US URINE CAPACITY MEASURE: CPT | Performed by: UROLOGY

## 2024-09-17 PROCEDURE — 99214 OFFICE O/P EST MOD 30 MIN: CPT | Mod: 25 | Performed by: UROLOGY

## 2024-09-17 RX ORDER — TROSPIUM CHLORIDE ER 60 MG/1
60 CAPSULE ORAL EVERY MORNING
Qty: 90 CAPSULE | Refills: 3 | Status: SHIPPED | OUTPATIENT
Start: 2024-09-17 | End: 2025-09-17

## 2024-09-17 ASSESSMENT — PAIN SCALES - GENERAL: PAINLEVEL: NO PAIN (0)

## 2024-09-17 NOTE — PROGRESS NOTES
[facility]   CHIEF COMPLAINT   It was my pleasure to see Jose Angel Cha who is a 69 year old male for follow-up of BPH with LUTS, Elevated PSA .      HPI   Jose Angel Cha is a very pleasant 69 year old male     Initially seen 2/1/2024:  Jose Angel Cha is a 68 year old male who is being seen for evaluation of LUTS  He has noticed a gradual worsening of his urinary symptoms over the last several years  He notes a slower stream with increased urgency and at times a few drops of urge incontinence  He did try tamsulosin in the past, this was at least a decade ago, with very bothersome side effects such as retrograde ejaculation and changes to his climax and orgasm  He has had some difficulty maintaining an erection and has used sildenafil 25 mg as needed with very good results  1 cup of tea, water, 1 pop per day    AUASS: 4-2-3-4-3-1-2/3 = 19/20  QOL:4/5  PVR = 59cc    No known family history of prostate cancer    3/14/2024:  Follow-up today to review his MRI  He continues to have bothersome urinary symptoms    TODAY 9/17/2024:  Follow-up today to review his PSA and discuss urinary symptoms  He continues to have bothersome urinary frequency and urgency with at times a small amount of urge incontinence  No significant outlet obstructive symptoms    AUASS: 2-2-3-4-1-1-2 = 15  QOL = 5  PVR = 28cc    PHYSICAL EXAM  Patient is a 69 year old  male   Vitals: There were no vitals taken for this visit.  There is no height or weight on file to calculate BMI.  General Appearance Adult:   Alert, no acute distress, oriented  HENT: throat/mouth:normal, good dentition  Lungs: no respiratory distress, or pursed lip breathing  Heart: No obvious jugular venous distension present  Musculoskeltal: extremities normal, no peripheral edema  Skin: no suspicious lesions or rashes  Neuro: Alert, oriented, speech and mentation normal  Psych: affect and mood normal         PSA PSA Tumor Marker   Latest Ref Rng 0.00 - 4.00 ug/L 0.00 - 4.50 ng/mL    2/22/2007 1.44      9/19/2011 1.53      10/25/2012 1.73      7/26/2013 2.04      8/11/2022 3.73      1/29/2024   4.85 (H)     finasteride 5mg (Proscar) started 3/14/2024   PSA Tumor Marker   Latest Ref Rng 0.00 - 6.50 ng/mL   9/11/2024 2.49          IMAGING:  All pertinent imaging reviewed:    All imaging studies reviewed by me.  I personally reviewed these imaging films.  A formal report from radiology will follow.    MRI PROSTATE 3/6/2024:  FINDINGS:  PERIPHERAL ZONE: No suspicious focal finding in the peripheral zone. Multiple linear and wedge-shaped foci of T2 hypointensity and enhancement without abnormal diffusion restriction consistent with nonspecific postinflammatory change.     TRANSITIONAL ZONE: No suspicious focal finding in the transitional zone. Stromal and glandular BPH.     No seminal vesicle invasion.  No pelvic lymphadenopathy.  No lesions in the visualized bones.     PROSTATE GLAND VOLUME: 67 cc.     Small bilateral fat-containing inguinal hernias unchanged.                                                            IMPRESSION:  1.  No suspicious focal finding in the prostate gland which is assigned PI-RADS CATEGORY 2: Low. Clinically significant cancer is unlikely to be present.  2.  Peripheral zone with mild nonspecific postinflammatory change.  3.  Moderate prostate enlargement secondary to BPH.  4.  Small bilateral fat-containing inguinal hernias unchanged.        ASSESSMENT and PLAN  69-year-old man with BPH and LUTS with urinary frequency and elevated PSA    BPH with LUTS  - We again discussed the pathophysiology of the bladder and the prostate and the normal changes associated with the development of BPH and LUTS  - Reviewed his MRI and reviewed these images personally.  I agree with radiologist interpretation.  -We discussed that his prostate measures 67 g on MRI  - Previous trial of tamsulosin with bothersome side effects  -He has now been on finasteride since 3/14/2024 with no  bothersome side effects and only slight improvement of symptoms  - Majority of his symptoms are related to an OAB type picture with urinary urgency  - We discussed the option for additional medication management with an anticholinergic and prescription for trospium 60 mg XL daily sent to the pharmacy.  We discussed the common side effects  - If he is doing well, follow-up with me in 1 year.  If he continues to have bothersome symptoms after 3 months of medication trial, he will message for an earlier follow-up    Elevated PSA  - Reviewed his PSA history and trend  - I reviewed his MRI and agree with the radiologist interpretation.  We discussed that it is very reassuring there are no PI-RADS 3 or greater lesions  - We reviewed the expected change in PSA with the initiation of finasteride and his PSA has come down by 50% to 2.49 which is very reassuring  - Plan for follow-up and repeat PSA in 1 year    Time spent: 20 minutes spent on the date of the encounter doing chart review, history and exam, documentation and further activities as noted above.    Note copy attestation: the elements have been reviewed, edited as needed, and remain pertinent to today's visit.     Bernard Blank MD   Urology  [facility]  [facility] Phone: [phone]  [facility] Phone: [phone]

## 2024-09-17 NOTE — NURSING NOTE
Jose Angel Cha's chief complaint for this visit includes:  Chief Complaint   Patient presents with    Follow Up     6 month follow up; BPH/LUTS and PSA prior 9/11/24 2.49     PCP: Shari Paredes    Referring Provider:  Referred Self, MD  No address on file    There were no vitals taken for this visit.  No Pain (0)        Allergies   Allergen Reactions    Anesthetic Ether     Bee Venom     Demerol Visual Disturbance    Statin [Statins] Other (See Comments)     Muscle pain     PVR 29 mL    Do you need any medication refills at today's visit?

## 2024-09-18 ASSESSMENT — ACTIVITIES OF DAILY LIVING (ADL)
CARRYING_A_HEAVY_OBJECT_OF_10_POUNDS: 2
PUSHING_WITH_THE_INVOLVED_ARM: 4
WASHING_YOUR_HAIR?: 2
PUTTING_ON_YOUR_PANTS: 1
PUTTING_ON_AN_UNDERSHIRT_OR_A_PULLOVER_SWEATER: 3
AT_ITS_WORST?: 6
REACHING_FOR_SOMETHING_ON_A_HIGH_SHELF: 5
WASHING_YOUR_BACK: 4
REMOVING_SOMETHING_FROM_YOUR_BACK_POCKET: 3
TOUCHING_THE_BACK_OF_YOUR_NECK: 2
PUTTING_ON_A_SHIRT_THAT_BUTTONS_DOWN_THE_FRONT: 2
PLACING_AN_OBJECT_ON_A_HIGH_SHELF: 6
WHEN_LYING_ON_THE_INVOLVED_SIDE: 9
PLEASE_INDICATE_YOR_PRIMARY_REASON_FOR_REFERRAL_TO_THERAPY:: SHOULDER

## 2024-09-23 ENCOUNTER — THERAPY VISIT (OUTPATIENT)
Dept: PHYSICAL THERAPY | Facility: CLINIC | Age: 69
End: 2024-09-23
Attending: NURSE PRACTITIONER
Payer: MEDICARE

## 2024-09-23 DIAGNOSIS — M25.511 PAIN IN JOINT OF RIGHT SHOULDER: ICD-10-CM

## 2024-09-23 PROCEDURE — 97162 PT EVAL MOD COMPLEX 30 MIN: CPT | Mod: GP | Performed by: PHYSICAL THERAPIST

## 2024-09-23 PROCEDURE — 97110 THERAPEUTIC EXERCISES: CPT | Mod: GP | Performed by: PHYSICAL THERAPIST

## 2024-09-23 NOTE — PROGRESS NOTES
PHYSICAL THERAPY EVALUATION  Type of Visit: Evaluation        Fall Risk Screen:  Fall screen completed by: PT  Have you fallen 2 or more times in the past year?: No  Have you fallen and had an injury in the past year?: No  Is patient a fall risk?: No    Subjective   Patient reports that in early July was bitten by a spider 2x and then right foot started to swell and be painful . Has since gotten compression garement and wears them all the time and has good footware .  And since the bite his right side of his body is overall more sore .  The right shoulder is painful and weak and cant lay on shoulder and throw overhand.  Will have numb and tingling and pain in the right shoulder on laterial shoulder and the right hand 2,3 knuckles and dorsal hand , when he lays on right shoulder and is very painful , and the right hand when he uses it will be more painful and hand has been more painful since he hit when taking out garbage breaking down box . Right ribs have been chronic pain since 1996 and the last 1 year has been worse .  Also has chronic B right >left hip pain and the right has recently also been more painful .  2011 mitral valve repair 2011       Presenting condition or subjective complaint: Can't really throw a ball overhand.  Lying on R side for a few minutes leads to considerable pain/discomfort  Date of onset: 07/09/24    Relevant medical history: Arthritis   Dates & types of surgery:      Prior diagnostic imaging/testing results:       Prior therapy history for the same diagnosis, illness or injury: No      Prior Level of Function  Transfers: Independent  Ambulation: Independent  ADL: Independent  IADL:  independent     Living Environment  Social support: With a significant other or spouse   Type of home: House; 2-story   Stairs to enter the home: No       Ramp: No   Stairs inside the home: Yes 8 Is there a railing: Yes     Help at home: None  Equipment owned: Straight Cane     Employment: No     Hobbies/Interests: golf :}   reading  socializing   yard care    Patient goals for therapy: Play/throw ball with grandchildren and sleep on R side.    Pain assessment:      Objective   SHOULDER EVALUATION  PAIN: spadi 37.69  INTEGUMENTARY (edema, incisions):   POSTURE: patient is somewhat forward head and rounded shoulder   GAIT:   Weightbearing Status:   Assistive Device(s):   Gait Deviations:   BALANCE/PROPRIOCEPTION:   WEIGHTBEARING ALIGNMENT:   ROM: shoulder flexion B 120 , external rotation 50 internal rotation B low back     STRENGTH: strength in right shoulder is good flexion , extension , abduction , internal and external rotation , 4-/5 supraspinatus   FLEXIBILITY:   SPECIAL TESTS:   PALPATION: tender on right laterial shoulder   JOINT MOBILITY:   CERVICAL SCREEN: WFL    Assessment & Plan   CLINICAL IMPRESSIONS  Medical Diagnosis: pain in joint of right shoulder M25.511    Treatment Diagnosis: right shoulder pain and right hip , rib , right hand   Impression/Assessment: Patient is a 69 year old male with right shoulder and chronic right trunk , hip , hand and foot  complaints.  The following significant findings have been identified: Pain, Decreased ROM/flexibility, Decreased joint mobility, Decreased strength, Impaired muscle performance, and Decreased activity tolerance. These impairments interfere with their ability to perform self care tasks, work tasks, recreational activities, and household chores as compared to previous level of function.     Clinical Decision Making (Complexity):  Clinical Presentation: Stable/Uncomplicated  Clinical Presentation Rationale: based on medical and personal factors listed in PT evaluation  Clinical Decision Making (Complexity): Low complexity    PLAN OF CARE  Treatment Interventions:  Modalities:  as needed   Interventions: Manual Therapy, Neuromuscular Re-education, Therapeutic Activity, Therapeutic Exercise, Aquatic Therapy    Long Term Goals     PT Goal 1  Goal  Identifier: 1  Goal Description: instruction in HEP and compliant with it 5 of 7 days to improve quality of life  Target Date: 12/21/24  PT Goal 2  Goal Identifier: 2  Goal Description: patient to have reduction in right shoulder pain currently spadi 37.69 goal is less than 25  Target Date: 12/21/24  PT Goal 3  Goal Identifier: 3  Goal Description: patients 2 goals be able to sleep better on right side and throw a ball with grandkids  Target Date: 12/21/24      Frequency of Treatment: 1x week x 12 weeks  Duration of Treatment:      Recommended Referrals to Other Professionals:   Education Assessment:   Learner/Method: Patient;Listening;Reading;Demonstration;Pictures/Video;No Barriers to Learning    Risks and benefits of evaluation/treatment have been explained.   Patient/Family/caregiver agrees with Plan of Care.     Evaluation Time:     PT Eval, Moderate Complexity Minutes (39512): 30       Signing Clinician: Veronika Stone, PT        [facility]                                                                                   OUTPATIENT PHYSICAL THERAPY      PLAN OF TREATMENT FOR OUTPATIENT REHABILITATION   Patient's Last Name, First Name, Jose Angel Meza YOB: 1955   Provider's Name   [facility]   Medical Record No.  [id number]     Onset Date: 07/09/24  Start of Care Date: 09/23/24     Medical Diagnosis:  pain in joint of right shoulder M25.511      PT Treatment Diagnosis:  right shoulder pain and right hip , rib , right hand Plan of Treatment  Frequency/Duration: 1x week x 12 weeks/      Certification date from 09/23/24 to 12/21/24         See note for plan of treatment details and functional goals     Veronika Stone, PT                         I CERTIFY THE NEED FOR THESE SERVICES FURNISHED UNDER        THIS PLAN OF TREATMENT AND WHILE UNDER MY CARE     (Physician attestation of this document indicates review and certification of  the therapy plan).              Referring Provider:  Gabriella Cao    Initial Assessment  See Epic Evaluation- Start of Care Date: 09/23/24

## 2024-09-24 ENCOUNTER — RADIOLOGY INJECTION OFFICE VISIT (OUTPATIENT)
Dept: PALLIATIVE MEDICINE | Facility: CLINIC | Age: 69
End: 2024-09-24
Attending: NURSE PRACTITIONER
Payer: MEDICARE

## 2024-09-24 VITALS — OXYGEN SATURATION: 98 % | HEART RATE: 76 BPM | DIASTOLIC BLOOD PRESSURE: 89 MMHG | SYSTOLIC BLOOD PRESSURE: 136 MMHG

## 2024-09-24 DIAGNOSIS — G58.8 INTERCOSTAL NEURALGIA: ICD-10-CM

## 2024-09-24 PROCEDURE — 64420 NJX AA&/STRD NTRCOST NRV 1: CPT | Mod: RT | Performed by: PAIN MEDICINE

## 2024-09-24 PROCEDURE — 77002 NEEDLE LOCALIZATION BY XRAY: CPT | Performed by: PAIN MEDICINE

## 2024-09-24 PROCEDURE — 64421 NJX AA&/STRD NTRCOST NRV EA: CPT | Mod: RT | Performed by: PAIN MEDICINE

## 2024-09-24 RX ORDER — DEXAMETHASONE SODIUM PHOSPHATE 10 MG/ML
10 INJECTION, SOLUTION INTRAMUSCULAR; INTRAVENOUS ONCE
Status: COMPLETED | OUTPATIENT
Start: 2024-09-24 | End: 2024-09-24

## 2024-09-24 RX ADMIN — DEXAMETHASONE SODIUM PHOSPHATE 10 MG: 10 INJECTION, SOLUTION INTRAMUSCULAR; INTRAVENOUS at 13:04

## 2024-09-24 ASSESSMENT — PAIN SCALES - GENERAL
PAINLEVEL: MILD PAIN (2)
PAINLEVEL: MILD PAIN (3)

## 2024-09-24 NOTE — NURSING NOTE
Discharge Information    IV Discontiued Time:  NA    Amount of Fluid Infused:  NA    Discharge Criteria = When patient returns to baseline or as per MD order    Consciousness:  Pt is fully awake    Circulation:  BP +/- 20% of pre-procedure level    Respiration:  Patient is able to breathe deeply    O2 Sat:  Patient is able to maintain O2 Sat >92% on room air    Activity:  Moves 4 extremities on command    Ambulation:  Patient is able to stand and walk or stand and pivot into wheelchair    Dressing:  Clean/dry or No Dressing    Notes:   Discharge instructions and AVS given to patient    Patient meets criteria for discharge?  YES    Admitted to PCU?  No    Responsible adult present to accompany patient home?  Yes    Signature/Title:    riya ng RN  RN Care Coordinator  [facility]

## 2024-09-24 NOTE — PATIENT INSTRUCTIONS
[facility]   Procedure Discharge Instructions    Today you saw:    Dr. Meghan Flores,   Dr. Taco Briceño     You had an:  intercostal nerve blocks      Numbness and/or weakness in the chest may occur for up to 6-8 hours after the procedure due to effect of the local anesthetic  Do not drive for 6 hours. The effect of the local anesthetic could slow your reflexes.   You may resume your regular activities after 24 hours  Avoid strenuous activity for the first 24 hours  You may shower, however avoid swimming, tub baths or hot tubs for 24 hours following your procedure  You may have a mild to moderate increase in pain for several days following the injection.  It may take up to 14 days for the steroid medication to start working although you may feel the effect as early as a few days after the procedure.     You may use ice packs for 10-15 minutes, 3 to 4 times a day at the injection site for comfort  Do not use heat to painful areas for 6 to 8 hours. This will give the local anesthetic time to wear off and prevent you from accidentally burning your skin.   Unless you have been directed to avoid the use of anti-inflammatory medications (NSAIDS), you may use medications such as ibuprofen, Aleve or Tylenol for pain control if needed.   If you were fasting, you may resume your normal diet and medications after the procedure  If you have diabetes, check your blood sugar more frequently than usual as your blood sugar may be higher than normal for 10-14 days following a steroid injection. Contact your doctor who manages your diabetes if your blood sugar is higher than usual  Possible side effects of steroids that you may experience include flushing, elevated blood pressure, increased appetite, mild headaches and restlessness.  All of these symptoms will get better with time.  If you experience any of the following, call the Pain Clinic during work hours (Mon-Friday 8-4:30 pm) at [phone]  or the Provider Line after hours at [phone]:  -Fever over 100 degree F  -Swelling, bleeding, redness, drainage, warmth at the injection site  -Progressive weakness or numbness in your legs or arms  -Loss of bowel or bladder function  -Unusual headache that is not relieved by Tylenol or other pain reliever  -Unusual new onset of pain that is not improving

## 2024-09-24 NOTE — CONFIDENTIAL NOTE
Pre procedure Diagnosis: intercostal neuralgia   Post procedure Diagnosis: Same  Procedure performed: Right T7,8,9 intercostal nerve blocks  Anesthesia: none  Complications: none  Operators: Taco Briceño MD     Indications:   Jose Angel Cha is a 69 year old male was sent for right intercostal nerve blocks.  They have a history of right rib pain.  Exam shows neg allodynia and they have tried conservative treatment including meds/pt/injections with benefit .    Options/alternatives, benefits and risks were discussed with the patient including bleeding, infection, no pain relief, tissue trauma, exposure to radiation, reaction to medications including seizure, spinal cord injury, weakness, numbness, and lung injury including pneumothorax.  Questions were answered to his satisfaction and he agrees to proceed. Voluntary informed consent was obtained and signed.     Vitals were reviewed: Yes  Allergies were reviewed:  Yes   Medications were reviewed:  Yes   Pre-procedure pain score: 3/10    Procedure:  After getting informed consent, patient was brought into the procedure suite and was placed in a prone position on the procedure table.   A Pause for the Cause was performed.  Patient was prepped and draped in sterile fashion.     The location of maximal pain was determined to be at T7,78,9 on the right. At a location several centimeters lateral from the spine, injections were completed. Using a 30G 1 inch needle, lidocaine 1% was used at the for locations (bilateral blocks) to anesthetize the skin. A 25G 2.5 inch needle was advanced with a cephalad tilt, to touch down on the anterior aspect of the rib. Intermittent fluoroscopy in both the AP and lateral projection was used to verify location superficial to lung tissue. After touching down on the rib, the needle was walked off inferiorly, into the area of the neurovascular bundle. Advancement was made 1-2mm at a time.   Omnipaque was injected. There was no evidence of  vascular uptake or spread towards the spinal canal. 1ml of contrast was injected.  9ml was wasted.  In total, 5ml of ropivacaine 0.2%, and 10mg of dexamethasone as injected. .    Hemostasis was achieved, the area was cleaned, and bandaids were placed when appropriate.  The patient tolerated the procedure well, and was taken to the recovery room.    Images were saved to PACS.    Post-procedure pain score: 2/10  Follow-up includes:   -f/u with referring provider    Taco Briceño MD  [facility]

## 2024-10-01 ENCOUNTER — THERAPY VISIT (OUTPATIENT)
Dept: PHYSICAL THERAPY | Facility: CLINIC | Age: 69
End: 2024-10-01
Attending: NURSE PRACTITIONER
Payer: MEDICARE

## 2024-10-01 DIAGNOSIS — M25.511 PAIN IN JOINT OF RIGHT SHOULDER: Primary | ICD-10-CM

## 2024-10-01 PROCEDURE — 97110 THERAPEUTIC EXERCISES: CPT | Mod: GP | Performed by: PHYSICAL THERAPIST

## 2024-10-11 ENCOUNTER — TRANSFERRED RECORDS (OUTPATIENT)
Dept: HEALTH INFORMATION MANAGEMENT | Facility: CLINIC | Age: 69
End: 2024-10-11
Payer: MEDICARE

## 2024-10-16 DIAGNOSIS — R09.A2 GLOBUS SENSATION: ICD-10-CM

## 2024-10-16 RX ORDER — FAMOTIDINE 40 MG/1
TABLET, FILM COATED ORAL
Qty: 30 TABLET | Refills: 0 | Status: SHIPPED | OUTPATIENT
Start: 2024-10-16 | End: 2024-10-21 | Stop reason: ALTCHOICE

## 2024-10-16 NOTE — TELEPHONE ENCOUNTER
Famotidine (PEPCID) 40 mg   Last Written Prescription Date:  8/22/2023  Last Fill Quantity: 30,  # refills: 3   Last office visit: 8/22/2023 with prescribing provider:  Dr. Hilario   Future Office Visit:  10/28/2024    Requested Prescriptions   Pending Prescriptions Disp Refills    famotidine (PEPCID) 40 MG tablet [Pharmacy Med Name: Famotidine 40 MG Oral Tablet] 30 tablet 0     Sig: TAKE 1 TABLET BY MOUTH NIGHTLY AS NEEDED HEARTBURN       H2 Blockers Protocol Passed - 10/16/2024  4:06 PM        Passed - Patient is age 12 or older        Passed - Medication is active on med list        Passed - Medication indicated for associated diagnosis     Medication is associated with one or more of the following diagnoses:  Erosive esophagitis - Gastric hypersecretion   Gastric ulcer   Gastroesophageal reflux disease   Indigestion   Ulcer of duodenum   Esophagitis   Gastritis   Gastrointestinal hemorrhage   Stress ulcer; Prophylaxis   Helicobacter pylori gastrointestinal tract infection - Ulcer of duodenum  Zollinger-Santos syndrome             Passed - Recent (12 mo) or future (90 days) visit within the authorizing provider's specialty     The patient must have completed an in-person or virtual visit within the past 12 months or has a future visit scheduled within the next 90 days with the authorizing provider s specialty.  Urgent care and e-visits do not quality as an office visit for this protocol.               Medication refilled per RN protocol.     Catina Sethi RN on 10/16/2024 at 4:09 PM

## 2024-10-21 ENCOUNTER — ANCILLARY PROCEDURE (OUTPATIENT)
Dept: GENERAL RADIOLOGY | Facility: CLINIC | Age: 69
End: 2024-10-21
Attending: PODIATRIST
Payer: MEDICARE

## 2024-10-21 ENCOUNTER — OFFICE VISIT (OUTPATIENT)
Dept: PODIATRY | Facility: CLINIC | Age: 69
End: 2024-10-21
Payer: MEDICARE

## 2024-10-21 VITALS
BODY MASS INDEX: 30.27 KG/M2 | TEMPERATURE: 97.1 F | WEIGHT: 214 LBS | SYSTOLIC BLOOD PRESSURE: 135 MMHG | DIASTOLIC BLOOD PRESSURE: 82 MMHG

## 2024-10-21 DIAGNOSIS — M20.5X1 HALLUX LIMITUS OF RIGHT FOOT: Primary | ICD-10-CM

## 2024-10-21 DIAGNOSIS — M77.42 METATARSALGIA OF BOTH FEET: ICD-10-CM

## 2024-10-21 DIAGNOSIS — M79.89 SWELLING OF RIGHT FOOT: ICD-10-CM

## 2024-10-21 DIAGNOSIS — M77.41 METATARSALGIA OF BOTH FEET: ICD-10-CM

## 2024-10-21 DIAGNOSIS — R60.0 EDEMA OF RIGHT LOWER LEG DUE TO VENOUS STASIS: ICD-10-CM

## 2024-10-21 DIAGNOSIS — I87.2 EDEMA OF RIGHT LOWER LEG DUE TO VENOUS STASIS: ICD-10-CM

## 2024-10-21 DIAGNOSIS — M77.8 CAPSULITIS OF FOOT: ICD-10-CM

## 2024-10-21 PROCEDURE — 99203 OFFICE O/P NEW LOW 30 MIN: CPT | Performed by: PODIATRIST

## 2024-10-21 PROCEDURE — 73630 X-RAY EXAM OF FOOT: CPT | Mod: TC | Performed by: RADIOLOGY

## 2024-10-21 ASSESSMENT — PAIN SCALES - GENERAL: PAINLEVEL: MODERATE PAIN (4)
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
## 2024-10-21 NOTE — PATIENT INSTRUCTIONS
Reliable shoe stores: To maximize your experience and provide the best possible fit.  Be sure to show them your foot concerns and tell them Dr. Rocha sent you.      Stores listed in bold have only athletic shoes, and stores that are not bold are mostly casual or variety of shoes    [employer]  [address] [place] [address]  [phone]    [employer]  [address] [place] [address]  [phone]     [employer]  [address] [place] [address]  [phone]    [employer]  [address] [facility] [address]  [phone]    [employer]  [address] [place], [place] [address] [phone]    [employer]  [address] [place] [address]  [phone]                         [employer] Locations:     [address] [place] [address]   [phone]     [address] [place] [address] [address] [place] [address]   [phone]     [address] [place] [address]   [phone]     [address] [facility] [place] [address]   [phone]     [address] [address]   [phone]     [address] [place] [address]   [phone]     [address] [facility] [address]   [place] [address]   [phone]     [address] [facility]   [place] [address]   [phone]             How to find reasonable shoes          The correct width    Correct Fitting    Correct Length      Foot Distortion    Posture Distortion                          Torsional Rigidity      Grasp behind the heel and underneath the foot and twist      Bad    Excessive torsion/twist in midfoot     Less torsion/twist in midfoot is better                   Heel Counter Rigidity      Grasp just above   midsole and squeeze      Bad    Soft heel counter      Good    Rigid Heel Counter      Flexion Rigidity      Grasp shoe and bend from forefoot to rearfoot                  DEGENERATIVE ARTHRITIS OF THE BIG TOE JOINT  "(hallux limitus/hallux rigidus)   Arthritis of the joint at the base of the big toe (metatarsophalangeal joint) has several causes. Usually it results from repetitive trauma to the joint, secondary to abnormal foot mechanics. Often it is hereditary. However, a one-time traumatic event can lead to arthritis. The condition falcon,sn't improve with time, and even with treatment, can worsen. The cartilage wears out, joint surfaces are no longer smooth, bone rubs on bone, inflammation occurs with pain, and eventually bone spurs and loose fragments might develop.   The joint is often painful with activity, worse with flimsy shoes or walking barefoot, and it slowly progresses over time. A person might notice the toe \"locking up\" with walking. There often is an obvious, and irritating, bony bump on top of the foot. Shoes might be uncomfortable. In some people the pain is so bothersome that recreational activities sometimes even normal daily activities are difficult to perform.   The pain from this arthritis is likely a combination of joint jamming, cartilage loss and inflammation, and irritation from shoes rubbing on the bump. Sometimes other parts of the foot, leg, or back hurt from altering one's walk to compensate for the painful jOint.     Ways to help a person live with the discomfort include wearing a good, supportive shoe with a rigid, rocker-type bottom. An example is a hiking boot. A rigid sole minimizes bending of the joint, and therefore, joint motion and pain. Shoes with a high toe box allow for less rubbing on the bump. Avoiding barefoot walking, sandals, flip-flops and slippers usually helps.   Sometimes an insert or orthotic provides symptom relief. This might make shoe fit more difficult. Pads over the bump and occasionally injections into the joint provide relief.   Surgery for this condition is aimed towards alleviating pain. It does not cure the arthritis nor does it guarantee better joint motion. Depending " on the condition of the metatarsophalangeal joint, there are several surgiqal options:    1.  Cutting off the bony bump(s) and cleaning the joint    2.  Loosening the joint up by making cuts in the first metatarsal bone or the big toe bone and removing a small section of bone.    3.  Repositioning bone to minimize jamming of the joint.    4.  In severe cases, the joint is fused. By fusing the joint, it will never bend again. This resolves the pain, because it's the movement of a worn out jOint that causes pain. Oftentimes the operation involves a combination of these procedures and. requires the use of screws, pins, and/or a small surgical plate.     Healing after surgery requires about six weeks of protection. This allows the bone to heal. Maximum recovery takes about one year. The scar tissue and joint structures require this amount of time to finish the healing process. Expect stiffness, swelling and numbness during that time frame.   Surgery for arthritis of the metatarsophalangeal joint does involve side effects. Some side effects are predictable and others are less common but do occur. A scar will be visible and could be irritated by shoes. The shoe may rub on the screw or internal pin requiring surgical removal of these fixation devices. The screw and pin would likely be left in place for a full year. The first toe may remain stiff after surgery. The amount of stiffness is variable. Most people never regain normal motion of the first toe. This is due to scar tissue inherent to any surgery, in addition to the cumUlative effects of arthritis. Sometimes the big toe drifts to one side or the other. Joint fusion is one option to correct an unstable, drifting toe. This procedure straightens the toe, however, no motion remains.   All surgical procedures involve risk of infection, numbness, pain, delayed bone healing, osteotomy (bone cut) dislocation, blood clots, continued foot pain, etc. Arthritic joint surgery is  quite complex and should not be taken lightly.    Any skin incision can lead to infection. Deep infection might involve the bone and thus repeat surgery and six weeks of IV antibiotics. Scar tissue can cause nerve pain or numbness. his is generally temporary but can be permanent. We do not have treatments that cure nerve problems. Second toe pain could be related to altered mechanics and pressure transferred to the second toe. Delayed bone healing would lengthen the healing time. Some bones simply do not heal. This requires repeat surgery, electronic bone stimulation and/or extended protection. Smokers have an approximate 20% chance of poor bone healing. This is double that of a non-smoker. The bone cut may displace. This may need to be repaired with a second operation. Displacement can cause joint malalignment. Immobility after surgery can cause a blood clot in the legs and lungs. This could result in death.   Foot pain is complex. Most feet hurt for more than one reason. Operating on the arthritic   big toe joint will not necessarily create a pain free foot. Appropriate shoes, healthy body weight, avoidance of bare foot walking and moderation of activity will always be   necessary to enjoy foot comfort. Arthritis is incurable even with surgery.     Surgery for this type of arthritis is nevertheless quite successful. Most surgical patients are pleased with their foot following surgery. Many of the issues described above can be controlled by taking proper care of your foot during the healing process.   Cosmetic bump surgery is discouraged for the reasons listed above. A bump and joint that is comfortable when wearing appropriate shoes should simply be treated with appropriate shoes.   Your surgeon would be happy to fully describe any of the above issues. You should pursue a full understanding of the operation, recovery process and any potential problems that could develop.
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
## 2024-10-22 ENCOUNTER — THERAPY VISIT (OUTPATIENT)
Dept: PHYSICAL THERAPY | Facility: CLINIC | Age: 69
End: 2024-10-22
Attending: NURSE PRACTITIONER
Payer: MEDICARE

## 2024-10-22 DIAGNOSIS — R09.A2 GLOBUS SENSATION: ICD-10-CM

## 2024-10-22 DIAGNOSIS — M25.511 PAIN IN JOINT OF RIGHT SHOULDER: Primary | ICD-10-CM

## 2024-10-22 PROCEDURE — 97113 AQUATIC THERAPY/EXERCISES: CPT | Mod: GP | Performed by: PHYSICAL THERAPIST

## 2024-10-22 NOTE — TELEPHONE ENCOUNTER
Requested Prescriptions   Pending Prescriptions Disp Refills    famotidine (PEPCID) 40 MG tablet [Pharmacy Med Name: Famotidine 40 MG Oral Tablet] 30 tablet 0     Sig: TAKE 1 TABLET BY MOUTH NIGHTLY AS NEEDED HEARTBURN       H2 Blockers Protocol Failed - 10/22/2024  1:21 PM        Failed - Medication is active on med list        Passed - Patient is age 12 or older        Passed - Medication indicated for associated diagnosis     Medication is associated with one or more of the following diagnoses:  Erosive esophagitis - Gastric hypersecretion   Gastric ulcer   Gastroesophageal reflux disease   Indigestion   Ulcer of duodenum   Esophagitis   Gastritis   Gastrointestinal hemorrhage   Stress ulcer; Prophylaxis   Helicobacter pylori gastrointestinal tract infection - Ulcer of duodenum  Zollinger-Santos syndrome             Passed - Recent (12 mo) or future (90 days) visit within the authorizing provider's specialty     The patient must have completed an in-person or virtual visit within the past 12 months or has a future visit scheduled within the next 90 days with the authorizing provider s specialty.  Urgent care and e-visits do not quality as an office visit for this protocol.               Unable to refill per RN protocol. Routing to provider to review/ approve.     Catina Sethi, RN on 10/22/2024 at 1:26 PM

## 2024-10-23 RX ORDER — FAMOTIDINE 40 MG/1
TABLET, FILM COATED ORAL
Qty: 30 TABLET | Refills: 0 | Status: SHIPPED | OUTPATIENT
Start: 2024-10-23
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
## 2024-10-28 ENCOUNTER — OFFICE VISIT (OUTPATIENT)
Dept: GASTROENTEROLOGY | Facility: CLINIC | Age: 69
End: 2024-10-28
Payer: MEDICARE

## 2024-10-28 VITALS
SYSTOLIC BLOOD PRESSURE: 136 MMHG | TEMPERATURE: 96.9 F | HEART RATE: 77 BPM | BODY MASS INDEX: 30.13 KG/M2 | OXYGEN SATURATION: 95 % | WEIGHT: 213 LBS | DIASTOLIC BLOOD PRESSURE: 92 MMHG

## 2024-10-28 DIAGNOSIS — Z87.19 HISTORY OF BARRETT'S ESOPHAGUS: Primary | ICD-10-CM

## 2024-10-28 PROCEDURE — 99213 OFFICE O/P EST LOW 20 MIN: CPT | Performed by: INTERNAL MEDICINE

## 2024-10-28 ASSESSMENT — PAIN SCALES - GENERAL: PAINLEVEL_OUTOF10: NO PAIN (0)

## 2024-10-28 NOTE — PATIENT INSTRUCTIONS
Discussed    1.  Will arrange for endoscopies for evaluation    2.  Continue omeprazole daily premeal with as needed famotidine    As needed return

## 2024-10-28 NOTE — PROGRESS NOTES
Gastroenterology return    69-year-old retired  with a history of Cardenas's esophagus.  More recently he has had there is sense of globus not necessarily a problem as reminder.  Reflux is somewhat uncontrolled he is status post a wrap.  Currently he is on Prilosec premeal once in the morning symptoms are controlled with the use of the Pepcid intermittently.  Previous visit in August 2023.  Cardenas's follow-up would be due in 2025 2027.  He is doing overall well since surgery but he is concerned about calling that developed esophageal cancer several years ago.    Past medical history reviewed on epic    Medications reviewed on epic      Exam was performed.  BMI 30.13,  x 92 pulse 77    Impression/plan: Heartburn controlled.  Sensation globus sense of distal discomfort.  History of Cardenas's reassurance and management with proton pump inhibitor.  Risk of Cardenas's transformation is felt to be 0.025 to 0.05%.  Will arrange upper endoscopy evaluation and biopsy.  Continue PPI.  This is not standard of care for Cardenas's esophagus    As needed return

## 2024-10-28 NOTE — NURSING NOTE
Chief Complaint   Patient presents with    Follow Up     Cardenas's esophagus without dysplasia  Globus sensation

## 2024-10-28 NOTE — LETTER
10/28/2024      Jose Angel Cha  [address] [facility] [address]      Dear Colleague,    Thank you for referring your patient, Jose Angel Cha, to the [facility]. Please see a copy of my visit note below.    Gastroenterology return    69-year-old retired  with a history of Cardenas's esophagus.  More recently he has had there is sense of globus not necessarily a problem as reminder.  Reflux is somewhat uncontrolled he is status post a wrap.  Currently he is on Prilosec premeal once in the morning symptoms are controlled with the use of the Pepcid intermittently.  Previous visit in August 2023.  Cardenas's follow-up would be due in 2025 2027.  He is doing overall well since surgery but he is concerned about calling that developed esophageal cancer several years ago.    Past medical history reviewed on epic    Medications reviewed on epic      Exam was performed.  BMI 30.13,  x 92 pulse 77    Impression/plan: Heartburn controlled.  Sensation globus sense of distal discomfort.  History of Cardenas's reassurance and management with proton pump inhibitor.  Risk of Cardenas's transformation is felt to be 0.025 to 0.05%.  Will arrange upper endoscopy evaluation and biopsy.  Continue PPI.  This is not standard of care for Cardenas's esophagus    As needed return        Again, thank you for allowing me to participate in the care of your patient.        Sincerely,        Sherman Hilario MD

## 2024-10-29 ENCOUNTER — THERAPY VISIT (OUTPATIENT)
Dept: PHYSICAL THERAPY | Facility: CLINIC | Age: 69
End: 2024-10-29
Attending: NURSE PRACTITIONER
Payer: MEDICARE

## 2024-10-29 ENCOUNTER — TELEPHONE (OUTPATIENT)
Dept: GASTROENTEROLOGY | Facility: CLINIC | Age: 69
End: 2024-10-29

## 2024-10-29 DIAGNOSIS — M25.511 PAIN IN JOINT OF RIGHT SHOULDER: Primary | ICD-10-CM

## 2024-10-29 PROCEDURE — 97113 AQUATIC THERAPY/EXERCISES: CPT | Mod: GP | Performed by: PHYSICAL THERAPIST

## 2024-10-29 NOTE — TELEPHONE ENCOUNTER
"Endoscopy Scheduling Screen    Have you had any respiratory illness or flu-like symptoms in the last 10 days?  No    What is your communication preference for Instructions and/or Bowel Prep?   MyChart    What insurance is in the chart?  Other:  MEDICARE    Ordering/Referring Provider: LEONORA BARAHONA   (If ordering provider performs procedure, schedule with ordering provider unless otherwise instructed. )    BMI: Estimated body mass index is 30.13 kg/m  as calculated from the following:    Height as of 9/11/24: 1.791 m (5' 10.5\").    Weight as of 10/28/24: 96.6 kg (213 lb).     Sedation Ordered  MAC/deep sedation.   BMI<= 45 45 < BMI <= 48 48 < BMI < = 50  BMI > 50   No Restrictions No [facility]  No [facility]  [facility] with exceptions Hospital Only OR Only       Do you have a history of malignant hyperthermia?  No    (Females) Are you currently pregnant?   No     Have you been diagnosed or told you have pulmonary hypertension?   No    Do you have an LVAD?  No    Have you been told you have moderate to severe sleep apnea?  No.    Have you been told you have COPD, asthma, or any other lung disease?  No    Do you have any heart conditions?  Yes     In the past year, have you had any hospitalizations for heart related issues including cardiomyopathy, heart attack, or stent placement?  No    Do you have any implantable devices in your body (pacemaker, ICD)?  No    Do you take nitroglycerine?  No    Have you ever had or are you waiting for an organ transplant?  No. Continue scheduling, no site restrictions.    Have you had a stroke or transient ischemic attack (TIA aka \"mini stroke\" in the last 6 months?   No    Have you been diagnosed with or been told you have cirrhosis of the liver?   No.    Are you currently on dialysis?   No    Do you need assistance transferring?   No    BMI: Estimated body mass index is 30.13 kg/m  as calculated from the following:    Height as of 9/11/24: 1.791 m (5' 10.5\").    Weight as of 10/28/24: " 96.6 kg (213 lb).     Is patients BMI > 40 and scheduling location [facility]?  No    Do you take an injectable or oral medication for weight loss or diabetes (excluding insulin)?  No    Do you take the medication Naltrexone?  No    Do you take blood thinners?  No       Prep   Are you currently on dialysis or do you have chronic kidney disease?  No    Do you have a diagnosis of diabetes?  No    Do you have a diagnosis of cystic fibrosis (CF)?  No    On a regular basis do you go 3 -5 days between bowel movements?  No    BMI > 40?  No    Preferred Pharmacy:    [facility] [address] [facility] [id number] [facility]  [address] [facility] [id number]  Phone: [phone] Fax: [phone]    Final Scheduling Details     Procedure scheduled  Upper endoscopy (EGD)    Surgeon:  BROOKLYN     Date of procedure:  12/4/24     Pre-OP / PAC:   No - Not required for this site.    Location  [facility] - Per order.    Sedation   MAC/Deep Sedation - Per order.      Patient Reminders:   You will receive a call from a Nurse to review instructions and health history.  This assessment must be completed prior to your procedure.  Failure to complete the Nurse assessment may result in the procedure being cancelled.      On the day of your procedure, please designate an adult(s) who can drive you home stay with you for the next 24 hours. The medicines used in the exam will make you sleepy. You will not be able to drive.      You cannot take public transportation, ride share services, or non-medical taxi service without a responsible caregiver.  Medical transport services are allowed with the requirement that a responsible caregiver will receive you at your destination.  We require that drivers and caregivers are confirmed prior to your procedure.

## 2024-10-30 ENCOUNTER — TELEPHONE (OUTPATIENT)
Dept: PALLIATIVE MEDICINE | Facility: OTHER | Age: 69
End: 2024-10-30
Payer: MEDICARE

## 2024-10-30 NOTE — TELEPHONE ENCOUNTER
Attempted to reach patient to remind them about appointment scheduled with ETHEL Lnae CNP on 10/31/24 in our [place] location.    A voicemail was left with a call back number if the patient has questions or would like to reschedule.
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
## 2024-10-31 ENCOUNTER — MYC MEDICAL ADVICE (OUTPATIENT)
Dept: FAMILY MEDICINE | Facility: OTHER | Age: 69
End: 2024-10-31

## 2024-10-31 ENCOUNTER — OFFICE VISIT (OUTPATIENT)
Dept: PALLIATIVE MEDICINE | Facility: CLINIC | Age: 69
End: 2024-10-31
Attending: NURSE PRACTITIONER
Payer: MEDICARE

## 2024-10-31 VITALS — DIASTOLIC BLOOD PRESSURE: 91 MMHG | HEART RATE: 75 BPM | OXYGEN SATURATION: 96 % | SYSTOLIC BLOOD PRESSURE: 137 MMHG

## 2024-10-31 DIAGNOSIS — M25.511 PAIN IN JOINT OF RIGHT SHOULDER: Primary | ICD-10-CM

## 2024-10-31 DIAGNOSIS — G89.29 CHRONIC INTRACTABLE PAIN: ICD-10-CM

## 2024-10-31 DIAGNOSIS — M19.011 PRIMARY OSTEOARTHRITIS OF RIGHT SHOULDER: ICD-10-CM

## 2024-10-31 PROCEDURE — G2211 COMPLEX E/M VISIT ADD ON: HCPCS | Performed by: NURSE PRACTITIONER

## 2024-10-31 PROCEDURE — 99215 OFFICE O/P EST HI 40 MIN: CPT | Performed by: NURSE PRACTITIONER

## 2024-10-31 ASSESSMENT — PAIN SCALES - GENERAL: PAINLEVEL_OUTOF10: MODERATE PAIN (4)

## 2024-10-31 NOTE — PROGRESS NOTES
Patient presents to the clinic today for a visit with ETHEL Lane CNP regarding Pain Management.      Moderate Pain (4)      BP (!) 137/91   Pulse 75   SpO2 96%             9/12/2018     1:43 PM   PHQ-9 SCORE   PHQ-9 Total Score MyChart 5 (Mild depression)   PHQ-9 Total Score 5           1/9/2024     4:38 PM 7/9/2024     8:04 AM 9/11/2024    11:08 AM   PANKAJ-7 SCORE   Total Score 1 (minimal anxiety) 4 (minimal anxiety) 6 (mild anxiety)   Total Score 1 4 6             QUESTIONS:      Roma Thomas, Clinic Facilitator  [facility] [place]

## 2024-10-31 NOTE — PATIENT INSTRUCTIONS
Plan on 10/31/2024:  Schedule R) shoulder joint injection with Dr. Briceño in [place].    Refer to Ortho to see if he is a surgical candidate.    Continue PT.    Keep Neurology appt for HA.    Follow-up in clinic 2 wks after shoulder joint injection.      Gabriella Patel APRN, RN, CNP, FNP  [facility]        Clinic Number:  [phone]   Call with any questions about your care and for scheduling assistance.   Calls are returned Monday through Friday between 8 AM and 4:30 PM. We usually get back to you within 2 business days depending on the issue/request.    If we are prescribing your medications:  For opioid medication refills, call the clinic or send a [email or web address] message 7 days in advance.  Please include:  Name of requested medication  Name of the pharmacy.  For non-opioid medications, call your pharmacy directly to request a refill. Please allow 3-4 days to be processed.   Per [place] State Law:  All controlled substance prescriptions must be filled within 30 days of being written.    For those controlled substances allowing refills, pickup must occur within 30 days of last fill.      We believe regular attendance is key to your success in our program!    Any time you are unable to keep your appointment we ask that you call us at least 24 hours in advance to cancel.This will allow us to offer the appointment time to another patient.   Multiple missed appointments may lead to dismissal from the clinic.

## 2024-11-01 ENCOUNTER — PATIENT OUTREACH (OUTPATIENT)
Dept: CARE COORDINATION | Facility: CLINIC | Age: 69
End: 2024-11-01
Payer: MEDICARE

## 2024-11-04 ENCOUNTER — TELEPHONE (OUTPATIENT)
Dept: PALLIATIVE MEDICINE | Facility: CLINIC | Age: 69
End: 2024-11-04
Payer: MEDICARE

## 2024-11-04 ENCOUNTER — PATIENT OUTREACH (OUTPATIENT)
Dept: CARE COORDINATION | Facility: CLINIC | Age: 69
End: 2024-11-04
Payer: MEDICARE

## 2024-11-04 DIAGNOSIS — M25.511 RIGHT SHOULDER PAIN: Primary | ICD-10-CM
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
## 2024-11-05 ENCOUNTER — THERAPY VISIT (OUTPATIENT)
Dept: PHYSICAL THERAPY | Facility: CLINIC | Age: 69
End: 2024-11-05
Attending: NURSE PRACTITIONER
Payer: MEDICARE

## 2024-11-05 DIAGNOSIS — M25.511 PAIN IN JOINT OF RIGHT SHOULDER: Primary | ICD-10-CM

## 2024-11-05 PROCEDURE — 97113 AQUATIC THERAPY/EXERCISES: CPT | Mod: GP | Performed by: PHYSICAL THERAPIST

## 2024-11-07 ENCOUNTER — OFFICE VISIT (OUTPATIENT)
Dept: ORTHOPEDICS | Facility: CLINIC | Age: 69
End: 2024-11-07
Attending: NURSE PRACTITIONER
Payer: MEDICARE

## 2024-11-07 ENCOUNTER — ANCILLARY PROCEDURE (OUTPATIENT)
Dept: GENERAL RADIOLOGY | Facility: CLINIC | Age: 69
End: 2024-11-07
Attending: NURSE PRACTITIONER
Payer: MEDICARE

## 2024-11-07 ENCOUNTER — OFFICE VISIT (OUTPATIENT)
Dept: ORTHOPEDICS | Facility: CLINIC | Age: 69
End: 2024-11-07
Attending: ORTHOPAEDIC SURGERY
Payer: MEDICARE

## 2024-11-07 VITALS
BODY MASS INDEX: 29.54 KG/M2 | HEIGHT: 71 IN | WEIGHT: 211 LBS | TEMPERATURE: 96.7 F | SYSTOLIC BLOOD PRESSURE: 134 MMHG | HEART RATE: 76 BPM | DIASTOLIC BLOOD PRESSURE: 84 MMHG

## 2024-11-07 DIAGNOSIS — M25.511 RIGHT SHOULDER PAIN: ICD-10-CM

## 2024-11-07 DIAGNOSIS — M19.011 PRIMARY OSTEOARTHRITIS OF RIGHT SHOULDER: ICD-10-CM

## 2024-11-07 DIAGNOSIS — M19.011 PRIMARY OSTEOARTHRITIS OF RIGHT SHOULDER: Primary | ICD-10-CM

## 2024-11-07 PROCEDURE — 99213 OFFICE O/P EST LOW 20 MIN: CPT | Performed by: ORTHOPAEDIC SURGERY

## 2024-11-07 PROCEDURE — 20611 DRAIN/INJ JOINT/BURSA W/US: CPT | Mod: RT | Performed by: STUDENT IN AN ORGANIZED HEALTH CARE EDUCATION/TRAINING PROGRAM

## 2024-11-07 PROCEDURE — 73030 X-RAY EXAM OF SHOULDER: CPT | Mod: TC | Performed by: STUDENT IN AN ORGANIZED HEALTH CARE EDUCATION/TRAINING PROGRAM

## 2024-11-07 RX ORDER — TRIAMCINOLONE ACETONIDE 40 MG/ML
40 INJECTION, SUSPENSION INTRA-ARTICULAR; INTRAMUSCULAR
Status: COMPLETED | OUTPATIENT
Start: 2024-11-07 | End: 2024-11-07

## 2024-11-07 RX ORDER — BUPIVACAINE HYDROCHLORIDE 5 MG/ML
2 INJECTION, SOLUTION PERINEURAL
Status: COMPLETED | OUTPATIENT
Start: 2024-11-07 | End: 2024-11-07

## 2024-11-07 RX ORDER — LIDOCAINE HYDROCHLORIDE 10 MG/ML
2 INJECTION, SOLUTION INFILTRATION; PERINEURAL
Status: COMPLETED | OUTPATIENT
Start: 2024-11-07 | End: 2024-11-07

## 2024-11-07 RX ADMIN — TRIAMCINOLONE ACETONIDE 40 MG: 40 INJECTION, SUSPENSION INTRA-ARTICULAR; INTRAMUSCULAR at 12:35

## 2024-11-07 RX ADMIN — BUPIVACAINE HYDROCHLORIDE 2 ML: 5 INJECTION, SOLUTION PERINEURAL at 12:35

## 2024-11-07 RX ADMIN — LIDOCAINE HYDROCHLORIDE 2 ML: 10 INJECTION, SOLUTION INFILTRATION; PERINEURAL at 12:35

## 2024-11-07 NOTE — LETTER
2024      Jose Angel Cha  [address] [facility] [address]      Dear Colleague,    Thank you for referring your patient, Jose Angel Cha, to the [facility] [place]. Please see a copy of my visit note below.    Jose Angel Cha  :  1955  DOS: 2024  MRN: [id number]    Sports Medicine Clinic Procedure    Ultrasound Guided Right Shoulder Intra-articular Glenohumeral Joint Injection    Clinical History: Right glenohumeral corticosteroid injection    Diagnosis:   1. Primary osteoarthritis of right shoulder      Referring Physician: Dr. Emily Hayes Joint Injection/Arthocentesis: R glenohumeral joint    Date/Time: 2024 12:35 PM    Performed by: Cj Saldaña DO  Authorized by: Cj Saldaña DO    Indications:  Pain  Needle Size:  22 G  Guidance: ultrasound    Approach:  Posterolateral  Location:  Shoulder      Site:  R glenohumeral joint  Medications:  40 mg triamcinolone 40 MG/ML; 2 mL lidocaine 1 %; 2 mL BUPivacaine 0.5 %  Outcome:  Tolerated well, no immediate complications  Procedure discussed: discussed risks, benefits, and alternatives    Consent Given by:  Patient  Prep: patient was prepped and draped in usual sterile fashion     Ultrasound images of procedure were permanently stored.        Right Glenohumeral Injection - Ultrasound Guided  The patient was informed of the risks and the benefits of the procedure and a written consent was signed.  The patient s shoulder was prepped with chlorhexidine in sterile fashion.   40 mg of kenalog suspension was drawn up into a 5 mL syringe with 2 mL of 1% lidocaine and 2 ml of 0.5% bupivacaine.   Injection was performed using sterile technique.  Under ultrasound guidance a 2-inch 25-gauge needle was used to enter the glenohumeral joint.  Posterolateral approach was used with the patient in lateral recumbent position, arm in neutral position at the side.  Needle placement was visualized and documented with  ultrasound.  Ultrasound visualization was necessary due to the small joint space entered.  Injection performed long axis to the probe.  Injection solution visualized within the joint space.  Images were permanently stored for the patient's record.  There were no complications. The patient tolerated the procedure well. There was negligible bleeding.     Impression:  Successful ultrasound guided right glenohumeral joint corticosteroid injection.    Plan:  - Injection:    - Expectations and goals of the injection were discussed and verbal and written consent was obtained.  - Performed the above procedure today in clinic. Patient tolerated the procedure well without complications.    - Post-procedure instructions:    - Keep the injection site clean and dry.   - Do not submerge the injection site for 24 hours (no baths, pools). Showers are ok.   - Rest the area for 24-48 hours before resuming normal activities. Avoid overexerting the area for the first few weeks.   - It may take 2-3 days to start noticing the effects of the injection and up to 3-4 weeks to feel significant benefits.   - Follow up:          - With Dr. Diaz as recommended for updates to treatment plan, or sooner for new/worsening symptoms.  - Patient has clinic contact information for questions or concerns.      Cj Saldaña DO, CAQSM  [facility]  [facility] - Department of Orthopedic Surgery     Disclaimer:  This note was prepared and written using Dragon Medical dictation software. As a result, there may be errors in the script that have gone undetected. Please consider this when interpreting the information in this note.        Again, thank you for allowing me to participate in the care of your patient.        Sincerely,        Cj Saldaña DO
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
## 2024-11-07 NOTE — PROGRESS NOTES
Jose Angel Cha  :  1955  DOS: 2024  MRN: [id number]    Sports Medicine Clinic Procedure    Ultrasound Guided Right Shoulder Intra-articular Glenohumeral Joint Injection    Clinical History: Right glenohumeral corticosteroid injection    Diagnosis:   1. Primary osteoarthritis of right shoulder      Referring Physician: Dr. Diaz  Large Joint Injection/Arthocentesis: R glenohumeral joint    Date/Time: 2024 12:35 PM    Performed by: Cj Saldaña DO  Authorized by: Cj Saldaña DO    Indications:  Pain  Needle Size:  22 G  Guidance: ultrasound    Approach:  Posterolateral  Location:  Shoulder      Site:  R glenohumeral joint  Medications:  40 mg triamcinolone 40 MG/ML; 2 mL lidocaine 1 %; 2 mL BUPivacaine 0.5 %  Outcome:  Tolerated well, no immediate complications  Procedure discussed: discussed risks, benefits, and alternatives    Consent Given by:  Patient  Prep: patient was prepped and draped in usual sterile fashion     Ultrasound images of procedure were permanently stored.        Right Glenohumeral Injection - Ultrasound Guided  The patient was informed of the risks and the benefits of the procedure and a written consent was signed.  The patient s shoulder was prepped with chlorhexidine in sterile fashion.   40 mg of kenalog suspension was drawn up into a 5 mL syringe with 2 mL of 1% lidocaine and 2 ml of 0.5% bupivacaine.   Injection was performed using sterile technique.  Under ultrasound guidance a 2-inch 25-gauge needle was used to enter the glenohumeral joint.  Posterolateral approach was used with the patient in lateral recumbent position, arm in neutral position at the side.  Needle placement was visualized and documented with ultrasound.  Ultrasound visualization was necessary due to the small joint space entered.  Injection performed long axis to the probe.  Injection solution visualized within the joint space.  Images were permanently stored for the patient's record.  There were  no complications. The patient tolerated the procedure well. There was negligible bleeding.     Impression:  Successful ultrasound guided right glenohumeral joint corticosteroid injection.    Plan:  - Injection:    - Expectations and goals of the injection were discussed and verbal and written consent was obtained.  - Performed the above procedure today in clinic. Patient tolerated the procedure well without complications.    - Post-procedure instructions:    - Keep the injection site clean and dry.   - Do not submerge the injection site for 24 hours (no baths, pools). Showers are ok.   - Rest the area for 24-48 hours before resuming normal activities. Avoid overexerting the area for the first few weeks.   - It may take 2-3 days to start noticing the effects of the injection and up to 3-4 weeks to feel significant benefits.   - Follow up:          - With Dr. Diaz as recommended for updates to treatment plan, or sooner for new/worsening symptoms.  - Patient has clinic contact information for questions or concerns.      Cj Saldaña DO, CAQSM  [facility] - Sports Medicine  [facility] - Department of Orthopedic Surgery     Disclaimer:  This note was prepared and written using Dragon Medical dictation software. As a result, there may be errors in the script that have gone undetected. Please consider this when interpreting the information in this note.
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
## 2024-11-21 ENCOUNTER — PRE VISIT (OUTPATIENT)
Dept: NEUROLOGY | Facility: CLINIC | Age: 69
End: 2024-11-21

## 2024-12-02 ENCOUNTER — THERAPY VISIT (OUTPATIENT)
Dept: PHYSICAL THERAPY | Facility: CLINIC | Age: 69
End: 2024-12-02
Attending: NURSE PRACTITIONER
Payer: MEDICARE

## 2024-12-02 DIAGNOSIS — M25.511 PAIN IN JOINT OF RIGHT SHOULDER: Primary | ICD-10-CM

## 2024-12-02 PROCEDURE — 97113 AQUATIC THERAPY/EXERCISES: CPT | Mod: GP | Performed by: PHYSICAL THERAPIST

## 2024-12-03 ENCOUNTER — ANESTHESIA EVENT (OUTPATIENT)
Dept: GASTROENTEROLOGY | Facility: CLINIC | Age: 69
End: 2024-12-03
Payer: MEDICARE

## 2024-12-03 ASSESSMENT — ENCOUNTER SYMPTOMS: DYSRHYTHMIAS: 1

## 2024-12-03 ASSESSMENT — LIFESTYLE VARIABLES: TOBACCO_USE: 1
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
## 2024-12-03 NOTE — H&P
[facility] Anesthesia Pre-op History and Physical    Jose Angel Cha MRN# [id number]   Age: 69 year old YOB: 1955      Date of Surgery: 12/4/2024 Location [facility]      Date of Exam 12/4/2024 Facility (In hospital)       Home clinic: [facility]  Primary care provider: Shari Paredes         Chief Complaint and/or Reason for Procedure:   No chief complaint on file.  EGD. Cardenas's  Irregular Z and no IM 2022. 2018 EGD  2 mm tongue bx.       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     Priority: Medium    Need for desensitization to allergens 04/03/2019     Priority: Medium    Grade II internal hemorrhoids 10/04/2018     Priority: Medium    Allergic rhinitis due to animal dander 11/15/2017     Priority: Medium    Chronic seasonal allergic rhinitis due to pollen 11/15/2017     Priority: Medium    Cardenas's esophagus without dysplasia 10/11/2017     Priority: Medium     Annual endoscopy recommended 10/11/2017        Subclinical hypothyroidism 09/27/2017     Priority: Medium    Benign essential hypertension 03/27/2017     Priority: Medium    Coronary artery disease involving native coronary artery of native heart without angina pectoris      Priority: Medium     cardiac cath 2010: mild diffuse disease      Need for SBE (subacute bacterial endocarditis) prophylaxis      Priority: Medium     s/p mitral valve ring repair 2010      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     Priority: Medium    LISBET (obstructive sleep apnea) AHI 13.8 06/15/2016     Priority: Medium     PSG at [facility] 5/19/2016 Mild      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 sting 04/01/2016     Priority: Medium    House dust mite allergy       Priority: Medium    Dupuytren's contracture 07/17/2014     Priority: Medium    Other symptoms involving nervous and musculoskeletal systems(781.99) 10/02/2013     Priority: Medium    Dizziness and giddiness 10/02/2013     Priority: Medium    Mixed hyperlipidemia 08/07/2013     Priority: Medium    Pain in joint involving shoulder region 12/10/2010     Priority: Medium    Meniere disease 01/24/2009     Priority: Medium    Benign localized prostatic hyperplasia with lower urinary tract symptoms (LUTS) 06/01/2007     Priority: Medium    Family history of ischemic heart disease 02/23/2007     Priority: Medium    Hearing loss      Priority: Medium     right more than left  Problem list name updated by automated process. Provider to review      Lumbago      Priority: Medium     chronic LBP              Medications (include herbals and vitamins):   Any Plavix use in the last 7 days? No     No current facility-administered medications for this encounter.     Current Outpatient Medications   Medication Sig Dispense Refill    alirocumab (PRALUENT) 75 MG/ML injectable pen Inject 1 mL (75 mg) Subcutaneous every 14 days 75 mL 11    amLODIPine (NORVASC) 2.5 MG tablet Take 1 tablet (2.5 mg) by mouth daily. 90 tablet 3    ASPIRIN 81 MG OR TABS ONE DAILY 0 0    CALCIUM PO       clindamycin (CLEOCIN T) 1 % external solution Apply to scalp for 3 weeks in a row 60 mL 2    EPINEPHrine (ANY BX GENERIC EQUIV) 0.3 MG/0.3ML injection 2-pack Inject 0.3 mLs (0.3 mg) into the muscle as needed for anaphylaxis 0.6 mL 1    finasteride (PROSCAR) 5 MG tablet Take 1 tablet (5 mg) by mouth daily 90 tablet 3    fluticasone (FLONASE) 50 MCG/ACT nasal spray USE 2 SPRAY(S) IN THE AFFECTED NOSTRIL ONCE DAILY      Multiple Vitamins-Minerals (MULTIVITAL PO) Take 1 tablet by mouth daily Reported on 3/22/2017      olopatadine (PATADAY) 0.2 % ophthalmic solution 0.05 mLs (1 drop) daily      psyllium (METAMUCIL/KONSYL) Packet Take 1 packet by mouth daily       sildenafil (VIAGRA) 25 MG tablet Take 1 tablet (25 mg) by mouth daily as needed (ED). 30 tablet 4    trospium (SANCTURA XR) 60 MG CP24 24 hr capsule Take 1 capsule (60 mg) by mouth every morning. 90 capsule 3    famotidine (PEPCID) 40 MG tablet TAKE 1 TABLET BY MOUTH NIGHTLY AS NEEDED HEARTBURN (Patient not taking: Reported on 11/7/2024) 30 tablet 0    nortriptyline (PAMELOR) 10 MG capsule Take 1 capsule (10 mg) by mouth at bedtime. 30 capsule 0    omeprazole (PRILOSEC) 20 MG DR capsule Take 1 capsule by mouth twice daily (Patient not taking: Reported on 11/7/2024) 60 capsule 3             Allergies:      Allergies   Allergen Reactions    Anesthetic Ether     Bee Venom     Demerol Visual Disturbance    Statin [Statins] Other (See Comments)     Muscle pain     Allergy to Latex? No  Allergy to tape?   No  Intolerances:             Physical Exam:   All vitals have been reviewed  No data found.  No intake/output data recorded.  Lungs:   No increased work of breathing, good air exchange, clear to auscultation bilaterally, no crackles or wheezing     Cardiovascular:   Normal apical impulse, regular rate and rhythm, normal S1 and S2, no S3 or S4, and no murmur noted             Lab / Radiology Results:            Anesthetic risk and/or ASA classification:       Sherman Hilario MD

## 2024-12-04 ENCOUNTER — ANESTHESIA (OUTPATIENT)
Dept: GASTROENTEROLOGY | Facility: CLINIC | Age: 69
End: 2024-12-04
Payer: MEDICARE

## 2024-12-04 ENCOUNTER — HOSPITAL ENCOUNTER (OUTPATIENT)
Facility: CLINIC | Age: 69
Discharge: HOME | End: 2024-12-04
Attending: INTERNAL MEDICINE | Admitting: INTERNAL MEDICINE
Payer: MEDICARE

## 2024-12-04 VITALS
SYSTOLIC BLOOD PRESSURE: 128 MMHG | RESPIRATION RATE: 16 BRPM | HEART RATE: 65 BPM | OXYGEN SATURATION: 97 % | BODY MASS INDEX: 29.85 KG/M2 | TEMPERATURE: 97.1 F | WEIGHT: 211 LBS | DIASTOLIC BLOOD PRESSURE: 86 MMHG

## 2024-12-04 LAB — UPPER GI ENDOSCOPY: NORMAL

## 2024-12-04 PROCEDURE — 88305 TISSUE EXAM BY PATHOLOGIST: CPT | Mod: TC | Performed by: INTERNAL MEDICINE

## 2024-12-04 PROCEDURE — 250N000009 HC RX 250: Performed by: NURSE ANESTHETIST, CERTIFIED REGISTERED

## 2024-12-04 PROCEDURE — 43239 EGD BIOPSY SINGLE/MULTIPLE: CPT | Performed by: INTERNAL MEDICINE

## 2024-12-04 PROCEDURE — 250N000011 HC RX IP 250 OP 636: Performed by: NURSE ANESTHETIST, CERTIFIED REGISTERED

## 2024-12-04 PROCEDURE — 370N000017 HC ANESTHESIA TECHNICAL FEE, PER MIN: Performed by: INTERNAL MEDICINE

## 2024-12-04 RX ORDER — OXYCODONE HYDROCHLORIDE 5 MG/1
5 TABLET ORAL
Status: DISCONTINUED | OUTPATIENT
Start: 2024-12-04 | End: 2024-12-04 | Stop reason: HOSPADM

## 2024-12-04 RX ORDER — LIDOCAINE 40 MG/G
CREAM TOPICAL
Status: DISCONTINUED | OUTPATIENT
Start: 2024-12-04 | End: 2024-12-04 | Stop reason: HOSPADM

## 2024-12-04 RX ORDER — OXYCODONE HYDROCHLORIDE 5 MG/1
10 TABLET ORAL
Status: DISCONTINUED | OUTPATIENT
Start: 2024-12-04 | End: 2024-12-04 | Stop reason: HOSPADM

## 2024-12-04 RX ORDER — NALOXONE HYDROCHLORIDE 0.4 MG/ML
0.1 INJECTION, SOLUTION INTRAMUSCULAR; INTRAVENOUS; SUBCUTANEOUS
Status: DISCONTINUED | OUTPATIENT
Start: 2024-12-04 | End: 2024-12-04 | Stop reason: HOSPADM

## 2024-12-04 RX ORDER — PROPOFOL 10 MG/ML
INJECTION, EMULSION INTRAVENOUS CONTINUOUS PRN
Status: DISCONTINUED | OUTPATIENT
Start: 2024-12-04 | End: 2024-12-04

## 2024-12-04 RX ORDER — ONDANSETRON 4 MG/1
4 TABLET, ORALLY DISINTEGRATING ORAL EVERY 30 MIN PRN
Status: DISCONTINUED | OUTPATIENT
Start: 2024-12-04 | End: 2024-12-04 | Stop reason: HOSPADM

## 2024-12-04 RX ORDER — PROPOFOL 10 MG/ML
INJECTION, EMULSION INTRAVENOUS PRN
Status: DISCONTINUED | OUTPATIENT
Start: 2024-12-04 | End: 2024-12-04

## 2024-12-04 RX ORDER — LIDOCAINE HYDROCHLORIDE 20 MG/ML
INJECTION, SOLUTION INFILTRATION; PERINEURAL PRN
Status: DISCONTINUED | OUTPATIENT
Start: 2024-12-04 | End: 2024-12-04

## 2024-12-04 RX ORDER — DEXAMETHASONE SODIUM PHOSPHATE 10 MG/ML
4 INJECTION, SOLUTION INTRAMUSCULAR; INTRAVENOUS
Status: DISCONTINUED | OUTPATIENT
Start: 2024-12-04 | End: 2024-12-04 | Stop reason: HOSPADM

## 2024-12-04 RX ORDER — ONDANSETRON 2 MG/ML
4 INJECTION INTRAMUSCULAR; INTRAVENOUS EVERY 30 MIN PRN
Status: DISCONTINUED | OUTPATIENT
Start: 2024-12-04 | End: 2024-12-04 | Stop reason: HOSPADM

## 2024-12-04 RX ADMIN — PROPOFOL 100 MG: 10 INJECTION, EMULSION INTRAVENOUS at 07:31

## 2024-12-04 RX ADMIN — LIDOCAINE HYDROCHLORIDE 30 MG: 20 INJECTION, SOLUTION INFILTRATION; PERINEURAL at 07:29

## 2024-12-04 RX ADMIN — PROPOFOL 200 MCG/KG/MIN: 10 INJECTION, EMULSION INTRAVENOUS at 07:31

## 2024-12-04 ASSESSMENT — ACTIVITIES OF DAILY LIVING (ADL)
ADLS_ACUITY_SCORE: 41

## 2024-12-04 NOTE — LETTER
December 9, 2024      [place]  [id number] [facility] [address]        Dear ,    We are writing to inform you of your test results.    Your test results fall within the expected range(s) or remain unchanged from previous results.  Please continue with current treatment plan.    A 5 yr follow up exam is suggested.     Resulted Orders   Surgical Pathology Exam   Result Value Ref Range    Case Report       Surgical Pathology Report                         Case: [id number]                                  Authorizing Provider:  Sherman Hilario MD        Collected:           12/04/2024 07:33 AM          Ordering Location:     [facility]          Received:            12/04/2024 07:43 AM                                 [facility]                                                          Pathologist:           Anshul Stern MD                                                            Specimen:    Small Intestine, small bowel biopsy for history of Barrets                                 Final Diagnosis       Gastroesophageal junction, biopsies:  --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  --Negative for dysplasia.          Clinical Information       Procedure:  Esophagoscopy, gastroscopy, duodenoscopy (EGD), combined  Pre-op Diagnosis: History of Cardenas's esophagus [Z87.19]  Post-op Diagnosis: Z87.19 - History of Cardenas's esophagus [ICD-10-CM]      Gross Description       A(1). Small Intestine, small bowel biopsy for history of Barrets:  The specimen is received in formalin, labeled with the patient's name, medical record number and other identifying information and designated  small bowel biopsy for history of Cardenas's  . It consists of 2 tan soft tissue fragments ranging from 0.4-0.5 cm. Entirely submitted in one cassette.   (CLINTON Hunter (ASCP) 12/4/2024 2:28 PM       Microscopic Description       Microscopic examination was  performed.        Performing Labs       The technical component of this testing was completed at [facility].    Stain controls for all stains resulted within this report have been reviewed and show appropriate reactivity.       Case Images         If you have any questions or concerns, please call the clinic at the number listed above.       Sincerely,      Sherman Hilario MD

## 2024-12-04 NOTE — ANESTHESIA CARE TRANSFER NOTE
Patient: Jose Angel Cha    Procedure: Procedure(s):  Esophagoscopy, gastroscopy, duodenoscopy (EGD), combined       Diagnosis: History of Cardenas's esophagus [Z87.19]  Diagnosis Additional Information: No value filed.    Anesthesia Type:   MAC     Note:    Oropharynx: oropharynx clear of all foreign objects  Level of Consciousness: drowsy  Oxygen Supplementation: room air    Independent Airway: airway patency satisfactory and stable  Dentition: dentition unchanged  Vital Signs Stable: post-procedure vital signs reviewed and stable  Report to RN Given: handoff report given  Patient transferred to: Phase II  Comments: To Phase II. Report to RN.  VSS Resp status stable.  Handoff Report: Identifed the Patient, Identified the Reponsible Provider, Reviewed the pertinent medical history, Discussed the surgical course, Reviewed Intra-OP anesthesia mangement and issues during anesthesia, Set expectations for post-procedure period and Allowed opportunity for questions and acknowledgement of understanding      Vitals:  Vitals Value Taken Time   BP     Temp     Pulse     Resp     SpO2 95 % 12/04/24 0743   Vitals shown include unfiled device data.    Electronically Signed By: ETHEL Crane CRNA  December 4, 2024  7:44 AM

## 2024-12-04 NOTE — DISCHARGE INSTRUCTIONS
[facility]    Home Care Following Endoscopy          Activity:  You have just undergone an endoscopic procedure usually performed with conscious sedation.  Do not work or operate machinery (including a car) for at least 12 hours.    I encourage you to walk and attempt to pass this air as soon as possible.    Diet:  Return to the diet you were on before your procedure but eat lightly for the first 12-24 hours.  Drink plenty of water.  Resume any regular medications unless otherwise advised by your physician.  Please begin any new medication prescribed as a result of your procedure as directed by your physician.   If you had any biopsy or polyp removed please refrain from aspirin or aspirin products for 2 days.  If on Coumadin please restart as instructed by your physician.   Pain:  You may take Tylenol as needed for pain.  Expected Recovery:  You can expect some mild abdominal fullness and/or discomfort due to the air used to inflate your intestinal tract. It is also normal to have a mild sore throat after upper endoscopy.    Call Your Physician if You Have:  After Upper Endoscopy:  Shoulder, back or chest pain.  Difficulty breathing or swallowing.  Vomiting blood.  Any questions or concerns about your recovery, please call [phone] or after hours [facility] ([phone]) Nurse Advice Line.    Follow-up Care:  You did have polyps/biopsy tissue sample(s) removed.  The polyps/biopsy tissue sample(s) will be sent to pathology.    You should receive letter in your My chart from  with your results within 1-2 weeks. If you do not participate in My Chart a physical letter will come in the mail in 2-3 weeks.  Please call if you have not received a notification of your results.  If asked to return to clinic please make an appointment 1 week after your procedure.  Call [phone].

## 2024-12-04 NOTE — ANESTHESIA POSTPROCEDURE EVALUATION
Patient: Jose Angel Cha    Procedure: Procedure(s):  Esophagoscopy, gastroscopy, duodenoscopy (EGD), combined       Anesthesia Type:  MAC    Note:  Disposition: Outpatient   Postop Pain Control: Uneventful            Sign Out: Well controlled pain   PONV: No   Neuro/Psych: Uneventful            Sign Out: Acceptable/Baseline neuro status   Airway/Respiratory: Uneventful            Sign Out: Acceptable/Baseline resp. status   CV/Hemodynamics: Uneventful            Sign Out: Acceptable CV status; No obvious hypovolemia; No obvious fluid overload   Other NRE: NONE   DID A NON-ROUTINE EVENT OCCUR? No           Last vitals:  Vitals Value Taken Time   /122 12/04/24 0810   Temp     Pulse 60 12/04/24 0810   Resp 16 12/04/24 0740   SpO2 94 % 12/04/24 0819   Vitals shown include unfiled device data.    Electronically Signed By: ETHEL Crane CRNA  December 4, 2024  2:41 PM

## 2024-12-05 PROCEDURE — 88305 TISSUE EXAM BY PATHOLOGIST: CPT | Mod: 26 | Performed by: PATHOLOGY

## 2024-12-09 ENCOUNTER — THERAPY VISIT (OUTPATIENT)
Dept: PHYSICAL THERAPY | Facility: CLINIC | Age: 69
End: 2024-12-09
Attending: NURSE PRACTITIONER
Payer: MEDICARE

## 2024-12-09 DIAGNOSIS — M25.511 PAIN IN JOINT OF RIGHT SHOULDER: Primary | ICD-10-CM

## 2024-12-09 PROCEDURE — 97113 AQUATIC THERAPY/EXERCISES: CPT | Mod: GP | Performed by: PHYSICAL THERAPIST

## 2024-12-16 ENCOUNTER — THERAPY VISIT (OUTPATIENT)
Dept: PHYSICAL THERAPY | Facility: CLINIC | Age: 69
End: 2024-12-16
Attending: NURSE PRACTITIONER
Payer: MEDICARE

## 2024-12-16 DIAGNOSIS — M25.511 PAIN IN JOINT OF RIGHT SHOULDER: Primary | ICD-10-CM

## 2024-12-16 PROCEDURE — 97113 AQUATIC THERAPY/EXERCISES: CPT | Mod: GP | Performed by: PHYSICAL THERAPIST

## 2024-12-16 NOTE — PROGRESS NOTES
DISCHARGE  Reason for Discharge: Patient has met all goals.    Equipment Issued: none    Discharge Plan: Patient to continue home program.    Referring Provider:  Gabriella Cao     12/16/24 0500   Appointment Info   Signing clinician's name / credentials eugene arzate PT   Total/Authorized Visits [email or web address]   Medical Diagnosis pain in joint of right shoulder M25.511   PT Tx Diagnosis right shoulder pain and right hip , rib , right hand   Progress Note/Certification   Start of Care Date 09/23/24   Onset of illness/injury or Date of Surgery 07/09/24   Therapy Frequency 1x week x 12 weeks   Certification date from 09/23/24   Certification date to 12/21/24   GOALS   PT Goals 2;3   PT Goal 1   Goal Identifier 1   Goal Description instruction in HEP and compliant with it 5 of 7 days to improve quality of life   Target Date 12/21/24   PT Goal 2   Goal Identifier 2   Goal Description patient to have reduction in right shoulder pain currently spadi 37.69 goal is less than 25   Target Date 12/21/24   PT Goal 3   Goal Identifier 3   Goal Description patients 2 goals be able to sleep better on right side and throw a ball with grandkids   Target Date 12/21/24   Objective Measure 1   Objective Measure spadi 37.69 at [facility] , currently 20.77   Treatment Interventions (PT)   Interventions Aquatic Therapy   Aquatic Therapy   Aquatic Therapy Minutes (47411) 91   Aquatic Therapy 1 - Details able to enter and exit pool on ladder , ambulated forward , backward and laterial 5 minutes, with 4yellow arm bars  2 minutes shoulder abd/adduction , elbow flexion/extension , horizontal abd/adduction shoulder, with kick board push/pull x 2 minutes , with 2 noodles floated in water   Skilled Intervention instruction in HEP   Patient Response/Progress tolerated well   Education   Learner/Method Patient;Listening;Reading;Demonstration;Pictures/Video;No Barriers to Learning   Plan   Home program instruction in HEP and exercise wall slide for  flexion 5-10x and shoulder isometric flexion , extension , abduction , internal and external rotation 5-10x 1-2 x day   Plan for next session is ready for discharge to [facility]   Total Session Time   Timed Code Treatment Minutes 45   Total Treatment Time (sum of timed and untimed services) 45

## 2025-01-02 ENCOUNTER — OFFICE VISIT (OUTPATIENT)
Dept: PODIATRY | Facility: CLINIC | Age: 70
End: 2025-01-02
Payer: MEDICARE

## 2025-01-02 VITALS
HEIGHT: 71 IN | TEMPERATURE: 96.7 F | WEIGHT: 212 LBS | BODY MASS INDEX: 29.68 KG/M2 | HEART RATE: 76 BPM | DIASTOLIC BLOOD PRESSURE: 76 MMHG | SYSTOLIC BLOOD PRESSURE: 110 MMHG

## 2025-01-02 DIAGNOSIS — M20.5X1 HALLUX LIMITUS OF RIGHT FOOT: ICD-10-CM

## 2025-01-02 DIAGNOSIS — I73.9 PERIPHERAL VASCULAR DISEASE (H): Primary | ICD-10-CM

## 2025-01-02 DIAGNOSIS — M77.41 METATARSALGIA OF BOTH FEET: ICD-10-CM

## 2025-01-02 DIAGNOSIS — I70.90 ARTERIAL CALCIFICATION: ICD-10-CM

## 2025-01-02 DIAGNOSIS — R09.A2 GLOBUS SENSATION: ICD-10-CM

## 2025-01-02 DIAGNOSIS — M77.42 METATARSALGIA OF BOTH FEET: ICD-10-CM

## 2025-01-02 DIAGNOSIS — R60.0 EDEMA OF RIGHT LOWER LEG DUE TO VENOUS STASIS: ICD-10-CM

## 2025-01-02 DIAGNOSIS — M77.8 CAPSULITIS OF FOOT: ICD-10-CM

## 2025-01-02 DIAGNOSIS — I87.2 EDEMA OF RIGHT LOWER LEG DUE TO VENOUS STASIS: ICD-10-CM

## 2025-01-02 DIAGNOSIS — K22.70 BARRETT'S ESOPHAGUS WITHOUT DYSPLASIA: ICD-10-CM

## 2025-01-02 RX ORDER — DIAZEPAM 5 MG/1
5 TABLET ORAL EVERY 6 HOURS PRN
Qty: 2 TABLET | Refills: 0 | Status: SHIPPED | OUTPATIENT
Start: 2025-01-02

## 2025-01-02 ASSESSMENT — PAIN SCALES - GENERAL: PAINLEVEL_OUTOF10: MILD PAIN (3)
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
## 2025-01-02 NOTE — PATIENT INSTRUCTIONS
Please call [phone] to schedule your MRI and JACOB.     *Once your imaging exam has been scheduled, our prior authorization team will connect with your insurance to ensure coverage of the exam. They will only reach out to you if a prior authorization is denied.  Please schedule your imaging exam at least 7 days out so our team has time to complete this process.  Failure to do so could result in insurance denial and you becoming responsible for the cost of the exam.     After your imaging appointment is scheduled, call [phone] to schedule your 30 minute follow-up visit with Dr. Rocha to discuss the results. .
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
## 2025-01-13 NOTE — PROGRESS NOTES
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 or effusions.  2. Osteoarthrosis involving the glenohumeral joint and AC joints.  3. Mild right anterolateral seventh, eighth and ninth rib deformities  likely from old trauma. There is no evidence of an acute or subacute  fracture. No pneumothorax.  4. No mass evident radiographically.     RENÉE SANTOS MD       XR HAND RIGHT G/E 3 VIEWS 7/9/2024 8:24 AM      HISTORY: Pain after injury     COMPARISON: None.                                                                          IMPRESSION: The right hand is negative for fracture. There is  degenerative change in the STT joint and first MCP joint. Degenerative  change at the index finger MCP joint..     PATEL STONE MD         EMG:  na      Diagnosis:  (M25.511) Pain in joint of right shoulder  (primary encounter diagnosis)  Comment: MRI reviewed today.  Refer for R) shoulder joint injection with Dr. Briceño and to Ortho to see if he is a surgical candidate.  Plan: Continue Physical Therapy           (R07.81) Rib pain on right side   Comment:   Plan: Intercostal nerve blocks R) T7,8,9 with Dr. Briceño decreased pain 75%    (R51.9) Chronic daily headache  Comment:   Plan: Neurology consult in November.    (M54.2) Cervicalgia  Comment:   Plan: PT referral    (M79.881) Right foot  pain  Comment:   Plan: He is following with podiatry    (G89.29) Chronic intractable pain  Comment:   Plan: Continue PT.    (F41.9,  F32.A) Anxiety and depression  Comment:   Plan: PAIN PHD EVAL/TREAT/FOLLOW UP                Plan on 1/14/2025  He will take nortriptyline to assist with pain and sleep.    He will [facility] an appointment at [facility] to discuss medications for pain and sleep.    Schedule intercostal nerve block with Dr. Briceño.    He will schedule repeat R) shoulder injection in another month.    Compounding Cream ordered through [facility] [phone].    Follow-up in 3 months.      ETHEL Cooley, RN, CNP, FNP  [facility] [place]        BILLING TIME DOCUMENTATION:   The total TIME spent on this patient on the date of the encounter/appointment was 41 minutes.

## 2025-01-14 ENCOUNTER — OFFICE VISIT (OUTPATIENT)
Dept: PALLIATIVE MEDICINE | Facility: OTHER | Age: 70
End: 2025-01-14
Attending: NURSE PRACTITIONER
Payer: MEDICARE

## 2025-01-14 VITALS — OXYGEN SATURATION: 98 % | SYSTOLIC BLOOD PRESSURE: 138 MMHG | HEART RATE: 70 BPM | DIASTOLIC BLOOD PRESSURE: 86 MMHG

## 2025-01-14 DIAGNOSIS — G58.8 INTERCOSTAL NEURALGIA: ICD-10-CM

## 2025-01-14 DIAGNOSIS — M25.511 PAIN IN JOINT OF RIGHT SHOULDER: Primary | ICD-10-CM

## 2025-01-14 DIAGNOSIS — M25.511 PAIN IN JOINT OF RIGHT SHOULDER: ICD-10-CM

## 2025-01-14 PROCEDURE — G0463 HOSPITAL OUTPT CLINIC VISIT: HCPCS | Performed by: NURSE PRACTITIONER

## 2025-01-14 PROCEDURE — 99215 OFFICE O/P EST HI 40 MIN: CPT | Performed by: NURSE PRACTITIONER

## 2025-01-14 PROCEDURE — G2211 COMPLEX E/M VISIT ADD ON: HCPCS | Performed by: NURSE PRACTITIONER

## 2025-01-14 RX ORDER — KETAMINE HCL 100 %
2-3 POWDER (GRAM) MISCELLANEOUS 4 TIMES DAILY
Qty: 120 G | Refills: 2 | Status: SHIPPED | OUTPATIENT
Start: 2025-01-14

## 2025-01-14 RX ORDER — KETAMINE HCL 100 %
2-3 POWDER (GRAM) MISCELLANEOUS 4 TIMES DAILY
Qty: 120 G | Refills: 2 | Status: SHIPPED | OUTPATIENT
Start: 2025-01-14 | End: 2025-01-14

## 2025-01-14 ASSESSMENT — PAIN SCALES - GENERAL: PAINLEVEL_OUTOF10: MILD PAIN (3)

## 2025-01-14 NOTE — TELEPHONE ENCOUNTER
[facility] Call Center    Phone Message    May a detailed message be left on voicemail: yes     Reason for Call: Medication Question or concern regarding medication   Prescription Clarification  Name of Medication: ketamine HCl POWD   Prescribing Provider:     Gabriella Cao APRN CNP      Pharmacy: [facility] [id number]   What on the order needs clarification? Was sent to the wrong [facility].  Please resend to the above location.      Action Taken: Message routed to:  Other: [facility]     Travel Screening: Not Applicable     Date of Service:

## 2025-01-14 NOTE — TELEPHONE ENCOUNTER
Re-routing prescription to pharmacy indicated  Please review and sign  Pending Prescriptions:                       Disp   Refills    ketamine HCl POWD                         120 g  2            Si-3 Pump 4 times daily.

## 2025-01-14 NOTE — PATIENT INSTRUCTIONS
Plan on 1/14/2025  He will take nortriptyline to assist with pain and sleep.    He will make an appointment at the Dispensary to discuss medications for pain and sleep.    Schedule intercostal nerve block with Dr. Briceño.    He will schedule repeat R) shoulder injection in another month.    Compounding Cream ordered through [facility]    Follow-up in 3 months.      ETHEL Cooley, RN, CNP, FNP  [facility]/Dede Knapp          [facility]    Clinic Number:  [phone]  Call with any questions about your care and for scheduling assistance.   Calls are returned Monday through Friday between 8 AM and 4:30 PM. We usually get back to you within 2 business days depending on the issue/request.    If we are prescribing your medications:  For opioid medication refills, call the clinic or send a [employer] message 7 days in advance.  Please include:  Name of requested medication  Name of the pharmacy.  For non-opioid medications, call your pharmacy directly to request a refill. Please allow 3-4 days to be processed.   Per [place] State Law:  All controlled substance prescriptions must be filled within 30 days of being written.    For those controlled substances allowing refills, pickup must occur within 30 days of last fill.      We believe regular attendance is key to your success in our program!    Any time you are unable to keep your appointment we ask that you call us at least 24 hours in advance to cancel.This will allow us to offer the appointment time to another patient.   Multiple missed appointments may lead to dismissal from the clinic.

## 2025-01-15 ENCOUNTER — TELEPHONE (OUTPATIENT)
Dept: PALLIATIVE MEDICINE | Facility: OTHER | Age: 70
End: 2025-01-15
Payer: MEDICARE

## 2025-01-15 NOTE — TELEPHONE ENCOUNTER
"Screening Questions for Radiology Injections:    Injection to be done at which interventional clinic site? [facility] - Main    If choosing [facility] for location, please inform patient:  \"[facility] is a Hospital based clinic. Before your visit, you should check with your insurance about how it covers the charges for facility services in a hospital-based clinic.     Procedure ordered by D    Procedure ordered? R) intercostal nerve block with Dr. Briceño   Transforaminal Cervical АЛЕКСАНДР - Send to [facility] ([facility]) - No [facility] Site providers perform this procedure    What insurance would patient like us to bill for this procedure? [email or web address]  IF SCHEDULING IN [facility] OR SPINE PLEASE SCHEDULE AT LEAST 7-10 BUSINESS DAYS OUT SO A PA CAN BE OBTAINED  Worker's comp or MVA (motor vehicle accident) -Any injection DO NOT SCHEDULE and route to Lisa Galeano.    [employer] insurance - For ALL INJECTIONS DO NOT SCHEDULE and route to Charla Alvarenga.     ALL BCBS, [employer] and [employer] - DO NOT SCHEDULE and route to Charla Alvarenga  MEDICA- ALL INJECTIONS- route to Charla Alvarenga    Is patient scheduled at [facility]? no   If YES, route every encounter to [facility] SPINE CENTER CARE NAVIGATION POOL [[id number]]    Is an  needed? No     Patient has a  home? (Review Grid) YES: ok    Any chance of pregnancy? NO   If YES, do NOT schedule and route to RN pool  - Dr. Leroy route to PM&R Nurse  [[id number]]      Is patient actively being treated for cancer or immunocompromised? No  If YES, do NOT schedule and route to RN pool/ Dr. Leroy's Team    Does the patient have a bleeding or clotting disorder? No   If YES, okay to schedule AND route to RN  / Dr. Leroy's Team   (For any patients with platelet count <100, RN must forward to provider)    Is patient taking any Blood Thinners OR Antiplatelet medication?  No   If hold needed, do NOT schedule, route to RN pool/ Dr." Mariaa's Team  Examples:   Blood Thinners: (Coumadin, Warfarin, Jantoven, Pradaxa, Xarelto, Eliquis, Edoxaban, Enoxaparin, Lovenox, Heparin, Arixtra, Fondaparinux or Fragmin)  Antiplatelet Medications: (Plavix, Brilinta or Effient)     Is patient taking any aspirin products (includes Excedrin and Fiorinal)? Yes - Pt takes 81mg daily; instructed to hold 0 day(s) prior to procedure.    If yes route to RN pool/ Dr. Leroy's Team - Do not schedule    Is patient taking any GLP-1 Antagonist (hold needed for sedation patients only) No   (semaglutide (Ozempic, Wegovy), dulaglutide (Trulicity), exenatide ER (Bydureon), tirzepatide (Mounjaro), Liraglutide (Saxenda, Victoza), semaglutide (Rybelsus), Terzepatide (Zepbound)  If YES, okay to schedule AND route to RN nurse / Dr. Leroy's Team      Any allergies to contrast dye, iodine, shellfish, or numbing and steroid medications? No  If YES, schedule and add allergy information to appointment notes AND route to the RN pool/ Dr. Leroy's Team  If АЛЕКСАНДР and Contrast Dye / Iodine Allergy? DO NOT SCHEDULE, route to RN pool/ Dr. Courtneys Team  Allergies: Anesthetic ether, Bee venom, Demerol, and Statin [statins]     Does patient have an active infection or treated for one within the past week? No  Is patient currently taking any antibiotics or steroid medications?  No   For patients on chronic, preventative, or prophylactic antibiotics, procedures may be scheduled.   For patients on antibiotics for active or recent infection, schedule 4 days after completed.  For patients on steroid medications, schedule 4 days after completed.     Has the patient had a flu shot or any other vaccinations within the past 7 days? No  If yes, explain that for the vaccine to work best they need to:     wait 1 week before and 1 week after getting any Vaccine  wait 1 week before and 2 weeks after getting any Covid Vaccine   If patient has concerns about the timing, send to RN pool/ Dr. Leroy's Team    Does  patient have an MRI/CT?  Not Applicable Include Date and Check Procedure Scheduling Grid to see if required.  Was the MRI/CT done within the last 3 years?  NA   If no route to RN Pool/ Dr. Leroy's Team  If yes, where was the MRI/CT done?    Refer to PACS Transmissions list for approved external locations and route to RN Pool High Priority/ Dr. Corutneys Team  If MRI was not done at approved external location do NOT schedule and route to RN pool/ Dr. Courtneys Team    If patient has an imaging disc, the injection MAY be scheduled but patient must bring disc to appt or appt will be cancelled.    Is patient able to transfer to a procedure table with minimal or no assistance? Yes   If no, do NOT schedule and route to RN Pool/ Dr. Leroy's Team    Procedure Specific Instructions:  If celiac plexus block, informed patient NPO for 6 hours and that it is okay to take medications with sips of water, especially blood pressure medications Not Applicable       If this is for a cervical procedure, informed patient that aspirin needs to be held for 6 days.   Not Applicable    Sedation, If Sedation is ordered for any procedure, patient must be NPO for 6 hours prior to procedure Not Applicable    If IV needed:  Do not schedule procedures requiring IV placement in the first appointment of the day or first appointment after lunch. Do NOT schedule at 0745, 0815 or 1245.   Instructed patient to arrive 30 minutes early for IV start if required. (Check Procedure Scheduling Grid)  Not Applicable    Reminders:  If you are started on any steroids or antibiotics between now and your appointment, you must contact us because the procedure may need to be cancelled.  Yes    As a reminder, receiving steroids can decrease your body's ability to fight infection.   Would you still like to move forward with scheduling the injection?  Yes    IV Sedation is not provided for procedures. If oral anti-anxiety medication is needed, the patient should  request this from their referring provider.    Instruct patient to arrive as directed prior to the scheduled appointment time:  If IV needed 30 minutes before appointment time     For patients 85 or older we recommend having an adult stay w/ them for the remainder of the day.     If the patient is Diabetic, remind them to bring their glucometer.    Dr. Vazquez Pt's - Imaging Orders Needed   Please send all injections to RN Pool Not Applicable   Red Flags? Not Applicable    Does the patient have any questions?  Not Applicable  Verónica Galeano  [facility]

## 2025-01-16 ENCOUNTER — HOSPITAL ENCOUNTER (OUTPATIENT)
Dept: ULTRASOUND IMAGING | Facility: CLINIC | Age: 70
Discharge: HOME | End: 2025-01-16
Attending: PODIATRIST
Payer: MEDICARE

## 2025-01-16 ENCOUNTER — HOSPITAL ENCOUNTER (OUTPATIENT)
Dept: MRI IMAGING | Facility: CLINIC | Age: 70
Discharge: HOME | End: 2025-01-16
Attending: PODIATRIST
Payer: MEDICARE

## 2025-01-16 DIAGNOSIS — M20.5X1 HALLUX LIMITUS OF RIGHT FOOT: ICD-10-CM

## 2025-01-16 DIAGNOSIS — M77.41 METATARSALGIA OF BOTH FEET: ICD-10-CM

## 2025-01-16 DIAGNOSIS — I87.2 EDEMA OF RIGHT LOWER LEG DUE TO VENOUS STASIS: ICD-10-CM

## 2025-01-16 DIAGNOSIS — I73.9 PERIPHERAL VASCULAR DISEASE: ICD-10-CM

## 2025-01-16 DIAGNOSIS — I70.90 ARTERIAL CALCIFICATION: ICD-10-CM

## 2025-01-16 DIAGNOSIS — M77.42 METATARSALGIA OF BOTH FEET: ICD-10-CM

## 2025-01-16 DIAGNOSIS — M77.8 CAPSULITIS OF FOOT: ICD-10-CM

## 2025-01-16 DIAGNOSIS — R60.0 EDEMA OF RIGHT LOWER LEG DUE TO VENOUS STASIS: ICD-10-CM

## 2025-01-16 PROCEDURE — 73718 MRI LOWER EXTREMITY W/O DYE: CPT | Mod: RT

## 2025-01-16 PROCEDURE — 93922 UPR/L XTREMITY ART 2 LEVELS: CPT

## 2025-01-16 PROCEDURE — 93925 LOWER EXTREMITY STUDY: CPT

## 2025-01-20 ENCOUNTER — OFFICE VISIT (OUTPATIENT)
Dept: PODIATRY | Facility: CLINIC | Age: 70
End: 2025-01-20
Payer: MEDICARE

## 2025-01-20 VITALS
DIASTOLIC BLOOD PRESSURE: 78 MMHG | WEIGHT: 208 LBS | HEIGHT: 71 IN | BODY MASS INDEX: 29.12 KG/M2 | TEMPERATURE: 96.6 F | SYSTOLIC BLOOD PRESSURE: 110 MMHG

## 2025-01-20 DIAGNOSIS — M77.8 CAPSULITIS OF FOOT: ICD-10-CM

## 2025-01-20 DIAGNOSIS — M20.5X1 HALLUX LIMITUS OF RIGHT FOOT: ICD-10-CM

## 2025-01-20 DIAGNOSIS — M77.42 METATARSALGIA OF BOTH FEET: ICD-10-CM

## 2025-01-20 DIAGNOSIS — I87.2 EDEMA OF RIGHT LOWER LEG DUE TO VENOUS STASIS: ICD-10-CM

## 2025-01-20 DIAGNOSIS — R60.0 EDEMA OF RIGHT LOWER LEG DUE TO VENOUS STASIS: ICD-10-CM

## 2025-01-20 DIAGNOSIS — I70.90 ARTERIAL CALCIFICATION: Primary | ICD-10-CM

## 2025-01-20 DIAGNOSIS — M77.41 METATARSALGIA OF BOTH FEET: ICD-10-CM

## 2025-01-20 PROCEDURE — 99213 OFFICE O/P EST LOW 20 MIN: CPT | Performed by: PODIATRIST

## 2025-01-20 ASSESSMENT — PAIN SCALES - GENERAL: PAINLEVEL_OUTOF10: MILD PAIN (3)
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
## 2025-01-23 NOTE — CONFIDENTIAL NOTE
Reason for visit: Episodic tension-type headache, not intractable    Referring Provider: Gabriella Cao APRN CNP  [facility]      Office Visit Notes: 08/29/2024       IMAGING   STATUS/LOCATION   DATE/TYPE   MRI/MRA External  [facility] 2/13/2018    CT/CTA N/A     LABS Internal    EEG N/A    EMG N/A    NEUOROPSYCH   TEST: N/A

## 2025-01-29 ENCOUNTER — RADIOLOGY INJECTION OFFICE VISIT (OUTPATIENT)
Dept: PALLIATIVE MEDICINE | Facility: CLINIC | Age: 70
End: 2025-01-29
Attending: NURSE PRACTITIONER
Payer: MEDICARE

## 2025-01-29 VITALS — SYSTOLIC BLOOD PRESSURE: 143 MMHG | HEART RATE: 73 BPM | DIASTOLIC BLOOD PRESSURE: 90 MMHG | OXYGEN SATURATION: 99 %

## 2025-01-29 DIAGNOSIS — G58.8 INTERCOSTAL NEURALGIA: ICD-10-CM

## 2025-01-29 PROCEDURE — 77002 NEEDLE LOCALIZATION BY XRAY: CPT | Performed by: PAIN MEDICINE

## 2025-01-29 PROCEDURE — 64420 NJX AA&/STRD NTRCOST NRV 1: CPT | Mod: RT | Performed by: PAIN MEDICINE

## 2025-01-29 PROCEDURE — 64421 NJX AA&/STRD NTRCOST NRV EA: CPT | Mod: RT | Performed by: PAIN MEDICINE

## 2025-01-29 RX ORDER — DEXAMETHASONE SODIUM PHOSPHATE 10 MG/ML
10 INJECTION, SOLUTION INTRAMUSCULAR; INTRAVENOUS ONCE
Status: COMPLETED | OUTPATIENT
Start: 2025-01-29 | End: 2025-01-29

## 2025-01-29 RX ADMIN — DEXAMETHASONE SODIUM PHOSPHATE 10 MG: 10 INJECTION, SOLUTION INTRAMUSCULAR; INTRAVENOUS at 15:54

## 2025-01-29 ASSESSMENT — PAIN SCALES - GENERAL
PAINLEVEL_OUTOF10: MILD PAIN (3)
PAINLEVEL_OUTOF10: MILD PAIN (2)

## 2025-01-29 NOTE — NURSING NOTE
Discharge Information    IV Discontiued Time:  NA    Amount of Fluid Infused:  NA    All lung fields clear to auscultation    Discharge Criteria = When patient returns to baseline or as per MD order    Consciousness:  Pt is fully awake    Circulation:  BP +/- 20% of pre-procedure level    Respiration:  Patient is able to breathe deeply    O2 Sat:  Patient is able to maintain O2 Sat >92% on room air    Activity:  Moves 4 extremities on command    Ambulation:  Patient is able to stand and walk or stand and pivot into wheelchair    Dressing:  Clean/dry or No Dressing    Notes:   Discharge instructions and AVS given to patient    Patient meets criteria for discharge?  YES    Admitted to PCU?  No    Responsible adult present to accompany patient home?  Yes    Signature/Title:    Ester Zuñiga RN  RN Care Coordinator  [facility] [place]

## 2025-01-29 NOTE — PROGRESS NOTES
Pre procedure Diagnosis: intercostal neuralgia   Post procedure Diagnosis: Same  Procedure performed: Right T7,8,9 intercostal nerve blocks  Anesthesia: none  Complications: none  Operators: Taco Briceño MD      Indications:   Jose Angel Cha is a 69 year old male was sent for right intercostal nerve blocks.  They have a history of right rib pain.  Exam shows neg allodynia and they have tried conservative treatment including meds/pt/injections with benefit .     Options/alternatives, benefits and risks were discussed with the patient including bleeding, infection, no pain relief, tissue trauma, exposure to radiation, reaction to medications including seizure, spinal cord injury, weakness, numbness, and lung injury including pneumothorax.  Questions were answered to his satisfaction and he agrees to proceed. Voluntary informed consent was obtained and signed.      Vitals were reviewed: Yes  Allergies were reviewed:  Yes   Medications were reviewed:  Yes   Pre-procedure pain score: 3/10     Procedure:  After getting informed consent, patient was brought into the procedure suite and was placed in a prone position on the procedure table.   A Pause for the Cause was performed.  Patient was prepped and draped in sterile fashion.      The location of maximal pain was determined to be at T7,78,9 on the right. At a location several centimeters lateral from the spine, injections were completed. Using a 30G 1 inch needle, lidocaine 1% was used at the for locations (bilateral blocks) to anesthetize the skin. A 25G 2.5 inch needle was advanced with a cephalad tilt, to touch down on the anterior aspect of the rib. Intermittent fluoroscopy in both the AP and lateral projection was used to verify location superficial to lung tissue. After touching down on the rib, the needle was walked off inferiorly, into the area of the neurovascular bundle. Advancement was made 1-2mm at a time.   Omnipaque was injected. There was no evidence  of vascular uptake or spread towards the spinal canal. 1ml of contrast was injected.  9ml was wasted.  In total, 5ml of ropivacaine 0.2%, and 10mg of dexamethasone as injected. .     Hemostasis was achieved, the area was cleaned, and bandaids were placed when appropriate.  The patient tolerated the procedure well, and was taken to the recovery room.    Images were saved to PACS.     Post-procedure pain score: 2/10  Follow-up includes:   -f/u with referring provider  -consider intercostal ablation      Taco Briceño MD  [facility]

## 2025-01-29 NOTE — PATIENT INSTRUCTIONS
[facility]   Procedure Discharge Instructions    Today you saw:    Dr. Taco Briceño,         You had a(n):   Right intercostal nerve block          Be cautious with activities. Numbness and/or weakness may occur for up to 6-8 hours after the procedure due to effect of the local anesthetic.   You may resume your regular activities after 24 hours  Avoid strenuous activity for the first 24 hours  You may shower, however avoid swimming, tub baths or hot tubs for 24 hours following your procedure  You may have a mild to moderate increase in pain for several days following the injection.  It may take up to 14 days for the steroid medication to start working although you may feel the effect as early as a few days after the procedure.     You may use ice packs for 10-15 minutes, 3 to 4 times a day at the injection site for comfort  Do not use heat to painful areas for 6 to 8 hours. This will give the local anesthetic time to wear off and prevent you from accidentally burning your skin.   Unless you have been directed to avoid the use of anti-inflammatory medications (NSAIDS), you may use medications such as ibuprofen, Aleve or Tylenol for pain control if needed.   If you have diabetes, check your blood sugar more frequently than usual as your blood sugar may be higher than normal for 10-14 days following a steroid injection. Contact your doctor who manages your diabetes if your blood sugar is higher than usual  Possible side effects of steroids that you may experience include flushing, elevated blood pressure, increased appetite, mild headaches and restlessness.  All of these symptoms will get better with time.  If you experience any of the following, call the Pain Clinic during work hours (Mon-Friday 8-4:30 pm) at [phone] or the Provider Line after hours at [phone]:  -Fever over 100 degree F  -Swelling, bleeding, redness, drainage, warmth at the injection site  -Progressive  weakness or numbness.  -Unusual new onset of pain that is not improving

## 2025-01-29 NOTE — NURSING NOTE
Pre-procedure Intake  If YES to any questions or NO to having a   Please complete laminated checklist and leave on the computer keyboard for Provider, verbally inform provider if able.    For SCS Trial, RFA's or any sedation procedure:  Have you been fasting? NA  If yes, for how long? NA    Are you taking any any blood thinners such as Coumadin, Warfarin, Jantoven, Pradaxa Xarelto, Eliquis, Edoxaban, Enoxaparin, Lovenox, Heparin, Arixtra, Fondaparinux, or Fragmin? OR Antiplatelet medication such as Plavix, Brilinta, or Effient?   No   If yes, when did you take your last dose? NA    Do you take aspirin?  Yes -   ASA  If cervical procedure, have you held aspirin for 6 days?   NA    Is the Pt taking any GLP-1 Antagonist (hold needed for sedation patients only)  (semaglutide (Ozempic, Wegovy), dulaglutide (Trulicity), exenatide ER (Bydureon), tirzepatide (Mounjaro), Liraglutide (Saxenda, Victoza), semaglutide (Rybelsus)     NA  If yes, when did you take your last dose? NA    Do you have any allergies to contrast dye, iodine, steroid and/or numbing medications?  NO    Are you currently taking antibiotics or have an active infection?  NO    Have you had a fever/elevated temperature within the past week? NO    Are you currently taking oral steroids? NO    Do you have a ? NA    Are you pregnant or breastfeeding?  Not Applicable    Have you received any vaccinations in the last week? NO    Notify provider and RNs if systolic BP >170, diastolic BP >100, P >100 or O2 sats < 90%    Pepper Weinberg MA

## 2025-02-17 NOTE — PROGRESS NOTES
Chief Complaint   Patient presents with    Consult     Ear concerns-left ear infection about 1 month ago, still feels pressure. Post nasal drip, clearing throat     History of Present Illness  Jose Angel Cha is a 69 year old male who presents to me today for ear evaluation. The patient was self-referred for concerns of recurrent ear infections.    The patient tells me that he has left ear pressure. Symptoms initially started 2 months ago. He felt that he had a sinus infection that traveled into his ear. He went into  in [place] and was prescribes Amoxicillin. Symptoms have since then resolved some. He does not have pain but he does have pressure.     He does have a post-nasal drip that has worsen over the past few allergy's. He takes pataday eye drops and Flonase.   He does have hx of acid reflux. He had a hiatal hernia repaired that has resolved reflux symptoms a few years ago.     There is no history of recent head trauma (back in 1996). He had chronic ear infections growing up. He has a history of T&A removal at the age of 7. He had mastoid surgery back in 2009 through [facility]. The patient denies vertigo and otorrhea.     Past Medical History  Patient Active Problem List   Diagnosis    Hearing loss    Lumbago    Family history of ischemic heart disease    Benign localized prostatic hyperplasia with lower urinary tract symptoms (LUTS)    Meniere disease    Pain in joint involving shoulder region    Mixed hyperlipidemia    Other symptoms involving nervous and musculoskeletal systems(781.99)    Dizziness and giddiness    Dupuytren's contracture    House dust mite allergy    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 sting    LISBET (obstructive sleep apnea) AHI 13.8    Venom-induced anaphylaxis    Coronary artery disease involving native coronary artery of native heart without angina pectoris    Need for SBE (subacute bacterial endocarditis) prophylaxis    Benign essential hypertension    Subclinical hypothyroidism    Cardenas's esophagus  without dysplasia    Allergic rhinitis due to animal dander    Chronic seasonal allergic rhinitis due to pollen    Grade II internal hemorrhoids    Need for desensitization to allergens    Basilic vein thrombosis    Greater trochanteric bursitis of both hips    Impingement syndrome of both shoulders    Rib pain     Current Medications     Current Outpatient Medications:     alirocumab (PRALUENT) 75 MG/ML injectable pen, Inject 1 mL (75 mg) Subcutaneous every 14 days, Disp: 75 mL, Rfl: 11    amLODIPine (NORVASC) 2.5 MG tablet, Take 1 tablet (2.5 mg) by mouth daily., Disp: 90 tablet, Rfl: 3    ASPIRIN 81 MG OR TABS, ONE DAILY, Disp: 0, Rfl: 0    CALCIUM PO, , Disp: , Rfl:     clindamycin (CLEOCIN T) 1 % external solution, Apply to scalp for 3 weeks in a row, Disp: 60 mL, Rfl: 2    diazepam (VALIUM) 5 MG tablet, Take 1 tablet (5 mg) by mouth every 6 hours as needed for anxiety., Disp: 2 tablet, Rfl: 0    EPINEPHrine (ANY BX GENERIC EQUIV) 0.3 MG/0.3ML injection 2-pack, Inject 0.3 mLs (0.3 mg) into the muscle as needed for anaphylaxis (Patient not taking: Reported on 1/29/2025), Disp: 0.6 mL, Rfl: 1    famotidine (PEPCID) 40 MG tablet, TAKE 1 TABLET BY MOUTH NIGHTLY AS NEEDED HEARTBURN, Disp: 30 tablet, Rfl: 0    finasteride (PROSCAR) 5 MG tablet, Take 1 tablet (5 mg) by mouth daily, Disp: 90 tablet, Rfl: 3    fluticasone (FLONASE) 50 MCG/ACT nasal spray, USE 2 SPRAY(S) IN THE AFFECTED NOSTRIL ONCE DAILY, Disp: , Rfl:     ketamine HCl POWD, 2-3 Pump 4 times daily. (Patient not taking: Reported on 1/29/2025), Disp: 120 g, Rfl: 2    Multiple Vitamins-Minerals (MULTIVITAL PO), Take 1 tablet by mouth daily Reported on 3/22/2017, Disp: , Rfl:     nortriptyline (PAMELOR) 10 MG capsule, Take 1 capsule (10 mg) by mouth at bedtime. (Patient not taking: Reported on 1/2/2025), Disp: 30 capsule, Rfl: 0    olopatadine (PATADAY) 0.2 % ophthalmic solution, 0.05 mLs (1 drop) daily, Disp: , Rfl:     omeprazole (PRILOSEC) 20 MG DR  capsule, Take 1 capsule by mouth twice daily, Disp: 60 capsule, Rfl: 0    psyllium (METAMUCIL/KONSYL) Packet, Take 1 packet by mouth daily, Disp: , Rfl:     sildenafil (VIAGRA) 25 MG tablet, Take 1 tablet (25 mg) by mouth daily as needed (ED)., Disp: 30 tablet, Rfl: 4    trospium (SANCTURA XR) 60 MG CP24 24 hr capsule, Take 1 capsule (60 mg) by mouth every morning., Disp: 90 capsule, Rfl: 3    Allergies  Allergies   Allergen Reactions    Anesthetic Ether     Bee Venom     Demerol Visual Disturbance    Statin [Statins] Other (See Comments)     Muscle pain       Social History   Social History     Socioeconomic History    Marital status:     Number of children: 4   Occupational History     Employer: [employer]     Comment:    Tobacco Use    Smoking status: Former     Types: Cigars     Passive exposure: Never    Smokeless tobacco: Never    Tobacco comments:     very occasion use of cigars formerly   Vaping Use    Vaping status: Never Used   Substance and Sexual Activity    Alcohol use: Not Currently     Comment: Quit 10/10/21    Drug use: No    Sexual activity: Yes     Partners: Female     Birth control/protection: Surgical   Other Topics Concern    Parent/sibling w/ CABG, MI or angioplasty before 65F 55M? Yes    Special Diet No    Exercise No     Comment: 1 x weekly    Social History Narrative    ENVIRONMENTAL HISTORY: [place] is heated with a forced air. They do have central air conditioning. The patient's bedroom is furnished with carpeting in bedroom.  Pets inside the house include 0 pets. There is history of cockroach or mice infestation. There is/are 0 smokers in the house.  The house does not have a damp basement.      Social Drivers of Health     Financial Resource Strain: Low Risk  (9/6/2024)    Financial Resource Strain     Within the past 12 months, have you or your family members you live with been unable to get utilities (heat,  electricity) when it was really needed?: No   Food Insecurity: Low Risk  (2024)    Food Insecurity     Within the past 12 months, did you worry that your food would run out before you got money to buy more?: No     Within the past 12 months, did the food you bought just not last and you didn t have money to get more?: No   Transportation Needs: Low Risk  (2024)    Transportation Needs     Within the past 12 months, has lack of transportation kept you from medical appointments, getting your medicines, non-medical meetings or appointments, work, or from getting things that you need?: No   Physical Activity: Insufficiently Active (2024)    Exercise Vital Sign     Days of Exercise per Week: 1 day     Minutes of Exercise per Session: 90 min   Stress: Stress Concern Present (2024)    [employer] - Occupational Stress Questionnaire     Feeling of Stress : To some extent   Social Connections: Unknown (2024)    Social Connection and Isolation Panel [NHANES]     Frequency of Social Gatherings with Friends and Family: Twice a week   Interpersonal Safety: Low Risk  (2024)    Interpersonal Safety     Do you feel physically and emotionally safe where you currently live?: Yes     Within the past 12 months, have you been hit, slapped, kicked or otherwise physically hurt by someone?: No     Within the past 12 months, have you been humiliated or emotionally abused in other ways by your partner or ex-partner?: No   Housing Stability: Low Risk  (2024)    Housing Stability     Do you have housing? : Yes     Are you worried about losing your housing?: No       Family History  Family History   Problem Relation Age of Onset    C.A.D. Mother         MI    Cancer Father         liver  age 51    Breast Cancer Father     Other Cancer Father     C.A.D. Sister          from MI at 49    C.A.D. Brother         MI age 50s    Parkinsonism Brother     Sleep Apnea Brother     Neurologic  "Disorder Brother         hearing loss    Hernia Brother     Gallbladder Disease Brother     Cholecystitis Brother     Substance Abuse Brother     Neurologic Disorder Son         hearing loss age 20s    Cancer - colorectal No family hx of     Prostate Cancer No family hx of     Diabetes No family hx of     Cerebrovascular Disease No family hx of     Colon Cancer No family hx of     Hyperlipidemia No family hx of     Depression No family hx of     Anxiety Disorder No family hx of     Mental Illness No family hx of     Anesthesia Reaction No family hx of     Osteoporosis No family hx of     Genetic Disorder No family hx of     Thyroid Disease No family hx of     Asthma No family hx of     Obesity No family hx of     Coronary Artery Disease No family hx of     Hypertension No family hx of        Review of Systems  As per HPI and PMHx, otherwise 10+ comprehensive system review is negative.    Physical Exam  /74   Temp 97.3  F (36.3  C) (Temporal)   Ht 1.791 m (5' 10.5\")   Wt 95 kg (209 lb 8 oz)   BMI 29.64 kg/m    GENERAL: Patient is a pleasant, cooperative 69 year old male in no acute distress.  HEAD: Normocephalic, atraumatic.  Hair and scalp are normal.  EYES: Pupils are equal, round, reactive to light and accommodation.  Extraocular movements are intact.  The sclera nonicteric without injection.  The extraocular structures are normal.  EARS: Normal shape and symmetry.  No tenderness when palpating the mastoid or tragal areas bilaterally.  Otoscopic exam reveals a no amount of cerumen bilaterally.  The bilateral tympanic membranes are round, cloudy, and minimal erythema.   NOSE: Nares are patent.  Nasal mucosa is pink.  ORAL CAVITY: Dentition is in good repair.  Mucous membranes are moist.  Tongue is mobile, protrudes to the midline.  Palate elevates symmetrically.  Tonsils are +1.  No erythema or exudate.  No oral cavity or oropharyngeal masses, lesions, ulcerations, leukoplakia.  NECK: Supple, trachea is " midline.  There no palpable cervical lymphadenopathy or masses bilaterally.  Palpation of the bilateral parotid and submandibular areas reveal no masses.  No thyromegaly.    NEUROLOGIC: Cranial nerves II through XII are grossly intact.  Voice is strong.  Patient is House-Brackmann I/VI bilaterally.  CARDIOVASCULAR: Extremities are warm and well-perfused.  No significant peripheral edema.  RESPIRATORY: Patient has nonlabored breathing without cough, wheeze, stridor.  PSYCHIATRIC: Patient is alert and oriented.  Mood and affect appear normal.  SKIN: Warm and dry.  No scalp, face, or neck lesions noted.    Audiogram  The patient underwent an audiogram performed today.  My review of the audiogram shows mixed hearing bilaterally.  Pure-tone average is 45 dB on the right and 42 dB on the left.  Speech reception threshold is 45 dB on the right and 40 dB on the left.  The patient had 100% word recognition on the right and 100% word recognition on the left.  The patient had a A tympanogram on the right and a C tympanogram on the left.     Assessment and Plan     ICD-10-CM    1. Recurrent acute suppurative otitis media without spontaneous rupture of tympanic membrane of both sides  H66.006         It was my pleasure seeing Jose Angel Cha today in clinic. The patient presents with concerns of left ear plugged feeling, which his symptoms are most likely related to eustachian tube dysfunction. I also feel he has chronic sinusitis, therefore we started him on a saline rinse daily, continue with Flonase, add in Atrovent to help with a post nasal drip, and continue with a antihistamine.     I prescribed Augmentin to treat his ear infection.   Ordered a CT of the sinuses to rule out any sinus abnormalities.   We briefly discussed laryngeal reflux. However, the patient had a endoscopy completed about 2 months ago and it was clear. Therfore, if symptoms persist then we will perform a laryngeal scope.       ETHEL Winters  CNP  Otolaryngology  [place] [facility] [place]

## 2025-02-18 ENCOUNTER — OFFICE VISIT (OUTPATIENT)
Dept: AUDIOLOGY | Facility: CLINIC | Age: 70
End: 2025-02-18
Payer: MEDICARE

## 2025-02-18 ENCOUNTER — OFFICE VISIT (OUTPATIENT)
Dept: OTOLARYNGOLOGY | Facility: CLINIC | Age: 70
End: 2025-02-18
Payer: MEDICARE

## 2025-02-18 VITALS
SYSTOLIC BLOOD PRESSURE: 124 MMHG | WEIGHT: 209.5 LBS | TEMPERATURE: 97.3 F | BODY MASS INDEX: 29.33 KG/M2 | HEIGHT: 71 IN | DIASTOLIC BLOOD PRESSURE: 74 MMHG

## 2025-02-18 DIAGNOSIS — H66.016 RECURRENT ACUTE SUPPURATIVE OTITIS MEDIA WITH SPONTANEOUS RUPTURE OF BOTH TYMPANIC MEMBRANES: ICD-10-CM

## 2025-02-18 DIAGNOSIS — J01.41 ACUTE RECURRENT PANSINUSITIS: ICD-10-CM

## 2025-02-18 DIAGNOSIS — R09.82 POST-NASAL DRIP: ICD-10-CM

## 2025-02-18 DIAGNOSIS — H90.6 MIXED HEARING LOSS, BILATERAL: Primary | ICD-10-CM

## 2025-02-18 DIAGNOSIS — H66.006 RECURRENT ACUTE SUPPURATIVE OTITIS MEDIA WITHOUT SPONTANEOUS RUPTURE OF TYMPANIC MEMBRANE OF BOTH SIDES: Primary | ICD-10-CM

## 2025-02-18 DIAGNOSIS — H90.6 MIXED CONDUCTIVE AND SENSORINEURAL HEARING LOSS OF BOTH EARS: ICD-10-CM

## 2025-02-18 PROCEDURE — 92567 TYMPANOMETRY: CPT

## 2025-02-18 PROCEDURE — 99203 OFFICE O/P NEW LOW 30 MIN: CPT

## 2025-02-18 PROCEDURE — 92557 COMPREHENSIVE HEARING TEST: CPT

## 2025-02-18 RX ORDER — IPRATROPIUM BROMIDE 42 UG/1
2 SPRAY, METERED NASAL 2 TIMES DAILY
Qty: 15 ML | Refills: 0 | Status: SHIPPED | OUTPATIENT
Start: 2025-02-18

## 2025-02-18 NOTE — PROGRESS NOTES
AUDIOLOGY REPORT     SUMMARY: Audiology visit completed. See audiogram for results.       RECOMMENDATIONS: Follow-up with ENT.    Francisco Solorio, CCC-A  Doctor of Audiology, [place] #[id number]   February 18, 2025

## 2025-02-18 NOTE — LETTER
2/18/2025      Jose Angel Cha  [address] [facility] [address]      Dear Colleague,    Thank you for referring your patient, Jose Angel Cha, to the [facility]. Please see a copy of my visit note below.    Chief Complaint   Patient presents with     Consult     Ear concerns-left ear infection about 1 month ago, still feels pressure. Post nasal drip, clearing throat     History of Present Illness  Jose Angel Cha is a 69 year old male who presents to me today for ear evaluation. The patient was self-referred for concerns of recurrent ear infections.    The patient tells me that he has left ear pressure. Symptoms initially started 2 months ago. He felt that he had a sinus infection that traveled into his ear. He went into  in [place] and was prescribes Amoxicillin. Symptoms have since then resolved some. He does not have pain but he does have pressure.     He does have a post-nasal drip that has worsen over the past few allergy's. He takes pataday eye drops and Flonase.   He does have hx of acid reflux. He had a hiatal hernia repaired that has resolved reflux symptoms a few years ago.     There is no history of recent head trauma (back in 1996). He had chronic ear infections growing up. He has a history of T&A removal at the age of 7. He had mastoid surgery back in 2009 through [facility]. The patient denies vertigo and otorrhea.     Past Medical History  Patient Active Problem List   Diagnosis     Hearing loss     Lumbago     Family history of ischemic heart disease     Benign localized prostatic hyperplasia with lower urinary tract symptoms (LUTS)     Meniere disease     Pain in joint involving shoulder region     Mixed hyperlipidemia     Other symptoms involving nervous and musculoskeletal systems(781.99)     Dizziness and giddiness     Dupuytren's contracture     House dust mite allergy     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 sting     LISBET (obstructive sleep apnea) AHI 13.8     Venom-induced  anaphylaxis     Coronary artery disease involving native coronary artery of native heart without angina pectoris     Need for SBE (subacute bacterial endocarditis) prophylaxis     Benign essential hypertension     Subclinical hypothyroidism     Cardenas's esophagus without dysplasia     Allergic rhinitis due to animal dander     Chronic seasonal allergic rhinitis due to pollen     Grade II internal hemorrhoids     Need for desensitization to allergens     Basilic vein thrombosis     Greater trochanteric bursitis of both hips     Impingement syndrome of both shoulders     Rib pain     Current Medications     Current Outpatient Medications:      alirocumab (PRALUENT) 75 MG/ML injectable pen, Inject 1 mL (75 mg) Subcutaneous every 14 days, Disp: 75 mL, Rfl: 11     amLODIPine (NORVASC) 2.5 MG tablet, Take 1 tablet (2.5 mg) by mouth daily., Disp: 90 tablet, Rfl: 3     ASPIRIN 81 MG OR TABS, ONE DAILY, Disp: 0, Rfl: 0     CALCIUM PO, , Disp: , Rfl:      clindamycin (CLEOCIN T) 1 % external solution, Apply to scalp for 3 weeks in a row, Disp: 60 mL, Rfl: 2     diazepam (VALIUM) 5 MG tablet, Take 1 tablet (5 mg) by mouth every 6 hours as needed for anxiety., Disp: 2 tablet, Rfl: 0     EPINEPHrine (ANY BX GENERIC EQUIV) 0.3 MG/0.3ML injection 2-pack, Inject 0.3 mLs (0.3 mg) into the muscle as needed for anaphylaxis (Patient not taking: Reported on 1/29/2025), Disp: 0.6 mL, Rfl: 1     famotidine (PEPCID) 40 MG tablet, TAKE 1 TABLET BY MOUTH NIGHTLY AS NEEDED HEARTBURN, Disp: 30 tablet, Rfl: 0     finasteride (PROSCAR) 5 MG tablet, Take 1 tablet (5 mg) by mouth daily, Disp: 90 tablet, Rfl: 3     fluticasone (FLONASE) 50 MCG/ACT nasal spray, USE 2 SPRAY(S) IN THE AFFECTED NOSTRIL ONCE DAILY, Disp: , Rfl:      ketamine HCl POWD, 2-3 Pump 4 times daily. (Patient not taking: Reported on 1/29/2025), Disp: 120 g, Rfl: 2     Multiple Vitamins-Minerals (MULTIVITAL PO), Take 1 tablet by mouth daily Reported on 3/22/2017, Disp: , Rfl:       nortriptyline (PAMELOR) 10 MG capsule, Take 1 capsule (10 mg) by mouth at bedtime. (Patient not taking: Reported on 1/2/2025), Disp: 30 capsule, Rfl: 0     olopatadine (PATADAY) 0.2 % ophthalmic solution, 0.05 mLs (1 drop) daily, Disp: , Rfl:      omeprazole (PRILOSEC) 20 MG DR capsule, Take 1 capsule by mouth twice daily, Disp: 60 capsule, Rfl: 0     psyllium (METAMUCIL/KONSYL) Packet, Take 1 packet by mouth daily, Disp: , Rfl:      sildenafil (VIAGRA) 25 MG tablet, Take 1 tablet (25 mg) by mouth daily as needed (ED)., Disp: 30 tablet, Rfl: 4     trospium (SANCTURA XR) 60 MG CP24 24 hr capsule, Take 1 capsule (60 mg) by mouth every morning., Disp: 90 capsule, Rfl: 3    Allergies  Allergies   Allergen Reactions     Anesthetic Ether      Bee Venom      Demerol Visual Disturbance     Statin [Statins] Other (See Comments)     Muscle pain       Social History   Social History     Socioeconomic History     Marital status:      Number of children: 4   Occupational History     Employer: [employer]     Comment:    Tobacco Use     Smoking status: Former     Types: Cigars     Passive exposure: Never     Smokeless tobacco: Never     Tobacco comments:     very occasion use of cigars formerly   Vaping Use     Vaping status: Never Used   Substance and Sexual Activity     Alcohol use: Not Currently     Comment: Quit 10/10/21     Drug use: No     Sexual activity: Yes     Partners: Female     Birth control/protection: Surgical   Other Topics Concern     Parent/sibling w/ CABG, MI or angioplasty before 65F 55M? Yes     Special Diet No     Exercise No     Comment: 1 x weekly    Social History Narrative    ENVIRONMENTAL HISTORY: [place] The home is heated with a forced air. They do have central air conditioning. The patient's bedroom is furnished with carpeting in bedroom.  Pets inside the house include 0 pets. There is history of cockroach or mice infestation. There  is/are 0 smokers in the house.  The house does not have a damp basement.      Social Drivers of Health     Financial Resource Strain: Low Risk  (9/6/2024)    Financial Resource Strain      Within the past 12 months, have you or your family members you live with been unable to get utilities (heat, electricity) when it was really needed?: No   Food Insecurity: Low Risk  (9/6/2024)    Food Insecurity      Within the past 12 months, did you worry that your food would run out before you got money to buy more?: No      Within the past 12 months, did the food you bought just not last and you didn t have money to get more?: No   Transportation Needs: Low Risk  (9/6/2024)    Transportation Needs      Within the past 12 months, has lack of transportation kept you from medical appointments, getting your medicines, non-medical meetings or appointments, work, or from getting things that you need?: No   Physical Activity: Insufficiently Active (9/6/2024)    Exercise Vital Sign      Days of Exercise per Week: 1 day      Minutes of Exercise per Session: 90 min   Stress: Stress Concern Present (9/6/2024)    [employer] [place] [employer] - Occupational Stress Questionnaire      Feeling of Stress : To some extent   Social Connections: Unknown (9/6/2024)    Social Connection and Isolation Panel [NHANES]      Frequency of Social Gatherings with Friends and Family: Twice a week   Interpersonal Safety: Low Risk  (12/4/2024)    Interpersonal Safety      Do you feel physically and emotionally safe where you currently live?: Yes      Within the past 12 months, have you been hit, slapped, kicked or otherwise physically hurt by someone?: No      Within the past 12 months, have you been humiliated or emotionally abused in other ways by your partner or ex-partner?: No   Housing Stability: Low Risk  (9/6/2024)    Housing Stability      Do you have housing? : Yes      Are you worried about losing your housing?: No       Family  "History  Family History   Problem Relation Age of Onset     C.A.D. Mother         MI     Cancer Father         liver  age 51     Breast Cancer Father      Other Cancer Father      C.A.D. Sister          from MI at 49     C.A.D. Brother         MI age 50s     Parkinsonism Brother      Sleep Apnea Brother      Neurologic Disorder Brother         hearing loss     Hernia Brother      Gallbladder Disease Brother      Cholecystitis Brother      Substance Abuse Brother      Neurologic Disorder Son         hearing loss age 20s     Cancer - colorectal No family hx of      Prostate Cancer No family hx of      Diabetes No family hx of      Cerebrovascular Disease No family hx of      Colon Cancer No family hx of      Hyperlipidemia No family hx of      Depression No family hx of      Anxiety Disorder No family hx of      Mental Illness No family hx of      Anesthesia Reaction No family hx of      Osteoporosis No family hx of      Genetic Disorder No family hx of      Thyroid Disease No family hx of      Asthma No family hx of      Obesity No family hx of      Coronary Artery Disease No family hx of      Hypertension No family hx of        Review of Systems  As per HPI and PMHx, otherwise 10+ comprehensive system review is negative.    Physical Exam  /74   Temp 97.3  F (36.3  C) (Temporal)   Ht 1.791 m (5' 10.5\")   Wt 95 kg (209 lb 8 oz)   BMI 29.64 kg/m    GENERAL: Patient is a pleasant, cooperative 69 year old male in no acute distress.  HEAD: Normocephalic, atraumatic.  Hair and scalp are normal.  EYES: Pupils are equal, round, reactive to light and accommodation.  Extraocular movements are intact.  The sclera nonicteric without injection.  The extraocular structures are normal.  EARS: Normal shape and symmetry.  No tenderness when palpating the mastoid or tragal areas bilaterally.  Otoscopic exam reveals a no amount of cerumen bilaterally.  The bilateral tympanic membranes are round, cloudy, and minimal " erythema.   NOSE: Nares are patent.  Nasal mucosa is pink.  ORAL CAVITY: Dentition is in good repair.  Mucous membranes are moist.  Tongue is mobile, protrudes to the midline.  Palate elevates symmetrically.  Tonsils are +1.  No erythema or exudate.  No oral cavity or oropharyngeal masses, lesions, ulcerations, leukoplakia.  NECK: Supple, trachea is midline.  There no palpable cervical lymphadenopathy or masses bilaterally.  Palpation of the bilateral parotid and submandibular areas reveal no masses.  No thyromegaly.    NEUROLOGIC: Cranial nerves II through XII are grossly intact.  Voice is strong.  Patient is House-Brackmann I/VI bilaterally.  CARDIOVASCULAR: Extremities are warm and well-perfused.  No significant peripheral edema.  RESPIRATORY: Patient has nonlabored breathing without cough, wheeze, stridor.  PSYCHIATRIC: Patient is alert and oriented.  Mood and affect appear normal.  SKIN: Warm and dry.  No scalp, face, or neck lesions noted.    Audiogram  The patient underwent an audiogram performed today.  My review of the audiogram shows mixed hearing bilaterally.  Pure-tone average is 45 dB on the right and 42 dB on the left.  Speech reception threshold is 45 dB on the right and 40 dB on the left.  The patient had 100% word recognition on the right and 100% word recognition on the left.  The patient had a A tympanogram on the right and a C tympanogram on the left.     Assessment and Plan     ICD-10-CM    1. Recurrent acute suppurative otitis media without spontaneous rupture of tympanic membrane of both sides  H66.006         It was my pleasure seeing Jose Angel Cha today in clinic. The patient presents with concerns of left ear plugged feeling, which his symptoms are most likely related to eustachian tube dysfunction. I also feel he has chronic sinusitis, therefore we started him on a saline rinse daily, continue with Flonase, add in Atrovent to help with a post nasal drip, and continue with a  antihistamine.     I prescribed Augmentin to treat his ear infection.   Ordered a CT of the sinuses to rule out any sinus abnormalities.   We briefly discussed laryngeal reflux. However, the patient had a endoscopy completed about 2 months ago and it was clear. Therfore, if symptoms persist then we will perform a laryngeal scope.       ETHEL Winters CNP  Otolaryngology  [facility]      Again, thank you for allowing me to participate in the care of your patient.        Sincerely,        ETHEL Winters CNP    Electronically signed

## 2025-02-18 NOTE — PATIENT INSTRUCTIONS
You were seen by Son Tolentino CNP.  If you have questions or concerns regarding your appointment today, you can reach out to our call center at [phone].  The following has been recommended at your appointment today:    Start taking the antibiotic twice daily for the next 10 days. I will reach out to see if your symptoms have improved through mychart.     Schedule CT scan to rule out any abnormalities with this sinuses.     Start using the saline nasal spray rinse bottle twice per day.   Then use Flonase in the AM 25-30 minutes after the saline nasal rinse.   Then use Atrovent twice daily 20-25 minutes after the nasal rinse.   4.  Start taking Zyrtec, Claritin, or Allegra.       NASAL SALINE IRRIGATION INSTRUCTIONS    You will be starting nasal saline irrigations and will need to obtain the following:      - NeilMed Sinus Rinse 8 oz Kit  - Distilled or filtered water   - Normal saline salt packets    Place filtered or distilled water into the NeilMed bottle up to the fill line (DO NOT USE TAP OR WELL WATER).  Place the pre-made salt packet in the 8 oz of saline.  Shake the bottle to suspend into solution.  Lean head forward over a sink or a basin.  Rinse each side of the nose with one-half of the bottle (each squeeze is about one-half of the bottle). Rinse the nose daily.     If you use topical nasal sprays, apply following irrigation.    Video example: https://[email or web address].com/watch?v=[email or web address]

## 2025-02-23 DIAGNOSIS — N40.1 BPH WITH OBSTRUCTION/LOWER URINARY TRACT SYMPTOMS: ICD-10-CM

## 2025-02-23 DIAGNOSIS — N13.8 BPH WITH OBSTRUCTION/LOWER URINARY TRACT SYMPTOMS: ICD-10-CM

## 2025-02-24 ENCOUNTER — MYC MEDICAL ADVICE (OUTPATIENT)
Dept: SLEEP MEDICINE | Facility: CLINIC | Age: 70
End: 2025-02-24
Payer: MEDICARE

## 2025-03-02 ENCOUNTER — THERAPY VISIT (OUTPATIENT)
Dept: SLEEP MEDICINE | Facility: CLINIC | Age: 70
End: 2025-03-02
Attending: INTERNAL MEDICINE
Payer: MEDICARE

## 2025-03-02 DIAGNOSIS — G47.10 HYPERSOMNIA: ICD-10-CM

## 2025-03-02 DIAGNOSIS — G47.33 OSA (OBSTRUCTIVE SLEEP APNEA): ICD-10-CM

## 2025-03-02 RX ORDER — FINASTERIDE 5 MG/1
1 TABLET, FILM COATED ORAL DAILY
Qty: 90 TABLET | Refills: 3 | Status: SHIPPED | OUTPATIENT
Start: 2025-03-02

## 2025-03-02 ASSESSMENT — SLEEP AND FATIGUE QUESTIONNAIRES
HOW LIKELY ARE YOU TO NOD OFF OR FALL ASLEEP WHILE SITTING QUIETLY AFTER LUNCH WITHOUT ALCOHOL: SLIGHT CHANCE OF DOZING
HOW LIKELY ARE YOU TO NOD OFF OR FALL ASLEEP WHILE SITTING AND TALKING TO SOMEONE: WOULD NEVER DOZE
HOW LIKELY ARE YOU TO NOD OFF OR FALL ASLEEP WHEN YOU ARE A PASSENGER IN A CAR FOR AN HOUR WITHOUT A BREAK: MODERATE CHANCE OF DOZING
HOW LIKELY ARE YOU TO NOD OFF OR FALL ASLEEP IN A CAR, WHILE STOPPED FOR A FEW MINUTES IN TRAFFIC: SLIGHT CHANCE OF DOZING
HOW LIKELY ARE YOU TO NOD OFF OR FALL ASLEEP WHILE SITTING AND READING: SLIGHT CHANCE OF DOZING
HOW LIKELY ARE YOU TO NOD OFF OR FALL ASLEEP WHILE SITTING INACTIVE IN A PUBLIC PLACE: SLIGHT CHANCE OF DOZING
HOW LIKELY ARE YOU TO NOD OFF OR FALL ASLEEP WHILE WATCHING TV: WOULD NEVER DOZE
HOW LIKELY ARE YOU TO NOD OFF OR FALL ASLEEP WHILE LYING DOWN TO REST IN THE AFTERNOON WHEN CIRCUMSTANCES PERMIT: MODERATE CHANCE OF DOZING

## 2025-03-02 NOTE — TELEPHONE ENCOUNTER
Last Written Prescription:  3/14/24  90: 3  ----------------------  Last Visit Date: 1/27/25  Future Visit Date: 4/14/25  ----------------------    Refill decision: Medication refilled per  Medication Refill in Ambulatory Care  policy. * the sig matches    Request from pharmacy:  Requested Prescriptions   Pending Prescriptions Disp Refills    finasteride (PROSCAR) 5 MG tablet [Pharmacy Med Name: Finasteride 5 MG Oral Tablet] 90 tablet 0     Sig: Take 1 tablet by mouth once daily       BPH Agents Failed - 3/2/2025 12:34 AM        Failed - Medication is active on med list and the sig matches. RN to manually verify dose and sig if red X/fail.     If the protocol passes (green check), you do not need to verify med dose and sig.    A prescription matches if they are the same clinical intention.    For Example: once daily and every morning are the same.    For all fails (red x), verify dose and sig.    If the refill does match what is on file, the RN can still proceed to approve the refill request.     If they do not match, route to the appropriate provider.             Passed - Recent (12 mo) or future (90 days) visit within the authorizing provider's department     The patient must have completed an in-person or virtual visit within the past 12 months or has a future visit scheduled within the next 90 days with the authorizing provider s specialty.  Urgent care and e-visits do not qualify as an office visit for this protocol.          Passed - Medication indicated for associated diagnosis     Medication is associated with one or more of the following diagnoses:     Benign prostatic hyperplasia   Male pattern alopecia   Nocturia          Passed - Patient is 18 years of age or older

## 2025-03-03 ENCOUNTER — OFFICE VISIT (OUTPATIENT)
Dept: NEUROLOGY | Facility: CLINIC | Age: 70
End: 2025-03-03
Attending: NURSE PRACTITIONER
Payer: MEDICARE

## 2025-03-03 ENCOUNTER — PRE VISIT (OUTPATIENT)
Dept: NEUROLOGY | Facility: CLINIC | Age: 70
End: 2025-03-03

## 2025-03-03 VITALS — OXYGEN SATURATION: 98 % | DIASTOLIC BLOOD PRESSURE: 78 MMHG | SYSTOLIC BLOOD PRESSURE: 120 MMHG | HEART RATE: 74 BPM

## 2025-03-03 DIAGNOSIS — R09.A2 GLOBUS SENSATION: ICD-10-CM

## 2025-03-03 DIAGNOSIS — G44.86 CERVICOGENIC HEADACHE: Primary | ICD-10-CM

## 2025-03-03 DIAGNOSIS — K22.70 BARRETT'S ESOPHAGUS WITHOUT DYSPLASIA: ICD-10-CM
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
## 2025-03-03 NOTE — NURSING NOTE
Completed a all night titration PSG per provider order.    Preliminary AHI 28.  A final therapeutic PAP pressure was achieved.    Supine REM was seen on therapeutic pressure.    Patient reports feeling refreshed in AM.
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
## 2025-03-04 RX ORDER — OMEPRAZOLE 20 MG/1
20 CAPSULE, DELAYED RELEASE ORAL 2 TIMES DAILY
Qty: 60 CAPSULE | Refills: 3 | Status: SHIPPED | OUTPATIENT
Start: 2025-03-04

## 2025-03-04 NOTE — TELEPHONE ENCOUNTER
Had voicemail from pt asking if he had left his coat here on night of his sleep study (Sun 3/2/25) Each room was searched and nothing was found at  or MA/tech area. LVM informing pt that coat was not found and to call me back directly if they had further questions.    Cate Hernandez    [facility]

## 2025-03-06 ENCOUNTER — HOSPITAL ENCOUNTER (OUTPATIENT)
Dept: CT IMAGING | Facility: CLINIC | Age: 70
Discharge: HOME | End: 2025-03-06
Payer: MEDICARE

## 2025-03-06 DIAGNOSIS — J01.41 ACUTE RECURRENT PANSINUSITIS: ICD-10-CM

## 2025-03-06 PROCEDURE — 70486 CT MAXILLOFACIAL W/O DYE: CPT

## 2025-03-13 LAB — SLPCOMP: NORMAL

## 2025-03-17 ENCOUNTER — THERAPY VISIT (OUTPATIENT)
Dept: PHYSICAL THERAPY | Facility: CLINIC | Age: 70
End: 2025-03-17
Attending: PSYCHIATRY & NEUROLOGY
Payer: MEDICARE

## 2025-03-17 DIAGNOSIS — G44.86 CERVICOGENIC HEADACHE: ICD-10-CM

## 2025-03-17 PROCEDURE — 97140 MANUAL THERAPY 1/> REGIONS: CPT | Mod: GP | Performed by: PHYSICAL THERAPIST

## 2025-03-17 PROCEDURE — 97161 PT EVAL LOW COMPLEX 20 MIN: CPT | Mod: GP | Performed by: PHYSICAL THERAPIST

## 2025-03-17 PROCEDURE — 97010 HOT OR COLD PACKS THERAPY: CPT | Mod: GP | Performed by: PHYSICAL THERAPIST

## 2025-03-17 PROCEDURE — 97110 THERAPEUTIC EXERCISES: CPT | Mod: GP | Performed by: PHYSICAL THERAPIST
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
## 2025-03-23 ASSESSMENT — SLEEP AND FATIGUE QUESTIONNAIRES
HOW LIKELY ARE YOU TO NOD OFF OR FALL ASLEEP WHILE LYING DOWN TO REST IN THE AFTERNOON WHEN CIRCUMSTANCES PERMIT: MODERATE CHANCE OF DOZING
HOW LIKELY ARE YOU TO NOD OFF OR FALL ASLEEP WHILE SITTING AND TALKING TO SOMEONE: WOULD NEVER DOZE
HOW LIKELY ARE YOU TO NOD OFF OR FALL ASLEEP WHILE SITTING QUIETLY AFTER LUNCH WITHOUT ALCOHOL: SLIGHT CHANCE OF DOZING
HOW LIKELY ARE YOU TO NOD OFF OR FALL ASLEEP WHILE WATCHING TV: WOULD NEVER DOZE
HOW LIKELY ARE YOU TO NOD OFF OR FALL ASLEEP WHILE SITTING AND READING: SLIGHT CHANCE OF DOZING
HOW LIKELY ARE YOU TO NOD OFF OR FALL ASLEEP IN A CAR, WHILE STOPPED FOR A FEW MINUTES IN TRAFFIC: WOULD NEVER DOZE
HOW LIKELY ARE YOU TO NOD OFF OR FALL ASLEEP WHEN YOU ARE A PASSENGER IN A CAR FOR AN HOUR WITHOUT A BREAK: MODERATE CHANCE OF DOZING
HOW LIKELY ARE YOU TO NOD OFF OR FALL ASLEEP WHILE SITTING INACTIVE IN A PUBLIC PLACE: WOULD NEVER DOZE

## 2025-03-24 ENCOUNTER — OFFICE VISIT (OUTPATIENT)
Dept: SLEEP MEDICINE | Facility: CLINIC | Age: 70
End: 2025-03-24
Payer: MEDICARE

## 2025-03-24 ENCOUNTER — TELEPHONE (OUTPATIENT)
Dept: SLEEP MEDICINE | Facility: CLINIC | Age: 70
End: 2025-03-24

## 2025-03-24 VITALS
RESPIRATION RATE: 20 BRPM | DIASTOLIC BLOOD PRESSURE: 89 MMHG | WEIGHT: 208.2 LBS | HEIGHT: 71 IN | BODY MASS INDEX: 29.15 KG/M2 | SYSTOLIC BLOOD PRESSURE: 132 MMHG | OXYGEN SATURATION: 98 % | HEART RATE: 74 BPM

## 2025-03-24 DIAGNOSIS — G47.33 OSA (OBSTRUCTIVE SLEEP APNEA): Primary | ICD-10-CM

## 2025-03-24 DIAGNOSIS — G47.10 HYPERSOMNIA: ICD-10-CM

## 2025-03-24 DIAGNOSIS — G47.61 PERIODIC LIMB MOVEMENT: ICD-10-CM

## 2025-03-24 PROCEDURE — 99214 OFFICE O/P EST MOD 30 MIN: CPT | Performed by: INTERNAL MEDICINE

## 2025-03-24 PROCEDURE — 3079F DIAST BP 80-89 MM HG: CPT | Performed by: INTERNAL MEDICINE

## 2025-03-24 PROCEDURE — 1125F AMNT PAIN NOTED PAIN PRSNT: CPT | Performed by: INTERNAL MEDICINE

## 2025-03-24 PROCEDURE — G2211 COMPLEX E/M VISIT ADD ON: HCPCS | Performed by: INTERNAL MEDICINE

## 2025-03-24 PROCEDURE — 3075F SYST BP GE 130 - 139MM HG: CPT | Performed by: INTERNAL MEDICINE

## 2025-03-24 NOTE — PROGRESS NOTES
Additional 10 minutes on the date of service was spent performing the following:    -Preparing to see the patient  -Ordering medications, tests, or procedures   -Documenting clinical information in the electronic or other health record     Thank you for the opportunity to participate in the care of Jose Angel Cha.     He is a 69 year old  male patient who comes to the sleep medicine clinic for review of his titration sleep study results. The patient was diagnosed with LISBET on 05/19/2016 (AHI=13). The patient's titration study was performed on  03/02/2025. Optimal pressure was achieved with a CPAP pressure of 7 CWP.  The patient was also found to have periodic limb movement in sleep.    Assessment and Plan:  In summary Jose Angel Cha is a 69 year old year old male here for review of sleep study results.  1. Obstructive Sleep Apnea/Hypersomnia  We had an extensive conversation to review the results of his sleep study and to  him on the importance of treating sleep apnea. We discussed the options of treatment with oral appliance versus CPAP. Patient decided to proceed with CPAP. He will start using the device as soon as he receives it with the intention to use if for the entire night. We discussed some tips to increase PAP tolerance as well as the normal curve of adaptation. CPAP is going to provide improved respiratory function during the night but it can cause some sleep disruption that tends to improve with continuous usage. He should schedule for return to the clinic after sufficient usage of PAP to review compliance and efficacy monitoring. Since the patient does not have any preference, we will use [employer]formerly [employer]) as the durable medical equipment company.   - COMPREHENSIVE DME    2. Periodic limb movement  Most likely will resolve after optimal pressure therapy.    Sleep-Wake Cycle:    The patient likes to initiate sleep at around 11:30 PM with a sleep latency of 15 minutes. The patient has 3-4  nocturnal awakenings. Final wake up time is around 8-10 AM.    MARTHA:  MARTHA Total Score: (Patient-Rptd) 20  Total score - [place]: (Patient-Rptd) 6 (3/23/2025  5:39 PM)    Patient Active Problem List   Diagnosis    Hearing loss    Lumbago    Family history of ischemic heart disease    Benign localized prostatic hyperplasia with lower urinary tract symptoms (LUTS)    Meniere disease    Pain in joint involving shoulder region    Mixed hyperlipidemia    Other symptoms involving nervous and musculoskeletal systems(781.99)    Dizziness and giddiness    Dupuytren's contracture    House dust mite allergy    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 sting    LISBET (obstructive sleep apnea) AHI 13.8    Venom-induced anaphylaxis    Coronary artery disease involving native coronary artery of native heart without angina pectoris    Need for SBE (subacute bacterial endocarditis) prophylaxis    Benign essential hypertension    Subclinical hypothyroidism    Cardenas's esophagus without dysplasia    Allergic rhinitis due to animal dander    Chronic seasonal allergic rhinitis due to pollen    Grade II internal hemorrhoids    Need for desensitization to allergens    Basilic vein thrombosis    Greater trochanteric bursitis of both hips    Impingement syndrome of both shoulders    Rib pain       Current Outpatient Medications   Medication Sig Dispense Refill    alirocumab (PRALUENT) 75 MG/ML injectable pen Inject 1 mL (75 mg) Subcutaneous every 14 days 75 mL 11    amLODIPine (NORVASC) 2.5 MG tablet Take 1 tablet (2.5 mg) by mouth daily. 90 tablet 3    ASPIRIN 81 MG OR TABS ONE DAILY 0 0    CALCIUM PO       EPINEPHrine (ANY BX GENERIC EQUIV) 0.3 MG/0.3ML injection 2-pack Inject 0.3 mLs (0.3 mg) into the muscle as needed for anaphylaxis 0.6 mL 1    famotidine (PEPCID) 40 MG tablet TAKE 1 TABLET BY MOUTH NIGHTLY AS NEEDED HEARTBURN 30 tablet 0    finasteride (PROSCAR) 5 MG tablet Take 1 tablet by mouth once daily 90 tablet 3    fluticasone (FLONASE) 50 MCG/ACT nasal  "spray USE 2 SPRAY(S) IN THE AFFECTED NOSTRIL ONCE DAILY      ipratropium (ATROVENT) 0.06 % nasal spray Spray 2 sprays into both nostrils 2 times daily. 15 mL 0    Multiple Vitamins-Minerals (MULTIVITAL PO) Take 1 tablet by mouth daily Reported on 3/22/2017      nortriptyline (PAMELOR) 10 MG capsule Take 1-2 tabs po q hs 60 capsule 1    olopatadine (PATADAY) 0.2 % ophthalmic solution 0.05 mLs (1 drop) daily      omeprazole (PRILOSEC) 20 MG DR capsule Take 1 capsule by mouth twice daily 60 capsule 3    psyllium (METAMUCIL/KONSYL) Packet Take 1 packet by mouth daily      sildenafil (VIAGRA) 25 MG tablet Take 1 tablet (25 mg) by mouth daily as needed (ED). 30 tablet 4    trospium (SANCTURA XR) 60 MG CP24 24 hr capsule Take 1 capsule (60 mg) by mouth every morning. 90 capsule 3       Allergies   Allergen Reactions    Anesthetic Ether     Bee Venom     Demerol Visual Disturbance    Statin [Statins] Other (See Comments)     Muscle pain       Physical Exam:  /89   Pulse 74   Resp 20   Ht 1.791 m (5' 10.51\")   Wt 94.4 kg (208 lb 3.2 oz)   SpO2 98%   BMI 29.44 kg/m    BMI:Body mass index is 29.44 kg/m .   GEN: NAD,   Head: Normocephalic.  EYES: EOMI  Psych: normal mood, normal affect     Labs/Studies:  - We reviewed the results of the overnight study as described on the HPI.     Recent Results (from the past 744 hours)   CT Sinus w/o Contrast    Narrative    EXAM: CT SINUS W/O CONTRAST  LOCATION: [facility]  DATE: 3/6/2025    INDICATION:  Acute recurrent pansinusitis  COMPARISON: None.  TECHNIQUE: Routine without contrast. Multiplanar reformats. Dose reduction techniques were used.    FINDINGS: Bilateral maxillary sinuses, ostiomeatal units, sphenoid sinus, sphenoid ostia, and ethmoid air cells are clear. Trace mucosal thickening of the inferior frontal sinuses bilaterally. Trace mucosal thickening in the frontal recesses bilaterally.   No fluid in the mastoid air cells. Right-sided " canal wall up mastoidectomy. Visualized facial soft tissues, orbits, and visualized intracranial contents are unremarkable.      Impression    IMPRESSION:  1.  Trace mucosal thickening in the inferior frontal sinuses bilaterally with trace mucosal thickening in the frontal recesses bilaterally.  2.  Paranasal sinuses are otherwise clear.  3.  Right-sided canal wall up mastoidectomy.           Patient was strongly advised in AVS to avoid driving, operating any heavy machinery or other hazardous situations while drowsy or sleepy. Patient was counseled on the importance of driving while alert, to pull over if drowsy, or nap before getting into the vehicle if sleepy.     Patient verbalized understanding of these issues, agrees with the plan and all questions were answered today. Patient was given an opportuntity to voice any other symptoms or concerns not listed above. Patient did not have any other symptoms or concerns.      Lenny Patel DO  Board Certified in Internal Medicine and Sleep Medicine      (Note created with Dragon voice recognition and unintended spelling errors and word substitutions may occur)

## 2025-03-24 NOTE — TELEPHONE ENCOUNTER
I called to let patient know that his two sleep studies from 2016 and 2017 does not meet medicare requirements for a cpap machine. I explained that his 2016 diagnostic sleep study was scored at 3%, which Medicare requires a 4% scoring. I also explained that his HST done with his dental device in 2017, his AHI was 2.5 and needs to be at least 5 to qualify. Lastly, I explained that the recent [facility] sleep study he had on 3/2/25 is a type of sleep study to find optimal CPAP pressures and is not a diagnostic study to diagnose sleep apnea. I let patient know I will relay the same message to his sleep provider and advised patient to contact the sleep clinic to schedule a new sleep study if he does not hear from them in 2 days.

## 2025-03-24 NOTE — NURSING NOTE
"Chief Complaint   Patient presents with    Study Results       Initial /89   Pulse 74   Resp 20   Ht 1.791 m (5' 10.51\")   Wt 94.4 kg (208 lb 3.2 oz)   SpO2 98%   BMI 29.44 kg/m   Estimated body mass index is 29.44 kg/m  as calculated from the following:    Height as of this encounter: 1.791 m (5' 10.51\").    Weight as of this encounter: 94.4 kg (208 lb 3.2 oz).    Medication Reconciliation: complete    Neck circumference: 18.1 inches / 46 centimeters.    DME: N/A    Ag Rodriguez CMA on 3/24/2025 at 9:16 AM      "

## 2025-03-24 NOTE — PATIENT INSTRUCTIONS
Please do not drive drowsy under any circumstances.  If you find yourself in a situation in which you are driving and feeling sleepy, please pull over right away in a safe place and take a quick nap before getting back on the road.  Equipment Instructions    We will process your PAP order and send it to a Durable Medical Equipment (DME) provider.    The medical equipment company should call you within 7 days.  If you have not heard from the company, please contact them to see if they received your order and are planning to call you.    Please call us at [phone] if you are unable to contact the medical equipment company or if they do not have the order.    If you are starting a new PAP machine, please call us after you use it the first night to let us know how it went. This call also helps us know that you received your equipment and that everything is ready. Please use our central phone number [phone]    Contact information for [employer] company:    [employer] [facility] Tel: [phone]

## 2025-04-10 ENCOUNTER — THERAPY VISIT (OUTPATIENT)
Dept: PHYSICAL THERAPY | Facility: CLINIC | Age: 70
End: 2025-04-10
Attending: PSYCHIATRY & NEUROLOGY
Payer: MEDICARE

## 2025-04-10 DIAGNOSIS — G44.86 CERVICOGENIC HEADACHE: Primary | ICD-10-CM

## 2025-04-10 PROCEDURE — 97140 MANUAL THERAPY 1/> REGIONS: CPT | Mod: GP | Performed by: PHYSICAL THERAPIST

## 2025-04-10 PROCEDURE — 97010 HOT OR COLD PACKS THERAPY: CPT | Mod: GP | Performed by: PHYSICAL THERAPIST

## 2025-04-10 PROCEDURE — 97110 THERAPEUTIC EXERCISES: CPT | Mod: GP | Performed by: PHYSICAL THERAPIST

## 2025-04-14 ENCOUNTER — THERAPY VISIT (OUTPATIENT)
Dept: PHYSICAL THERAPY | Facility: CLINIC | Age: 70
End: 2025-04-14
Attending: PSYCHIATRY & NEUROLOGY
Payer: MEDICARE

## 2025-04-14 DIAGNOSIS — G44.86 CERVICOGENIC HEADACHE: Primary | ICD-10-CM

## 2025-04-14 PROCEDURE — 97010 HOT OR COLD PACKS THERAPY: CPT | Mod: GP | Performed by: PHYSICAL THERAPIST

## 2025-04-14 PROCEDURE — 97140 MANUAL THERAPY 1/> REGIONS: CPT | Mod: GP | Performed by: PHYSICAL THERAPIST

## 2025-04-23 DIAGNOSIS — I25.10 CORONARY ARTERY DISEASE INVOLVING NATIVE CORONARY ARTERY OF NATIVE HEART WITHOUT ANGINA PECTORIS: ICD-10-CM

## 2025-04-23 NOTE — TELEPHONE ENCOUNTER
Reason for Call:  Other call back    Detailed comments: pts pharmacy calling to request refill of alirocumab (PRALUENT) 75 MG/ML injectable pen be sent to    [facility] [address] [address] [facility]    Stated they sent refill request electronically over 1 week ago but I was unable to find documention for this        Phone Number Patient can be reached at: Other phone number:  [phone]     Best Time: any    Can we leave a detailed message on this number? YES    Call taken on 4/23/2025 at 4:31 PM by Nahed Cox

## 2025-04-24 NOTE — TELEPHONE ENCOUNTER
Medication refilled. Patient has follow up visit with Dr. Gtz on 8/13/25 with echocardiogram on 8/11/25.

## 2025-04-28 ENCOUNTER — THERAPY VISIT (OUTPATIENT)
Dept: PHYSICAL THERAPY | Facility: CLINIC | Age: 70
End: 2025-04-28
Attending: PSYCHIATRY & NEUROLOGY
Payer: MEDICARE

## 2025-04-28 DIAGNOSIS — G44.86 CERVICOGENIC HEADACHE: Primary | ICD-10-CM

## 2025-04-28 PROCEDURE — 97010 HOT OR COLD PACKS THERAPY: CPT | Mod: GP | Performed by: PHYSICAL THERAPIST

## 2025-04-28 PROCEDURE — 97140 MANUAL THERAPY 1/> REGIONS: CPT | Mod: GP | Performed by: PHYSICAL THERAPIST

## 2025-04-28 PROCEDURE — 97110 THERAPEUTIC EXERCISES: CPT | Mod: GP | Performed by: PHYSICAL THERAPIST

## 2025-05-03 ENCOUNTER — TELEPHONE (OUTPATIENT)
Dept: PALLIATIVE MEDICINE | Facility: CLINIC | Age: 70
End: 2025-05-03
Payer: MEDICARE

## 2025-05-03 DIAGNOSIS — G58.8 INTERCOSTAL NEURALGIA: Primary | ICD-10-CM
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
## 2025-05-05 NOTE — TELEPHONE ENCOUNTER
Patient requesting to be scheduled for: right intercostal nerve block    The following red flags noted on patient screening:   Anticoagulation / aspirin - aspirin   -No hold needed    Injection imaging orders Placed    Scheduled:  5/13    Routing:  N/A    Christine Rich RN

## 2025-05-07 DIAGNOSIS — M19.011 PRIMARY OSTEOARTHRITIS OF RIGHT SHOULDER: Primary | ICD-10-CM
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
## 2025-05-08 ENCOUNTER — OFFICE VISIT (OUTPATIENT)
Dept: ORTHOPEDICS | Facility: CLINIC | Age: 70
End: 2025-05-08
Payer: MEDICARE

## 2025-05-08 ENCOUNTER — OFFICE VISIT (OUTPATIENT)
Dept: PODIATRY | Facility: CLINIC | Age: 70
End: 2025-05-08
Payer: MEDICARE

## 2025-05-08 ENCOUNTER — TELEPHONE (OUTPATIENT)
Dept: ORTHOPEDICS | Facility: CLINIC | Age: 70
End: 2025-05-08

## 2025-05-08 VITALS — HEIGHT: 70 IN | BODY MASS INDEX: 29.56 KG/M2 | WEIGHT: 206.5 LBS

## 2025-05-08 DIAGNOSIS — M77.42 METATARSALGIA OF BOTH FEET: Primary | ICD-10-CM

## 2025-05-08 DIAGNOSIS — M77.41 METATARSALGIA OF BOTH FEET: Primary | ICD-10-CM

## 2025-05-08 DIAGNOSIS — M19.011 PRIMARY OSTEOARTHRITIS OF RIGHT SHOULDER: Primary | ICD-10-CM

## 2025-05-08 DIAGNOSIS — I87.8 VENOUS STASIS: ICD-10-CM

## 2025-05-08 ASSESSMENT — PAIN SCALES - GENERAL: PAINLEVEL_OUTOF10: MILD PAIN (2)

## 2025-05-08 NOTE — TELEPHONE ENCOUNTER
Reason for Call:  Other appointment    Detailed comments: Patient to schedule with Ortho surgeon, patient mentioned Dr. Diaz but Dr. Saldaña's notes say shoulder replacement surgeon. Should this be with Dr. Ross?     Phone Number Patient can be reached at: Home number on file [phone] (home)    Best Time: any    Can we leave a detailed message on this number? YES    Call taken on 5/8/2025 at 2:42 PM by Shari Mustafa
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
## 2025-05-08 NOTE — TELEPHONE ENCOUNTER
Dr. Saldaña conversed with Dr. Diaz after time of patient's appointment. Please schedule with Dr. Ross. Ramandeep Orellana, ATC

## 2025-05-08 NOTE — TELEPHONE ENCOUNTER
It was supposed to be honeybee venom skin test.  He needs to reschedule.    Saud Delcid MD     PRE-OPERATIVE NOTE     Chief Complaint had concerns including Pre-Op Exam (Plastic surgery 5/29/25 by Dr. Panfilo Bernal to remove bags under eyes).  Surgery Date: 5/28/25  Planned Surgery:  removal    of   bag   unders  eyes    bilaterally    Pre-op Diagnosis:   bilateral  eye    bags    Requesting Surgeon:Dr. Bernal      Subjective   BRIEF HISTORY LEADING to SURGERY: Nilton Saba is a 73 year old male here for pre-operative anesthesia consult for surgery as requested by the surgeon. Dr. Bernal      Review of Systems   Constitutional:  Negative for appetite change, chills and fever.   HENT:  Negative for sore throat and trouble swallowing.    Eyes:  Negative for visual disturbance.   Respiratory:  Negative for cough and shortness of breath.    Cardiovascular:  Negative for chest pain and palpitations.   Gastrointestinal:  Negative for abdominal pain, blood in stool, nausea and vomiting.   Endocrine: Negative for cold intolerance.   Genitourinary:  Negative for dysuria and hematuria.   Musculoskeletal:  Negative for back pain.   Skin:  Negative for rash.   Allergic/Immunologic: Negative for environmental allergies.   Neurological:  Negative for dizziness, syncope and headaches.   Hematological:  Does not bruise/bleed easily.   Psychiatric/Behavioral: Negative.           Objective   PHYSICAL EXAM  Vitals:    05/08/25 1011   BP: 119/64   Pulse: (!) 58   Resp: 20   SpO2: 99%   Weight: 83.5 kg (184 lb 1.4 oz)   Height: 5' 10\" (1.778 m)   BMI (Calculated): 26.41     Physical Exam        RESULTS REVIEW         Most Recent Na+  140 (4/24/2024)  K+  4.6 (4/24/2024)   Glucose  102 (4/24/2024)  GFR  68 (4/24/2024)        Most Recent WBC  3.3 (8/3/2022)  HGB  13.5 (8/3/2022)     Platelet  171 (8/3/2022)   INR  no-result      The last White Blood Cell count was abnormal at 3.3 (8/3/2022)  (Normal 4.2 - 11.0 K/mcL).       PAST MEDICAL HISTORIES     Medications: has a current medication list which includes the following  prescription(s): atorvastatin and aspirin.    Problem List: has Chronic right ear pain; Decreased hearing of right ear; Essential hypertension, benign; Encounter for laboratory test; Coronary artery disease with stable angina pectoris (CMD); Screening for colon cancer; Refused influenza vaccine; Health care maintenance; Screening for prostate cancer; Bunion of great toe of left foot; Vaccine refused by patient; Testicular pain, left; Neutropenia (CMD); Hypertensive kidney disease with stage 3 chronic kidney disease  (CMD); Testicular pain, left; Medicare annual wellness visit, subsequent; Hyperlipidemia; Screen for STD (sexually transmitted disease); Disorder of skin and subcutaneous tissue; and Pre-operative examination on their problem list.    Medical:  has a past medical history of CAD, multiple vessel (2013), Colon polyp, Hyperlipidemia (15 year ago), Hypertension (2013), and Myocardial infarction  (CMD).    He has no past medical history of Difficult intubation, Failed moderate sedation during procedure, Malignant hyperthermia, Motion sickness, or PONV (postoperative nausea and vomiting).    Surgical:  has a past surgical history that includes Colonoscopy.    Family: family history includes Coronary Artery Disease in his brother; Heart disease in his mother and sister.    Social:  reports that he has never smoked. He has never used smokeless tobacco. He reports current alcohol use. He reports that he does not use drugs.    Allergies: has no known allergies.    SURGICAL RISK AND SCREENINGS     Family History Risks  Adverse Reaction to Anesthesia: No  Bleeding Problems: No  Malignant Hyperthermia: No    Personal Surgical History  Anesthetic Complications: No  Bleeding Problems: No  Problems with Surgery: No       Malnutrition Screening Tool Current Weight 184 lbs  Have you recently lost weight without trying? No  Have you been eating poorly because of a decreased appetite? No  Score = 0, Not at Risk        Functional Capacity  Are you able to do light housework, dusting, wash dishes, climb a flight of stairs or walk up a hill without chest pain or problems breathing (>4 METS)? Yes    Revised Cardiac Risk Index  High Risk Surgery  (Intraperitoneal; intrathoracic; suprainguinal vascular) No   History of Ischemic Heart Disease or Cardiac Chest Pain Yes; Coronary Artery Disease With Stable Angina Pectoris (Cmd)   Congestive Heart Failure No;     History of Stroke or TIA No   Last Creatinine >2 No; 1.14 g/dL (04/24/2024)   Prescribed Insulin No   Estimated Risk of a Major Cardiac Complication 1 risk factor = 6.0%       Acute Kidney Injury Prevention:  BRODERICK Risk is Medium based on criteria below:  Chronic Kidney Disease  Atherosclerotic Heart Disease  Age >65  Recommendations  - 24 hours before surgery hold ACE-I/ARB, Diuretics and Potassium  - 5 days before surgery hold NSAIDs    Advance care planning documents on file - no     ASSESSMENT AND PLAN        1. Pre-operative examination  -     CBC with Automated Differential; Future  -     Basic Metabolic Panel; Future  -     Electrocardiogram 12-Lead  2. Disorder of skin and subcutaneous tissue  3. Essential hypertension, benign  4. Pure hypercholesterolemia  Other orders  -     atorvastatin (LIPITOR) 40 MG tablet; Take 1 tablet by mouth daily.    Preop Optimization  - OPTIMIZED for SURGERY: YES patient is medically optimized for surgery. Ecg      done   5/8/25    Sinus    bradycardia  with  sinus   arrhythmia  unchanged   from   ecg   done  7/20/23   - Workup reviewed and/or ordered: See Orders  - Pre-Op management of pacemaker, dialysis or steroids: not needed.  - Post-Op DVT prophylaxis: per surgical team  - Smoking Status: Never Smoker  .     Pertinent Conditions     #Hypertensive Kidney Disease With Stage 3 Chronic Kidney Disease (Cmd) today's blood pressure was /64    #Hypertensive Kidney Disease With Stage 3 Chronic Kidney Disease (Cmd) the last eGFR was 68  (4/24/2024)    #Coronary Artery Disease With Stable Angina Pectoris (Cmd)        Before Surgery/Procedure Hold (Do Not Take) these Medications  Medications and Supplements:  - Morning of surgery hold any Vitamins AND for 2 weeks prior hold any Vitamin E or Herbals     Anticoagulation:    - 7 days before high bleeding risk surgery hold (aspirin 81 MG chewable tablet [[id number]])    Antidiabetic: none on med list    BRODERICK Risk (Diuretics, ACE-I/ARB, NSAIDs): none on med list      Continue all other medications unless an alternative plan was advised with the surgeon and/or specialist.      Return in about 6 months (around 11/8/2025), or if symptoms worsen or fail to improve.    Electronically signed by: Aramis Forrester MD  Copy sent to:  Dr. Panfilo Bernal

## 2025-05-09 ENCOUNTER — THERAPY VISIT (OUTPATIENT)
Dept: SLEEP MEDICINE | Facility: CLINIC | Age: 70
End: 2025-05-09
Attending: INTERNAL MEDICINE
Payer: MEDICARE

## 2025-05-09 DIAGNOSIS — G47.33 OSA (OBSTRUCTIVE SLEEP APNEA): ICD-10-CM

## 2025-05-13 ENCOUNTER — ANCILLARY PROCEDURE (OUTPATIENT)
Dept: GENERAL RADIOLOGY | Facility: CLINIC | Age: 70
End: 2025-05-13
Attending: ORTHOPAEDIC SURGERY
Payer: MEDICARE

## 2025-05-13 ENCOUNTER — TELEPHONE (OUTPATIENT)
Dept: ORTHOPEDICS | Facility: CLINIC | Age: 70
End: 2025-05-13

## 2025-05-13 ENCOUNTER — OFFICE VISIT (OUTPATIENT)
Dept: ORTHOPEDICS | Facility: CLINIC | Age: 70
End: 2025-05-13
Attending: STUDENT IN AN ORGANIZED HEALTH CARE EDUCATION/TRAINING PROGRAM
Payer: MEDICARE

## 2025-05-13 ENCOUNTER — RADIOLOGY INJECTION OFFICE VISIT (OUTPATIENT)
Dept: PALLIATIVE MEDICINE | Facility: CLINIC | Age: 70
End: 2025-05-13
Attending: NURSE PRACTITIONER
Payer: MEDICARE

## 2025-05-13 VITALS — OXYGEN SATURATION: 98 % | SYSTOLIC BLOOD PRESSURE: 144 MMHG | DIASTOLIC BLOOD PRESSURE: 99 MMHG | HEART RATE: 74 BPM

## 2025-05-13 VITALS — WEIGHT: 204 LBS | RESPIRATION RATE: 20 BRPM | HEIGHT: 70 IN | BODY MASS INDEX: 29.2 KG/M2

## 2025-05-13 DIAGNOSIS — M19.011 PRIMARY OSTEOARTHRITIS OF RIGHT SHOULDER: ICD-10-CM

## 2025-05-13 DIAGNOSIS — M19.011 PRIMARY OSTEOARTHRITIS OF RIGHT SHOULDER: Primary | ICD-10-CM

## 2025-05-13 DIAGNOSIS — G58.8 INTERCOSTAL NEURALGIA: ICD-10-CM

## 2025-05-13 PROCEDURE — 99214 OFFICE O/P EST MOD 30 MIN: CPT | Performed by: ORTHOPAEDIC SURGERY

## 2025-05-13 PROCEDURE — 73030 X-RAY EXAM OF SHOULDER: CPT | Mod: TC | Performed by: RADIOLOGY

## 2025-05-13 RX ORDER — DEXAMETHASONE SODIUM PHOSPHATE 10 MG/ML
10 INJECTION, SOLUTION INTRAMUSCULAR; INTRAVENOUS ONCE
Status: COMPLETED | OUTPATIENT
Start: 2025-05-13 | End: 2025-05-13

## 2025-05-13 RX ADMIN — DEXAMETHASONE SODIUM PHOSPHATE 10 MG: 10 INJECTION, SOLUTION INTRAMUSCULAR; INTRAVENOUS at 10:13

## 2025-05-13 ASSESSMENT — PAIN SCALES - GENERAL
PAINLEVEL_OUTOF10: MODERATE PAIN (4)
PAINLEVEL_OUTOF10: MILD PAIN (3)

## 2025-05-13 NOTE — PROGRESS NOTES
Pre procedure Diagnosis: intercostal neuralgia   Post procedure Diagnosis: Same  Procedure performed: Right T7,8,9 intercostal nerve blocks  Anesthesia: none  Complications: none  Operators: Taco Briceño MD      Indications:   Jose Angel Cha is a 69 year old male was sent for right intercostal nerve blocks.  They have a history of right rib pain.  Exam shows neg allodynia and they have tried conservative treatment including meds/pt/injections with benefit .     Options/alternatives, benefits and risks were discussed with the patient including bleeding, infection, no pain relief, tissue trauma, exposure to radiation, reaction to medications including seizure, spinal cord injury, weakness, numbness, and lung injury including pneumothorax.  Questions were answered to his satisfaction and he agrees to proceed. Voluntary informed consent was obtained and signed.      Vitals were reviewed: Yes  Allergies were reviewed:  Yes   Medications were reviewed:  Yes   Pre-procedure pain score: 3/10     Procedure:  After getting informed consent, patient was brought into the procedure suite and was placed in a prone position on the procedure table.   A Pause for the Cause was performed.  Patient was prepped and draped in sterile fashion.      The location of maximal pain was determined to be at T7,78,9 on the right. At a location several centimeters lateral from the spine, injections were completed. Using a 30G 1 inch needle, lidocaine 1% was used at the for locations (bilateral blocks) to anesthetize the skin. A 25G 2.5 inch needle was advanced with a cephalad tilt, to touch down on the anterior aspect of the rib. Intermittent fluoroscopy in both the AP and lateral projection was used to verify location superficial to lung tissue. After touching down on the rib, the needle was walked off inferiorly, into the area of the neurovascular bundle. Advancement was made 1-2mm at a time.   Omnipaque was injected. There was no evidence  of vascular uptake or spread towards the spinal canal. 1ml of contrast was injected.  9ml was wasted.  In total, 5ml of ropivacaine 0.2%, and 10mg of dexamethasone as injected. .     Hemostasis was achieved, the area was cleaned, and bandaids were placed when appropriate.  The patient tolerated the procedure well, and was taken to the recovery room.    Images were saved to PACS.     Post-procedure pain score: 2/10  Follow-up includes:   -f/u with referring provider  -consider intercostal ablation      Taco Briceño MD  [facility]

## 2025-05-13 NOTE — TELEPHONE ENCOUNTER
Type of surgery: ARTHROPLASTY, SHOULDER, TOTAL (Right)   Location of surgery: [facility]  Date and time of surgery: 7/9/25  Surgeon: Cody  Pre-Op Appt Date: 7/14  Post-Op Appt Date: 7/22   Packet sent out: Yes sheri dumont per pt   Pre-cert/Authorization completed:  Not Applicable  Date: na  Transferred to CT and PT scheduling

## 2025-05-13 NOTE — NURSING NOTE
Discharge Information    IV Discontiued Time:  NA    Amount of Fluid Infused:  NA    Discharge Criteria = When patient returns to baseline or as per MD order    Consciousness:  Pt is fully awake    Circulation:  BP +/- 20% of pre-procedure level    Respiration:  Patient is able to breathe deeply    O2 Sat:  Patient is able to maintain O2 Sat >92% on room air    Activity:  Moves 4 extremities on command    Ambulation:  Patient is able to stand and walk or stand and pivot into wheelchair    Dressing:  Clean/dry or No Dressing    Notes:   Discharge instructions and AVS given to patient    Patient meets criteria for discharge?  YES    Admitted to PCU?  No    Responsible adult present to accompany patient home?  Yes    Signature/Title:    Christine Rich RN  RN Care Coordinator  [facility] [place]
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
## 2025-05-13 NOTE — PATIENT INSTRUCTIONS
Jose Angel Cha  and I talked about his condition and diagnosis.  Because of severe arthritis, and failure of conservative measures, we have decided to proceed with right  total shoulder arthroplasty.      [facility] Scheduling    [address] [address]    Please call [phone] to schedule this test.     We have talked in depth about the procedure and the risks, which include, but are not limited to:  * blood loss possibly requiring transfusion,   * blood clots with possible pulmonary embolism  * risk of dislocation.    There will be a motion restriction for 4-6 weeks.    Do not reach behind your back, do not reach your hands widely apart, do not push up off a chair or bed.  * infection or wound healing problems  * nerve damage   * vascular circulation problems  * continued pain  * Loosening or wear  * anesthetic risks  * The possibility of needing additional procedures.      We also talked about recovery time, which differs from patient to patient.    Average 1 night in the hospital.  Most people can return home at that point.  Physical Therapy will start at 2 weeks.    Preop xrays have been ordered, and medical clearance will need to be obtained.    Preop physical with primary physician is needed within 30 days of the surgery.  Nothing to eat or drink for 8 hours before surgery.  Stop blood thinners 5 days before surgery (aspirin, warfarin, anti-inflammatories).  .    Surgery schedulers will call you to schedule surgery.  If you don't get a call in the next few days, then call [phone] to schedule for [place].

## 2025-05-13 NOTE — PATIENT INSTRUCTIONS
[facility]   Procedure Discharge Instructions    Today you saw:    Dr. Taco Briceño,         You had a(n):  right intercostal nerve block     Be cautious with activities. Numbness and/or weakness in the upper extremities may occur for up to 6-8 hours after the procedure due to effect of the local anesthetic  Do not drive for 6 hours. The effect of the local anesthetic could slow your reflexes.   You may resume your regular activities after 24 hours  Avoid strenuous activity for the first 24 hours  You may shower, however avoid swimming, tub baths or hot tubs for 24 hours following your procedure  You may have a mild to moderate increase in pain for several days following the injection.  It may take up to 14 days for the steroid medication to start working although you may feel the effect as early as a few days after the procedure.     You may use ice packs for 10-15 minutes, 3 to 4 times a day at the injection site for comfort  Do not use heat to painful areas for 6 to 8 hours. This will give the local anesthetic time to wear off and prevent you from accidentally burning your skin.   Unless you have been directed to avoid the use of anti-inflammatory medications (NSAIDS), you may use medications such as ibuprofen, Aleve or Tylenol for pain control if needed.   If you have diabetes, check your blood sugar more frequently than usual as your blood sugar may be higher than normal for 10-14 days following a steroid injection. Contact your doctor who manages your diabetes if your blood sugar is higher than usual  Possible side effects of steroids that you may experience include flushing, elevated blood pressure, increased appetite, mild headaches and restlessness.  All of these symptoms will get better with time.  If you experience any of the following, call the Pain Clinic during work hours (Mon-Friday 8-4:30 pm) at [phone] or the Provider Line after hours at [phone]:  -Fever  over 100 degree F  -Swelling, bleeding, redness, drainage, warmth at the injection site  -Progressive weakness or numbness in your arms  -Unusual new onset of pain that is not improving
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
## 2025-05-13 NOTE — NURSING NOTE
Pre-procedure Intake  If YES to any questions or NO to having a   Please complete laminated checklist and leave on the computer keyboard for Provider, verbally inform provider if able.    For SCS Trial, RFA's or any sedation procedure:  Have you been fasting? NA  If yes, for how long?     Are you taking any any blood thinners such as Coumadin, Warfarin, Jantoven, Pradaxa Xarelto, Eliquis, Edoxaban, Enoxaparin, Lovenox, Heparin, Arixtra, Fondaparinux, or Fragmin? OR Antiplatelet medication such as Plavix, Brilinta, or Effient?   No   If yes, when did you take your last dose?     Do you take aspirin?  Yes -   ASA  If cervical procedure, have you held aspirin for 6 days?   NA    Is the Pt taking any GLP-1 Antagonist (hold needed for sedation patients only)  (semaglutide (Ozempic, Wegovy), dulaglutide (Trulicity), exenatide ER (Bydureon), tirzepatide (Mounjaro), Liraglutide (Saxenda, Victoza), semaglutide (Rybelsus)     NA  If yes, when did you take your last dose?     Do you have any allergies to contrast dye, iodine, steroid and/or numbing medications?  NO    Are you currently taking antibiotics or have an active infection?  NO    Have you had a fever/elevated temperature within the past week? NO    Are you currently taking oral steroids? NO    Do you have a ? Not Needed    Are you pregnant or breastfeeding?  NO    Have you received any vaccinations in the last week? NO    Notify provider and RNs if systolic BP >170, diastolic BP >100, P >100 or O2 sats < 90%

## 2025-05-28 ENCOUNTER — TELEPHONE (OUTPATIENT)
Dept: DERMATOLOGY | Facility: CLINIC | Age: 70
End: 2025-05-28
Payer: MEDICARE

## 2025-05-28 DIAGNOSIS — L73.9 FOLLICULITIS: Primary | ICD-10-CM

## 2025-05-28 NOTE — TELEPHONE ENCOUNTER
"Reason for Call:  Medication or medication refill:    Do you use a [facility]?  Name of the pharmacy and phone number for the current request:  [facility] [place] - [phone]    Name of the medication requested: clindamycin (CLEOCIN T) 1 % external solution [4729] (Order [id number])     Other request: Davi says he was told by [facility] in [place] that they have \"reached out\" to provider three times on this but have not heard back.     I was not able to locate any previous message. Davi is not in a big hurry but would appreciate if this could be addressed in the near future.     Thank you!     Can we leave a detailed message on this number? YES    Phone number patient can be reached at: Cell number on file:    Telephone Information:   Mobile [phone]       Best Time: any    Call taken on 5/28/2025 at 4:42 PM by Geoff Lawson    "

## 2025-05-29 ENCOUNTER — HOSPITAL ENCOUNTER (OUTPATIENT)
Dept: CT IMAGING | Facility: CLINIC | Age: 70
Discharge: HOME | End: 2025-05-29
Attending: ORTHOPAEDIC SURGERY
Payer: MEDICARE

## 2025-05-29 DIAGNOSIS — M19.011 PRIMARY OSTEOARTHRITIS OF RIGHT SHOULDER: ICD-10-CM

## 2025-05-29 PROCEDURE — 73200 CT UPPER EXTREMITY W/O DYE: CPT | Mod: RT

## 2025-05-29 RX ORDER — CLINDAMYCIN PHOSPHATE 11.9 MG/ML
SOLUTION TOPICAL
Qty: 60 ML | Refills: 4 | Status: SHIPPED | OUTPATIENT
Start: 2025-05-29

## 2025-06-02 ENCOUNTER — THERAPY VISIT (OUTPATIENT)
Dept: PHYSICAL THERAPY | Facility: CLINIC | Age: 70
End: 2025-06-02
Attending: PSYCHIATRY & NEUROLOGY
Payer: MEDICARE

## 2025-06-02 DIAGNOSIS — G44.86 CERVICOGENIC HEADACHE: Primary | ICD-10-CM

## 2025-06-02 PROCEDURE — 97140 MANUAL THERAPY 1/> REGIONS: CPT | Mod: GP | Performed by: PHYSICAL THERAPIST

## 2025-06-02 PROCEDURE — 97010 HOT OR COLD PACKS THERAPY: CPT | Mod: GP | Performed by: PHYSICAL THERAPIST

## 2025-06-02 PROCEDURE — 97110 THERAPEUTIC EXERCISES: CPT | Mod: GP | Performed by: PHYSICAL THERAPIST

## 2025-06-02 NOTE — PROGRESS NOTES
DISCHARGE  Reason for Discharge: Patient has met all goals.    Equipment Issued: home traction     Discharge Plan: Patient to continue home program.    Referring Provider:  Mary Hernandez     06/02/25 0500   Appointment Info   Signing clinician's name / credentials eugene carito PT   Total/Authorized Visits 5 medicare   Medical Diagnosis cervicogenic headache G44.86   PT Tx Diagnosis neck and headaches   Progress Note/Certification   Start of Care Date 03/17/25   Onset of illness/injury or Date of Surgery 03/17/24   Therapy Frequency 1x week x 12 weeks   Certification date from 03/17/25   Certification date to 06/14/25   GOALS   PT Goals 2;3   PT Goal 1   Goal Identifier 1   Goal Description instruction in HEP and compliant with it 5 of 7 days to improve quality of life   Target Date 06/14/25   PT Goal 2   Goal Identifier 2   Goal Description patient to have overall improved tolerance to activity currently NDI 26 and overall decrease pain   Target Date 06/14/25   PT Goal 3   Goal Identifier patient to have reduction in nightly headaches currently 2-3x night wakes due to headache   Target Date 06/14/25   Objective Measure 1   Objective Measure neck pain 0-3/10 headac he 0-7/10 nightly 2-3 x, NDI 26 at eval   Details currently neck pain 0-2/10 headache pain 0-5/10 woke nightly wakes 1-2 x NDI 20   PT Modalities   PT Modalities Cryotherapy   Cryotherapy   Treatment Detail supine hooklying large cervical cold pack with manual cervical intermittent traction and suboccipital release   Patient Response/Progress great after Rx   Treatment Interventions (PT)   Interventions Therapeutic Procedure/Exercise;Manual Therapy   Therapeutic Procedure/Exercise   Therapeutic Procedures: strength, endurance, ROM, flexibility minutes (76119) 15   Ther Proc 1 - Details instruction in HEP and exercise chin tucks and scapular squeezes 10x every 2 hours , be mindul of posture day and night , addedyellow ,  red to progress B UE horizontal  abduction and diagonals   Skilled Intervention instruction in HEP and exercises   Patient Response/Progress improved compliance   Manual Therapy   Manual Therapy: Mobilization, MFR, MLD, friction massage minutes (75743) 20   Manual Therapy 1 - Details supine hooklying large cervical hot pack with manual cervical intermittent traction and suboccipital release   Skilled Intervention manual traction , instructed in farias home cervical traction   Patient Response/Progress instructed in home traction   Education   Learner/Method Patient;Listening;Reading;Demonstration;Pictures/Video;No Barriers to Learning   Plan   Home program instruction in HEP and exercise chin tucks and scapular squeezes 10x every 2 hours , be mindul of posture day and night , addedyellow , red to progress B UE horizontal abduction and diagonals   Updates to plan of care 1x week x12 weeks   Plan for next session discharge to HEP   Total Session Time   Timed Code Treatment Minutes 35   Total Treatment Time (sum of timed and untimed services) 35

## 2025-06-02 NOTE — PROGRESS NOTES
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 ESOPHAGOGASTRODUODENOSCOPY, WITH BIOPSY;  Surgeon: Sherman Hilario MD;  Location: [facility]    ESOPHAGOSCOPY, GASTROSCOPY, DUODENOSCOPY (EGD), COMBINED N/A 12/4/2024    Procedure: Esophagoscopy, gastroscopy, duodenoscopy (EGD), combined with biopsy;  Surgeon: Sherman Hilario MD;  Location: [facility]    GI SURGERY  11/12/2018    HEAD & NECK SURGERY      INJECT EPIDURAL CERVICAL  [facility]/12/2014    [facility]    INJECT TRIGGER POINT Right 10/26/2023    Procedure: Trigger point injections of 3 intercostal muscles of the anterior Thoracic 7, Thoracic 8, Thoracic 9 intercostal muscles;  Surgeon: Magdiel Calderón MD;  Location:  OR    LAPAROSCOPIC HERNIORRHAPHY HIATAL      [place] Fundoplication; [facility] [place]; Dr. Gurpreet Thrasher DO    ORTHOPEDIC SURGERY      REPAIR VALVE MITRAL  04/16/2010    THORACIC SURGERY      TONSILLECTOMY      ZZHC CREATE EARDRUM OPENING,GEN ANESTH  01/29/2009    Right    ZZHC MASTOIDECTOMY,COMPLETE  01/29/2009    Right          Medications: Pertinent medications reviewed.  Current Outpatient Medications   Medication Sig Dispense Refill    alirocumab (PRALUENT) 75 MG/ML injectable pen Inject 1 mL (75 mg) subcutaneously every 14 days. 75 mL 11    amLODIPine (NORVASC) 2.5 MG  tablet Take 1 tablet (2.5 mg) by mouth daily. 90 tablet 3    ASPIRIN 81 MG OR TABS ONE DAILY 0 0    CALCIUM PO       clindamycin (CLEOCIN T) 1 % external solution Apply to scalp 1-2 x daily as needed for flares 60 mL 4    EPINEPHrine (ANY BX GENERIC EQUIV) 0.3 MG/0.3ML injection 2-pack Inject 0.3 mLs (0.3 mg) into the muscle as needed for anaphylaxis 0.6 mL 1    famotidine (PEPCID) 40 MG tablet TAKE 1 TABLET BY MOUTH NIGHTLY AS NEEDED HEARTBURN 30 tablet 0    finasteride (PROSCAR) 5 MG tablet Take 1 tablet by mouth once daily 90 tablet 3    fluticasone (FLONASE) 50 MCG/ACT nasal spray USE 2 SPRAY(S) IN THE AFFECTED NOSTRIL ONCE DAILY      ipratropium (ATROVENT) 0.06 % nasal spray Spray 2 sprays into both nostrils 2 times daily. 15 mL 0    ketamine HCl POWD 2-3 Pump 4 times daily. 120 g 2    Multiple Vitamins-Minerals (MULTIVITAL PO) Take 1 tablet by mouth daily Reported on 3/22/2017      nortriptyline (PAMELOR) 10 MG capsule Take 1-2 tabs po q hs 60 capsule 1    olopatadine (PATADAY) 0.2 % ophthalmic solution 0.05 mLs (1 drop) daily      omeprazole (PRILOSEC) 20 MG DR capsule Take 1 capsule by mouth twice daily 60 capsule 3    psyllium (METAMUCIL/KONSYL) Packet Take 1 packet by mouth daily      sildenafil (VIAGRA) 25 MG tablet Take 1 tablet (25 mg) by mouth daily as needed (ED). 30 tablet 4    trospium (SANCTURA XR) 60 MG CP24 24 hr capsule Take 1 capsule (60 mg) by mouth every morning. 90 capsule 3       [place] Prescription Monitoring Program reviewed 06/2/2025.  No concern for abuse or misuse of controlled medications based on this report.  4/26/2025 Ketamine compounding cream  8/29/2024 Diazepam 5mg 1 tab for 1 day      Allergies: Pertinent allergies reviewed.     Allergies   Allergen Reactions    Anesthetic Ether     Bee Venom     Demerol Visual Disturbance    Statin [Statins] Other (See Comments)     Muscle pain       Family History:   family history includes Breast Cancer in his father; C.A.D. in his brother,  mother, and sister; Cancer in his father; Cholecystitis in his brother; Gallbladder Disease in his brother; Hernia in his brother; Neurologic Disorder in his brother and son; Other Cancer in his father; Parkinsonism in his brother; Sleep Apnea in his brother; Substance Abuse in his brother.    Social History:   He is  and lives in [place], [place].  He has a cabin in [place], [place].  He has 4 children.  He is a retired Christian .  He enjoys fishing.  He  reports that he has quit smoking. His smoking use included cigars. He has never been exposed to tobacco smoke. He has never used smokeless tobacco. He reports that he does not currently use alcohol. He reports that he does not use drugs.  Social History     Social History Narrative    ENVIRONMENTAL HISTORY: [place] The home is heated with a forced air. They do have central air conditioning. The patient's bedroom is furnished with carpeting in bedroom.  Pets inside the house include 0 pets. There is history of cockroach or mice infestation. There is/are 0 smokers in the house.  The house does not have a damp basement.          Physical Exam:  There were no vitals taken for this visit.    Constitutional: He is oriented to person, place, and time.  He appears well-developed and well-nourished. He is not in acute distress.   HENT:     Head: Normocephalic and atraumatic.   Pulmonary/Chest:  NWOB. No respiratory distress.   Neurological: He is alert and oriented to person, place, and time. Coordination grossly normal.   Skin:  He is not diaphoretic.   Psychiatric: He has a normal mood and affect. His behavior is normal. Judgment and thought content normal.  Patient answers questions appropriately.        Imaging:                HISTORY: Hip pain, left     COMPARISON: Left hip x-rays dated 12/29/2017.     FINDINGS: The femoral head/necks have a somewhat cam shape  bilaterally. This could predispose this patient to  premature  degenerative changes from posterior acetabular impingement syndrome.  No acute fracture or malalignment is seen. Joint spaces are grossly  well-maintained.                                                                      IMPRESSION:  1. Mildly cam shaped femoral head/neck combinations bilaterally could  predispose this patient to posterior acetabular impingement syndrome  and early degenerative change.  2. No fracture or malalignment. No obvious degenerative changes are  identified on this study.  3. Arterial calcifications are again noted.     RAMBO OLIVEIRA MD      RIGHT RIBS AND CHEST, THREE VIEWS   8/30/2023 12:00 PM      HISTORY:  Lump on right rib that is causing a lot of pain. Rib pain.     COMPARISON: Radiographs from 5/18/2010.                                                                     IMPRESSION:  1. Sternotomy wires again noted. The cardiomediastinal silhouette  appears normal otherwise. The pulmonary vasculature appears normal. No  infiltrates or effusions.  2. Osteoarthrosis involving the glenohumeral joint and AC joints.  3. Mild right anterolateral seventh, eighth and ninth rib deformities  likely from old trauma. There is no evidence of an acute or subacute  fracture. No pneumothorax.  4. No mass evident radiographically.     RENÉE SANTOS MD       XR HAND RIGHT G/E 3 VIEWS 7/9/2024 8:24 AM      HISTORY: Pain after injury     COMPARISON: None.                                                                          IMPRESSION: The right hand is negative for fracture. There is  degenerative change in the STT joint and first MCP joint. Degenerative  change at the index finger MCP joint..     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    Recommend R) intercostal RFA with Dr. Cj Corbin. r5/13/2025 Dr. Taco Briceño - R) T7,8,9 intercostal nerve block decreased the pain by 70%.    Follow-up in clinic 3 months.      ETHEL Hussein, FNP  [facility] [place]        BILLING TIME DOCUMENTATION:   The total TIME spent on this patient on the date of the encounter/appointment was 30 minutes.

## 2025-06-03 ENCOUNTER — VIRTUAL VISIT (OUTPATIENT)
Dept: PALLIATIVE MEDICINE | Facility: OTHER | Age: 70
End: 2025-06-03
Attending: NURSE PRACTITIONER
Payer: MEDICARE

## 2025-06-03 DIAGNOSIS — R07.81 RIB PAIN ON RIGHT SIDE: Primary | ICD-10-CM

## 2025-06-03 DIAGNOSIS — G58.8 INTERCOSTAL NEURALGIA: ICD-10-CM

## 2025-06-03 DIAGNOSIS — M19.011 PRIMARY OSTEOARTHRITIS OF RIGHT SHOULDER: ICD-10-CM

## 2025-06-03 PROCEDURE — 1125F AMNT PAIN NOTED PAIN PRSNT: CPT | Mod: 95 | Performed by: NURSE PRACTITIONER

## 2025-06-03 PROCEDURE — 98006 SYNCH AUDIO-VIDEO EST MOD 30: CPT | Performed by: NURSE PRACTITIONER

## 2025-06-03 ASSESSMENT — PAIN SCALES - GENERAL: PAINLEVEL_OUTOF10: MODERATE PAIN (5)

## 2025-06-03 NOTE — PATIENT INSTRUCTIONS
Plan on 6/3/2025:  R) shoulder replacement 7/9/2025.    Recommend R) intercostal RFA with Dr. Cj Corbin. r5/13/2025 Dr. Taco Briceño - R) T7,8,9 intercostal nerve block decreased the pain by 70%.    Follow-up in clinic 3 months.      ETHEL Hussein, KIN  [facility]/Dede Knapp          [facility]    Clinic Number:  [phone]  Call with any questions about your care and for scheduling assistance.   Calls are returned Monday through Friday between 8 AM and 4:30 PM. We usually get back to you within 2 business days depending on the issue/request.    If we are prescribing your medications:  For opioid medication refills, call the clinic or send a Write.my message 7 days in advance.  Please include:  Name of requested medication  Name of the pharmacy.  For non-opioid medications, call your pharmacy directly to request a refill. Please allow 3-4 days to be processed.   Per [place] State Law:  All controlled substance prescriptions must be filled within 30 days of being written.    For those controlled substances allowing refills, pickup must occur within 30 days of last fill.      We believe regular attendance is key to your success in our program!    Any time you are unable to keep your appointment we ask that you call us at least 24 hours in advance to cancel.This will allow us to offer the appointment time to another patient.   Multiple missed appointments may lead to dismissal from the clinic.

## 2025-06-05 ENCOUNTER — OFFICE VISIT (OUTPATIENT)
Dept: CARDIOLOGY | Facility: CLINIC | Age: 70
End: 2025-06-05
Payer: MEDICARE

## 2025-06-05 VITALS
SYSTOLIC BLOOD PRESSURE: 132 MMHG | BODY MASS INDEX: 29.56 KG/M2 | WEIGHT: 206.5 LBS | HEIGHT: 70 IN | DIASTOLIC BLOOD PRESSURE: 74 MMHG | HEART RATE: 82 BPM | OXYGEN SATURATION: 96 % | RESPIRATION RATE: 18 BRPM

## 2025-06-05 DIAGNOSIS — E78.5 DYSLIPIDEMIA (HIGH LDL; LOW HDL): ICD-10-CM

## 2025-06-05 DIAGNOSIS — R52 PAIN: ICD-10-CM

## 2025-06-05 DIAGNOSIS — I10 BENIGN ESSENTIAL HYPERTENSION: ICD-10-CM

## 2025-06-05 DIAGNOSIS — I05.9 MITRAL VALVE DISORDER: Primary | ICD-10-CM

## 2025-06-05 DIAGNOSIS — Z98.890 H/O MITRAL VALVE REPAIR: ICD-10-CM

## 2025-06-05 ASSESSMENT — PAIN SCALES - GENERAL: PAINLEVEL_OUTOF10: MILD PAIN (3)
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
## 2025-06-05 NOTE — PATIENT INSTRUCTIONS
Call my nurse with any questions or concerns:  [phone]  *If you have concerns after hours, please call [phone], option 2 to speak with on call Cardiologist.    1. Medication changes from today:    Please discuss your toe pain with neurology and see if she has recommendations for foot/ankle pain    Stop amlodipine  Reduce salt intake and start a walking program  Blood pressure check with  in 2 weeks.

## 2025-06-09 ENCOUNTER — RESULTS FOLLOW-UP (OUTPATIENT)
Dept: FAMILY MEDICINE | Facility: OTHER | Age: 70
End: 2025-06-09

## 2025-06-09 ENCOUNTER — TELEPHONE (OUTPATIENT)
Dept: NEUROLOGY | Facility: CLINIC | Age: 70
End: 2025-06-09

## 2025-06-09 ENCOUNTER — OFFICE VISIT (OUTPATIENT)
Dept: NEUROLOGY | Facility: CLINIC | Age: 70
End: 2025-06-09
Payer: MEDICARE

## 2025-06-09 ENCOUNTER — LAB (OUTPATIENT)
Dept: LAB | Facility: OTHER | Age: 70
End: 2025-06-09
Payer: MEDICARE

## 2025-06-09 ENCOUNTER — DOCUMENTATION ONLY (OUTPATIENT)
Dept: SLEEP MEDICINE | Facility: CLINIC | Age: 70
End: 2025-06-09
Payer: MEDICARE

## 2025-06-09 VITALS — DIASTOLIC BLOOD PRESSURE: 86 MMHG | OXYGEN SATURATION: 98 % | HEART RATE: 77 BPM | SYSTOLIC BLOOD PRESSURE: 131 MMHG

## 2025-06-09 DIAGNOSIS — E03.8 SUBCLINICAL HYPOTHYROIDISM: Primary | ICD-10-CM

## 2025-06-09 DIAGNOSIS — M79.671 RIGHT FOOT PAIN: ICD-10-CM

## 2025-06-09 DIAGNOSIS — E78.5 DYSLIPIDEMIA (HIGH LDL; LOW HDL): ICD-10-CM

## 2025-06-09 DIAGNOSIS — I10 BENIGN ESSENTIAL HYPERTENSION: ICD-10-CM

## 2025-06-09 DIAGNOSIS — M79.671 RIGHT FOOT PAIN: Primary | ICD-10-CM

## 2025-06-09 DIAGNOSIS — R51.9 NONINTRACTABLE HEADACHE, UNSPECIFIED CHRONICITY PATTERN, UNSPECIFIED HEADACHE TYPE: ICD-10-CM

## 2025-06-09 DIAGNOSIS — G47.33 OSA (OBSTRUCTIVE SLEEP APNEA): Primary | ICD-10-CM

## 2025-06-09 LAB
ALT SERPL W P-5'-P-CCNC: 18 U/L (ref 0–70)
ANION GAP SERPL CALCULATED.3IONS-SCNC: 8 MMOL/L (ref 7–15)
BUN SERPL-MCNC: 10.7 MG/DL (ref 8–23)
CALCIUM SERPL-MCNC: 9.6 MG/DL (ref 8.8–10.4)
CHLORIDE SERPL-SCNC: 103 MMOL/L (ref 98–107)
CHOLEST SERPL-MCNC: 149 MG/DL
CREAT SERPL-MCNC: 1.2 MG/DL (ref 0.67–1.17)
EGFRCR SERPLBLD CKD-EPI 2021: 65 ML/MIN/1.73M2
FASTING STATUS PATIENT QL REPORTED: YES
FASTING STATUS PATIENT QL REPORTED: YES
GLUCOSE SERPL-MCNC: 92 MG/DL (ref 70–99)
HCO3 SERPL-SCNC: 30 MMOL/L (ref 22–29)
HDLC SERPL-MCNC: 47 MG/DL
LDLC SERPL CALC-MCNC: 82 MG/DL
NONHDLC SERPL-MCNC: 102 MG/DL
POTASSIUM SERPL-SCNC: 4.1 MMOL/L (ref 3.4–5.3)
SODIUM SERPL-SCNC: 141 MMOL/L (ref 135–145)
T4 FREE SERPL-MCNC: 1.16 NG/DL (ref 0.9–1.7)
TRIGL SERPL-MCNC: 101 MG/DL
TSH SERPL DL<=0.005 MIU/L-ACNC: 4.84 UIU/ML (ref 0.3–4.2)

## 2025-06-09 PROCEDURE — 86618 LYME DISEASE ANTIBODY: CPT

## 2025-06-09 PROCEDURE — 36415 COLL VENOUS BLD VENIPUNCTURE: CPT

## 2025-06-09 PROCEDURE — 84443 ASSAY THYROID STIM HORMONE: CPT

## 2025-06-09 PROCEDURE — 84439 ASSAY OF FREE THYROXINE: CPT

## 2025-06-09 PROCEDURE — 80061 LIPID PANEL: CPT

## 2025-06-09 PROCEDURE — 84460 ALANINE AMINO (ALT) (SGPT): CPT

## 2025-06-09 PROCEDURE — 80048 BASIC METABOLIC PNL TOTAL CA: CPT

## 2025-06-09 NOTE — TELEPHONE ENCOUNTER
Attempted to reach patient to remind them about appointment scheduled with Jcarlos Nair MD on 6/10/25 in our [place] clinic.    A voicemail was left with a call back number if the patient has questions or would like to reschedule.
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
## 2025-06-09 NOTE — PROGRESS NOTES
Patient was offered choice of vendor and chose [facility].  Patient Jose Angel Cha was set up at [place] on June 9, 2025. Patient received a Resmed Airsense 11 Pressures were set at 7-14 cm H2O.   Patient s ramp is 5 cm H2O for Auto and FLEX/EPR is EPR, 2.  Patient received a Resmed Mask name: AIRFIT F20  Full Face mask size Large, heated tubing and heated humidifier.  Patient has the following compliance requirements: using and visit requirements  Patient has a follow up on 7/28/2025 with Dr. Patel.    Jayashree Sam

## 2025-06-10 ENCOUNTER — OFFICE VISIT (OUTPATIENT)
Dept: NEUROLOGY | Facility: CLINIC | Age: 70
End: 2025-06-10
Payer: MEDICARE

## 2025-06-10 DIAGNOSIS — G57.31 SUPERFICIAL PERONEAL NERVE NEUROPATHY, RIGHT: Primary | ICD-10-CM

## 2025-06-10 DIAGNOSIS — M79.671 RIGHT FOOT PAIN: ICD-10-CM

## 2025-06-10 LAB — B BURGDOR IGG+IGM SER QL: 0.14

## 2025-06-10 NOTE — PROGRESS NOTES
[facility]  Electrodiagnostic Laboratory                 Department of Neurology                                                                                                         Test Date:  6/10/2025    Patient: Davi Cha : 1955 Physician: Jcarlos Nair MD   Sex: Male AGE: 70 year Ref Phys: Mary HernandezDO   ID#: [id number]   Technician: Kristy Behling     History and Examination:    70-year-old man with chief complaint of a burning sensation and swelling of the dorsum of the right foot.  He does not describe prominent right sided low back pain or sciatica.  He denies numbness of the foot.  Symptoms began after he had 2 spider bites on the top of the foot and he could have strained the ligament of the ankle due to his physical activity.  This insect bite occurred more than 6 months ago.  EMG was requested to evaluate for right peroneal mononeuropathy, versus L5 radiculopathy versus other neuromuscular explanation for the chief complaint.    Techniques:    Motor and sensory nerve conduction studies were done with surface recording electrodes. Temperature was monitored and recorded throughout the study. Upper extremities were maintained at a temperature of 32 degrees Centigrade or higher and Lower extremities were maintained at a temperature of 31 degrees Centigrade or higher.  EMG was done with a concentric needle electrode.     Results    Right fibular (EDB), and tibial (AH) motor nerve conduction studies were normal.  Right tibial F-wave latencies were normal.  Right superficial fibular antidromic sensory nerve conduction study showed attenuated SNAP amplitude and normal conduction velocity.  The left superficial fibular and the right sural antidromic sensory nerve conduction studies were normal.  Needle EMG of the right TA, tibialis posterior, medial gastrocnemius, and fibularis longus, was normal.    Interpretation:    Mildly abnormal study.  There is  electrodiagnostic evidence of a right superficial peroneal sensory mononeuropathy.  There is no electrodiagnostic evidence of right common peroneal neuropathy at the knee or L5 radiculopathy from this study.  The fact that the peroneus longus muscle was normal on needle examination suggests that this could be a more distal lesion of the right superficial peroneal sensory nerve towards the ankle.  Clinical and imaging correlation would be helpful.    ___________________________  Jcarlos Nair MD        Nerve Conduction Studies  Motor Sites      Latency Neg. Amp Neg. Amp Diff Segment Distance Velocity Neg. Dur Neg Area Diff Temperature Comment   Site (ms) Norm (mV) Norm (%)  cm m/s Norm (ms) (%) ( C)    Right Fibular (EDB) Motor   Ankle 3.8  < 6.0 5.1 -  Ankle-EDB 8   5.0  31    Bel Fibular Head 11.3 - 5.0 - -2 Bel Fibular Head-Ankle 38 51  > 38 5.4 2 31    Pop Fossa 13.4 - 5.0 - 0 Pop Fossa-Bel Fibular Head 10 48  > 38 5.4 0 31    Right Tibial (AHB) Motor   Ankle 4.7  < 6.5 10.0  > 5.0  Ankle-AH 8   5.3  31.2    Knee 13.8 - 6.5 - -35 Knee-Ankle 40 44  > 38 6.1 -20 31.2      Sensory Sites      Onset Lat Peak Lat Amp (O-P) Amp (P-P) Segment Distance Velocity Temperature Comment   Site ms (ms)  V Norm ( V)  cm m/s Norm ( C)    Left Superficial Fibular Sensory   Lower Leg-Ankle 2.7 3.6 7  > 3 19 Lower Leg-Ankle 12.5 46 - 24.4    Right Superficial Fibular Sensory   Lower Leg 3.0 3.6 3  > 3 10 Lower Leg-Ankle 12.5 42 - 24.5    Right Sural Sensory   Calf-Lat Malleolus 2.9 3.8 10  > 5 5 Calf-Lat Malleolus 14 48  > 38 30.1        Electromyography     Side Muscle Ins Act Fibs/PSW Fasc HF Amp Dur Poly Recrt Int Pat   Right Tib ant Nml None Nml 0 Nml Nml 1+ Nml Nml   Right Tib post Nml None Nml 0 Nml Nml 0 Nml Nml   Right Gastroc MH Nml None Nml 0 Nml Nml 0 Nml Nml   Right Fib Longus Nml None Nml 0 Nml Nml 0 Nml Nml         Waveforms / Images:     Motor         Sensory

## 2025-06-10 NOTE — LETTER
6/10/2025      Jose Angel Cha  [address] [facility] [id number]      Dear Colleague,    Thank you for referring your patient, Jose Angel Cha, to the [facility]. Please see a copy of my visit note below.                        [facility]  Electrodiagnostic Laboratory                 Department of Neurology                                                                                                         Test Date:  6/10/2025    Patient: Davi Cha : 1955 Physician: Jcarlos Nair MD   Sex: Male AGE: 70 year Ref Phys: Mary Rosariorudi, DO   ID#: [id number]   Technician: Kristy Behling     History and Examination:    70-year-old man with chief complaint of a burning sensation and swelling of the dorsum of the right foot.  He does not describe prominent right sided low back pain or sciatica.  He denies numbness of the foot.  Symptoms began after he had 2 spider bites on the top of the foot and he could have strained the ligament of the ankle due to his physical activity.  This insect bite occurred more than 6 months ago.  EMG was requested to evaluate for right peroneal mononeuropathy, versus L5 radiculopathy versus other neuromuscular explanation for the chief complaint.    Techniques:    Motor and sensory nerve conduction studies were done with surface recording electrodes. Temperature was monitored and recorded throughout the study. Upper extremities were maintained at a temperature of 32 degrees Centigrade or higher and Lower extremities were maintained at a temperature of 31 degrees Centigrade or higher.  EMG was done with a concentric needle electrode.     Results    Right fibular (EDB), and tibial (AH) motor nerve conduction studies were normal.  Right tibial F-wave latencies were normal.  Right superficial fibular antidromic sensory nerve conduction study showed attenuated SNAP amplitude and normal conduction velocity.  The left  superficial fibular and the right sural antidromic sensory nerve conduction studies were normal.  Needle EMG of the right TA, tibialis posterior, medial gastrocnemius, and fibularis longus, was normal.    Interpretation:    Mildly abnormal study.  There is electrodiagnostic evidence of a right superficial peroneal sensory mononeuropathy.  There is no electrodiagnostic evidence of right common peroneal neuropathy at the knee or L5 radiculopathy from this study.  The fact that the peroneus longus muscle was normal on needle examination suggests that this could be a more distal lesion of the right superficial peroneal sensory nerve towards the ankle.  Clinical and imaging correlation would be helpful.    ___________________________  Jcarlos Nair MD        Nerve Conduction Studies  Motor Sites      Latency Neg. Amp Neg. Amp Diff Segment Distance Velocity Neg. Dur Neg Area Diff Temperature Comment   Site (ms) Norm (mV) Norm (%)  cm m/s Norm (ms) (%) ( C)    Right Fibular (EDB) Motor   Ankle 3.8  < 6.0 5.1 -  Ankle-EDB 8   5.0  31    Bel Fibular Head 11.3 - 5.0 - -2 Bel Fibular Head-Ankle 38 51  > 38 5.4 2 31    Pop Fossa 13.4 - 5.0 - 0 Pop Fossa-Bel Fibular Head 10 48  > 38 5.4 0 31    Right Tibial (AHB) Motor   Ankle 4.7  < 6.5 10.0  > 5.0  Ankle-AH 8   5.3  31.2    Knee 13.8 - 6.5 - -35 Knee-Ankle 40 44  > 38 6.1 -20 31.2      Sensory Sites      Onset Lat Peak Lat Amp (O-P) Amp (P-P) Segment Distance Velocity Temperature Comment   Site ms (ms)  V Norm ( V)  cm m/s Norm ( C)    Left Superficial Fibular Sensory   Lower Leg-Ankle 2.7 3.6 7  > 3 19 Lower Leg-Ankle 12.5 46 - 24.4    Right Superficial Fibular Sensory   Lower Leg 3.0 3.6 3  > 3 10 Lower Leg-Ankle 12.5 42 - 24.5    Right Sural Sensory   Calf-Lat Malleolus 2.9 3.8 10  > 5 5 Calf-Lat Malleolus 14 48  > 38 30.1        Electromyography     Side Muscle Ins Act Fibs/PSW Fasc HF Amp Dur Poly Recrt Int Pat   Right Tib ant Nml None Nml 0 Nml Nml 1+ Nml Nml    Right Tib post Nml None Nml 0 Nml Nml 0 Nml Nml   Right Gastroc MH Nml None Nml 0 Nml Nml 0 Nml Nml   Right Fib Longus Nml None Nml 0 Nml Nml 0 Nml Nml         Waveforms / Images:     Motor         Sensory                   Again, thank you for allowing me to participate in the care of your patient.        Sincerely,        Jcarlos Nair MD    Electronically signed

## 2025-06-12 ENCOUNTER — DOCUMENTATION ONLY (OUTPATIENT)
Dept: SLEEP MEDICINE | Facility: CLINIC | Age: 70
End: 2025-06-12
Payer: MEDICARE

## 2025-06-12 ENCOUNTER — RESULTS FOLLOW-UP (OUTPATIENT)
Dept: CARDIOLOGY | Facility: CLINIC | Age: 70
End: 2025-06-12

## 2025-06-12 NOTE — PROGRESS NOTES
3 day Sleep therapy management telephone visit    Diagnostic AHI:PS         Confirmed with patient at time of call- Yes Patient is still interested in STM service       Subjective measures:  Spoke with patient he is getting over a bout of food poisoning and has little use of his CPAP.  Patient is going to start using his CPAP tonight. He is Scheduled for shoulder surgery for 2025.          Objective data     Order Settings for PAP  CPAP min     CPAP max     CPAP fixed         Device settings from machine CPAP min 7.0     CPAP max 14.0      CPAP fixed      EPR Setting TWO    RESMED soft response  OFF     Assessment: Minimal usage      Patient has the following upcoming sleep appts:  Future Sleep Appointments         Provider Department    2025 9:30 AM (Arrive by 9:15 AM) Lenny Patel DO [facility] [place]            Replacement device: No  STM ordered by provider: Yes     Total time spent on accessing and  interpreting remote patient PAP therapy data  10 minutes    Total time spent counseling, coaching  and reviewing PAP therapy data with patient  6 minutes    [id number] no

## 2025-06-16 ENCOUNTER — OFFICE VISIT (OUTPATIENT)
Dept: FAMILY MEDICINE | Facility: OTHER | Age: 70
End: 2025-06-16
Payer: MEDICARE

## 2025-06-16 VITALS
SYSTOLIC BLOOD PRESSURE: 138 MMHG | WEIGHT: 206 LBS | HEART RATE: 70 BPM | RESPIRATION RATE: 14 BRPM | OXYGEN SATURATION: 96 % | BODY MASS INDEX: 29.56 KG/M2 | TEMPERATURE: 97 F | DIASTOLIC BLOOD PRESSURE: 86 MMHG

## 2025-06-16 DIAGNOSIS — Z23 NEED FOR VACCINATION: ICD-10-CM

## 2025-06-16 DIAGNOSIS — I25.10 CORONARY ARTERY DISEASE INVOLVING NATIVE CORONARY ARTERY OF NATIVE HEART WITHOUT ANGINA PECTORIS: ICD-10-CM

## 2025-06-16 DIAGNOSIS — E03.8 SUBCLINICAL HYPOTHYROIDISM: ICD-10-CM

## 2025-06-16 DIAGNOSIS — I82.619 BASILIC VEIN THROMBOSIS: ICD-10-CM

## 2025-06-16 DIAGNOSIS — G89.4 CHRONIC PAIN SYNDROME: ICD-10-CM

## 2025-06-16 DIAGNOSIS — I05.9 MITRAL VALVE DISORDER: ICD-10-CM

## 2025-06-16 DIAGNOSIS — G47.33 OSA (OBSTRUCTIVE SLEEP APNEA): ICD-10-CM

## 2025-06-16 DIAGNOSIS — M19.011 PRIMARY OSTEOARTHRITIS OF RIGHT SHOULDER: ICD-10-CM

## 2025-06-16 DIAGNOSIS — Z98.890 S/P MITRAL VALVE REPAIR: ICD-10-CM

## 2025-06-16 DIAGNOSIS — I10 BENIGN ESSENTIAL HYPERTENSION: ICD-10-CM

## 2025-06-16 DIAGNOSIS — Z01.818 PREOP GENERAL PHYSICAL EXAM: Primary | ICD-10-CM

## 2025-06-16 DIAGNOSIS — E78.2 MIXED HYPERLIPIDEMIA: ICD-10-CM

## 2025-06-16 PROCEDURE — 1125F AMNT PAIN NOTED PAIN PRSNT: CPT | Performed by: FAMILY MEDICINE

## 2025-06-16 PROCEDURE — 99215 OFFICE O/P EST HI 40 MIN: CPT | Performed by: FAMILY MEDICINE

## 2025-06-16 PROCEDURE — 93000 ELECTROCARDIOGRAM COMPLETE: CPT | Performed by: FAMILY MEDICINE

## 2025-06-16 PROCEDURE — 3079F DIAST BP 80-89 MM HG: CPT | Performed by: FAMILY MEDICINE

## 2025-06-16 PROCEDURE — 3075F SYST BP GE 130 - 139MM HG: CPT | Performed by: FAMILY MEDICINE

## 2025-06-16 ASSESSMENT — ANXIETY QUESTIONNAIRES
4. TROUBLE RELAXING: SEVERAL DAYS
5. BEING SO RESTLESS THAT IT IS HARD TO SIT STILL: NOT AT ALL
7. FEELING AFRAID AS IF SOMETHING AWFUL MIGHT HAPPEN: NOT AT ALL
8. IF YOU CHECKED OFF ANY PROBLEMS, HOW DIFFICULT HAVE THESE MADE IT FOR YOU TO DO YOUR WORK, TAKE CARE OF THINGS AT HOME, OR GET ALONG WITH OTHER PEOPLE?: NOT DIFFICULT AT ALL
1. FEELING NERVOUS, ANXIOUS, OR ON EDGE: MORE THAN HALF THE DAYS
GAD7 TOTAL SCORE: 5
2. NOT BEING ABLE TO STOP OR CONTROL WORRYING: NOT AT ALL
7. FEELING AFRAID AS IF SOMETHING AWFUL MIGHT HAPPEN: NOT AT ALL
3. WORRYING TOO MUCH ABOUT DIFFERENT THINGS: NOT AT ALL
IF YOU CHECKED OFF ANY PROBLEMS ON THIS QUESTIONNAIRE, HOW DIFFICULT HAVE THESE PROBLEMS MADE IT FOR YOU TO DO YOUR WORK, TAKE CARE OF THINGS AT HOME, OR GET ALONG WITH OTHER PEOPLE: NOT DIFFICULT AT ALL
6. BECOMING EASILY ANNOYED OR IRRITABLE: MORE THAN HALF THE DAYS
GAD7 TOTAL SCORE: 5
GAD7 TOTAL SCORE: 5

## 2025-06-16 ASSESSMENT — PAIN SCALES - GENERAL: PAINLEVEL_OUTOF10: MODERATE PAIN (4)
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
## 2025-06-16 NOTE — PATIENT INSTRUCTIONS
How to Take Your Medication Before Surgery  Preoperative Medication Instructions   Antiplatelet or Anticoagulation Medication Instructions   - aspirin: Discontinue aspirin 7 days prior to procedure to reduce bleeding risk. It should be resumed postoperatively.     Additional Medication Instructions   - psk9- no hold needed, but will not take day of surgery       Patient Education   Preparing for Your Surgery  For Adults  Getting started  In most cases, a nurse will call to review your health history and instructions. They will give you an arrival time based on your scheduled surgery time. Please be ready to share:  Your doctor's clinic name and phone number  Your medical, surgical, and anesthesia history  A list of allergies and sensitivities  A list of medicines, including herbal treatments and over-the-counter drugs  Whether the patient has a legal guardian (ask how to send us the papers in advance)  Note: You may not receive a call if you were seen at our PAC (Preoperative Assessment Center).  Please tell us if you're pregnant--or if there's any chance you might be pregnant. Some surgeries may injure a fetus (unborn baby), so they require a pregnancy test. Surgeries that are safe for a fetus don't always need a test, and you can choose whether to have one.   Preparing for surgery  Within 10 to 30 days of surgery: Have a pre-op exam (sometimes called an H&P, or History and Physical). This can be done at a clinic or pre-operative center.  If you're having a , you may not need this exam. Talk to your care team.  At your pre-op exam, talk to your care team about all medicines you take. (This includes CBD oil and any drugs, such as THC, marijuana, and other forms of cannabis.) If you need to stop any medicine before surgery, ask when to start taking it again.  This is for your safety. Many medicines and drugs can make you bleed too much during surgery. Some change how well surgery (anesthesia) drugs  work.  Call your insurance company to let them know you're having surgery. (If you don't have insurance, call [phone].)  Call your clinic if there's any change in your health. This includes a scrape or scratch near the surgery site, or any signs of a cold (sore throat, runny nose, cough, rash, fever).  Eating and drinking guidelines  For your safety: Unless your surgeon tells you otherwise, follow the guidelines below.  Eat and drink as normal until 8 hours before you arrive for surgery. After that, no food or milk. You can spit out gum when you arrive.  Drink clear liquids until 2 hours before you arrive. These are liquids you can see through, like water, Gatorade, and Propel Water. They also include plain black coffee and tea (no cream or milk).  No alcohol for 24 hours before you arrive. The night before surgery, stop any drinks that contain THC.  If your care team tells you to take medicine on the morning of surgery, it's okay to take it with a sip of water. No other medicines or drugs are allowed (including CBD oil)--follow your care team's instructions.  If you have questions the day of surgery, call your hospital or surgery center.   Preventing infection  Shower or bathe the night before and the morning of surgery. Follow the instructions your clinic gave you. (If no instructions, use regular soap.)  Don't shave or clip hair near your surgery site. We'll remove the hair if needed.  Don't smoke or vape the morning of surgery. No chewing tobacco for 6 hours before you arrive. A nicotine patch is okay. You may spit out nicotine gum when you arrive.  For some surgeries, the surgeon will tell you to fully quit smoking and nicotine.  We will make every effort to keep you safe from infection. We will:  Clean our hands often with soap and water (or an alcohol-based hand rub).  Clean the skin at your surgery site with a special soap that kills germs.  Give you a special gown to keep you warm. (Cold raises the  risk of infection.)  Wear hair covers, masks, gowns, and gloves during surgery.  Give antibiotic medicine, if prescribed. Not all surgeries need this medicine.  What to bring on the day of surgery  Photo ID and insurance card  Copy of your health care directive, if you have one  Glasses and hearing aids (bring cases)  You can't wear contacts during surgery  Inhaler and eye drops, if you use them (tell us about these when you arrive)  CPAP machine or breathing device, if you use them  A few personal items, if spending the night  If you have . . .  A pacemaker, ICD (cardiac defibrillator), or other implant: Bring the ID card.  An implanted stimulator: Bring the remote control.  A legal guardian: Bring a copy of the certified (court-stamped) guardianship papers.  Please remove any jewelry, including body piercings. Leave jewelry and other valuables at home.  If you're going home the day of surgery  You must have a support person drive you home. They should stay with you overnight, and they may need to help with your self-care.  If you don't have a support person, please tells us as soon as possible. We can help.  After surgery  If it's hard to control your pain or you need more pain medicine, please call your surgeon's office.  Questions?   If you have any questions for your care team, list them here:   ____________________________________________________________________________________________________________________________________________________________________________________________________________________________________________________________  For informational purposes only. Not to replace the advice of your health care provider. Copyright   2003, 2019 [employer] [facility]. All rights reserved. Clinically reviewed by Power Avendaño MD. [employer] [id number] - REV 02/25.

## 2025-06-18 ENCOUNTER — RESULTS FOLLOW-UP (OUTPATIENT)
Dept: NEUROLOGY | Facility: CLINIC | Age: 70
End: 2025-06-18

## 2025-06-18 DIAGNOSIS — R07.81 RIB PAIN ON RIGHT SIDE: Primary | ICD-10-CM

## 2025-06-26 ENCOUNTER — DOCUMENTATION ONLY (OUTPATIENT)
Dept: SLEEP MEDICINE | Facility: CLINIC | Age: 70
End: 2025-06-26
Payer: MEDICARE

## 2025-06-26 NOTE — PROGRESS NOTES
14  DAY STM VISIT    Diagnostic AHI: PS         Subjective measures:   Spoke with patient we reviewed his CPAP cleaning and data.  Patient has an ablation for his 3 cracked ribs. He also having should surgery on . Patient going to use a TShirt to work as a barrier between the skin and mask.      Assessment: Pt not meeting objective benchmarks for compliance  Patient failing following subjective benchmarks: leak issues     Action plan: pt to have 30 day STM visit.      Device type: Auto-CPAP    PAP settings: CPAP min 7.0 cm  H20       CPAP max 14.0 cm  H20      95th% pressure 8.7 cm  H20        RESMED EPR level Setting: TWO    RESMED Soft response setting:  OFF    Mask type:      Objective measures: 14 day rolling measures      Compliance  42 %      Leak  47.78  lpm  last  upload      AHI 1.96   last  upload      Average number of minutes 217      Objective measure goal  Compliance   Goal >70%  Leak   Goal < 24 lpm  AHI  Goal < 5  Usage  Goal >240    Patient has the following upcoming sleep appts:  Future Sleep Appointments         Provider Department    2025 9:30 AM (Arrive by 9:15 AM) Lenny Patel DO [facility] [place]            Total time spent on accessing and interpreting remote patient PAP therapy data  10 minutes    Total time spent counseling, coaching  and reviewing PAP therapy data with patient  9 minutes    [id number]  [id number]  no (3 day STM)

## 2025-06-27 ENCOUNTER — TELEPHONE (OUTPATIENT)
Dept: FAMILY MEDICINE | Facility: OTHER | Age: 70
End: 2025-06-27
Payer: MEDICARE

## 2025-06-27 NOTE — TELEPHONE ENCOUNTER
General Call    Contacts       Contact Date/Time Type Contact Phone/Fax    06/27/2025 08:34 AM CDT Phone (Incoming) Davi Cha (Self) [phone] (M)          Reason for Call:  pt going under general anesthesia on July 9th and 29th.     What are your questions or concerns:  Wondering if it's ok to go under general anesthesia that close together, or if he should reschedule the 7/29 MRI    Date of last appointment with provider: 6/16/25    Could we send this information to you in [employer] or would you prefer to receive a phone call?:   Patient would prefer a phone call   Okay to leave a detailed message?: Yes at Cell number on file:    Telephone Information:   Mobile [phone]

## 2025-06-27 NOTE — TELEPHONE ENCOUNTER
Low risk for MRI so this can be completed outside of the preop 30 days typically. So it should be fine. Not as concerned about timing after the shoulder surgery  Shari Paredes MD

## 2025-06-28 ENCOUNTER — HOSPITAL ENCOUNTER (OUTPATIENT)
Dept: MRI IMAGING | Facility: CLINIC | Age: 70
Discharge: HOME | End: 2025-06-28
Attending: PSYCHIATRY & NEUROLOGY
Payer: MEDICARE

## 2025-06-28 DIAGNOSIS — M79.671 RIGHT FOOT PAIN: ICD-10-CM

## 2025-06-28 DIAGNOSIS — M25.471 RIGHT ANKLE SWELLING: ICD-10-CM

## 2025-06-28 PROCEDURE — 73720 MRI LWR EXTREMITY W/O&W/DYE: CPT | Mod: RT

## 2025-06-28 PROCEDURE — 73723 MRI JOINT LWR EXTR W/O&W/DYE: CPT | Mod: RT

## 2025-06-28 PROCEDURE — A9585 GADOBUTROL INJECTION: HCPCS | Performed by: PSYCHIATRY & NEUROLOGY

## 2025-06-28 PROCEDURE — 255N000002 HC RX 255 OP 636: Performed by: PSYCHIATRY & NEUROLOGY

## 2025-06-28 RX ORDER — GADOBUTROL 604.72 MG/ML
9 INJECTION INTRAVENOUS ONCE
Status: COMPLETED | OUTPATIENT
Start: 2025-06-28 | End: 2025-06-28

## 2025-06-28 RX ADMIN — GADOBUTROL 9 ML: 604.72 INJECTION INTRAVENOUS at 09:35

## 2025-06-30 ENCOUNTER — HOSPITAL ENCOUNTER (OUTPATIENT)
Facility: CLINIC | Age: 70
Discharge: HOME | End: 2025-06-30
Attending: ANESTHESIOLOGY | Admitting: ANESTHESIOLOGY
Payer: MEDICARE

## 2025-06-30 ENCOUNTER — ANESTHESIA (OUTPATIENT)
Dept: GASTROENTEROLOGY | Facility: CLINIC | Age: 70
End: 2025-06-30
Payer: MEDICARE

## 2025-06-30 ENCOUNTER — ANESTHESIA EVENT (OUTPATIENT)
Dept: GASTROENTEROLOGY | Facility: CLINIC | Age: 70
End: 2025-06-30
Payer: MEDICARE

## 2025-06-30 VITALS — OXYGEN SATURATION: 98 % | SYSTOLIC BLOOD PRESSURE: 156 MMHG | DIASTOLIC BLOOD PRESSURE: 94 MMHG

## 2025-06-30 PROCEDURE — 999N000010 HC STATISTIC ANES STAT CODE-CRNA PER MINUTE: Performed by: ANESTHESIOLOGY

## 2025-06-30 PROCEDURE — 64450 NJX AA&/STRD OTHER PN/BRANCH: CPT | Performed by: ANESTHESIOLOGY

## 2025-06-30 ASSESSMENT — ACTIVITIES OF DAILY LIVING (ADL): ADLS_ACUITY_SCORE: 41
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
## 2025-06-30 NOTE — ANESTHESIA POSTPROCEDURE EVALUATION
Patient: Jose Angel Cha    Procedure: Procedure(s):  cryoablation of right intercostal nerves       Anesthesia Type:  Peripheral Nerve Block    Note:  Disposition: Outpatient   Postop Pain Control: Uneventful            Sign Out: Well controlled pain   PONV: No   Neuro/Psych: Uneventful            Sign Out: Acceptable/Baseline neuro status   Airway/Respiratory: Uneventful            Sign Out: Acceptable/Baseline resp. status   CV/Hemodynamics: Uneventful            Sign Out: Acceptable CV status; No obvious hypovolemia; No obvious fluid overload   Other NRE: NONE   DID A NON-ROUTINE EVENT OCCUR? No           Last vitals:  Vitals:    06/30/25 1105 06/30/25 1157   BP: (!) 157/95 (!) 156/94   SpO2: 98% 98%       Electronically Signed By: Cj Corbin MD, MD  June 30, 2025  12:10 PM

## 2025-06-30 NOTE — ANESTHESIA PROCEDURE NOTES
Other (intercostal) Procedure Note    Pre-Procedure   Staff -        Anesthesiologist:  Cj Corbin MD       Performed By: anesthesiologist       Location: OR       Procedure Start/Stop Times: 6/30/2025 11:37 AM and 6/30/2025 11:43 AM       Pre-Anesthestic Checklist: patient identified, IV checked, site marked, risks and benefits discussed, informed consent, monitors and equipment checked, pre-op evaluation, at physician/surgeon's request and post-op pain management  Timeout:       Correct Patient: Yes        Correct Procedure: Yes        Correct Site: Yes        Correct Position: Yes        Correct Laterality: Yes        Site Marked: Yes  Procedure Documentation  Procedure: Other (intercostal)         Laterality: right       Patient Position: prone       Patient Prep/Sterile Barriers: sterile gloves, mask       Skin prep: Chloraprep       Ultrasound guided       1. Ultrasound was used to identify targeted nerve, plexus, vascular marker, or fascial plane and place a needle adjacent to it in real-time.       2. Ultrasound was used to visualize the spread of anesthetic in close proximity to the above referenced structure.       3. A permanent image is entered into the patient's record.       4. The visualized anatomic structures appeared normal.       5. There were no apparent abnormal pathologic findings.    Assessment/Narrative         The placement was negative for: blood aspirated, painful injection and site bleeding       Paresthesias: Yes and Resolved.       Complications: none    Medication(s) Administered   Medication Administration Time: 6/30/2025 11:37 AM     Comments:  PROCEDURE NOTE: right T7, 8, 9th Intercostal Nerves Cryoablation    PROCEDURE DATE: June 30, 2025  PATIENT NAME: Home Phone      [phone]  Work Phone      Not on file.  Mobile          [phone]    YOB: 1955  ATTENDING PHYSICIAN: Cj Corbin MD  RESIDENT/FELLOW PHYSICIAN: None   PREOPERATIVE DIAGNOSIS: #rib  pain chronic  POSTOPERATIVE DIAGNOSIS: same     OPERATION PERFORMED: right 7th, 8th, and 9th intercostal nerves cryoablation Ultrasonography WAS USED.     INDICATIONS FOR PROCEDURE: The patient is a 70 year old year old male with a clinical picture consistent with right sided chronic rib pain.     PROCEDURE AND FINDINGS:  Patient was greeted in the pre-procedure holding area. The risk, benefits and alternatives to the procedure were again reviewed with the patient and written informed consent was placed in the chart. The patient was taken to the procedure room and positioned prone on the procedure room table.  Routine monitors were applied including blood pressure cuff, and pulse oximetry. Prior to the procedure a time out was completed, verifying correct patient, procedure, site, and positioning.  A 12-3 MHz ultrasound probe was used to identify the 7th, 8th and 9th ribs.  A skin marker was used to noni the site of injection.  Then the skin was prepped and ultrasound covered in the usual sterile fashion. The skin overlying the ribs were anesthetized using a 25-gauge 1-1/2 inch needle with 2% preservative-free lidocaine for a total volume of 2 ml. Next, the smart needle was advanced under ultrasound guidance until it made contact with the targeted rib.  Then the portable handheld cryoablation device was used to deliver nitrous oxide through the center of the probe, using one cycle of 90 second freezing to below -20 C.  Following thaw, the probe was removed and the entry site was bandaged. This was repeated for each procedure site for a total of three intercostal nerve cryoablations. Patient was taken to the recovery room where the patient was monitored for a brief period of time. The patient tolerated the procedure well and was discharged home in stable condition with post procedural instructions. Before the procedure, the patient reported a pain score of 7/10. This was 3/10 prior to discharge      COMPLICATIONS:  "None   COMMENTS: None        FOR [facility] ONLY:   Pain Team Contact information: please page the Pain Team Via [employer]. Search \"Pain\". During daytime hours, please page the attending first. At night please page the resident first.      "

## 2025-06-30 NOTE — ANESTHESIA CARE TRANSFER NOTE
Patient: Jose Angel Cha    Procedure: Procedure(s):  cryoablation of right intercostal nerves       Diagnosis: Rib pain on right side [R07.81]  Diagnosis Additional Information: No value filed.    Anesthesia Type:   Peripheral Nerve Block     Note:    Oropharynx: oropharynx clear of all foreign objects  Level of Consciousness: awake  Oxygen Supplementation: room air    Independent Airway: airway patency satisfactory and stable  Dentition: dentition unchanged  Vital Signs Stable: post-procedure vital signs reviewed and stable  Report to RN Given: handoff report given  Patient transferred to: Phase II    Handoff Report: Identifed the Patient, Identified the Reponsible Provider, Reviewed the pertinent medical history, Discussed the surgical course, Reviewed Intra-OP anesthesia mangement and issues during anesthesia, Set expectations for post-procedure period and Allowed opportunity for questions and acknowledgement of understanding      Vitals:  Vitals Value Taken Time   /94 06/30/25 11:58   Temp     Pulse     Resp     SpO2 99 % 06/30/25 11:58   Vitals shown include unfiled device data.    Electronically Signed By: Cj Corbin MD, MD  June 30, 2025  12:10 PM

## 2025-07-02 ENCOUNTER — OFFICE VISIT (OUTPATIENT)
Dept: FAMILY MEDICINE | Facility: OTHER | Age: 70
End: 2025-07-02
Payer: MEDICARE

## 2025-07-02 VITALS
HEART RATE: 75 BPM | TEMPERATURE: 98.4 F | HEIGHT: 70 IN | SYSTOLIC BLOOD PRESSURE: 151 MMHG | OXYGEN SATURATION: 97 % | DIASTOLIC BLOOD PRESSURE: 93 MMHG | WEIGHT: 208 LBS | RESPIRATION RATE: 18 BRPM | BODY MASS INDEX: 29.78 KG/M2

## 2025-07-02 DIAGNOSIS — I10 BENIGN HYPERTENSION: ICD-10-CM

## 2025-07-02 DIAGNOSIS — H53.8 BLURRED VISION: ICD-10-CM

## 2025-07-02 DIAGNOSIS — E78.2 MIXED HYPERLIPIDEMIA: ICD-10-CM

## 2025-07-02 DIAGNOSIS — G89.29 CHRONIC INTRACTABLE HEADACHE, UNSPECIFIED HEADACHE TYPE: ICD-10-CM

## 2025-07-02 DIAGNOSIS — G47.33 OSA (OBSTRUCTIVE SLEEP APNEA): ICD-10-CM

## 2025-07-02 DIAGNOSIS — K59.01 SLOW TRANSIT CONSTIPATION: ICD-10-CM

## 2025-07-02 DIAGNOSIS — I25.10 CORONARY ARTERY DISEASE INVOLVING NATIVE CORONARY ARTERY OF NATIVE HEART WITHOUT ANGINA PECTORIS: ICD-10-CM

## 2025-07-02 DIAGNOSIS — I10 BENIGN ESSENTIAL HYPERTENSION: ICD-10-CM

## 2025-07-02 DIAGNOSIS — Z01.818 PREOP GENERAL PHYSICAL EXAM: Primary | ICD-10-CM

## 2025-07-02 DIAGNOSIS — R51.9 CHRONIC INTRACTABLE HEADACHE, UNSPECIFIED HEADACHE TYPE: ICD-10-CM

## 2025-07-02 PROCEDURE — 3077F SYST BP >= 140 MM HG: CPT | Performed by: FAMILY MEDICINE

## 2025-07-02 PROCEDURE — 1125F AMNT PAIN NOTED PAIN PRSNT: CPT | Performed by: FAMILY MEDICINE

## 2025-07-02 PROCEDURE — 3080F DIAST BP >= 90 MM HG: CPT | Performed by: FAMILY MEDICINE

## 2025-07-02 PROCEDURE — 99214 OFFICE O/P EST MOD 30 MIN: CPT | Performed by: FAMILY MEDICINE

## 2025-07-02 RX ORDER — HYDROCHLOROTHIAZIDE 25 MG/1
25 TABLET ORAL DAILY
Qty: 90 TABLET | Refills: 1 | Status: SHIPPED | OUTPATIENT
Start: 2025-07-02

## 2025-07-02 RX ORDER — POLYETHYLENE GLYCOL 3350 17 G/17G
1 POWDER, FOR SOLUTION ORAL DAILY
Qty: 510 G | Refills: 1 | Status: SHIPPED | OUTPATIENT
Start: 2025-07-02

## 2025-07-02 ASSESSMENT — PAIN SCALES - GENERAL: PAINLEVEL_OUTOF10: MODERATE PAIN (4)
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
## 2025-07-02 NOTE — PATIENT INSTRUCTIONS
How to Take Your Medication Before Surgery  Preoperative Medication Instructions   Antiplatelet or Anticoagulation Medication Instructions   - aspirin: Bleeding risk is low for this procedure and daily aspirin may be continued without modification.     Additional Medication Instructions  Take all scheduled medications on the day of surgery       Patient Education   Preparing for Your Surgery  For Adults  Getting started  In most cases, a nurse will call to review your health history and instructions. They will give you an arrival time based on your scheduled surgery time. Please be ready to share:  Your doctor's clinic name and phone number  Your medical, surgical, and anesthesia history  A list of allergies and sensitivities  A list of medicines, including herbal treatments and over-the-counter drugs  Whether the patient has a legal guardian (ask how to send us the papers in advance)  Note: You may not receive a call if you were seen at our PAC (Preoperative Assessment Center).  Please tell us if you're pregnant--or if there's any chance you might be pregnant. Some surgeries may injure a fetus (unborn baby), so they require a pregnancy test. Surgeries that are safe for a fetus don't always need a test, and you can choose whether to have one.   Preparing for surgery  Within 10 to 30 days of surgery: Have a pre-op exam (sometimes called an H&P, or History and Physical). This can be done at a clinic or pre-operative center.  If you're having a , you may not need this exam. Talk to your care team.  At your pre-op exam, talk to your care team about all medicines you take. (This includes CBD oil and any drugs, such as THC, marijuana, and other forms of cannabis.) If you need to stop any medicine before surgery, ask when to start taking it again.  This is for your safety. Many medicines and drugs can make you bleed too much during surgery. Some change how well surgery (anesthesia) drugs work.  Call your insurance  company to let them know you're having surgery. (If you don't have insurance, call [phone].)  Call your clinic if there's any change in your health. This includes a scrape or scratch near the surgery site, or any signs of a cold (sore throat, runny nose, cough, rash, fever).  Eating and drinking guidelines  For your safety: Unless your surgeon tells you otherwise, follow the guidelines below.  Eat and drink as normal until 8 hours before you arrive for surgery. After that, no food or milk. You can spit out gum when you arrive.  Drink clear liquids until 2 hours before you arrive. These are liquids you can see through, like water, Gatorade, and Propel Water. They also include plain black coffee and tea (no cream or milk).  No alcohol for 24 hours before you arrive. The night before surgery, stop any drinks that contain THC.  If your care team tells you to take medicine on the morning of surgery, it's okay to take it with a sip of water. No other medicines or drugs are allowed (including CBD oil)--follow your care team's instructions.  If you have questions the day of surgery, call your hospital or surgery center.   Preventing infection  Shower or bathe the night before and the morning of surgery. Follow the instructions your clinic gave you. (If no instructions, use regular soap.)  Don't shave or clip hair near your surgery site. We'll remove the hair if needed.  Don't smoke or vape the morning of surgery. No chewing tobacco for 6 hours before you arrive. A nicotine patch is okay. You may spit out nicotine gum when you arrive.  For some surgeries, the surgeon will tell you to fully quit smoking and nicotine.  We will make every effort to keep you safe from infection. We will:  Clean our hands often with soap and water (or an alcohol-based hand rub).  Clean the skin at your surgery site with a special soap that kills germs.  Give you a special gown to keep you warm. (Cold raises the risk of infection.)  Wear hair  covers, masks, gowns, and gloves during surgery.  Give antibiotic medicine, if prescribed. Not all surgeries need this medicine.  What to bring on the day of surgery  Photo ID and insurance card  Copy of your health care directive, if you have one  Glasses and hearing aids (bring cases)  You can't wear contacts during surgery  Inhaler and eye drops, if you use them (tell us about these when you arrive)  CPAP machine or breathing device, if you use them  A few personal items, if spending the night  If you have . . .  A pacemaker, ICD (cardiac defibrillator), or other implant: Bring the ID card.  An implanted stimulator: Bring the remote control.  A legal guardian: Bring a copy of the certified (court-stamped) guardianship papers.  Please remove any jewelry, including body piercings. Leave jewelry and other valuables at home.  If you're going home the day of surgery  You must have a support person drive you home. They should stay with you overnight, and they may need to help with your self-care.  If you don't have a support person, please tells us as soon as possible. We can help.  After surgery  If it's hard to control your pain or you need more pain medicine, please call your surgeon's office.  Questions?   If you have any questions for your care team, list them here:   ____________________________________________________________________________________________________________________________________________________________________________________________________________________________________________________________  For informational purposes only. Not to replace the advice of your health care provider. Copyright   2003, 2019 [facility]. All rights reserved. Clinically reviewed by Power Avendaño MD. [place] [id number] - REV 02/25.

## 2025-07-08 PROBLEM — M19.011 PRIMARY OSTEOARTHRITIS OF RIGHT SHOULDER: Status: ACTIVE | Noted: 2025-07-08

## 2025-07-08 PROBLEM — Z96.611 H/O TOTAL SHOULDER REPLACEMENT, RIGHT: Status: ACTIVE | Noted: 2025-07-08

## 2025-07-09 ENCOUNTER — APPOINTMENT (OUTPATIENT)
Dept: GENERAL RADIOLOGY | Facility: CLINIC | Age: 70
End: 2025-07-09
Attending: ORTHOPAEDIC SURGERY
Payer: MEDICARE

## 2025-07-09 ENCOUNTER — ANESTHESIA EVENT (OUTPATIENT)
Dept: SURGERY | Facility: CLINIC | Age: 70
End: 2025-07-09
Payer: MEDICARE

## 2025-07-09 ENCOUNTER — HOSPITAL ENCOUNTER (OUTPATIENT)
Facility: CLINIC | Age: 70
Discharge: HOME | End: 2025-07-10
Attending: ORTHOPAEDIC SURGERY | Admitting: ORTHOPAEDIC SURGERY
Payer: MEDICARE

## 2025-07-09 ENCOUNTER — ANESTHESIA (OUTPATIENT)
Dept: SURGERY | Facility: CLINIC | Age: 70
End: 2025-07-09
Payer: MEDICARE

## 2025-07-09 DIAGNOSIS — Z96.611 H/O TOTAL SHOULDER REPLACEMENT, RIGHT: Primary | ICD-10-CM

## 2025-07-09 DIAGNOSIS — M19.011 PRIMARY OSTEOARTHRITIS OF RIGHT SHOULDER: ICD-10-CM

## 2025-07-09 PROCEDURE — 250N000011 HC RX IP 250 OP 636: Performed by: ORTHOPAEDIC SURGERY

## 2025-07-09 PROCEDURE — 272N000002 HC OR SUPPLY OTHER OPNP: Performed by: ORTHOPAEDIC SURGERY

## 2025-07-09 PROCEDURE — 370N000017 HC ANESTHESIA TECHNICAL FEE, PER MIN: Performed by: ORTHOPAEDIC SURGERY

## 2025-07-09 PROCEDURE — 250N000013 HC RX MED GY IP 250 OP 250 PS 637: Performed by: ORTHOPAEDIC SURGERY

## 2025-07-09 PROCEDURE — 250N000011 HC RX IP 250 OP 636

## 2025-07-09 PROCEDURE — 250N000009 HC RX 250

## 2025-07-09 PROCEDURE — 258N000003 HC RX IP 258 OP 636

## 2025-07-09 PROCEDURE — 710N000010 HC RECOVERY PHASE 1, LEVEL 2, PER MIN: Performed by: ORTHOPAEDIC SURGERY

## 2025-07-09 PROCEDURE — 250N000025 HC SEVOFLURANE, PER MIN: Performed by: ORTHOPAEDIC SURGERY

## 2025-07-09 PROCEDURE — 258N000003 HC RX IP 258 OP 636: Performed by: NURSE ANESTHETIST, CERTIFIED REGISTERED

## 2025-07-09 PROCEDURE — 250N000013 HC RX MED GY IP 250 OP 250 PS 637: Performed by: HOSPITALIST

## 2025-07-09 PROCEDURE — 999N000141 HC STATISTIC PRE-PROCEDURE NURSING ASSESSMENT: Performed by: ORTHOPAEDIC SURGERY

## 2025-07-09 PROCEDURE — 999N000063 XR SHOULDER RIGHT PORT G/E 2 VIEWS: Mod: RT

## 2025-07-09 PROCEDURE — 258N000003 HC RX IP 258 OP 636: Performed by: ORTHOPAEDIC SURGERY

## 2025-07-09 PROCEDURE — 23472 RECONSTRUCT SHOULDER JOINT: CPT | Mod: AS | Performed by: PHYSICIAN ASSISTANT

## 2025-07-09 PROCEDURE — C1713 ANCHOR/SCREW BN/BN,TIS/BN: HCPCS | Performed by: ORTHOPAEDIC SURGERY

## 2025-07-09 PROCEDURE — 250N000009 HC RX 250: Performed by: ORTHOPAEDIC SURGERY

## 2025-07-09 PROCEDURE — 23472 RECONSTRUCT SHOULDER JOINT: CPT | Mod: RT | Performed by: ORTHOPAEDIC SURGERY

## 2025-07-09 PROCEDURE — 360N000077 HC SURGERY LEVEL 4, PER MIN: Performed by: ORTHOPAEDIC SURGERY

## 2025-07-09 PROCEDURE — 272N000001 HC OR GENERAL SUPPLY STERILE: Performed by: ORTHOPAEDIC SURGERY

## 2025-07-09 PROCEDURE — 258N000001 HC RX 258: Performed by: ORTHOPAEDIC SURGERY

## 2025-07-09 PROCEDURE — C1776 JOINT DEVICE (IMPLANTABLE): HCPCS | Performed by: ORTHOPAEDIC SURGERY

## 2025-07-09 DEVICE — IMP STEM MINI HUMERAL BIOM SHLDR 13MM 113633: Type: IMPLANTABLE DEVICE | Site: SHOULDER | Status: FUNCTIONAL

## 2025-07-09 DEVICE — IMP ADAPTOR STD TPR BIOMET SHLDR 118001: Type: IMPLANTABLE DEVICE | Site: SHOULDER | Status: FUNCTIONAL

## 2025-07-09 DEVICE — IMP GLENOID MOD POST ZIM ALLIANCE TM SAGP0002: Type: IMPLANTABLE DEVICE | Site: SHOULDER | Status: FUNCTIONAL

## 2025-07-09 DEVICE — PIN STEINMANN BIOMET 3.2MM 9" SS 110003484: Type: IMPLANTABLE DEVICE | Site: SHOULDER | Status: FUNCTIONAL

## 2025-07-09 DEVICE — BONE CEMENT SIMPLEX FULL DOSE 6191-1-001: Type: IMPLANTABLE DEVICE | Site: SHOULDER | Status: FUNCTIONAL

## 2025-07-09 DEVICE — IMPLANTABLE DEVICE: Type: IMPLANTABLE DEVICE | Site: SHOULDER | Status: FUNCTIONAL

## 2025-07-09 RX ORDER — FINASTERIDE 5 MG/1
5 TABLET, FILM COATED ORAL DAILY
Status: DISCONTINUED | OUTPATIENT
Start: 2025-07-09 | End: 2025-07-10 | Stop reason: HOSPADM

## 2025-07-09 RX ORDER — NALOXONE HYDROCHLORIDE 0.4 MG/ML
0.4 INJECTION, SOLUTION INTRAMUSCULAR; INTRAVENOUS; SUBCUTANEOUS
Status: DISCONTINUED | OUTPATIENT
Start: 2025-07-09 | End: 2025-07-10 | Stop reason: HOSPADM

## 2025-07-09 RX ORDER — ACETAMINOPHEN 325 MG/1
975 TABLET ORAL EVERY 8 HOURS PRN
Qty: 100 TABLET | Refills: 1 | Status: SHIPPED | OUTPATIENT
Start: 2025-07-09

## 2025-07-09 RX ORDER — BUPIVACAINE HCL/EPINEPHRINE 0.5-1:200K
VIAL (ML) INJECTION
Status: COMPLETED | OUTPATIENT
Start: 2025-07-09 | End: 2025-07-09

## 2025-07-09 RX ORDER — BISACODYL 10 MG
10 SUPPOSITORY, RECTAL RECTAL DAILY PRN
Status: DISCONTINUED | OUTPATIENT
Start: 2025-07-09 | End: 2025-07-10 | Stop reason: HOSPADM

## 2025-07-09 RX ORDER — PROCHLORPERAZINE MALEATE 5 MG/1
5 TABLET ORAL EVERY 6 HOURS PRN
Status: DISCONTINUED | OUTPATIENT
Start: 2025-07-09 | End: 2025-07-10 | Stop reason: HOSPADM

## 2025-07-09 RX ORDER — AMOXICILLIN 250 MG
1-2 CAPSULE ORAL 2 TIMES DAILY
Qty: 42 TABLET | Refills: 0 | Status: SHIPPED | OUTPATIENT
Start: 2025-07-09

## 2025-07-09 RX ORDER — LIDOCAINE 40 MG/G
CREAM TOPICAL
Status: DISCONTINUED | OUTPATIENT
Start: 2025-07-09 | End: 2025-07-10 | Stop reason: HOSPADM

## 2025-07-09 RX ORDER — HYDROMORPHONE HCL IN WATER/PF 6 MG/30 ML
0.1 PATIENT CONTROLLED ANALGESIA SYRINGE INTRAVENOUS EVERY 4 HOURS PRN
Status: DISCONTINUED | OUTPATIENT
Start: 2025-07-09 | End: 2025-07-10 | Stop reason: HOSPADM

## 2025-07-09 RX ORDER — ONDANSETRON 4 MG/1
4 TABLET, ORALLY DISINTEGRATING ORAL EVERY 6 HOURS PRN
Status: DISCONTINUED | OUTPATIENT
Start: 2025-07-09 | End: 2025-07-10 | Stop reason: HOSPADM

## 2025-07-09 RX ORDER — HYDROMORPHONE HYDROCHLORIDE 1 MG/ML
0.4 INJECTION, SOLUTION INTRAMUSCULAR; INTRAVENOUS; SUBCUTANEOUS EVERY 5 MIN PRN
Status: DISCONTINUED | OUTPATIENT
Start: 2025-07-09 | End: 2025-07-09 | Stop reason: HOSPADM

## 2025-07-09 RX ORDER — SENNOSIDES 8.6 MG
325 CAPSULE ORAL DAILY
Qty: 28 TABLET | Refills: 0 | Status: SHIPPED | OUTPATIENT
Start: 2025-07-09

## 2025-07-09 RX ORDER — CEFAZOLIN SODIUM/WATER 2 G/20 ML
2 SYRINGE (ML) INTRAVENOUS
Status: COMPLETED | OUTPATIENT
Start: 2025-07-09 | End: 2025-07-09

## 2025-07-09 RX ORDER — ALPRAZOLAM 0.5 MG
0.5 TABLET ORAL EVERY 8 HOURS PRN
Status: DISCONTINUED | OUTPATIENT
Start: 2025-07-09 | End: 2025-07-10 | Stop reason: HOSPADM

## 2025-07-09 RX ORDER — LIDOCAINE HYDROCHLORIDE 10 MG/ML
INJECTION, SOLUTION INFILTRATION; PERINEURAL PRN
Status: DISCONTINUED | OUTPATIENT
Start: 2025-07-09 | End: 2025-07-09

## 2025-07-09 RX ORDER — PROPOFOL 10 MG/ML
INJECTION, EMULSION INTRAVENOUS PRN
Status: DISCONTINUED | OUTPATIENT
Start: 2025-07-09 | End: 2025-07-09

## 2025-07-09 RX ORDER — SENNOSIDES 8.6 MG
325 CAPSULE ORAL DAILY
Status: DISCONTINUED | OUTPATIENT
Start: 2025-07-09 | End: 2025-07-10 | Stop reason: HOSPADM

## 2025-07-09 RX ORDER — FENTANYL CITRATE 50 UG/ML
INJECTION, SOLUTION INTRAMUSCULAR; INTRAVENOUS PRN
Status: DISCONTINUED | OUTPATIENT
Start: 2025-07-09 | End: 2025-07-09

## 2025-07-09 RX ORDER — CELECOXIB 100 MG/1
400 CAPSULE ORAL ONCE
Status: COMPLETED | OUTPATIENT
Start: 2025-07-09 | End: 2025-07-09

## 2025-07-09 RX ORDER — CEFAZOLIN SODIUM/WATER 2 G/20 ML
2 SYRINGE (ML) INTRAVENOUS SEE ADMIN INSTRUCTIONS
Status: DISCONTINUED | OUTPATIENT
Start: 2025-07-09 | End: 2025-07-09 | Stop reason: HOSPADM

## 2025-07-09 RX ORDER — OXYCODONE HCL 10 MG/1
10 TABLET, FILM COATED, EXTENDED RELEASE ORAL EVERY 8 HOURS
Status: COMPLETED | OUTPATIENT
Start: 2025-07-09 | End: 2025-07-09

## 2025-07-09 RX ORDER — VANCOMYCIN HYDROCHLORIDE 1 G/20ML
INJECTION, POWDER, LYOPHILIZED, FOR SOLUTION INTRAVENOUS PRN
Status: DISCONTINUED | OUTPATIENT
Start: 2025-07-09 | End: 2025-07-09 | Stop reason: HOSPADM

## 2025-07-09 RX ORDER — SODIUM CHLORIDE, SODIUM LACTATE, POTASSIUM CHLORIDE, CALCIUM CHLORIDE 600; 310; 30; 20 MG/100ML; MG/100ML; MG/100ML; MG/100ML
INJECTION, SOLUTION INTRAVENOUS CONTINUOUS
Status: DISCONTINUED | OUTPATIENT
Start: 2025-07-09 | End: 2025-07-09 | Stop reason: HOSPADM

## 2025-07-09 RX ORDER — FENTANYL CITRATE-0.9 % NACL/PF 10 MCG/ML
PLASTIC BAG, INJECTION (ML) INTRAVENOUS CONTINUOUS PRN
Status: DISCONTINUED | OUTPATIENT
Start: 2025-07-09 | End: 2025-07-09

## 2025-07-09 RX ORDER — FENTANYL CITRATE 50 UG/ML
50 INJECTION, SOLUTION INTRAMUSCULAR; INTRAVENOUS EVERY 5 MIN PRN
Status: DISCONTINUED | OUTPATIENT
Start: 2025-07-09 | End: 2025-07-09 | Stop reason: HOSPADM

## 2025-07-09 RX ORDER — ACETAMINOPHEN 325 MG/1
975 TABLET ORAL ONCE
Status: COMPLETED | OUTPATIENT
Start: 2025-07-09 | End: 2025-07-09

## 2025-07-09 RX ORDER — NALOXONE HYDROCHLORIDE 0.4 MG/ML
0.1 INJECTION, SOLUTION INTRAMUSCULAR; INTRAVENOUS; SUBCUTANEOUS
Status: DISCONTINUED | OUTPATIENT
Start: 2025-07-09 | End: 2025-07-09 | Stop reason: HOSPADM

## 2025-07-09 RX ORDER — HYDROMORPHONE HYDROCHLORIDE 1 MG/ML
0.2 INJECTION, SOLUTION INTRAMUSCULAR; INTRAVENOUS; SUBCUTANEOUS EVERY 5 MIN PRN
Status: DISCONTINUED | OUTPATIENT
Start: 2025-07-09 | End: 2025-07-09 | Stop reason: HOSPADM

## 2025-07-09 RX ORDER — TOLTERODINE 2 MG/1
4 CAPSULE, EXTENDED RELEASE ORAL EVERY MORNING
Status: DISCONTINUED | OUTPATIENT
Start: 2025-07-10 | End: 2025-07-10 | Stop reason: HOSPADM

## 2025-07-09 RX ORDER — AMOXICILLIN 250 MG
1 CAPSULE ORAL 2 TIMES DAILY
Status: DISCONTINUED | OUTPATIENT
Start: 2025-07-09 | End: 2025-07-10 | Stop reason: HOSPADM

## 2025-07-09 RX ORDER — CEFAZOLIN SODIUM 2 G/50ML
2 SOLUTION INTRAVENOUS EVERY 8 HOURS
Status: COMPLETED | OUTPATIENT
Start: 2025-07-09 | End: 2025-07-10

## 2025-07-09 RX ORDER — OXYCODONE HYDROCHLORIDE 5 MG/1
5 TABLET ORAL EVERY 4 HOURS PRN
Status: DISCONTINUED | OUTPATIENT
Start: 2025-07-09 | End: 2025-07-10 | Stop reason: HOSPADM

## 2025-07-09 RX ORDER — HYDROCHLOROTHIAZIDE 25 MG/1
25 TABLET ORAL DAILY
Status: DISCONTINUED | OUTPATIENT
Start: 2025-07-09 | End: 2025-07-10 | Stop reason: HOSPADM

## 2025-07-09 RX ORDER — HYDROMORPHONE HCL IN WATER/PF 6 MG/30 ML
0.2 PATIENT CONTROLLED ANALGESIA SYRINGE INTRAVENOUS EVERY 4 HOURS PRN
Status: DISCONTINUED | OUTPATIENT
Start: 2025-07-09 | End: 2025-07-10 | Stop reason: HOSPADM

## 2025-07-09 RX ORDER — SODIUM CHLORIDE, SODIUM LACTATE, POTASSIUM CHLORIDE, CALCIUM CHLORIDE 600; 310; 30; 20 MG/100ML; MG/100ML; MG/100ML; MG/100ML
INJECTION, SOLUTION INTRAVENOUS CONTINUOUS
Status: DISCONTINUED | OUTPATIENT
Start: 2025-07-09 | End: 2025-07-10 | Stop reason: HOSPADM

## 2025-07-09 RX ORDER — FLUTICASONE PROPIONATE 50 MCG
2 SPRAY, SUSPENSION (ML) NASAL DAILY
Status: DISCONTINUED | OUTPATIENT
Start: 2025-07-09 | End: 2025-07-10 | Stop reason: HOSPADM

## 2025-07-09 RX ORDER — TRANEXAMIC ACID 650 MG/1
1950 TABLET ORAL ONCE
Status: COMPLETED | OUTPATIENT
Start: 2025-07-09 | End: 2025-07-09

## 2025-07-09 RX ORDER — ONDANSETRON 2 MG/ML
4 INJECTION INTRAMUSCULAR; INTRAVENOUS EVERY 6 HOURS PRN
Status: DISCONTINUED | OUTPATIENT
Start: 2025-07-09 | End: 2025-07-10 | Stop reason: HOSPADM

## 2025-07-09 RX ORDER — FENTANYL CITRATE 50 UG/ML
25 INJECTION, SOLUTION INTRAMUSCULAR; INTRAVENOUS EVERY 5 MIN PRN
Status: DISCONTINUED | OUTPATIENT
Start: 2025-07-09 | End: 2025-07-09 | Stop reason: HOSPADM

## 2025-07-09 RX ORDER — ONDANSETRON 2 MG/ML
INJECTION INTRAMUSCULAR; INTRAVENOUS PRN
Status: DISCONTINUED | OUTPATIENT
Start: 2025-07-09 | End: 2025-07-09

## 2025-07-09 RX ORDER — DEXAMETHASONE SODIUM PHOSPHATE 4 MG/ML
INJECTION, SOLUTION INTRA-ARTICULAR; INTRALESIONAL; INTRAMUSCULAR; INTRAVENOUS; SOFT TISSUE PRN
Status: DISCONTINUED | OUTPATIENT
Start: 2025-07-09 | End: 2025-07-09

## 2025-07-09 RX ORDER — NALOXONE HYDROCHLORIDE 0.4 MG/ML
0.2 INJECTION, SOLUTION INTRAMUSCULAR; INTRAVENOUS; SUBCUTANEOUS
Status: DISCONTINUED | OUTPATIENT
Start: 2025-07-09 | End: 2025-07-10 | Stop reason: HOSPADM

## 2025-07-09 RX ORDER — ONDANSETRON 4 MG/1
4 TABLET, ORALLY DISINTEGRATING ORAL EVERY 30 MIN PRN
Status: DISCONTINUED | OUTPATIENT
Start: 2025-07-09 | End: 2025-07-09 | Stop reason: HOSPADM

## 2025-07-09 RX ORDER — ACETAMINOPHEN 325 MG/1
975 TABLET ORAL EVERY 8 HOURS
Status: DISCONTINUED | OUTPATIENT
Start: 2025-07-09 | End: 2025-07-10 | Stop reason: HOSPADM

## 2025-07-09 RX ORDER — DEXAMETHASONE SODIUM PHOSPHATE 10 MG/ML
4 INJECTION, SOLUTION INTRAMUSCULAR; INTRAVENOUS
Status: DISCONTINUED | OUTPATIENT
Start: 2025-07-09 | End: 2025-07-09 | Stop reason: HOSPADM

## 2025-07-09 RX ORDER — ONDANSETRON 2 MG/ML
4 INJECTION INTRAMUSCULAR; INTRAVENOUS EVERY 30 MIN PRN
Status: DISCONTINUED | OUTPATIENT
Start: 2025-07-09 | End: 2025-07-09 | Stop reason: HOSPADM

## 2025-07-09 RX ORDER — POLYETHYLENE GLYCOL 3350 17 G/17G
17 POWDER, FOR SOLUTION ORAL DAILY
Status: DISCONTINUED | OUTPATIENT
Start: 2025-07-10 | End: 2025-07-10 | Stop reason: HOSPADM

## 2025-07-09 RX ORDER — OXYCODONE HYDROCHLORIDE 5 MG/1
5-10 TABLET ORAL EVERY 4 HOURS PRN
Qty: 26 TABLET | Refills: 0 | Status: SHIPPED | OUTPATIENT
Start: 2025-07-09

## 2025-07-09 RX ORDER — CEFAZOLIN SODIUM 1 G/3ML
INJECTION, POWDER, FOR SOLUTION INTRAMUSCULAR; INTRAVENOUS PRN
Status: DISCONTINUED | OUTPATIENT
Start: 2025-07-09 | End: 2025-07-09

## 2025-07-09 RX ADMIN — SODIUM CHLORIDE, SODIUM LACTATE, POTASSIUM CHLORIDE, AND CALCIUM CHLORIDE: .6; .31; .03; .02 INJECTION, SOLUTION INTRAVENOUS at 11:23

## 2025-07-09 RX ADMIN — TRANEXAMIC ACID 1950 MG: 650 TABLET ORAL at 10:35

## 2025-07-09 RX ADMIN — FINASTERIDE 5 MG: 5 TABLET, FILM COATED ORAL at 19:36

## 2025-07-09 RX ADMIN — SODIUM CHLORIDE, SODIUM LACTATE, POTASSIUM CHLORIDE, AND CALCIUM CHLORIDE: .6; .31; .03; .02 INJECTION, SOLUTION INTRAVENOUS at 19:35

## 2025-07-09 RX ADMIN — Medication 2 G: at 11:50

## 2025-07-09 RX ADMIN — PHENYLEPHRINE HYDROCHLORIDE 100 MCG: 10 INJECTION INTRAVENOUS at 14:12

## 2025-07-09 RX ADMIN — ONDANSETRON 4 MG: 2 INJECTION INTRAMUSCULAR; INTRAVENOUS at 15:01

## 2025-07-09 RX ADMIN — CEFAZOLIN SODIUM 2 G: 2 SOLUTION INTRAVENOUS at 22:50

## 2025-07-09 RX ADMIN — PHENYLEPHRINE HYDROCHLORIDE 100 MCG: 10 INJECTION INTRAVENOUS at 11:56

## 2025-07-09 RX ADMIN — DEXAMETHASONE SODIUM PHOSPHATE 8 MG: 4 INJECTION, SOLUTION INTRA-ARTICULAR; INTRALESIONAL; INTRAMUSCULAR; INTRAVENOUS; SOFT TISSUE at 12:15

## 2025-07-09 RX ADMIN — ONDANSETRON 4 MG: 4 TABLET, ORALLY DISINTEGRATING ORAL at 19:56

## 2025-07-09 RX ADMIN — ACETAMINOPHEN 975 MG: 325 TABLET ORAL at 19:36

## 2025-07-09 RX ADMIN — Medication 30 MCG/MIN: at 12:07

## 2025-07-09 RX ADMIN — ACETAMINOPHEN 975 MG: 325 TABLET ORAL at 10:35

## 2025-07-09 RX ADMIN — FENTANYL CITRATE 50 MCG: 50 INJECTION INTRAMUSCULAR; INTRAVENOUS at 12:36

## 2025-07-09 RX ADMIN — ASPIRIN 325 MG: 325 TABLET ORAL at 19:36

## 2025-07-09 RX ADMIN — BUPIVACAINE HYDROCHLORIDE AND EPINEPHRINE BITARTRATE 10 ML: 5; .005 INJECTION, SOLUTION PERINEURAL at 11:40

## 2025-07-09 RX ADMIN — FENTANYL CITRATE 25 MCG: 50 INJECTION, SOLUTION INTRAMUSCULAR; INTRAVENOUS at 16:46

## 2025-07-09 RX ADMIN — MIDAZOLAM 2 MG: 1 INJECTION INTRAMUSCULAR; INTRAVENOUS at 11:30

## 2025-07-09 RX ADMIN — FENTANYL CITRATE 50 MCG: 50 INJECTION INTRAMUSCULAR; INTRAVENOUS at 11:52

## 2025-07-09 RX ADMIN — ROCURONIUM BROMIDE 50 MG: 50 INJECTION, SOLUTION INTRAVENOUS at 11:53

## 2025-07-09 RX ADMIN — PROPOFOL 180 MG: 10 INJECTION, EMULSION INTRAVENOUS at 11:52

## 2025-07-09 RX ADMIN — HYDROCHLOROTHIAZIDE 25 MG: 25 TABLET ORAL at 19:36

## 2025-07-09 RX ADMIN — FLUTICASONE PROPIONATE 2 SPRAY: 50 SPRAY, METERED NASAL at 19:37

## 2025-07-09 RX ADMIN — FENTANYL CITRATE 25 MCG: 50 INJECTION, SOLUTION INTRAMUSCULAR; INTRAVENOUS at 16:25

## 2025-07-09 RX ADMIN — OXYCODONE HYDROCHLORIDE 5 MG: 5 TABLET ORAL at 22:07

## 2025-07-09 RX ADMIN — SENNOSIDES AND DOCUSATE SODIUM 1 TABLET: 50; 8.6 TABLET ORAL at 19:57

## 2025-07-09 RX ADMIN — CELECOXIB 400 MG: 100 CAPSULE ORAL at 10:36

## 2025-07-09 RX ADMIN — BUPIVACAINE 10 ML: 13.3 INJECTION, SUSPENSION, LIPOSOMAL INFILTRATION at 11:40

## 2025-07-09 RX ADMIN — ALPRAZOLAM 0.5 MG: 0.5 TABLET ORAL at 20:19

## 2025-07-09 RX ADMIN — LIDOCAINE HYDROCHLORIDE 50 MG: 10 INJECTION, SOLUTION INFILTRATION; PERINEURAL at 11:52

## 2025-07-09 RX ADMIN — CEFAZOLIN 2 G: 1 INJECTION, POWDER, FOR SOLUTION INTRAMUSCULAR; INTRAVENOUS at 15:35

## 2025-07-09 RX ADMIN — OXYCODONE HYDROCHLORIDE 10 MG: 10 TABLET, FILM COATED, EXTENDED RELEASE ORAL at 10:36

## 2025-07-09 RX ADMIN — FENTANYL CITRATE 25 MCG: 50 INJECTION, SOLUTION INTRAMUSCULAR; INTRAVENOUS at 16:16

## 2025-07-09 ASSESSMENT — ACTIVITIES OF DAILY LIVING (ADL)
ADLS_ACUITY_SCORE: 21
ADLS_ACUITY_SCORE: 33
ADLS_ACUITY_SCORE: 32
ADLS_ACUITY_SCORE: 21
ADLS_ACUITY_SCORE: 21
ADLS_ACUITY_SCORE: 25
ADLS_ACUITY_SCORE: 33
ADLS_ACUITY_SCORE: 21
ADLS_ACUITY_SCORE: 32
ADLS_ACUITY_SCORE: 21
ADLS_ACUITY_SCORE: 21

## 2025-07-09 ASSESSMENT — ENCOUNTER SYMPTOMS: DYSRHYTHMIAS: 1

## 2025-07-09 ASSESSMENT — LIFESTYLE VARIABLES: TOBACCO_USE: 1
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
## 2025-07-09 NOTE — BRIEF OP NOTE
"[facility]    Brief Operative Note    Pre-operative diagnosis: Primary osteoarthritis of right shoulder [M19.011]  Post-operative diagnosis Same as pre-operative diagnosis    Procedure: ARTHROPLASTY, SHOULDER, TOTAL, Right - Shoulder    Surgeon: Surgeons and Role:     * Jaycob Ross MD - Primary     * Sherman Heath PA-C - Assisting  Anesthesia: General with Block   Estimated Blood Loss: 250 mL from 7/9/2025 11:45 AM to 7/9/2025  3:59 PM      Drains: None  Specimens:   ID Type Source Tests Collected by Time Destination   A :  Bone Fragments Shoulder, Right OR DOCUMENTATION ONLY Jaycob Ross MD 7/9/2025 12:36 PM      Findings:   Right shoulder glenohumeral osteoarthritis.  Complications: None.  Implants:   Implant Name Type Inv. Item Serial No.  Lot No. LRB No. Used Action   BONE CEMENT SIMPLEX FULL DOSE 6191-1-001 - [id number] Cement, Bone BONE CEMENT SIMPLEX FULL DOSE 6191-1-001  [employer] [id number] Right 1 Implanted   PIN STEINMANN BIOMET 3.2MM 9\" SS [id number] - [id number] Wire PIN STEINMANN BIOMET 3.2MM 9\" SS [id number]  [employer] [id number] Right 1 Used as a Supply   IMP GLENOID ZIM 4 PEG MOD RIGHT AUG SZ 5 [id number] - [id number] Total Joint Component/Insert IMP GLENOID ZIM 4 PEG MOD RIGHT AUG SZ 5 [id number]  [employer] [id number] Right 1 Implanted   IMP GLENOID MOD POST ZIM ALLIANCE TM [id number] - [id number] Total Joint Component/Insert IMP GLENOID MOD POST ZIM [employer]IANCE TM [id number]  [employer] [id number] Right 1 Implanted   IMP STEM MINI HUMERAL BIOM SHLDR 13MM [id number] - [id number] Total Joint Component/Insert IMP STEM MINI HUMERAL BIOM SHLDR 13MM [id number]  [employer] [id number] Right 1 Implanted   HEAD VERSA-DIAL [id number] HUMERAL - [id number] Total Joint Component/Insert HEAD VERSA-DIAL [id number] HUMERAL  [employer] [id number] Right 1 Implanted   IMP ADAPTOR STD TPR BIOMET SHLDR [id number] - [id number] Total Joint Component/Insert " IMP ADAPTOR STD TPR BIOMET SHLDR [id number]  [employer] [id number] Right 1 Implanted

## 2025-07-09 NOTE — ANESTHESIA PROCEDURE NOTES
Airway       Patient location during procedure: OR       Procedure Start/Stop Times: 7/9/2025 11:55 AM  Staff -        CRNA: Osmar Thomas APRN CRNA       Performed By: CRNA  Consent for Airway        Urgency: elective  Indications and Patient Condition       Indications for airway management: amilcar-procedural       Induction type:intravenous       Mask difficulty assessment: 1 - vent by mask    Final Airway Details       Final airway type: endotracheal airway       Successful airway: ETT - single  Endotracheal Airway Details        ETT size (mm): 7.5       Cuffed: yes       Successful intubation technique: direct laryngoscopy       DL Blade Type: MAC 3       Grade View of Cords: 3       Adjucts: stylet       Position: Right       Measured from: gums/teeth       Secured at (cm): 23       Bite block used: Soft    Post intubation assessment        Placement verified by: capnometry, equal breath sounds and chest rise        Number of attempts at approach: 1       Secured with: tape       Ease of procedure: easy       Dentition: Intact and Unchanged    Medication(s) Administered   Medication Administration Time: 7/9/2025 11:55 AM    Additional Comments       Due to positioning on shoulder table, proper sniffing position hard to obtain. Unable to visualize cords once epiglottis lifted but able to pass tube gently and confirm correct positioning with etCO2, chest rise, and fog in the tube.

## 2025-07-09 NOTE — PROGRESS NOTES
Jose Angel Cha is a 70 year old male with severe right shoulder  osteoarthritis.  Xray shows severe wear.  Has failed conservative treatment.  Range of motion is 30 degrees external rotation, 45 degrees internal rotation.  Flexion to 150 degrees.   Presents for right total shoulder  arthroplasty.  This H&P has been reviewed and there are no clinically significant changes in the patient s condition.  The patient was evaluated by myself as well as (H&P provider) prior to surgery. The patient is approved for the surgery and the stated surgical procedure is still clinically indicated.  Risks, benefits, potential complications and alternatives were discussed.    Jaycob Ross M.D.  Department of Orthopaedic Surgery  [facility]  Pager: [phone]

## 2025-07-09 NOTE — ANESTHESIA CARE TRANSFER NOTE
Patient: Jose Angel Cha    Procedure: Procedure(s):  ARTHROPLASTY, SHOULDER, TOTAL       Diagnosis: Primary osteoarthritis of right shoulder [M19.011]  Diagnosis Additional Information: No value filed.    Anesthesia Type:   General     Note:    Oropharynx: oropharynx clear of all foreign objects and spontaneously breathing  Level of Consciousness: awake  Oxygen Supplementation: face mask    Independent Airway: airway patency satisfactory and stable  Dentition: dentition unchanged  Vital Signs Stable: post-procedure vital signs reviewed and stable  Report to RN Given: handoff report given  Patient transferred to: PACU    Handoff Report: Identifed the Patient, Identified the Reponsible Provider, Reviewed the pertinent medical history, Discussed the surgical course, Reviewed Intra-OP anesthesia mangement and issues during anesthesia, Set expectations for post-procedure period and Allowed opportunity for questions and acknowledgement of understanding      Vitals:  Vitals Value Taken Time   /113 07/09/25 16:16   Temp 96.44  F (35.8  C) 07/09/25 16:18   Pulse 66 07/09/25 16:18   Resp 9 07/09/25 16:18   SpO2 97 % 07/09/25 16:18   Vitals shown include unfiled device data.    Electronically Signed By: ETHEL Mercado CRNA  July 9, 2025  4:19 PM

## 2025-07-09 NOTE — OP NOTE
Date of Service: 07/09/25      SURGEON:  Jaycob Ross MD     ASSISTANT: Sherman Heath PA-C      PREOPERATIVE DIAGNOSIS:  right  shoulder osteoarthritis.       POSTOPERATIVE DIAGNOSIS: right  shoulder osteoarthritis.     PROCEDURE PERFORMED:  right  total shoulder arthroplasty.    COMPONENTS IMPLANTED:         Biomet comprehensive system:     Glenoid:  #5 with posterior augment.     Humeral stem - size 13     Head - 50 x 21 with D offset inferior.     HISTORY:  This is a 70 year old  male with severe osteoarthritis of his right  shoulder.  We believe his rotator cuff is intact.  Because of severe arthritis and pain, he presents for total shoulder arthroplasty, possibly reverse.    Assistance of Sherman Heath PA-C was medically necessary given the technical complexity of the case; with assistance with patient positioning, retraction and glenohumeral joint exposure, limb manipulation, assistance with implant placement, trial and final arthroplasty shoulder implant reduction, and wound closure.        DESCRIPTION OF PROCEDURE:  After a smooth general anesthetic and an interscalene block, the patient was placed in the beach chair position, padding under his legs, chest protector in place and head lindquist in place.  The right  shoulder was then prepped and draped in sterile fashion.  A pause was performed for patient verification.  Dilute epinephrine was injected at the deltopectoral interval.  Incision made for a deltopectoral incision and  carried down through subcutaneous tissue.  The deltopectoral interval was developed, retracting the cephalic vein medially.  The rotator cuff appeared intact.  The rotator interval was opened and the subscapularis divided from the humerus and tagged for later repair.  The biceps tendon was tenodesed and cut free from in the joint with the stump removed.   I released capsule from the inferior humeral neck and then dislocated the shoulder.  Retraction was placed and the step cut  reamer was used to open the superior head for the [employer] system.  I then did rigid reaming up to a 13 mini stem size, left the reamer in and applied the cutting jig.  The humeral neck was cut at 30 degrees retroversion.  We then sequentially broached up to the 13 mini stem and left the broach in  place.  A tissue protector was placed and attention was turned to the glenoid.      Glenoid retraction was placed and the labrum excised. Capsule was released form anterior and inferior glenoid neck.   I made sure there was no cartilage left on the glenoid surface.  Glenoid sizing was felt to be 5. The Signature guide was placed with good positioning and the central pin placed for the standard shoulder replacement.  Planing was then performed.  I planed the anterior half and stopped due to significant posterior wear.   I then performed central hole reaming and then placed the guide for the pegs and placed peg holes, one superiorly, two inferiorly.  I then placed the augment reamer and reamed posterior to fit the augment.  Minimal bone was taken with this.The trial was placed and fit well, so we then obtained the size 5 glenoid with posterior augment, attached the Regenerex peg to this and cemented this into position with cement filling the peg holes and no cement in the central Regenerex peg.  We allowed cement to harden and removed excess cement.  We then placed trials of the head and found the 50 mm head to fit best over the humerus.  We trialed 21 and 24 mm thickness head.  We selected the 21 head.  The stem trial was removed and I placed 2 sutures in the anterior bone for later repair of the subscapularis.  The [employer] 13 mm mini stem was impacted fully.  This was placed at 30 degrees retroversion.  We then applied the 50 x 21 mm head at the D offset with the offset set inferior .  Final reduction was performed.  We found good mobility of the shoulder with approximately 50% subluxation evident with  spontaneous reduction.  The  wound was irrigated with betadyne and antibiotic jet lavage.  The subscapularis tendon repaired to the humerus with interrupted #2 Vicryl and #2 Polydek sutures alternating.  vancomycin powder was placed in the joint.  The rotator interval was closed with running #2 Vicryl suture.  The deltopectoral interval was closed with running 0 Vicryl suture, subcutaneous tissue was closed with a running 2-0 Vicryl suture followed by interrupted 2-0 Vicryl suture in the subcutaneous tissue.  The skin  edges were closed with a running 3-0 Monocryl subcuticular closure.  Dermabond was applied, sterile dressings were applied and the patient was taken to the recovery room in stable condition.           Jaycob Ross MD

## 2025-07-09 NOTE — DISCHARGE INSTRUCTIONS
Total Shoulder Replacement   Discharge Instructions                             [phone]  Bone and Joint Service Line for issues or concerns      Congratulations, you have a new shoulder!  Your care team, made of nurses, therapists, doctors and others are here to help you recover and ease your discomfort.  Goals we will help you achieve before going home:  Get safely in and out of bed and chair by yourself (or with a little help from your caregiver)  Safely complete self-care activities  Safely walk  Begin your home exercise program  Urinate and move your bowels as you did before your hospital stay  Know the signs of infection   Know how to manage your pain   Be comfortable taking and managing pain medications  Eat a well-balanced diet to promote healing  Feel sure that you or your caregiver can meet your needs    Pain Management  As your body heals, you might feel a stabbing, pinching or aching pain. Treating pain is an important part of your care.    There are many ways to relieve pain. We can help you decide what works best for you. The right treatment will probably not take all of your pain away.  Your care team will help you keep your pain at a manageable level so you are able to fully participate in your therapies.  Your pain should get better each day.    Pain medicine    Here are some tips for taking pain medicine:    Ask for medicine when you need it. Don t try to  tough it out . This can make you feel worse.  Take your medicine as soon as you feel pain. Don t wait for pain to get worse before taking medicine.  Medicine doesn t work the same for everyone. If yours isn t working, tell your nurse. We can try other medicines.  Take pain medicine at least 45 minutes before therapy or other activities.    We will watch for side effects from the medicines. If you feel sick,  off  or uncomfortable after taking medicine, tell your nurse.    Ice, Movement and Relaxation    Ice packs and moving your body into a  different position (up to a chair, walking, etc.) can help with pain and body stiffness.  Using ice intermittently (15 minutes on and 15 minutes off) can help.  We will offer you ice or cold packs to use.   You can also use your mind to reduce pain.   Visualize. Imagine yourself in a nice place where you feel good. This might be at a beach, in a park or at home on your couch.   Distract yourself. Do something to take your mind off the pain. For example, talk with a friend or play a game.   Listen to music. For many people, calming music helps them feel better.   Breathe slowly. If you focus on your breathing and let other thoughts go out of your mind, your pain may slip away for a while.    Activity  At first you may not feel like doing anything at all, but movement and exercise are keys to recovery.   However, if you want to get out of bed, ask for help. Do not get out of bed by yourself while you are hospitalized.  After discharge do the exercises (if prescribed) 3-5 times per day as instructed by your physical therapist.  Your sling you can remove for showering and dressing and exercises, otherwise remain in your sling until follow up.   You can shower as long as you have a well sealed aquacel dressing (we will specify this prior to discharge). This dressing is designed to get wet in a shower.  If you have a different dressing then you just have to make sure the dressing stays dry.  We will specify in the discharge orders exact dressing and wound care directions.    What You Will Work on in Therapy    You will learn new ways to:  Get in and out of bedDo regular tasks with one arm like dressing, grooming, and bathingUse the toilet safely  Talk about equipment for your home that can make it easier for you to use the bathtub, toilet and other areas  Use special equipment that helps you do tasks with one hand  Walk and use stairs safely  Use your energy wisely so you can do more things  Exercises you will do at home  to help you recover      What to Expect once You Return Home     Exercise    Exercise can help prevent problems after surgery. It also builds your strength so you can get back to your normal routine.  Follow your therapist s directions for exercising your shoulder, elbow and wrist until your surgeon or outpatient therapist gives you new directions.  Most of these exercises can be done sitting down.  It s normal to feel sore after exercise.  If you have pain that is not helped by medicine, rest or ice packs, call your surgeon to discuss your best next steps.    Walking    Besides your home exercise program, make time each day for walking. It builds your endurance. This means you ll be able to do more without feeling as tired.    Increasing your walking also helps prevent constipation (hard stools).  It keeps your joints flexible (able to move easily), improves muscle tone and makes you feel good.  Without it, you will be weak and stiff.    Nutrition     When you leave the hospital, go back to your normal diet as soon as you can. Do not skip meals. Eat breakfast, lunch and dinner.   A well-balanced diet will help you feel good and recover quickly. Choose a range of fruits, vegetables, grains, milk products and meat. Drink plenty of fluids, too. Be sure to include:   High-fiber foods. Fiber helps keep your bowel movements regular and prevent constipation (hard stools). Good sources of fiber include whole grains, brown rice, cracked wheat (bulgur), oatmeal, popcorn, whole oats and wheat. Fruits and vegetables are also great sources of fiber.   High-calcium foods. Calcium helps build strong bones. Good sources of calcium include milk, yogurt, cheese, enriched soymilk, tofu, soups made with milk, and dark green, leafy vegetables (such as kale and you greens).   Iron-rich foods. This mineral helps your blood carry oxygen to every part of your body. Good sources of iron include lean meats, dark turkey meat, shellfish  (such as shrimp), cooked dry beans or peas and whole grain breads.   Water. Drink 8 to 10 glasses of fluid each day. Drinking water will help relieve constipation. You will have less trouble with constipation when you stop using prescribed pain medicines.      Preventing problems    After shoulder surgery, you need to watch for infections and other problems.     Call your doctor right away if:  Your arm is cool to the touch or a gray color  You feel numbness or tingling in your arm  You get a temperature of 101 F (38 C) or higher  You have chills  The area around the incision becomes more swollen, warm, red or painful  Fluid, pus or bright red blood comes out of the incision  You have sudden, severe shoulder pain  There is a burning feeling or foul odor when you urinate (pass water when going to the toilet), or you feel like you have to go more often than usual      Driving    Your doctor will let you know when you can drive.  Many surgeons advise against driving for at least four weeks after surgery. You need to be able to operate a vehicle safely.  You cannot drive until you no longer are using prescription pain medicine.  In order to drive, you must also be able to:    Buckle your seat belt  Maneuver the steering wheel and shift gears  Open and close car door      Work/Household Activities    Ask your doctor when you can go back to work.   Remember most people cannot even drive for 2 to 4 weeks.    Two to four weeks after surgery, many people are able to do simple activities such as shopping and light housework.   At your doctor appointments, you will learn what new activities you can safely do as your shoulder heals.     Restrictions    Use immobilizer for 4 weeks, It may be off for eating, dressing, dangling exercises.  Do not hold arms wide apart. (Do not show big fish).  Do not reach behind your back  Do not push up out of chairs or bed with operative arm.
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
## 2025-07-09 NOTE — PROGRESS NOTES
S-(situation): Patient registered to Outpatient in a bed. Patient arrived to room 271 via cart from PACU.    B-(background): s/p right rotator cuff surgery.    A-(assessment): pt A/Ox4, VSS. Calm and cooperative. Right shoulder numb with tingling at wrist & fingers, incision CDI. Sling on. Transfers with A1.     R-(recommendations): Orders and observation goals reviewed with pt and wife.    Nursing Observation criteria listed below was met:    Skin issues/needs documented:NA  Isolation needs addressed and Signage up: NA  Fall Prevention: Education given and documented: Yes  Education Assessment documented:Yes  Admission Education Documented: Yes  New medication patient education completed and documented (Possible Side Effects of Common Medications handout): Yes  Allergies Reviewed: Yes  Medication Reconciliation Complete: Yes  Home medications if not able to send immediately home with family stored here: NA  Reminder note placed in discharge instructions of home meds: NA  Patient has discharge needs (If yes, please explain): No  Patient discharge preferences addressed and charted on white board:  Yes  Provider notified that patient has arrived to the unit: Yes

## 2025-07-09 NOTE — ANESTHESIA PROCEDURE NOTES
Brachial plexus Procedure Note    Pre-Procedure   Staff -        CRNA: Osmar Thomas APRN CRNA       Other Anesthesia Staff: Sen Wilcox APRN CRNA       Performed By: CRNA and other anesthesia staff       Location: pre-op       Procedure Start/Stop Times: 7/9/2025 11:30 AM and 7/9/2025 11:40 AM       Pre-Anesthestic Checklist: patient identified, IV checked, site marked, risks and benefits discussed, informed consent, monitors and equipment checked, pre-op evaluation, at physician/surgeon's request and post-op pain management  Timeout:       Correct Patient: Yes        Correct Procedure: Yes        Correct Site: Yes        Correct Position: Yes        Correct Laterality: Yes        Site Marked: Yes  Procedure Documentation  Procedure: Brachial plexus         Laterality: right       Patient Position: supine       Patient Prep/Sterile Barriers: sterile gloves, mask       Skin prep: Chloraprep       Local skin infiltrated with 1 mL of 1% lidocaine.  (interscalene approach).       Needle Gauge: 22.        Needle Length (millimeters): 50        Ultrasound guided       1. Ultrasound was used to identify targeted nerve, plexus, vascular marker, or fascial plane and place a needle adjacent to it in real-time.       2. Ultrasound was used to visualize the spread of anesthetic in close proximity to the above referenced structure.       3. A permanent image is entered into the patient's record.       4. The visualized anatomic structures appeared normal.       5. There were no apparent abnormal pathologic findings.       Nerve Stim: Initial Level 0.8 mA.  Lowest motor response 0.32 mA.    Assessment/Narrative         The placement was positive for bleeding at site (Bleeding at site of needle insertion. Stasis acheived with a little pressure.).       The placement was negative for: blood aspirated and painful injection       Paresthesias: No.       Complications: none    Medication(s) Administered   Bupivacaine 0.5% w/  "1:200K Epi (Other) - Other   10 mL - 7/9/2025 11:40:00 AM  Bupivacaine liposome (Exparel) 1.3% LA inj susp (Infiltration) - Infiltration   10 mL - 7/9/2025 11:40:00 AM  Medication Administration Time: 7/9/2025 11:30 AM      FOR [facility] ONLY:   Pain Team Contact information: please page the Pain Team Via [employer]. Search \"Pain\". During daytime hours, please page the attending first. At night please page the resident first.      "

## 2025-07-10 ENCOUNTER — APPOINTMENT (OUTPATIENT)
Dept: OCCUPATIONAL THERAPY | Facility: CLINIC | Age: 70
End: 2025-07-10
Attending: ORTHOPAEDIC SURGERY
Payer: MEDICARE

## 2025-07-10 VITALS
TEMPERATURE: 97.3 F | DIASTOLIC BLOOD PRESSURE: 71 MMHG | HEIGHT: 71 IN | RESPIRATION RATE: 14 BRPM | SYSTOLIC BLOOD PRESSURE: 119 MMHG | OXYGEN SATURATION: 94 % | HEART RATE: 72 BPM | WEIGHT: 208 LBS | BODY MASS INDEX: 29.12 KG/M2

## 2025-07-10 LAB
HGB BLD-MCNC: 14.3 G/DL (ref 13.3–17.7)
HOLD SPECIMEN: NORMAL
MCV RBC AUTO: 100 FL (ref 78–100)

## 2025-07-10 PROCEDURE — 97110 THERAPEUTIC EXERCISES: CPT | Mod: GO

## 2025-07-10 PROCEDURE — 250N000013 HC RX MED GY IP 250 OP 250 PS 637: Performed by: ORTHOPAEDIC SURGERY

## 2025-07-10 PROCEDURE — 36415 COLL VENOUS BLD VENIPUNCTURE: CPT | Performed by: ORTHOPAEDIC SURGERY

## 2025-07-10 PROCEDURE — 97165 OT EVAL LOW COMPLEX 30 MIN: CPT | Mod: GO

## 2025-07-10 PROCEDURE — 85018 HEMOGLOBIN: CPT | Performed by: ORTHOPAEDIC SURGERY

## 2025-07-10 PROCEDURE — 250N000011 HC RX IP 250 OP 636: Performed by: ORTHOPAEDIC SURGERY

## 2025-07-10 RX ORDER — ALPRAZOLAM 0.5 MG
0.5 TABLET ORAL EVERY 8 HOURS PRN
Qty: 5 TABLET | Refills: 0 | Status: SHIPPED | OUTPATIENT
Start: 2025-07-10

## 2025-07-10 RX ADMIN — TOLTERODINE TARTRATE 4 MG: 2 CAPSULE, EXTENDED RELEASE ORAL at 08:34

## 2025-07-10 RX ADMIN — FINASTERIDE 5 MG: 5 TABLET, FILM COATED ORAL at 08:33

## 2025-07-10 RX ADMIN — HYDROCHLOROTHIAZIDE 25 MG: 25 TABLET ORAL at 08:33

## 2025-07-10 RX ADMIN — POLYETHYLENE GLYCOL 3350 17 G: 17 POWDER, FOR SOLUTION ORAL at 08:31

## 2025-07-10 RX ADMIN — SENNOSIDES AND DOCUSATE SODIUM 1 TABLET: 50; 8.6 TABLET ORAL at 08:33

## 2025-07-10 RX ADMIN — OXYCODONE HYDROCHLORIDE 5 MG: 5 TABLET ORAL at 06:13

## 2025-07-10 RX ADMIN — ASPIRIN 325 MG: 325 TABLET ORAL at 08:34

## 2025-07-10 RX ADMIN — CEFAZOLIN SODIUM 2 G: 2 SOLUTION INTRAVENOUS at 06:39

## 2025-07-10 RX ADMIN — ACETAMINOPHEN 975 MG: 325 TABLET ORAL at 01:38

## 2025-07-10 RX ADMIN — ACETAMINOPHEN 975 MG: 325 TABLET ORAL at 09:47

## 2025-07-10 ASSESSMENT — ACTIVITIES OF DAILY LIVING (ADL)
ADLS_ACUITY_SCORE: 32
ADLS_ACUITY_SCORE: 30
ADLS_ACUITY_SCORE: 32
ADLS_ACUITY_SCORE: 30
ADLS_ACUITY_SCORE: 32
ADLS_ACUITY_SCORE: 30
ADLS_ACUITY_SCORE: 32
ADLS_ACUITY_SCORE: 30

## 2025-07-10 NOTE — ANESTHESIA POSTPROCEDURE EVALUATION
Patient: Jose Angel Cha    Procedure: Procedure(s):  ARTHROPLASTY, SHOULDER, TOTAL       Anesthesia Type:  General    Note:  Disposition: Inpatient   Postop Pain Control: Uneventful            Sign Out: Well controlled pain   PONV: No   Neuro/Psych: Uneventful            Sign Out: Acceptable/Baseline neuro status   Airway/Respiratory: Uneventful            Sign Out: Acceptable/Baseline resp. status   CV/Hemodynamics: Uneventful            Sign Out: Acceptable CV status; No obvious hypovolemia; No obvious fluid overload   Other NRE: NONE   DID A NON-ROUTINE EVENT OCCUR? No    Event details/Postop Comments:  I called Mr. Cha on his cell phone and asked if he had any questions or concerns about his anesthesia experience. He was well pleased with his anesthesia and the interscalene block is still working.            Last vitals:  Vitals Value Taken Time   /89 07/09/25 16:50   Temp 96.08  F (35.6  C) 07/09/25 16:53   Pulse 69 07/09/25 16:55   Resp 12 07/09/25 16:55   SpO2 97 % 07/09/25 16:55   Vitals shown include unfiled device data.    Electronically Signed By: ETHEL Mercado CRNA  July 10, 2025  2:21 PM

## 2025-07-10 NOTE — PROGRESS NOTES
Patient vital signs are at baseline: Yes  Patient able to ambulate as they were prior to admission or with assist devices provided by therapies during their stay:  Yes  Patient MUST void prior to discharge:  Yes  Patient able to tolerate oral intake:  Yes  Pain has adequate pain control using Oral analgesics:  Yes  Does patient have an identified :  Yes  Has goal D/C date and time been discussed with patient:  Yes    Alert and oriented to person, place and time

## 2025-07-10 NOTE — PROGRESS NOTES
Patient vital signs are at baseline: Yes  Patient able to ambulate as they were prior to admission or with assist devices provided by therapies during their stay:  Yes  Patient MUST void prior to discharge:  Yes  Patient able to tolerate oral intake:  Yes  Pain has adequate pain control using Oral analgesics:  Yes  Does patient have an identified :  Yes  Has goal D/C date and time been discussed with patient:  Yes     Slightly hypertensive Sbp in 130s-140s otherwise stable. Pain managed with ice and oxy x1. PRN xanax given for anxiety. Ambulating in lees SBA GB and cane. Tolerating oral intake, SL. Scheduled ancef given. CMS intact.

## 2025-07-10 NOTE — DISCHARGE SUMMARY
"DISCHARGE SUMMARY    Primary MD: Shari Paredes    ADMIT DATE: 7/9/2025  DISCHARGE DATE: 7/10/2025    DISCHARGE DIAGNOSIS:  right shoulder osteoarthritis.    PROCEDURE:  right total shoulder arthroplasty.    HISTORY:  Jose Angel Cha is a 70 year old male with severe osteoarthritis of the right shoulder.  He has failed conservative treatment and presents for total shoulder arthroplasty .    HOSPITAL COURSE:  On 7/9/2025 Mr. Cha underwent total shoulder arthroplasty without complications. His hemoglobin dropped to a low of 14.3.    His progress with physical therapy was very good .  He is to do pendulum exercises four times daily .  He is discharged on POD # 1 with wound clean without drainage.  He will start ASA for blood thinning.  For pain control A prescription for oxycodone  was written.  RTC 1 1/2 weeks.  sling should be worn for comfort.      Recent Labs   Lab Test 07/10/25  0529 01/16/20  1227 09/26/17  0807   HGB 14.3 16.7 16.0   PLT  --  178 162     Recent Labs   Lab Test 06/09/25  1123 09/11/24  1355 08/30/23  1210   POTASSIUM 4.1 4.2 4.1   BUN 10.7 12.1 15.3   CR 1.20* 1.22* 1.17     No results for input(s): \"INR\" in the last 65786 hours.    Allergies: Anesthetic ether, Bee venom, Demerol, and Statin [statins]            Discharge Medications:     Current Discharge Medication List        START taking these medications    Details   acetaminophen (TYLENOL) 325 MG tablet Take 3 tablets (975 mg) by mouth every 8 hours as needed for other (mild pain).  Qty: 100 tablet, Refills: 1    Associated Diagnoses: H/O total shoulder replacement, right      ALPRAZolam (XANAX) 0.5 MG tablet Take 1 tablet (0.5 mg) by mouth every 8 hours as needed for anxiety.  Qty: 5 tablet, Refills: 0    Associated Diagnoses: H/O total shoulder replacement, right; Primary osteoarthritis of right shoulder      aspirin (ASA) 325 MG EC tablet Take 1 tablet (325 mg) by mouth daily.  Qty: 28 tablet, Refills: 0    Associated Diagnoses: H/O " total shoulder replacement, right      oxyCODONE (ROXICODONE) 5 MG tablet Take 1-2 tablets (5-10 mg) by mouth every 4 hours as needed for moderate to severe pain.  Qty: 26 tablet, Refills: 0    Associated Diagnoses: H/O total shoulder replacement, right      senna-docusate (SENOKOT-S/PERICOLACE) 8.6-50 MG tablet Take 1-2 tablets by mouth 2 times daily. Take while on oral narcotics to prevent or treat constipation.  Qty: 42 tablet, Refills: 0    Comments: While taking narcotics  Associated Diagnoses: H/O total shoulder replacement, right           CONTINUE these medications which have NOT CHANGED    Details   finasteride (PROSCAR) 5 MG tablet Take 1 tablet by mouth once daily  Qty: 90 tablet, Refills: 3    Associated Diagnoses: BPH with obstruction/lower urinary tract symptoms      fluticasone (FLONASE) 50 MCG/ACT nasal spray USE 2 SPRAY(S) IN THE AFFECTED NOSTRIL ONCE DAILY      hydrochlorothiazide (HYDRODIURIL) 25 MG tablet Take 1 tablet (25 mg) by mouth daily.  Qty: 90 tablet, Refills: 1    Associated Diagnoses: Benign hypertension      olopatadine (PATADAY) 0.2 % ophthalmic solution 0.05 mLs (1 drop) daily      omeprazole (PRILOSEC) 20 MG DR capsule Take 1 capsule by mouth twice daily  Qty: 60 capsule, Refills: 3    Associated Diagnoses: Cardenas's esophagus without dysplasia; Globus sensation      polyethylene glycol (MIRALAX) 17 GM/Dose powder Take 17 g (1 Capful) by mouth daily.  Qty: 510 g, Refills: 1    Associated Diagnoses: Slow transit constipation      psyllium (METAMUCIL/KONSYL) Packet Take 1 packet by mouth daily      trospium (SANCTURA XR) 60 MG CP24 24 hr capsule Take 1 capsule (60 mg) by mouth every morning.  Qty: 90 capsule, Refills: 3    Associated Diagnoses: BPH with obstruction/lower urinary tract symptoms; Urinary frequency      alirocumab (PRALUENT) 75 MG/ML injectable pen Inject 1 mL (75 mg) subcutaneously every 14 days.  Qty: 75 mL, Refills: 11    Associated Diagnoses: Coronary artery disease  involving native coronary artery of native heart without angina pectoris      ASPIRIN 81 MG OR TABS ONE DAILY  Qty: 0, Refills: 0    Associated Diagnoses: Other and unspecified hyperlipidemia; Family history of ischemic heart disease      CALCIUM PO       clindamycin (CLEOCIN T) 1 % external solution Apply to scalp 1-2 x daily as needed for flares  Qty: 60 mL, Refills: 4    Associated Diagnoses: Folliculitis      EPINEPHrine (ANY BX GENERIC EQUIV) 0.3 MG/0.3ML injection 2-pack Inject 0.3 mLs (0.3 mg) into the muscle as needed for anaphylaxis  Qty: 0.6 mL, Refills: 1    Associated Diagnoses: Toxic effect of venom of bees, unintentional, subsequent encounter      ipratropium (ATROVENT) 0.06 % nasal spray Spray 2 sprays into both nostrils 2 times daily.  Qty: 15 mL, Refills: 0    Associated Diagnoses: Post-nasal drip      ketamine HCl POWD 2-3 Pump 4 times daily.  Qty: 120 g, Refills: 2    Comments: Trial compounding cream Ketamine 10% Lidocaine 5%, 2-3 pumps (1-1.5gms) up to four times daily to affected area.  Associated Diagnoses: Pain in joint of right shoulder; Intercostal neuralgia      Multiple Vitamins-Minerals (MULTIVITAL PO) Take 1 tablet by mouth daily Reported on 3/22/2017      sildenafil (VIAGRA) 25 MG tablet Take 1 tablet (25 mg) by mouth daily as needed (ED).  Qty: 30 tablet, Refills: 4    Associated Diagnoses: Vasculogenic erectile dysfunction, unspecified vasculogenic erectile dysfunction type             Jaycob Ross M.D.  Department of Orthopaedic Surgery  [facility]        Cc: Shari Paredes

## 2025-07-10 NOTE — PROGRESS NOTES
07/10/25 1000   Appointment Info   Signing Clinician's Name / Credentials (OT) Maria Ines Zapata, OTR/L   Living Environment   People in Home spouse   Current Living Arrangements house   Home Accessibility stairs to enter home;stairs within home   Number of Stairs, Main Entrance 3  (can use back door with no stairs)   Stair Railings, Main Entrance railings safe and in good condition   Number of Stairs, Within Home, Primary six  (to upstairs and 6 to downstairs)   Stair Railings, Within Home, Primary railings safe and in good condition   Transportation Anticipated family or friend will provide   Living Environment Comments No AE in the bathroom   Self-Care   Usual Activity Tolerance good   Current Activity Tolerance good   Regular Exercise No   Equipment Currently Used at Home none   Fall history within last six months no   Activity/Exercise/Self-Care Comment enjoys golf at [employer]   Instrumental Activities of Daily Living (IADL)   IADL Comments PLOF IND, spouse able to provide support as needed.   General Information   Onset of Illness/Injury or Date of Surgery 07/09/25   Referring Physician Dr. Ross   Patient/Family Therapy Goal Statement (OT) home when able   Existing Precautions/Restrictions shoulder   Left Upper Extremity (Weight-bearing Status) full weight-bearing (FWB)   Right Upper Extremity (Weight-bearing Status) non weight-bearing (NWB)   Left Lower Extremity (Weight-bearing Status) full weight-bearing (FWB)   Right Lower Extremity (Weight-bearing Status) full weight-bearing (FWB)   General Observations and Info Pt s/p day 1 R TSA. OT orders post-op OT services during admission for ADLs, codmans, and ambulation   Cognitive Status Examination   Orientation Status orientation to person, place and time   Follows Commands WNL   Cognitive Status Comments Following directives, spouse aware and present for evaluation.   Visual Perception   Visual Impairment/Limitations WNL   Sensory   Sensory Comments Numbness  "through RUE and digits, reports feeling \"heavy\". Anticipated spinal block wearing off for pt.   Pain Assessment   Patient Currently in Pain Yes, see Vital Sign flowsheet   Range of Motion Comprehensive   Comment, General Range of Motion LUE AROM WFL, unable to complete elbow bends or pronation/supination due to spinal block. Able to complete wrist extension/flexion and opening/closing the hand. Provided blue foam block for squeezes.   Strength Comprehensive (MMT)   Comment, General Manual Muscle Testing (MMT) Assessment LUE WFL, unable to test RUE   Muscle Tone Assessment   Muscle Tone Quick Adds No deficits were identified   Coordination   Upper Extremity Coordination No deficits were identified   Gross Motor Coordination No deficits were identified   Fine Motor Coordination Use R hand as dominate hand, able to engage L hand as needed.   Bed Mobility   Bed Mobility supine-sit   Supine-Sit [place] (Bed Mobility) independent   Transfers   Transfers sit-stand transfer;bed-chair transfer   Transfer Skill: Bed to Chair/Chair to Bed   Bed-Chair [place] (Transfers) independent   Sit-Stand Transfer   Sit-Stand [place] (Transfers) independent   Balance   Balance Comments No LOB during ambulation in the room   Activities of Daily Living   BADL Assessment/Intervention upper body dressing;lower body dressing;grooming;feeding   Upper Body Dressing Assessment/Training   [place] Level (Upper Body Dressing) upper body dressing skills;doff;don;modified independence;pull-over garment   Comment, (Upper Body Dressing) Dicussed dressing techniques to support precautions. spouse able to assist as needed. Threading RUE first to don, doffing RUE last.   Lower Body Dressing Assessment/Training   [place] Level (Lower Body Dressing) lower body dressing skills;doff;don;socks;pants/bottoms;modified independence   Comment, (Lower Body Dressing) Complete in sitting, dicussed dressing techniques to use during LB dressing " including undergarments, shorts/pants, socks to support IND while maintaining precautions. Verbalized understanding. Demonstrates ROM to use one handed dressing with LUE for don/doffing socks.   Grooming Assessment/Training   Comment, (Grooming) LUE for grooming, can complete RUE with elbow bends once AROM is more functional within precautions.   Eating/Self Feeding   Comment, (Feeding) LUE currently, however, can use RUE with elbow bends as function and sensation improves.   Clinical Impression   Criteria for Skilled Therapeutic Interventions Met (OT) Evaluation only   OT Diagnosis s/p day 1 R  TSA   OT Problem List-Impairments impacting ADL sensory feedback;pain;post-surgical precautions   ADL comments/analysis Pt able to receive wife assistance for ADLs as needed, MOD IND and to complete in seated position for safety. Dicussed safety with sling and precautions during recovery. MOD IND   Intervention Comments Plan to follow up with OP PT services once cleared by provider.   Clinical Decision Making Complexity (OT) problem focused assessment/low complexity   Risk & Benefits of therapy have been explained evaluation/treatment results reviewed;risks/benefits reviewed;care plan/treatment goals reviewed;current/potential barriers reviewed;participants voiced agreement with care plan;participants included;patient   Clinical Impression Comments Pt currently s/p day 1 R TSA. PLOF IND with I/ADLs. From home with spouse, spouse able to provide support as needed for I/ADLS. Demonstrates MOD IND ADLs and ability to engage in post-surgical ex's including codmans. Handout provided and ex's completed with verbal cues. Ambulating IND in room no A with transfers. Education on sling management and positioning. Anticipate once medically stable, able to return home with spouse support as needed with planned follow up in OP setting.   OT Total Evaluation Time   OT Eval, Low Complexity Minutes (44743) 20   Interventions   Interventions  Quick Adds Therapeutic Procedures/Exercise   Therapeutic Procedures/Exercise   Therapeutic Procedure: strength, endurance, ROM, flexibility minutes (74110) 15   Symptoms Noted During/After Treatment none   Treatment Detail/Skilled Intervention Completing post-surgical ex's including codmans (CW, CCW, elbow bends, pronation/supination, opening/closing digits). Codmands completed in standing, remainder ex's completed in chair. Handout provided. Unable to engage in elbow bends and supination/pronation due to spinal block in place. Assisted in education in sling management and positioning when seated vs up. Practiced don/doffing sling with spouse present.   OT Discharge Planning   OT Plan Evaluation only   OT Discharge Recommendation (DC Rec) home with assist   OT Rationale for DC Rec Pt currently s/p day 1 R TSA. PLOF IND with I/ADLs. From home with spouse, spouse able to provide support as needed for I/ADLS. Demonstrates MOD IND ADLs and ability to engage in post-surgical ex's including codmans. Handout provided and ex's completed with verbal cues. Ambulating IND in room no A with transfers. Education on sling management and positioning. Anticipate once medically stable, able to return home with spouse support as needed with planned follow up in OP setting.   OT Brief overview of current status Ambulating IND, spouse able to support ADLs as needed otherwise MOD IND with one handed dressing techniques, completion of post-op ex's with writer and handouts provided.   OT Total Distance Amb During Session (feet) 10     Thank you for the referral.   CATA Hughes/L   [facility]  Email: [email or web address]  Phone: [phone]

## 2025-07-10 NOTE — PROGRESS NOTES
S-(situation): Patient discharged to home via wheelchair with family    B-(background): right shoulder replacement    A-(assessment): vss, RA, pain controlled, good appetite, ambulating well, voiding    R-(recommendations): Discharge instructions reviewed with patient. Listed belongings gathered and returned to patient.          Discharge Nursing Criteria:   Patient Education given and documented: (STROKE, CHF, Diabetes):  {NA   Care Plan and Patient education resolved: outpaitent    New Medications- pt has been educated about purpose and side effects: Yes    Vaccines  Influenza status verified at discharge:  Yes      MISC  Prescriptions if needed, hard copies sent with patient  NA  Home medications returned to patient: NA  Medication Bin checked and emptied on discharge Yes  Patient reports post-discharge pain management plan is effective: Yes

## 2025-07-10 NOTE — MEDICATION SCRIBE - ADMISSION MEDICATION HISTORY
Medication Scribe Admission Medication History    Admission medication history is complete. The information provided in this note is only as accurate as the sources available at the time of the update.    Information Source(s): Patient via in-person    Pertinent Information: Med scribe review post RN review. Medications has not been taken as usual due to procedure.     Changes made to PTA medication list:  Added: None  Deleted: Atrovent Nasal Spray - no longer using   Changed: None    Allergies reviewed with patient and updates made in EHR: yes    Medication History Completed By: FRANCINE WARREN 7/10/2025 1:42 PM    PTA Med List   Medication Sig Note Last Dose/Taking    acetaminophen (TYLENOL) 325 MG tablet Take 3 tablets (975 mg) by mouth every 8 hours as needed for other (mild pain).  Taking As Needed    alirocumab (PRALUENT) 75 MG/ML injectable pen Inject 1 mL (75 mg) subcutaneously every 14 days. (Patient taking differently: Inject 75 mg subcutaneously every 14 days. Every other Wednesday)  7/2/2025 Morning    ALPRAZolam (XANAX) 0.5 MG tablet Take 1 tablet (0.5 mg) by mouth every 8 hours as needed for anxiety.  Taking As Needed    aspirin (ASA) 325 MG EC tablet Take 1 tablet (325 mg) by mouth daily.  Taking    [Paused] ASPIRIN 81 MG OR TABS ONE DAILY (Patient taking differently: Take 81 mg by mouth daily.)  7/1/2025 Morning    CALCIUM PO Take 2 chew tab by mouth daily.  7/4/2025 Morning    clindamycin (CLEOCIN T) 1 % external solution Apply to scalp 1-2 x daily as needed for flares  Past Week    EPINEPHrine (ANY BX GENERIC EQUIV) 0.3 MG/0.3ML injection 2-pack Inject 0.3 mLs (0.3 mg) into the muscle as needed for anaphylaxis 7/10/2025: Has; not used in years  Taking As Needed    finasteride (PROSCAR) 5 MG tablet Take 1 tablet by mouth once daily  7/8/2025 Morning    fluticasone (FLONASE) 50 MCG/ACT nasal spray Spray 2 sprays into both nostrils daily.  7/9/2025 Bedtime    hydrochlorothiazide (HYDRODIURIL) 25 MG  tablet Take 1 tablet (25 mg) by mouth daily.  7/8/2025 Morning    ketamine HCl POWD 2-3 Pump 4 times daily. (Patient taking differently: Apply 2-3 Pump topically 4 times daily.)  Past Week    Multiple Vitamins-Minerals (MULTIVITAL PO) Take 1 tablet by mouth daily Reported on 3/22/2017  7/4/2025 Morning    olopatadine (PATADAY) 0.2 % ophthalmic solution 0.05 mLs (1 drop) daily  7/8/2025 Evening    omeprazole (PRILOSEC) 20 MG DR capsule Take 1 capsule by mouth twice daily (Patient taking differently: Take 20 mg by mouth 2 times daily as needed.) 7/10/2025: Usually takes every morning and second dose as needed  Past Week Morning    oxyCODONE (ROXICODONE) 5 MG tablet Take 1-2 tablets (5-10 mg) by mouth every 4 hours as needed for moderate to severe pain.  Taking As Needed    polyethylene glycol (MIRALAX) 17 GM/Dose powder Take 17 g (1 Capful) by mouth daily.  Past Week Morning    psyllium (METAMUCIL/KONSYL) Packet Take 1 packet by mouth daily  Past Week Morning    senna-docusate (SENOKOT-S/PERICOLACE) 8.6-50 MG tablet Take 1-2 tablets by mouth 2 times daily. Take while on oral narcotics to prevent or treat constipation.  Taking    sildenafil (VIAGRA) 25 MG tablet Take 1 tablet (25 mg) by mouth daily as needed (ED).  Past Month    trospium (SANCTURA XR) 60 MG CP24 24 hr capsule Take 1 capsule (60 mg) by mouth every morning. (Patient taking differently: Take 60 mg by mouth at bedtime.)  7/8/2025 Bedtime

## 2025-07-10 NOTE — PROGRESS NOTES
"ORTHO PROGRESS NOTE    POD # 1  Procedure(s):  ARTHROPLASTY, SHOULDER, TOTAL - on 2025    Pain:  mild.   Block is still working.    /71 (BP Location: Left arm)   Pulse 72   Temp 97.3  F (36.3  C) (Oral)   Resp 14   Ht 1.791 m (5' 10.5\")   Wt 94.3 kg (208 lb)   SpO2 94%   BMI 29.42 kg/m      Temp (24hrs), Av  F (36.1  C), Min:96.8  F (36  C), Max:97.6  F (36.4  C)      Recent Labs   Lab Test 07/10/25  0529 20  1227 17  0807   HGB 14.3 16.7 16.0               Circulation intact.   Sensation intact.   Calves soft and nontender.   Incision is covered      PT  walked well.  Did Chip's         ASSESSMENT:  right total shoulder arthroplasty doing well.    PLAN:  Discharge today.  Aspirin: Take 1 tablet (325 mg) by mouth daily for blood thinning for 4 weeks.   Tylenol 1000 mg three times daily.  Oxycodone as needed for pain.  Use immobilizer for 4 weeks.  It may be off for eating, dressing, dangling exercises.  Do not hold arms wide apart. (Do not show big fish).   Do not reach behind your back.  Do not push up out of chairs or bed with operative arm.   Return to clinic 10-14 days.      Jaycob Ross M.D.  Department of Orthopaedic Surgery  [facility]  Pager: [phone]      "

## 2025-07-13 NOTE — TELEPHONE ENCOUNTER
MA placed patient on wait list and he will be notified if a sooner appointment comes up. Will leave for RN to send medication if appropriate/triage if needed.       Hina Lopez MA    
Left message on answering machine for patient to call back.      Hina Lopez MA    
Reason for Call:  Other prescription    Detailed comments: Patient requesting new epipen and uses [facility] in [place], [place] and would like to be seen sooner than September.     Phone Number Patient can be reached at: Cell number on file:    Telephone Information:   Mobile [phone]       Best Time: Anytime    Can we leave a detailed message on this number? YES    Call taken on 7/5/2022 at 4:41 PM by Nahed Merino      
Routing refill request to provider for review/approval because:  The provider of the initial prescription no longer practices at [facility].        Darshan Martinez RN on 7/6/2022 at 11:46 AM        
Oriented - self; Oriented - place; Oriented - time

## 2025-07-19 DIAGNOSIS — M19.011 PRIMARY OSTEOARTHRITIS OF RIGHT SHOULDER: ICD-10-CM

## 2025-07-19 DIAGNOSIS — Z96.611 H/O TOTAL SHOULDER REPLACEMENT, RIGHT: ICD-10-CM

## 2025-07-19 RX ORDER — ALPRAZOLAM 0.5 MG
0.5 TABLET ORAL EVERY 8 HOURS PRN
Qty: 10 TABLET | Refills: 0 | Status: SHIPPED | OUTPATIENT
Start: 2025-07-19

## 2025-07-22 ENCOUNTER — ANCILLARY PROCEDURE (OUTPATIENT)
Dept: GENERAL RADIOLOGY | Facility: CLINIC | Age: 70
End: 2025-07-22
Attending: ORTHOPAEDIC SURGERY
Payer: MEDICARE

## 2025-07-22 ENCOUNTER — OFFICE VISIT (OUTPATIENT)
Dept: ORTHOPEDICS | Facility: CLINIC | Age: 70
End: 2025-07-22
Payer: MEDICARE

## 2025-07-22 VITALS — WEIGHT: 208 LBS | RESPIRATION RATE: 18 BRPM | HEIGHT: 70 IN | BODY MASS INDEX: 29.78 KG/M2

## 2025-07-22 DIAGNOSIS — Z96.611 H/O TOTAL SHOULDER REPLACEMENT, RIGHT: ICD-10-CM

## 2025-07-22 DIAGNOSIS — Z96.611 H/O TOTAL SHOULDER REPLACEMENT, RIGHT: Primary | ICD-10-CM

## 2025-07-22 PROCEDURE — 99024 POSTOP FOLLOW-UP VISIT: CPT | Performed by: ORTHOPAEDIC SURGERY

## 2025-07-22 PROCEDURE — 73030 X-RAY EXAM OF SHOULDER: CPT | Mod: TC | Performed by: RADIOLOGY

## 2025-07-22 RX ORDER — HYDROCODONE BITARTRATE AND ACETAMINOPHEN 5; 325 MG/1; MG/1
.5-1 TABLET ORAL EVERY 6 HOURS PRN
Qty: 20 TABLET | Refills: 0 | Status: SHIPPED | OUTPATIENT
Start: 2025-07-22 | End: 2025-07-29

## 2025-07-22 NOTE — LETTER
7/22/2025      Jose Angel Cha  [id number] [place] [facility] [id number]      Dear Colleague,    Thank you for referring your patient, Jose Angel Cha, to the [facility]. Please see a copy of my visit note below.    Follow up right total shoulder arthroplasty on 7/9/25.  He is using a sling.  Is doing pendulum exercises.  Wound is healing well.  Sensation, circulation and motor are intact.  Xray shows good position of shoulder prosthesis.      Assessment:  right total shoulder arthroplasty healing well.  Plan:  Start Physical Therapy for active and passive range of motion.  Limit external rotation to 30 degrees.  No resistive internal rotation.  Medication renewal:  had oxycodone recently. Doesn't like side effects.  Will try Vicodin  Return to clinic 4 weeks.     Again, thank you for allowing me to participate in the care of your patient.        Sincerely,        Jaycob Ross MD    Electronically signed

## 2025-07-22 NOTE — PROGRESS NOTES
Follow up right total shoulder arthroplasty on 7/9/25.  He is using a sling.  Is doing pendulum exercises.  Wound is healing well.  Sensation, circulation and motor are intact.  Xray shows good position of shoulder prosthesis.      Assessment:  right total shoulder arthroplasty healing well.  Plan:  Start Physical Therapy for active and passive range of motion.  Limit external rotation to 30 degrees.  No resistive internal rotation.  Medication renewal:  had oxycodone recently. Doesn't like side effects.  Will try Vicodin  Return to clinic 4 weeks.

## 2025-07-22 NOTE — PATIENT INSTRUCTIONS
right total shoulder arthroplasty healing well.  Plan:  Start Physical Therapy for active and passive range of motion. Please call [phone].   Limit external rotation to 30 degrees.  No resistive internal rotation.  Medication renewal:  vicodin  Return to clinic 4 weeks.

## 2025-07-23 ENCOUNTER — TELEPHONE (OUTPATIENT)
Dept: NEUROLOGY | Facility: CLINIC | Age: 70
End: 2025-07-23
Payer: MEDICARE

## 2025-07-23 NOTE — TELEPHONE ENCOUNTER
[facility] Call Center    Phone Message    May a detailed message be left on voicemail: yes     Reason for Call: Patient calling in. States that they have an MRI with sedation scheduled for 07/29/2025 and they have a few questions they would like to discuss before going into that appointment. Please reach back out to patient at [phone].    Action Taken: Message routed to:  Other: [facility]

## 2025-07-24 NOTE — TELEPHONE ENCOUNTER
Had heart surgery 2010, with general anesthesia. When he came out of surgery, had panic attacks with claustrophobia for the next couple of weeks.    Just had shoulder replaced and had similar symptoms.    Pt is concerned about having the same symptoms after MRI.    Told pt that the he will not be put under with general anesthesia for the MRI, IV sedation will be utilized therefore hopefully he shouldn't develop the same residual effects as prior.     Jessica SANDERS RN, BSN  [facility]

## 2025-07-28 ENCOUNTER — TELEPHONE (OUTPATIENT)
Dept: NEUROLOGY | Facility: CLINIC | Age: 70
End: 2025-07-28

## 2025-07-28 ENCOUNTER — ANESTHESIA EVENT (OUTPATIENT)
Dept: SURGERY | Facility: CLINIC | Age: 70
End: 2025-07-28
Payer: MEDICARE

## 2025-07-28 NOTE — TELEPHONE ENCOUNTER
See TE from today. No further action needed on this encounter.     Jessica SANDERS RN, BSN  [facility]

## 2025-07-28 NOTE — TELEPHONE ENCOUNTER
Spoke with pt.      He previously had questions about anesthesia with MRI tomorrow, but he was able to speak to anesthesiologist, so he has no further questions.     Pt does have additional question regarding MR ankle and tibia results 6/28/25.    He is still having pain in right calf and ankle, and the imaging showed a tear, so he would like to know who he would be managing treatment recommendations for this.     Please advise.     LAURI Quick, BSN  [facility]

## 2025-07-28 NOTE — TELEPHONE ENCOUNTER
[facility] Call Center    Phone Message    May a detailed message be left on voicemail: yes     Reason for Call: Pt has some questions abut taking anti anxiety medications before his MRI visit tomorrow, please call Jose Angel at [phone] to discuss further.    Action Taken: Message routed to:  Other: [facility]    Travel Screening: Not Applicable     Date of Service:

## 2025-07-28 NOTE — TELEPHONE ENCOUNTER
Attempted to call [facility] to get clarification on medications for MRI.    Sofi AVILA RN  [facility]

## 2025-07-28 NOTE — TELEPHONE ENCOUNTER
Per Dr. Hernandez:   7/28/25  1:47 PM  In terms of the findings on the MRI for his ankle and lower leg, I think he should follow-up with his podiatrist concerning this.  While he has some maybe nerve involvement, his imaging and history does not really support this being primarily neurologic in etiology that I can tell.     LD relaying provider message above, to follow up with podiatry regarding MR imaging of ankle/leg.     Ynes RN, BSN  [facility]

## 2025-07-28 NOTE — TELEPHONE ENCOUNTER
[facility] Call Center    Phone Message    May a detailed message be left on voicemail: yes     Reason for Call: Patient got the answer he needed about medication.    Patient now requests a call back to discuss torn calf ligament and MRI plans    Action Taken: [facility] Neurology    Travel Screening: Not Applicable     Date of Service:

## 2025-07-29 ENCOUNTER — ANESTHESIA (OUTPATIENT)
Dept: SURGERY | Facility: CLINIC | Age: 70
End: 2025-07-29
Payer: MEDICARE

## 2025-07-29 ENCOUNTER — HOSPITAL ENCOUNTER (OUTPATIENT)
Dept: MRI IMAGING | Facility: CLINIC | Age: 70
Discharge: HOME | End: 2025-07-29
Attending: PSYCHIATRY & NEUROLOGY
Payer: MEDICARE

## 2025-07-29 ENCOUNTER — HOSPITAL ENCOUNTER (OUTPATIENT)
Facility: CLINIC | Age: 70
Discharge: HOME | End: 2025-07-29
Attending: PSYCHIATRY & NEUROLOGY | Admitting: PSYCHIATRY & NEUROLOGY
Payer: MEDICARE

## 2025-07-29 VITALS
HEIGHT: 70 IN | HEART RATE: 63 BPM | OXYGEN SATURATION: 95 % | WEIGHT: 206.79 LBS | DIASTOLIC BLOOD PRESSURE: 84 MMHG | TEMPERATURE: 97.8 F | SYSTOLIC BLOOD PRESSURE: 125 MMHG | BODY MASS INDEX: 29.6 KG/M2 | RESPIRATION RATE: 20 BRPM

## 2025-07-29 DIAGNOSIS — R51.9 NONINTRACTABLE HEADACHE, UNSPECIFIED CHRONICITY PATTERN, UNSPECIFIED HEADACHE TYPE: ICD-10-CM

## 2025-07-29 PROCEDURE — 250N000009 HC RX 250: Performed by: NURSE ANESTHETIST, CERTIFIED REGISTERED

## 2025-07-29 PROCEDURE — 70553 MRI BRAIN STEM W/O & W/DYE: CPT

## 2025-07-29 PROCEDURE — 250N000013 HC RX MED GY IP 250 OP 250 PS 637: Performed by: ANESTHESIOLOGY

## 2025-07-29 PROCEDURE — 710N000012 HC RECOVERY PHASE 2, PER MINUTE

## 2025-07-29 PROCEDURE — A9585 GADOBUTROL INJECTION: HCPCS | Performed by: PSYCHIATRY & NEUROLOGY

## 2025-07-29 PROCEDURE — 710N000011 HC RECOVERY PHASE 1, LEVEL 3, PER MIN

## 2025-07-29 PROCEDURE — 250N000011 HC RX IP 250 OP 636: Performed by: ANESTHESIOLOGY

## 2025-07-29 PROCEDURE — 250N000009 HC RX 250: Performed by: ANESTHESIOLOGY

## 2025-07-29 PROCEDURE — 70553 MRI BRAIN STEM W/O & W/DYE: CPT | Mod: 26 | Performed by: RADIOLOGY

## 2025-07-29 PROCEDURE — 370N000017 HC ANESTHESIA TECHNICAL FEE, PER MIN

## 2025-07-29 PROCEDURE — 999N000141 HC STATISTIC PRE-PROCEDURE NURSING ASSESSMENT

## 2025-07-29 PROCEDURE — 250N000025 HC SEVOFLURANE, PER MIN

## 2025-07-29 PROCEDURE — 255N000002 HC RX 255 OP 636: Performed by: PSYCHIATRY & NEUROLOGY

## 2025-07-29 PROCEDURE — 258N000003 HC RX IP 258 OP 636: Performed by: ANESTHESIOLOGY

## 2025-07-29 RX ORDER — DEXAMETHASONE SODIUM PHOSPHATE 4 MG/ML
4 INJECTION, SOLUTION INTRA-ARTICULAR; INTRALESIONAL; INTRAMUSCULAR; INTRAVENOUS; SOFT TISSUE
Status: DISCONTINUED | OUTPATIENT
Start: 2025-07-29 | End: 2025-07-29 | Stop reason: HOSPADM

## 2025-07-29 RX ORDER — ONDANSETRON 4 MG/1
4 TABLET, ORALLY DISINTEGRATING ORAL EVERY 30 MIN PRN
Status: DISCONTINUED | OUTPATIENT
Start: 2025-07-29 | End: 2025-07-29 | Stop reason: HOSPADM

## 2025-07-29 RX ORDER — HYDROMORPHONE HCL IN WATER/PF 6 MG/30 ML
0.4 PATIENT CONTROLLED ANALGESIA SYRINGE INTRAVENOUS EVERY 5 MIN PRN
Refills: 0 | Status: DISCONTINUED | OUTPATIENT
Start: 2025-07-29 | End: 2025-07-29 | Stop reason: HOSPADM

## 2025-07-29 RX ORDER — ONDANSETRON 2 MG/ML
4 INJECTION INTRAMUSCULAR; INTRAVENOUS EVERY 30 MIN PRN
Status: DISCONTINUED | OUTPATIENT
Start: 2025-07-29 | End: 2025-07-29 | Stop reason: HOSPADM

## 2025-07-29 RX ORDER — SODIUM CHLORIDE, SODIUM LACTATE, POTASSIUM CHLORIDE, CALCIUM CHLORIDE 600; 310; 30; 20 MG/100ML; MG/100ML; MG/100ML; MG/100ML
INJECTION, SOLUTION INTRAVENOUS CONTINUOUS
Status: DISCONTINUED | OUTPATIENT
Start: 2025-07-29 | End: 2025-07-29 | Stop reason: HOSPADM

## 2025-07-29 RX ORDER — NALOXONE HYDROCHLORIDE 0.4 MG/ML
0.1 INJECTION, SOLUTION INTRAMUSCULAR; INTRAVENOUS; SUBCUTANEOUS
Status: DISCONTINUED | OUTPATIENT
Start: 2025-07-29 | End: 2025-07-29 | Stop reason: HOSPADM

## 2025-07-29 RX ORDER — SODIUM CHLORIDE, SODIUM LACTATE, POTASSIUM CHLORIDE, CALCIUM CHLORIDE 600; 310; 30; 20 MG/100ML; MG/100ML; MG/100ML; MG/100ML
INJECTION, SOLUTION INTRAVENOUS CONTINUOUS PRN
Status: DISCONTINUED | OUTPATIENT
Start: 2025-07-29 | End: 2025-07-29

## 2025-07-29 RX ORDER — OXYCODONE HYDROCHLORIDE 5 MG/1
5 TABLET ORAL
Status: DISCONTINUED | OUTPATIENT
Start: 2025-07-29 | End: 2025-07-29 | Stop reason: HOSPADM

## 2025-07-29 RX ORDER — GADOBUTROL 604.72 MG/ML
0.1 INJECTION INTRAVENOUS ONCE
Status: COMPLETED | OUTPATIENT
Start: 2025-07-29 | End: 2025-07-29

## 2025-07-29 RX ORDER — HYDROMORPHONE HYDROCHLORIDE 1 MG/ML
0.4 INJECTION, SOLUTION INTRAMUSCULAR; INTRAVENOUS; SUBCUTANEOUS EVERY 5 MIN PRN
Refills: 0 | Status: DISCONTINUED | OUTPATIENT
Start: 2025-07-29 | End: 2025-07-29

## 2025-07-29 RX ORDER — HYDROMORPHONE HCL IN WATER/PF 6 MG/30 ML
0.2 PATIENT CONTROLLED ANALGESIA SYRINGE INTRAVENOUS EVERY 5 MIN PRN
Refills: 0 | Status: DISCONTINUED | OUTPATIENT
Start: 2025-07-29 | End: 2025-07-29 | Stop reason: HOSPADM

## 2025-07-29 RX ORDER — ONDANSETRON 2 MG/ML
INJECTION INTRAMUSCULAR; INTRAVENOUS PRN
Status: DISCONTINUED | OUTPATIENT
Start: 2025-07-29 | End: 2025-07-29

## 2025-07-29 RX ORDER — HYDROMORPHONE HYDROCHLORIDE 1 MG/ML
0.2 INJECTION, SOLUTION INTRAMUSCULAR; INTRAVENOUS; SUBCUTANEOUS EVERY 5 MIN PRN
Refills: 0 | Status: DISCONTINUED | OUTPATIENT
Start: 2025-07-29 | End: 2025-07-29

## 2025-07-29 RX ORDER — PROPOFOL 10 MG/ML
INJECTION, EMULSION INTRAVENOUS PRN
Status: DISCONTINUED | OUTPATIENT
Start: 2025-07-29 | End: 2025-07-29

## 2025-07-29 RX ORDER — OXYCODONE HYDROCHLORIDE 10 MG/1
10 TABLET ORAL
Status: DISCONTINUED | OUTPATIENT
Start: 2025-07-29 | End: 2025-07-29 | Stop reason: HOSPADM

## 2025-07-29 RX ORDER — DEXAMETHASONE SODIUM PHOSPHATE 4 MG/ML
INJECTION, SOLUTION INTRA-ARTICULAR; INTRALESIONAL; INTRAMUSCULAR; INTRAVENOUS; SOFT TISSUE PRN
Status: DISCONTINUED | OUTPATIENT
Start: 2025-07-29 | End: 2025-07-29

## 2025-07-29 RX ORDER — FENTANYL CITRATE 50 UG/ML
INJECTION, SOLUTION INTRAMUSCULAR; INTRAVENOUS PRN
Status: DISCONTINUED | OUTPATIENT
Start: 2025-07-29 | End: 2025-07-29

## 2025-07-29 RX ORDER — LIDOCAINE HYDROCHLORIDE 20 MG/ML
INJECTION, SOLUTION INFILTRATION; PERINEURAL PRN
Status: DISCONTINUED | OUTPATIENT
Start: 2025-07-29 | End: 2025-07-29

## 2025-07-29 RX ORDER — ACETAMINOPHEN 325 MG/1
975 TABLET ORAL ONCE
Status: COMPLETED | OUTPATIENT
Start: 2025-07-29 | End: 2025-07-29

## 2025-07-29 RX ADMIN — PHENYLEPHRINE HYDROCHLORIDE 200 MCG: 10 INJECTION INTRAVENOUS at 08:53

## 2025-07-29 RX ADMIN — LIDOCAINE HYDROCHLORIDE 60 MG: 20 INJECTION, SOLUTION INFILTRATION; PERINEURAL at 08:23

## 2025-07-29 RX ADMIN — ACETAMINOPHEN 975 MG: 325 TABLET ORAL at 10:25

## 2025-07-29 RX ADMIN — PHENYLEPHRINE HYDROCHLORIDE 100 MCG: 10 INJECTION INTRAVENOUS at 08:44

## 2025-07-29 RX ADMIN — HYDROMORPHONE HYDROCHLORIDE 0.2 MG: 0.2 INJECTION, SOLUTION INTRAMUSCULAR; INTRAVENOUS; SUBCUTANEOUS at 10:20

## 2025-07-29 RX ADMIN — SODIUM CHLORIDE, SODIUM LACTATE, POTASSIUM CHLORIDE, AND CALCIUM CHLORIDE: .6; .31; .03; .02 INJECTION, SOLUTION INTRAVENOUS at 08:12

## 2025-07-29 RX ADMIN — Medication 200 MG: at 09:15

## 2025-07-29 RX ADMIN — DEXAMETHASONE SODIUM PHOSPHATE 4 MG: 4 INJECTION, SOLUTION INTRAMUSCULAR; INTRAVENOUS at 08:27

## 2025-07-29 RX ADMIN — PROPOFOL 100 MCG/KG/MIN: 10 INJECTION, EMULSION INTRAVENOUS at 08:28

## 2025-07-29 RX ADMIN — ONDANSETRON 4 MG: 2 INJECTION INTRAMUSCULAR; INTRAVENOUS at 08:27

## 2025-07-29 RX ADMIN — DEXMEDETOMIDINE HYDROCHLORIDE 4 MCG: 100 INJECTION, SOLUTION INTRAVENOUS at 08:22

## 2025-07-29 RX ADMIN — GADOBUTROL 10 ML: 604.72 INJECTION INTRAVENOUS at 09:14

## 2025-07-29 RX ADMIN — PROPOFOL 200 MG: 10 INJECTION, EMULSION INTRAVENOUS at 08:23

## 2025-07-29 RX ADMIN — DEXMEDETOMIDINE HYDROCHLORIDE 8 MCG: 100 INJECTION, SOLUTION INTRAVENOUS at 08:16

## 2025-07-29 RX ADMIN — FENTANYL CITRATE 100 MCG: 50 INJECTION INTRAMUSCULAR; INTRAVENOUS at 08:21

## 2025-07-29 RX ADMIN — Medication 50 MG: at 08:23

## 2025-07-29 RX ADMIN — MIDAZOLAM 2 MG: 1 INJECTION INTRAMUSCULAR; INTRAVENOUS at 08:12

## 2025-07-29 ASSESSMENT — ENCOUNTER SYMPTOMS: DYSRHYTHMIAS: 1

## 2025-07-29 ASSESSMENT — SLEEP AND FATIGUE QUESTIONNAIRES
HOW LIKELY ARE YOU TO NOD OFF OR FALL ASLEEP IN A CAR, WHILE STOPPED FOR A FEW MINUTES IN TRAFFIC: WOULD NEVER DOZE
HOW LIKELY ARE YOU TO NOD OFF OR FALL ASLEEP WHILE LYING DOWN TO REST IN THE AFTERNOON WHEN CIRCUMSTANCES PERMIT: HIGH CHANCE OF DOZING
HOW LIKELY ARE YOU TO NOD OFF OR FALL ASLEEP WHILE SITTING AND TALKING TO SOMEONE: WOULD NEVER DOZE
HOW LIKELY ARE YOU TO NOD OFF OR FALL ASLEEP WHILE SITTING QUIETLY AFTER LUNCH WITHOUT ALCOHOL: SLIGHT CHANCE OF DOZING
HOW LIKELY ARE YOU TO NOD OFF OR FALL ASLEEP WHILE SITTING AND READING: MODERATE CHANCE OF DOZING
HOW LIKELY ARE YOU TO NOD OFF OR FALL ASLEEP WHILE SITTING INACTIVE IN A PUBLIC PLACE: WOULD NEVER DOZE
HOW LIKELY ARE YOU TO NOD OFF OR FALL ASLEEP WHILE WATCHING TV: SLIGHT CHANCE OF DOZING
HOW LIKELY ARE YOU TO NOD OFF OR FALL ASLEEP WHEN YOU ARE A PASSENGER IN A CAR FOR AN HOUR WITHOUT A BREAK: MODERATE CHANCE OF DOZING

## 2025-07-29 ASSESSMENT — ACTIVITIES OF DAILY LIVING (ADL)
ADLS_ACUITY_SCORE: 45
ADLS_ACUITY_SCORE: 43
ADLS_ACUITY_SCORE: 45
ADLS_ACUITY_SCORE: 45

## 2025-07-29 NOTE — DISCHARGE INSTRUCTIONS
After Anesthesia (Sleep Medicine)  What should I do after anesthesia?  You should rest and relax for the next 24 hours. Avoid risky or difficult (strenuous) activity. A responsible adult should stay with you overnight.  Don't drive or use any heavy equipment for 24 hours. Even if you feel normal, your reactions may be affected by the sleep medicine given to you.  Don't drink alcohol or make any important decisions for 24 hours.  Slowly get back to your regular diet, as you feel able.  How should I expect to feel?  It's normal to feel dizzy, light-headed, or faint for up to a full day after anesthesia or while taking pain medicine. If this happens:   Sit down for a few minutes before standing.  Have someone help you when you get up to walk or use the bathroom.  If you have nausea (feel sick to your stomach) or vomit (throw up):   Drink clear liquids (such as apple juice, ginger ale, broth, or 7UP) until you feel better.  If you feel sick to your stomach, or you keep vomiting for 24 hours, please call the doctor.  What else should I know?  You might have a dry mouth, sore throat, muscle aches, or trouble sleeping. These should go away after 24 hours.  Please contact your doctor if you have any other symptoms that concern you, such as fever, pain, bleeding, fluid drainage, swelling, or headache, or if it's been over 8 to 10 hours and you still aren't able to pee (urinate).  If you have a history of sleep apnea, it's very important to use your CPAP machine for the next 24 hours when you nap or sleep.

## 2025-07-29 NOTE — ANESTHESIA CARE TRANSFER NOTE
Patient: Jose Angel Cha    Procedure: Procedure(s):  Anesthesia out of OR MAGNETIC RESONANCE IMAGING OF BRAIN WITH AND WITHOUT@0800       Diagnosis: Acute intractable headache, unspecified headache type [R51.9]  Diagnosis Additional Information: No value filed.    Anesthesia Type:   No value filed.     Note:    Oropharynx: spontaneously breathing and oropharynx clear of all foreign objects  Level of Consciousness: drowsy  Oxygen Supplementation: face mask  Level of Supplemental Oxygen (L/min / FiO2): 6  Independent Airway: airway patency satisfactory and stable    Vital Signs Stable: post-procedure vital signs reviewed and stable  Report to RN Given: handoff report given  Patient transferred to: PACU    Handoff Report: Identifed the Patient, Identified the Reponsible Provider, Reviewed the pertinent medical history, Discussed the surgical course, Reviewed Intra-OP anesthesia mangement and issues during anesthesia, Set expectations for post-procedure period and Allowed opportunity for questions and acknowledgement of understanding      Vitals:  Vitals Value Taken Time   /84 07/29/25 09:30   Temp 36.3  C (97.3  F) 07/29/25 09:16   Pulse 63 07/29/25 09:39   Resp 17 07/29/25 09:39   SpO2 99 % 07/29/25 09:39   Vitals shown include unfiled device data.    Electronically Signed By: ETHEL Hampton CRNA  July 29, 2025  9:40 AM
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
## 2025-07-30 ENCOUNTER — OFFICE VISIT (OUTPATIENT)
Dept: SLEEP MEDICINE | Facility: CLINIC | Age: 70
End: 2025-07-30
Payer: MEDICARE

## 2025-07-30 ENCOUNTER — NURSE TRIAGE (OUTPATIENT)
Dept: FAMILY MEDICINE | Facility: OTHER | Age: 70
End: 2025-07-30

## 2025-07-30 VITALS
OXYGEN SATURATION: 96 % | RESPIRATION RATE: 16 BRPM | BODY MASS INDEX: 29.23 KG/M2 | DIASTOLIC BLOOD PRESSURE: 78 MMHG | WEIGHT: 208.8 LBS | HEIGHT: 71 IN | HEART RATE: 71 BPM | SYSTOLIC BLOOD PRESSURE: 123 MMHG

## 2025-07-30 DIAGNOSIS — G47.33 OSA (OBSTRUCTIVE SLEEP APNEA): Primary | ICD-10-CM

## 2025-07-30 PROCEDURE — G2211 COMPLEX E/M VISIT ADD ON: HCPCS | Performed by: INTERNAL MEDICINE

## 2025-07-30 PROCEDURE — 99213 OFFICE O/P EST LOW 20 MIN: CPT | Performed by: INTERNAL MEDICINE

## 2025-07-30 PROCEDURE — 1125F AMNT PAIN NOTED PAIN PRSNT: CPT | Performed by: INTERNAL MEDICINE

## 2025-07-30 PROCEDURE — 3074F SYST BP LT 130 MM HG: CPT | Performed by: INTERNAL MEDICINE

## 2025-07-30 PROCEDURE — 3078F DIAST BP <80 MM HG: CPT | Performed by: INTERNAL MEDICINE

## 2025-07-30 NOTE — ANESTHESIA POSTPROCEDURE EVALUATION
Patient: Jose Angel Cha    Procedure: Procedure(s):  Anesthesia out of OR MAGNETIC RESONANCE IMAGING OF BRAIN WITH AND WITHOUT@0800       Anesthesia Type:  No value filed.    Note:  Disposition: Outpatient   Postop Pain Control: Uneventful            Sign Out: Well controlled pain   PONV: No   Neuro/Psych: Uneventful            Sign Out: Acceptable/Baseline neuro status   Airway/Respiratory: Uneventful            Sign Out: Acceptable/Baseline resp. status   CV/Hemodynamics: Uneventful            Sign Out: Acceptable CV status; No obvious hypovolemia; No obvious fluid overload   Other NRE: NONE   DID A NON-ROUTINE EVENT OCCUR? No           Last vitals:  Vitals Value Taken Time   /85 07/29/25 11:31   Temp 36.6  C (97.8  F) 07/29/25 10:45   Pulse 65 07/29/25 11:31   Resp 24 07/29/25 11:31   SpO2 96 % 07/29/25 11:31       Electronically Signed By: Vee Wallace MD  July 30, 2025  1:54 PM

## 2025-07-30 NOTE — NURSING NOTE
"Chief Complaint   Patient presents with    CPAP Follow Up     New CPAP compliance       Initial There were no vitals taken for this visit. Estimated body mass index is 29.67 kg/m  as calculated from the following:    Height as of 7/29/25: 1.778 m (5' 10\").    Weight as of 7/29/25: 93.8 kg (206 lb 12.7 oz).    Medication Reconciliation: complete  ESS: 9  Neck circumference: 18 inches / 46 centimeters.  DME: CHELI Friedman CMA    "
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
## 2025-07-30 NOTE — NURSING NOTE
1 year appointment reminder message was created and will be sent out 2 - 3 months prior to next appointment due date. [employer]

## 2025-07-30 NOTE — TELEPHONE ENCOUNTER
"Nurse Triage SBAR    Is this a 2nd Level Triage? NO    Situation: Patient calling with concerns for anxiety, claustrophilia, and requesting referral for psychiatry services.     Background: Patient reports history of claustrophobia and increasing anxiety. Recently having orthopedic treatments requiring MRI which Dr. Ross prescribed alprazolam 0.5 mg as needed for MRI and anxieties. Patient states working well.     Assessment: Patient reporting increasing anxieties. He was looking into [facility] psychiatry services, but would need referral. He reports having anxiety that is keeping him up at night. He has triggers from tv/life that increase the anxiety. He denies active anxiety attack on phone. He is able to function/perform daily tasks without overwhelming anxiety.     Protocol Recommended Disposition:   See in Office Within 3 Days    Recommendation: RN scheduled patient same day appointment in clinic 08/01 with Cj Hong. Wondering if PCP would be able to work him in, otherwise is he ok with appointment as schedule. If unable to work in, no need to return call as patient will be in clinic Friday. If able to work in he \"can make any date/time work\".     Celestino Forte RN on 7/30/2025 at 12:13 PM       Routed to provider    Does the patient meet one of the following criteria for ADS visit consideration? 16+ years old, with an [facility] PCP     TIP  Providers, please consider if this condition is appropriate for management at one of our Acute and Diagnostic Services sites.     If patient is a good candidate, please use dotphrase <dot>triageresponse and select Refer to ADS to document.      Reason for Disposition   Patient wants to be seen    Protocols used: Anxiety and Panic Attack-A-[place]    "
PCP not in this week so recommend he keep appt with JR. Bishnu Smart PA-C    
Attending Only

## 2025-07-31 ENCOUNTER — OFFICE VISIT (OUTPATIENT)
Dept: PODIATRY | Facility: CLINIC | Age: 70
End: 2025-07-31
Payer: MEDICARE

## 2025-07-31 VITALS — BODY MASS INDEX: 29.23 KG/M2 | WEIGHT: 208.8 LBS | HEIGHT: 71 IN

## 2025-07-31 DIAGNOSIS — I87.2 EDEMA OF RIGHT LOWER LEG DUE TO VENOUS STASIS: ICD-10-CM

## 2025-07-31 DIAGNOSIS — I87.8 VENOUS STASIS: Primary | ICD-10-CM

## 2025-07-31 DIAGNOSIS — R60.0 EDEMA OF RIGHT LOWER LEG DUE TO VENOUS STASIS: ICD-10-CM

## 2025-07-31 ASSESSMENT — PAIN SCALES - GENERAL: PAINLEVEL_OUTOF10: MODERATE PAIN (4)
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
## 2025-07-31 NOTE — PROGRESS NOTES
I called the patient to follow up with him after his MRI under [facility]. I was unable to reach him but left a voicemail.    2.21
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
## 2025-08-07 ENCOUNTER — THERAPY VISIT (OUTPATIENT)
Dept: PHYSICAL THERAPY | Facility: CLINIC | Age: 70
End: 2025-08-07
Attending: PSYCHIATRY & NEUROLOGY
Payer: MEDICARE

## 2025-08-07 DIAGNOSIS — Z96.611 STATUS POST TOTAL SHOULDER ARTHROPLASTY, RIGHT: Primary | ICD-10-CM

## 2025-08-07 PROCEDURE — 97110 THERAPEUTIC EXERCISES: CPT | Mod: GP | Performed by: PHYSICAL THERAPIST

## 2025-08-07 PROCEDURE — 97161 PT EVAL LOW COMPLEX 20 MIN: CPT | Mod: GP | Performed by: PHYSICAL THERAPIST

## 2025-08-07 ASSESSMENT — ACTIVITIES OF DAILY LIVING (ADL)
PUSHING_WITH_THE_INVOLVED_ARM: 2
CARRYING_A_HEAVY_OBJECT_OF_10_POUNDS: 3
TOUCHING_THE_BACK_OF_YOUR_NECK: 3
PUTTING_ON_YOUR_PANTS: 2
PUTTING_ON_AN_UNDERSHIRT_OR_A_PULLOVER_SWEATER: 2
PUTTING_ON_A_SHIRT_THAT_BUTTONS_DOWN_THE_FRONT: 2
PLACING_AN_OBJECT_ON_A_HIGH_SHELF: 4
AT_ITS_WORST?: 7
REACHING_FOR_SOMETHING_ON_A_HIGH_SHELF: 3
WASHING_YOUR_HAIR?: 2
PLEASE_INDICATE_YOR_PRIMARY_REASON_FOR_REFERRAL_TO_THERAPY:: SHOULDER

## 2025-08-14 ENCOUNTER — THERAPY VISIT (OUTPATIENT)
Dept: PHYSICAL THERAPY | Facility: CLINIC | Age: 70
End: 2025-08-14
Attending: PSYCHIATRY & NEUROLOGY
Payer: MEDICARE

## 2025-08-14 DIAGNOSIS — Z96.611 STATUS POST TOTAL SHOULDER ARTHROPLASTY, RIGHT: Primary | ICD-10-CM

## 2025-08-14 PROCEDURE — 97140 MANUAL THERAPY 1/> REGIONS: CPT | Mod: GP

## 2025-08-14 PROCEDURE — 97110 THERAPEUTIC EXERCISES: CPT | Mod: GP

## 2025-08-19 ENCOUNTER — OFFICE VISIT (OUTPATIENT)
Dept: ORTHOPEDICS | Facility: CLINIC | Age: 70
End: 2025-08-19
Payer: MEDICARE

## 2025-08-19 VITALS — BODY MASS INDEX: 29.4 KG/M2 | RESPIRATION RATE: 18 BRPM | HEIGHT: 71 IN | WEIGHT: 210 LBS

## 2025-08-19 DIAGNOSIS — Z96.611 STATUS POST TOTAL SHOULDER ARTHROPLASTY, RIGHT: Primary | ICD-10-CM

## 2025-08-19 PROCEDURE — 99024 POSTOP FOLLOW-UP VISIT: CPT | Performed by: ORTHOPAEDIC SURGERY

## 2025-08-20 ENCOUNTER — THERAPY VISIT (OUTPATIENT)
Dept: PHYSICAL THERAPY | Facility: CLINIC | Age: 70
End: 2025-08-20
Attending: ORTHOPAEDIC SURGERY
Payer: MEDICARE

## 2025-08-20 DIAGNOSIS — M19.011 PRIMARY OSTEOARTHRITIS OF RIGHT SHOULDER: ICD-10-CM

## 2025-08-20 PROCEDURE — 97110 THERAPEUTIC EXERCISES: CPT | Mod: GP | Performed by: PHYSICAL THERAPIST

## 2025-08-22 DIAGNOSIS — N13.8 BPH WITH OBSTRUCTION/LOWER URINARY TRACT SYMPTOMS: ICD-10-CM

## 2025-08-22 DIAGNOSIS — N40.1 BPH WITH OBSTRUCTION/LOWER URINARY TRACT SYMPTOMS: ICD-10-CM

## 2025-08-22 DIAGNOSIS — R35.0 URINARY FREQUENCY: ICD-10-CM

## 2025-08-25 ENCOUNTER — THERAPY VISIT (OUTPATIENT)
Dept: PHYSICAL THERAPY | Facility: CLINIC | Age: 70
End: 2025-08-25
Attending: ORTHOPAEDIC SURGERY
Payer: MEDICARE

## 2025-08-25 DIAGNOSIS — Z96.611 STATUS POST TOTAL SHOULDER ARTHROPLASTY, RIGHT: Primary | ICD-10-CM

## 2025-08-25 PROCEDURE — 97110 THERAPEUTIC EXERCISES: CPT | Mod: GP | Performed by: PHYSICAL THERAPIST

## 2025-08-25 RX ORDER — TROSPIUM CHLORIDE ER 60 MG/1
60 CAPSULE ORAL EVERY MORNING
Qty: 90 CAPSULE | Refills: 0 | Status: SHIPPED | OUTPATIENT
Start: 2025-08-25

## (undated) DEVICE — SU NDL MAYO TROCAR 217002

## (undated) DEVICE — DRAPE CONVERTORS U-DRAPE 60X72" 8476

## (undated) DEVICE — DRSG AQUACEL AG HYDROFIBER  3.5X10" 422605

## (undated) DEVICE — TRAY EPDRL 3.5IN 17GA 19GA PRFX BPA DEHP 332079

## (undated) DEVICE — TUBING SUCTION 12"X1/4" N612

## (undated) DEVICE — PACK TOTAL JOINT STD LATEX

## (undated) DEVICE — HOOD FLYTE 0408-800-000

## (undated) DEVICE — SU DERMABOND PROPEN .5ML DPP6

## (undated) DEVICE — GLOVE BIOGEL PI SZ 8.0 40880

## (undated) DEVICE — SUTURE VICRYL+ 2 27IN CP UNDYED VCP195H

## (undated) DEVICE — TUBING IV EXTENSION SET 34"

## (undated) DEVICE — NDL ECLIPSE 22GA 1.5"

## (undated) DEVICE — KIT ENDO TURNOVER/PROCEDURE CARRY-ON 101822

## (undated) DEVICE — DRSG AQUACEL AG 3.5X6.0" HYDROFIBER 412010

## (undated) DEVICE — GLOVE BIOGEL PI SZ 7.5 40875

## (undated) DEVICE — DRAPE EXTREMITY W/ARMBOARD 29405

## (undated) DEVICE — STRAP STIRRUP W/SLIP 30187-030

## (undated) DEVICE — DRAPE STERI TOWEL SM 1000

## (undated) DEVICE — DRAPE STERI U 1015

## (undated) DEVICE — SU MONOCRYL 3-0 PS-2 27" Y427H

## (undated) DEVICE — DRAPE POUCH INSTRUMENT 1018

## (undated) DEVICE — PIN STEINMANN 1/8" THRD 406669

## (undated) DEVICE — FILTER HEPA FLUID TRAP NEPTUNE 0703-040-001

## (undated) DEVICE — SYR 10ML FINGER CONTROL W/O NDL 309695

## (undated) DEVICE — SOL WATER IRRIG 1000ML BOTTLE 2F7114

## (undated) DEVICE — DRAPE IOBAN INCISE 36X23" 6651EZ

## (undated) DEVICE — SYR 50ML LL W/O NDL 309653

## (undated) DEVICE — IMM SHOULDER LG 79-84017

## (undated) DEVICE — SUCTION IRR SYSTEM W/O TIP INTERPULSE HANDPIECE 0210-100-000

## (undated) DEVICE — ENDO FORCEP ENDOJAW BIOPSY 2.8MMX230CM FB-220U

## (undated) DEVICE — TUBING SUCTION 6"X3/16" N56A

## (undated) DEVICE — ESU PENCIL SMOKE EVAC W/ROCKER SWITCH 0703-047-000

## (undated) DEVICE — PREP CHLORAPREP 26ML TINTED ORANGE  260815

## (undated) DEVICE — SU VICRYL #2 OS-8 VIOLET 18" J719T

## (undated) DEVICE — SU VICRYL 2-0 CT-2 27" UND J269H

## (undated) DEVICE — Device

## (undated) DEVICE — SU POLYDEK #2 KC-7 30" 69-557

## (undated) DEVICE — DRAPE MAYO STAND 23X54 8337

## (undated) DEVICE — GLOVE BIOGEL INDICATOR 7.5 LF 41675

## (undated) DEVICE — NEEDLE ECLIPSE 23X1 1/2 RB TW 303304

## (undated) DEVICE — SYR 10ML LL W/O NDL

## (undated) DEVICE — SYR 05ML LL W/O NDL

## (undated) DEVICE — GLOVE BIOGEL PI ULTRATOUCH G SZ 7.5 42175

## (undated) DEVICE — NDL 19GA 1.5" FILTER 305200

## (undated) DEVICE — SU VICRYL 0 CP-1 27" UND J267H

## (undated) DEVICE — SYR 50ML CATH TIP W/O NDL 309620

## (undated) DEVICE — SPONGE RAY-TEC 4X8" 7318

## (undated) DEVICE — BONE CLEANING TIP INTERPULSE  0210-010-000

## (undated) DEVICE — LUBRICATING JELLY 4.25OZ

## (undated) DEVICE — BLADE SAW SAGITTAL STRK 18X90X1.19MM HD SYS 6 6118-119-090

## (undated) DEVICE — NDL ECLIPSE 18GA 1.5"

## (undated) DEVICE — DRAPE POUCH IRR 1016

## (undated) RX ORDER — FENTANYL CITRATE 50 UG/ML
INJECTION, SOLUTION INTRAMUSCULAR; INTRAVENOUS
Status: DISPENSED
Start: 2024-03-06

## (undated) RX ORDER — ONDANSETRON 2 MG/ML
INJECTION INTRAMUSCULAR; INTRAVENOUS
Status: DISPENSED
Start: 2025-07-09

## (undated) RX ORDER — REGADENOSON 0.08 MG/ML
INJECTION, SOLUTION INTRAVENOUS
Status: DISPENSED
Start: 2018-01-12

## (undated) RX ORDER — FENTANYL CITRATE 50 UG/ML
INJECTION, SOLUTION INTRAMUSCULAR; INTRAVENOUS
Status: DISPENSED
Start: 2025-07-29

## (undated) RX ORDER — PROPOFOL 10 MG/ML
INJECTION, EMULSION INTRAVENOUS
Status: DISPENSED
Start: 2024-12-04

## (undated) RX ORDER — VANCOMYCIN HYDROCHLORIDE 1 G/20ML
INJECTION, POWDER, LYOPHILIZED, FOR SOLUTION INTRAVENOUS
Status: DISPENSED
Start: 2025-07-09

## (undated) RX ORDER — ACETAMINOPHEN 325 MG/1
TABLET ORAL
Status: DISPENSED
Start: 2025-07-29

## (undated) RX ORDER — CEFAZOLIN SODIUM 1 G/3ML
INJECTION, POWDER, FOR SOLUTION INTRAMUSCULAR; INTRAVENOUS
Status: DISPENSED
Start: 2025-07-09

## (undated) RX ORDER — FENTANYL CITRATE 50 UG/ML
INJECTION, SOLUTION INTRAMUSCULAR; INTRAVENOUS
Status: DISPENSED
Start: 2018-11-12

## (undated) RX ORDER — LIDOCAINE HYDROCHLORIDE 10 MG/ML
INJECTION, SOLUTION EPIDURAL; INFILTRATION; INTRACAUDAL; PERINEURAL
Status: DISPENSED
Start: 2024-12-04

## (undated) RX ORDER — FENTANYL CITRATE 50 UG/ML
INJECTION, SOLUTION INTRAMUSCULAR; INTRAVENOUS
Status: DISPENSED
Start: 2025-07-09

## (undated) RX ORDER — FENTANYL CITRATE 50 UG/ML
INJECTION, SOLUTION INTRAMUSCULAR; INTRAVENOUS
Status: DISPENSED
Start: 2017-09-08

## (undated) RX ORDER — BUPIVACAINE HYDROCHLORIDE AND EPINEPHRINE 5; 5 MG/ML; UG/ML
INJECTION, SOLUTION EPIDURAL; INTRACAUDAL; PERINEURAL
Status: DISPENSED
Start: 2025-07-09

## (undated) RX ORDER — HYDROMORPHONE HCL IN WATER/PF 6 MG/30 ML
PATIENT CONTROLLED ANALGESIA SYRINGE INTRAVENOUS
Status: DISPENSED
Start: 2025-07-29

## (undated) RX ORDER — FENTANYL CITRATE-0.9 % NACL/PF 10 MCG/ML
PLASTIC BAG, INJECTION (ML) INTRAVENOUS
Status: DISPENSED
Start: 2025-07-09

## (undated) RX ORDER — DEXAMETHASONE SODIUM PHOSPHATE 10 MG/ML
INJECTION, SOLUTION INTRAMUSCULAR; INTRAVENOUS
Status: DISPENSED
Start: 2025-07-09